# Patient Record
Sex: FEMALE | Race: WHITE | NOT HISPANIC OR LATINO | Employment: OTHER | URBAN - METROPOLITAN AREA
[De-identification: names, ages, dates, MRNs, and addresses within clinical notes are randomized per-mention and may not be internally consistent; named-entity substitution may affect disease eponyms.]

---

## 2017-01-03 ENCOUNTER — HOSPITAL ENCOUNTER (OUTPATIENT)
Dept: PREADMISSION TESTING | Facility: HOSPITAL | Age: 77
Discharge: HOME | End: 2017-01-03
Attending: ORTHOPAEDIC SURGERY
Payer: MEDICARE

## 2017-01-03 ENCOUNTER — HOSPITAL ENCOUNTER (OUTPATIENT)
Dept: RADIOLOGY | Facility: HOSPITAL | Age: 77
Discharge: HOME | End: 2017-01-03
Attending: ORTHOPAEDIC SURGERY
Payer: MEDICARE

## 2017-01-03 VITALS — HEIGHT: 64 IN | WEIGHT: 230 LBS | BODY MASS INDEX: 39.27 KG/M2

## 2017-01-03 DIAGNOSIS — Z01.818 PREOP EXAMINATION: ICD-10-CM

## 2017-01-03 DIAGNOSIS — M19.019 SHOULDER ARTHRITIS: Primary | ICD-10-CM

## 2017-01-03 LAB
ANION GAP SERPL CALC-SCNC: 8 MMOL/L
BACTERIA #/AREA URNS HPF: NORMAL /HPF
BASOPHILS # BLD AUTO: 0 K/UL
BASOPHILS NFR BLD: 0.3 %
BILIRUB UR QL STRIP: NEGATIVE
BUN SERPL-MCNC: 15 MG/DL
CALCIUM SERPL-MCNC: 9.3 MG/DL
CHLORIDE SERPL-SCNC: 108 MMOL/L
CLARITY UR: CLEAR
CO2 SERPL-SCNC: 24 MMOL/L
COLOR UR: YELLOW
CREAT SERPL-MCNC: 0.7 MG/DL
DIFFERENTIAL METHOD: ABNORMAL
EOSINOPHIL # BLD AUTO: 0.1 K/UL
EOSINOPHIL NFR BLD: 1.3 %
ERYTHROCYTE [DISTWIDTH] IN BLOOD BY AUTOMATED COUNT: 12.9 %
EST. GFR  (AFRICAN AMERICAN): >60 ML/MIN/1.73 M^2
EST. GFR  (NON AFRICAN AMERICAN): >60 ML/MIN/1.73 M^2
GLUCOSE SERPL-MCNC: 90 MG/DL
GLUCOSE UR QL STRIP: NEGATIVE
HCT VFR BLD AUTO: 39.1 %
HGB BLD-MCNC: 13.2 G/DL
HGB UR QL STRIP: NEGATIVE
KETONES UR QL STRIP: NEGATIVE
LEUKOCYTE ESTERASE UR QL STRIP: ABNORMAL
LYMPHOCYTES # BLD AUTO: 1.5 K/UL
LYMPHOCYTES NFR BLD: 22.1 %
MCH RBC QN AUTO: 31.2 PG
MCHC RBC AUTO-ENTMCNC: 33.8 %
MCV RBC AUTO: 92 FL
MICROSCOPIC COMMENT: NORMAL
MONOCYTES # BLD AUTO: 0.6 K/UL
MONOCYTES NFR BLD: 8.5 %
NEUTROPHILS # BLD AUTO: 4.5 K/UL
NEUTROPHILS NFR BLD: 67.8 %
NITRITE UR QL STRIP: NEGATIVE
PH UR STRIP: 6 [PH] (ref 5–8)
PLATELET # BLD AUTO: 199 K/UL
PMV BLD AUTO: 8.4 FL
POTASSIUM SERPL-SCNC: 4.3 MMOL/L
PROT UR QL STRIP: NEGATIVE
RBC # BLD AUTO: 4.23 M/UL
RBC #/AREA URNS HPF: 1 /HPF (ref 0–4)
SODIUM SERPL-SCNC: 140 MMOL/L
SP GR UR STRIP: 1.01 (ref 1–1.03)
SQUAMOUS #/AREA URNS HPF: 6 /HPF
URATE CRY URNS QL MICRO: NORMAL
URN SPEC COLLECT METH UR: ABNORMAL
UROBILINOGEN UR STRIP-ACNC: NEGATIVE EU/DL
WBC # BLD AUTO: 6.7 K/UL
WBC #/AREA URNS HPF: 5 /HPF (ref 0–5)

## 2017-01-03 PROCEDURE — 93005 ELECTROCARDIOGRAM TRACING: CPT

## 2017-01-03 PROCEDURE — 71020 XR CHEST PA AND LATERAL: CPT | Mod: TC

## 2017-01-03 PROCEDURE — 87081 CULTURE SCREEN ONLY: CPT

## 2017-01-03 PROCEDURE — 99900103 DSU ONLY-NO CHARGE-INITIAL HR (STAT)

## 2017-01-03 PROCEDURE — 36415 COLL VENOUS BLD VENIPUNCTURE: CPT

## 2017-01-03 PROCEDURE — 81000 URINALYSIS NONAUTO W/SCOPE: CPT

## 2017-01-03 PROCEDURE — 80048 BASIC METABOLIC PNL TOTAL CA: CPT

## 2017-01-03 PROCEDURE — 71020 XR CHEST PA AND LATERAL: CPT | Mod: 26,,, | Performed by: RADIOLOGY

## 2017-01-03 PROCEDURE — 99900104 DSU ONLY-NO CHARGE-EA ADD'L HR (STAT)

## 2017-01-03 PROCEDURE — 93010 ELECTROCARDIOGRAM REPORT: CPT | Mod: ,,, | Performed by: INTERNAL MEDICINE

## 2017-01-03 PROCEDURE — 85025 COMPLETE CBC W/AUTO DIFF WBC: CPT

## 2017-01-04 LAB — MRSA SPEC QL CULT: NORMAL

## 2017-01-05 ENCOUNTER — SURGERY (OUTPATIENT)
Age: 77
End: 2017-01-05

## 2017-01-05 PROBLEM — M51.36 DDD (DEGENERATIVE DISC DISEASE), LUMBAR: Status: ACTIVE | Noted: 2017-01-05

## 2017-01-05 PROBLEM — M51.369 DDD (DEGENERATIVE DISC DISEASE), LUMBAR: Status: ACTIVE | Noted: 2017-01-05

## 2017-01-05 RX ADMIN — LIDOCAINE HYDROCHLORIDE 10 ML: 10 INJECTION, SOLUTION EPIDURAL; INFILTRATION; INTRACAUDAL; PERINEURAL at 01:01

## 2017-01-05 RX ADMIN — DEXAMETHASONE SODIUM PHOSPHATE 10 MG: 10 INJECTION INTRAMUSCULAR; INTRAVENOUS at 01:01

## 2017-01-05 RX ADMIN — BUPIVACAINE HYDROCHLORIDE 3 ML: 2.5 INJECTION, SOLUTION EPIDURAL; INFILTRATION; INTRACAUDAL at 01:01

## 2017-01-05 RX ADMIN — FENTANYL CITRATE 50 MCG: 50 INJECTION, SOLUTION INTRAMUSCULAR; INTRAVENOUS at 01:01

## 2017-01-05 RX ADMIN — MIDAZOLAM HYDROCHLORIDE 2 MG: 1 INJECTION INTRAMUSCULAR; INTRAVENOUS at 01:01

## 2017-01-11 ENCOUNTER — ANESTHESIA EVENT (OUTPATIENT)
Dept: SURGERY | Facility: HOSPITAL | Age: 77
DRG: 483 | End: 2017-01-11
Payer: MEDICARE

## 2017-01-12 ENCOUNTER — ANESTHESIA (OUTPATIENT)
Dept: SURGERY | Facility: HOSPITAL | Age: 77
DRG: 483 | End: 2017-01-12
Payer: MEDICARE

## 2017-01-12 PROBLEM — Z96.611 STATUS POST REVERSE TOTAL REPLACEMENT OF RIGHT SHOULDER: Status: ACTIVE | Noted: 2017-01-12

## 2017-01-12 PROBLEM — M19.019 SHOULDER ARTHRITIS: Status: ACTIVE | Noted: 2017-01-12

## 2017-01-12 PROBLEM — M19.019 UNSPECIFIED ARTHROPATHY, SHOULDER REGION: Status: ACTIVE | Noted: 2017-01-12

## 2017-01-12 PROCEDURE — D9220A PRA ANESTHESIA: Mod: CRNA,,, | Performed by: NURSE ANESTHETIST, CERTIFIED REGISTERED

## 2017-01-12 PROCEDURE — 76942 ECHO GUIDE FOR BIOPSY: CPT | Performed by: ANESTHESIOLOGY

## 2017-01-12 PROCEDURE — 25000003 PHARM REV CODE 250: Performed by: ANESTHESIOLOGY

## 2017-01-12 PROCEDURE — 27200664 HC NERVE BLOCK COMPLETE KIT: Performed by: ANESTHESIOLOGY

## 2017-01-12 PROCEDURE — 63600175 PHARM REV CODE 636 W HCPCS: Performed by: NURSE ANESTHETIST, CERTIFIED REGISTERED

## 2017-01-12 PROCEDURE — 64416 NJX AA&/STRD BRCH PL NFS IMG: CPT | Mod: 59,RT,, | Performed by: ANESTHESIOLOGY

## 2017-01-12 PROCEDURE — D9220A PRA ANESTHESIA: Mod: ANES,,, | Performed by: ANESTHESIOLOGY

## 2017-01-12 PROCEDURE — 76942 ECHO GUIDE FOR BIOPSY: CPT | Mod: 26,,, | Performed by: ANESTHESIOLOGY

## 2017-01-12 PROCEDURE — 25000003 PHARM REV CODE 250: Performed by: NURSE ANESTHETIST, CERTIFIED REGISTERED

## 2017-01-12 RX ORDER — FENTANYL CITRATE 50 UG/ML
INJECTION, SOLUTION INTRAMUSCULAR; INTRAVENOUS
Status: DISCONTINUED | OUTPATIENT
Start: 2017-01-12 | End: 2017-01-12

## 2017-01-12 RX ORDER — LIDOCAINE HCL/PF 100 MG/5ML
SYRINGE (ML) INTRAVENOUS
Status: DISCONTINUED | OUTPATIENT
Start: 2017-01-12 | End: 2017-01-12

## 2017-01-12 RX ORDER — CEFAZOLIN SODIUM 1 G/3ML
INJECTION, POWDER, FOR SOLUTION INTRAMUSCULAR; INTRAVENOUS
Status: DISCONTINUED | OUTPATIENT
Start: 2017-01-12 | End: 2017-01-12

## 2017-01-12 RX ORDER — NEOSTIGMINE METHYLSULFATE 1 MG/ML
INJECTION, SOLUTION INTRAVENOUS
Status: DISCONTINUED | OUTPATIENT
Start: 2017-01-12 | End: 2017-01-12

## 2017-01-12 RX ORDER — ACETAMINOPHEN 10 MG/ML
INJECTION, SOLUTION INTRAVENOUS
Status: DISCONTINUED | OUTPATIENT
Start: 2017-01-12 | End: 2017-01-12

## 2017-01-12 RX ORDER — PROPOFOL 10 MG/ML
VIAL (ML) INTRAVENOUS
Status: DISCONTINUED | OUTPATIENT
Start: 2017-01-12 | End: 2017-01-12

## 2017-01-12 RX ORDER — SUCCINYLCHOLINE CHLORIDE 20 MG/ML
INJECTION INTRAMUSCULAR; INTRAVENOUS
Status: DISCONTINUED | OUTPATIENT
Start: 2017-01-12 | End: 2017-01-12

## 2017-01-12 RX ORDER — PHENYLEPHRINE HYDROCHLORIDE 10 MG/ML
INJECTION INTRAVENOUS
Status: DISCONTINUED | OUTPATIENT
Start: 2017-01-12 | End: 2017-01-12

## 2017-01-12 RX ORDER — ONDANSETRON HYDROCHLORIDE 2 MG/ML
INJECTION, SOLUTION INTRAMUSCULAR; INTRAVENOUS
Status: DISCONTINUED | OUTPATIENT
Start: 2017-01-12 | End: 2017-01-12

## 2017-01-12 RX ORDER — MIDAZOLAM HYDROCHLORIDE 1 MG/ML
INJECTION, SOLUTION INTRAMUSCULAR; INTRAVENOUS
Status: DISCONTINUED | OUTPATIENT
Start: 2017-01-12 | End: 2017-01-12

## 2017-01-12 RX ORDER — GLYCOPYRROLATE 0.2 MG/ML
INJECTION INTRAMUSCULAR; INTRAVENOUS
Status: DISCONTINUED | OUTPATIENT
Start: 2017-01-12 | End: 2017-01-12

## 2017-01-12 RX ORDER — ROCURONIUM BROMIDE 10 MG/ML
INJECTION, SOLUTION INTRAVENOUS
Status: DISCONTINUED | OUTPATIENT
Start: 2017-01-12 | End: 2017-01-12

## 2017-01-12 RX ADMIN — MIDAZOLAM 2 MG: 1 INJECTION INTRAMUSCULAR; INTRAVENOUS at 07:01

## 2017-01-12 RX ADMIN — SUCCINYLCHOLINE CHLORIDE 200 MG: 20 INJECTION, SOLUTION INTRAMUSCULAR; INTRAVENOUS at 07:01

## 2017-01-12 RX ADMIN — PHENYLEPHRINE HYDROCHLORIDE 200 MCG: 10 INJECTION INTRAVENOUS at 08:01

## 2017-01-12 RX ADMIN — GLYCOPYRROLATE 0.6 MG: 0.2 INJECTION, SOLUTION INTRAMUSCULAR; INTRAVENOUS at 10:01

## 2017-01-12 RX ADMIN — ONDANSETRON 4 MG: 2 INJECTION, SOLUTION INTRAMUSCULAR; INTRAVENOUS at 10:01

## 2017-01-12 RX ADMIN — FENTANYL CITRATE 50 MCG: 50 INJECTION, SOLUTION INTRAMUSCULAR; INTRAVENOUS at 10:01

## 2017-01-12 RX ADMIN — SODIUM CHLORIDE, SODIUM LACTATE, POTASSIUM CHLORIDE, AND CALCIUM CHLORIDE: .6; .31; .03; .02 INJECTION, SOLUTION INTRAVENOUS at 08:01

## 2017-01-12 RX ADMIN — GLYCOPYRROLATE 0.2 MG: 0.2 INJECTION, SOLUTION INTRAMUSCULAR; INTRAVENOUS at 08:01

## 2017-01-12 RX ADMIN — CEFAZOLIN 2 G: 1 INJECTION, POWDER, FOR SOLUTION INTRAVENOUS at 08:01

## 2017-01-12 RX ADMIN — EPHEDRINE SULFATE 10 MG: 50 INJECTION, SOLUTION INTRAMUSCULAR; INTRAVENOUS; SUBCUTANEOUS at 09:01

## 2017-01-12 RX ADMIN — FENTANYL CITRATE 50 MCG: 50 INJECTION, SOLUTION INTRAMUSCULAR; INTRAVENOUS at 11:01

## 2017-01-12 RX ADMIN — EPHEDRINE SULFATE 10 MG: 50 INJECTION, SOLUTION INTRAMUSCULAR; INTRAVENOUS; SUBCUTANEOUS at 11:01

## 2017-01-12 RX ADMIN — FENTANYL CITRATE 50 MCG: 50 INJECTION, SOLUTION INTRAMUSCULAR; INTRAVENOUS at 07:01

## 2017-01-12 RX ADMIN — EPHEDRINE SULFATE 10 MG: 50 INJECTION, SOLUTION INTRAMUSCULAR; INTRAVENOUS; SUBCUTANEOUS at 10:01

## 2017-01-12 RX ADMIN — ROCURONIUM BROMIDE 5 MG: 10 INJECTION, SOLUTION INTRAVENOUS at 07:01

## 2017-01-12 RX ADMIN — PROPOFOL 160 MG: 10 INJECTION, EMULSION INTRAVENOUS at 07:01

## 2017-01-12 RX ADMIN — LIDOCAINE HYDROCHLORIDE 50 MG: 20 INJECTION, SOLUTION INTRAVENOUS at 07:01

## 2017-01-12 RX ADMIN — EPHEDRINE SULFATE 10 MG: 50 INJECTION, SOLUTION INTRAMUSCULAR; INTRAVENOUS; SUBCUTANEOUS at 08:01

## 2017-01-12 RX ADMIN — NEOSTIGMINE METHYLSULFATE 5 MG: 1 INJECTION INTRAVENOUS at 10:01

## 2017-01-12 RX ADMIN — ACETAMINOPHEN 1000 MG: 10 INJECTION, SOLUTION INTRAVENOUS at 10:01

## 2017-01-12 NOTE — ANESTHESIA PROCEDURE NOTES
Peripheral    Patient location during procedure: pre-op   Block not for primary anesthetic.  Reason for block: at surgeon's request and post-op pain management   Post-op Pain Location: shoulder right  Start time: 1/12/2017 7:01 AM  Timeout: 1/12/2017 7:01 AM   End time: 1/12/2017 7:15 AM  Surgery related to: TSA right  Staffing  Anesthesiologist: KISHORE ARMENTA  Performed by: anesthesiologist   Preanesthetic Checklist  Completed: patient identified, site marked, surgical consent, pre-op evaluation, timeout performed, IV checked, risks and benefits discussed and monitors and equipment checked  Peripheral Block  Patient position: supine  Prep: ChloraPrep  Patient monitoring: cardiac monitor, continuous pulse ox and frequent blood pressure checks  Block type: interscalene  Laterality: right  Injection technique: continuous  Needle  Needle type: Tuohy   Needle gauge: 18 G  Needle length: 2 in  Needle localization: ultrasound guidance  Catheter type: non-stimulating  Catheter size: 20 G  Test dose: negative   -ultrasound image captured on disc.  Assessment  Injection assessment: negative aspiration, positive parasthesia and local visualized surrounding nerve  Paresthesia pain: immediately resolved  Heart rate change: no  Slow fractionated injection: yes  Medications:  Bolus administered: 25 mL of ropivacaine  Epinephrine added: none

## 2017-01-12 NOTE — ANESTHESIA POSTPROCEDURE EVALUATION
"Anesthesia Post Evaluation    Patient: Danna Sorensen    Procedure(s) Performed: Procedure(s) (LRB):  ARTHROPLASTY-SHOULDER REVERSE (Right)    Final Anesthesia Type: general  Patient location during evaluation: PACU  Patient participation: Yes- Able to Participate  Level of consciousness: awake and alert  Post-procedure vital signs: reviewed and stable  Pain management: adequate  Airway patency: patent  PONV status at discharge: No PONV  Anesthetic complications: no      Cardiovascular status: blood pressure returned to baseline  Respiratory status: unassisted  Hydration status: euvolemic  Follow-up not needed.        Visit Vitals    BP (!) 163/78    Pulse 94    Temp 36.1 °C (97 °F) (Oral)    Resp (!) 22    Ht 5' 4" (1.626 m)    Wt 104.3 kg (230 lb)    SpO2 100%    Breastfeeding No    BMI 39.48 kg/m2       Pain/Ronda Score: Pain Assessment Performed: Yes (1/12/2017 11:40 AM)  Presence of Pain: denies (1/12/2017 12:15 PM)  Pain Rating Prior to Med Admin: 0 (1/12/2017 11:47 AM)  Ronda Score: 8 (1/12/2017 12:15 PM)      "

## 2017-01-12 NOTE — ANESTHESIA PREPROCEDURE EVALUATION
01/12/2017  Danna Sorensen is a 76 y.o., female.    [facility] Anesthesia Evaluation    I have reviewed the Patient Summary Reports.    I have reviewed the Nursing Notes.      Review of Systems  Anesthesia Hx:  No problems with previous Anesthesia Denies Hx of Anesthetic complications    Cardiovascular:   CAD asymptomatic     Pulmonary:  Pulmonary Normal    Renal/:  Renal/ Normal     Hepatic/GI:  Hepatic/GI Normal    Musculoskeletal:   Arthritis     Endocrine:   Hypothyroidism        Physical Exam  General:  Morbid Obesity    Airway/Jaw/Neck:  Airway Findings: Mouth Opening: Normal Tongue: Normal  General Airway Assessment: Adult  Mallampati: III  Improves to II with phonation.  TM Distance: Normal, at least 6 cm  Jaw/Neck Findings:  Neck ROM: Normal ROM  Neck Findings:  Girth Increased      Dental:  Dental Findings: Upper Dentures, Periodontal disease, Severe   Chest/Lungs:  Chest/Lungs Findings: Clear to auscultation     Heart/Vascular:  Heart Findings: Rate: Normal  Rhythm: Regular Rhythm  Sounds: Normal             Anesthesia Plan  Type of Anesthesia, risks & benefits discussed:  Anesthesia Type:  general  Patient's Preference:   Intra-op Monitoring Plan:   Intra-op Monitoring Plan Comments:   Post Op Pain Control Plan:   Post Op Pain Control Plan Comments:   Induction:   IV  Beta Blocker:  Patient is not currently on a Beta-Blocker (No further documentation required).       Informed Consent: Patient understands risks and agrees with Anesthesia plan.  Questions answered. Anesthesia consent signed with patient.  ASA Score: 3     Day of Surgery Review of History & Physical:    H&P update referred to the surgeon.         Ready For Surgery From Anesthesia Perspective.

## 2017-01-12 NOTE — TRANSFER OF CARE
"Anesthesia Transfer of Care Note    Patient: Danna Sorensen    Procedure(s) Performed: Procedure(s) (LRB):  ARTHROPLASTY-SHOULDER REVERSE (Right)    Patient location: PACU    Anesthesia Type: general    Transport from OR: Transported from OR on 2-3 L/min O2 by NC with adequate spontaneous ventilation    Post pain: adequate analgesia    Post assessment: no apparent anesthetic complications    Post vital signs: stable    Level of consciousness: awake and alert    Nausea/Vomiting: no nausea/vomiting    Complications: none          Last vitals:   Visit Vitals    BP (!) 164/78 (BP Location: Left arm, Patient Position: Lying, BP Method: Automatic)    Pulse (!) 59    Temp 36.2 °C (97.1 °F) (Oral)    Resp 20    Ht 5' 4" (1.626 m)    Wt 104.3 kg (230 lb)    SpO2 97%    Breastfeeding No    BMI 39.48 kg/m2     "

## 2017-01-14 NOTE — ADDENDUM NOTE
Addendum  created 01/14/17 1524 by Law Lewis MD    Anesthesia Event edited, Procedure Event Log accessed, Sign clinical note

## 2017-01-14 NOTE — ANESTHESIA POST-OP PAIN MANAGEMENT
Anesthesia Acute Pain Service Follow up Note    Danna Sorensen  1940  [id number]    Procedure: Procedure(s) (LRB):  ARTHROPLASTY-SHOULDER REVERSE (Right)        Post Op Day #: 2    Catheter type: perineural  saphenous           Pt contacted by phone at number provided during surgical visit.  Pt reports good pain with perineural catheter in place and functional. Pt denies signs and symptoms  of local anesthetic toxicity, denies erythema or induration at catheter insertion site, states that dressing remains intact.  All questions were answered, no complications or issues were identified at this time.    Catheter Disposition: Removed   Catheter Tip: Intact    PANTERA RAMOS MD  Department of Anesthesiology   [facility]  Ext [phone]

## 2017-01-16 ENCOUNTER — TELEPHONE (OUTPATIENT)
Dept: ORTHOPEDICS | Facility: CLINIC | Age: 77
End: 2017-01-16

## 2017-01-16 NOTE — TELEPHONE ENCOUNTER
----- Message from Itzel Moreno sent at 1/16/2017  8:13 AM CST -----  Contact: Self - [phone]  Patient called requesting to speak with Dr. Lee or his staff about her recent right shoulder surgery. Please call back at [phone]

## 2017-01-17 ENCOUNTER — PATIENT OUTREACH (OUTPATIENT)
Dept: ADMINISTRATIVE | Facility: CLINIC | Age: 77
End: 2017-01-17
Payer: MEDICARE

## 2017-01-17 NOTE — PROGRESS NOTES
Spoke with patient and she wants to keep her appt with Dr. Carlisle on 1/30/17 and did not want to schedule any sooner with the GRETCHEN.

## 2017-01-17 NOTE — PATIENT INSTRUCTIONS
Reverse Total Shoulder Replacement  Reverse total shoulder replacement is a type of surgery. Its done to repair an injury to the rotator cuff.  Understanding the shoulder joint  The shoulder joint is where the ball-shaped part of the upper arm bone (humerus) meets the cup-shaped socket of the shoulder blade (scapula). A group of muscles and tendons hold the joint together. These muscles and tendons are called the rotator cuff. The muscles let you move your arm and shoulder.  Why reverse total shoulder replacement is done  The surgery may be needed if you have a complete tear of your rotator cuff. The tear may cause long-term problems with your shoulder joint. This is called cuff tear arthropathy. You may need reverse total shoulder replacement surgery if you have any of these:  · A complete tear in your rotator cuff  · Joint problems from the cuff tear (cuff tear arthropathy)  · Previous total shoulder replacement surgery that didnt relieve symptoms  · Severe pain and trouble moving your shoulder  · No success relieving your symptoms with treatments such as rest, medicines, cortisone injections, or physical therapy  How reverse total shoulder replacement is done  The surgery is the opposite (reverse) of standard total shoulder replacement surgery:  · In the standard surgery, the ball of the humerus is replaced with an artificial ball. The socket of the scapula is replaced with an artificial socket. The new joint still uses the rotator cuff muscles to move the arm and shoulder.  · With the reverse surgery, the ball of the humerus is replaced with an artificial socket. The socket of the scapula is replaced with an artificial ball. Since the rotator cuff muscles are damaged, another muscle (deltoid) moves the arm and shoulder.  Risks of reverse total shoulder replacement  Every surgery has risks. Risks for this surgery include:  · Infection  · Blood loss  · Damage to nerves that may make it hard to move your  arm  · Damage to the humerus or scapula  · Artificial joint moving out of position (dislocation)  · Problems with general anesthesia  Some risks may be higher if you have had shoulder surgery in the past. Your risks may vary depending on your shoulder problem and your overall health. Talk with your doctor about which risks apply most to you.  © 9439-0763 [employer]. [address] [place] [address], [place] [address]. All rights reserved. This information is not intended as a substitute for professional medical care. Always follow your healthcare professional's instructions.

## 2017-01-24 ENCOUNTER — DOCUMENTATION ONLY (OUTPATIENT)
Dept: FAMILY MEDICINE | Facility: CLINIC | Age: 77
End: 2017-01-24

## 2017-01-24 NOTE — PROGRESS NOTES
Pre-Visit Chart Review  For Appointment Scheduled on (date) 1/30/17    There are no preventive care reminders to display for this patient.

## 2017-01-26 ENCOUNTER — OFFICE VISIT (OUTPATIENT)
Dept: PAIN MEDICINE | Facility: CLINIC | Age: 77
End: 2017-01-26
Payer: MEDICARE

## 2017-01-26 VITALS
BODY MASS INDEX: 39.26 KG/M2 | WEIGHT: 229.94 LBS | SYSTOLIC BLOOD PRESSURE: 127 MMHG | DIASTOLIC BLOOD PRESSURE: 77 MMHG | HEART RATE: 64 BPM | HEIGHT: 64 IN

## 2017-01-26 DIAGNOSIS — M54.16 LUMBAR RADICULOPATHY: ICD-10-CM

## 2017-01-26 DIAGNOSIS — M25.511 RIGHT SHOULDER PAIN, UNSPECIFIED CHRONICITY: Primary | ICD-10-CM

## 2017-01-26 DIAGNOSIS — M51.36 DDD (DEGENERATIVE DISC DISEASE), LUMBAR: Primary | ICD-10-CM

## 2017-01-26 PROCEDURE — 1159F MED LIST DOCD IN RCRD: CPT | Mod: S$GLB,,, | Performed by: PHYSICIAN ASSISTANT

## 2017-01-26 PROCEDURE — 1160F RVW MEDS BY RX/DR IN RCRD: CPT | Mod: S$GLB,,, | Performed by: PHYSICIAN ASSISTANT

## 2017-01-26 PROCEDURE — 99214 OFFICE O/P EST MOD 30 MIN: CPT | Mod: S$GLB,,, | Performed by: PHYSICIAN ASSISTANT

## 2017-01-26 PROCEDURE — 1126F AMNT PAIN NOTED NONE PRSNT: CPT | Mod: S$GLB,,, | Performed by: PHYSICIAN ASSISTANT

## 2017-01-26 PROCEDURE — 99999 PR PBB SHADOW E&M-EST. PATIENT-LVL III: CPT | Mod: PBBFAC,,, | Performed by: PHYSICIAN ASSISTANT

## 2017-01-26 PROCEDURE — 1157F ADVNC CARE PLAN IN RCRD: CPT | Mod: S$GLB,,, | Performed by: PHYSICIAN ASSISTANT

## 2017-01-26 NOTE — PROGRESS NOTES
Referring Physician: No ref. provider found    PCP: Raymond Carlisle Jr, MD      CC: Lower back and right leg pain    Interval History:  Mr. Sorensen is a 76 y.o. female with a history of low back and right leg pain who presents today for f/u s/p right  lumbar TFESI at L3-4 and L4-5. Reports 100% relief of burning low back pain with radiation to right buttock to right knee. She is very happy with results. She has no new complaints today. Today pain is rated 0/10.  Pt has been seen in the clinic before, however pt is new to me.     History below per Dr. Gaitan    HPI:   42-year-old female referred to us for lower back and leg pain.  Pain has gradually worsened over the past 6 months.  No recent traumatic incident.  She has intermittent aching, BURNING pain in her lower back.  Pain radiates down her right buttock into her right lateral knee.  Pain worsens with standing, laying, lifting and getting up.  Pain improves with rest.  She has tried physical therapy with minimal benefit.  She has had hip injection with mild benefits.  X-rays performed in July showed lumbar DDD.  She has not had any lumbar MRIs.  She denies any weakness.  No bowel bladder changes.  She rates her pain 6/10 today.    ROS:  CONSTITUTIONAL: No fevers, chills, night sweats, wt. loss, appetite changes  SKIN: no rashes or itching  ENT: No headaches, head trauma, vision changes, or eye pain  LYMPH NODES: None noticed   CV: No chest pain, palpitations.   RESP: No shortness of breath, dyspnea on exertion, cough, wheezing, or hemoptysis  GI: No nausea, emesis, diarrhea, constipation, melena, hematochezia, pain.    : No dysuria, hematuria, urgency, or frequency   HEME: No easy bruising, bleeding problems  PSYCHIATRIC: No depression, anxiety, psychosis, hallucinations.  NEURO: No seizures, memory loss, dizziness or difficulty sleeping  MSK: +HPI      Past Medical History   Diagnosis Date    Back pain     Carpal tunnel syndrome     Cataract     Colon cancer      Coronary artery disease     Hypothyroidism     Obesity (BMI 30-39.9) 3/24/2016    Osteoarthritis     Osteoporosis     Personal history of colon cancer, stage III     Squamous cell carcinoma     Strabismus     Trouble in sleeping     Wears dentures      Uppers     Past Surgical History   Procedure Laterality Date    Tonsillectomy, adenoidectomy, bilateral myringotomy and tubes      Total knee arthroplasty Bilateral 08/1998     total replacements    Hemicolectomy       right    Colon surgery  06/2007    Hand tendon surgery Left     Tumor removal Left      left foot as a child    Colonoscopy N/A 10/18/2016     Procedure: COLONOSCOPY;  Surgeon: Rell Cordova MD;  [facility];  Service: Endoscopy;  Laterality: N/A;    Joint replacement       Bilateral knees    Tonsillectomy      Shoulder arthroscopy Right 01/12/2017     Reverse      Family History   Problem Relation Age of Onset    Hypertension Mother     Kidney disease Mother     Cataracts Father     Cataracts Sister     Cancer Sister      breast    No Known Problems Brother     Cancer Sister      breast    Arthritis Sister     Diabetes Maternal Uncle     Glaucoma Neg Hx     Amblyopia Neg Hx     Blindness Neg Hx     Macular degeneration Neg Hx     Retinal detachment Neg Hx     Strabismus Neg Hx     Stroke Neg Hx     Thyroid disease Neg Hx      Social History     Social History    Marital status: Single     Spouse name: N/A    Number of children: N/A    Years of education: N/A     Social History Main Topics    Smoking status: Former Smoker     Packs/day: 0.50     Years: 1.00     Types: Cigarettes     Start date: 7/27/1960     Quit date: 1/1/1961    Smokeless tobacco: Never Used    Alcohol use No    Drug use: No    Sexual activity: No     Other Topics Concern    None     Social History Narrative    None         Medications/Allergies: See med card    Vitals:    01/26/17 1258   BP: 127/77   Pulse: 64   Weight: 104.3  "kg (229 lb 15 oz)   Height: 5' 4" (1.626 m)   PainSc: 0-No pain         Physical exam:    GENERAL: A and O x3, the patient appears well groomed and is in no acute distress.  Skin: No rashes or obvious lesions  HEENT: normocephalic, atraumatic  CARDIOVASCULAR:  RRR  LUNGS: non labored breathing  ABDOMEN: soft, nontender   UPPER EXTREMITIES: Normal alignment, normal range of motion, no atrophy, no skin changes,  hair growth and nail growth normal and equal bilaterally. No swelling, no tenderness.    LOWER EXTREMITIES:  Normal alignment, normal range of motion, no atrophy, no skin changes,  hair growth and nail growth normal and equal bilaterally. No swelling, no tenderness.    LUMBAR SPINE  Lumbar spine: ROM is full with flexion extension and oblique extension with mild increased pain.    Gregory's test causes no increased pain on either side.    Supine straight leg raise is negative   Internal and external rotation of the hip causes no increased pain on either side.  Myofascial exam: No tenderness to palpation across lumbar paraspinous muscles.      MENTAL STATUS: normal orientation, speech, language, and fund of knowledge for social situation.  Emotional state appropriate.    CRANIAL NERVES:  II:  PERRL bilaterally,   III,IV,VI: EOMI.    V:  Facial sensation equal bilaterally  VII:  Facial motor function normal.  VIII:  Hearing equal to finger rub bilaterally  IX/X: Gag normal, palate symmetric  XI:  Shoulder shrug equal, head turn equal  XII:  Tongue midline without fasciculations      MOTOR: Tone and bulk: normal bilateral upper and lower Strength: normal   Delt Bi Tri WE WF     R 5 5 5 5 5 5   L 5 5 5 5 5 5     IP ADD ABD Quad TA Gas HAM  R 5 5 5 5 5 5 5  L 5 5 5 5 5 5 5    SENSATION: Light touch and pinprick intact bilaterally  REFLEXES: normal, symmetric, nonbrisk.  Toes down, no clonus. No hoffmans.  GAIT: normal rise, base, steps, and arm swing.        Imaging:  Xray L-spine 7/2016   A 5 views lumbar " spine demonstrate a transitional lumbosacral zone.  There is a mild levoscoliotic curvature.  Is anterior osteophyte motion throughout the lumbar region with loss of disc space height noted at L3/L4-L4/L5.  Prominent degenerative facet changes are present.     Assessment:  Ms. Sorensen is a 76 y.o. female with low back and right leg pain  1. DDD (degenerative disc disease), lumbar    2. Lumbar radiculopathy      Plan:  1. I have stressed the importance of physical activity and exercise to improve overall health  2. Reviewed lumbar MRI results  3. Will monitor progress. Will repeat if low back and RLE pain returns.   4. F/u prn

## 2017-01-27 ENCOUNTER — HOSPITAL ENCOUNTER (OUTPATIENT)
Dept: RADIOLOGY | Facility: HOSPITAL | Age: 77
Discharge: HOME | End: 2017-01-27
Attending: ORTHOPAEDIC SURGERY
Payer: MEDICARE

## 2017-01-27 ENCOUNTER — OFFICE VISIT (OUTPATIENT)
Dept: ORTHOPEDICS | Facility: CLINIC | Age: 77
End: 2017-01-27
Payer: MEDICARE

## 2017-01-27 VITALS
SYSTOLIC BLOOD PRESSURE: 164 MMHG | DIASTOLIC BLOOD PRESSURE: 68 MMHG | WEIGHT: 229 LBS | HEIGHT: 64 IN | BODY MASS INDEX: 39.09 KG/M2 | HEART RATE: 63 BPM

## 2017-01-27 DIAGNOSIS — M25.511 RIGHT SHOULDER PAIN, UNSPECIFIED CHRONICITY: ICD-10-CM

## 2017-01-27 DIAGNOSIS — Z96.611 S/P REVERSE TOTAL SHOULDER ARTHROPLASTY, RIGHT: Primary | ICD-10-CM

## 2017-01-27 PROCEDURE — 99999 PR PBB SHADOW E&M-EST. PATIENT-LVL III: CPT | Mod: PBBFAC,,, | Performed by: ORTHOPAEDIC SURGERY

## 2017-01-27 PROCEDURE — 73030 X-RAY EXAM OF SHOULDER: CPT | Mod: TC,PN,RT

## 2017-01-27 PROCEDURE — 73030 X-RAY EXAM OF SHOULDER: CPT | Mod: 26,RT,, | Performed by: RADIOLOGY

## 2017-01-27 PROCEDURE — 99024 POSTOP FOLLOW-UP VISIT: CPT | Mod: S$GLB,,, | Performed by: ORTHOPAEDIC SURGERY

## 2017-01-27 NOTE — MR AVS SNAPSHOT
[facility]  [place] [address]  Phone: [phone]                  Danna Sorensen   2017 8:30 AM   Office Visit    Description:  Female : 1940   Provider:  Jamal Lee MD   Department:  [facility]           Reason for Visit     Post-op Evaluation           Diagnoses this Visit        Comments    S/p reverse total shoulder arthroplasty, right    -  Primary            To Do List           Future Appointments        Provider Department Dept Phone    2017 8:00 AM Raymond Carlisle Jr., MD [facility] [phone]    2017 10:00 AM Jerrell Coy OT [facility] [phone]    2017 8:30 AM Jamal Lee MD [facility] [phone]    3/16/2017 10:00 AM KAM, [place] [facility] [phone]    3/16/2017 10:15 AM [facility] XR2 [facility] [phone]      Goals (5 Years of Data)     None      [facility]     [facility] Care [facility] -  Assistance  Registered nurses in the [facility] provide clinical advisement, health education, appointment booking, and other advisory services.  Call for this free service at [phone].             Medications           Message regarding Medications     Verify the changes and/or additions to your medication regime listed below are the same as discussed with your clinician today.  If any of these changes or additions are incorrect, please notify your healthcare provider.             Verify that the below list of medications is an accurate representation of the medications you are currently taking.  If none reported, the list may be blank. If incorrect, please contact your healthcare provider. Carry this list with you in case of emergency.           Current Medications     cyclobenzaprine (FLEXERIL) 5 MG tablet Take 5 mg by mouth 3 (three) times daily as needed for Muscle spasms.  "   hydrocodone-acetaminophen 10-325mg (NORCO)  mg Tab Take 1 tablet by mouth every 4 (four) hours as needed.    levothyroxine (SYNTHROID) 75 MCG tablet TAKE 1 TABLET(75 MCG) BY MOUTH EVERY DAY    nystatin (MYCOSTATIN) cream Apply topically 2 (two) times daily as needed. GRETCHEN EXT AA BID    lisinopril 10 MG tablet Take 1 tablet (10 mg total) by mouth once daily.           Clinical Reference Information           Vital Signs - Last Recorded  Most recent update: 1/27/2017  9:29 AM by Lanny Iniguez LPN    BP Pulse Ht Wt BMI    (!) 164/68 63 5' 4" (1.626 m) 103.9 kg (229 lb) 39.31 kg/m2      Blood Pressure          Most Recent Value    BP  (!)  164/68      Allergies as of 1/27/2017     Aspirin    Mobic [Meloxicam]    Oxaliplatin      Immunizations Administered on Date of Encounter - 1/27/2017     None      "

## 2017-01-27 NOTE — PROGRESS NOTES
Past Medical History   Diagnosis Date    Back pain     Carpal tunnel syndrome     Cataract     Colon cancer     Coronary artery disease     Hypothyroidism     Obesity (BMI 30-39.9) 3/24/2016    Osteoarthritis     Osteoporosis     Personal history of colon cancer, stage III     Squamous cell carcinoma     Strabismus     Trouble in sleeping     Wears dentures      Uppers       Past Surgical History   Procedure Laterality Date    Tonsillectomy, adenoidectomy, bilateral myringotomy and tubes      Total knee arthroplasty Bilateral 08/1998     total replacements    Hemicolectomy       right    Colon surgery  06/2007    Hand tendon surgery Left     Tumor removal Left      left foot as a child    Colonoscopy N/A 10/18/2016     Procedure: COLONOSCOPY;  Surgeon: Rell Cordova MD;  Location: [facility];  Service: Endoscopy;  Laterality: N/A;    Joint replacement       Bilateral knees    Tonsillectomy      Shoulder arthroscopy Right 01/12/2017     Reverse        Current Outpatient Prescriptions   Medication Sig    cyclobenzaprine (FLEXERIL) 5 MG tablet Take 5 mg by mouth 3 (three) times daily as needed for Muscle spasms.    hydrocodone-acetaminophen 10-325mg (NORCO)  mg Tab Take 1 tablet by mouth every 4 (four) hours as needed.    levothyroxine (SYNTHROID) 75 MCG tablet TAKE 1 TABLET(75 MCG) BY MOUTH EVERY DAY    nystatin (MYCOSTATIN) cream Apply topically 2 (two) times daily as needed. GRETCHEN EXT AA BID    lisinopril 10 MG tablet Take 1 tablet (10 mg total) by mouth once daily.     No current facility-administered medications for this visit.        Review of patient's allergies indicates:   Allergen Reactions    Aspirin      Other reaction(s): Stomach upset    Mobic [meloxicam] Swelling     Edema and elevated blood pressure     Oxaliplatin      Other reaction(s): Joint pain  Other reaction(s): Itching  Other reaction(s): Hives       Family History   Problem Relation Age of Onset     Hypertension Mother     Kidney disease Mother     Cataracts Father     Cataracts Sister     Cancer Sister      breast    No Known Problems Brother     Cancer Sister      breast    Arthritis Sister     Diabetes Maternal Uncle     Glaucoma Neg Hx     Amblyopia Neg Hx     Blindness Neg Hx     Macular degeneration Neg Hx     Retinal detachment Neg Hx     Strabismus Neg Hx     Stroke Neg Hx     Thyroid disease Neg Hx        Social History     Social History    Marital status: Single     Spouse name: N/A    Number of children: N/A    Years of education: N/A     Occupational History    Not on file.     Social History Main Topics    Smoking status: Former Smoker     Packs/day: 0.50     Years: 1.00     Types: Cigarettes     Start date: 7/27/1960     Quit date: 1/1/1961    Smokeless tobacco: Never Used    Alcohol use No    Drug use: No    Sexual activity: No     Other Topics Concern    Not on file     Social History Narrative       Chief Complaint:   Chief Complaint   Patient presents with    Post-op Evaluation     s/p right shoulder reverse total shoulder 1/12/17       Consulting Physician: No ref. provider found    History of present illness: This is a 76 y.o. year old female following up for a right reverse total shoulder arthroplasty done on 1/12/17.  She puts her pain at a 0 out of 10.  She is not using any pain medicine.  She states she's having no problems and is doing well.  She is in her immobilizer.      Review of Systems:    Constitution: Denies chills, fever, and sweats.    HENT: Denies headaches or blurry vision.    Cardiovascular: Denies chest pain or irregular heart beat.    Respiratory: Denies cough or shortness of breath.    Gastrointestinal: Denies abdominal pain, nausea, or vomiting.    Musculoskeletal:  Denies muscle cramps.    Neurological: Denies dizziness or focal weakness.    Psychiatric/Behavioral: Normal mental status.    Hematologic/Lymphatic: Denies bleeding problem or  easy bruising/bleeding.    Skin: Denies rash or suspicious lesions.      Examination:    Vital Signs:    Vitals:    01/27/17 0823   BP: (!) 164/68   Pulse: 63       Body mass index is 39.31 kg/(m^2).    This a well-developed, well nourished patient in no acute distress.    Alert and oriented and cooperative to examination.       Physical Exam: right shoulder    Inspection/Observation   Swelling:   mild  Erythema:   none  Bruising:   mild  Wounds:   Clean and dry  Drainage:  None    Range of Motion   Limited    Muscle Strength   Limited    Other   Sensation:  normal  Pulse:    palpable    Imaging: Normal post-operative XR     Assessment: S/p reverse total shoulder arthroplasty, right        Plan:  She looks good today.  We'll start her in physical therapy and see her back in 4 weeks' time.  I like her to stay in her immobilizer.      DISCLAIMER: This note may have been dictated using voice recognition software and may contain grammatical errors. Report sent to referring provider as required.

## 2017-01-30 ENCOUNTER — OFFICE VISIT (OUTPATIENT)
Dept: FAMILY MEDICINE | Facility: CLINIC | Age: 77
End: 2017-01-30
Payer: MEDICARE

## 2017-01-30 ENCOUNTER — CLINICAL SUPPORT (OUTPATIENT)
Dept: REHABILITATION | Facility: HOSPITAL | Age: 77
End: 2017-01-30
Attending: ORTHOPAEDIC SURGERY
Payer: MEDICARE

## 2017-01-30 VITALS
DIASTOLIC BLOOD PRESSURE: 73 MMHG | WEIGHT: 230.81 LBS | HEIGHT: 64 IN | SYSTOLIC BLOOD PRESSURE: 131 MMHG | TEMPERATURE: 98 F | BODY MASS INDEX: 39.4 KG/M2 | HEART RATE: 65 BPM

## 2017-01-30 DIAGNOSIS — M25.611 SHOULDER STIFFNESS, RIGHT: ICD-10-CM

## 2017-01-30 DIAGNOSIS — R29.898 SHOULDER WEAKNESS: ICD-10-CM

## 2017-01-30 DIAGNOSIS — E03.4 HYPOTHYROIDISM DUE TO ACQUIRED ATROPHY OF THYROID: ICD-10-CM

## 2017-01-30 DIAGNOSIS — M25.511 RIGHT SHOULDER PAIN, UNSPECIFIED CHRONICITY: Primary | ICD-10-CM

## 2017-01-30 DIAGNOSIS — I25.10 CORONARY ARTERY DISEASE INVOLVING NATIVE CORONARY ARTERY OF NATIVE HEART WITHOUT ANGINA PECTORIS: Primary | ICD-10-CM

## 2017-01-30 PROCEDURE — 1159F MED LIST DOCD IN RCRD: CPT | Mod: S$GLB,,, | Performed by: FAMILY MEDICINE

## 2017-01-30 PROCEDURE — 99213 OFFICE O/P EST LOW 20 MIN: CPT | Mod: S$GLB,,, | Performed by: FAMILY MEDICINE

## 2017-01-30 PROCEDURE — G8988 SELF CARE GOAL STATUS: HCPCS | Mod: CH,PN

## 2017-01-30 PROCEDURE — 1157F ADVNC CARE PLAN IN RCRD: CPT | Mod: S$GLB,,, | Performed by: FAMILY MEDICINE

## 2017-01-30 PROCEDURE — 1126F AMNT PAIN NOTED NONE PRSNT: CPT | Mod: S$GLB,,, | Performed by: FAMILY MEDICINE

## 2017-01-30 PROCEDURE — G8987 SELF CARE CURRENT STATUS: HCPCS | Mod: CM,PN

## 2017-01-30 PROCEDURE — 99999 PR PBB SHADOW E&M-EST. PATIENT-LVL III: CPT | Mod: PBBFAC,,, | Performed by: FAMILY MEDICINE

## 2017-01-30 PROCEDURE — 1160F RVW MEDS BY RX/DR IN RCRD: CPT | Mod: S$GLB,,, | Performed by: FAMILY MEDICINE

## 2017-01-30 PROCEDURE — 97165 OT EVAL LOW COMPLEX 30 MIN: CPT | Mod: PN

## 2017-01-30 PROCEDURE — 99499 UNLISTED E&M SERVICE: CPT | Mod: S$GLB,,, | Performed by: FAMILY MEDICINE

## 2017-01-30 RX ORDER — FUROSEMIDE 20 MG/1
20 TABLET ORAL DAILY PRN
Qty: 30 TABLET | Refills: 2 | Status: SHIPPED | OUTPATIENT
Start: 2017-01-30 | End: 2017-05-10 | Stop reason: SDUPTHER

## 2017-01-30 NOTE — PLAN OF CARE
TIME RECORD    Date: 01/30/2017    Start Time:  10  Stop Time:  1035    PROCEDURES:    TIMED  Procedure Time Min.    Start:  Stop:     Start:  Stop:     Start:  Stop:     Start:  Stop:          UNTIMED  Procedure Time Min.   eval Start:10  Stop:1035     Start:  Stop:      Total Timed Minutes:  0  Total Timed Units:  0  Total Untimed Units:  1  Charges Billed/# of units:  1    OUTPATIENT OCCUPATIONAL THERAPY   PATIENT EVALUATION  Onset Date: years ago  Primary Diagnosis: r reverse TSA  1. Right shoulder pain, unspecified chronicity     2. Shoulder stiffness, right     3. Shoulder weakness       Treatment Diagnosis: see above  Past Medical History   Diagnosis Date    Back pain     Carpal tunnel syndrome     Cataract     Colon cancer     Coronary artery disease     Hypothyroidism     Obesity (BMI 30-39.9) 3/24/2016    Osteoarthritis     Osteoporosis     Personal history of colon cancer, stage III     Squamous cell carcinoma     Strabismus     Trouble in sleeping     Wears dentures      Uppers     Precautions: universal, protocol  Prior Therapy: none  Medications: Danna Sorensen has a current medication list which includes the following prescription(s): cyclobenzaprine, furosemide, hydrocodone-acetaminophen 10-325mg, levothyroxine, lisinopril, and nystatin.  Nutrition:  Overweight  History of Present Illness: insidious onset, had above surgery 2 w 4 d ago; recently sent here  Prior Level of Function: Independent pre symptom onset  Social History: has rx for pain meds for r shoulder post op but not using currently, denies nicotine, drinks roughly 3 caffeine drinks daily  DME: no sling or swathe noted, pt. Reports one issued gave her a rash and that ortho is aware of this and told her to just keep arm by side instead  Functional Deficits Leading to Referral/Nature of Injury: decreased rom, use, and strength with ADL  Patient Therapy Goals: full painless use    Subjective     Danna Sorensen states  understanding of HEP importance and general anticipated progression of therapy.    Pain:  C5 ache  Rest 0/10, use n/a, sleep 2    Objective     Posture: claribel deferred, healed incision with some dried skin noted  Palpation: nt  Sensation: denies numbness, tingling, burning  Range of Motion:    r prom er at 0 abd 20 nt further   at 45 abd   20 nt further  l prom er at 0 abd  60   at 45 abd  80      ADL: per FIM pt. Has significant deficits mainly due to precautions  Hand Dominance: r  Job: retired  Duties: Normal self and home care tasks  Treatment: issued pendulums, scar massage; told to keep r ue by side at all times (except for HEP routine) to avoid subscapularis rupture and implant dislocation    Assessment       Initial Assessment (Pertinent finding, problem list and factors affecting outcome): Needs skilled OT to properly promote rom, use, and strength given type, nature, and extent of diagnosis  Rehab Potiential: excellent    stg 1. Pt. Will be I with HEP 2. Pt. Will have 2/10 pain with light use 3. Pt. Will have = prom of bilateral  shoulders   To enhance affected arm use with ADL      ltg 1. Pt. Will be I with d/c HEP 2. Pt. Will have 1/10 pain with all use 3. Pt. Will have roughly 3+/5 MMT for elevation to increase r ue functional use with ADL                                                                          4. Pt. Will be I with ADL    Plan     Certification Period: 1-30-17 to 7-17-17  Recommended Treatment Plan: 3 times per week for 24 weeks: eval and tx  Other Recommendations: understanding of HEP importance and general anticipated progression of therapy  FIM  Current g code CM max a % impairment  Goal g code      CH independent 0% impairment    Therapist: Jerrell Coy, OT/CHT    I CERTIFY THE NEED FOR THESE SERVICES FURNISHED UNDER THIS PLAN OF TREATMENT AND WHILE UNDER MY CARE    Physician's comments:  ________________________________________________________________________________________________________________________________________________      Physician's Name: ___________________________________

## 2017-02-04 NOTE — PROGRESS NOTES
Subjective:       Patient ID: Danna Sorensen is a 76 y.o. female.    Chief Complaint: Coronary Artery Disease and Hypothyroidism    HPI Comments: Patient presents here for follow-up of coronary artery disease and hypothyroidism.  She just underwent a right shoulder replacement on 1/12/17 as well as an epidural steroid injection in the lumbar spine on 1/5/17.  She has had good results with both procedures.  As far as the shoulder, she is scheduled to start therapy today.  She is not having a lot of pain in the shoulder at this time.  She did not have any problems perioperatively.  Her coronary artery disease is stable without any chest pains or palpitations.  She states she is following her low-sodium, low-fat low-cholesterol diet.  She also has a history of hypothyroidism which is stable; her last TSH was normal in March 2016.  She is up-to-date with all of her routine screening exams and immunizations.    Review of Systems   Constitutional: Negative for chills, fever and unexpected weight change.   Respiratory: Negative for cough, chest tightness and shortness of breath.    Cardiovascular: Negative for chest pain and palpitations.   Gastrointestinal: Negative for diarrhea, nausea and vomiting.   Genitourinary: Negative for difficulty urinating, dysuria and frequency.   Musculoskeletal: Positive for arthralgias (right shoulder status post shoulder surgery). Negative for back pain.   Neurological: Negative for dizziness, light-headedness and headaches.       Objective:      Physical Exam   Constitutional: She is oriented to person, place, and time.   HENT:   Right Ear: External ear normal.   Left Ear: External ear normal.   Nose: Nose normal.   Mouth/Throat: Oropharynx is clear and moist.   Neck: Normal range of motion. Neck supple. No thyromegaly present.   Cardiovascular: Normal rate, regular rhythm, normal heart sounds and intact distal pulses.    No murmur heard.  Pulmonary/Chest: Effort normal and breath sounds  normal. She has no wheezes. She has no rales.   Lymphadenopathy:     She has no cervical adenopathy.   Neurological: She is alert and oriented to person, place, and time. No cranial nerve deficit.   Vitals reviewed.      Assessment:       1. Coronary artery disease involving native coronary artery of native heart without angina pectoris    2. Hypothyroidism due to acquired atrophy of thyroid        Plan:       1.  Continue present medications as her coronary artery disease and hypothyroidism are stable  2.  Continue following her low-sodium, low-fat low-cholesterol diet  3.  Begin physical therapy for her shoulder replacement  4.  Patient is encouraged to lose weight  5.  Follow-up with me in 6 months or when necessary

## 2017-02-14 ENCOUNTER — CLINICAL SUPPORT (OUTPATIENT)
Dept: REHABILITATION | Facility: HOSPITAL | Age: 77
End: 2017-02-14
Attending: ORTHOPAEDIC SURGERY
Payer: MEDICARE

## 2017-02-14 DIAGNOSIS — M25.511 RIGHT SHOULDER PAIN, UNSPECIFIED CHRONICITY: Primary | ICD-10-CM

## 2017-02-14 DIAGNOSIS — R29.898 SHOULDER WEAKNESS: ICD-10-CM

## 2017-02-14 DIAGNOSIS — M25.611 SHOULDER STIFFNESS, RIGHT: ICD-10-CM

## 2017-02-14 PROCEDURE — 97110 THERAPEUTIC EXERCISES: CPT | Mod: PN

## 2017-02-14 NOTE — PROGRESS NOTES
Time in 9  Time out 10      Timed units  4 therex                              Time:9-10        S: understands basic concept of tissue reorientation, collagen reorganization, etc. associated with low load prolonged stretching; understands proper ice use for pain control and to use pain meds only to sleep at night to prevent overstretching during waking hours  Pain:continues to decrease in intensity and frequency    O:  r prom er at 0 abd 30   at 45 abd 40    at 90 abd  40     slir 40         func ir back pocket  l prom er at 0 abd  80   at 45 abd 90   at  90 abd  90    slir 80         func ir 15' lift off    HEP: added er 0 abd and sleeper stretches, d/cd pendulums    manual tx: n/a    prom as tolerated-pain free: n/a    stretches as tolerated-pain free: er 0, 45 abd; sleeper    theraband 2 x 20:  n/a          S/p 5 w 5 d  A:resolving stiffness of shoulder complex imperative to maximize functional rom and use of r ue long term            P: slowly add remaining gh stretches to improve above planes of motion; integrate scapula, cuff, and deltoid PRE into clinic visits

## 2017-02-21 ENCOUNTER — CLINICAL SUPPORT (OUTPATIENT)
Dept: REHABILITATION | Facility: HOSPITAL | Age: 77
End: 2017-02-21
Attending: ORTHOPAEDIC SURGERY
Payer: MEDICARE

## 2017-02-21 DIAGNOSIS — M25.511 RIGHT SHOULDER PAIN, UNSPECIFIED CHRONICITY: Primary | ICD-10-CM

## 2017-02-21 DIAGNOSIS — R29.898 SHOULDER WEAKNESS: ICD-10-CM

## 2017-02-21 DIAGNOSIS — M25.611 SHOULDER STIFFNESS, RIGHT: ICD-10-CM

## 2017-02-21 PROCEDURE — 97110 THERAPEUTIC EXERCISES: CPT | Mod: PN

## 2017-02-21 NOTE — PROGRESS NOTES
"Time in 9  Time out 10      Timed units  4 therex                              Time:9-10      S: no new issues, doing HEP  Pain:continues to decrease in intensity and frequency    O:  r prom er at 0 abd 54  at 45 abd 50    at 90 abd 40      slir  60        func ir psis  l prom er at 0 abd 80    at 45 abd 90   at 90 abd 90      slir 80        func ir 15" lift off    HEP: added er 45 abd, functional ir stretch    manual tx: n/a    prom as tolerated-pain free: n/a    stretches as tolerated-pain free: er 0, 45, 90 abd; sleeper    theraband 2 x 20:  n/a    ube: level 1 x 10'    A: shoulder complex stiffness decreasing nicely, scar with good pliability, light use with self care below shoulder level continues to improve            P:fix shoulder complex stiffness; slowly integrate, scapula, cuff, and deltoid PRE into clinic sessions  "

## 2017-02-24 ENCOUNTER — OFFICE VISIT (OUTPATIENT)
Dept: ORTHOPEDICS | Facility: CLINIC | Age: 77
End: 2017-02-24
Payer: MEDICARE

## 2017-02-24 VITALS
WEIGHT: 229 LBS | HEIGHT: 64 IN | SYSTOLIC BLOOD PRESSURE: 151 MMHG | HEART RATE: 56 BPM | DIASTOLIC BLOOD PRESSURE: 69 MMHG | BODY MASS INDEX: 39.09 KG/M2

## 2017-02-24 DIAGNOSIS — Z96.611 S/P REVERSE TOTAL SHOULDER ARTHROPLASTY, RIGHT: Primary | ICD-10-CM

## 2017-02-24 PROCEDURE — 99024 POSTOP FOLLOW-UP VISIT: CPT | Mod: S$GLB,,, | Performed by: ORTHOPAEDIC SURGERY

## 2017-02-24 PROCEDURE — 99999 PR PBB SHADOW E&M-EST. PATIENT-LVL III: CPT | Mod: PBBFAC,,, | Performed by: ORTHOPAEDIC SURGERY

## 2017-02-25 NOTE — PROGRESS NOTES
Past Medical History:   Diagnosis Date    Back pain     Carpal tunnel syndrome     Cataract     Colon cancer     Coronary artery disease     Hypothyroidism     Obesity (BMI 30-39.9) 3/24/2016    Osteoarthritis     Osteoporosis     Personal history of colon cancer, stage III     Squamous cell carcinoma     Strabismus     Trouble in sleeping     Wears dentures     Uppers       Past Surgical History:   Procedure Laterality Date    COLON SURGERY  06/2007    COLONOSCOPY N/A 10/18/2016    Procedure: COLONOSCOPY;  Surgeon: Rell Cordova MD;  [facility];  Service: Endoscopy;  Laterality: N/A;    HAND TENDON SURGERY Left     HEMICOLECTOMY      right    JOINT REPLACEMENT      Bilateral knees    SHOULDER ARTHROSCOPY Right 01/12/2017    Reverse     TONSILLECTOMY      TONSILLECTOMY, ADENOIDECTOMY, BILATERAL MYRINGOTOMY AND TUBES      TOTAL KNEE ARTHROPLASTY Bilateral 08/1998    total replacements    TUMOR REMOVAL Left     left foot as a child       Current Outpatient Prescriptions   Medication Sig    cyclobenzaprine (FLEXERIL) 5 MG tablet Take 5 mg by mouth 3 (three) times daily as needed for Muscle spasms.    furosemide (LASIX) 20 MG tablet Take 1 tablet (20 mg total) by mouth daily as needed.    hydrocodone-acetaminophen 10-325mg (NORCO)  mg Tab Take 1 tablet by mouth every 4 (four) hours as needed.    levothyroxine (SYNTHROID) 75 MCG tablet TAKE 1 TABLET(75 MCG) BY MOUTH EVERY DAY    nystatin (MYCOSTATIN) cream Apply topically 2 (two) times daily as needed. GRETCHEN EXT AA BID    lisinopril 10 MG tablet Take 1 tablet (10 mg total) by mouth once daily.     No current facility-administered medications for this visit.        Review of patient's allergies indicates:   Allergen Reactions    Aspirin      Other reaction(s): Stomach upset    Mobic [meloxicam] Swelling     Edema and elevated blood pressure     Oxaliplatin      Other reaction(s): Joint pain  Other reaction(s): Itching  Other  reaction(s): Hives       Family History   Problem Relation Age of Onset    Hypertension Mother     Kidney disease Mother     Cataracts Father     Cataracts Sister     Cancer Sister      breast    No Known Problems Brother     Cancer Sister      breast    Arthritis Sister     Diabetes Maternal Uncle     Glaucoma Neg Hx     Amblyopia Neg Hx     Blindness Neg Hx     Macular degeneration Neg Hx     Retinal detachment Neg Hx     Strabismus Neg Hx     Stroke Neg Hx     Thyroid disease Neg Hx        Social History     Social History    Marital status: Single     Spouse name: N/A    Number of children: N/A    Years of education: N/A     Occupational History    Not on file.     Social History Main Topics    Smoking status: Former Smoker     Packs/day: 0.50     Years: 1.00     Types: Cigarettes     Start date: 7/27/1960     Quit date: 1/1/1961    Smokeless tobacco: Never Used    Alcohol use No    Drug use: No    Sexual activity: No     Other Topics Concern    Not on file     Social History Narrative       Chief Complaint:   Chief Complaint   Patient presents with    Right Shoulder - Post-op Evaluation       Consulting Physician: No ref. provider found    History of present illness: This is a 76 y.o. year old female following up for a right reverse total shoulder arthroplasty done on 1/12/17.  She puts her pain at a 0 out of 10.  She is not using any pain medicine.  She states she's having no problems and is doing well.  She is doing PT and HEP.      Review of Systems:    Constitution: Denies chills, fever, and sweats.    HENT: Denies headaches or blurry vision.    Cardiovascular: Denies chest pain or irregular heart beat.    Respiratory: Denies cough or shortness of breath.    Gastrointestinal: Denies abdominal pain, nausea, or vomiting.    Musculoskeletal:  Denies muscle cramps.    Neurological: Denies dizziness or focal weakness.    Psychiatric/Behavioral: Normal mental  status.    Hematologic/Lymphatic: Denies bleeding problem or easy bruising/bleeding.    Skin: Denies rash or suspicious lesions.      Examination:    Vital Signs:    Vitals:    02/24/17 0840   BP: (!) 151/69   Pulse: (!) 56       Body mass index is 39.31 kg/(m^2).    This a well-developed, well nourished patient in no acute distress.    Alert and oriented and cooperative to examination.       Physical Exam: right shoulder    Inspection/Observation   Swelling:   mild  Erythema:   none  Bruising:   none  Wounds:   healed  Drainage:  None    Range of Motion   30/140    Muscle Strength   Limited    Other   Sensation:  normal  Pulse:    palpable    Imaging:      Assessment: S/p reverse total shoulder arthroplasty, right        Plan:  She looks good and we will continue PT. Back in 6 weeks.    DISCLAIMER: This note may have been dictated using voice recognition software and may contain grammatical errors. Report sent to referring provider as required.

## 2017-03-02 ENCOUNTER — CLINICAL SUPPORT (OUTPATIENT)
Dept: REHABILITATION | Facility: HOSPITAL | Age: 77
End: 2017-03-02
Attending: ORTHOPAEDIC SURGERY
Payer: MEDICARE

## 2017-03-02 DIAGNOSIS — M25.511 RIGHT SHOULDER PAIN, UNSPECIFIED CHRONICITY: Primary | ICD-10-CM

## 2017-03-02 DIAGNOSIS — M25.611 SHOULDER STIFFNESS, RIGHT: ICD-10-CM

## 2017-03-02 DIAGNOSIS — R29.898 SHOULDER WEAKNESS: ICD-10-CM

## 2017-03-02 PROCEDURE — 97140 MANUAL THERAPY 1/> REGIONS: CPT | Mod: PN

## 2017-03-02 PROCEDURE — 97110 THERAPEUTIC EXERCISES: CPT | Mod: PN

## 2017-03-02 NOTE — PROGRESS NOTES
"Time in 10  Time out 11      Timed units  2 manual                           Time:  2 therex                              Time:1030-11      S:doing HEP, light use below shoulder level and sleep quality improving  Pain:continues to decrease in intensity and frequency    O:  r prom er at 0 abd  60  at 45 abd 60    at 90 abd 60      slir 60         func ir psis  l prom er at 0 abd 80    at 45 abd  90  At  90 abd  90    Slir  80        func ir 15" lift off    HEP: added er 90 abd stretch    manual tx: gh prom circumduction    prom as tolerated-pain free: supine abd    stretches as tolerated-pain free: supine flex; er 0, 45, 90 abd; sleeper, functional ir    theraband 2 x 20:  n/a            A:shoulder complex stiffness consistently decreasing; progress in rehab is satisfactory considering date of symptom onset, surgery, and first day of OT             P: fix shoulder complex stiffness then issue overhead stretches as needed; slowly integrate scapula, cuff, and deltoid PRE  "

## 2017-03-07 ENCOUNTER — CLINICAL SUPPORT (OUTPATIENT)
Dept: REHABILITATION | Facility: HOSPITAL | Age: 77
End: 2017-03-07
Attending: ORTHOPAEDIC SURGERY
Payer: MEDICARE

## 2017-03-07 DIAGNOSIS — M25.611 SHOULDER STIFFNESS, RIGHT: ICD-10-CM

## 2017-03-07 DIAGNOSIS — M25.511 RIGHT SHOULDER PAIN, UNSPECIFIED CHRONICITY: Primary | ICD-10-CM

## 2017-03-07 DIAGNOSIS — R29.898 SHOULDER WEAKNESS: ICD-10-CM

## 2017-03-07 PROCEDURE — 97110 THERAPEUTIC EXERCISES: CPT | Mod: PN

## 2017-03-07 NOTE — PROGRESS NOTES
"Time in 9  Time out 10        Timed units  4 therex                              Time:9-10    S:no new issues, basic use with ADL improving however active elevation still very limited; understands as stiffness resolves and strengthening is done, active elevation will improve  Pain:continues to decrease in intensity and frequency    O:  r prom er at 0 abd 60   at 45 abd 60    at 90 abd  60     slir  70        func ir 6" lift off  l prom er at 0 abd 80    at 45 abd 90   At  90 abd 90     Slir  80        func ir 15" lift off    HEP: reviewed likely tx progression    manual tx: n/a    prom as tolerated-pain free: n/a    stretches as tolerated-pain free: er 0, 45, 90 abd; functional ir    theraband 2 x 20:  n/a        A: shoulder stiffness decreasing nicely, light use below shoulder level improving            P:fix all shoulder complex tightness    OCCUPATIONAL THERAPY PROGRESS UPDATE      Onset Date:  Years ago  SOC Date:  1-30-17  Primary Diagnosis:  r reverse TSA  Treatment Diagnosis:  r shoulder pain, stiffness, weakness  Precautions:  Universal, protocol  Visits from SOC:  5  Functional Level Prior to SOC:  Gradual decrease in rom, use, and strength; pre symptoms had full use of arm    Updated Assessment:  Overall shoulder tightness with good reduction, mild intermittent C5 ache noted with use, looks consistent with HEP    Goals: all ongoing    Reasons for Continuation of Therapy:  Continued skilled and structured formal rehab imperative to properly restore strong, painless, and balanced mechanics and full use long term    Recommended Treatment Plan: eval and tx  "

## 2017-03-10 ENCOUNTER — CLINICAL SUPPORT (OUTPATIENT)
Dept: REHABILITATION | Facility: HOSPITAL | Age: 77
End: 2017-03-10
Attending: ORTHOPAEDIC SURGERY
Payer: MEDICARE

## 2017-03-10 DIAGNOSIS — R29.898 SHOULDER WEAKNESS: ICD-10-CM

## 2017-03-10 DIAGNOSIS — M25.511 RIGHT SHOULDER PAIN, UNSPECIFIED CHRONICITY: Primary | ICD-10-CM

## 2017-03-10 DIAGNOSIS — M25.611 SHOULDER STIFFNESS, RIGHT: ICD-10-CM

## 2017-03-10 PROCEDURE — 97140 MANUAL THERAPY 1/> REGIONS: CPT | Mod: PN

## 2017-03-10 NOTE — PROGRESS NOTES
Time in 9  Time out 10      Timed units  4 therex                              Time:9-10    S:no new c/o, doing HEP as advised, continues to report increased ADL independence  Pain:continues to decrease in intensity and frequency    O:  HEP: reviewed HEP and reiterated purpose, precautions, performance, and dosage    manual tx: n/a    prom as tolerated-pain free: n/a    stretches as tolerated-pain free: er 45, 90 abd; sleeper    theraband 2 x 20:  yellow add, ir 0 abd    isometrics 2 x 20: abd, ext arm by side    s/p 9 w 1 d    A:shoulder complex stiffness decreasing nicely; progressive scapula and anterior cuff theraband exercises as well as deltoid isometrics critical to properly advance active elevation, HADD, and reaching behind body with ADL            P:test prom er x 3 and ir x 2, PRE as flexibility allows, fix shoulder complex stiffness

## 2017-03-14 ENCOUNTER — CLINICAL SUPPORT (OUTPATIENT)
Dept: REHABILITATION | Facility: HOSPITAL | Age: 77
End: 2017-03-14
Attending: ORTHOPAEDIC SURGERY
Payer: MEDICARE

## 2017-03-14 DIAGNOSIS — R29.898 SHOULDER WEAKNESS: ICD-10-CM

## 2017-03-14 DIAGNOSIS — M25.611 SHOULDER STIFFNESS, RIGHT: ICD-10-CM

## 2017-03-14 DIAGNOSIS — M25.511 RIGHT SHOULDER PAIN, UNSPECIFIED CHRONICITY: Primary | ICD-10-CM

## 2017-03-14 PROCEDURE — 97140 MANUAL THERAPY 1/> REGIONS: CPT | Mod: PN

## 2017-03-14 PROCEDURE — 97110 THERAPEUTIC EXERCISES: CPT | Mod: PN

## 2017-03-14 NOTE — PROGRESS NOTES
"Time in 10  Time out 11      Timed units  2 manual                              Time:  2 therex                              Time:1030-11      S:using r ue more for basic ADL vs 2 weeks ago  Pain:continues to decrease in intensity and frequency    O:  r prom er at 0 abd 60   at 45 abd 60    at 90 abd 60      slir 70         func ir 15" lift off  l prom er at 0 abd 80    at 45 abd 90   at  90 abd 90      slir 80         func ir 15" lift off    HEP: reviewed    manual tx:     prom circumduction, bursal like massage abd 1 and 2    prom as tolerated-pain free: n/a    stretches as tolerated-pain free: er 45, 90 abd    theraband 2 x 20:  yellow depression, add, ir 0 abd    Isometrics 2 x 20:  abd, ext arm by side        A:shoulder complex continues to loosen, functional use below shoulder level improving nicely            P:resolve shoulder complex stiffness, progress shoulder complex PRE to facilitate correct mechanics and good overhead shoulder use long term  "

## 2017-03-16 ENCOUNTER — HOSPITAL ENCOUNTER (OUTPATIENT)
Dept: RADIOLOGY | Facility: HOSPITAL | Age: 77
Discharge: HOME | End: 2017-03-16
Attending: NURSE PRACTITIONER
Payer: MEDICARE

## 2017-03-16 DIAGNOSIS — C18.9 COLON CANCER: ICD-10-CM

## 2017-03-16 PROCEDURE — 71020 XR CHEST PA AND LATERAL: CPT | Mod: TC,PO

## 2017-03-16 PROCEDURE — 71020 XR CHEST PA AND LATERAL: CPT | Mod: 26,,, | Performed by: RADIOLOGY

## 2017-03-17 ENCOUNTER — OFFICE VISIT (OUTPATIENT)
Dept: HEMATOLOGY/ONCOLOGY | Facility: CLINIC | Age: 77
End: 2017-03-17
Payer: MEDICARE

## 2017-03-17 ENCOUNTER — CLINICAL SUPPORT (OUTPATIENT)
Dept: REHABILITATION | Facility: HOSPITAL | Age: 77
End: 2017-03-17
Attending: ORTHOPAEDIC SURGERY
Payer: MEDICARE

## 2017-03-17 VITALS
RESPIRATION RATE: 20 BRPM | HEART RATE: 64 BPM | HEIGHT: 64 IN | BODY MASS INDEX: 39.75 KG/M2 | TEMPERATURE: 98 F | WEIGHT: 232.81 LBS | DIASTOLIC BLOOD PRESSURE: 70 MMHG | SYSTOLIC BLOOD PRESSURE: 129 MMHG

## 2017-03-17 DIAGNOSIS — M25.511 RIGHT SHOULDER PAIN, UNSPECIFIED CHRONICITY: Primary | ICD-10-CM

## 2017-03-17 DIAGNOSIS — M25.611 SHOULDER STIFFNESS, RIGHT: ICD-10-CM

## 2017-03-17 DIAGNOSIS — R29.898 SHOULDER WEAKNESS: ICD-10-CM

## 2017-03-17 DIAGNOSIS — C18.9 MALIGNANT NEOPLASM OF COLON, UNSPECIFIED PART OF COLON: Primary | ICD-10-CM

## 2017-03-17 PROCEDURE — 1157F ADVNC CARE PLAN IN RCRD: CPT | Mod: S$GLB,,, | Performed by: NURSE PRACTITIONER

## 2017-03-17 PROCEDURE — 97140 MANUAL THERAPY 1/> REGIONS: CPT | Mod: PN

## 2017-03-17 PROCEDURE — 99213 OFFICE O/P EST LOW 20 MIN: CPT | Mod: S$GLB,,, | Performed by: NURSE PRACTITIONER

## 2017-03-17 PROCEDURE — 1126F AMNT PAIN NOTED NONE PRSNT: CPT | Mod: S$GLB,,, | Performed by: NURSE PRACTITIONER

## 2017-03-17 PROCEDURE — 97110 THERAPEUTIC EXERCISES: CPT | Mod: PN

## 2017-03-17 PROCEDURE — 99499 UNLISTED E&M SERVICE: CPT | Mod: S$GLB,,, | Performed by: NURSE PRACTITIONER

## 2017-03-17 PROCEDURE — 1160F RVW MEDS BY RX/DR IN RCRD: CPT | Mod: S$GLB,,, | Performed by: NURSE PRACTITIONER

## 2017-03-17 PROCEDURE — 1159F MED LIST DOCD IN RCRD: CPT | Mod: S$GLB,,, | Performed by: NURSE PRACTITIONER

## 2017-03-17 PROCEDURE — 99999 PR PBB SHADOW E&M-EST. PATIENT-LVL III: CPT | Mod: PBBFAC,,, | Performed by: NURSE PRACTITIONER

## 2017-03-17 RX ORDER — LISINOPRIL 10 MG/1
TABLET ORAL
COMMUNITY
Start: 2017-02-20 | End: 2017-04-12 | Stop reason: SDUPTHER

## 2017-03-17 NOTE — PROGRESS NOTES
HISTORY OF PRESENT ILLNESS:  This is a 76-year-old white female known to Dr. Gilmore for stage III adenocarcinoma of the colon.  She is status post resection   and 12 cycles of adjuvant FOLFOX.  She presents to the clinic today for her   annual evaluation.  She is now out 120 months (10 years) post-therapy.  She   states she is active and receiving physical therapy as she had a right shoulder   arthroplasty on 01/12/2017.  She denies any difficulties with fevers, chills,   drenching night sweats, changes in the caliber or character of her stool,   abdominal discomfort or bloating, nausea, vomiting, constipation, diarrhea,   unexplained weight loss, irregular heartbeat, chest pain, bleeding, etc.  No   other new complaints or pertinent findings on a 14-point review of systems.    PHYSICAL EXAMINATION:  GENERAL:  Well-developed, morbidly obese white female in no acute distress.    Alert and oriented x3.  VITAL SIGNS:  Weight 232.8 pounds (gain of 12.5 pounds in one year),   /70, pulse 64, respirations 20, temp 97.5.  HEENT:  Normocephalic, atraumatic.  Oral mucosa pink and moist.  Lips without   lesions.  Tongue midline.  Oropharynx clear.  Nonicteric sclerae.  NECK:  Supple, no adenopathy.  No carotid bruits, thyromegaly or thyroid nodule.  HEART:  Regular rate and rhythm without murmur, gallop or rub.  LUNGS:  Clear to auscultation bilaterally.  Normal respiratory effort.  ABDOMEN:  Soft, nontender, nondistended with positive normoactive bowel sounds,   no hepatosplenomegaly.  EXTREMITIES:  No cyanosis, clubbing or edema.  Distal pulses are intact.  AXILLAE AND GROIN:  No palpable pathologic lymphadenopathy is appreciated.  SKIN:  Intact/turgor normal.  NEUROLOGIC:  Cranial nerves II-XII grossly intact.  Motor:  Good muscle bulk and   tone.  Strength/sensory 5/5 throughout.  Gait stable.    LABORATORY:  Dated 03/16/2017:  WBCs 5.38, hemoglobin 12.8, hematocrit 37.9,   platelets 210, unremarkable  differential.  Chemistry:  Sodium 141, potassium 4.6,   chloride 106, CO2 of 26, BUN 16, creatinine 0.8, EGFR > 60,   glucose 100, calcium 9.6, alk phos 50.  AST 14, ALT 15, .    Chest x-ray dated 03/16/2017:  Impression, no evidence of acute cardiopulmonary   disease.  Significant to relative change as compared to 03/13/2016.      Colonoscopy dated 12/18/2016:  Impression, diverticulosis; normal, repeat in 10 years.    IMPRESSION:  1.  Stage III adenocarcinoma of the colon disease - VINNY.  2.  Status post right shoulder arthroplasty.    PLAN:  In regard to #1, we will have the patient return in one year with interval CBC, CMP,   LDH and chest x-ray prior.    Assessment/plan reviewed and approved by Dr. Gilmore.      ABI/CHIARA  dd: 03/17/2017 10:05:28 (CDT)  td: 03/17/2017 15:35:21 (CDT)  Doc ID   #[id number]  Job ID #[id number]    CC:

## 2017-03-17 NOTE — PROGRESS NOTES
Time in 1  Time out 2      Timed units  1 manual                              Time:1-115  3 therex                              Time:115-2      S:overall light use improving, doing HEP as advised  Pain:continues to decrease in intensity and frequency    O:  HEP: reviewed likely progression and rationale of tx    manual tx:     prom circumduction, bursal like massage abd 1 and 2    prom as tolerated-pain free: n/a    stretches as tolerated-pain free: er 0, 45, 90 abd; sleeper    theraband 2 x 20:  yellow add, ir 0 abd, depression    isometrics 2 x 20: arm by side abd, ext              A:once posterior shoulder tightness gone, supine protraction PRE will be indicated to improve serratus anterior strength to assist with active elevation with ADL            P: fix all shoulder tightness; strengthen scapula muscles, anterior cuff, and deltoid to enhance active lifting of arm

## 2017-03-17 NOTE — MR AVS SNAPSHOT
[place] [id number] [facility] [address]  [facility] [address]  Phone: [phone]  Fax: [phone]                  Danna Sorensen   3/17/2017 9:00 AM   Office Visit    Description:  Female : 1940   Provider:  Benji Cali NP   Department:  [facility] Hematology           Diagnoses this Visit        Comments    Malignant neoplasm of colon, unspecified part of colon    -  Primary            To Do List           Future Appointments        Provider Department Dept Phone    3/17/2017 1:00 PM Jerrell Vochanel, OT [facility] [phone]    3/20/2017 10:00 AM Jerrell Voorhies, OT [facility] [phone]    3/24/2017 10:00 AM Jerrell Voorhies, OT [facility] [phone]    3/28/2017 11:00 AM Jerrell Voorhies, OT [facility] [phone]    3/30/2017 11:00 AM Jerrell Voorhies, OT [facility] [phone]      Goals (5 Years of Data)     None      Follow-Up and Disposition     Return in about 1 year (around 3/17/2018) for 11 YR POST THERAPY evaluation with interval CBC, CMP, LDH, CXR.    Follow-up and Disposition History      [facility]     [facility] -  Assistance  Registered nurses in the [facility] provide clinical advisement, health education, appointment booking, and other advisory services.  Call for this free service at [phone].             Medications           Message regarding Medications     Verify the changes and/or additions to your medication regime listed below are the same as discussed with your clinician today.  If any of these changes or additions are incorrect, please notify your healthcare provider.             Verify that the below list of medications is an accurate representation of the medications you are currently taking.  If none reported, the list may be blank. If incorrect, please contact your healthcare provider. Carry this list  "with you in case of emergency.           Current Medications     cyclobenzaprine (FLEXERIL) 5 MG tablet Take 5 mg by mouth 3 (three) times daily as needed for Muscle spasms.    furosemide (LASIX) 20 MG tablet Take 1 tablet (20 mg total) by mouth daily as needed.    hydrocodone-acetaminophen 10-325mg (NORCO)  mg Tab Take 1 tablet by mouth every 4 (four) hours as needed.    levothyroxine (SYNTHROID) 75 MCG tablet TAKE 1 TABLET(75 MCG) BY MOUTH EVERY DAY    lisinopril 10 MG tablet     nystatin (MYCOSTATIN) cream Apply topically 2 (two) times daily as needed. GRETCHEN EXT AA BID           Clinical Reference Information           Your Vitals Were     BP Pulse Temp Resp Height Weight    129/70 64 97.5 °F (36.4 °C) 20 5' 4" (1.626 m) 105.6 kg (232 lb 12.9 oz)    BMI                39.96 kg/m2          Blood Pressure          Most Recent Value    BP  129/70      Allergies as of 3/17/2017     Aspirin    Mobic [Meloxicam]    Oxaliplatin      Immunizations Administered on Date of Encounter - 3/17/2017     None      Orders Placed During Today's Visit     Future Labs/Procedures Expected by Expires    CBC w/ DIFF  3/17/2017 5/16/2018    CMP  3/17/2017 5/16/2018    CXR  3/17/2017 3/17/2018    LDH  3/17/2017 5/16/2018      Language Assistance Services     ATTENTION: Language assistance services are available, free of charge. Please call [phone].      ATENCIÓN: Si habla español, tiene a licea disposición servicios gratuitos de asistencia lingüística. Llame al [phone].     [facility] Ý: N?u b?n nói Ti?ng Vi?t, có các d?ch v? h? tr? ngôn ng? mi?n phí dành cho b?n. G?i s? [phone].         [facility] complies with applicable Federal civil rights laws and does not discriminate on the basis of race, color, national origin, age, disability, or sex.        "

## 2017-03-20 ENCOUNTER — CLINICAL SUPPORT (OUTPATIENT)
Dept: REHABILITATION | Facility: HOSPITAL | Age: 77
End: 2017-03-20
Attending: ORTHOPAEDIC SURGERY
Payer: MEDICARE

## 2017-03-20 DIAGNOSIS — M25.511 RIGHT SHOULDER PAIN, UNSPECIFIED CHRONICITY: Primary | ICD-10-CM

## 2017-03-20 DIAGNOSIS — R29.898 SHOULDER WEAKNESS: ICD-10-CM

## 2017-03-20 DIAGNOSIS — M25.611 SHOULDER STIFFNESS, RIGHT: ICD-10-CM

## 2017-03-20 PROCEDURE — 97110 THERAPEUTIC EXERCISES: CPT | Mod: PN

## 2017-03-20 PROCEDURE — 97140 MANUAL THERAPY 1/> REGIONS: CPT | Mod: PN

## 2017-03-20 NOTE — PROGRESS NOTES
"Time in 10  Time out 11      Timed units  2 manual                              Time:   2 therex                              Time:1030-11      S:overall light use r ue continues to improve, doing HEP faithfully  Pain:some DOMS since last session    O:  r prom er at 0 abd 68   at 45 abd 70    at 90 abd 66      slir  70        func ir 15" lift off  l prom er at 0 abd 80    at 45 abd 90   at  90 abd 90     slir  80        func ir 15' lift off    HEP: reviewed    manual tx:     prom circumduction, bursal like massage abd 1 and 2    prom as tolerated-pain free: n/a    stretches as tolerated-pain free: er 0, 45, 90 abd    theraband 2 x 20:  yellow add, ir 0 abd, depression    isometrics 2 x 20: arm by side abd, ext            A:shoulder complex continues to loosen; once above planes = to l side, prom elevation screen x 3 will be indicated; scar with good pliability      progress in rehab is satisfactory considering date of symptom onset, surgery, and first day of OT       P:progress PRE as mobility allows  "

## 2017-03-24 ENCOUNTER — CLINICAL SUPPORT (OUTPATIENT)
Dept: REHABILITATION | Facility: HOSPITAL | Age: 77
End: 2017-03-24
Attending: ORTHOPAEDIC SURGERY
Payer: MEDICARE

## 2017-03-24 DIAGNOSIS — M25.511 RIGHT SHOULDER PAIN, UNSPECIFIED CHRONICITY: Primary | ICD-10-CM

## 2017-03-24 DIAGNOSIS — M25.611 SHOULDER STIFFNESS, RIGHT: ICD-10-CM

## 2017-03-24 DIAGNOSIS — R29.898 SHOULDER WEAKNESS: ICD-10-CM

## 2017-03-24 PROCEDURE — G8988 SELF CARE GOAL STATUS: HCPCS | Mod: CH,PN

## 2017-03-24 PROCEDURE — G8987 SELF CARE CURRENT STATUS: HCPCS | Mod: CL,PN

## 2017-03-24 PROCEDURE — 97110 THERAPEUTIC EXERCISES: CPT | Mod: PN

## 2017-03-24 NOTE — PROGRESS NOTES
Time in 10  Time out 11      Timed units  4 therex                              Time:10-11      S: understands current rationale of care and expected tx progression, doing HEP as advised  Pain:mild, intermittent, C5 ache with some light use    O:    HEP: discussed mechanism of LLPS    manual tx: n/a    prom as tolerated-pain free: n/a    stretches as tolerated-pain free: sleeper; er 45, 90 abd    theraband 2 x 20:  purple row, add, ir 0 abd, depression    ube: level 1 x 10'    isometrics 2 x 20: arm by side abd, ext      A: r ue with good increase in flexibility and below shoulder level functional use since day 1 of OT; continued skilled and structured rehab is essential to resolve r shoulder complex stiffness, progress to home PRE routine to improve strength and mechanics, and to facilitate full use and wfl elevation with required tasks            P: test prom er x 3 and ir x 2; strengthen scapula musculature, anterior cuff, and deltoid to promote improved active elevation     FIM   Current g code CL mod a 60-80% impairment  Goal g code CH 0% impairment independent

## 2017-03-28 ENCOUNTER — CLINICAL SUPPORT (OUTPATIENT)
Dept: REHABILITATION | Facility: HOSPITAL | Age: 77
End: 2017-03-28
Attending: ORTHOPAEDIC SURGERY
Payer: MEDICARE

## 2017-03-28 DIAGNOSIS — R29.898 SHOULDER WEAKNESS: ICD-10-CM

## 2017-03-28 DIAGNOSIS — M25.611 SHOULDER STIFFNESS, RIGHT: ICD-10-CM

## 2017-03-28 DIAGNOSIS — M25.511 RIGHT SHOULDER PAIN, UNSPECIFIED CHRONICITY: Primary | ICD-10-CM

## 2017-03-28 PROCEDURE — 97110 THERAPEUTIC EXERCISES: CPT | Mod: PN

## 2017-03-28 NOTE — PROGRESS NOTES
"Time in 11  Time out 12      Timed units  4 therex                              Time:11-12      S:no new issues, doing HEP  Pain:continues to decrease in intensity and frequency    O:  r prom er at 0 abd 68   at 45 abd 70    at 90 abd 66      slir  80        func ir 15" lift off  l prom er at 0 abd 80    at 45 abd 90   at  90 abd 90     slir  80        func ir 15" lift off    HEP: reviewed    manual tx: n/a    prom as tolerated-pain free: n/a    stretches as tolerated-pain free: er 45, 90 abd    theraband 2 x 20:  purple row, add, ir 0 abd, depression    isometrics 2 x 20: arm by side abd, flex    aarom 4 x 10: supine protraction    ube: n/a            A:posterior shoulder stiffness resolved; once anterior shoulder stiffness gone, evaluation of prom of elevation x 3 should be done            P: protraction PRE progression soon; strengthen scapula muscles, deltoid, and anterior cuff  "

## 2017-03-30 ENCOUNTER — CLINICAL SUPPORT (OUTPATIENT)
Dept: REHABILITATION | Facility: HOSPITAL | Age: 77
End: 2017-03-30
Attending: ORTHOPAEDIC SURGERY
Payer: MEDICARE

## 2017-03-30 DIAGNOSIS — M25.611 SHOULDER STIFFNESS, RIGHT: ICD-10-CM

## 2017-03-30 DIAGNOSIS — R29.898 SHOULDER WEAKNESS: ICD-10-CM

## 2017-03-30 DIAGNOSIS — M25.511 RIGHT SHOULDER PAIN, UNSPECIFIED CHRONICITY: Primary | ICD-10-CM

## 2017-03-30 PROCEDURE — 97110 THERAPEUTIC EXERCISES: CPT | Mod: PN

## 2017-03-30 NOTE — PROGRESS NOTES
Time in 11  Time out 12      Timed units  4 therex                              Time:11-12      S: doing HEP and understands to increase TERT for er stretches as time allows  Pain:0/10 at rest, with sleep, with light use    O:  HEP: reviewed    manual tx: n/a    prom as tolerated-pain free: n/a    stretches as tolerated-pain free: er 0, 45, 90 abd    theraband 2 x 20:  purple row, add, ir 0 abd, depression    isometrics 2 x 20: arm by side abd, ext    ube: n/a    arom 4 x 10: supine protraction    explained given type of surgery deltoid PRE is critical to improve active elevation especially coupled with scapula and anterior cuff PRE    A: light use and overall shoulder flexibility continue to improve, resolving all shoulder complex tightness essential for correct mechanics with ADL in future            P:progressive strengthening, stretching, scar massage; start supine protraction theraband instead of arom once strength allows this

## 2017-04-03 ENCOUNTER — CLINICAL SUPPORT (OUTPATIENT)
Dept: REHABILITATION | Facility: HOSPITAL | Age: 77
End: 2017-04-03
Attending: ORTHOPAEDIC SURGERY
Payer: MEDICARE

## 2017-04-03 DIAGNOSIS — R29.898 SHOULDER WEAKNESS: ICD-10-CM

## 2017-04-03 DIAGNOSIS — M25.511 RIGHT SHOULDER PAIN, UNSPECIFIED CHRONICITY: Primary | ICD-10-CM

## 2017-04-03 DIAGNOSIS — M25.611 SHOULDER STIFFNESS, RIGHT: ICD-10-CM

## 2017-04-03 PROCEDURE — 97110 THERAPEUTIC EXERCISES: CPT | Mod: PN

## 2017-04-03 NOTE — PROGRESS NOTES
Time in 2  Time out 3    Timed units  4 therex                              Time:2-3      S:overall light use continues to improve  Pain:rare, mild, C5 ache    O:  HEP: reviewed likely progression    manual tx: n/a    prom as tolerated-pain free: n/a    stretches as tolerated-pain free: n/a    theraband 2 x 20:  purple row, add, ir 0 abd, depression; yellow supine protraction    isometrics 2 x 20: arm by side abd, ext            A: r ue rom and use improving nicely considering type and date of surgery and amount of rehab so far, scar with good pliability            P:fix shoulder complex stiffness, test prom er x 3 and ir x 2

## 2017-04-07 ENCOUNTER — CLINICAL SUPPORT (OUTPATIENT)
Dept: REHABILITATION | Facility: HOSPITAL | Age: 77
End: 2017-04-07
Attending: ORTHOPAEDIC SURGERY
Payer: MEDICARE

## 2017-04-07 ENCOUNTER — OFFICE VISIT (OUTPATIENT)
Dept: ORTHOPEDICS | Facility: CLINIC | Age: 77
End: 2017-04-07
Payer: MEDICARE

## 2017-04-07 VITALS
SYSTOLIC BLOOD PRESSURE: 152 MMHG | BODY MASS INDEX: 39.61 KG/M2 | DIASTOLIC BLOOD PRESSURE: 66 MMHG | WEIGHT: 232 LBS | HEART RATE: 57 BPM | HEIGHT: 64 IN

## 2017-04-07 DIAGNOSIS — Z96.611 S/P REVERSE TOTAL SHOULDER ARTHROPLASTY, RIGHT: Primary | ICD-10-CM

## 2017-04-07 DIAGNOSIS — M25.611 SHOULDER STIFFNESS, RIGHT: ICD-10-CM

## 2017-04-07 DIAGNOSIS — M25.511 RIGHT SHOULDER PAIN, UNSPECIFIED CHRONICITY: Primary | ICD-10-CM

## 2017-04-07 DIAGNOSIS — R29.898 SHOULDER WEAKNESS: ICD-10-CM

## 2017-04-07 PROCEDURE — 97110 THERAPEUTIC EXERCISES: CPT | Mod: PN

## 2017-04-07 PROCEDURE — 99024 POSTOP FOLLOW-UP VISIT: CPT | Mod: S$GLB,,, | Performed by: ORTHOPAEDIC SURGERY

## 2017-04-07 PROCEDURE — 99999 PR PBB SHADOW E&M-EST. PATIENT-LVL III: CPT | Mod: PBBFAC,,, | Performed by: ORTHOPAEDIC SURGERY

## 2017-04-07 NOTE — PROGRESS NOTES
Time in 2  Time out 3      Timed units  4 therex                              Time:2-3      S:doing more and more with r ue with self and home care, saw ortho this a.m.  Pain:0/10 at rest, with sleep, with light use    O:  HEP: reviewed    manual tx: n/a    prom as tolerated-pain free: n/a    stretches as tolerated-pain free: n/a    theraband 2 x 20:  purple row, add, ir 0 abd, depression; orange supine protraction    ube: level 1 x 10'    isometrics 2 x 20: arm by side abd, ext        A: progressive strengthening of scapula muscles, anterior cuff, and deltoid should facilitate improved active elevation long term; considering length of time pt. was symptomatic pre surgery, slowly resolving weakness and stiffness in shoulder complex is reasonable            P: test prom er x 3 and ir x 2; fix all shoulder complex stiffness then transition to a stretch weaning and PRE HEP

## 2017-04-10 ENCOUNTER — DOCUMENTATION ONLY (OUTPATIENT)
Dept: FAMILY MEDICINE | Facility: CLINIC | Age: 77
End: 2017-04-10

## 2017-04-10 ENCOUNTER — CLINICAL SUPPORT (OUTPATIENT)
Dept: REHABILITATION | Facility: HOSPITAL | Age: 77
End: 2017-04-10
Attending: ORTHOPAEDIC SURGERY
Payer: MEDICARE

## 2017-04-10 DIAGNOSIS — M25.611 SHOULDER STIFFNESS, RIGHT: ICD-10-CM

## 2017-04-10 DIAGNOSIS — R29.898 SHOULDER WEAKNESS: ICD-10-CM

## 2017-04-10 DIAGNOSIS — M25.511 RIGHT SHOULDER PAIN, UNSPECIFIED CHRONICITY: Primary | ICD-10-CM

## 2017-04-10 PROCEDURE — 97110 THERAPEUTIC EXERCISES: CPT | Mod: PN

## 2017-04-10 NOTE — PROGRESS NOTES
Past Medical History:   Diagnosis Date    Back pain     Carpal tunnel syndrome     Cataract     Colon cancer     Coronary artery disease     Hypothyroidism     Obesity (BMI 30-39.9) 3/24/2016    Osteoarthritis     Osteoporosis     Personal history of colon cancer, stage III     Squamous cell carcinoma     Strabismus     Trouble in sleeping     Wears dentures     Uppers       Past Surgical History:   Procedure Laterality Date    COLON SURGERY  06/2007    COLONOSCOPY N/A 10/18/2016    Procedure: COLONOSCOPY;  Surgeon: Rell Cordova MD;  Location: [facility];  Service: Endoscopy;  Laterality: N/A;    HAND TENDON SURGERY Left     HEMICOLECTOMY      right    JOINT REPLACEMENT      Bilateral knees    SHOULDER ARTHROSCOPY Right 01/12/2017    Reverse     TONSILLECTOMY      TONSILLECTOMY, ADENOIDECTOMY, BILATERAL MYRINGOTOMY AND TUBES      TOTAL KNEE ARTHROPLASTY Bilateral 08/1998    total replacements    TUMOR REMOVAL Left     left foot as a child       Current Outpatient Prescriptions   Medication Sig    cyclobenzaprine (FLEXERIL) 5 MG tablet Take 5 mg by mouth 3 (three) times daily as needed for Muscle spasms.    furosemide (LASIX) 20 MG tablet Take 1 tablet (20 mg total) by mouth daily as needed.    hydrocodone-acetaminophen 10-325mg (NORCO)  mg Tab Take 1 tablet by mouth every 4 (four) hours as needed.    levothyroxine (SYNTHROID) 75 MCG tablet TAKE 1 TABLET(75 MCG) BY MOUTH EVERY DAY    lisinopril 10 MG tablet     nystatin (MYCOSTATIN) cream Apply topically 2 (two) times daily as needed. GRETCHEN EXT AA BID     No current facility-administered medications for this visit.        Review of patient's allergies indicates:   Allergen Reactions    Aspirin      Other reaction(s): Stomach upset    Mobic [meloxicam] Swelling     Edema and elevated blood pressure     Oxaliplatin      Other reaction(s): Joint pain  Other reaction(s): Itching  Other reaction(s): Hives       Family History   Problem  Relation Age of Onset    Hypertension Mother     Kidney disease Mother     Cataracts Father     Cataracts Sister     Cancer Sister      breast    No Known Problems Brother     Cancer Sister      breast    Arthritis Sister     Diabetes Maternal Uncle     Glaucoma Neg Hx     Amblyopia Neg Hx     Blindness Neg Hx     Macular degeneration Neg Hx     Retinal detachment Neg Hx     Strabismus Neg Hx     Stroke Neg Hx     Thyroid disease Neg Hx        Social History     Social History    Marital status: Single     Spouse name: N/A    Number of children: N/A    Years of education: N/A     Occupational History    Not on file.     Social History Main Topics    Smoking status: Former Smoker     Packs/day: 0.50     Years: 1.00     Types: Cigarettes     Start date: 7/27/1960     Quit date: 1/1/1961    Smokeless tobacco: Never Used    Alcohol use No    Drug use: No    Sexual activity: No     Other Topics Concern    Not on file     Social History Narrative       Chief Complaint:   Chief Complaint   Patient presents with    Shoulder Pain     R wicholer 6wk f/u       Consulting Physician: No ref. provider found    History of present illness: This is a 77 y.o. year old female following up for a right reverse total shoulder arthroplasty done on 1/12/17.  She puts her pain at a 0 out of 10.  She is not using any pain medicine.  She states she's having no problems and is doing well.  She is doing PT and HEP.      Review of Systems:    Constitution: Denies chills, fever, and sweats.    HENT: Denies headaches or blurry vision.    Cardiovascular: Denies chest pain or irregular heart beat.    Respiratory: Denies cough or shortness of breath.    Gastrointestinal: Denies abdominal pain, nausea, or vomiting.    Musculoskeletal:  Denies muscle cramps.    Neurological: Denies dizziness or focal weakness.    Psychiatric/Behavioral: Normal mental status.    Hematologic/Lymphatic: Denies bleeding problem or easy  bruising/bleeding.    Skin: Denies rash or suspicious lesions.      Examination:    Vital Signs:    Vitals:    04/07/17 0843   BP: (!) 152/66   Pulse: (!) 57       Body mass index is 39.82 kg/(m^2).    This a well-developed, well nourished patient in no acute distress.    Alert and oriented and cooperative to examination.       Physical Exam: right shoulder    Inspection/Observation   Swelling:   none  Erythema:   none  Bruising:   none  Wounds:   healed  Drainage:  None    Range of Motion   60/140, 30, HP    Muscle Strength   3+-4    Other   Sensation:  normal  Pulse:    palpable    Imaging:      Assessment: S/p reverse total shoulder arthroplasty, right        Plan:  She looks good and we will continue PT. Back in 6 weeks.    DISCLAIMER: This note may have been dictated using voice recognition software and may contain grammatical errors. Report sent to referring provider as required.

## 2017-04-10 NOTE — PROGRESS NOTES
Time in 8  Time out 9      Timed units  4 therex                            Time:8-9      S: light functional use continues to improve  Pain:mild, intermittent, C5 ache    O:  HEP: reviewed plan that eventually once stiffness resolved transition to stretch weaning and strengthening HEP will be done    manual tx: n/a    prom as tolerated-pain free: n/a    stretches as tolerated-pain free: er 45, 90 abd    theraband 2 x 20:  purple row, add, depression, ir 0 abd    theraband 4 x 10: orange supine protraction    ube: level 1 x 10'    isometrics 2 x 20: arm by side abd, ext        A: serratus anterior showing improved strength, good effort in clinic, looks consistent with HEP            P:fix shoulder complex stiffness and strengthen anterior cuff, scapula, and deltoid to facilitate improved functional elevation long term and promote proper mechanics; test prom er x 3 and ir x 2

## 2017-04-10 NOTE — PROGRESS NOTES
Pre-Visit Chart Review  For Appointment Scheduled on 4-12-17    There are no preventive care reminders to display for this patient.

## 2017-04-12 ENCOUNTER — OFFICE VISIT (OUTPATIENT)
Dept: FAMILY MEDICINE | Facility: CLINIC | Age: 77
End: 2017-04-12
Payer: MEDICARE

## 2017-04-12 VITALS
TEMPERATURE: 98 F | SYSTOLIC BLOOD PRESSURE: 149 MMHG | DIASTOLIC BLOOD PRESSURE: 76 MMHG | BODY MASS INDEX: 40.39 KG/M2 | HEART RATE: 62 BPM | HEIGHT: 64 IN | WEIGHT: 236.56 LBS

## 2017-04-12 DIAGNOSIS — H26.9 CATARACT, UNSPECIFIED CATARACT TYPE, UNSPECIFIED LATERALITY: ICD-10-CM

## 2017-04-12 DIAGNOSIS — E03.4 HYPOTHYROIDISM DUE TO ACQUIRED ATROPHY OF THYROID: Primary | ICD-10-CM

## 2017-04-12 DIAGNOSIS — R06.09 DOE (DYSPNEA ON EXERTION): ICD-10-CM

## 2017-04-12 DIAGNOSIS — L30.4 INTERTRIGO: ICD-10-CM

## 2017-04-12 DIAGNOSIS — I87.2 STASIS DERMATITIS OF BOTH LEGS: ICD-10-CM

## 2017-04-12 DIAGNOSIS — Z23 NEED FOR SHINGLES VACCINE: ICD-10-CM

## 2017-04-12 DIAGNOSIS — I10 ESSENTIAL HYPERTENSION: ICD-10-CM

## 2017-04-12 DIAGNOSIS — E66.01 MORBID OBESITY WITH BMI OF 40.0-44.9, ADULT: ICD-10-CM

## 2017-04-12 DIAGNOSIS — M51.36 DDD (DEGENERATIVE DISC DISEASE), LUMBAR: ICD-10-CM

## 2017-04-12 DIAGNOSIS — E78.5 HYPERLIPIDEMIA, UNSPECIFIED HYPERLIPIDEMIA TYPE: ICD-10-CM

## 2017-04-12 DIAGNOSIS — Z85.038 HISTORY OF COLON CANCER: ICD-10-CM

## 2017-04-12 PROCEDURE — 3077F SYST BP >= 140 MM HG: CPT | Mod: S$GLB,,, | Performed by: FAMILY MEDICINE

## 2017-04-12 PROCEDURE — 1126F AMNT PAIN NOTED NONE PRSNT: CPT | Mod: S$GLB,,, | Performed by: FAMILY MEDICINE

## 2017-04-12 PROCEDURE — 1157F ADVNC CARE PLAN IN RCRD: CPT | Mod: 8P,S$GLB,, | Performed by: FAMILY MEDICINE

## 2017-04-12 PROCEDURE — 1160F RVW MEDS BY RX/DR IN RCRD: CPT | Mod: S$GLB,,, | Performed by: FAMILY MEDICINE

## 2017-04-12 PROCEDURE — 99214 OFFICE O/P EST MOD 30 MIN: CPT | Mod: S$GLB,,, | Performed by: FAMILY MEDICINE

## 2017-04-12 PROCEDURE — 99499 UNLISTED E&M SERVICE: CPT | Mod: S$GLB,,, | Performed by: FAMILY MEDICINE

## 2017-04-12 PROCEDURE — 3078F DIAST BP <80 MM HG: CPT | Mod: S$GLB,,, | Performed by: FAMILY MEDICINE

## 2017-04-12 PROCEDURE — 1159F MED LIST DOCD IN RCRD: CPT | Mod: S$GLB,,, | Performed by: FAMILY MEDICINE

## 2017-04-12 PROCEDURE — 99999 PR PBB SHADOW E&M-EST. PATIENT-LVL III: CPT | Mod: PBBFAC,,, | Performed by: FAMILY MEDICINE

## 2017-04-12 RX ORDER — TRIAMCINOLONE ACETONIDE 1 MG/G
CREAM TOPICAL 2 TIMES DAILY
Qty: 60 G | Refills: 0 | Status: SHIPPED | OUTPATIENT
Start: 2017-04-12 | End: 2017-10-12 | Stop reason: SDUPTHER

## 2017-04-12 RX ORDER — LISINOPRIL 20 MG/1
20 TABLET ORAL DAILY
Qty: 90 TABLET | Refills: 3 | Status: SHIPPED | OUTPATIENT
Start: 2017-04-12 | End: 2018-04-19 | Stop reason: SDUPTHER

## 2017-04-12 RX ORDER — NYSTATIN 100000 U/G
CREAM TOPICAL 2 TIMES DAILY PRN
Qty: 30 G | Status: SHIPPED | OUTPATIENT
Start: 2017-04-12 | End: 2018-10-18 | Stop reason: SDUPTHER

## 2017-04-12 NOTE — PATIENT INSTRUCTIONS
Weight Management: Getting Started  Healthy bodies come in all shapes and sizes. Not all bodies are made to be thin. For some people, a healthy weight is higher than the average weight listed on weight charts. Your healthcare provider can help you decide on a healthy weight for you.    Reasons to lose weight  Losing weight can help with some health problems, such as high blood pressure, heart disease, diabetes, sleep apnea, and arthritis. You may also feel more energy.  Set your long-term goal  Your goal doesn't even have to be a specific weight. You may decide on a fitness goal (such as being able to walk 10 miles a week), or a health goal (such as lowering your blood pressure). Choose a goal that is measurable and reasonable, so you know when you've reached it. A goal of reaching a BMI of less than 25 is not always reasonable (or possible).   Make an action plan  Habits dont change overnight. Setting your goals too high can leave you feeling discouraged if you cant reach them. Be realistic. Choose one or two small changes you can make now. Set an action plan for how you are going to make these changes. When you can stick to this plan, keep making a few more small changes. Taking small steps will help you stay on the path to success.  Track your progress  Write down your goals. Then, keep a daily record of your progress. Write down what you eat and how active you are. This record lets you look back on how much youve done. It may also help when youre feeling frustrated. Reward yourself for success. Even if you dont reach every goal, give yourself credit for what you do get done.  Get support  Encouragement from others can help make losing weight easier. Ask your family members and friends for support. They may even want to join you. Also look to your healthcare provider, registered dietitian, and  for help. Your local hospital can give you more information about nutrition, exercise, and  weight loss.  Date Last Reviewed: 1/31/2016 © 2000-2016 [employer]. [address] [place] [address], [place] [address]. All rights reserved. This information is not intended as a substitute for professional medical care. Always follow your healthcare professional's instructions.        Walking for Fitness  Fitness walking has something for everyone, even people who are already fit. Walking is one of the safest ways to condition your body aerobically. It can boost energy, help you lose weight, and reduce stress.    Physical benefits  · Walking strengthens your heart and lungs, and tones your muscles.  · When walking, your feet land with less impact than in other sports. This reduces chances of muscle, bone, and joint injury.  · Regular walking improves your cholesterol levels and lowers your risk of heart disease. And it helps you control your blood sugar if you have diabetes.  · Walking is a weight-bearing activity, which helps maintain bone density. This can help prevent osteoporosis.  Personal rewards  · Taking walks can help you relax and manage stress. And fitness walking may make you feel better about yourself.  · Walking can help you sleep better at night and make you less likely to be depressed.  · Regular walking may help maintain your memory as you get older.  · Walking is a great way to spend extra time with friends and family members. Be sure to invite your dog along!  Q&A about fitness walking  Q: Will walking keep me fit?  A: Yes. Regular walking at the right pace gives you all the benefits of other aerobic activities, such as jogging and swimming.  Q: Will walking help me lose weight and keep it off?  A: Yes. Per mile, walking can burn as many calories as jogging. Your health care provider can help work walking into your weight-loss plan.  Q: Is walking safe for my health?  A: Yes. Walking is safe if you have high blood pressure, diabetes, heart disease, or other conditions. Talk to your health  care provider before you start.  Date Last Reviewed: 5/9/2015  © 9000-9216 [employer]. [address] [place] [address], [place] [address]. All rights reserved. This information is not intended as a substitute for professional medical care. Always follow your healthcare professional's instructions.        Controlling High Blood Pressure  High blood pressure (hypertension) is often called the silent killer. This is because many people who have it dont know it. High blood pressure is defined as 140/90 mm Hg or higher. Know your blood pressure and remember to check it regularly. Doing so can save your life. Here are some things you can do to help control your blood pressure.    Choose heart-healthy foods  · Select low-salt, low-fat foods. Limit sodium intake to 2,400 mg per day or the amount suggested by your healthcare provider.  · Limit canned, dried, cured, packaged, and fast foods. These can contain a lot of salt.  · Eat 8 to 10 servings of fruits and vegetables every day.  · Choose lean meats, fish, or chicken.  · Eat whole-grain pasta, brown rice, and beans.  · Eat 2 to 3 servings of low-fat or fat-free dairy products.  · Ask your doctor about the DASH eating plan. This plan helps reduce blood pressure.  · When you go to a restaurant, ask that your meal be prepared with no added salt.  Maintain a healthy weight  · Ask your healthcare provider how many calories to eat a day. Then stick to that number.  · Ask your healthcare provider what weight range is healthiest for you. If you are overweight, a weight loss of only 3% to 5% of your body weight can help lower blood pressure. Generally, a good weight loss goal is to lose 10% of your body weight in a year.  · Limit snacks and sweets.  · Get regular exercise.  Get up and get active  · Choose activities you enjoy. Find ones you can do with friends or family. This includes bicycling, dancing, walking, and jogging.  · Park farther away from building  entrances.  · Use stairs instead of the elevator.  · When you can, walk or bike instead of driving.  · [place] leaves, garden, or do household repairs.  · Be active at a moderate to vigorous level of physical activity for at least 40 minutes for a minimum of 3 to 4 days a week.   Manage stress  · Make time to relax and enjoy life. Find time to laugh.  · Communicate your concerns with your loved ones and your healthcare provider.  · Visit with family and friends, and keep up with hobbies.  Limit alcohol and quit smoking  · Men should have no more than 2 drinks per day.  · Women should have no more than 1 drink per day.  · Talk with your healthcare provider about quitting smoking. Smoking significantly increases your risk for heart disease and stroke. Ask your healthcare provider about community smoking cessation programs and other options.  Medicines  If lifestyle changes arent enough, your healthcare provider may prescribe high blood pressure medicine. Take all medicines as prescribed. If you have any questions about your medicines, ask your healthcare provider before stopping or changing them.   Date Last Reviewed: 4/27/2016 © 2000-2016 [employer]. [address] [place] [address], [place] [address]. All rights reserved. This information is not intended as a substitute for professional medical care. Always follow your healthcare professional's instructions.

## 2017-04-12 NOTE — MR AVS SNAPSHOT
[facility]  [address]  [place] [address]  Phone: [phone]  Fax: [phone]                  Danna Sorensen   2017 8:00 AM   Office Visit    Description:  Female : 1940   Provider:  Claudia Kim MD   Department:  [facility]           Reason for Visit     Establish Care           Diagnoses this Visit        Comments    Hypothyroidism due to acquired atrophy of thyroid    -  Primary     EATON (dyspnea on exertion)         Hyperlipidemia, unspecified hyperlipidemia type         Stasis dermatitis of both legs         Intertrigo         DDD (degenerative disc disease), lumbar         Cataract, unspecified cataract type, unspecified laterality         Need for shingles vaccine         Essential hypertension                To Do List           Future Appointments        Provider Department Dept Phone    2017 8:10 AM LAB, [facility] [address] [facility] [phone]    2017 8:30 AM ECHO, [place] [facility] - Cardiology [phone]    2017 8:00 AM Peng Daylin Renee, OD [facility] 2 - Optometry [phone]    2017 11:00 AM Jerrell Coy OT [facility] [phone]    2017 9:00 AM Jerrell Coy OT [facility] [phone]      Goals (5 Years of Data)     None      Follow-Up and Disposition     Return in about 6 months (around 10/12/2017).       These Medications        Disp Refills Start End    triamcinolone acetonide 0.1% (KENALOG) 0.1 % cream 60 g 0 2017     Apply topically 2 (two) times daily. - Topical (Top)    Pharmacy: [id number]  [place] - [address] AT [facility] & [place] [address] Ph #: [phone]       nystatin (MYCOSTATIN) cream 30 g prn 2017     Apply topically 2 (two) times daily as needed. GRETCHEN EXT AA BID - Topical (Top)    Pharmacy: [id number] - [place] - [address] AT [facility] & [place] [address]  Ph #: [phone]       zoster vaccine live, PF, (ZOSTAVAX, PF,) 19,400 unit/0.65 mL injection 1 vial 0 4/12/2017 4/12/2017    Inject 19,400 Units into the skin once. - Subcutaneous    Pharmacy: [facility] [address] [place] [address] [address] AT [facility] & [place] [address] Ph #: [phone]       lisinopril (PRINIVIL,ZESTRIL) 20 MG tablet 90 tablet 3 4/12/2017     Take 1 tablet (20 mg total) by mouth once daily. - Oral    Pharmacy: [facility] [address] [place] [address] [address] AT [facility] & [place] [address] Ph #: [phone]         [facility]     [facility] Care [facility] - 24/7 Assistance  Unless otherwise directed by your provider, please contact [facility], our nurse care line that is available for 24/7 assistance.     Registered nurses in the [facility] provide: appointment scheduling, clinical advisement, health education, and other advisory services.  Call: [phone] (toll free)               Medications           Message regarding Medications     Verify the changes and/or additions to your medication regime listed below are the same as discussed with your clinician today.  If any of these changes or additions are incorrect, please notify your healthcare provider.        START taking these NEW medications        Refills    triamcinolone acetonide 0.1% (KENALOG) 0.1 % cream 0    Sig: Apply topically 2 (two) times daily.    Class: Normal    Route: Topical (Top)    zoster vaccine live, PF, (ZOSTAVAX, PF,) 19,400 unit/0.65 mL injection 0    Sig: Inject 19,400 Units into the skin once.    Class: Normal    Route: Subcutaneous      CHANGE how you are taking these medications     Start Taking Instead of    lisinopril (PRINIVIL,ZESTRIL) 20 MG tablet lisinopril 10 MG tablet    Dosage:  Take 1 tablet (20 mg total) by mouth once daily.     Reason for Change:  Reorder            Verify that the below list of medications is an accurate representation of the  "medications you are currently taking.  If none reported, the list may be blank. If incorrect, please contact your healthcare provider. Carry this list with you in case of emergency.           Current Medications     cyclobenzaprine (FLEXERIL) 5 MG tablet Take 5 mg by mouth 3 (three) times daily as needed for Muscle spasms.    furosemide (LASIX) 20 MG tablet Take 1 tablet (20 mg total) by mouth daily as needed.    hydrocodone-acetaminophen 10-325mg (NORCO)  mg Tab Take 1 tablet by mouth every 4 (four) hours as needed.    levothyroxine (SYNTHROID) 75 MCG tablet TAKE 1 TABLET(75 MCG) BY MOUTH EVERY DAY    lisinopril (PRINIVIL,ZESTRIL) 20 MG tablet Take 1 tablet (20 mg total) by mouth once daily.    nystatin (MYCOSTATIN) cream Apply topically 2 (two) times daily as needed. GRETCHEN EXT AA BID    triamcinolone acetonide 0.1% (KENALOG) 0.1 % cream Apply topically 2 (two) times daily.    zoster vaccine live, PF, (ZOSTAVAX, PF,) 19,400 unit/0.65 mL injection Inject 19,400 Units into the skin once.           Clinical Reference Information           Your Vitals Were     BP Pulse Temp Height Weight BMI    149/76 (BP Location: Right arm, Patient Position: Sitting, BP Method: Automatic) 62 98 °F (36.7 °C) (Oral) 5' 4" (1.626 m) 107.3 kg (236 lb 8.9 oz) 40.6 kg/m2      Blood Pressure          Most Recent Value    BP  (!)  149/76      Allergies as of 4/12/2017     Aspirin    Mobic [Meloxicam]    Oxaliplatin      Immunizations Administered on Date of Encounter - 4/12/2017     None      Orders Placed During Today's Visit      Normal Orders This Visit    Ambulatory referral to Optometry     Ambulatory referral to Pain Clinic     Future Labs/Procedures Expected by Expires    Brain natriuretic peptide  4/12/2017 6/11/2018    CBC auto differential  4/12/2017 6/11/2018    Comprehensive metabolic panel  4/12/2017 6/11/2018    Lipid panel  4/12/2017 6/11/2018    T4, free  4/12/2017 6/11/2018    TSH  4/12/2017 6/11/2018    2D Echo w/ Color " Flow Doppler  As directed 4/12/2018      Instructions      Weight Management: Getting Started  Healthy bodies come in all shapes and sizes. Not all bodies are made to be thin. For some people, a healthy weight is higher than the average weight listed on weight charts. Your healthcare provider can help you decide on a healthy weight for you.    Reasons to lose weight  Losing weight can help with some health problems, such as high blood pressure, heart disease, diabetes, sleep apnea, and arthritis. You may also feel more energy.  Set your long-term goal  Your goal doesn't even have to be a specific weight. You may decide on a fitness goal (such as being able to walk 10 miles a week), or a health goal (such as lowering your blood pressure). Choose a goal that is measurable and reasonable, so you know when you've reached it. A goal of reaching a BMI of less than 25 is not always reasonable (or possible).   Make an action plan  Habits dont change overnight. Setting your goals too high can leave you feeling discouraged if you cant reach them. Be realistic. Choose one or two small changes you can make now. Set an action plan for how you are going to make these changes. When you can stick to this plan, keep making a few more small changes. Taking small steps will help you stay on the path to success.  Track your progress  Write down your goals. Then, keep a daily record of your progress. Write down what you eat and how active you are. This record lets you look back on how much youve done. It may also help when youre feeling frustrated. Reward yourself for success. Even if you dont reach every goal, give yourself credit for what you do get done.  Get support  Encouragement from others can help make losing weight easier. Ask your family members and friends for support. They may even want to join you. Also look to your healthcare provider, registered dietitian, and  for help. Your local hospital can  give you more information about nutrition, exercise, and weight loss.  Date Last Reviewed: 1/31/2016  © 8416-1210 [employer]. [address] [place] [address], [place] [address]. All rights reserved. This information is not intended as a substitute for professional medical care. Always follow your healthcare professional's instructions.        Walking for Fitness  Fitness walking has something for everyone, even people who are already fit. Walking is one of the safest ways to condition your body aerobically. It can boost energy, help you lose weight, and reduce stress.    Physical benefits  · Walking strengthens your heart and lungs, and tones your muscles.  · When walking, your feet land with less impact than in other sports. This reduces chances of muscle, bone, and joint injury.  · Regular walking improves your cholesterol levels and lowers your risk of heart disease. And it helps you control your blood sugar if you have diabetes.  · Walking is a weight-bearing activity, which helps maintain bone density. This can help prevent osteoporosis.  Personal rewards  · Taking walks can help you relax and manage stress. And fitness walking may make you feel better about yourself.  · Walking can help you sleep better at night and make you less likely to be depressed.  · Regular walking may help maintain your memory as you get older.  · Walking is a great way to spend extra time with friends and family members. Be sure to invite your dog along!  Q&A about fitness walking  Q: Will walking keep me fit?  A: Yes. Regular walking at the right pace gives you all the benefits of other aerobic activities, such as jogging and swimming.  Q: Will walking help me lose weight and keep it off?  A: Yes. Per mile, walking can burn as many calories as jogging. Your health care provider can help work walking into your weight-loss plan.  Q: Is walking safe for my health?  A: Yes. Walking is safe if you have high blood pressure, diabetes,  heart disease, or other conditions. Talk to your health care provider before you start.  Date Last Reviewed: 5/9/2015 © 2000-2016 [employer]. [address] [place] [address], [place] [address]. All rights reserved. This information is not intended as a substitute for professional medical care. Always follow your healthcare professional's instructions.        Controlling High Blood Pressure  High blood pressure (hypertension) is often called the silent killer. This is because many people who have it dont know it. High blood pressure is defined as 140/90 mm Hg or higher. Know your blood pressure and remember to check it regularly. Doing so can save your life. Here are some things you can do to help control your blood pressure.    Choose heart-healthy foods  · Select low-salt, low-fat foods. Limit sodium intake to 2,400 mg per day or the amount suggested by your healthcare provider.  · Limit canned, dried, cured, packaged, and fast foods. These can contain a lot of salt.  · Eat 8 to 10 servings of fruits and vegetables every day.  · Choose lean meats, fish, or chicken.  · Eat whole-grain pasta, brown rice, and beans.  · Eat 2 to 3 servings of low-fat or fat-free dairy products.  · Ask your doctor about the DASH eating plan. This plan helps reduce blood pressure.  · When you go to a restaurant, ask that your meal be prepared with no added salt.  Maintain a healthy weight  · Ask your healthcare provider how many calories to eat a day. Then stick to that number.  · Ask your healthcare provider what weight range is healthiest for you. If you are overweight, a weight loss of only 3% to 5% of your body weight can help lower blood pressure. Generally, a good weight loss goal is to lose 10% of your body weight in a year.  · Limit snacks and sweets.  · Get regular exercise.  Get up and get active  · Choose activities you enjoy. Find ones you can do with friends or family. This includes bicycling, dancing, walking, and  jogging.  · Park farther away from building entrances.  · Use stairs instead of the elevator.  · When you can, walk or bike instead of driving.  · [place] leaves, garden, or do household repairs.  · Be active at a moderate to vigorous level of physical activity for at least 40 minutes for a minimum of 3 to 4 days a week.   Manage stress  · Make time to relax and enjoy life. Find time to laugh.  · Communicate your concerns with your loved ones and your healthcare provider.  · Visit with family and friends, and keep up with hobbies.  Limit alcohol and quit smoking  · Men should have no more than 2 drinks per day.  · Women should have no more than 1 drink per day.  · Talk with your healthcare provider about quitting smoking. Smoking significantly increases your risk for heart disease and stroke. Ask your healthcare provider about community smoking cessation programs and other options.  Medicines  If lifestyle changes arent enough, your healthcare provider may prescribe high blood pressure medicine. Take all medicines as prescribed. If you have any questions about your medicines, ask your healthcare provider before stopping or changing them.   Date Last Reviewed: 4/27/2016 © 2000-2016 [employer]. [address] [place] [address]. All rights reserved. This information is not intended as a substitute for professional medical care. Always follow your healthcare professional's instructions.             Language Assistance Services     ATTENTION: Language assistance services are available, free of charge. Please call [phone].      ATENCIÓN: Si habla español, tiene a licea disposición servicios gratuitos de asistencia lingüística. Llame al [phone].     CHÚ Ý: N?u b?n nói Ti?ng Vi?t, có các d?ch v? h? tr? ngôn ng? mi?n phí dành cho b?n. G?i s? [phone].         [facility] complies with applicable Federal civil rights laws and does not discriminate on the basis of race,  color, national origin, age, disability, or sex.

## 2017-04-13 PROBLEM — E66.01 MORBID OBESITY WITH BMI OF 40.0-44.9, ADULT: Status: ACTIVE | Noted: 2017-04-13

## 2017-04-13 NOTE — PROGRESS NOTES
Subjective:       Patient ID: Danna Sorensen is a 77 y.o. female.    Chief Complaint: Establish Care    Patient Active Problem List   Diagnosis    CAD (coronary artery disease)    Hypothyroid    History of colon cancer    Nuclear sclerosis    Hyperopia with astigmatism and presbyopia    History of squamous cell carcinoma    Myalgia    Acute neck pain    Chronic right shoulder pain    Arthralgia of right hip    Hx of colon cancer, stage IV    Right hip pain    DDD (degenerative disc disease), lumbar    Shoulder arthritis    Unspecified arthropathy, shoulder region    Status post reverse total replacement of right shoulder    Right shoulder pain    Stiffness of right shoulder joint    Shoulder weakness    Morbid obesity with BMI of 40.0-44.9, adult   Patient is here to establish care and check up chronic conditions    2007 h/o colon ca s/p surgery and treatment.  During preop she was told she has an enlarged heart but has not had card f/u since then.  Has some land but this is stable.  No CP.  Was started on thyroid meds at that time and has been taking since    C/o recurrent rash ankle won't go away. Itchy.  Associated with feet swelling ivory    Insomnia due to chronic lbp with spinal stenosis radiates to right hip.  Had [facility] Dr. Gaitan 1/5/17 which did help    Right shoulder surgery 1/12/17 Dr. Lee and is in PT    HPI  Review of Systems   Constitutional: Negative for fatigue and unexpected weight change.   Respiratory: Negative for chest tightness and shortness of breath.    Cardiovascular: Negative for chest pain, palpitations and leg swelling.   Gastrointestinal: Negative for abdominal pain.   Musculoskeletal: Negative for arthralgias.   Neurological: Negative for dizziness, syncope, light-headedness and headaches.       Objective:      Physical Exam   Constitutional: She is oriented to person, place, and time. She appears well-developed and well-nourished.   Cardiovascular: Normal rate, regular  rhythm and normal heart sounds.    Pulmonary/Chest: Effort normal and breath sounds normal.   Musculoskeletal: She exhibits edema.   Neurological: She is alert and oriented to person, place, and time.   Skin: Skin is warm and dry. Rash noted. There is erythema.   Psychiatric: She has a normal mood and affect.   Nursing note and vitals reviewed.      Assessment:       1. Hypothyroidism due to acquired atrophy of thyroid    2. EATON (dyspnea on exertion)    3. Hyperlipidemia, unspecified hyperlipidemia type    4. Stasis dermatitis of both legs    5. Intertrigo    6. DDD (degenerative disc disease), lumbar    7. Cataract, unspecified cataract type, unspecified laterality    8. Need for shingles vaccine    9. Essential hypertension    10. Morbid obesity with BMI of 40.0-44.9, adult    11. History of colon cancer        Plan:       1. Hypothyroidism due to acquired atrophy of thyroid  Stable condition.  Continue current medications.  Will adjust based on lab findings or if condition changes.    - TSH; Future  - T4, free; Future    2. EATON (dyspnea on exertion)  Work up and treat as indicated.  Refer to card if indicated in light of h/o enlarged heart  - CBC auto differential; Future  - Comprehensive metabolic panel; Future  - Brain natriuretic peptide; Future  - 2D Echo w/ Color Flow Doppler; Future    3. Hyperlipidemia, unspecified hyperlipidemia type  Stable condition.  Continue current medications.  Will adjust based on lab findings or if condition changes.    - Lipid panel; Future    4. Stasis dermatitis of both legs  I counseled the patient on avoiding salt, elevating legs and wearing compression stockings as much as possible to diminish swelling.      - triamcinolone acetonide 0.1% (KENALOG) 0.1 % cream; Apply topically 2 (two) times daily.  Dispense: 60 g; Refill: 0    5. Intertrigo  Apply as directed  - nystatin (MYCOSTATIN) cream; Apply topically 2 (two) times daily as needed. GRETCHEN EXT AA BID  Dispense: 30 g;  "Refill: prn    6. DDD (degenerative disc disease), lumbar  Refer for repeat [facility]  - Ambulatory referral to Pain Clinic    7. Cataract, unspecified cataract type, unspecified laterality  Refer for eval and treat  - Ambulatory referral to Optometry    8. Need for shingles vaccine  RX sent to pharmacy  - zoster vaccine live, PF, (ZOSTAVAX, PF,) 19,400 unit/0.65 mL injection; Inject 19,400 Units into the skin once.  Dispense: 1 vial; Refill: 0    9. Essential hypertension  Uncontrolled. Increase:  - lisinopril (PRINIVIL,ZESTRIL) 20 MG tablet; Take 1 tablet (20 mg total) by mouth once daily.  Dispense: 90 tablet; Refill: 3    10. Morbid obesity with BMI of 40.0-44.9, adult  Counseled patient on his ideal body weight, health consequences of being obese and current recommendations including weekly exercise and a heart healthy diet.  Current BMI is:Estimated body mass index is 40.6 kg/(m^2) as calculated from the following:    Height as of this encounter: 5' 4" (1.626 m).    Weight as of this encounter: 107.3 kg (236 lb 8.9 oz)..  Patient is aware that ideal BMI < 25 or Weight in (lb) to have BMI = 25: 145.3.        11. History of colon cancer  In remission    [facility] goal documentation:  Patient readiness: acceptance and barriers:readiness  During the course of the visit the patient was educated and counseled about the following: Hypertension:   Dietary sodium restriction.  Regular aerobic exercise.  Obesity:   General weight loss/lifestyle modification strategies discussed (elicit support from others; identify saboteurs; non-food rewards, etc).  Goals: Hypertension: Reduce Blood Pressure and Obesity: Reduce calorie intake and BMI  Goal/Outcomes met:none  The following self management tools provided:none  Patient Instructions (the written plan) was given to the patient/family: Yes  Time spent with patient: 40 minutes    Patient with be reevaluated in 6 months or sooner prn    Greater than 50% of this visit was spent counseling " as described in above documentation:Yes

## 2017-04-17 ENCOUNTER — LAB VISIT (OUTPATIENT)
Dept: LAB | Facility: HOSPITAL | Age: 77
End: 2017-04-17
Attending: FAMILY MEDICINE
Payer: MEDICARE

## 2017-04-17 ENCOUNTER — CLINICAL SUPPORT (OUTPATIENT)
Dept: CARDIOLOGY | Facility: CLINIC | Age: 77
End: 2017-04-17
Payer: MEDICARE

## 2017-04-17 DIAGNOSIS — R06.09 DOE (DYSPNEA ON EXERTION): ICD-10-CM

## 2017-04-17 DIAGNOSIS — E78.5 HYPERLIPIDEMIA, UNSPECIFIED HYPERLIPIDEMIA TYPE: ICD-10-CM

## 2017-04-17 DIAGNOSIS — E03.4 HYPOTHYROIDISM DUE TO ACQUIRED ATROPHY OF THYROID: ICD-10-CM

## 2017-04-17 LAB
ALBUMIN SERPL BCP-MCNC: 3.9 G/DL
ALP SERPL-CCNC: 50 U/L
ALT SERPL W/O P-5'-P-CCNC: 11 U/L
ANION GAP SERPL CALC-SCNC: 9 MMOL/L
AORTIC VALVE REGURGITATION: NORMAL
AST SERPL-CCNC: 13 U/L
BASOPHILS # BLD AUTO: 0 K/UL
BASOPHILS NFR BLD: 0.4 %
BILIRUB SERPL-MCNC: 1.4 MG/DL
BNP SERPL-MCNC: 34 PG/ML
BUN SERPL-MCNC: 14 MG/DL
CALCIUM SERPL-MCNC: 9.6 MG/DL
CHLORIDE SERPL-SCNC: 106 MMOL/L
CHOLEST/HDLC SERPL: 2.7 {RATIO}
CO2 SERPL-SCNC: 26 MMOL/L
CREAT SERPL-MCNC: 0.8 MG/DL
DIASTOLIC DYSFUNCTION: NO
DIFFERENTIAL METHOD: ABNORMAL
EOSINOPHIL # BLD AUTO: 0.1 K/UL
EOSINOPHIL NFR BLD: 1.5 %
ERYTHROCYTE [DISTWIDTH] IN BLOOD BY AUTOMATED COUNT: 13.9 %
EST. GFR  (AFRICAN AMERICAN): >60 ML/MIN/1.73 M^2
EST. GFR  (NON AFRICAN AMERICAN): >60 ML/MIN/1.73 M^2
GLUCOSE SERPL-MCNC: 72 MG/DL
HCT VFR BLD AUTO: 38.8 %
HDL/CHOLESTEROL RATIO: 36.9 %
HDLC SERPL-MCNC: 176 MG/DL
HDLC SERPL-MCNC: 65 MG/DL
HGB BLD-MCNC: 12.7 G/DL
LDLC SERPL CALC-MCNC: 94.8 MG/DL
LYMPHOCYTES # BLD AUTO: 1.3 K/UL
LYMPHOCYTES NFR BLD: 27.1 %
MCH RBC QN AUTO: 30.1 PG
MCHC RBC AUTO-ENTMCNC: 32.8 %
MCV RBC AUTO: 92 FL
MITRAL VALVE REGURGITATION: NORMAL
MONOCYTES # BLD AUTO: 0.4 K/UL
MONOCYTES NFR BLD: 7.8 %
NEUTROPHILS # BLD AUTO: 3 K/UL
NEUTROPHILS NFR BLD: 63.2 %
NONHDLC SERPL-MCNC: 111 MG/DL
PLATELET # BLD AUTO: 189 K/UL
PMV BLD AUTO: 8.8 FL
POTASSIUM SERPL-SCNC: 4.6 MMOL/L
PROT SERPL-MCNC: 6.9 G/DL
RBC # BLD AUTO: 4.23 M/UL
RETIRED EF AND QEF - SEE NOTES: 56 (ref 55–65)
SODIUM SERPL-SCNC: 141 MMOL/L
T4 FREE SERPL-MCNC: 1.11 NG/DL
TRIGL SERPL-MCNC: 81 MG/DL
TSH SERPL DL<=0.005 MIU/L-ACNC: 1.49 UIU/ML
WBC # BLD AUTO: 4.7 K/UL

## 2017-04-17 PROCEDURE — 80061 LIPID PANEL: CPT

## 2017-04-17 PROCEDURE — 83880 ASSAY OF NATRIURETIC PEPTIDE: CPT

## 2017-04-17 PROCEDURE — 84439 ASSAY OF FREE THYROXINE: CPT

## 2017-04-17 PROCEDURE — 36415 COLL VENOUS BLD VENIPUNCTURE: CPT

## 2017-04-17 PROCEDURE — 84443 ASSAY THYROID STIM HORMONE: CPT

## 2017-04-17 PROCEDURE — 80053 COMPREHEN METABOLIC PANEL: CPT

## 2017-04-17 PROCEDURE — 85025 COMPLETE CBC W/AUTO DIFF WBC: CPT

## 2017-04-17 PROCEDURE — 93306 TTE W/DOPPLER COMPLETE: CPT | Mod: S$GLB,,, | Performed by: INTERNAL MEDICINE

## 2017-04-20 ENCOUNTER — OFFICE VISIT (OUTPATIENT)
Dept: OPTOMETRY | Facility: CLINIC | Age: 77
End: 2017-04-20
Payer: MEDICARE

## 2017-04-20 DIAGNOSIS — H52.7 REFRACTIVE ERROR: ICD-10-CM

## 2017-04-20 DIAGNOSIS — H25.13 NUCLEAR SCLEROSIS, BILATERAL: Primary | ICD-10-CM

## 2017-04-20 PROCEDURE — 92015 DETERMINE REFRACTIVE STATE: CPT | Mod: S$GLB,,, | Performed by: OPTOMETRIST

## 2017-04-20 PROCEDURE — 99999 PR PBB SHADOW E&M-EST. PATIENT-LVL I: CPT | Mod: PBBFAC,,, | Performed by: OPTOMETRIST

## 2017-04-20 PROCEDURE — 92014 COMPRE OPH EXAM EST PT 1/>: CPT | Mod: S$GLB,,, | Performed by: OPTOMETRIST

## 2017-04-20 NOTE — PROGRESS NOTES
HPI     CC: Pt here today for yearly ocular health check. DLE: 4 years ago    (+) Cataract both eyes  (+) Blurry near vision with current glasses. Pt states distance has bene   stable.      (-) pain / (-) discomfort  (-) headaches  (-) diplopia   (-) flashes / (-) floaters         Last edited by Danish Renee, OD on 4/20/2017  8:32 AM.     ROS     Positive for: Eyes    Negative for: Constitutional, Gastrointestinal, Neurological, Skin,   Genitourinary, Musculoskeletal, HENT, Endocrine, Cardiovascular,   Respiratory, Psychiatric, Allergic/Imm, Heme/Lymph    Last edited by Danish Renee, OD on 4/20/2017  8:32 AM. (History)        Assessment /Plan     For exam results, see Encounter Report.    Nuclear sclerosis, bilateral    Refractive error      Moderate NS OU. Discussed possible ocular affects of cataracts. Acceptable BCVA OU. Discussed treatment options. Surgery not recommended at this time. Monitor yearly.     Dispensed updated spectacle Rx. Discussed various spectacle lens options. Discussed adaptation period to new specs. Demonstrated new spec Rx in phoropter vs current specs with patient satisfaction.     Discussed findings. Mild blepharitis OU. Lid scrubs with warm water and baby shampoo qHS OU. RTC if symptoms worsen, otherwise monitor yearly.        RTC in 1 year for comprehensive eye exam, or sooner prn.

## 2017-04-21 ENCOUNTER — CLINICAL SUPPORT (OUTPATIENT)
Dept: REHABILITATION | Facility: HOSPITAL | Age: 77
End: 2017-04-21
Attending: ORTHOPAEDIC SURGERY
Payer: MEDICARE

## 2017-04-21 DIAGNOSIS — M25.611 SHOULDER STIFFNESS, RIGHT: ICD-10-CM

## 2017-04-21 DIAGNOSIS — M25.511 RIGHT SHOULDER PAIN, UNSPECIFIED CHRONICITY: Primary | ICD-10-CM

## 2017-04-21 DIAGNOSIS — R29.898 SHOULDER WEAKNESS: ICD-10-CM

## 2017-04-21 PROCEDURE — 97110 THERAPEUTIC EXERCISES: CPT | Mod: PN

## 2017-04-21 NOTE — PROGRESS NOTES
"Time in 11  Time out 12      Timed units  4 therex                              Time:11-12        S: doing HEP  Pain:continues to decrease in intensity and frequency    O:  r prom er at 0 abd 80   at 45 abd 60    at 90 abd 60      slir 80         func ir 15" lift off  l prom er at 0 abd 80    at 45 abd 90   at  90 abd  90    slir  80        func ir 15" lift off    HEP: told pt. To do at least 2 min. TERT for er 45, 90 abd stretches and to decrease ir stretches to 1-3 reps 1 x day    manual tx: n/a    prom as tolerated-pain free: n/a    stretches as tolerated-pain free: er 45, 90 abd    theraband 2 x 20:  purple row, add, ir 0 abd, depression    theraband 4 x 10: orange supine protraction    isometrics 2 x 20: arm by side abd, ext    education: explained typically passive er x 3 should = contralateral side long term given type of surgery done especially if no tension on subscapularis documented in op note    ube: level 1 x 10'          A: shoulder stiffness needs to be resolved so long term overhead use can be strong and functional            P:fix gh stiffness then issue d2 stretch; d/c once stiffness thru shoulder complex resolved  "

## 2017-04-24 ENCOUNTER — CLINICAL SUPPORT (OUTPATIENT)
Dept: REHABILITATION | Facility: HOSPITAL | Age: 77
End: 2017-04-24
Attending: ORTHOPAEDIC SURGERY
Payer: MEDICARE

## 2017-04-24 DIAGNOSIS — M25.611 SHOULDER STIFFNESS, RIGHT: ICD-10-CM

## 2017-04-24 DIAGNOSIS — R29.898 SHOULDER WEAKNESS: ICD-10-CM

## 2017-04-24 DIAGNOSIS — M25.511 RIGHT SHOULDER PAIN, UNSPECIFIED CHRONICITY: Primary | ICD-10-CM

## 2017-04-24 PROCEDURE — 97110 THERAPEUTIC EXERCISES: CPT | Mod: PN

## 2017-04-24 NOTE — PROGRESS NOTES
Time in 9  Time out 10      Timed units  4 therex                              Time:9-10    S: doing HEP and understands continued home stretching should resolve r anterior tightness  Pain:continues to decrease in intensity and frequency    O:  HEP: reviewed benefits of low load prolonged stretching    manual tx: n/a    prom as tolerated-pain free: n/a    stretches as tolerated-pain free: er 45, 90 abd    theraband 2 x 20:  purple row, ir 0 abd, add, depression    theraband 4 x 10: orange supine protraction    isometrics 2 x 20: abd, ext arm by side    education: explained as er 45, 90 abd flexibility improves, visit frequency may be decreased    ube: n/a            A:resolving anterior shoulder stiffness specifically subscapularis shortening is critical so overhead stretches to work pec major clavicular head can be issued            P:fix all shoulder stiffness so long term overhead use can be possible; test prom er x 3 and ir x 2

## 2017-04-27 ENCOUNTER — CLINICAL SUPPORT (OUTPATIENT)
Dept: REHABILITATION | Facility: HOSPITAL | Age: 77
End: 2017-04-27
Attending: ORTHOPAEDIC SURGERY
Payer: MEDICARE

## 2017-04-27 DIAGNOSIS — R29.898 SHOULDER WEAKNESS: ICD-10-CM

## 2017-04-27 DIAGNOSIS — M25.511 RIGHT SHOULDER PAIN, UNSPECIFIED CHRONICITY: Primary | ICD-10-CM

## 2017-04-27 DIAGNOSIS — M25.611 SHOULDER STIFFNESS, RIGHT: ICD-10-CM

## 2017-04-27 PROCEDURE — 97110 THERAPEUTIC EXERCISES: CPT | Mod: PN

## 2017-04-27 NOTE — PROGRESS NOTES
"Time in 8  Time out 9      Timed units  4 therex                              Time:8-9        S:understands given info in op note, r shoulder should have full flexibility vs l side long term  Pain:mild, rare, C5 ache    O:  r prom er at 0 abd 80   at 45 abd 72    at 90 abd 70      slir 80         func ir 15" lift off  l prom er at 0 abd 80   at 45 abd 90    at  90 abd 90     slir  80        func ir 15" lift off    HEP: reviewed    manual tx: n/a    prom as tolerated-pain free: n/a    stretches as tolerated-pain free: n/a    theraband 2 x 20:  purple row, add, ir 0 abd, depression    theraband 4 x 10: blue supine protraction    isometrics 2 x 20: arm by side abd, ext    education: went over benefit of getting full flexibility in r shoulder vs l    ube: level 1 x 10'        A: passive abd with excellent rom vs l side (about =), pec major clavicular head tightness and subscapularis tightness looks to be main factor for limited passive flex which is about 130            P:test b prom elevation x 3 once er 45, 90 abd = to l side; fix all stiffness and transition to stretch weaning and shoulder complex PRE HEP to improve active use, elevation, and mechanics to promote full painless use in future      "

## 2017-05-02 ENCOUNTER — CLINICAL SUPPORT (OUTPATIENT)
Dept: REHABILITATION | Facility: HOSPITAL | Age: 77
End: 2017-05-02
Attending: ORTHOPAEDIC SURGERY
Payer: MEDICARE

## 2017-05-02 DIAGNOSIS — R29.898 SHOULDER WEAKNESS: ICD-10-CM

## 2017-05-02 DIAGNOSIS — M25.511 RIGHT SHOULDER PAIN, UNSPECIFIED CHRONICITY: Primary | ICD-10-CM

## 2017-05-02 DIAGNOSIS — M25.611 SHOULDER STIFFNESS, RIGHT: ICD-10-CM

## 2017-05-02 PROCEDURE — 97110 THERAPEUTIC EXERCISES: CPT | Mod: PN

## 2017-05-02 NOTE — PROGRESS NOTES
Time in 7  Time out 8      Timed units  4 therex                              Time:7-8        S:overall functional use below shoulder level continues to improve  Pain:continues to decrease in intensity and frequency    O:  HEP: n/a    manual tx: n/a    prom as tolerated-pain free: n/a    stretches as tolerated-pain free: n/a    theraband 2 x 20:  purple row, add, ir 0 abd, depression, supine protraction    isometrics 2 x 20: arm by side abd, ext    education: explained focus of care is on stretching and strengthening and that HEP should provide main benefit at this point    ube: level 1 x 10'            A:subscapularis length continues to increase; once er x 3 prom = to l side, prom elevation eval should be done to see if overhead stretches will be needed to get full prom into elevation to facilitate overhead use long term            P:fix all shoulder stiffness, decrease visit frequency and issue PRE HEP to be done 1 x week soon

## 2017-05-04 ENCOUNTER — CLINICAL SUPPORT (OUTPATIENT)
Dept: REHABILITATION | Facility: HOSPITAL | Age: 77
End: 2017-05-04
Attending: ORTHOPAEDIC SURGERY
Payer: MEDICARE

## 2017-05-04 DIAGNOSIS — R29.898 SHOULDER WEAKNESS: ICD-10-CM

## 2017-05-04 DIAGNOSIS — M25.511 RIGHT SHOULDER PAIN, UNSPECIFIED CHRONICITY: Primary | ICD-10-CM

## 2017-05-04 DIAGNOSIS — M25.611 SHOULDER STIFFNESS, RIGHT: ICD-10-CM

## 2017-05-04 PROCEDURE — 97110 THERAPEUTIC EXERCISES: CPT | Mod: PN

## 2017-05-04 NOTE — PROGRESS NOTES
Time in 7  Time out 8      Timed units  4 therex                             Time:7-8        S:no new issues, overall use much better than 1 month ago  Pain:0/10 at rest, with sleep, with light use    O:  HEP: issued theraband routine done today as well as isometrics to be done 1 x week    manual tx: n/a    prom as tolerated-pain free: n/a    stretches as tolerated-pain free: n/a    theraband 2 x 20:  purple row, add, ir 0 abd, depression, supine protraction    isometrics 2 x 20: arm by side abd, ext    education: explained most likely overhead stretches will be needed once all anterior shoulder/subscapularis stiffness gone so long term elevation can be full    ube: level 1 x 10'            A:    functional use with ADL increasing nicely however active elevation still limited to about 80          P:decrease visit frequency, start overhead stretching once er 45, 90 abd = to l side    OCCUPATIONAL THERAPY PROGRESS UPDATE      Onset Date:  Years ago  SOC Date:  11-30-16  Primary Diagnosis:  r reverse TSA  Treatment Diagnosis:  r shoulder pain, stiffness, weakness  Precautions:  Universal, protocol  Visits from SOC:  21  Functional Level Prior to SOC:  Decreased rom, use, and strength; pre symptom onset had no deficits with functional use    Updated Assessment:  r shoulder flexibility with excellent improvement since day 1 of OT especially considering extent of stiffness and duration of symptoms pre OT    Goals: all ongoing    Reasons for Continuation of Therapy:  Progression to full stretch routine to maximize functional elevation and ADL independence imperative so long term pt. Can use r ue with all required tasks and promote solid mechanics    Recommended Treatment Plan: eval and tx

## 2017-05-05 ENCOUNTER — OFFICE VISIT (OUTPATIENT)
Dept: PAIN MEDICINE | Facility: CLINIC | Age: 77
End: 2017-05-05
Payer: MEDICARE

## 2017-05-05 VITALS
SYSTOLIC BLOOD PRESSURE: 120 MMHG | WEIGHT: 236.56 LBS | BODY MASS INDEX: 40.39 KG/M2 | HEIGHT: 64 IN | DIASTOLIC BLOOD PRESSURE: 68 MMHG | HEART RATE: 67 BPM

## 2017-05-05 DIAGNOSIS — M54.16 LUMBAR RADICULITIS: ICD-10-CM

## 2017-05-05 DIAGNOSIS — M51.36 DDD (DEGENERATIVE DISC DISEASE), LUMBAR: Primary | ICD-10-CM

## 2017-05-05 PROCEDURE — 1125F AMNT PAIN NOTED PAIN PRSNT: CPT | Mod: S$GLB,,, | Performed by: ANESTHESIOLOGY

## 2017-05-05 PROCEDURE — 3078F DIAST BP <80 MM HG: CPT | Mod: S$GLB,,, | Performed by: ANESTHESIOLOGY

## 2017-05-05 PROCEDURE — 99999 PR PBB SHADOW E&M-EST. PATIENT-LVL III: CPT | Mod: PBBFAC,,, | Performed by: ANESTHESIOLOGY

## 2017-05-05 PROCEDURE — 99214 OFFICE O/P EST MOD 30 MIN: CPT | Mod: S$GLB,,, | Performed by: ANESTHESIOLOGY

## 2017-05-05 PROCEDURE — 3074F SYST BP LT 130 MM HG: CPT | Mod: S$GLB,,, | Performed by: ANESTHESIOLOGY

## 2017-05-05 PROCEDURE — 1159F MED LIST DOCD IN RCRD: CPT | Mod: S$GLB,,, | Performed by: ANESTHESIOLOGY

## 2017-05-05 PROCEDURE — 1160F RVW MEDS BY RX/DR IN RCRD: CPT | Mod: S$GLB,,, | Performed by: ANESTHESIOLOGY

## 2017-05-05 NOTE — PROGRESS NOTES
This note was completed with dictation software and grammatical errors may exist.    Referring Physician: Claudia Kim MD    PCP: Claudia Kim MD      CC: Lower back and right leg pain    Interval history: Patient returns to our clinic.  She underwent right L3-4 and L4-5 TFESI on 1/2017.  She reports about 60% relief for about 3 months.  Pain has since recurreed.  She has resumption of Pain radiates down her right buttock into her right lateral knee.  Pain worsens with standing, laying, lifting and getting up.  She desires repeat procedure.  She denies any weakness.  No bowel bladder changes.  Prior HPI:   42-year-old female referred to us for lower back and leg pain.  Pain has gradually worsened over the past 6 months.  No recent traumatic incident.  She has intermittent aching, BURNING pain in her lower back.  Pain radiates down her right buttock into her right lateral knee.  Pain worsens with standing, laying, lifting and getting up.  Pain improves with rest.  She has tried physical therapy with minimal benefit.  She has had hip injection with mild benefits.  X-rays performed in July showed lumbar DDD.  She has not had any lumbar MRIs.  She denies any weakness.  No bowel bladder changes.  She rates her pain 6/10 today.    ROS:  CONSTITUTIONAL: No fevers, chills, night sweats, wt. loss, appetite changes  SKIN: no rashes or itching  ENT: No headaches, head trauma, vision changes, or eye pain  LYMPH NODES: None noticed   CV: No chest pain, palpitations.   RESP: No shortness of breath, dyspnea on exertion, cough, wheezing, or hemoptysis  GI: No nausea, emesis, diarrhea, constipation, melena, hematochezia, pain.    : No dysuria, hematuria, urgency, or frequency   HEME: No easy bruising, bleeding problems  PSYCHIATRIC: No depression, anxiety, psychosis, hallucinations.  NEURO: No seizures, memory loss, dizziness or difficulty sleeping  MSK: +HPI      Past Medical History:   Diagnosis Date    Back pain      Carpal tunnel syndrome     Cataract     Colon cancer     Coronary artery disease     Hypothyroidism     Obesity (BMI 30-39.9) 3/24/2016    Osteoarthritis     Osteoporosis     Personal history of colon cancer, stage III     Squamous cell carcinoma     Strabismus     Trouble in sleeping     Wears dentures     Uppers     Past Surgical History:   Procedure Laterality Date    COLON SURGERY  06/2007    COLONOSCOPY N/A 10/18/2016    Procedure: COLONOSCOPY;  Surgeon: Rell Cordova MD;  Location: [facility];  Service: Endoscopy;  Laterality: N/A;    HAND TENDON SURGERY Left     HEMICOLECTOMY      right    JOINT REPLACEMENT      Bilateral knees    SHOULDER ARTHROSCOPY Right 01/12/2017    Reverse     TONSILLECTOMY      TONSILLECTOMY, ADENOIDECTOMY, BILATERAL MYRINGOTOMY AND TUBES      TOTAL KNEE ARTHROPLASTY Bilateral 08/1998    total replacements    TUMOR REMOVAL Left     left foot as a child     Family History   Problem Relation Age of Onset    Hypertension Mother     Kidney disease Mother     Cataracts Father     Cataracts Sister     Cancer Sister      breast    No Known Problems Brother     Cancer Sister      breast    Arthritis Sister     Diabetes Maternal Uncle     Glaucoma Neg Hx     Amblyopia Neg Hx     Blindness Neg Hx     Macular degeneration Neg Hx     Retinal detachment Neg Hx     Strabismus Neg Hx     Stroke Neg Hx     Thyroid disease Neg Hx      Social History     Social History    Marital status: Single     Spouse name: N/A    Number of children: N/A    Years of education: N/A     Social History Main Topics    Smoking status: Former Smoker     Packs/day: 0.50     Years: 1.00     Types: Cigarettes     Start date: 7/27/1960     Quit date: 1/1/1961    Smokeless tobacco: Never Used    Alcohol use No    Drug use: No    Sexual activity: No     Other Topics Concern    None     Social History Narrative         Medications/Allergies: See med card    Vitals:    05/05/17 0845  "  BP: 120/68   Pulse: 67   Weight: 107.3 kg (236 lb 8.9 oz)   Height: 5' 4" (1.626 m)   PainSc:   7         Physical exam:    GENERAL: A and O x3, the patient appears well groomed and is in no acute distress.  Skin: No rashes or obvious lesions  HEENT: normocephalic, atraumatic  CARDIOVASCULAR:  Palpable peripheral pulses  LUNGS: easy work of breathing  ABDOMEN: soft, nontender   UPPER EXTREMITIES: Normal alignment, normal range of motion, no atrophy, no skin changes,  hair growth and nail growth normal and equal bilaterally. No swelling, no tenderness.    LOWER EXTREMITIES:  Normal alignment, normal range of motion, no atrophy, no skin changes,  hair growth and nail growth normal and equal bilaterally. No swelling, no tenderness.    LUMBAR SPINE  Lumbar spine: ROM is full with flexion extension and oblique extension with mild increased pain.    Gregory's test causes no increased pain on either side.    Supine straight leg raise is positive on right at 60 degrees   Internal and external rotation of the hip causes no increased pain on either side.  Myofascial exam: No tenderness to palpation across lumbar paraspinous muscles.      MENTAL STATUS: normal orientation, speech, language, and fund of knowledge for social situation.  Emotional state appropriate.    CRANIAL NERVES:  II:  PERRL bilaterally,   III,IV,VI: EOMI.    V:  Facial sensation equal bilaterally  VII:  Facial motor function normal.  VIII:  Hearing equal to finger rub bilaterally  IX/X: Gag normal, palate symmetric  XI:  Shoulder shrug equal, head turn equal  XII:  Tongue midline without fasciculations      MOTOR: Tone and bulk: normal bilateral upper and lower Strength: normal   Delt Bi Tri WE WF     R 5 5 5 5 5 5   L 5 5 5 5 5 5     IP ADD ABD Quad TA Gas HAM  R 5 5 5 5 5 5 5  L 5 5 5 5 5 5 5    SENSATION: Light touch and pinprick intact bilaterally  REFLEXES: normal, symmetric, nonbrisk.  Toes down, no clonus. No hoffmans.  GAIT: normal rise, base, " steps, and arm swing.        Imaging:  Xray L-spine 7/2016   A 5 views lumbar spine demonstrate a transitional lumbosacral zone.  There is a mild levoscoliotic curvature.  Is anterior osteophyte motion throughout the lumbar region with loss of disc space height noted at L3/L4-L4/L5.  Prominent degenerative facet changes are present.     MRI L-spine 12/2016  Multilevel degenerative lumbar spondylosis resulting in severe spinal canal narrowing at the L2-3 and L3-4 levels.  No significant neuroforaminal narrowing.  Please see level by level comments above.    Assessment:  Patient referred for low back and right leg pain  1. DDD (degenerative disc disease), lumbar    2. Lumbar radiculitis          Plan:  - I have stressed the importance of physical activity and exercise to improve overall health  - Schedule repeat right L3-4 and L4-5 TFESI  - Follow up after procedure

## 2017-05-08 DIAGNOSIS — M54.16 LUMBAR RADICULOPATHY: Primary | ICD-10-CM

## 2017-05-10 ENCOUNTER — CLINICAL SUPPORT (OUTPATIENT)
Dept: FAMILY MEDICINE | Facility: CLINIC | Age: 77
End: 2017-05-10
Payer: MEDICARE

## 2017-05-10 VITALS — HEART RATE: 53 BPM | DIASTOLIC BLOOD PRESSURE: 62 MMHG | SYSTOLIC BLOOD PRESSURE: 139 MMHG

## 2017-05-10 DIAGNOSIS — I10 ESSENTIAL HYPERTENSION: Primary | ICD-10-CM

## 2017-05-10 PROCEDURE — 99999 PR PBB SHADOW E&M-EST. PATIENT-LVL III: CPT | Mod: PBBFAC,,, | Performed by: FAMILY MEDICINE

## 2017-05-10 RX ORDER — FUROSEMIDE 20 MG/1
20 TABLET ORAL DAILY PRN
Qty: 30 TABLET | Refills: 2 | Status: SHIPPED | OUTPATIENT
Start: 2017-05-10 | End: 2017-08-12 | Stop reason: SDUPTHER

## 2017-05-10 NOTE — PROGRESS NOTES
2 patient identifiers used ( name &  ).  Patient came in for nurse blood pressure check.  Automatic reading was 156/59  . Right arm resting on desk, feet flat on floor, back straight.  Patient c/o back pain at a level 7.  Patient stated that hey took their prescribed blood pressure medication this am.  Allergies and medication reviewed with the patient.  Blood pressure re-checked after 5 minutes with manual cuff, reading was 139/62  .  Patient advised to continue taking medication as prescribed and follow up as scheduled.

## 2017-05-12 ENCOUNTER — CLINICAL SUPPORT (OUTPATIENT)
Dept: REHABILITATION | Facility: HOSPITAL | Age: 77
End: 2017-05-12
Attending: ORTHOPAEDIC SURGERY
Payer: MEDICARE

## 2017-05-12 DIAGNOSIS — M25.611 SHOULDER STIFFNESS, RIGHT: ICD-10-CM

## 2017-05-12 DIAGNOSIS — R29.898 SHOULDER WEAKNESS: ICD-10-CM

## 2017-05-12 DIAGNOSIS — M25.511 RIGHT SHOULDER PAIN, UNSPECIFIED CHRONICITY: Primary | ICD-10-CM

## 2017-05-12 PROCEDURE — G8988 SELF CARE GOAL STATUS: HCPCS | Mod: CH,PN

## 2017-05-12 PROCEDURE — G8987 SELF CARE CURRENT STATUS: HCPCS | Mod: CK,PN

## 2017-05-12 PROCEDURE — 97110 THERAPEUTIC EXERCISES: CPT | Mod: KX,PN

## 2017-05-12 NOTE — PROGRESS NOTES
"Time in 8  Time out 9      Timed units  4 therex                              Time:8-9        S:doing HEP, functional elevation with ADL continues to improve  Pain:0/10 at rest, with sleep, with light use    O:  r prom er at 0 abd 80   at 45 abd 78    at 90 abd 76      slir 80         func ir 15" lift off  l prom er at 0 abd  80   at 45 abd 90   At  90 abd 90     Slir 80         func ir 15" lift off    HEP: reviewed    manual tx: n/a    prom as tolerated-pain free: n/a    stretches as tolerated-pain free:     theraband 2 x 20:  purple row, add, ir 0 abd, depression, supine protraction    isometrics 2 x 20: arm by side abd, ext    education: expalined once all above planes =, d2 stretch will be issued with d/c soon after    ube: n/a        A: improving prom all shoulder planes important so long term over shoulder use can be full, balanced, and painless            P:test prom er x x 3 and ir x 2    FIM  Current g code CK min a 40-60% impairment  Goal g code      CH independent 0% impairment      "

## 2017-05-16 ENCOUNTER — HOSPITAL ENCOUNTER (OUTPATIENT)
Facility: AMBULARY SURGERY CENTER | Age: 77
Discharge: HOME | End: 2017-05-16
Attending: ANESTHESIOLOGY | Admitting: ANESTHESIOLOGY
Payer: MEDICARE

## 2017-05-16 ENCOUNTER — SURGERY (OUTPATIENT)
Age: 77
End: 2017-05-16

## 2017-05-16 DIAGNOSIS — M51.36 DDD (DEGENERATIVE DISC DISEASE), LUMBAR: Primary | ICD-10-CM

## 2017-05-16 PROCEDURE — 64483 NJX AA&/STRD TFRM EPI L/S 1: CPT | Performed by: ANESTHESIOLOGY

## 2017-05-16 PROCEDURE — 64484 NJX AA&/STRD TFRM EPI L/S EA: CPT | Mod: RT,,, | Performed by: ANESTHESIOLOGY

## 2017-05-16 PROCEDURE — 64484 NJX AA&/STRD TFRM EPI L/S EA: CPT | Performed by: ANESTHESIOLOGY

## 2017-05-16 PROCEDURE — 64483 NJX AA&/STRD TFRM EPI L/S 1: CPT | Mod: RT,,, | Performed by: ANESTHESIOLOGY

## 2017-05-16 RX ORDER — SODIUM CHLORIDE, SODIUM LACTATE, POTASSIUM CHLORIDE, CALCIUM CHLORIDE 600; 310; 30; 20 MG/100ML; MG/100ML; MG/100ML; MG/100ML
INJECTION, SOLUTION INTRAVENOUS ONCE AS NEEDED
Status: COMPLETED | OUTPATIENT
Start: 2017-05-16 | End: 2017-05-16

## 2017-05-16 RX ORDER — MIDAZOLAM HYDROCHLORIDE 1 MG/ML
INJECTION INTRAMUSCULAR; INTRAVENOUS
Status: DISCONTINUED | OUTPATIENT
Start: 2017-05-16 | End: 2017-05-16 | Stop reason: HOSPADM

## 2017-05-16 RX ORDER — DEXAMETHASONE SODIUM PHOSPHATE 10 MG/ML
INJECTION INTRAMUSCULAR; INTRAVENOUS
Status: DISCONTINUED | OUTPATIENT
Start: 2017-05-16 | End: 2017-05-16 | Stop reason: HOSPADM

## 2017-05-16 RX ORDER — FENTANYL CITRATE 50 UG/ML
INJECTION, SOLUTION INTRAMUSCULAR; INTRAVENOUS
Status: DISCONTINUED | OUTPATIENT
Start: 2017-05-16 | End: 2017-05-16 | Stop reason: HOSPADM

## 2017-05-16 RX ORDER — FENTANYL CITRATE 50 UG/ML
INJECTION, SOLUTION INTRAMUSCULAR; INTRAVENOUS
Status: DISCONTINUED
Start: 2017-05-16 | End: 2017-05-16 | Stop reason: HOSPADM

## 2017-05-16 RX ORDER — LIDOCAINE HYDROCHLORIDE 10 MG/ML
INJECTION, SOLUTION EPIDURAL; INFILTRATION; INTRACAUDAL; PERINEURAL
Status: DISCONTINUED | OUTPATIENT
Start: 2017-05-16 | End: 2017-05-16 | Stop reason: HOSPADM

## 2017-05-16 RX ORDER — BUPIVACAINE HYDROCHLORIDE 2.5 MG/ML
INJECTION, SOLUTION EPIDURAL; INFILTRATION; INTRACAUDAL
Status: DISPENSED
Start: 2017-05-16 | End: 2017-05-17

## 2017-05-16 RX ORDER — ALPRAZOLAM 1 MG/1
1 TABLET, ORALLY DISINTEGRATING ORAL
Status: DISCONTINUED | OUTPATIENT
Start: 2017-05-16 | End: 2017-05-16 | Stop reason: HOSPADM

## 2017-05-16 RX ORDER — BUPIVACAINE HYDROCHLORIDE 2.5 MG/ML
INJECTION, SOLUTION EPIDURAL; INFILTRATION; INTRACAUDAL
Status: DISCONTINUED | OUTPATIENT
Start: 2017-05-16 | End: 2017-05-16 | Stop reason: HOSPADM

## 2017-05-16 RX ORDER — MIDAZOLAM HYDROCHLORIDE 1 MG/ML
INJECTION INTRAMUSCULAR; INTRAVENOUS
Status: DISCONTINUED
Start: 2017-05-16 | End: 2017-05-16 | Stop reason: HOSPADM

## 2017-05-16 RX ORDER — DEXAMETHASONE SODIUM PHOSPHATE 10 MG/ML
INJECTION INTRAMUSCULAR; INTRAVENOUS
Status: DISPENSED
Start: 2017-05-16 | End: 2017-05-17

## 2017-05-16 RX ADMIN — SODIUM CHLORIDE, SODIUM LACTATE, POTASSIUM CHLORIDE, CALCIUM CHLORIDE: 600; 310; 30; 20 INJECTION, SOLUTION INTRAVENOUS at 12:05

## 2017-05-16 RX ADMIN — LIDOCAINE HYDROCHLORIDE 10 ML: 10 INJECTION, SOLUTION EPIDURAL; INFILTRATION; INTRACAUDAL; PERINEURAL at 01:05

## 2017-05-16 RX ADMIN — DEXAMETHASONE SODIUM PHOSPHATE 10 MG: 10 INJECTION INTRAMUSCULAR; INTRAVENOUS at 01:05

## 2017-05-16 RX ADMIN — MIDAZOLAM HYDROCHLORIDE 2 MG: 1 INJECTION INTRAMUSCULAR; INTRAVENOUS at 01:05

## 2017-05-16 RX ADMIN — BUPIVACAINE HYDROCHLORIDE 3 ML: 2.5 INJECTION, SOLUTION EPIDURAL; INFILTRATION; INTRACAUDAL at 01:05

## 2017-05-16 RX ADMIN — FENTANYL CITRATE 50 MCG: 50 INJECTION, SOLUTION INTRAMUSCULAR; INTRAVENOUS at 01:05

## 2017-05-16 NOTE — IP AVS SNAPSHOT
[facility] [place] Location  [address] [address]           Patient Discharge Instructions   Our goal is to set you up for success. This packet includes information on your condition, medications, and your home care.  It will help you care for yourself to prevent having to return to the hospital.     Please ask your nurse if you have any questions.      There are many details to remember when preparing to leave the hospital. Here is what you will need to do:    1. Take your medicine. If you are prescribed medications, review your Medication List on the following pages. You may have new medications to  at the pharmacy and others that you'll need to stop taking. Review the instructions for how and when to take your medications. Talk with your doctor or nurses if you are unsure of what to do.     2. Go to your follow-up appointments. Specific follow-up information is listed in the following pages. Your may be contacted by a nurse or clinical provider about future appointments. Be sure we have all of the phone numbers to reach you. Please contact your provider's office if you are unable to make an appointment.     3. Watch for warning signs. Your doctor or nurse will give you detailed warning signs to watch for and when to call for assistance. These instructions may also include educational information about your condition. If you experience any of warning signs to your health, call your doctor.           [facility]  Unless otherwise directed by your provider, please   contact [facility], our nurse care line   that is available for 24/7 assistance.     [phone] (toll-free)     Registered nurses in the [facility]   provide: appointment scheduling, clinical advisement, health education, and other advisory services.                  ** Verify the list of medication(s) below is accurate and up to date. Carry this with you in case of  emergency. If your medications have changed, please notify your healthcare provider.             Medication List      CONTINUE taking these medications        Additional Info                      cyclobenzaprine 5 MG tablet   Commonly known as:  FLEXERIL   Refills:  0   Dose:  5 mg    Instructions:  Take 5 mg by mouth 3 (three) times daily as needed for Muscle spasms.     Begin Date    AM    Noon    PM    Bedtime       furosemide 20 MG tablet   Commonly known as:  LASIX   Quantity:  30 tablet   Refills:  2   Dose:  20 mg    Instructions:  Take 1 tablet (20 mg total) by mouth daily as needed.     Begin Date    AM    Noon    PM    Bedtime       hydrocodone-acetaminophen 10-325mg  mg Tab   Commonly known as:  NORCO   Quantity:  30 tablet   Refills:  0   Dose:  1 tablet    Instructions:  Take 1 tablet by mouth every 4 (four) hours as needed.     Begin Date    AM    Noon    PM    Bedtime       levothyroxine 75 MCG tablet   Commonly known as:  SYNTHROID   Quantity:  90 tablet   Refills:  3   Comments:  **Patient requests 90 days supply**    Instructions:  TAKE 1 TABLET(75 MCG) BY MOUTH EVERY DAY     Begin Date    AM    Noon    PM    Bedtime       lisinopril 20 MG tablet   Commonly known as:  PRINIVIL,ZESTRIL   Quantity:  90 tablet   Refills:  3   Dose:  20 mg    Instructions:  Take 1 tablet (20 mg total) by mouth once daily.     Begin Date    AM    Noon    PM    Bedtime       nystatin cream   Commonly known as:  MYCOSTATIN   Quantity:  30 g   Refills:  prn    Instructions:  Apply topically 2 (two) times daily as needed. GRETCHEN EXT AA BID     Begin Date    AM    Noon    PM    Bedtime       triamcinolone acetonide 0.1% 0.1 % cream   Commonly known as:  KENALOG   Quantity:  60 g   Refills:  0    Instructions:  Apply topically 2 (two) times daily.     Begin Date    AM    Noon    PM    Bedtime                  Please bring to all follow up appointments:    1. A copy of your discharge instructions.  2. All medicines you are  currently taking in their original bottles.  3. Identification and insurance card.    Please arrive 15 minutes ahead of scheduled appointment time.    Please call 24 hours in advance if you must reschedule your appointment and/or time.        Your Scheduled Appointments     May 23, 2017  7:00 AM CDT   Established Occupational Therapy with Jerrell Coy, OT   [facility] ([facility])    [address] [place] [id number]   [phone]            May 26, 2017  8:00 AM CDT   Established Occupational Therapy with Jerrell Coy OT   [facility] ([facility])    [address] [place] [address]   [phone]            May 30, 2017  7:00 AM CDT   Established Occupational Therapy with Jerrell Coy OT   [facility] ([facility])    [address] [place] [address]   [phone]            Jun 01, 2017  8:00 AM CDT   Established Occupational Therapy with Jerrell Coy OT   [facility] ([facility])    [address] [place] [address]   [phone]            Socrates 15, 2017 10:30 AM CDT   Established Patient Visit with R[facility] ([facility])    [address] [place] [address] [address]  [facility] [address]   [phone]                Discharge Instructions     Future Orders    Activity as tolerated     Call MD for:  difficulty breathing or increased cough     Call MD for:  persistent nausea and vomiting or diarrhea     Call MD for:  redness, tenderness, or signs of infection (pain, swelling, redness, odor or green/yellow discharge around incision site)     Call MD for:  severe persistent headache     Call MD for:  severe uncontrolled pain     Call MD for:  temperature >100.4     Diet general     Questions:    Total calories:      Fat restriction, if  "any:      Protein restriction, if any:      Na restriction, if any:      Fluid restriction:      Additional restrictions:          Discharge Instructions       Pain injection instructions:     Steroids take about a week to relieve pain.  Initially you may get pain relief from the local anesthetic but this may wear off before the steroid works.    No driving for 24 hrs   Activity as tolerated- gradually increase activities.  Dont lift over 10 lbs for 24 hrs  No heat at injection sites x 2 days  Use ice for mild swelling and for comfort.  May shower today. No baths for two days.      Resume Aspirin, Plavix, or Coumadin the day after the procedure unless otherwise instructed.   If diabetic,monitor your glucose carefully as steroids can increase glucose level    Seek immediate medical help for:   Severe increase in your usual pain or appearance of new pain.  Prolonged or increasing weakness or numbness in the legs or arms.  Fever above 101 ,Drainage,redness,active bleeding, or increased swelling at the injection site.  Headache, shortness of breath, chest pain, or breathing problems.            Primary Diagnosis     Your primary diagnosis was:  Degeneration Of Lumbar Or Lumbosacral Intervertebral Disc      Admission Information     Date & Time Provider Department [facility]    5/16/2017 12:29 PM Parish Gaitan MD [facility] [phone]      Care Providers     Provider Role Specialty Primary office phone    Parish Gaitan MD Attending Provider Pain Medicine [phone]    Parish Gaitan MD Surgeon  Pain Medicine [phone]      Your Vitals Were     BP Pulse Temp Resp Height Weight    161/76 72 97.8 °F (36.6 °C) (Oral) 18 5' 4" (1.626 m) 107 kg (236 lb)    SpO2 BMI             99% 40.51 kg/m2         Recent Lab Values     No lab values to display.      Allergies as of 5/16/2017        Reactions    Aspirin     Other reaction(s): Stomach upset    Mobic [Meloxicam] Swelling    Edema and elevated blood pressure     " Oxaliplatin     Other reaction(s): Joint pain  Other reaction(s): Itching  Other reaction(s): Hives      Smoking Cessation     If you would like to quit smoking:   You may be eligible for free services if you are a [place] resident and started smoking cigarettes before September 1, 1988.  Call the [employer]oking [employer]essation [employer] ([employer]) toll free at (177) 886-7907 or (164) 812-1530.   Call 1-800-QUIT-NOW if you do not meet the above criteria.   Contact us via email: [email or web address]   View our website for more information: [email or web address]        Language Assistance Services     ATTENTION: Language assistance services are available, free of charge. Please call [phone].      ATENCIÓN: Si bert ye, tiene a licea disposición servicios gratuitos de asistencia lingüística. Llame al [phone].     CHÚ Ý: N?u b?n nói Ti?ng Vi?t, có các d?ch v? h? tr? ngôn ng? mi?n phí dành cho b?n. G?i s? [phone].         [facility] complies with applicable Federal civil rights laws and does not discriminate on the basis of race, color, national origin, age, disability, or sex.

## 2017-05-16 NOTE — H&P (VIEW-ONLY)
This note was completed with dictation software and grammatical errors may exist.    Referring Physician: Claudia Kim MD    PCP: Claudia Kim MD      CC: Lower back and right leg pain    Interval history: Patient returns to our clinic.  She underwent right L3-4 and L4-5 TFESI on 1/2017.  She reports about 60% relief for about 3 months.  Pain has since recurreed.  She has resumption of Pain radiates down her right buttock into her right lateral knee.  Pain worsens with standing, laying, lifting and getting up.  She desires repeat procedure.  She denies any weakness.  No bowel bladder changes.  Prior HPI:   42-year-old female referred to us for lower back and leg pain.  Pain has gradually worsened over the past 6 months.  No recent traumatic incident.  She has intermittent aching, BURNING pain in her lower back.  Pain radiates down her right buttock into her right lateral knee.  Pain worsens with standing, laying, lifting and getting up.  Pain improves with rest.  She has tried physical therapy with minimal benefit.  She has had hip injection with mild benefits.  X-rays performed in July showed lumbar DDD.  She has not had any lumbar MRIs.  She denies any weakness.  No bowel bladder changes.  She rates her pain 6/10 today.    ROS:  CONSTITUTIONAL: No fevers, chills, night sweats, wt. loss, appetite changes  SKIN: no rashes or itching  ENT: No headaches, head trauma, vision changes, or eye pain  LYMPH NODES: None noticed   CV: No chest pain, palpitations.   RESP: No shortness of breath, dyspnea on exertion, cough, wheezing, or hemoptysis  GI: No nausea, emesis, diarrhea, constipation, melena, hematochezia, pain.    : No dysuria, hematuria, urgency, or frequency   HEME: No easy bruising, bleeding problems  PSYCHIATRIC: No depression, anxiety, psychosis, hallucinations.  NEURO: No seizures, memory loss, dizziness or difficulty sleeping  MSK: +HPI      Past Medical History:   Diagnosis Date    Back pain      Carpal tunnel syndrome     Cataract     Colon cancer     Coronary artery disease     Hypothyroidism     Obesity (BMI 30-39.9) 3/24/2016    Osteoarthritis     Osteoporosis     Personal history of colon cancer, stage III     Squamous cell carcinoma     Strabismus     Trouble in sleeping     Wears dentures     Uppers     Past Surgical History:   Procedure Laterality Date    COLON SURGERY  06/2007    COLONOSCOPY N/A 10/18/2016    Procedure: COLONOSCOPY;  Surgeon: Rell Cordova MD;  Location: [facility];  Service: Endoscopy;  Laterality: N/A;    HAND TENDON SURGERY Left     HEMICOLECTOMY      right    JOINT REPLACEMENT      Bilateral knees    SHOULDER ARTHROSCOPY Right 01/12/2017    Reverse     TONSILLECTOMY      TONSILLECTOMY, ADENOIDECTOMY, BILATERAL MYRINGOTOMY AND TUBES      TOTAL KNEE ARTHROPLASTY Bilateral 08/1998    total replacements    TUMOR REMOVAL Left     left foot as a child     Family History   Problem Relation Age of Onset    Hypertension Mother     Kidney disease Mother     Cataracts Father     Cataracts Sister     Cancer Sister      breast    No Known Problems Brother     Cancer Sister      breast    Arthritis Sister     Diabetes Maternal Uncle     Glaucoma Neg Hx     Amblyopia Neg Hx     Blindness Neg Hx     Macular degeneration Neg Hx     Retinal detachment Neg Hx     Strabismus Neg Hx     Stroke Neg Hx     Thyroid disease Neg Hx      Social History     Social History    Marital status: Single     Spouse name: N/A    Number of children: N/A    Years of education: N/A     Social History Main Topics    Smoking status: Former Smoker     Packs/day: 0.50     Years: 1.00     Types: Cigarettes     Start date: 7/27/1960     Quit date: 1/1/1961    Smokeless tobacco: Never Used    Alcohol use No    Drug use: No    Sexual activity: No     Other Topics Concern    None     Social History Narrative         Medications/Allergies: See med card    Vitals:    05/05/17 0845  "  BP: 120/68   Pulse: 67   Weight: 107.3 kg (236 lb 8.9 oz)   Height: 5' 4" (1.626 m)   PainSc:   7         Physical exam:    GENERAL: A and O x3, the patient appears well groomed and is in no acute distress.  Skin: No rashes or obvious lesions  HEENT: normocephalic, atraumatic  CARDIOVASCULAR:  Palpable peripheral pulses  LUNGS: easy work of breathing  ABDOMEN: soft, nontender   UPPER EXTREMITIES: Normal alignment, normal range of motion, no atrophy, no skin changes,  hair growth and nail growth normal and equal bilaterally. No swelling, no tenderness.    LOWER EXTREMITIES:  Normal alignment, normal range of motion, no atrophy, no skin changes,  hair growth and nail growth normal and equal bilaterally. No swelling, no tenderness.    LUMBAR SPINE  Lumbar spine: ROM is full with flexion extension and oblique extension with mild increased pain.    Gregory's test causes no increased pain on either side.    Supine straight leg raise is positive on right at 60 degrees   Internal and external rotation of the hip causes no increased pain on either side.  Myofascial exam: No tenderness to palpation across lumbar paraspinous muscles.      MENTAL STATUS: normal orientation, speech, language, and fund of knowledge for social situation.  Emotional state appropriate.    CRANIAL NERVES:  II:  PERRL bilaterally,   III,IV,VI: EOMI.    V:  Facial sensation equal bilaterally  VII:  Facial motor function normal.  VIII:  Hearing equal to finger rub bilaterally  IX/X: Gag normal, palate symmetric  XI:  Shoulder shrug equal, head turn equal  XII:  Tongue midline without fasciculations      MOTOR: Tone and bulk: normal bilateral upper and lower Strength: normal   Delt Bi Tri WE WF     R 5 5 5 5 5 5   L 5 5 5 5 5 5     IP ADD ABD Quad TA Gas HAM  R 5 5 5 5 5 5 5  L 5 5 5 5 5 5 5    SENSATION: Light touch and pinprick intact bilaterally  REFLEXES: normal, symmetric, nonbrisk.  Toes down, no clonus. No hoffmans.  GAIT: normal rise, base, " steps, and arm swing.        Imaging:  Xray L-spine 7/2016   A 5 views lumbar spine demonstrate a transitional lumbosacral zone.  There is a mild levoscoliotic curvature.  Is anterior osteophyte motion throughout the lumbar region with loss of disc space height noted at L3/L4-L4/L5.  Prominent degenerative facet changes are present.     MRI L-spine 12/2016  Multilevel degenerative lumbar spondylosis resulting in severe spinal canal narrowing at the L2-3 and L3-4 levels.  No significant neuroforaminal narrowing.  Please see level by level comments above.    Assessment:  Patient referred for low back and right leg pain  1. DDD (degenerative disc disease), lumbar    2. Lumbar radiculitis          Plan:  - I have stressed the importance of physical activity and exercise to improve overall health  - Schedule repeat right L3-4 and L4-5 TFESI  - Follow up after procedure

## 2017-05-16 NOTE — DISCHARGE SUMMARY
[facility]  Discharge Note  Short Stay    Admit Date: 5/16/2017    Discharge Date and Time: 5/16/2017    Attending Physician: Parish Gaitan MD     Discharge Provider: Parish Gaitan    Diagnoses:  Active Hospital Problems    Diagnosis  POA    *DDD (degenerative disc disease), lumbar [M51.36]  Yes      Resolved Hospital Problems    Diagnosis Date Resolved POA   No resolved problems to display.       Hospital Course: Lumbar K[facility]ARI  Discharged Condition: Good    Final Diagnoses:   Active Hospital Problems    Diagnosis  POA    *DDD (degenerative disc disease), lumbar [M51.36]  Yes      Resolved Hospital Problems    Diagnosis Date Resolved POA   No resolved problems to display.       Disposition: Home or Self Care    Follow up/Patient Instructions:    Medications:  Reconciled Home Medications:   Current Discharge Medication List      CONTINUE these medications which have NOT CHANGED    Details   furosemide (LASIX) 20 MG tablet Take 1 tablet (20 mg total) by mouth daily as needed.  Qty: 30 tablet, Refills: 2      levothyroxine (SYNTHROID) 75 MCG tablet TAKE 1 TABLET(75 MCG) BY MOUTH EVERY DAY  Qty: 90 tablet, Refills: 3    Comments: **Patient requests 90 days supply**  Associated Diagnoses: Hypothyroidism, unspecified hypothyroidism type      lisinopril (PRINIVIL,ZESTRIL) 20 MG tablet Take 1 tablet (20 mg total) by mouth once daily.  Qty: 90 tablet, Refills: 3    Associated Diagnoses: Essential hypertension      cyclobenzaprine (FLEXERIL) 5 MG tablet Take 5 mg by mouth 3 (three) times daily as needed for Muscle spasms.      hydrocodone-acetaminophen 10-325mg (NORCO)  mg Tab Take 1 tablet by mouth every 4 (four) hours as needed.  Qty: 30 tablet, Refills: 0      nystatin (MYCOSTATIN) cream Apply topically 2 (two) times daily as needed. GRETCHEN EXT AA BID  Qty: 30 g, Refills: prn    Associated Diagnoses: Intertrigo      triamcinolone acetonide 0.1% (KENALOG) 0.1 % cream Apply topically 2 (two) times daily.  Qty: 60 g,  Refills: 0    Associated Diagnoses: Stasis dermatitis of both legs             Discharge Procedure Orders  Diet general     Activity as tolerated     Call MD for:  temperature >100.4     Call MD for:  persistent nausea and vomiting or diarrhea     Call MD for:  severe uncontrolled pain     Call MD for:  redness, tenderness, or signs of infection (pain, swelling, redness, odor or green/yellow discharge around incision site)     Call MD for:  difficulty breathing or increased cough     Call MD for:  severe persistent headache          Follow up with MD in 2-3 weeks    Discharge Procedure Orders (must include Diet, Follow-up, Activity):    Discharge Procedure Orders (must include Diet, Follow-up, Activity)  Diet general     Activity as tolerated     Call MD for:  temperature >100.4     Call MD for:  persistent nausea and vomiting or diarrhea     Call MD for:  severe uncontrolled pain     Call MD for:  redness, tenderness, or signs of infection (pain, swelling, redness, odor or green/yellow discharge around incision site)     Call MD for:  difficulty breathing or increased cough     Call MD for:  severe persistent headache

## 2017-05-16 NOTE — INTERVAL H&P NOTE
The patient has been examined and the H&P has been reviewed:    I concur with the findings and no changes have occurred since H&P was written.   ASA 3,  MP3  No history of anesthetic complications  Plan for RN IV sedation      Anesthesia/Surgery risks, benefits and alternative options discussed and understood by patient/family.          Active Hospital Problems    Diagnosis  POA    DDD (degenerative disc disease), lumbar [M51.36]  Yes      Resolved Hospital Problems    Diagnosis Date Resolved POA   No resolved problems to display.

## 2017-05-16 NOTE — OP NOTE
PROCEDURE DATE: 5/16/2017    PROCEDURE: Right L3-4 and L4-5 transforaminal epidural steroid injection under fluoroscopy    DIAGNOSIS: Lumbar disc displacement without myelopathy  Post op diagnosis: Same    PHYSICIAN: Parish Gaiatn MD    MEDICATIONS INJECTED:  Dexamethasone 5mg (0.5ml) and 1.5ml 0.25% bupivicaine at each nerve root.     LOCAL ANESTHETIC INJECTED:  Lidocaine 1%. 2 ml per site.    SEDATION MEDICATIONS: RN IV sedation    ESTIMATED BLOOD LOSS:  None    COMPLICATIONS:  None    TECHNIQUE:   A time-out was taken to identify patient and procedure side prior to starting the procedure. The patient was placed in a prone position, prepped and draped in the usual sterile fashion using ChloraPrep and sterile towels.  The area to be injected was determined under fluoroscopic guidance in AP and oblique view.  Local anesthetic was given by raising a wheal and going down to the hub of a 25-gauge 1.5 inch needle.  In oblique view, a 3.5 inch 22-gauge bent-tip spinal needle was introduced towards 6 oclock position of the pedicle of each above named nerve root level.  The needle was walked medially then hinged into the neural foramen and position was confirmed in AP and lateral views.  1ml contrast dye was injected to confirm appropriate placement and that there was no vascular uptake.  After negative aspiration for blood or CSF, the medication was then injected. This was performed at the right L3-4 and L4-5 level(s). The patient tolerated the procedure well.    The patient was monitored after the procedure.  Patient was given post procedure and discharge instructions to follow at home. The patient was discharged in a stable condition.

## 2017-05-17 VITALS
TEMPERATURE: 98 F | OXYGEN SATURATION: 97 % | DIASTOLIC BLOOD PRESSURE: 58 MMHG | SYSTOLIC BLOOD PRESSURE: 113 MMHG | WEIGHT: 236 LBS | HEIGHT: 64 IN | RESPIRATION RATE: 18 BRPM | HEART RATE: 66 BPM | BODY MASS INDEX: 40.29 KG/M2

## 2017-05-23 ENCOUNTER — CLINICAL SUPPORT (OUTPATIENT)
Dept: REHABILITATION | Facility: HOSPITAL | Age: 77
End: 2017-05-23
Attending: ORTHOPAEDIC SURGERY
Payer: MEDICARE

## 2017-05-23 DIAGNOSIS — R29.898 SHOULDER WEAKNESS: ICD-10-CM

## 2017-05-23 DIAGNOSIS — M25.611 SHOULDER STIFFNESS, RIGHT: ICD-10-CM

## 2017-05-23 DIAGNOSIS — M25.511 RIGHT SHOULDER PAIN, UNSPECIFIED CHRONICITY: Primary | ICD-10-CM

## 2017-05-23 PROCEDURE — 97110 THERAPEUTIC EXERCISES: CPT | Mod: PN

## 2017-05-23 NOTE — PROGRESS NOTES
"Time in 7  Time out 8        Timed units  4 therex                              Time:7-8    S:understands passive d2 stretch will be issued next week since flexibility of below planes pretty good considering hx of r shoulder  Pain:0/10 at rest, with sleep, with light use    O:  r prom er at 0 abd 80   at 45 abd 80    at 90 abd 78      slir 80         func ir 15" lift off            d2 3" elbow to surface  l prom er at 0 abd 80    at 45 abd 90   at  90 abd 90     slir 80         func ir 15" lift off            d2 0" elbow to surface    HEP: reviewed    manual tx: n/a    prom as tolerated-pain free: n/a    stretches as tolerated-pain free: n/a    theraband 2 x 20:  purple row, add, ir 0 abd, depression, supine protraction    isometrics 2 x 20: arm by side abd, ext    education: explained clinic visits 1 x week is fine now since she has stretches and strengthening for home, explained transition to d2 stretch and resolving all shoulder complex tightness should be focus of rehab now; explained given extensive amount of time of preop stiffness, progress has been excellent    ube: level 1 x 10'        A: r ue with about 70 degrees of active elevation against gravity, this should improve once all tightness gone and continued PRE is done            P: most likely transition to a d/c HEP within the next 4 visits or so  "

## 2017-05-30 ENCOUNTER — CLINICAL SUPPORT (OUTPATIENT)
Dept: REHABILITATION | Facility: HOSPITAL | Age: 77
End: 2017-05-30
Attending: ORTHOPAEDIC SURGERY
Payer: MEDICARE

## 2017-05-30 DIAGNOSIS — M25.511 RIGHT SHOULDER PAIN, UNSPECIFIED CHRONICITY: Primary | ICD-10-CM

## 2017-05-30 DIAGNOSIS — R29.898 SHOULDER WEAKNESS: ICD-10-CM

## 2017-05-30 DIAGNOSIS — M25.611 SHOULDER STIFFNESS, RIGHT: ICD-10-CM

## 2017-05-30 PROCEDURE — 97110 THERAPEUTIC EXERCISES: CPT | Mod: KX,PN

## 2017-05-30 NOTE — PROGRESS NOTES
"Time in 7  Time out 8      Timed units  4 therex                              Time:7-8        S:doing HEP, understands HEP modification below  Pain:0/10 at rest, with sleep, with light use    O:  r prom er at 0 abd 80   at 45 abd 78    at 90 abd 78      slir  80        func ir 15" lift off  l prom er at 0 abd 80    at 45 abd 90   At  90 abd 90     Slir 80         func ir 15" lift off    HEP: added d2 stretch, modified frequency and TERT prn for current HEP    manual tx: n/a    prom as tolerated-pain free: n/a    stretches as tolerated-pain free: d2, er 45 abd    theraband 2 x 20:  purple row, add, ir 0 abd, depression, supine protraction    isometrics 2 x 20: n/a  education: explained above motions that are = to l side can be done less often with HEP stretches    ube: level 1 x 10'        A: d/c very soon likely, resolving last bit of tightness should promote improved active elevation and functional use long term, more structured rehab important to properly advance HEP            P: test prom er x 3 and ir x 2 and elevation x 3, transition to d/c  "

## 2017-06-12 ENCOUNTER — CLINICAL SUPPORT (OUTPATIENT)
Dept: REHABILITATION | Facility: HOSPITAL | Age: 77
End: 2017-06-12
Attending: ORTHOPAEDIC SURGERY
Payer: MEDICARE

## 2017-06-12 DIAGNOSIS — M25.511 RIGHT SHOULDER PAIN, UNSPECIFIED CHRONICITY: Primary | ICD-10-CM

## 2017-06-12 DIAGNOSIS — M25.611 SHOULDER STIFFNESS, RIGHT: ICD-10-CM

## 2017-06-12 DIAGNOSIS — R29.898 SHOULDER WEAKNESS: ICD-10-CM

## 2017-06-12 PROCEDURE — G8987 SELF CARE CURRENT STATUS: HCPCS | Mod: CH,PN

## 2017-06-12 PROCEDURE — G8989 SELF CARE D/C STATUS: HCPCS | Mod: CH,PN

## 2017-06-12 PROCEDURE — G8988 SELF CARE GOAL STATUS: HCPCS | Mod: CH,PN

## 2017-06-12 PROCEDURE — 97110 THERAPEUTIC EXERCISES: CPT | Mod: PN

## 2017-06-12 NOTE — PROGRESS NOTES
"Time in 7  Time out 8      Timed units  4 therex                              Time:7-8        S:doing all required tasks  Pain:0/10 at rest, with sleep, with light use    O:  r prom er at 0 abd 80   at 45 abd 78    at 90 abd 78      slir 80         func ir 15" lift off  l prom er at 0 abd 80    at 45 abd 90   at  90 abd 90     slir  80        func ir 15" lift off    HEP: explained stretch weaning routine and duration for PRE HEP, issued d2 stretch; told to progress use per md advice    manual tx: n/a    prom as tolerated-pain free: n/a    stretches as tolerated-pain free: n/a    theraband 2 x 20:  n/a    isometrics 2 x 20: arm by side abd, ext    education: see above    ube: level 1 x 10'        A: all goals met except prom goal            P: d/c    Current, d/c g code CH independent 0% impairment  Goal g code CH independent 0% impairment  "

## 2017-06-15 ENCOUNTER — OFFICE VISIT (OUTPATIENT)
Dept: PAIN MEDICINE | Facility: CLINIC | Age: 77
End: 2017-06-15
Payer: MEDICARE

## 2017-06-15 VITALS
HEIGHT: 64 IN | HEART RATE: 62 BPM | WEIGHT: 236 LBS | SYSTOLIC BLOOD PRESSURE: 118 MMHG | DIASTOLIC BLOOD PRESSURE: 77 MMHG | BODY MASS INDEX: 40.29 KG/M2

## 2017-06-15 DIAGNOSIS — M54.16 LUMBAR RADICULOPATHY: Primary | ICD-10-CM

## 2017-06-15 DIAGNOSIS — M51.36 DDD (DEGENERATIVE DISC DISEASE), LUMBAR: ICD-10-CM

## 2017-06-15 PROCEDURE — 99999 PR PBB SHADOW E&M-EST. PATIENT-LVL III: CPT | Mod: PBBFAC,,, | Performed by: PHYSICIAN ASSISTANT

## 2017-06-15 PROCEDURE — 99213 OFFICE O/P EST LOW 20 MIN: CPT | Mod: S$GLB,,, | Performed by: PHYSICIAN ASSISTANT

## 2017-06-15 PROCEDURE — 1125F AMNT PAIN NOTED PAIN PRSNT: CPT | Mod: S$GLB,,, | Performed by: PHYSICIAN ASSISTANT

## 2017-06-15 PROCEDURE — 1159F MED LIST DOCD IN RCRD: CPT | Mod: S$GLB,,, | Performed by: PHYSICIAN ASSISTANT

## 2017-06-15 NOTE — PROGRESS NOTES
This note was completed with dictation software and grammatical errors may exist.    Referring Physician: No ref. provider found    PCP: Claudia Kim MD      CC: Lower back and right leg pain    Interval history: Ms. Sorensen is a 77 y.o. female with chronic low back and right leg pain who presents today for f/u s/p repeat right L3-4 and L4-5 TFESI.  Reports no improvement in pain. Pain radiates down right buttock and terminates mid thigh.  Pain no longer radiates to right lateral knee.  Pain worsens with standing, laying, lifting and getting up.  She desires repeat procedure. She denies any weakness.  No bowel bladder changes. 8/10.      Prior HPI:   42-year-old female referred to us for lower back and leg pain.  Pain has gradually worsened over the past 6 months.  No recent traumatic incident.  She has intermittent aching, BURNING pain in her lower back.  Pain radiates down her right buttock into her right lateral knee.  Pain worsens with standing, laying, lifting and getting up.  Pain improves with rest.  She has tried physical therapy with minimal benefit.  She has had hip injection with mild benefits.  X-rays performed in July showed lumbar DDD.  She has not had any lumbar MRIs.  She denies any weakness.  No bowel bladder changes.  She rates her pain 6/10 today.    ROS:  CONSTITUTIONAL: No fevers, chills, night sweats, wt. loss, appetite changes  SKIN: no rashes or itching  ENT: No headaches, head trauma, vision changes, or eye pain  LYMPH NODES: None noticed   CV: No chest pain, palpitations.   RESP: No shortness of breath, dyspnea on exertion, cough, wheezing, or hemoptysis  GI: No nausea, emesis, diarrhea, constipation, melena, hematochezia, pain.    : No dysuria, hematuria, urgency, or frequency   HEME: No easy bruising, bleeding problems  PSYCHIATRIC: No depression, anxiety, psychosis, hallucinations.  NEURO: No seizures, memory loss, dizziness or difficulty sleeping  MSK: +HPI      Past Medical History:    Diagnosis Date    Back pain     Carpal tunnel syndrome     Cataract     Colon cancer     Coronary artery disease     Hypothyroidism     Obesity (BMI 30-39.9) 3/24/2016    Osteoarthritis     Osteoporosis     Personal history of colon cancer, stage III     Squamous cell carcinoma     Strabismus     Trouble in sleeping     Wears dentures     Uppers     Past Surgical History:   Procedure Laterality Date    COLON SURGERY  06/2007    COLONOSCOPY N/A 10/18/2016    Procedure: COLONOSCOPY;  Surgeon: Rell Cordova MD;  Location: [facility];  Service: Endoscopy;  Laterality: N/A;    HAND TENDON SURGERY Left     HEMICOLECTOMY      right    JOINT REPLACEMENT      Bilateral knees    SHOULDER ARTHROSCOPY Right 01/12/2017    Reverse     TONSILLECTOMY      TONSILLECTOMY, ADENOIDECTOMY, BILATERAL MYRINGOTOMY AND TUBES      TOTAL KNEE ARTHROPLASTY Bilateral 08/1998    total replacements    TUMOR REMOVAL Left     left foot as a child     Family History   Problem Relation Age of Onset    Hypertension Mother     Kidney disease Mother     Cataracts Father     Cataracts Sister     Cancer Sister      breast    No Known Problems Brother     Cancer Sister      breast    Arthritis Sister     Diabetes Maternal Uncle     Glaucoma Neg Hx     Amblyopia Neg Hx     Blindness Neg Hx     Macular degeneration Neg Hx     Retinal detachment Neg Hx     Strabismus Neg Hx     Stroke Neg Hx     Thyroid disease Neg Hx      Social History     Social History    Marital status: Single     Spouse name: N/A    Number of children: N/A    Years of education: N/A     Social History Main Topics    Smoking status: Former Smoker     Packs/day: 0.50     Years: 1.00     Types: Cigarettes     Start date: 7/27/1960     Quit date: 1/1/1961    Smokeless tobacco: Never Used    Alcohol use No    Drug use: No    Sexual activity: No     Other Topics Concern    None     Social History Narrative    None  "        Medications/Allergies: See med card    Vitals:    06/15/17 1041   BP: 118/77   Pulse: 62   Weight: 107 kg (236 lb)   Height: 5' 4" (1.626 m)   PainSc:   8   PainLoc: Back         Physical exam:    GENERAL: A and O x3, the patient appears well groomed and is in no acute distress.  Skin: No rashes or obvious lesions  HEENT: normocephalic, atraumatic  CARDIOVASCULAR:  RRR  LUNGS: non labored breathing  ABDOMEN: soft, nontender   UPPER EXTREMITIES: Normal alignment, normal range of motion, no atrophy, no skin changes,  hair growth and nail growth normal and equal bilaterally. No swelling, no tenderness.    LOWER EXTREMITIES:  Normal alignment, normal range of motion, no atrophy, no skin changes,  hair growth and nail growth normal and equal bilaterally. No swelling, no tenderness.    LUMBAR SPINE  Lumbar spine: ROM is full with flexion extension and oblique extension with mild increased pain.    Gregory's test causes no increased pain on either side.    Supine straight leg raise is positive on right at 60 degrees   Internal and external rotation of the hip causes no increased pain on either side.  Myofascial exam: No tenderness to palpation across lumbar paraspinous muscles.      MENTAL STATUS: normal orientation, speech, language, and fund of knowledge for social situation.  Emotional state appropriate.    CRANIAL NERVES:  II:  PERRL bilaterally,   III,IV,VI: EOMI.    V:  Facial sensation equal bilaterally  VII:  Facial motor function normal.  VIII:  Hearing equal to finger rub bilaterally  IX/X: Gag normal, palate symmetric  XI:  Shoulder shrug equal, head turn equal  XII:  Tongue midline without fasciculations      MOTOR: Tone and bulk: normal bilateral upper and lower Strength: normal   Delt Bi Tri WE WF     R 5 5 5 5 5 5   L 5 5 5 5 5 5     IP ADD ABD Quad TA Gas HAM  R 5 5 5 5 5 5 5  L 5 5 5 5 5 5 5    SENSATION: Light touch and pinprick intact bilaterally  REFLEXES: normal, symmetric, nonbrisk.  Toes " down, no clonus. No hoffmans.  GAIT: normal rise, base, steps, and arm swing.        Imaging:  Xray L-spine 7/2016   A 5 views lumbar spine demonstrate a transitional lumbosacral zone.  There is a mild levoscoliotic curvature.  Is anterior osteophyte motion throughout the lumbar region with loss of disc space height noted at L3/L4-L4/L5.  Prominent degenerative facet changes are present.     MRI L-spine 12/2016  Multilevel degenerative lumbar spondylosis resulting in severe spinal canal narrowing at the L2-3 and L3-4 levels.  No significant neuroforaminal narrowing.  Please see level by level comments above.    Assessment:  Ms. Sorensen is a 77 y.o. female with chronic low back and right leg pain  1. Lumbar radiculopathy    2. DDD (degenerative disc disease), lumbar        Plan:  1. I have stressed the importance of physical activity and exercise to improve overall health  2. Schedule L5-S1 IESI. I have explained the risks, benefits, and alternatives of the procedure in detail. The patient voices understanding and all questions have been answered. The patient agrees to proceed as planned. Written Consent obtained.   3. May benefit from anti neuropathic agent in the future  4. F/u 3 weeks s/p KHARI

## 2017-06-15 NOTE — PROGRESS NOTES
This note was completed with dictation software and grammatical errors may exist.    Referring Physician: No ref. provider found    PCP: Claudia Kim MD      CC: Lower back and right leg pain    Interval history: Patient returns to our clinic.  She underwent right L3-4 and L4-5 TFESI on 1/2017.  She reports about 60% relief for about 3 months.  Pain has since recurreed.  She has resumption of Pain radiates down her right buttock into her right lateral knee.  Pain worsens with standing, laying, lifting and getting up.  She desires repeat procedure.  She denies any weakness.  No bowel bladder changes.  Prior HPI:   42-year-old female referred to us for lower back and leg pain.  Pain has gradually worsened over the past 6 months.  No recent traumatic incident.  She has intermittent aching, BURNING pain in her lower back.  Pain radiates down her right buttock into her right lateral knee.  Pain worsens with standing, laying, lifting and getting up.  Pain improves with rest.  She has tried physical therapy with minimal benefit.  She has had hip injection with mild benefits.  X-rays performed in July showed lumbar DDD.  She has not had any lumbar MRIs.  She denies any weakness.  No bowel bladder changes.  She rates her pain 6/10 today.    ROS:  CONSTITUTIONAL: No fevers, chills, night sweats, wt. loss, appetite changes  SKIN: no rashes or itching  ENT: No headaches, head trauma, vision changes, or eye pain  LYMPH NODES: None noticed   CV: No chest pain, palpitations.   RESP: No shortness of breath, dyspnea on exertion, cough, wheezing, or hemoptysis  GI: No nausea, emesis, diarrhea, constipation, melena, hematochezia, pain.    : No dysuria, hematuria, urgency, or frequency   HEME: No easy bruising, bleeding problems  PSYCHIATRIC: No depression, anxiety, psychosis, hallucinations.  NEURO: No seizures, memory loss, dizziness or difficulty sleeping  MSK: +HPI      Past Medical History:   Diagnosis Date    Back pain      Carpal tunnel syndrome     Cataract     Colon cancer     Coronary artery disease     Hypothyroidism     Obesity (BMI 30-39.9) 3/24/2016    Osteoarthritis     Osteoporosis     Personal history of colon cancer, stage III     Squamous cell carcinoma     Strabismus     Trouble in sleeping     Wears dentures     Uppers     Past Surgical History:   Procedure Laterality Date    COLON SURGERY  06/2007    COLONOSCOPY N/A 10/18/2016    Procedure: COLONOSCOPY;  Surgeon: Rell Cordova MD;  Location: [facility];  Service: Endoscopy;  Laterality: N/A;    HAND TENDON SURGERY Left     HEMICOLECTOMY      right    JOINT REPLACEMENT      Bilateral knees    SHOULDER ARTHROSCOPY Right 01/12/2017    Reverse     TONSILLECTOMY      TONSILLECTOMY, ADENOIDECTOMY, BILATERAL MYRINGOTOMY AND TUBES      TOTAL KNEE ARTHROPLASTY Bilateral 08/1998    total replacements    TUMOR REMOVAL Left     left foot as a child     Family History   Problem Relation Age of Onset    Hypertension Mother     Kidney disease Mother     Cataracts Father     Cataracts Sister     Cancer Sister      breast    No Known Problems Brother     Cancer Sister      breast    Arthritis Sister     Diabetes Maternal Uncle     Glaucoma Neg Hx     Amblyopia Neg Hx     Blindness Neg Hx     Macular degeneration Neg Hx     Retinal detachment Neg Hx     Strabismus Neg Hx     Stroke Neg Hx     Thyroid disease Neg Hx      Social History     Social History    Marital status: Single     Spouse name: N/A    Number of children: N/A    Years of education: N/A     Social History Main Topics    Smoking status: Former Smoker     Packs/day: 0.50     Years: 1.00     Types: Cigarettes     Start date: 7/27/1960     Quit date: 1/1/1961    Smokeless tobacco: Never Used    Alcohol use No    Drug use: No    Sexual activity: No     Other Topics Concern    None     Social History Narrative    None         Medications/Allergies: See med card    Vitals:     "06/15/17 1041   BP: 118/77   Pulse: 62   Weight: 107 kg (236 lb)   Height: 5' 4" (1.626 m)   PainSc:   8   PainLoc: Back         Physical exam:    GENERAL: A and O x3, the patient appears well groomed and is in no acute distress.  Skin: No rashes or obvious lesions  HEENT: normocephalic, atraumatic  CARDIOVASCULAR:  Palpable peripheral pulses  LUNGS: easy work of breathing  ABDOMEN: soft, nontender   UPPER EXTREMITIES: Normal alignment, normal range of motion, no atrophy, no skin changes,  hair growth and nail growth normal and equal bilaterally. No swelling, no tenderness.    LOWER EXTREMITIES:  Normal alignment, normal range of motion, no atrophy, no skin changes,  hair growth and nail growth normal and equal bilaterally. No swelling, no tenderness.    LUMBAR SPINE  Lumbar spine: ROM is full with flexion extension and oblique extension with mild increased pain.    Gregory's test causes no increased pain on either side.    Supine straight leg raise is positive on right at 60 degrees   Internal and external rotation of the hip causes no increased pain on either side.  Myofascial exam: No tenderness to palpation across lumbar paraspinous muscles.      MENTAL STATUS: normal orientation, speech, language, and fund of knowledge for social situation.  Emotional state appropriate.    CRANIAL NERVES:  II:  PERRL bilaterally,   III,IV,VI: EOMI.    V:  Facial sensation equal bilaterally  VII:  Facial motor function normal.  VIII:  Hearing equal to finger rub bilaterally  IX/X: Gag normal, palate symmetric  XI:  Shoulder shrug equal, head turn equal  XII:  Tongue midline without fasciculations      MOTOR: Tone and bulk: normal bilateral upper and lower Strength: normal   Delt Bi Tri WE WF     R 5 5 5 5 5 5   L 5 5 5 5 5 5     IP ADD ABD Quad TA Gas HAM  R 5 5 5 5 5 5 5  L 5 5 5 5 5 5 5    SENSATION: Light touch and pinprick intact bilaterally  REFLEXES: normal, symmetric, nonbrisk.  Toes down, no clonus. No " anitha.  GAIT: normal rise, base, steps, and arm swing.        Imaging:  Xray L-spine 7/2016   A 5 views lumbar spine demonstrate a transitional lumbosacral zone.  There is a mild levoscoliotic curvature.  Is anterior osteophyte motion throughout the lumbar region with loss of disc space height noted at L3/L4-L4/L5.  Prominent degenerative facet changes are present.     MRI L-spine 12/2016  Multilevel degenerative lumbar spondylosis resulting in severe spinal canal narrowing at the L2-3 and L3-4 levels.  No significant neuroforaminal narrowing.  Please see level by level comments above.    Assessment:  Patient referred for low back and right leg pain  No diagnosis found.      Plan:  - I have stressed the importance of physical activity and exercise to improve overall health  - Schedule repeat right L3-4 and L4-5 TFESI  - Follow up after procedure

## 2017-06-21 DIAGNOSIS — M54.16 LUMBAR RADICULOPATHY: Primary | ICD-10-CM

## 2017-06-30 ENCOUNTER — SURGERY (OUTPATIENT)
Age: 77
End: 2017-06-30

## 2017-06-30 ENCOUNTER — HOSPITAL ENCOUNTER (OUTPATIENT)
Facility: AMBULARY SURGERY CENTER | Age: 77
Discharge: HOME | End: 2017-06-30
Attending: ANESTHESIOLOGY | Admitting: ANESTHESIOLOGY
Payer: MEDICARE

## 2017-06-30 DIAGNOSIS — M51.36 DDD (DEGENERATIVE DISC DISEASE), LUMBAR: Primary | ICD-10-CM

## 2017-06-30 PROCEDURE — 62323 NJX INTERLAMINAR LMBR/SAC: CPT | Performed by: ANESTHESIOLOGY

## 2017-06-30 PROCEDURE — 62323 NJX INTERLAMINAR LMBR/SAC: CPT | Mod: ,,, | Performed by: ANESTHESIOLOGY

## 2017-06-30 PROCEDURE — 99152 MOD SED SAME PHYS/QHP 5/>YRS: CPT | Mod: ,,, | Performed by: ANESTHESIOLOGY

## 2017-06-30 RX ORDER — MIDAZOLAM HYDROCHLORIDE 2 MG/2ML
INJECTION, SOLUTION INTRAMUSCULAR; INTRAVENOUS
Status: DISCONTINUED | OUTPATIENT
Start: 2017-06-30 | End: 2017-06-30 | Stop reason: HOSPADM

## 2017-06-30 RX ORDER — DEXAMETHASONE SODIUM PHOSPHATE 10 MG/ML
INJECTION INTRAMUSCULAR; INTRAVENOUS
Status: DISCONTINUED
Start: 2017-06-30 | End: 2017-06-30 | Stop reason: HOSPADM

## 2017-06-30 RX ORDER — ALPRAZOLAM 1 MG/1
1 TABLET, ORALLY DISINTEGRATING ORAL
Status: DISCONTINUED | OUTPATIENT
Start: 2017-06-30 | End: 2017-06-30 | Stop reason: HOSPADM

## 2017-06-30 RX ORDER — FENTANYL CITRATE 50 UG/ML
INJECTION, SOLUTION INTRAMUSCULAR; INTRAVENOUS
Status: DISCONTINUED
Start: 2017-06-30 | End: 2017-06-30 | Stop reason: HOSPADM

## 2017-06-30 RX ORDER — SODIUM CHLORIDE, SODIUM LACTATE, POTASSIUM CHLORIDE, CALCIUM CHLORIDE 600; 310; 30; 20 MG/100ML; MG/100ML; MG/100ML; MG/100ML
INJECTION, SOLUTION INTRAVENOUS ONCE AS NEEDED
Status: COMPLETED | OUTPATIENT
Start: 2017-06-30 | End: 2017-06-30

## 2017-06-30 RX ORDER — SODIUM CHLORIDE 9 MG/ML
INJECTION, SOLUTION INTRAMUSCULAR; INTRAVENOUS; SUBCUTANEOUS
Status: DISCONTINUED | OUTPATIENT
Start: 2017-06-30 | End: 2017-06-30 | Stop reason: HOSPADM

## 2017-06-30 RX ORDER — DEXAMETHASONE SODIUM PHOSPHATE 10 MG/ML
INJECTION INTRAMUSCULAR; INTRAVENOUS
Status: DISCONTINUED | OUTPATIENT
Start: 2017-06-30 | End: 2017-06-30 | Stop reason: HOSPADM

## 2017-06-30 RX ORDER — FENTANYL CITRATE 50 UG/ML
INJECTION, SOLUTION INTRAMUSCULAR; INTRAVENOUS
Status: DISCONTINUED | OUTPATIENT
Start: 2017-06-30 | End: 2017-06-30 | Stop reason: HOSPADM

## 2017-06-30 RX ORDER — MIDAZOLAM HYDROCHLORIDE 1 MG/ML
INJECTION INTRAMUSCULAR; INTRAVENOUS
Status: DISCONTINUED
Start: 2017-06-30 | End: 2017-06-30 | Stop reason: HOSPADM

## 2017-06-30 RX ORDER — LIDOCAINE HYDROCHLORIDE 10 MG/ML
INJECTION, SOLUTION EPIDURAL; INFILTRATION; INTRACAUDAL; PERINEURAL
Status: DISCONTINUED | OUTPATIENT
Start: 2017-06-30 | End: 2017-06-30 | Stop reason: HOSPADM

## 2017-06-30 RX ADMIN — SODIUM CHLORIDE, SODIUM LACTATE, POTASSIUM CHLORIDE, CALCIUM CHLORIDE: 600; 310; 30; 20 INJECTION, SOLUTION INTRAVENOUS at 11:06

## 2017-06-30 RX ADMIN — SODIUM CHLORIDE 4 ML: 9 INJECTION, SOLUTION INTRAMUSCULAR; INTRAVENOUS; SUBCUTANEOUS at 12:06

## 2017-06-30 RX ADMIN — LIDOCAINE HYDROCHLORIDE 5 ML: 10 INJECTION, SOLUTION EPIDURAL; INFILTRATION; INTRACAUDAL; PERINEURAL at 12:06

## 2017-06-30 RX ADMIN — DEXAMETHASONE SODIUM PHOSPHATE 10 MG: 10 INJECTION INTRAMUSCULAR; INTRAVENOUS at 12:06

## 2017-06-30 RX ADMIN — MIDAZOLAM HYDROCHLORIDE 2 MG: 2 INJECTION, SOLUTION INTRAMUSCULAR; INTRAVENOUS at 12:06

## 2017-06-30 RX ADMIN — FENTANYL CITRATE 50 MCG: 50 INJECTION, SOLUTION INTRAMUSCULAR; INTRAVENOUS at 12:06

## 2017-06-30 NOTE — DISCHARGE SUMMARY
[facility]  Discharge Note  Short Stay    Admit Date: 6/30/2017    Discharge Date and Time: 6/30/2017    Attending Physician: Parish Gaitan MD     Discharge Provider: Parish Gaitan    Diagnoses:  Active Hospital Problems    Diagnosis  POA    *DDD (degenerative disc disease), lumbar [M51.36]  Yes      Resolved Hospital Problems    Diagnosis Date Resolved POA   No resolved problems to display.       Hospital Course: lumbar Jose  Discharged Condition: Good    Final Diagnoses:   Active Hospital Problems    Diagnosis  POA    *DDD (degenerative disc disease), lumbar [M51.36]  Yes      Resolved Hospital Problems    Diagnosis Date Resolved POA   No resolved problems to display.       Disposition: Home or Self Care    Follow up/Patient Instructions:    Medications:  Reconciled Home Medications:   Current Discharge Medication List      CONTINUE these medications which have NOT CHANGED    Details   levothyroxine (SYNTHROID) 75 MCG tablet TAKE 1 TABLET(75 MCG) BY MOUTH EVERY DAY  Qty: 90 tablet, Refills: 3    Comments: **Patient requests 90 days supply**  Associated Diagnoses: Hypothyroidism, unspecified hypothyroidism type      lisinopril (PRINIVIL,ZESTRIL) 20 MG tablet Take 1 tablet (20 mg total) by mouth once daily.  Qty: 90 tablet, Refills: 3    Associated Diagnoses: Essential hypertension      cyclobenzaprine (FLEXERIL) 5 MG tablet Take 5 mg by mouth 3 (three) times daily as needed for Muscle spasms.      furosemide (LASIX) 20 MG tablet Take 1 tablet (20 mg total) by mouth daily as needed.  Qty: 30 tablet, Refills: 2      hydrocodone-acetaminophen 10-325mg (NORCO)  mg Tab Take 1 tablet by mouth every 4 (four) hours as needed.  Qty: 30 tablet, Refills: 0      nystatin (MYCOSTATIN) cream Apply topically 2 (two) times daily as needed. GRETCHEN EXT AA BID  Qty: 30 g, Refills: prn    Associated Diagnoses: Intertrigo      triamcinolone acetonide 0.1% (KENALOG) 0.1 % cream Apply topically 2 (two) times daily.  Qty: 60 g,  Refills: 0    Associated Diagnoses: Stasis dermatitis of both legs             Discharge Procedure Orders  Diet general     Activity as tolerated     Call MD for:  temperature >100.4     Call MD for:  persistent nausea and vomiting or diarrhea     Call MD for:  severe uncontrolled pain     Call MD for:  redness, tenderness, or signs of infection (pain, swelling, redness, odor or green/yellow discharge around incision site)     Call MD for:  difficulty breathing or increased cough     Call MD for:  severe persistent headache          Follow up with MD in 2-3 weeks    Discharge Procedure Orders (must include Diet, Follow-up, Activity):    Discharge Procedure Orders (must include Diet, Follow-up, Activity)  Diet general     Activity as tolerated     Call MD for:  temperature >100.4     Call MD for:  persistent nausea and vomiting or diarrhea     Call MD for:  severe uncontrolled pain     Call MD for:  redness, tenderness, or signs of infection (pain, swelling, redness, odor or green/yellow discharge around incision site)     Call MD for:  difficulty breathing or increased cough     Call MD for:  severe persistent headache

## 2017-06-30 NOTE — H&P (VIEW-ONLY)
This note was completed with dictation software and grammatical errors may exist.    Referring Physician: No ref. provider found    PCP: Claudia Kim MD      CC: Lower back and right leg pain    Interval history: Ms. Sorensen is a 77 y.o. female with chronic low back and right leg pain who presents today for f/u s/p repeat right L3-4 and L4-5 TFESI.  Reports no improvement in pain. Pain radiates down right buttock and terminates mid thigh.  Pain no longer radiates to right lateral knee.  Pain worsens with standing, laying, lifting and getting up.  She desires repeat procedure. She denies any weakness.  No bowel bladder changes. 8/10.      Prior HPI:   42-year-old female referred to us for lower back and leg pain.  Pain has gradually worsened over the past 6 months.  No recent traumatic incident.  She has intermittent aching, BURNING pain in her lower back.  Pain radiates down her right buttock into her right lateral knee.  Pain worsens with standing, laying, lifting and getting up.  Pain improves with rest.  She has tried physical therapy with minimal benefit.  She has had hip injection with mild benefits.  X-rays performed in July showed lumbar DDD.  She has not had any lumbar MRIs.  She denies any weakness.  No bowel bladder changes.  She rates her pain 6/10 today.    ROS:  CONSTITUTIONAL: No fevers, chills, night sweats, wt. loss, appetite changes  SKIN: no rashes or itching  ENT: No headaches, head trauma, vision changes, or eye pain  LYMPH NODES: None noticed   CV: No chest pain, palpitations.   RESP: No shortness of breath, dyspnea on exertion, cough, wheezing, or hemoptysis  GI: No nausea, emesis, diarrhea, constipation, melena, hematochezia, pain.    : No dysuria, hematuria, urgency, or frequency   HEME: No easy bruising, bleeding problems  PSYCHIATRIC: No depression, anxiety, psychosis, hallucinations.  NEURO: No seizures, memory loss, dizziness or difficulty sleeping  MSK: +HPI      Past Medical History:    Diagnosis Date    Back pain     Carpal tunnel syndrome     Cataract     Colon cancer     Coronary artery disease     Hypothyroidism     Obesity (BMI 30-39.9) 3/24/2016    Osteoarthritis     Osteoporosis     Personal history of colon cancer, stage III     Squamous cell carcinoma     Strabismus     Trouble in sleeping     Wears dentures     Uppers     Past Surgical History:   Procedure Laterality Date    COLON SURGERY  06/2007    COLONOSCOPY N/A 10/18/2016    Procedure: COLONOSCOPY;  Surgeon: Rell Cordova MD;  Location[facility];  Service: Endoscopy;  Laterality: N/A;    HAND TENDON SURGERY Left     HEMICOLECTOMY      right    JOINT REPLACEMENT      Bilateral knees    SHOULDER ARTHROSCOPY Right 01/12/2017    Reverse     TONSILLECTOMY      TONSILLECTOMY, ADENOIDECTOMY, BILATERAL MYRINGOTOMY AND TUBES      TOTAL KNEE ARTHROPLASTY Bilateral 08/1998    total replacements    TUMOR REMOVAL Left     left foot as a child     Family History   Problem Relation Age of Onset    Hypertension Mother     Kidney disease Mother     Cataracts Father     Cataracts Sister     Cancer Sister      breast    No Known Problems Brother     Cancer Sister      breast    Arthritis Sister     Diabetes Maternal Uncle     Glaucoma Neg Hx     Amblyopia Neg Hx     Blindness Neg Hx     Macular degeneration Neg Hx     Retinal detachment Neg Hx     Strabismus Neg Hx     Stroke Neg Hx     Thyroid disease Neg Hx      Social History     Social History    Marital status: Single     Spouse name: N/A    Number of children: N/A    Years of education: N/A     Social History Main Topics    Smoking status: Former Smoker     Packs/day: 0.50     Years: 1.00     Types: Cigarettes     Start date: 7/27/1960     Quit date: 1/1/1961    Smokeless tobacco: Never Used    Alcohol use No    Drug use: No    Sexual activity: No     Other Topics Concern    None     Social History Narrative    None  "        Medications/Allergies: See med card    Vitals:    06/15/17 1041   BP: 118/77   Pulse: 62   Weight: 107 kg (236 lb)   Height: 5' 4" (1.626 m)   PainSc:   8   PainLoc: Back         Physical exam:    GENERAL: A and O x3, the patient appears well groomed and is in no acute distress.  Skin: No rashes or obvious lesions  HEENT: normocephalic, atraumatic  CARDIOVASCULAR:  RRR  LUNGS: non labored breathing  ABDOMEN: soft, nontender   UPPER EXTREMITIES: Normal alignment, normal range of motion, no atrophy, no skin changes,  hair growth and nail growth normal and equal bilaterally. No swelling, no tenderness.    LOWER EXTREMITIES:  Normal alignment, normal range of motion, no atrophy, no skin changes,  hair growth and nail growth normal and equal bilaterally. No swelling, no tenderness.    LUMBAR SPINE  Lumbar spine: ROM is full with flexion extension and oblique extension with mild increased pain.    Gregory's test causes no increased pain on either side.    Supine straight leg raise is positive on right at 60 degrees   Internal and external rotation of the hip causes no increased pain on either side.  Myofascial exam: No tenderness to palpation across lumbar paraspinous muscles.      MENTAL STATUS: normal orientation, speech, language, and fund of knowledge for social situation.  Emotional state appropriate.    CRANIAL NERVES:  II:  PERRL bilaterally,   III,IV,VI: EOMI.    V:  Facial sensation equal bilaterally  VII:  Facial motor function normal.  VIII:  Hearing equal to finger rub bilaterally  IX/X: Gag normal, palate symmetric  XI:  Shoulder shrug equal, head turn equal  XII:  Tongue midline without fasciculations      MOTOR: Tone and bulk: normal bilateral upper and lower Strength: normal   Delt Bi Tri WE WF     R 5 5 5 5 5 5   L 5 5 5 5 5 5     IP ADD ABD Quad TA Gas HAM  R 5 5 5 5 5 5 5  L 5 5 5 5 5 5 5    SENSATION: Light touch and pinprick intact bilaterally  REFLEXES: normal, symmetric, nonbrisk.  Toes " down, no clonus. No hoffmans.  GAIT: normal rise, base, steps, and arm swing.        Imaging:  Xray L-spine 7/2016   A 5 views lumbar spine demonstrate a transitional lumbosacral zone.  There is a mild levoscoliotic curvature.  Is anterior osteophyte motion throughout the lumbar region with loss of disc space height noted at L3/L4-L4/L5.  Prominent degenerative facet changes are present.     MRI L-spine 12/2016  Multilevel degenerative lumbar spondylosis resulting in severe spinal canal narrowing at the L2-3 and L3-4 levels.  No significant neuroforaminal narrowing.  Please see level by level comments above.    Assessment:  Ms. Sorensen is a 77 y.o. female with chronic low back and right leg pain  1. Lumbar radiculopathy    2. DDD (degenerative disc disease), lumbar        Plan:  1. I have stressed the importance of physical activity and exercise to improve overall health  2. Schedule L5-S1 IESI. I have explained the risks, benefits, and alternatives of the procedure in detail. The patient voices understanding and all questions have been answered. The patient agrees to proceed as planned. Written Consent obtained.   3. May benefit from anti neuropathic agent in the future  4. F/u 3 weeks s/p KHARI

## 2017-06-30 NOTE — OP NOTE
PROCEDURE DATE: 6/30/2017    Procedure:   Interlaminar epidural steroid injection at L5-S1 under fluoroscopic guidance.    Diagnosis: lUMBAR DISC DISPLACEMENT WITHOUT MYELOPATHY  pOSTOP DIAGNOSIS: sAME    Physician: Parish Gaitan M.D.    Medications injected:10 mg dexamethasone with 4 ml of preservative free NaCl    Local anesthetic injected:    Lidocaine 1% 2 ml total    Sedation Medications: RN IV sedation    Estimated blood loss:  None    Complications:  None    Technique:  Time-out taken to identify patient and procedure prior to starting the procedure.  With the patient laying in a prone position, the area was prepped and draped in the usual sterile fashion using ChloraPrep and a fenestrated drape.  After determining the target level with an AP fluoroscopic view, local anesthetic was given using a 25-gauge 1.5 inch needle by raising a wheal and then infiltrating toward the interlaminar entry space.  A 3.5inch 25-gauge Touhy needle was introduced under AP fluoroscopic guidance to the interlaminar space of L5-S1. Once the trajectory was established, the needle was visualized in the lateral view and advanced using loss of resistance technique. Once in the desired position, 1ml contrast was injected to confirm placement and there was no vascular uptake nor intrathecal spread.  The medication was then injected slowly. The patient tolerated the procedure well.      The patient was monitored after the procedure.   They were given post-procedure and discharge instructions to follow at home.  The patient was discharged in a stable condition.

## 2017-07-03 VITALS
WEIGHT: 236 LBS | BODY MASS INDEX: 40.29 KG/M2 | HEIGHT: 64 IN | SYSTOLIC BLOOD PRESSURE: 106 MMHG | HEART RATE: 68 BPM | DIASTOLIC BLOOD PRESSURE: 55 MMHG | RESPIRATION RATE: 20 BRPM | OXYGEN SATURATION: 97 % | TEMPERATURE: 98 F

## 2017-07-07 ENCOUNTER — OFFICE VISIT (OUTPATIENT)
Dept: ORTHOPEDICS | Facility: CLINIC | Age: 77
End: 2017-07-07
Payer: MEDICARE

## 2017-07-07 VITALS
HEIGHT: 64 IN | HEART RATE: 63 BPM | WEIGHT: 235.88 LBS | DIASTOLIC BLOOD PRESSURE: 61 MMHG | SYSTOLIC BLOOD PRESSURE: 144 MMHG | BODY MASS INDEX: 40.27 KG/M2

## 2017-07-07 DIAGNOSIS — Z96.611 S/P REVERSE TOTAL SHOULDER ARTHROPLASTY, RIGHT: Primary | ICD-10-CM

## 2017-07-07 PROCEDURE — 99999 PR PBB SHADOW E&M-EST. PATIENT-LVL III: CPT | Mod: PBBFAC,,, | Performed by: ORTHOPAEDIC SURGERY

## 2017-07-07 PROCEDURE — 99213 OFFICE O/P EST LOW 20 MIN: CPT | Mod: S$GLB,,, | Performed by: ORTHOPAEDIC SURGERY

## 2017-07-07 PROCEDURE — 1126F AMNT PAIN NOTED NONE PRSNT: CPT | Mod: S$GLB,,, | Performed by: ORTHOPAEDIC SURGERY

## 2017-07-07 PROCEDURE — 1159F MED LIST DOCD IN RCRD: CPT | Mod: S$GLB,,, | Performed by: ORTHOPAEDIC SURGERY

## 2017-07-13 NOTE — PROGRESS NOTES
Past Medical History:   Diagnosis Date    Back pain     Carpal tunnel syndrome     Cataract     Colon cancer     Coronary artery disease     Hypothyroidism     Obesity (BMI 30-39.9) 3/24/2016    Osteoarthritis     Osteoporosis     Personal history of colon cancer, stage III     Squamous cell carcinoma     Strabismus     Trouble in sleeping     Wears dentures     Uppers       Past Surgical History:   Procedure Laterality Date    COLON SURGERY  06/2007    COLONOSCOPY N/A 10/18/2016    Procedure: COLONOSCOPY;  Surgeon: Rell Cordova MD;  [facility];  Service: Endoscopy;  Laterality: N/A;    HAND TENDON SURGERY Left     HEMICOLECTOMY      right    JOINT REPLACEMENT      Bilateral knees    SHOULDER ARTHROSCOPY Right 01/12/2017    Reverse     TONSILLECTOMY      TONSILLECTOMY, ADENOIDECTOMY, BILATERAL MYRINGOTOMY AND TUBES      TOTAL KNEE ARTHROPLASTY Bilateral 08/1998    total replacements    TUMOR REMOVAL Left     left foot as a child       Current Outpatient Prescriptions   Medication Sig    cyclobenzaprine (FLEXERIL) 5 MG tablet Take 5 mg by mouth 3 (three) times daily as needed for Muscle spasms.    furosemide (LASIX) 20 MG tablet Take 1 tablet (20 mg total) by mouth daily as needed.    hydrocodone-acetaminophen 10-325mg (NORCO)  mg Tab Take 1 tablet by mouth every 4 (four) hours as needed.    levothyroxine (SYNTHROID) 75 MCG tablet TAKE 1 TABLET(75 MCG) BY MOUTH EVERY DAY    lisinopril (PRINIVIL,ZESTRIL) 20 MG tablet Take 1 tablet (20 mg total) by mouth once daily.    nystatin (MYCOSTATIN) cream Apply topically 2 (two) times daily as needed. GRETCHEN EXT AA BID    triamcinolone acetonide 0.1% (KENALOG) 0.1 % cream Apply topically 2 (two) times daily.     No current facility-administered medications for this visit.        Review of patient's allergies indicates:   Allergen Reactions    Aspirin      Other reaction(s): Stomach upset    Mobic [meloxicam] Swelling     Edema and  elevated blood pressure     Oxaliplatin      Other reaction(s): Joint pain  Other reaction(s): Itching  Other reaction(s): Hives       Family History   Problem Relation Age of Onset    Hypertension Mother     Kidney disease Mother     Cataracts Father     Cataracts Sister     Cancer Sister      breast    No Known Problems Brother     Cancer Sister      breast    Arthritis Sister     Diabetes Maternal Uncle     Glaucoma Neg Hx     Amblyopia Neg Hx     Blindness Neg Hx     Macular degeneration Neg Hx     Retinal detachment Neg Hx     Strabismus Neg Hx     Stroke Neg Hx     Thyroid disease Neg Hx        Social History     Social History    Marital status: Single     Spouse name: N/A    Number of children: N/A    Years of education: N/A     Occupational History    Not on file.     Social History Main Topics    Smoking status: Former Smoker     Packs/day: 0.50     Years: 1.00     Types: Cigarettes     Start date: 7/27/1960     Quit date: 1/1/1961    Smokeless tobacco: Never Used    Alcohol use No    Drug use: No    Sexual activity: No     Other Topics Concern    Not on file     Social History Narrative    No narrative on file       Chief Complaint:   Chief Complaint   Patient presents with    Post-op Evaluation     S/P (R) reverse total shoulder 1/12/17       Consulting Physician: No ref. provider found    History of present illness: This is a 77 y.o. year old female following up for a right reverse total shoulder arthroplasty done on 1/12/17.  She puts her pain at a 0 out of 10.  She is not using any pain medicine.  She states she's having no problems and is doing well.      Review of Systems:    Constitution: Denies chills, fever, and sweats.    HENT: Denies headaches or blurry vision.    Cardiovascular: Denies chest pain or irregular heart beat.    Respiratory: Denies cough or shortness of breath.    Gastrointestinal: Denies abdominal pain, nausea, or vomiting.    Musculoskeletal:  Denies  muscle cramps.    Neurological: Denies dizziness or focal weakness.    Psychiatric/Behavioral: Normal mental status.    Hematologic/Lymphatic: Denies bleeding problem or easy bruising/bleeding.    Skin: Denies rash or suspicious lesions.      Examination:    Vital Signs:    Vitals:    07/07/17 0824   BP: (!) 144/61   Pulse: 63       Body mass index is 40.49 kg/m².    This a well-developed, well nourished patient in no acute distress.    Alert and oriented and cooperative to examination.       Physical Exam: right shoulder    Inspection/Observation   Swelling:   none  Erythema:   none  Bruising:   none  Wounds:   healed  Drainage:  None    Range of Motion   130, 30, low back    Muscle Strength   4-4+    Other   Sensation:  normal  Pulse:    palpable    Imaging:      Assessment: S/p reverse total shoulder arthroplasty, right        Plan:  She is doing well. No pain. Good ROM. She is very happy with the shoulder. Continue HEP. Back in 3 months.    DISCLAIMER: This note may have been dictated using voice recognition software and may contain grammatical errors. Report sent to referring provider as required.

## 2017-07-23 DIAGNOSIS — E03.9 HYPOTHYROIDISM: ICD-10-CM

## 2017-07-24 RX ORDER — LEVOTHYROXINE SODIUM 75 UG/1
TABLET ORAL
Qty: 90 TABLET | Refills: 3 | Status: SHIPPED | OUTPATIENT
Start: 2017-07-24 | End: 2018-08-02 | Stop reason: SDUPTHER

## 2017-07-28 ENCOUNTER — OFFICE VISIT (OUTPATIENT)
Dept: PAIN MEDICINE | Facility: CLINIC | Age: 77
End: 2017-07-28
Payer: MEDICARE

## 2017-07-28 VITALS
DIASTOLIC BLOOD PRESSURE: 80 MMHG | BODY MASS INDEX: 40.12 KG/M2 | WEIGHT: 235 LBS | SYSTOLIC BLOOD PRESSURE: 121 MMHG | HEIGHT: 64 IN | HEART RATE: 69 BPM

## 2017-07-28 DIAGNOSIS — M54.16 LUMBAR RADICULOPATHY: Primary | ICD-10-CM

## 2017-07-28 DIAGNOSIS — M51.36 DDD (DEGENERATIVE DISC DISEASE), LUMBAR: ICD-10-CM

## 2017-07-28 PROCEDURE — 99214 OFFICE O/P EST MOD 30 MIN: CPT | Mod: S$GLB,,, | Performed by: PHYSICIAN ASSISTANT

## 2017-07-28 PROCEDURE — 1159F MED LIST DOCD IN RCRD: CPT | Mod: S$GLB,,, | Performed by: PHYSICIAN ASSISTANT

## 2017-07-28 PROCEDURE — 99999 PR PBB SHADOW E&M-EST. PATIENT-LVL III: CPT | Mod: PBBFAC,,, | Performed by: PHYSICIAN ASSISTANT

## 2017-07-28 PROCEDURE — 1125F AMNT PAIN NOTED PAIN PRSNT: CPT | Mod: S$GLB,,, | Performed by: PHYSICIAN ASSISTANT

## 2017-07-28 RX ORDER — GABAPENTIN 300 MG/1
300 CAPSULE ORAL 3 TIMES DAILY
Qty: 90 CAPSULE | Refills: 0 | Status: SHIPPED | OUTPATIENT
Start: 2017-07-28 | End: 2017-10-12

## 2017-07-28 RX ORDER — CYCLOBENZAPRINE HCL 5 MG
5 TABLET ORAL 3 TIMES DAILY PRN
Qty: 90 TABLET | Refills: 0 | Status: SHIPPED | OUTPATIENT
Start: 2017-07-28 | End: 2017-08-27

## 2017-07-28 NOTE — PATIENT INSTRUCTIONS
Exercises To Strengthen Your Lower Back  Strong lower-back and abdominal muscles work together to support your spine. These exercises will help strengthen the muscles of the lower back. It is important that you begin exercising slowly and increase levels gradually.  Always begin any exercise program with stretching. If you feel pain while doing any of these exercises, stop and talk to your doctor about a more specific exercise program that suits your condition better.  Low Back Stretch  · Lie on your back with your knees bent and both feet on the ground.  ·   Slowly raise your left knee to your chest as you flatten your lower back against the floor. Hold for 5 seconds.  · Relax and repeat the exercise with your right knee.  · Do 10 of these exercises for each leg.  · Repeat hugging both knees to your chest at the same time.  Building Lower Back Strength  1. Kneeling Lumbar Extension: Begin on your hands and knees. Simultaneously raise and straighten your right arm and left leg until they are parallel to the ground. Hold for 2 seconds and come back slowly to a starting position. Repeat with left arm and right leg, alternating 10 times.  2. Prone Lumbar Extension: Lie face down, arms extended overhead, palms on the floor. Simultaneously raise your right arm and left leg as high as comfortably possible. Hold for 10 seconds and slowly return to start. Repeat with left arm and right leg, alternating 10 times. Gradually build up to 20 times. (Advanced: Repeat this exercise raising both arms and both legs a few inches off the floor at the same time. Hold for 5 seconds and release.)  3. Pelvic Tilt: Lie on the floor on your back with your knees bent at 90 degrees. Your feet should be flat on the floor. Inhale, exhale, then slowly contract your abdominal muscles bringing your navel toward your spine. Let your pelvis rock back until your lower back is flat on the floor. Hold for 10 seconds while breathing  smoothly.  4. Abdominal Crunch: Perform a Pelvic Tilt (above) flattening your lower back against the floor. Holding the tension in your abdominal muscles, take another breath and raise your shoulder blades off the ground (this is not a full sit-up). Keep your head in line with your body (dont bend your neck forward). Hold for 2 seconds, then slowly lower.      Back Exercises: Abdominal Lift  The Abdominal Lift strengthens your lower abdominal muscles, helping you keep your pelvis and back stable.  · Lie on the floor with both knees bent. Put your feet flat on the floor and your arms by your sides. Tighten your abdominal muscles.  · Lift one bent knee and move it toward your upper body. Keep your abdominal muscles tight and your back flat on the floor. Hold for 10 seconds.  · Repeat 3 times. Then, switch legs.         Back Exercises: Bridge  The Bridge exercise strengthens your abdominal, buttocks, and hamstring muscles. This helps keep your back stable and aligned when you walk.  · Lie on the floor with your back and palms flat. Bend your knees. Keep your feet flat on the floor.  · Contract your abdominal and buttocks muscles. Slowly lift your buttocks off the floor until there is a straight line from your knees to your shoulders.  · Hold for 5  seconds. Repeat 10 times.          Back Exercises: Hip Lift        To start, lie on your back with your knees bent and feet flat on the floor. Dont press your neck or lower back to the floor. Breathe deeply. You should feel comfortable and relaxed in this position.  · Slowly raise your hips upward.  · Tighten your abdomen and buttocks. Be careful not to arch your back.  · Hold for 5 seconds. Lower your hips to the floor.  · Repeat 10 times.  For your safety, check with your healthcare provider before starting an exercise program.       Back Exercises: Hip Rotator Stretch        To start, lie on your back with your knees bent and feet flat on the floor. Dont press your  neck or lower back to the floor. Breathe deeply. You should feel comfortable and relaxed in this position.  · Rest your right ankle on your left knee.  · Place a towel behind your left thigh and use it to pull the knee toward your chest. Feel the stretch in your buttocks.  · Hold for 30-60 seconds. Release.  · Repeat 2 times.  · Switch legs.   For your safety, check with your healthcare provider before starting an exercise program.       Back Exercises: Knee Lift        To start, lie on your back with your knees bent and feet flat on the floor. Dont press your neck or lower back to the floor. Breathe deeply. You should feel comfortable and relaxed in this position.  · Lift one bent knee and move it toward your upper body. Keep your abdominal muscles tight and your back flat on the floor.  · Hold for 10 seconds. Return to start position.  · Repeat 3 times.  · Switch legs.        Back Exercises: Leg Pull        To start, lie on your back with your knees bent and feet flat on the floor. Dont press your neck or lower back to the floor. Breathe deeply. You should feel comfortable and relaxed in this position.  · Pull one knee to your chest.  · Hold for 30-60 seconds. Return to starting position.  · Repeat 2 times.  · Switch legs.  · For a double leg pull, pull both legs to your chest at the same time. Repeat 2 times.  For your safety, check with your healthcare provider before starting an exercise program.       Back Exercises: Leg Reach        Do this exercise on your hands and knees. Keep your knees under your hips and your hands under your shoulders. Keep your spine in a neutral position (not arched or sagging). Be sure to maintain your necks natural curve.  · Extend one leg straight back. Dont arch your back or let your head or body sag.  · Hold for 5 seconds. Return to starting position.  · Repeat 5 times.  · Switch legs.       Back Exercises: Lower Back Rotation  To start, lie on your back with your knees bent  and feet flat on the floor. Dont press your neck or lower back to the floor. Breathe deeply. You should feel comfortable and relaxed in this position.  · Drop both knees to one side. Turn your head to the other side. Keep your shoulders flat on the floor.  · Hold for 20 seconds.  · Slowly switch sides.  · Repeat 2 times.                                   Back Exercises: Lower Back Stretch                        To start, sit in a chair with your feet flat on the floor. Shift your weight slightly forward to avoid rounding your back. Relax, and keep your ears, shoulders, and hips aligned.  · Sit with your feet well apart.  · Bend forward and touch the floor with the backs of your hands. Relax and let your body drop.  · Hold for 20 seconds. Return to starting position.  · Repeat 2 times.       Back Exercises: Partial Curl-Ups        To start, lie on your back with your knees bent and feet flat on the floor. Dont press your neck or lower back to the floor. Breathe deeply. You should feel comfortable and relaxed in this position.  · Cross your arms loosely.  · Tighten your abdomen and curl detention up, keeping your head in line with your shoulders.  · Hold for 5 seconds. Uncurl to lie down.  · Repeat 5 times.       Back Exercises: Pelvic Tilt  To start, lie on your back with your knees bent and feet flat on the floor. Dont press your neck or lower back to the floor. Breathe deeply. You should feel comfortable and relaxed in this position.  · Tighten your abdomen and buttocks, and press your lower back toward the floor. This should be a small, subtle movement.  · Hold for 5 seconds. Release.  · Repeat 5 times.                           Back Exercises: Seated Rotation      To start, sit in a chair with your feet flat on the floor. Shift your weight slightly forward to avoid rounding your back. Relax, and keep your ears, shoulders, and hips aligned.  · Fold your arms, elbows just below shoulder height.  · Turn from the  waist with hips forward. Turn your head last.  · Hold for a count of 5. Return to starting position.  · Repeat 5 times on one side. Then switch sides.          Back Exercises: Side Stretch  To start, sit in a chair with your feet flat on the floor. Shift your weight slightly forward to avoid rounding your back. Relax. Keep your ears, shoulders, and hips aligned.  · Stretch your right arm overhead.  · Slowly bend to the left. Dont twist your torso.  · Hold for 20 seconds. Return to starting position.  · Repeat 2 times. Then, switch to the other side.      © 8597-3420 [employer]. [address] [place] [address], [place] [address]. All rights reserved. This information is not intended as a substitute for professional medical care. Always follow your healthcare professional's instructions.

## 2017-08-14 RX ORDER — FUROSEMIDE 20 MG/1
TABLET ORAL
Qty: 30 TABLET | Refills: 11 | Status: SHIPPED | OUTPATIENT
Start: 2017-08-14 | End: 2018-09-03 | Stop reason: SDUPTHER

## 2017-10-10 ENCOUNTER — DOCUMENTATION ONLY (OUTPATIENT)
Dept: FAMILY MEDICINE | Facility: CLINIC | Age: 77
End: 2017-10-10

## 2017-10-10 NOTE — PROGRESS NOTES
Pre-Visit Chart Review  For Appointment Scheduled on 10-12-17    Health Maintenance Due   Topic Date Due    Zoster Vaccine  03/22/2002    Influenza Vaccine  08/01/2017

## 2017-10-12 ENCOUNTER — OFFICE VISIT (OUTPATIENT)
Dept: FAMILY MEDICINE | Facility: CLINIC | Age: 77
End: 2017-10-12
Payer: MEDICARE

## 2017-10-12 VITALS
HEIGHT: 64 IN | TEMPERATURE: 98 F | BODY MASS INDEX: 42.19 KG/M2 | DIASTOLIC BLOOD PRESSURE: 76 MMHG | SYSTOLIC BLOOD PRESSURE: 140 MMHG | HEART RATE: 58 BPM | WEIGHT: 247.13 LBS

## 2017-10-12 DIAGNOSIS — Z23 FLU VACCINE NEED: ICD-10-CM

## 2017-10-12 DIAGNOSIS — E03.4 HYPOTHYROIDISM DUE TO ACQUIRED ATROPHY OF THYROID: ICD-10-CM

## 2017-10-12 DIAGNOSIS — I87.2 STASIS DERMATITIS OF BOTH LEGS: Primary | ICD-10-CM

## 2017-10-12 PROCEDURE — G0008 ADMIN INFLUENZA VIRUS VAC: HCPCS | Mod: S$GLB,,, | Performed by: FAMILY MEDICINE

## 2017-10-12 PROCEDURE — 99499 UNLISTED E&M SERVICE: CPT | Mod: S$GLB,,, | Performed by: FAMILY MEDICINE

## 2017-10-12 PROCEDURE — 99999 PR PBB SHADOW E&M-EST. PATIENT-LVL III: CPT | Mod: PBBFAC,,, | Performed by: FAMILY MEDICINE

## 2017-10-12 PROCEDURE — 99214 OFFICE O/P EST MOD 30 MIN: CPT | Mod: S$GLB,,, | Performed by: FAMILY MEDICINE

## 2017-10-12 PROCEDURE — 90662 IIV NO PRSV INCREASED AG IM: CPT | Mod: S$GLB,,, | Performed by: FAMILY MEDICINE

## 2017-10-12 RX ORDER — CYCLOBENZAPRINE HCL 5 MG
5 TABLET ORAL 3 TIMES DAILY PRN
COMMUNITY
End: 2017-12-22 | Stop reason: SDUPTHER

## 2017-10-12 RX ORDER — TRIAMCINOLONE ACETONIDE 1 MG/G
CREAM TOPICAL 2 TIMES DAILY
Qty: 60 G | Refills: 0 | Status: SHIPPED | OUTPATIENT
Start: 2017-10-12 | End: 2018-10-18 | Stop reason: SDUPTHER

## 2017-10-12 NOTE — PROGRESS NOTES
Subjective:       Patient ID: Danna Sorensen is a 77 y.o. female.    Chief Complaint: Follow-up    Patient Active Problem List   Diagnosis    CAD (coronary artery disease)    Hypothyroid    History of colon cancer    Nuclear sclerosis    Hyperopia with astigmatism and presbyopia    History of squamous cell carcinoma    Myalgia    Acute neck pain    Chronic right shoulder pain    Arthralgia of right hip    Hx of colon cancer, stage IV    Right hip pain    DDD (degenerative disc disease), lumbar    Shoulder arthritis    Unspecified arthropathy, shoulder region    Status post reverse total replacement of right shoulder    Right shoulder pain    Stiffness of right shoulder joint    Shoulder weakness    Morbid obesity with BMI of 40.0-44.9, adult     HPI  Review of Systems   Constitutional: Negative for fatigue and unexpected weight change.   Respiratory: Negative for chest tightness and shortness of breath.    Cardiovascular: Negative for chest pain, palpitations and leg swelling.   Gastrointestinal: Negative for abdominal pain.   Musculoskeletal: Negative for arthralgias.   Neurological: Negative for dizziness, syncope, light-headedness and headaches.       Objective:      Physical Exam   Constitutional: She is oriented to person, place, and time. She appears well-developed and well-nourished.   Cardiovascular: Normal rate, regular rhythm and normal heart sounds.    Pulmonary/Chest: Effort normal and breath sounds normal.   Musculoskeletal: She exhibits no edema.   Neurological: She is alert and oriented to person, place, and time.   Skin: Skin is warm and dry.   Psychiatric: She has a normal mood and affect.   Nursing note and vitals reviewed.      Assessment:       1. Stasis dermatitis of both legs    2. Flu vaccine need    3. Hypothyroidism due to acquired atrophy of thyroid        Plan:       1. Stasis dermatitis of both legs  I counseled the patient on avoiding salt, elevating legs and wearing  compression stockings as much as possible to diminish swelling.      - triamcinolone acetonide 0.1% (KENALOG) 0.1 % cream; Apply topically 2 (two) times daily.  Dispense: 60 g; Refill: 0    2. Flu vaccine need  Immunize today.  Counseled patient on risks, benefits and side effects.  Patient elected to proceed with vaccination.    - Influenza - High Dose (65+) (PF) (IM)    3. Hypothyroidism due to acquired atrophy of thyroid  Stable condition.  Continue current medications.  Will adjust based on lab findings or if condition changes.    - CBC auto differential; Future  - Comprehensive metabolic panel; Future  - Lipid panel; Future  - TSH; Future  - T3, free; Future  - T4, free; Future      Reeval 6 months or sooner prn

## 2017-10-12 NOTE — PATIENT INSTRUCTIONS

## 2017-10-23 DIAGNOSIS — M25.511 RIGHT SHOULDER PAIN, UNSPECIFIED CHRONICITY: Primary | ICD-10-CM

## 2017-10-24 ENCOUNTER — HOSPITAL ENCOUNTER (OUTPATIENT)
Dept: RADIOLOGY | Facility: HOSPITAL | Age: 77
Discharge: HOME | End: 2017-10-24
Attending: ORTHOPAEDIC SURGERY
Payer: MEDICARE

## 2017-10-24 ENCOUNTER — OFFICE VISIT (OUTPATIENT)
Dept: ORTHOPEDICS | Facility: CLINIC | Age: 77
End: 2017-10-24
Payer: MEDICARE

## 2017-10-24 VITALS
HEIGHT: 64 IN | BODY MASS INDEX: 42.19 KG/M2 | WEIGHT: 247.13 LBS | DIASTOLIC BLOOD PRESSURE: 76 MMHG | SYSTOLIC BLOOD PRESSURE: 176 MMHG | HEART RATE: 61 BPM

## 2017-10-24 DIAGNOSIS — M25.511 RIGHT SHOULDER PAIN, UNSPECIFIED CHRONICITY: ICD-10-CM

## 2017-10-24 DIAGNOSIS — Z96.611 S/P REVERSE TOTAL SHOULDER ARTHROPLASTY, RIGHT: Primary | ICD-10-CM

## 2017-10-24 PROCEDURE — 99999 PR PBB SHADOW E&M-EST. PATIENT-LVL III: CPT | Mod: PBBFAC,,, | Performed by: ORTHOPAEDIC SURGERY

## 2017-10-24 PROCEDURE — 73030 X-RAY EXAM OF SHOULDER: CPT | Mod: TC,PN,RT

## 2017-10-24 PROCEDURE — 99213 OFFICE O/P EST LOW 20 MIN: CPT | Mod: S$GLB,,, | Performed by: ORTHOPAEDIC SURGERY

## 2017-10-24 PROCEDURE — 73030 X-RAY EXAM OF SHOULDER: CPT | Mod: 26,RT,, | Performed by: RADIOLOGY

## 2017-10-26 NOTE — PROGRESS NOTES
Past Medical History:   Diagnosis Date    Back pain     Carpal tunnel syndrome     Cataract     Colon cancer     Coronary artery disease     Hypothyroidism     Obesity (BMI 30-39.9) 3/24/2016    Osteoarthritis     Osteoporosis     Personal history of colon cancer, stage III     Squamous cell carcinoma     Strabismus     Trouble in sleeping     Wears dentures     Uppers       Past Surgical History:   Procedure Laterality Date    COLON SURGERY  06/2007    COLONOSCOPY N/A 10/18/2016    Procedure: COLONOSCOPY;  Surgeon: Rell Cordova MD;  Location: [facility];  Service: Endoscopy;  Laterality: N/A;    HAND TENDON SURGERY Left     HEMICOLECTOMY      right    JOINT REPLACEMENT      Bilateral knees    SHOULDER ARTHROSCOPY Right 01/12/2017    Reverse     TONSILLECTOMY      TONSILLECTOMY, ADENOIDECTOMY, BILATERAL MYRINGOTOMY AND TUBES      TOTAL KNEE ARTHROPLASTY Bilateral 08/1998    total replacements    TUMOR REMOVAL Left     left foot as a child       Current Outpatient Prescriptions   Medication Sig    cyclobenzaprine (FLEXERIL) 5 MG tablet Take 5 mg by mouth 3 (three) times daily as needed for Muscle spasms.    furosemide (LASIX) 20 MG tablet TAKE 1 TABLET(20 MG) BY MOUTH DAILY AS NEEDED    hydrocodone-acetaminophen 10-325mg (NORCO)  mg Tab Take 1 tablet by mouth every 4 (four) hours as needed.    levothyroxine (SYNTHROID) 75 MCG tablet TAKE 1 TABLET BY MOUTH EVERY DAY    lisinopril (PRINIVIL,ZESTRIL) 20 MG tablet Take 1 tablet (20 mg total) by mouth once daily.    nystatin (MYCOSTATIN) cream Apply topically 2 (two) times daily as needed. GRETCHEN EXT AA BID    triamcinolone acetonide 0.1% (KENALOG) 0.1 % cream Apply topically 2 (two) times daily.     No current facility-administered medications for this visit.        Review of patient's allergies indicates:   Allergen Reactions    Aspirin      Other reaction(s): Stomach upset    Mobic [meloxicam] Swelling     Edema and elevated blood  pressure     Oxaliplatin      Other reaction(s): Joint pain  Other reaction(s): Itching  Other reaction(s): Hives       Family History   Problem Relation Age of Onset    Hypertension Mother     Kidney disease Mother     Cataracts Father     Cataracts Sister     Cancer Sister      breast    No Known Problems Brother     Cancer Sister      breast    Arthritis Sister     Diabetes Maternal Uncle     Glaucoma Neg Hx     Amblyopia Neg Hx     Blindness Neg Hx     Macular degeneration Neg Hx     Retinal detachment Neg Hx     Strabismus Neg Hx     Stroke Neg Hx     Thyroid disease Neg Hx        Social History     Social History    Marital status: Single     Spouse name: N/A    Number of children: N/A    Years of education: N/A     Occupational History    Not on file.     Social History Main Topics    Smoking status: Former Smoker     Packs/day: 0.50     Years: 1.00     Types: Cigarettes     Start date: 7/27/1960     Quit date: 1/1/1961    Smokeless tobacco: Never Used    Alcohol use No    Drug use: No    Sexual activity: No     Other Topics Concern    Not on file     Social History Narrative    No narrative on file       Chief Complaint:   Chief Complaint   Patient presents with    Right Shoulder - Pain       Consulting Physician: No ref. provider found    History of present illness: This is a 77 y.o. year old female following up for a right reverse total shoulder arthroplasty done on 1/12/17.  She puts her pain at a 0 out of 10.  She is not using any pain medicine.  She states she's having no problems and is doing well.      Review of Systems:    Constitution: Denies chills, fever, and sweats.    HENT: Denies headaches or blurry vision.    Cardiovascular: Denies chest pain or irregular heart beat.    Respiratory: Denies cough or shortness of breath.    Gastrointestinal: Denies abdominal pain, nausea, or vomiting.    Musculoskeletal:  Denies muscle cramps.    Neurological: Denies dizziness or  focal weakness.    Psychiatric/Behavioral: Normal mental status.    Hematologic/Lymphatic: Denies bleeding problem or easy bruising/bleeding.    Skin: Denies rash or suspicious lesions.      Examination:    Vital Signs:    Vitals:    10/24/17 0827   BP: (!) 176/76   Pulse: 61       Body mass index is 42.42 kg/m².    This a well-developed, well nourished patient in no acute distress.    Alert and oriented and cooperative to examination.       Physical Exam: right shoulder    Inspection/Observation   Swelling:   none  Erythema:   none  Bruising:   none  Wounds:   healed  Drainage:  None    Range of Motion   130, 30, low back    Muscle Strength   4+-5    Other   Sensation:  normal  Pulse:    palpable    Imaging: XR ordered and reviewed personally today show a normal appearing reverse TSA.     Assessment: S/P reverse total shoulder arthroplasty, right        Plan:  She is doing well. No pain. Good ROM. She is very happy with the shoulder. PRN.    DISCLAIMER: This note may have been dictated using voice recognition software and may contain grammatical errors. Report sent to referring provider as required.

## 2017-12-21 ENCOUNTER — DOCUMENTATION ONLY (OUTPATIENT)
Dept: FAMILY MEDICINE | Facility: CLINIC | Age: 77
End: 2017-12-21

## 2017-12-21 NOTE — PROGRESS NOTES
Pre-Visit Chart Review  For Appointment Scheduled on 12/22/2017      Health Maintenance Due   Topic Date Due    Zoster Vaccine  03/22/2002

## 2017-12-22 ENCOUNTER — OFFICE VISIT (OUTPATIENT)
Dept: FAMILY MEDICINE | Facility: CLINIC | Age: 77
End: 2017-12-22
Payer: MEDICARE

## 2017-12-22 VITALS
TEMPERATURE: 98 F | SYSTOLIC BLOOD PRESSURE: 138 MMHG | DIASTOLIC BLOOD PRESSURE: 66 MMHG | HEIGHT: 64 IN | HEART RATE: 57 BPM | WEIGHT: 241.88 LBS | BODY MASS INDEX: 41.29 KG/M2

## 2017-12-22 DIAGNOSIS — M54.2 NECK PAIN: ICD-10-CM

## 2017-12-22 DIAGNOSIS — E03.4 HYPOTHYROIDISM DUE TO ACQUIRED ATROPHY OF THYROID: ICD-10-CM

## 2017-12-22 DIAGNOSIS — Z00.00 ENCOUNTER FOR PREVENTIVE HEALTH EXAMINATION: Primary | ICD-10-CM

## 2017-12-22 DIAGNOSIS — E66.01 MORBID OBESITY WITH BMI OF 40.0-44.9, ADULT: ICD-10-CM

## 2017-12-22 DIAGNOSIS — J84.10 CALCIFIED GRANULOMA OF LUNG: ICD-10-CM

## 2017-12-22 PROBLEM — R29.898 SHOULDER WEAKNESS: Status: RESOLVED | Noted: 2017-01-30 | Resolved: 2017-12-22

## 2017-12-22 PROBLEM — Z96.611 STATUS POST REVERSE TOTAL REPLACEMENT OF RIGHT SHOULDER: Status: RESOLVED | Noted: 2017-01-12 | Resolved: 2017-12-22

## 2017-12-22 PROBLEM — M25.611 STIFFNESS OF RIGHT SHOULDER JOINT: Status: RESOLVED | Noted: 2017-01-30 | Resolved: 2017-12-22

## 2017-12-22 PROBLEM — M19.019 SHOULDER ARTHRITIS: Status: RESOLVED | Noted: 2017-01-12 | Resolved: 2017-12-22

## 2017-12-22 PROBLEM — J98.4 CALCIFIED GRANULOMA OF LUNG: Status: ACTIVE | Noted: 2017-12-22

## 2017-12-22 PROBLEM — M25.511 RIGHT SHOULDER PAIN: Status: RESOLVED | Noted: 2017-01-30 | Resolved: 2017-12-22

## 2017-12-22 PROBLEM — M19.019 UNSPECIFIED ARTHROPATHY, SHOULDER REGION: Status: RESOLVED | Noted: 2017-01-12 | Resolved: 2017-12-22

## 2017-12-22 PROCEDURE — 99499 UNLISTED E&M SERVICE: CPT | Mod: S$GLB,,, | Performed by: PHYSICIAN ASSISTANT

## 2017-12-22 PROCEDURE — 99999 PR PBB SHADOW E&M-EST. PATIENT-LVL IV: CPT | Mod: PBBFAC,,, | Performed by: PHYSICIAN ASSISTANT

## 2017-12-22 PROCEDURE — G0439 PPPS, SUBSEQ VISIT: HCPCS | Mod: S$GLB,,, | Performed by: PHYSICIAN ASSISTANT

## 2017-12-22 RX ORDER — CYCLOBENZAPRINE HCL 5 MG
5 TABLET ORAL NIGHTLY
Qty: 90 TABLET | Refills: 3 | Status: SHIPPED | OUTPATIENT
Start: 2017-12-22 | End: 2018-12-27 | Stop reason: SDUPTHER

## 2017-12-22 RX ORDER — DEXTROMETHORPHAN HYDROBROMIDE, GUAIFENESIN 5; 100 MG/5ML; MG/5ML
650 LIQUID ORAL EVERY 8 HOURS
COMMUNITY

## 2017-12-22 NOTE — PATIENT INSTRUCTIONS
Weight Management: Getting Started  Healthy bodies come in all shapes and sizes. Not all bodies are made to be thin. For some people, a healthy weight is higher than the average weight listed on weight charts. Your healthcare provider can help you decide on a healthy weight for you.    Reasons to lose weight  Losing weight can help with some health problems, such as high blood pressure, heart disease, diabetes, sleep apnea, and arthritis. You may also feel more energy.  Set your long-term goal  Your goal doesn't even have to be a specific weight. You may decide on a fitness goal (such as being able to walk 10 miles a week), or a health goal (such as lowering your blood pressure). Choose a goal that is measurable and reasonable, so you know when you've reached it. A goal of reaching a BMI of less than 25 is not always reasonable (or possible).   Make an action plan  Habits dont change overnight. Setting your goals too high can leave you feeling discouraged if you cant reach them. Be realistic. Choose one or two small changes you can make now. Set an action plan for how you are going to make these changes. When you can stick to this plan, keep making a few more small changes. Taking small steps will help you stay on the path to success.  Track your progress  Write down your goals. Then, keep a daily record of your progress. Write down what you eat and how active you are. This record lets you look back on how much youve done. It may also help when youre feeling frustrated. Reward yourself for success. Even if you dont reach every goal, give yourself credit for what you do get done.  Get support  Encouragement from others can help make losing weight easier. Ask your family members and friends for support. They may even want to join you. Also look to your healthcare provider, registered dietitian, and  for help. Your local hospital can give you more information about nutrition, exercise, and  weight loss.  Date Last Reviewed: 1/31/2016 © 2000-2017 [employer]. [address] [place] [address], [place] [address]. All rights reserved. This information is not intended as a substitute for professional medical care. Always follow your healthcare professional's instructions.        Walking for Fitness  Fitness walking has something for everyone, even people who are already fit. Walking is one of the safest ways to condition your body aerobically. It can boost energy, help you lose weight, and reduce stress.    Physical benefits  · Walking strengthens your heart and lungs, and tones your muscles.  · When walking, your feet land with less impact than in other sports. This reduces chances of muscle, bone, and joint injury.  · Regular walking improves your cholesterol levels and lowers your risk of heart disease. And it helps you control your blood sugar if you have diabetes.  · Walking is a weight-bearing activity, which helps maintain bone density. This can help prevent osteoporosis.  Personal rewards  · Taking walks can help you relax and manage stress. And fitness walking may make you feel better about yourself.  · Walking can help you sleep better at night and make you less likely to be depressed.  · Regular walking may help maintain your memory as you get older.  · Walking is a great way to spend extra time with friends and family members. Be sure to invite your dog along!  Q&A about fitness walking  Q: Will walking keep me fit?  A: Yes. Regular walking at the right pace gives you all the benefits of other aerobic activities, such as jogging and swimming.  Q: Will walking help me lose weight and keep it off?  A: Yes. Per mile, walking can burn as many calories as jogging. Your health care provider can help work walking into your weight-loss plan.  Q: Is walking safe for my health?  A: Yes. Walking is safe if you have high blood pressure, diabetes, heart disease, or other conditions. Talk to your  healthcare provider before you start.  Date Last Reviewed: 4/1/2017  © 1140-1092 [employer].. [address] [place] [address], [place] [address]. All rights reserved. This information is not intended as a substitute for professional medical care. Always follow your healthcare professional's instructions.          Counseling and Referral of Other Preventative  (Italic type indicates deductible and co-insurance are waived)    Patient Name: Danna Sorensen  Today's Date: 12/22/2017      SERVICE LIMITATIONS RECOMMENDATION    Vaccines    · Pneumococcal (once after 65)    · Influenza (annually)    · Hepatitis B (if medium/high risk)    · Prevnar 13      Hepatitis B medium/high risk factors:       - End-stage renal disease       - Hemophiliacs who received Factor VII or         IX concentrates       - Clients of institutions for the mentally             retarded       - Persons who live in the same house as          a HepB carrier       - Homosexual men       - Illicit injectable drug abusers     Pneumococcal: Done, no repeat necessary     Influenza: Done, repeat in one year     Hepatitis B: N/A     Prevnar 13: Done, no repeat necessary    Mammogram (biennial age 50-74)  Annually (age 40 or over)  N/A    Pap (up to age 70 and after 70 if unknown history or abnormal study last 10 years)    N/A     The USPSTF recommends against screening for cervical cancer in women who have had a hysterectomy with removal of the cervix and who do not have a history of a high-grade precancerous lesion (cervical intraepithelial neoplasia [VENANCIO] grade 2 or 3) or cervical cancer.     Colorectal cancer screening (to age 75)    · Fecal occult blood test (annual)  · Flexible sigmoidoscopy (5y)  · Screening colonoscopy (10y)  · Barium enema   Last done 10/18/16, recommend to repeat every 10  years    Diabetes self-management training (no USPSTF recommendations)  Requires referral by treating physician for patient with diabetes or renal disease. 10  hours of initial DSMT sessions of no less than 30 minutes each in a continuous 12-month period. 2 hours of follow-up DSMT in subsequent years.  Done this year, repeat every year    Bone mass measurements (age 65 & older, biennial)  Requires diagnosis related to osteoporosis or estrogen deficiency. Biennial benefit unless patient has history of long-term glucocorticoid  Last done 9/2/16, recommend to repeat every 3 years     Glaucoma screening (no USPSTF recommendation)  Diabetes mellitus, family history   , age 50 or over    American, age 65 or over  Recommend follow up with eye care professional regularly    Medical nutrition therapy for diabetes or renal disease (no recommended schedule)  Requires referral by treating physician for patient with diabetes or renal disease or kidney transplant within the past 3 years.  Can be provided in same year as diabetes self-management training (DSMT), and CMS recommends medical nutrition therapy take place after DSMT. Up to 3 hours for initial year and 2 hours in subsequent years.  Done this year, repeat every year    Cardiovascular screening blood tests (every 5 years)  · Fasting lipid panel  Order as a panel if possible  Done this year, repeat every year    Diabetes screening tests (at least every 3 years, Medicare covers annually or at 6-month intervals for prediabetic patients)  · Fasting blood sugar (FBS) or glucose tolerance test (GTT)  Patient must be diagnosed with one of the following:       - Hypertension       - Dyslipidemia       - Obesity (BMI 30kg/m2)       - Previous elevated impaired FBS or GTT       ... or any two of the following:       - Overweight (BMI 25 but <30)       - Family history of diabetes       - Age 65 or older       - History of gestational diabetes or birth of baby weighing more than 9 pounds  Done this year, repeat every year    HIV screening (annually for increased risk patients)  · HIV-1 and HIV-2 by EIA, or DANNY,  rapid antibody test or oral mucosa transudate  Patients must be at increased risk for HIV infection per USPSTF guidelines or pregnant. Tests covered annually for patient at increased risk or as requested by the patient. Pregnant patients may receive up to 3 tests during pregnancy.  Risks discussed, screening is not recommended    Smoking cessation counseling (up to 8 sessions per year)  Patients must be asymptomatic of tobacco-related conditions to receive as a preventative service.  Non-smoker    Subsequent annual wellness visit  At least 12 months since last AWV  Return in one year     The following information is provided to all patients.  This information is to help you find resources for any of the problems found today that may be affecting your health:                Living healthy guide: www.[facility].[place].[facility]      Understanding Diabetes: www.diabetes.org      Eating healthy: www.cdc.gov/healthyweight      CDC home safety checklist: www.cdc.gov/steadi/patient.html      Agency on Aging: www.goea.[place].[facility]      Alcoholics anonymous (AA): www.aa.org      Physical Activity: www.dread.nih.gov/oy9giyt      Tobacco use: www.quitwithusla.org

## 2017-12-22 NOTE — PROGRESS NOTES
"Danna Sorensen presented for a  Medicare AWV and comprehensive Health Risk Assessment today. The following components were reviewed and updated:    · Medical history  · Family History  · Social history  · Allergies and Current Medications  · Health Risk Assessment  · Health Maintenance  · Care Team     ** See Completed Assessments for Annual Wellness Visit within the encounter summary.**       The following assessments were completed:  · Living Situation  · CAGE  · Depression Screening  · Timed Get Up and Go  · Whisper Test  · Cognitive Function Screening  · Nutrition Screening  · ADL Screening  · PAQ Screening    Vitals:    12/22/17 0801   BP: 138/66   BP Location: Left arm   Patient Position: Sitting   BP Method: Large (Automatic)   Pulse: (!) 57   Temp: 98.2 °F (36.8 °C)   TempSrc: Oral   Weight: 109.7 kg (241 lb 13.5 oz)   Height: 5' 4" (1.626 m)     Body mass index is 41.51 kg/m².  Physical Exam   Constitutional: She is oriented to person, place, and time. She appears well-developed and well-nourished.   HENT:   Head: Normocephalic and atraumatic.   Eyes: Conjunctivae are normal. Pupils are equal, round, and reactive to light.   Neck: Normal range of motion. Neck supple. No JVD present.   Cardiovascular: Normal rate and regular rhythm.  Exam reveals no gallop and no friction rub.    No murmur heard.  Pulmonary/Chest: Effort normal and breath sounds normal. No respiratory distress. She has no wheezes. She has no rales.   Neurological: She is alert and oriented to person, place, and time.   Skin: Skin is warm and dry.   Psychiatric: She has a normal mood and affect. Her behavior is normal. Judgment and thought content normal.                 Diagnoses and health risks identified today and associated recommendations/orders:    Danna was seen today for health risk assessment.    Diagnoses and all orders for this visit:    Encounter for preventive health examination    Morbid obesity with BMI of 40.0-44.9, " adult  Comments:  uncontrolled, encouraged weight loss    Calcified granuloma of lung  Comments:  stable,continue to monitor    Hypothyroidism due to acquired atrophy of thyroid  Comments:  controlled, continue meds    Neck pain  Comments:  chronic continue meds  Orders:  -     cyclobenzaprine (FLEXERIL) 5 MG tablet; Take 1 tablet (5 mg total) by mouth nightly.        Provided Danna with a 5-10 year written screening schedule and personal prevention plan. Recommendations were developed using the USPSTF age appropriate recommendations. Education, counseling, and referrals were provided as needed. After Visit Summary printed and given to patient which includes a list of additional screenings\tests needed.    No Follow-up on file.    BERNADINE Truong     Patient readiness: acceptance and barriers:none    During the course of the visit the patient was educated and counseled about the following:     Obesity:   General weight loss/lifestyle modification strategies discussed (elicit support from others; identify saboteurs; non-food rewards, etc).    Goals: Obesity: Reduce calorie intake and BMI    Did patient meet goals/outcomes: No    The following self management tools provided: excercise log    Patient Instructions (the written plan) was given to the patient/family.     Time spent with patient: 55 minutes    Barriers to medications present (no )    Adverse reactions to current medications (no)    Over the counter medications reviewed (Yes)

## 2017-12-22 NOTE — Clinical Note
Primary Care Providers: Claudia Kim MD, MD (General)  Your patient was seen today for a HRA visit. Gap(s) in care (HEDIS gaps) have been identified during this visit that require additional testing and possible follow up.  No orders of the defined types were placed in this encounter.   These orders were placed using [facility] approved protocol and any results will be forwarded to your office for appropriate follow up. I have included a copy of my visit note; please review the note and feel free to contact me with any questions.   Thank you for allowing me to participate in the care of your patients. BERNADINE Truong

## 2018-03-16 ENCOUNTER — HOSPITAL ENCOUNTER (OUTPATIENT)
Dept: RADIOLOGY | Facility: HOSPITAL | Age: 78
Discharge: HOME | End: 2018-03-16
Attending: NURSE PRACTITIONER
Payer: MEDICARE

## 2018-03-16 DIAGNOSIS — C18.9 MALIGNANT NEOPLASM OF COLON, UNSPECIFIED PART OF COLON: ICD-10-CM

## 2018-03-16 PROCEDURE — 71046 X-RAY EXAM CHEST 2 VIEWS: CPT | Mod: TC,FY

## 2018-03-16 PROCEDURE — 71046 X-RAY EXAM CHEST 2 VIEWS: CPT | Mod: 26,,, | Performed by: RADIOLOGY

## 2018-03-19 ENCOUNTER — TELEPHONE (OUTPATIENT)
Dept: HEMATOLOGY/ONCOLOGY | Facility: CLINIC | Age: 78
End: 2018-03-19

## 2018-03-19 ENCOUNTER — OFFICE VISIT (OUTPATIENT)
Dept: HEMATOLOGY/ONCOLOGY | Facility: CLINIC | Age: 78
End: 2018-03-19
Payer: MEDICARE

## 2018-03-19 ENCOUNTER — LAB VISIT (OUTPATIENT)
Dept: LAB | Facility: HOSPITAL | Age: 78
End: 2018-03-19
Attending: NURSE PRACTITIONER
Payer: MEDICARE

## 2018-03-19 VITALS
HEART RATE: 68 BPM | BODY MASS INDEX: 41.25 KG/M2 | SYSTOLIC BLOOD PRESSURE: 141 MMHG | DIASTOLIC BLOOD PRESSURE: 68 MMHG | RESPIRATION RATE: 20 BRPM | TEMPERATURE: 98 F | WEIGHT: 241.63 LBS | HEIGHT: 64 IN

## 2018-03-19 DIAGNOSIS — R59.0 LYMPHADENOPATHY OF HEAD AND NECK REGION: ICD-10-CM

## 2018-03-19 DIAGNOSIS — Z85.038 HISTORY OF COLON CANCER: ICD-10-CM

## 2018-03-19 DIAGNOSIS — Z85.038 HISTORY OF COLON CANCER: Primary | ICD-10-CM

## 2018-03-19 LAB
BASOPHILS # BLD AUTO: 0.03 K/UL
BASOPHILS NFR BLD: 0.6 %
DIFFERENTIAL METHOD: ABNORMAL
EOSINOPHIL # BLD AUTO: 0.1 K/UL
EOSINOPHIL NFR BLD: 1.3 %
ERYTHROCYTE [DISTWIDTH] IN BLOOD BY AUTOMATED COUNT: 12.6 %
HCT VFR BLD AUTO: 37.5 %
HGB BLD-MCNC: 12.6 G/DL
LYMPHOCYTES # BLD AUTO: 1.3 K/UL
LYMPHOCYTES NFR BLD: 24.5 %
MCH RBC QN AUTO: 32.6 PG
MCHC RBC AUTO-ENTMCNC: 33.6 G/DL
MCV RBC AUTO: 97 FL
MONOCYTES # BLD AUTO: 0.5 K/UL
MONOCYTES NFR BLD: 9.9 %
NEUTROPHILS # BLD AUTO: 3.3 K/UL
NEUTROPHILS NFR BLD: 63.7 %
NRBC BLD-RTO: 0 /100 WBC
PLATELET # BLD AUTO: 185 K/UL
PMV BLD AUTO: 10.1 FL
RBC # BLD AUTO: 3.86 M/UL
WBC # BLD AUTO: 5.23 K/UL

## 2018-03-19 PROCEDURE — 3078F DIAST BP <80 MM HG: CPT | Mod: CPTII,S$GLB,, | Performed by: NURSE PRACTITIONER

## 2018-03-19 PROCEDURE — 36415 COLL VENOUS BLD VENIPUNCTURE: CPT | Mod: PN

## 2018-03-19 PROCEDURE — 85025 COMPLETE CBC W/AUTO DIFF WBC: CPT

## 2018-03-19 PROCEDURE — 99499 UNLISTED E&M SERVICE: CPT | Mod: S$GLB,,, | Performed by: NURSE PRACTITIONER

## 2018-03-19 PROCEDURE — 99213 OFFICE O/P EST LOW 20 MIN: CPT | Mod: S$GLB,,, | Performed by: NURSE PRACTITIONER

## 2018-03-19 PROCEDURE — 3077F SYST BP >= 140 MM HG: CPT | Mod: CPTII,S$GLB,, | Performed by: NURSE PRACTITIONER

## 2018-03-19 PROCEDURE — 85025 COMPLETE CBC W/AUTO DIFF WBC: CPT | Mod: PN

## 2018-03-19 PROCEDURE — 99999 PR PBB SHADOW E&M-EST. PATIENT-LVL IV: CPT | Mod: PBBFAC,,, | Performed by: NURSE PRACTITIONER

## 2018-03-19 NOTE — PROGRESS NOTES
HISTORY OF PRESENT ILLNESS:  This is a 77-year-old white female known to Dr. Gilmore for stage III adenocarcinoma of the colon.  She is status post resection   and 12 cycles of adjuvant FOLFOX.  She presents to the clinic today for her   annual evaluation.  She is now out 132 months (11 years) post-therapy. She   denies any difficulties with fevers, chills, drenching night sweats, changes in   the caliber or character of her stool, abdominal discomfort or bloating, nausea,   vomiting, constipation, diarrhea, unexplained weight loss, irregular heartbeat,   chest pain, bleeding, etc.  No other new complaints or pertinent findings on a   14-point review of systems.    PHYSICAL EXAMINATION:  GENERAL:  Well-developed, morbidly obese white female in no acute distress.    Alert and oriented x3.  VITAL SIGNS: Gain of 8 1/2 pounds in 1 year  Wt Readings from Last 3 Encounters:   03/19/18 109.6 kg (241 lb 10 oz)   12/22/17 109.7 kg (241 lb 13.5 oz)   10/24/17 112.1 kg (247 lb 2.2 oz)     Temp Readings from Last 3 Encounters:   03/19/18 97.8 °F (36.6 °C)   12/22/17 98.2 °F (36.8 °C) (Oral)   10/12/17 97.6 °F (36.4 °C) (Oral)     BP Readings from Last 3 Encounters:   03/19/18 (!) 141/68   12/22/17 138/66   10/24/17 (!) 176/76     Pulse Readings from Last 3 Encounters:   03/19/18 68   12/22/17 (!) 57   10/24/17 61     HEENT:  Normocephalic, atraumatic.  Oral mucosa pink and moist.  Lips without   lesions.  Tongue midline.  Oropharynx clear.  Nonicteric sclerae.  NECK:  Supple, swelling to right supraclavicular area.  No carotid bruits, thyromegaly or thyroid nodule.  HEART:  Regular rate and rhythm without murmur, gallop or rub.  LUNGS:  Clear to auscultation bilaterally.  Normal respiratory effort.  ABDOMEN:  Soft, nontender, nondistended with positive normoactive bowel sounds,   no hepatosplenomegaly.  EXTREMITIES:  No cyanosis, clubbing or edema.  Distal pulses are intact.  AXILLAE AND GROIN:  No palpable pathologic  lymphadenopathy is appreciated.  SKIN:  Intact/turgor normal.  NEUROLOGIC:  Cranial nerves II-XII grossly intact.  Motor:  Good muscle bulk and   tone.  Strength/sensory 5/5 throughout.  Gait stable.    LABORATORY:    CMP  Sodium   Date Value Ref Range Status   03/16/2018 141 136 - 145 mmol/L Final   03/16/2018 141 136 - 145 mmol/L Final     Potassium   Date Value Ref Range Status   03/16/2018 4.4 3.5 - 5.1 mmol/L Final   03/16/2018 4.4 3.5 - 5.1 mmol/L Final     Chloride   Date Value Ref Range Status   03/16/2018 107 95 - 110 mmol/L Final   03/16/2018 107 95 - 110 mmol/L Final     CO2   Date Value Ref Range Status   03/16/2018 25 23 - 29 mmol/L Final   03/16/2018 25 23 - 29 mmol/L Final     Glucose   Date Value Ref Range Status   03/16/2018 93 70 - 110 mg/dL Final   03/16/2018 93 70 - 110 mg/dL Final     BUN, Bld   Date Value Ref Range Status   03/16/2018 14 8 - 23 mg/dL Final   03/16/2018 14 8 - 23 mg/dL Final     Creatinine   Date Value Ref Range Status   03/16/2018 0.7 0.5 - 1.4 mg/dL Final   03/16/2018 0.7 0.5 - 1.4 mg/dL Final     Calcium   Date Value Ref Range Status   03/16/2018 9.5 8.7 - 10.5 mg/dL Final   03/16/2018 9.5 8.7 - 10.5 mg/dL Final     Total Protein   Date Value Ref Range Status   03/16/2018 7.1 6.0 - 8.4 g/dL Final   03/16/2018 7.1 6.0 - 8.4 g/dL Final     Albumin   Date Value Ref Range Status   03/16/2018 3.9 3.5 - 5.2 g/dL Final   03/16/2018 3.9 3.5 - 5.2 g/dL Final     Total Bilirubin   Date Value Ref Range Status   03/16/2018 1.7 (H) 0.1 - 1.0 mg/dL Final     Comment:     For infants and newborns, interpretation of results should be based  on gestational age, weight and in agreement with clinical  observations.  Premature Infant recommended reference ranges:  Up to 24 hours.............<8.0 mg/dL  Up to 48 hours............<12.0 mg/dL  3-5 days..................<15.0 mg/dL  6-29 days.................<15.0 mg/dL     03/16/2018 1.7 (H) 0.1 - 1.0 mg/dL Final     Comment:     For infants and  newborns, interpretation of results should be based  on gestational age, weight and in agreement with clinical  observations.  Premature Infant recommended reference ranges:  Up to 24 hours.............<8.0 mg/dL  Up to 48 hours............<12.0 mg/dL  3-5 days..................<15.0 mg/dL  6-29 days.................<15.0 mg/dL       Alkaline Phosphatase   Date Value Ref Range Status   03/16/2018 61 55 - 135 U/L Final   03/16/2018 61 55 - 135 U/L Final     AST   Date Value Ref Range Status   03/16/2018 17 10 - 40 U/L Final   03/16/2018 17 10 - 40 U/L Final     ALT   Date Value Ref Range Status   03/16/2018 15 10 - 44 U/L Final   03/16/2018 15 10 - 44 U/L Final     Anion Gap   Date Value Ref Range Status   03/16/2018 9 8 - 16 mmol/L Final   03/16/2018 9 8 - 16 mmol/L Final     eGFR if    Date Value Ref Range Status   03/16/2018 >60 >60 mL/min/1.73 m^2 Final   03/16/2018 >60 >60 mL/min/1.73 m^2 Final     eGFR if non    Date Value Ref Range Status   03/16/2018 >60 >60 mL/min/1.73 m^2 Final     Comment:     Calculation used to obtain the estimated glomerular filtration  rate (eGFR) is the CKD-EPI equation.      03/16/2018 >60 >60 mL/min/1.73 m^2 Final     Comment:     Calculation used to obtain the estimated glomerular filtration  rate (eGFR) is the CKD-EPI equation.            Chest x-ray dated 03/16/2018: Impression     No acute radiographic findings in the chest.    Stable enlarged pulmonary artery contours suggestive of pulmonary artery hypertension.     Colonoscopy dated 12/18/2016:  Impression, diverticulosis; normal, repeat in 10 years.    IMPRESSION:  1.  Stage III adenocarcinoma of the colon disease - VINNY.  2.  Status post right shoulder arthroplasty - 01/12/2017  3.  Right supraclavicular edema - US today    PLAN:    1.  CBC today.  2.  US right neck today - phone review [phone]  3.  In regard to #1, we will have the patient return in one year with interval CBC, CMP,    LDH and chest x-ray prior.    Assessment/plan reviewed and approved by Dr. Gilmore.

## 2018-04-12 ENCOUNTER — DOCUMENTATION ONLY (OUTPATIENT)
Dept: FAMILY MEDICINE | Facility: CLINIC | Age: 78
End: 2018-04-12

## 2018-04-12 NOTE — PROGRESS NOTES
Pre-Visit Chart Review  For Appointment Scheduled on 4-13-18  There are no preventive care reminders to display for this patient.

## 2018-04-13 ENCOUNTER — OFFICE VISIT (OUTPATIENT)
Dept: FAMILY MEDICINE | Facility: CLINIC | Age: 78
End: 2018-04-13
Payer: MEDICARE

## 2018-04-13 ENCOUNTER — TELEPHONE (OUTPATIENT)
Dept: HEMATOLOGY/ONCOLOGY | Facility: CLINIC | Age: 78
End: 2018-04-13

## 2018-04-13 VITALS
HEIGHT: 64 IN | DIASTOLIC BLOOD PRESSURE: 80 MMHG | SYSTOLIC BLOOD PRESSURE: 138 MMHG | TEMPERATURE: 98 F | BODY MASS INDEX: 41.06 KG/M2 | HEART RATE: 58 BPM | WEIGHT: 240.5 LBS

## 2018-04-13 DIAGNOSIS — E03.4 HYPOTHYROIDISM DUE TO ACQUIRED ATROPHY OF THYROID: ICD-10-CM

## 2018-04-13 DIAGNOSIS — C18.2 MALIGNANT NEOPLASM OF ASCENDING COLON: Primary | ICD-10-CM

## 2018-04-13 DIAGNOSIS — M51.36 DDD (DEGENERATIVE DISC DISEASE), LUMBAR: Primary | ICD-10-CM

## 2018-04-13 PROCEDURE — 3079F DIAST BP 80-89 MM HG: CPT | Mod: CPTII,S$GLB,, | Performed by: FAMILY MEDICINE

## 2018-04-13 PROCEDURE — 99213 OFFICE O/P EST LOW 20 MIN: CPT | Mod: S$GLB,,, | Performed by: FAMILY MEDICINE

## 2018-04-13 PROCEDURE — 99999 PR PBB SHADOW E&M-EST. PATIENT-LVL III: CPT | Mod: PBBFAC,,, | Performed by: FAMILY MEDICINE

## 2018-04-13 PROCEDURE — 99499 UNLISTED E&M SERVICE: CPT | Mod: S$GLB,,, | Performed by: FAMILY MEDICINE

## 2018-04-13 PROCEDURE — 3075F SYST BP GE 130 - 139MM HG: CPT | Mod: CPTII,S$GLB,, | Performed by: FAMILY MEDICINE

## 2018-04-13 RX ORDER — NABUMETONE 500 MG/1
500 TABLET, FILM COATED ORAL 2 TIMES DAILY PRN
Qty: 60 TABLET | Refills: 5 | Status: SHIPPED | OUTPATIENT
Start: 2018-04-13 | End: 2018-11-16

## 2018-04-13 NOTE — TELEPHONE ENCOUNTER
----- Message from Pebbles Stephens sent at 4/13/2018  1:34 PM CDT -----  Contact: Patient  Type:  Patient Returning Call    Who Called:  Danna, patient   Who Left Message for Patient:  Ellen  Does the patient know what this is regarding?: Scheduling an appointment  Best Call Back Number:  [phone]

## 2018-04-13 NOTE — PROGRESS NOTES
Subjective:       Patient ID: Danna Sorensen is a 78 y.o. female.    Chief Complaint: Follow-up    Patient Active Problem List   Diagnosis    Hypothyroid    Nuclear sclerosis    Hyperopia with astigmatism and presbyopia    DDD (degenerative disc disease), lumbar    Morbid obesity with BMI of 40.0-44.9, adult    Calcified granuloma of lung    History of colon cancer     HPI  Review of Systems   Constitutional: Negative for fatigue and unexpected weight change.   Respiratory: Negative for chest tightness and shortness of breath.    Cardiovascular: Negative for chest pain, palpitations and leg swelling.   Gastrointestinal: Negative for abdominal pain.   Musculoskeletal: Negative for arthralgias.   Neurological: Negative for dizziness, syncope, light-headedness and headaches.       Objective:      Physical Exam   Constitutional: She is oriented to person, place, and time. She appears well-developed and well-nourished.   Cardiovascular: Normal rate, regular rhythm and normal heart sounds.    Pulmonary/Chest: Effort normal and breath sounds normal.   Musculoskeletal: She exhibits no edema.   Neurological: She is alert and oriented to person, place, and time.   Skin: Skin is warm and dry.   Psychiatric: She has a normal mood and affect.   Nursing note and vitals reviewed.      Assessment:       1. DDD (degenerative disc disease), lumbar    2. Hypothyroidism due to acquired atrophy of thyroid        Plan:       1. DDD (degenerative disc disease), lumbar  Refer  - Ambulatory referral to Physical Medicine Rehab  - nabumetone (RELAFEN) 500 MG tablet; Take 1 tablet (500 mg total) by mouth 2 (two) times daily as needed for Pain.  Dispense: 60 tablet; Refill: 5    2. Hypothyroidism due to acquired atrophy of thyroid  Stable condition.  Continue current medications.  Will adjust based on lab findings or if condition changes.    - CBC auto differential; Future  - Comprehensive metabolic panel; Future  - TSH; Future  - T4,  free; Future      Reeval 6 months or sooner prn

## 2018-04-13 NOTE — TELEPHONE ENCOUNTER
----- Message from Leonora Chen sent at 4/13/2018  8:41 AM CDT -----  Please call pt @ [phone] to schedule her annual cancer checkup  Thanks !

## 2018-04-19 DIAGNOSIS — I10 ESSENTIAL HYPERTENSION: ICD-10-CM

## 2018-04-19 RX ORDER — LISINOPRIL 20 MG/1
TABLET ORAL
Qty: 90 TABLET | Refills: 3 | Status: SHIPPED | OUTPATIENT
Start: 2018-04-19 | End: 2018-10-18 | Stop reason: SDUPTHER

## 2018-05-07 ENCOUNTER — OFFICE VISIT (OUTPATIENT)
Dept: SPINE | Facility: CLINIC | Age: 78
End: 2018-05-07
Payer: MEDICARE

## 2018-05-07 ENCOUNTER — TELEPHONE (OUTPATIENT)
Dept: PAIN MEDICINE | Facility: CLINIC | Age: 78
End: 2018-05-07

## 2018-05-07 VITALS
DIASTOLIC BLOOD PRESSURE: 83 MMHG | HEART RATE: 58 BPM | BODY MASS INDEX: 41.05 KG/M2 | WEIGHT: 240.44 LBS | SYSTOLIC BLOOD PRESSURE: 164 MMHG | HEIGHT: 64 IN

## 2018-05-07 DIAGNOSIS — M54.50 CHRONIC RIGHT-SIDED LOW BACK PAIN WITHOUT SCIATICA: Primary | ICD-10-CM

## 2018-05-07 DIAGNOSIS — M54.16 LUMBAR RADICULOPATHY: Primary | ICD-10-CM

## 2018-05-07 DIAGNOSIS — G89.29 CHRONIC RIGHT-SIDED LOW BACK PAIN WITHOUT SCIATICA: Primary | ICD-10-CM

## 2018-05-07 PROCEDURE — 99204 OFFICE O/P NEW MOD 45 MIN: CPT | Mod: ,,, | Performed by: PHYSICAL MEDICINE & REHABILITATION

## 2018-05-07 PROCEDURE — 3079F DIAST BP 80-89 MM HG: CPT | Mod: ,,, | Performed by: PHYSICAL MEDICINE & REHABILITATION

## 2018-05-07 PROCEDURE — 3077F SYST BP >= 140 MM HG: CPT | Mod: ,,, | Performed by: PHYSICAL MEDICINE & REHABILITATION

## 2018-05-07 NOTE — PROGRESS NOTES
Subjective:       Patient ID: Danna Sorensen is a 78 y.o. female.    Chief Complaint: No chief complaint on file.    This is a 78-year-old woman who sees Dr. Kim for her primary care.  She does have a history of stage III colon cancer but has shown no evidence of disease for approximately 11 years.  She has a long history of low back pain and presents to me with a primary complaint of right-sided low back pain at the lumbosacral junction that radiates to the right buttocks.  No distal radicular symptoms.  No weakness no bowel or bladder dysfunction fever chills or sweats.  She has had injections with Dr. Gaitan including right L3-4 and L4-5 transforaminal KHARI ×2 and interlaminar KHARI at L5-S1 once.  According to the patient those injections did not help her significantly with her primary complaint of right-sided low back pain.  At this time she denies having any radicular leg pain.  Last visit with Dr. Gaitan was in July 2017 at which time gabapentin was added and the patient was told titrate it up.  When she got to about 3 pills a day she started feeling strange and discontinued the gabapentin.  She says that it was not helping at that dose anyway.  She has not had physical therapy.  She experiences pain only when lying down at night.  It is interfering with her sleep.  During the day she is fine.  At its worst the pain is 9 out of 10 in intensity.  When she is up and about during the day it is 0 out of 10.  Extension causes more discomfort than flexion.      PHQ9 is 9      Review of Systems   Constitutional: Negative for chills, diaphoresis, fatigue, fever and unexpected weight change.   HENT: Negative for trouble swallowing.    Eyes: Negative for visual disturbance.   Respiratory: Negative for shortness of breath.    Cardiovascular: Negative for chest pain.   Gastrointestinal: Negative for abdominal pain, constipation, nausea and vomiting.   Genitourinary: Negative for difficulty urinating.   Musculoskeletal: Negative  for arthralgias, back pain, gait problem, joint swelling, myalgias, neck pain and neck stiffness.   Neurological: Negative for dizziness, speech difficulty, weakness, light-headedness, numbness and headaches.       Objective:      Physical Exam   Constitutional: She is oriented to person, place, and time. She appears well-developed and well-nourished.   Neurological: She is alert and oriented to person, place, and time.   She is awake and in no acute distress  No point tenderness or palpable masses about the lumbar spine  Tendon reflexes trace at both knees and both ankles  Strength is normal in both lower extremities  Forward flexion to 90° causes discomfort on the right at the lumbosacral junction  Extension to about 10° reproduces her pain on the right lumbosacral junction.  Again extension causes more discomfort than flexion  Kole testing on the right reproduces her low back pain  MRI of the lumbar spine from 2016 shows advanced multilevel degenerative disc disease with multilevel central canal stenosis       Assessment:       1. Chronic right-sided low back pain without sciatica        Plan:         she has a history of chronic primarily right-sided low back pain.  At this particular time she has no radicular complaints.  Despite the rather severe central canal stenosis she has no symptoms of claudication.  I believe that her current discomfort is most likely related to facet arthropathy.  I would like to try facet injections on the right at L4-5 and L5-S1.  In addition to that she needs to be in a physical therapy program and continue home excise program afterwards.  I did tell her that she has advanced degenerative changes of the lumbar spine and it is possible that we will not be able to improve her pain with injections or therapy.  She is not interested in surgery at this time.  She can follow-up with me after the injection

## 2018-05-07 NOTE — TELEPHONE ENCOUNTER
----- Message from Chance Joyce MD sent at 5/7/2018  9:29 AM CDT -----  Please schedule lumbar facet injections on the right at L4-5 and L5-S1

## 2018-05-22 ENCOUNTER — HOSPITAL ENCOUNTER (OUTPATIENT)
Facility: AMBULARY SURGERY CENTER | Age: 78
Discharge: HOME | End: 2018-05-22
Attending: ANESTHESIOLOGY | Admitting: ANESTHESIOLOGY
Payer: MEDICARE

## 2018-05-22 ENCOUNTER — SURGERY (OUTPATIENT)
Age: 78
End: 2018-05-22

## 2018-05-22 DIAGNOSIS — M54.16 LUMBAR RADICULITIS: ICD-10-CM

## 2018-05-22 DIAGNOSIS — M51.36 DDD (DEGENERATIVE DISC DISEASE), LUMBAR: Primary | ICD-10-CM

## 2018-05-22 PROCEDURE — 64484 NJX AA&/STRD TFRM EPI L/S EA: CPT | Mod: RT,,, | Performed by: ANESTHESIOLOGY

## 2018-05-22 PROCEDURE — 99152 MOD SED SAME PHYS/QHP 5/>YRS: CPT | Mod: ,,, | Performed by: ANESTHESIOLOGY

## 2018-05-22 PROCEDURE — 64484 NJX AA&/STRD TFRM EPI L/S EA: CPT | Performed by: ANESTHESIOLOGY

## 2018-05-22 PROCEDURE — 64483 NJX AA&/STRD TFRM EPI L/S 1: CPT | Performed by: ANESTHESIOLOGY

## 2018-05-22 PROCEDURE — 64483 NJX AA&/STRD TFRM EPI L/S 1: CPT | Mod: RT,,, | Performed by: ANESTHESIOLOGY

## 2018-05-22 RX ORDER — FENTANYL CITRATE 50 UG/ML
INJECTION, SOLUTION INTRAMUSCULAR; INTRAVENOUS
Status: DISCONTINUED | OUTPATIENT
Start: 2018-05-22 | End: 2018-05-22 | Stop reason: HOSPADM

## 2018-05-22 RX ORDER — MIDAZOLAM HYDROCHLORIDE 1 MG/ML
INJECTION INTRAMUSCULAR; INTRAVENOUS
Status: DISCONTINUED | OUTPATIENT
Start: 2018-05-22 | End: 2018-05-22 | Stop reason: HOSPADM

## 2018-05-22 RX ORDER — BUPIVACAINE HYDROCHLORIDE 2.5 MG/ML
INJECTION, SOLUTION EPIDURAL; INFILTRATION; INTRACAUDAL
Status: DISCONTINUED
Start: 2018-05-22 | End: 2018-05-22 | Stop reason: HOSPADM

## 2018-05-22 RX ORDER — LIDOCAINE HYDROCHLORIDE 10 MG/ML
INJECTION, SOLUTION EPIDURAL; INFILTRATION; INTRACAUDAL; PERINEURAL
Status: DISCONTINUED
Start: 2018-05-22 | End: 2018-05-22 | Stop reason: HOSPADM

## 2018-05-22 RX ORDER — LIDOCAINE HYDROCHLORIDE 10 MG/ML
INJECTION, SOLUTION EPIDURAL; INFILTRATION; INTRACAUDAL; PERINEURAL
Status: DISCONTINUED | OUTPATIENT
Start: 2018-05-22 | End: 2018-05-22 | Stop reason: HOSPADM

## 2018-05-22 RX ORDER — DEXAMETHASONE SODIUM PHOSPHATE 10 MG/ML
INJECTION INTRAMUSCULAR; INTRAVENOUS
Status: DISCONTINUED | OUTPATIENT
Start: 2018-05-22 | End: 2018-05-22 | Stop reason: HOSPADM

## 2018-05-22 RX ORDER — BUPIVACAINE HYDROCHLORIDE 2.5 MG/ML
INJECTION, SOLUTION EPIDURAL; INFILTRATION; INTRACAUDAL
Status: DISCONTINUED | OUTPATIENT
Start: 2018-05-22 | End: 2018-05-22 | Stop reason: HOSPADM

## 2018-05-22 RX ORDER — SODIUM CHLORIDE, SODIUM LACTATE, POTASSIUM CHLORIDE, CALCIUM CHLORIDE 600; 310; 30; 20 MG/100ML; MG/100ML; MG/100ML; MG/100ML
INJECTION, SOLUTION INTRAVENOUS ONCE AS NEEDED
Status: COMPLETED | OUTPATIENT
Start: 2018-05-22 | End: 2018-05-22

## 2018-05-22 RX ADMIN — LIDOCAINE HYDROCHLORIDE 10 ML: 10 INJECTION, SOLUTION EPIDURAL; INFILTRATION; INTRACAUDAL; PERINEURAL at 12:05

## 2018-05-22 RX ADMIN — FENTANYL CITRATE 50 MCG: 50 INJECTION, SOLUTION INTRAMUSCULAR; INTRAVENOUS at 12:05

## 2018-05-22 RX ADMIN — SODIUM CHLORIDE, SODIUM LACTATE, POTASSIUM CHLORIDE, CALCIUM CHLORIDE: 600; 310; 30; 20 INJECTION, SOLUTION INTRAVENOUS at 11:05

## 2018-05-22 RX ADMIN — MIDAZOLAM HYDROCHLORIDE 2 MG: 1 INJECTION INTRAMUSCULAR; INTRAVENOUS at 12:05

## 2018-05-22 RX ADMIN — BUPIVACAINE HYDROCHLORIDE 3 ML: 2.5 INJECTION, SOLUTION EPIDURAL; INFILTRATION; INTRACAUDAL at 12:05

## 2018-05-22 RX ADMIN — DEXAMETHASONE SODIUM PHOSPHATE 10 MG: 10 INJECTION INTRAMUSCULAR; INTRAVENOUS at 12:05

## 2018-05-22 NOTE — PLAN OF CARE
Patient sitting in chair and states she is ready to go home. Denies pain,nausea or any weakness. Spouse chairside and states he is ready to take patient home;he states he is driving the patient home.

## 2018-05-22 NOTE — OP NOTE
PROCEDURE DATE: 5/22/2018    PROCEDURE: Right L4-5 and L5-S1 transforaminal epidural steroid injection under fluoroscopy    DIAGNOSIS: Lumbar disc displacement without myelopathy  Post op diagnosis: Same    PHYSICIAN: Parish Gaitan MD    MEDICATIONS INJECTED:  Dexamethasone 5mg (0.5ml) and 1.5ml 0.25% bupivicaine at each nerve root.     LOCAL ANESTHETIC INJECTED:  Lidocaine 1%. 2 ml per site.    SEDATION MEDICATIONS: RN IV sedation    ESTIMATED BLOOD LOSS:  None    COMPLICATIONS:  None    TECHNIQUE:   A time-out was taken to identify patient and procedure side prior to starting the procedure. The patient was placed in a prone position, prepped and draped in the usual sterile fashion using ChloraPrep and sterile towels.  The area to be injected was determined under fluoroscopic guidance in AP and oblique view.  Local anesthetic was given by raising a wheal and going down to the hub of a 25-gauge 1.5 inch needle.  In oblique view, a 5 inch 22-gauge bent-tip spinal needle was introduced towards 6 oclock position of the pedicle of each above named nerve root level.  The needle was walked medially then hinged into the neural foramen and position was confirmed in AP and lateral views.  1ml contrast dye was injected to confirm appropriate placement and that there was no vascular uptake.  After negative aspiration for blood or CSF, the medication was then injected. This was performed at the right L4-5 and L5-S1 level(s). The patient tolerated the procedure well.    The patient was monitored after the procedure.  Patient was given post procedure and discharge instructions to follow at home. The patient was discharged in a stable condition.

## 2018-05-22 NOTE — DISCHARGE SUMMARY
[facility]  Discharge Note  Short Stay    Admit Date: 5/22/2018    Discharge Date and Time: 5/22/2018    Attending Physician: Parish Gaitan MD     Discharge Provider: Parish Gaitan    Diagnoses:  Active Hospital Problems    Diagnosis  POA    *Lumbar radiculitis [M54.16]  Yes      Resolved Hospital Problems    Diagnosis Date Resolved POA   No resolved problems to display.       Hospital Course: Lumbar KHARI  Discharged Condition: Good    Final Diagnoses:   Active Hospital Problems    Diagnosis  POA    *Lumbar radiculitis [M54.16]  Yes      Resolved Hospital Problems    Diagnosis Date Resolved POA   No resolved problems to display.       Disposition: Home or Self Care    Follow up/Patient Instructions:    Medications:  Reconciled Home Medications:      Medication List      CONTINUE taking these medications    cyclobenzaprine 5 MG tablet  Commonly known as:  FLEXERIL  Take 1 tablet (5 mg total) by mouth nightly.     furosemide 20 MG tablet  Commonly known as:  LASIX  TAKE 1 TABLET(20 MG) BY MOUTH DAILY AS NEEDED     levothyroxine 75 MCG tablet  Commonly known as:  SYNTHROID  TAKE 1 TABLET BY MOUTH EVERY DAY     lisinopril 20 MG tablet  Commonly known as:  PRINIVIL,ZESTRIL  TAKE 1 TABLET(20 MG) BY MOUTH EVERY DAY     nabumetone 500 MG tablet  Commonly known as:  RELAFEN  Take 1 tablet (500 mg total) by mouth 2 (two) times daily as needed for Pain.     nystatin cream  Commonly known as:  MYCOSTATIN  Apply topically 2 (two) times daily as needed. GRETCHEN EXT AA BID     triamcinolone acetonide 0.1% 0.1 % cream  Commonly known as:  KENALOG  Apply topically 2 (two) times daily.     TYLENOL ARTHRITIS PAIN 650 MG Tbsr  Generic drug:  acetaminophen  Take 650 mg by mouth every 8 (eight) hours.            Discharge Procedure Orders  Call MD for:  temperature >100.4     Call MD for:  persistent nausea and vomiting or diarrhea     Call MD for:  severe uncontrolled pain     Call MD for:  redness, tenderness, or signs of infection  (pain, swelling, redness, odor or green/yellow discharge around incision site)     Call MD for:  difficulty breathing or increased cough     Call MD for:  severe persistent headache          Follow up with MD in 2-3 weeks    Discharge Procedure Orders (must include Diet, Follow-up, Activity):    Discharge Procedure Orders (must include Diet, Follow-up, Activity)  Call MD for:  temperature >100.4     Call MD for:  persistent nausea and vomiting or diarrhea     Call MD for:  severe uncontrolled pain     Call MD for:  redness, tenderness, or signs of infection (pain, swelling, redness, odor or green/yellow discharge around incision site)     Call MD for:  difficulty breathing or increased cough     Call MD for:  severe persistent headache

## 2018-05-22 NOTE — DISCHARGE INSTRUCTIONS
Recovery After Procedural Sedation (Adult)  You have been given medicine by vein to make you sleep during your surgery. This may have included both a pain medicine and sleeping medicine. Most of the effects have worn off. But you may still have some drowsiness for the next 6 to 8 hours.  Home care  Follow these guidelines when you get home:  · For the next 8 hours, you should be watched by a responsible adult. This person should make sure your condition is not getting worse.  · Don't drink any alcohol for the next 24 hours.  · Don't drive, operate dangerous machinery, or make important business or personal decisions during the next 24 hours.  Note: Your healthcare provider may tell you not to take any medicine by mouth for pain or sleep in the next 4 hours. These medicines may react with the medicines you were given in the hospital. This could cause a much stronger response than usual.  Follow-up care  Follow up with your healthcare provider if you are not alert and back to your usual level of activity within 12 hours.  When to seek medical advice  Call your healthcare provider right away if any of these occur:  · Drowsiness gets worse  · Weakness or dizziness gets worse  · Repeated vomiting  · You can't be awakened   Date Last Reviewed: 10/18/2016  © 5738-9474 [employer]. [address] [place] [address], [place] [address]. All rights reserved. This information is not intended as a substitute for professional medical care. Always follow your healthcare professional's instructions.      Pain injection instructions:     Steroids take about a 2 weeks to relieve pain.  Initially you may get pain relief from the local anesthetic but this may wear off before the steroid works.    No driving for 24 hrs.   Activity as tolerated- gradually increase activities.  Dont lift over 10 lbs for 24 hrs   No heat at injection sites x 2 days. No heating pads, hot tubs, saunas, or swimming in any body of water or pool for 2  days.  Use ice pack for mild swelling and for comfort , apply for 20 minutes, remove for 20 minute intervals. No direct contact of ice itself  to skin.  May shower today. No tub baths for two days.      Resume Aspirin, Plavix, or Coumadin the day after the procedure unless otherwise instructed.   If diabetic,monitor your glucose carefully as steroids can increase your glucose level    Seek immediate medical help for:   Severe increase in your usual pain or appearance of new pain.  Prolonged (mor than 8 hours) or increasing weakness or numbness in the legs or arms.    - Numbing medicine was injected that affects nerves that carry information from       muscles to the  brain and the brain to the muscles.  This numbness can last 6-8 hrs so be very careful and get assistance when standing or walking.    Fever above 101 ,Drainage,redness,active bleeding, or increased swelling at the injection site.  Headache, shortness of breath, chest pain, or breathing problems.

## 2018-05-23 VITALS
HEIGHT: 64 IN | HEART RATE: 60 BPM | WEIGHT: 240 LBS | RESPIRATION RATE: 18 BRPM | TEMPERATURE: 98 F | BODY MASS INDEX: 40.97 KG/M2 | DIASTOLIC BLOOD PRESSURE: 65 MMHG | SYSTOLIC BLOOD PRESSURE: 139 MMHG | OXYGEN SATURATION: 98 %

## 2018-06-12 ENCOUNTER — OFFICE VISIT (OUTPATIENT)
Dept: SPINE | Facility: CLINIC | Age: 78
End: 2018-06-12
Payer: MEDICARE

## 2018-06-12 VITALS
DIASTOLIC BLOOD PRESSURE: 79 MMHG | BODY MASS INDEX: 40.98 KG/M2 | WEIGHT: 240.06 LBS | SYSTOLIC BLOOD PRESSURE: 141 MMHG | HEIGHT: 64 IN | HEART RATE: 61 BPM

## 2018-06-12 DIAGNOSIS — G89.29 CHRONIC RIGHT-SIDED LOW BACK PAIN WITHOUT SCIATICA: Primary | ICD-10-CM

## 2018-06-12 DIAGNOSIS — M54.50 CHRONIC RIGHT-SIDED LOW BACK PAIN WITHOUT SCIATICA: Primary | ICD-10-CM

## 2018-06-12 PROCEDURE — 99213 OFFICE O/P EST LOW 20 MIN: CPT | Mod: ,,, | Performed by: PHYSICAL MEDICINE & REHABILITATION

## 2018-06-12 PROCEDURE — 3078F DIAST BP <80 MM HG: CPT | Mod: ,,, | Performed by: PHYSICAL MEDICINE & REHABILITATION

## 2018-06-12 PROCEDURE — 3077F SYST BP >= 140 MM HG: CPT | Mod: ,,, | Performed by: PHYSICAL MEDICINE & REHABILITATION

## 2018-06-12 RX ORDER — ACETAMINOPHEN AND CODEINE PHOSPHATE 300; 30 MG/1; MG/1
1 TABLET ORAL NIGHTLY PRN
Qty: 30 TABLET | Refills: 2 | Status: SHIPPED | OUTPATIENT
Start: 2018-06-12 | End: 2018-06-22

## 2018-06-12 NOTE — PROGRESS NOTES
Subjective:       Patient ID: Danna Sorensen is a 78 y.o. female.    Chief Complaint: Follow-up (KHARI with Dr. Gaitan. No help with the last 4 injections)    She returns for follow-up status post transforaminal epidural steroid injection on the right at L4-5 and L5-S1.  She continues to have discomfort on the right at the lumbosacral junction.  She has no new or progressive problems.  Still having difficulty sleeping at night due to pain.  Currently taking over-the-counter Tylenol and Flexeril.  Physical therapy was not started      Review of Systems   All other systems reviewed and are negative.      Objective:      Physical Exam   Constitutional: She is oriented to person, place, and time. She appears well-developed and well-nourished.   Neurological: She is alert and oriented to person, place, and time.   she is awake and in no acute distress  Mild tenderness to palpation of the right lumbosacral junction.  No palpable masses  Flexion and extension at this time are painless         Assessment:       1. Chronic right-sided low back pain without sciatica        Plan:         start physical therapy as ordered.  Add Tylenol number 3 at night.  Follow-up with me in about one month

## 2018-06-18 ENCOUNTER — OFFICE VISIT (OUTPATIENT)
Dept: PAIN MEDICINE | Facility: CLINIC | Age: 78
End: 2018-06-18
Payer: MEDICARE

## 2018-06-18 VITALS
HEART RATE: 53 BPM | WEIGHT: 240 LBS | BODY MASS INDEX: 40.97 KG/M2 | SYSTOLIC BLOOD PRESSURE: 143 MMHG | DIASTOLIC BLOOD PRESSURE: 72 MMHG | HEIGHT: 64 IN

## 2018-06-18 DIAGNOSIS — M47.896 OTHER SPONDYLOSIS, LUMBAR REGION: Primary | ICD-10-CM

## 2018-06-18 DIAGNOSIS — M51.36 DDD (DEGENERATIVE DISC DISEASE), LUMBAR: ICD-10-CM

## 2018-06-18 PROCEDURE — 99499 UNLISTED E&M SERVICE: CPT | Mod: S$GLB,,, | Performed by: ANESTHESIOLOGY

## 2018-06-18 PROCEDURE — 99214 OFFICE O/P EST MOD 30 MIN: CPT | Mod: S$GLB,,, | Performed by: ANESTHESIOLOGY

## 2018-06-18 PROCEDURE — 99999 PR PBB SHADOW E&M-EST. PATIENT-LVL III: CPT | Mod: PBBFAC,,, | Performed by: ANESTHESIOLOGY

## 2018-06-18 PROCEDURE — 3078F DIAST BP <80 MM HG: CPT | Mod: CPTII,S$GLB,, | Performed by: ANESTHESIOLOGY

## 2018-06-18 PROCEDURE — 3077F SYST BP >= 140 MM HG: CPT | Mod: CPTII,S$GLB,, | Performed by: ANESTHESIOLOGY

## 2018-06-18 NOTE — PROGRESS NOTES
Referring Physician: No ref. provider found    PCP: Claudia Kim MD      CC:right sided lower back pain    Interval history: Ms. Sorensen is a 78 y.o. female with chronic right sided low back pain who returns to our clinic.  She recently underwent repeat lumbar KHARI's and states having minimal relief of her right-sided lower back pain.  She was actually referred to us by Dr. Joyce for consideration of facet arthropathy.  However, prior to procedure, patient consented to repeat lumbar KHARI.  Pain is a constant aching, deep pain in her right sided lower back.  Pain radiates to her right buttock and hip.  Denies any weakness.  No bowel bladder changes.  She takes Flexeril and most recently Tylenol 3 with mild to moderate benefit.      Prior HPI:   42-year-old female referred to us for lower back and leg pain.  Pain has gradually worsened over the past 6 months.  No recent traumatic incident.  She has intermittent aching, BURNING pain in her lower back.  Pain radiates down her right buttock into her right lateral knee.  Pain worsens with standing, laying, lifting and getting up.  Pain improves with rest.  She has tried physical therapy with minimal benefit.  She has had hip injection with mild benefits.  X-rays performed in July showed lumbar DDD.  She has not had any lumbar MRIs.  She denies any weakness.  No bowel bladder changes.  She rates her pain 6/10 today.    Interventional history:   S/p right L3-4 and L4-5 TFESI on 1/2017 and 5/2017 with moderate relief of her right lateral knee pain  - s/p L5-S1 KHARI on 6/2017 with mild to moderate relief of her lower back pain  - s/p right L4-5 and L5-S1 TEFESI on 6/2018 with minimal relief of her right lower back pain    ROS:  CONSTITUTIONAL: No fevers, chills, night sweats, wt. loss, appetite changes  SKIN: no rashes or itching  ENT: No headaches, head trauma, vision changes, or eye pain  LYMPH NODES: None noticed   CV: No chest pain, palpitations.   RESP: No shortness of  breath, dyspnea on exertion, cough, wheezing, or hemoptysis  GI: No nausea, emesis, diarrhea, constipation, melena, hematochezia, pain.    : No dysuria, hematuria, urgency, or frequency   HEME: No easy bruising, bleeding problems  PSYCHIATRIC: No depression, anxiety, psychosis, hallucinations.  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    Strabismus     Trouble in sleeping     Wears dentures     Uppers     Past Surgical History:   Procedure Laterality Date    COLONOSCOPY N/A 10/18/2016    Procedure: COLONOSCOPY;  Surgeon: Rell Cordova MD;  Location: [facility];  Service: Endoscopy;  Laterality: N/A;    HAND TENDON SURGERY Left     HEMICOLECTOMY      right    SHOULDER ARTHROSCOPY Right 01/12/2017    Reverse     TONSILLECTOMY, ADENOIDECTOMY, BILATERAL MYRINGOTOMY AND TUBES      TOTAL KNEE ARTHROPLASTY Bilateral 08/1998    total replacements    TUMOR REMOVAL Left     left foot as a child     Family History   Problem Relation Age of Onset    Hypertension Mother     Kidney disease Mother     Cataracts Father     Cataracts Sister     Cancer Sister         breast    No Known Problems Brother     Cancer Sister         breast    Arthritis Sister     Glaucoma Neg Hx     Amblyopia Neg Hx     Blindness Neg Hx     Macular degeneration Neg Hx     Retinal detachment Neg Hx     Strabismus Neg Hx     Stroke Neg Hx     Thyroid disease Neg Hx      Social History     Social History    Marital  "status: Single     Spouse name: N/A    Number of children: N/A    Years of education: N/A     Social History Main Topics    Smoking status: Former Smoker     Packs/day: 0.50     Years: 1.00     Types: Cigarettes     Start date: 7/27/1960     Quit date: 1/1/1961    Smokeless tobacco: Never Used    Alcohol use No    Drug use: No    Sexual activity: No     Other Topics Concern    None     Social History Narrative    None         Medications/Allergies: See med card    Vitals:    06/18/18 0929   BP: (!) 143/72   Pulse: (!) 53   Weight: 108.9 kg (240 lb)   Height: 5' 4" (1.626 m)   PainSc:   6   PainLoc: Back         Physical exam:    GENERAL: A and O x3, the patient appears well groomed and is in no acute distress.  Skin: No rashes or obvious lesions  HEENT: normocephalic, atraumatic  CARDIOVASCULAR:  RRR  LUNGS: non labored breathing  ABDOMEN: soft, nontender   UPPER EXTREMITIES: Normal alignment, normal range of motion, no atrophy, no skin changes,  hair growth and nail growth normal and equal bilaterally. No swelling, no tenderness.    LOWER EXTREMITIES:  Normal alignment, normal range of motion, no atrophy, no skin changes,  hair growth and nail growth normal and equal bilaterally. No swelling, no tenderness.    LUMBAR SPINE  Lumbar spine: ROM is full with flexion extension and oblique extension with mild increased pain.    Gregory's test causes no increased pain on either side.    Supine straight leg raise is positive on right at 60 degrees   Internal and external rotation of the hip causes no increased pain on either side.  Myofascial exam: No tenderness to palpation across lumbar paraspinous muscles.      MENTAL STATUS: normal orientation, speech, language, and fund of knowledge for social situation.  Emotional state appropriate.    CRANIAL NERVES:  II:  PERRL bilaterally,   III,IV,VI: EOMI.    V:  Facial sensation equal bilaterally  VII:  Facial motor function normal.  VIII:  Hearing equal to finger rub " bilaterally  IX/X: Gag normal, palate symmetric  XI:  Shoulder shrug equal, head turn equal  XII:  Tongue midline without fasciculations      MOTOR: Tone and bulk: normal bilateral upper and lower Strength: normal   Delt Bi Tri WE WF     R 5 5 5 5 5 5   L 5 5 5 5 5 5     IP ADD ABD Quad TA Gas HAM  R 5 5 5 5 5 5 5  L 5 5 5 5 5 5 5    SENSATION: Light touch and pinprick intact bilaterally  REFLEXES: normal, symmetric, nonbrisk.  Toes down, no clonus. No hoffmans.  GAIT: normal rise, base, steps, and arm swing.        Imaging:  Xray L-spine 7/2016   A 5 views lumbar spine demonstrate a transitional lumbosacral zone.  There is a mild levoscoliotic curvature.  Is anterior osteophyte motion throughout the lumbar region with loss of disc space height noted at L3/L4-L4/L5.  Prominent degenerative facet changes are present.     MRI L-spine 12/2016  Multilevel degenerative lumbar spondylosis resulting in severe spinal canal narrowing at the L2-3 and L3-4 levels.  No significant neuroforaminal narrowing.  Please see level by level comments above.    Assessment:  Ms. Sorensen is a 78 y.o. female with chronic low back and right leg pain  1. Other spondylosis, lumbar region    2. DDD (degenerative disc disease), lumbar        Plan:  1. I have stressed the importance of physical activity and exercise to improve overall health. Home exercises mailed  2. I believe her low back pain maybe due to facet arthropathy and have recommended lumbar medial branch blocks as a diagnostic procedure.  If successful, would proceed with radiofrequency ablation.  3.  Continue medications as prescribed  4.  I have given her a prescription for compounded cream that contains local anesthetics, anti-neuropathic medication, muscle relaxants and anti-inflammatories to see if this helps with her myofascial pain.  5. Follow up after procedure

## 2018-07-09 ENCOUNTER — PES CALL (OUTPATIENT)
Dept: ADMINISTRATIVE | Facility: CLINIC | Age: 78
End: 2018-07-09

## 2018-07-10 ENCOUNTER — HOSPITAL ENCOUNTER (OUTPATIENT)
Facility: AMBULARY SURGERY CENTER | Age: 78
Discharge: HOME | End: 2018-07-10
Attending: ANESTHESIOLOGY | Admitting: ANESTHESIOLOGY
Payer: MEDICARE

## 2018-07-10 ENCOUNTER — SURGERY (OUTPATIENT)
Age: 78
End: 2018-07-10

## 2018-07-10 DIAGNOSIS — M47.896 OTHER SPONDYLOSIS, LUMBAR REGION: Primary | ICD-10-CM

## 2018-07-10 PROCEDURE — 64495 INJ PARAVERT F JNT L/S 3 LEV: CPT | Performed by: ANESTHESIOLOGY

## 2018-07-10 PROCEDURE — 64494 INJ PARAVERT F JNT L/S 2 LEV: CPT | Mod: RT,,, | Performed by: ANESTHESIOLOGY

## 2018-07-10 PROCEDURE — 64493 INJ PARAVERT F JNT L/S 1 LEV: CPT | Mod: RT,,, | Performed by: ANESTHESIOLOGY

## 2018-07-10 PROCEDURE — 64494 INJ PARAVERT F JNT L/S 2 LEV: CPT | Performed by: ANESTHESIOLOGY

## 2018-07-10 PROCEDURE — 64493 INJ PARAVERT F JNT L/S 1 LEV: CPT | Performed by: ANESTHESIOLOGY

## 2018-07-10 PROCEDURE — 99152 MOD SED SAME PHYS/QHP 5/>YRS: CPT | Mod: ,,, | Performed by: ANESTHESIOLOGY

## 2018-07-10 RX ORDER — BUPIVACAINE HYDROCHLORIDE 5 MG/ML
INJECTION, SOLUTION EPIDURAL; INTRACAUDAL
Status: DISCONTINUED | OUTPATIENT
Start: 2018-07-10 | End: 2018-07-10 | Stop reason: HOSPADM

## 2018-07-10 RX ORDER — SODIUM CHLORIDE, SODIUM LACTATE, POTASSIUM CHLORIDE, CALCIUM CHLORIDE 600; 310; 30; 20 MG/100ML; MG/100ML; MG/100ML; MG/100ML
INJECTION, SOLUTION INTRAVENOUS ONCE AS NEEDED
Status: DISCONTINUED | OUTPATIENT
Start: 2018-07-10 | End: 2018-07-10 | Stop reason: HOSPADM

## 2018-07-10 RX ADMIN — BUPIVACAINE HYDROCHLORIDE 5 ML: 5 INJECTION, SOLUTION EPIDURAL; INTRACAUDAL at 12:07

## 2018-07-10 NOTE — H&P (VIEW-ONLY)
Referring Physician: No ref. provider found    PCP: Claudia Kim MD      CC:right sided lower back pain    Interval history: Ms. Sorensen is a 78 y.o. female with chronic right sided low back pain who returns to our clinic.  She recently underwent repeat lumbar KHARI's and states having minimal relief of her right-sided lower back pain.  She was actually referred to us by Dr. Joyce for consideration of facet arthropathy.  However, prior to procedure, patient consented to repeat lumbar KHARI.  Pain is a constant aching, deep pain in her right sided lower back.  Pain radiates to her right buttock and hip.  Denies any weakness.  No bowel bladder changes.  She takes Flexeril and most recently Tylenol 3 with mild to moderate benefit.      Prior HPI:   42-year-old female referred to us for lower back and leg pain.  Pain has gradually worsened over the past 6 months.  No recent traumatic incident.  She has intermittent aching, BURNING pain in her lower back.  Pain radiates down her right buttock into her right lateral knee.  Pain worsens with standing, laying, lifting and getting up.  Pain improves with rest.  She has tried physical therapy with minimal benefit.  She has had hip injection with mild benefits.  X-rays performed in July showed lumbar DDD.  She has not had any lumbar MRIs.  She denies any weakness.  No bowel bladder changes.  She rates her pain 6/10 today.    Interventional history:   S/p right L3-4 and L4-5 TFESI on 1/2017 and 5/2017 with moderate relief of her right lateral knee pain  - s/p L5-S1 KHARI on 6/2017 with mild to moderate relief of her lower back pain  - s/p right L4-5 and L5-S1 TEFESI on 6/2018 with minimal relief of her right lower back pain    ROS:  CONSTITUTIONAL: No fevers, chills, night sweats, wt. loss, appetite changes  SKIN: no rashes or itching  ENT: No headaches, head trauma, vision changes, or eye pain  LYMPH NODES: None noticed   CV: No chest pain, palpitations.   RESP: No shortness of  breath, dyspnea on exertion, cough, wheezing, or hemoptysis  GI: No nausea, emesis, diarrhea, constipation, melena, hematochezia, pain.    : No dysuria, hematuria, urgency, or frequency   HEME: No easy bruising, bleeding problems  PSYCHIATRIC: No depression, anxiety, psychosis, hallucinations.  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    Strabismus     Trouble in sleeping     Wears dentures     Uppers     Past Surgical History:   Procedure Laterality Date    COLONOSCOPY N/A 10/18/2016    Procedure: COLONOSCOPY;  Surgeon: Rell Cordova MD;  [facility];  Service: Endoscopy;  Laterality: N/A;    HAND TENDON SURGERY Left     HEMICOLECTOMY      right    SHOULDER ARTHROSCOPY Right 01/12/2017    Reverse     TONSILLECTOMY, ADENOIDECTOMY, BILATERAL MYRINGOTOMY AND TUBES      TOTAL KNEE ARTHROPLASTY Bilateral 08/1998    total replacements    TUMOR REMOVAL Left     left foot as a child     Family History   Problem Relation Age of Onset    Hypertension Mother     Kidney disease Mother     Cataracts Father     Cataracts Sister     Cancer Sister         breast    No Known Problems Brother     Cancer Sister         breast    Arthritis Sister     Glaucoma Neg Hx     Amblyopia Neg Hx     Blindness Neg Hx     Macular degeneration Neg Hx     Retinal detachment Neg Hx     Strabismus Neg Hx     Stroke Neg Hx     Thyroid disease Neg Hx      Social History     Social History    Marital  "status: Single     Spouse name: N/A    Number of children: N/A    Years of education: N/A     Social History Main Topics    Smoking status: Former Smoker     Packs/day: 0.50     Years: 1.00     Types: Cigarettes     Start date: 7/27/1960     Quit date: 1/1/1961    Smokeless tobacco: Never Used    Alcohol use No    Drug use: No    Sexual activity: No     Other Topics Concern    None     Social History Narrative    None         Medications/Allergies: See med card    Vitals:    06/18/18 0929   BP: (!) 143/72   Pulse: (!) 53   Weight: 108.9 kg (240 lb)   Height: 5' 4" (1.626 m)   PainSc:   6   PainLoc: Back         Physical exam:    GENERAL: A and O x3, the patient appears well groomed and is in no acute distress.  Skin: No rashes or obvious lesions  HEENT: normocephalic, atraumatic  CARDIOVASCULAR:  RRR  LUNGS: non labored breathing  ABDOMEN: soft, nontender   UPPER EXTREMITIES: Normal alignment, normal range of motion, no atrophy, no skin changes,  hair growth and nail growth normal and equal bilaterally. No swelling, no tenderness.    LOWER EXTREMITIES:  Normal alignment, normal range of motion, no atrophy, no skin changes,  hair growth and nail growth normal and equal bilaterally. No swelling, no tenderness.    LUMBAR SPINE  Lumbar spine: ROM is full with flexion extension and oblique extension with mild increased pain.    Gregory's test causes no increased pain on either side.    Supine straight leg raise is positive on right at 60 degrees   Internal and external rotation of the hip causes no increased pain on either side.  Myofascial exam: No tenderness to palpation across lumbar paraspinous muscles.      MENTAL STATUS: normal orientation, speech, language, and fund of knowledge for social situation.  Emotional state appropriate.    CRANIAL NERVES:  II:  PERRL bilaterally,   III,IV,VI: EOMI.    V:  Facial sensation equal bilaterally  VII:  Facial motor function normal.  VIII:  Hearing equal to finger rub " bilaterally  IX/X: Gag normal, palate symmetric  XI:  Shoulder shrug equal, head turn equal  XII:  Tongue midline without fasciculations      MOTOR: Tone and bulk: normal bilateral upper and lower Strength: normal   Delt Bi Tri WE WF     R 5 5 5 5 5 5   L 5 5 5 5 5 5     IP ADD ABD Quad TA Gas HAM  R 5 5 5 5 5 5 5  L 5 5 5 5 5 5 5    SENSATION: Light touch and pinprick intact bilaterally  REFLEXES: normal, symmetric, nonbrisk.  Toes down, no clonus. No hoffmans.  GAIT: normal rise, base, steps, and arm swing.        Imaging:  Xray L-spine 7/2016   A 5 views lumbar spine demonstrate a transitional lumbosacral zone.  There is a mild levoscoliotic curvature.  Is anterior osteophyte motion throughout the lumbar region with loss of disc space height noted at L3/L4-L4/L5.  Prominent degenerative facet changes are present.     MRI L-spine 12/2016  Multilevel degenerative lumbar spondylosis resulting in severe spinal canal narrowing at the L2-3 and L3-4 levels.  No significant neuroforaminal narrowing.  Please see level by level comments above.    Assessment:  Ms. Sorensen is a 78 y.o. female with chronic low back and right leg pain  1. Other spondylosis, lumbar region    2. DDD (degenerative disc disease), lumbar        Plan:  1. I have stressed the importance of physical activity and exercise to improve overall health. Home exercises mailed  2. I believe her low back pain maybe due to facet arthropathy and have recommended lumbar medial branch blocks as a diagnostic procedure.  If successful, would proceed with radiofrequency ablation.  3.  Continue medications as prescribed  4.  I have given her a prescription for compounded cream that contains local anesthetics, anti-neuropathic medication, muscle relaxants and anti-inflammatories to see if this helps with her myofascial pain.  5. Follow up after procedure

## 2018-07-10 NOTE — DISCHARGE SUMMARY
[facility]  Discharge Note  Short Stay    Admit Date: 7/10/2018    Discharge Date and Time: 7/10/2018    Attending Physician: Parish Gaitan MD     Discharge Provider: Parish Gaitan    Diagnoses:  Active Hospital Problems    Diagnosis  POA    *Other spondylosis, lumbar region [M47.896]  Yes      Resolved Hospital Problems    Diagnosis Date Resolved POA   No resolved problems to display.       Hospital Course: Lumbar MBB  Discharged Condition: Good    Final Diagnoses:   Active Hospital Problems    Diagnosis  POA    *Other spondylosis, lumbar region [M47.896]  Yes      Resolved Hospital Problems    Diagnosis Date Resolved POA   No resolved problems to display.       Disposition: Home or Self Care    Follow up/Patient Instructions:    Medications:  Reconciled Home Medications:      Medication List      CONTINUE taking these medications    cyclobenzaprine 5 MG tablet  Commonly known as:  FLEXERIL  Take 1 tablet (5 mg total) by mouth nightly.     furosemide 20 MG tablet  Commonly known as:  LASIX  TAKE 1 TABLET(20 MG) BY MOUTH DAILY AS NEEDED     levothyroxine 75 MCG tablet  Commonly known as:  SYNTHROID  TAKE 1 TABLET BY MOUTH EVERY DAY     lisinopril 20 MG tablet  Commonly known as:  PRINIVIL,ZESTRIL  TAKE 1 TABLET(20 MG) BY MOUTH EVERY DAY     nabumetone 500 MG tablet  Commonly known as:  RELAFEN  Take 1 tablet (500 mg total) by mouth 2 (two) times daily as needed for Pain.     nystatin cream  Commonly known as:  MYCOSTATIN  Apply topically 2 (two) times daily as needed. GRETCHEN EXT AA BID     triamcinolone acetonide 0.1% 0.1 % cream  Commonly known as:  KENALOG  Apply topically 2 (two) times daily.     TYLENOL ARTHRITIS PAIN 650 MG Tbsr  Generic drug:  acetaminophen  Take 650 mg by mouth every 8 (eight) hours.            Discharge Procedure Orders  Call MD for:  temperature >100.4     Call MD for:  persistent nausea and vomiting or diarrhea     Call MD for:  severe uncontrolled pain     Call MD for:  redness,  tenderness, or signs of infection (pain, swelling, redness, odor or green/yellow discharge around incision site)     Call MD for:  difficulty breathing or increased cough     Call MD for:  severe persistent headache          Follow up with MD in 2-3 weeks    Discharge Procedure Orders (must include Diet, Follow-up, Activity):    Discharge Procedure Orders (must include Diet, Follow-up, Activity)  Call MD for:  temperature >100.4     Call MD for:  persistent nausea and vomiting or diarrhea     Call MD for:  severe uncontrolled pain     Call MD for:  redness, tenderness, or signs of infection (pain, swelling, redness, odor or green/yellow discharge around incision site)     Call MD for:  difficulty breathing or increased cough     Call MD for:  severe persistent headache

## 2018-07-10 NOTE — INTERVAL H&P NOTE
The patient has been examined and the H&P has been reviewed:    I concur with the findings and no changes have occurred since H&P was written..  This patient has been cleared for surgery in an ambulatory surgical facility    ASA 3,  Mallampatti Score 3  No history of anesthetic complications  Plan for RN IV sedation      Anesthesia/Surgery risks, benefits and alternative options discussed and understood by patient/family.          Active Hospital Problems    Diagnosis  POA    Other spondylosis, lumbar region [M47.896]  Yes      Resolved Hospital Problems    Diagnosis Date Resolved POA   No resolved problems to display.

## 2018-07-10 NOTE — PLAN OF CARE
Pt states ready to go home , stable, korey po fluids, ambulatory, denies pain, Leg raises performed in bed without diff and instructed on fall risk. PT and spouse verbalized understanding

## 2018-07-10 NOTE — DISCHARGE INSTRUCTIONS
Anesthesia information    Anesthesia Safety      You have been given medicine  to sedate you during your procedure today. This may have included both a pain medicine and sleeping medicine. Most of the effects have worn off; however, you may continue to have some drowsiness for the next  24 hours. Anesthesia and pain medicines can cause nausea, sleepiness, dizziness and  constipation.    HOME CARE:  1) For the next EIGHT HOURS, you should be watched by a responsible adult to look for any worsening of your condition.  2) DO NOT DRINK any ALCOHOL for the next 24 HOURS.  3) DO NOT DRIVE or operate dangerous machinery during the next 24 HOURS.  FOLLOW UP with your doctor or this facility if you are not alert and back to your usual level of activity within 24 hrs.  GET PROMPT MEDICAL ATTENTION if any of the following occur:  -- Increased drowsiness  -- Increased weakness or dizziness  -- Repeated vomiting  -- If you cannot be awakened    NERVE BLOCK   This was a test not a treatment. Please keep pain journal as instructed  Tips for recovery  · You may use an ice pack at your injection site for comfort.  · You may shower this evening.   · Do not use a heating pad or take tub baths or swim for 2 days.  · Take your usual medication for pain if needed.  · Dont drive the day of the procedure.  · Wait until tomorrow to resume any blood thinners (aspirin, Plavix, Coumadin) but you may resume all your other medications today.  When to call your doctor  Call right away if you notice any of the following symptoms:  · Severe pain or headache  · Fever or chills  · Redness or swelling around the injection site   · Difficulty breathing  · Vomiting  · Increasing numbness or weakness in arms or legs  ·

## 2018-07-10 NOTE — OP NOTE
PROCEDURE DATE: 7/10/2018    PROCEDURE:  Right L3,4,5 medial branch nerve blocks    DIAGNOSIS:  Other lumbar spondylosis    Post Op diagnosis: Same    PHYSICIAN: Parish Gaitan MD    MEDICATIONS INJECTED: 0.5% bupivicaine, 0.5 ml at each level    SEDATION MEDICATIONS:RN IV Versed    LOCAL ANESTHETIC USED: None    ESTIMATED BLOOD LOSS:  None    COMPLICATIONS:  None    TECHNIQUE: A time out was taken to identify the patient, procedure and side of the procedure. The patient was placed in a prone position, then prepped and draped in the usual sterile fashion using ChloraPrep and sterile towels.  The levels were determined under fluoroscopic guidance and then marked.  A 25-gauge 3.5 inch needle was introduced to the anatomic location of the L3,4,5 medial branch nerves on the right side. The above medication was then injected. The patient tolerated the procedure well.     The patient was monitored after the procedure. Patient was given pain diary to record pain levels at home.     If found to have greater than a 50% recovery and so will be scheduled for a radiofrequency ablation of the corresponding nerves.  Patient was given post procedure and discharge instructions to follow at home.  The patient was discharged in a stable condition.

## 2018-07-11 VITALS
OXYGEN SATURATION: 98 % | SYSTOLIC BLOOD PRESSURE: 120 MMHG | TEMPERATURE: 99 F | HEIGHT: 64 IN | HEART RATE: 60 BPM | RESPIRATION RATE: 18 BRPM | DIASTOLIC BLOOD PRESSURE: 60 MMHG | BODY MASS INDEX: 40.97 KG/M2 | WEIGHT: 240 LBS

## 2018-07-13 ENCOUNTER — TELEPHONE (OUTPATIENT)
Dept: PAIN MEDICINE | Facility: CLINIC | Age: 78
End: 2018-07-13

## 2018-07-13 DIAGNOSIS — M47.896 OTHER SPONDYLOSIS, LUMBAR REGION: Primary | ICD-10-CM

## 2018-07-13 NOTE — TELEPHONE ENCOUNTER
----- Message from Tru Velázquez sent at 7/13/2018  9:59 AM CDT -----  Contact: patient  Danna, [phone]. Calling in regards to her pain diary. Says she was told someone would call to get the results, but no one have called her yet. Please advise. Thanks.

## 2018-07-13 NOTE — TELEPHONE ENCOUNTER
Patient reports 80% or greater decrease in pain. Will proceed with Radiofrequency Thermocoagulation to corresponding nerves. Patient scheduled on 7/24/18 instructions given. Patient accepted and voiced understanding.

## 2018-07-16 ENCOUNTER — TELEPHONE (OUTPATIENT)
Dept: PAIN MEDICINE | Facility: CLINIC | Age: 78
End: 2018-07-16

## 2018-07-16 ENCOUNTER — OFFICE VISIT (OUTPATIENT)
Dept: SPINE | Facility: CLINIC | Age: 78
End: 2018-07-16
Payer: MEDICARE

## 2018-07-16 VITALS
DIASTOLIC BLOOD PRESSURE: 83 MMHG | WEIGHT: 240.06 LBS | BODY MASS INDEX: 40.98 KG/M2 | HEART RATE: 54 BPM | SYSTOLIC BLOOD PRESSURE: 148 MMHG | HEIGHT: 64 IN

## 2018-07-16 DIAGNOSIS — M54.50 CHRONIC RIGHT-SIDED LOW BACK PAIN WITHOUT SCIATICA: Primary | ICD-10-CM

## 2018-07-16 DIAGNOSIS — G89.29 CHRONIC RIGHT-SIDED LOW BACK PAIN WITHOUT SCIATICA: Primary | ICD-10-CM

## 2018-07-16 PROCEDURE — 3079F DIAST BP 80-89 MM HG: CPT | Mod: ,,, | Performed by: PHYSICAL MEDICINE & REHABILITATION

## 2018-07-16 PROCEDURE — 99213 OFFICE O/P EST LOW 20 MIN: CPT | Mod: ,,, | Performed by: PHYSICAL MEDICINE & REHABILITATION

## 2018-07-16 PROCEDURE — 3077F SYST BP >= 140 MM HG: CPT | Mod: ,,, | Performed by: PHYSICAL MEDICINE & REHABILITATION

## 2018-07-16 NOTE — PROGRESS NOTES
SUBJECTIVE:    Patient ID: Danna Sorensen is a 78 y.o. female.    Chief Complaint: Follow-up (Patient presents for a follow up stating that she noticed a difference for 1 day the of Radio frequency test. Pain resumed the next day and is a 5 today on pain scale.)    She is here for follow-up status post medial branch blocks on the right at L3-L4 and L5.  She had a good response to the medial branch blocks.  She is ready to proceed with radiofrequency ablation.  Nobody contacted her regarding physical therapy but she would like to put that off until after the radiofrequency ablation.  Tylenol 3 helps at night with sleep.  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     Social History Main Topics    Smoking status: Former Smoker     Packs/day: 0.50     Years: 1.00     Types: Cigarettes     Start date: 7/27/1960     Quit date: 1/1/1961    Smokeless tobacco: Never Used    Alcohol use No    Drug use: No    Sexual activity: No     Other Topics Concern    Not on file     Social History Narrative    No narrative on file     Past Surgical History:   Procedure Laterality Date    COLONOSCOPY N/A 10/18/2016    Procedure: COLONOSCOPY;  Surgeon: Rell Cordova MD;  Location: [facility];  Service:  "Endoscopy;  Laterality: N/A;    HAND TENDON SURGERY Left     HEMICOLECTOMY      right    INJECTION OF ANESTHETIC AGENT AROUND MEDIAL BRANCH NERVES INNERVATING LUMBAR FACET JOINT Right 7/10/2018    Procedure: Block-nerve-medial branch-lumbar;  Surgeon: Parish Gaitan MD;  Location: [facility];  Service: Pain Management;  Laterality: Right;  L3, 4, 5    SHOULDER ARTHROSCOPY Right 01/12/2017    Reverse     TONSILLECTOMY, ADENOIDECTOMY, BILATERAL MYRINGOTOMY AND TUBES      TOTAL KNEE ARTHROPLASTY Bilateral 08/1998    total replacements    TUMOR REMOVAL Left     left foot as a child     Family History   Problem Relation Age of Onset    Hypertension Mother     Kidney disease Mother     Cataracts Father     Cataracts Sister     Cancer Sister         breast    No Known Problems Brother     Cancer Sister         breast    Arthritis Sister     Glaucoma Neg Hx     Amblyopia Neg Hx     Blindness Neg Hx     Macular degeneration Neg Hx     Retinal detachment Neg Hx     Strabismus Neg Hx     Stroke Neg Hx     Thyroid disease Neg Hx      Vitals:    07/16/18 1003   BP: (!) 148/83   Pulse: (!) 54   Weight: 108.9 kg (240 lb 1.3 oz)   Height: 5' 4" (1.626 m)       Review of Systems   Constitutional: Negative for chills, diaphoresis, fatigue, fever and unexpected weight change.   HENT: Negative for trouble swallowing.    Eyes: Negative for visual disturbance.   Respiratory: Negative for shortness of breath.    Cardiovascular: Negative for chest pain.   Gastrointestinal: Negative for abdominal pain, constipation, nausea and vomiting.   Genitourinary: Negative for difficulty urinating.   Musculoskeletal: Negative for arthralgias, back pain, gait problem, joint swelling, myalgias, neck pain and neck stiffness.   Neurological: Negative for dizziness, speech difficulty, weakness, light-headedness, numbness and headaches.          Objective:      Physical Exam   Constitutional: She appears well-developed and well-nourished. "   No change           Assessment:       1. Chronic right-sided low back pain without sciatica           Plan:     proceed with radiofrequency ablation on the right at L3-4 L4 and L5 as planned.  Follow up with me in about 1 month afterwards      Chronic right-sided low back pain without sciatica

## 2018-07-16 NOTE — TELEPHONE ENCOUNTER
----- Message from Chance Joyce MD sent at 7/16/2018 10:26 AM CDT -----  Please proceed with radiofrequency ablation as planned

## 2018-07-17 ENCOUNTER — TELEPHONE (OUTPATIENT)
Dept: SPINE | Facility: CLINIC | Age: 78
End: 2018-07-17

## 2018-07-17 NOTE — TELEPHONE ENCOUNTER
Spoke with pt. She states there was a missed call on her phone from our number, however, I do not see any other notes in her chart other than from Dolores from Dr. Gaitan's office calling to schedule her procedure. She thanked me for calling back.

## 2018-07-17 NOTE — TELEPHONE ENCOUNTER
----- Message from Gail Ely sent at 7/16/2018  4:40 PM CDT -----  Contact: pt            Name of Who is Calling: pt      What is the request in detail: pt returned the nurse's phone call. Call pt      Can the clinic reply by [facility]: no      What Number to Call Back if not in [facility]: [phone]

## 2018-07-24 ENCOUNTER — HOSPITAL ENCOUNTER (OUTPATIENT)
Facility: AMBULARY SURGERY CENTER | Age: 78
Discharge: HOME | End: 2018-07-24
Attending: ANESTHESIOLOGY | Admitting: ANESTHESIOLOGY
Payer: MEDICARE

## 2018-07-24 ENCOUNTER — SURGERY (OUTPATIENT)
Age: 78
End: 2018-07-24

## 2018-07-24 DIAGNOSIS — M47.896 OTHER SPONDYLOSIS, LUMBAR REGION: Primary | ICD-10-CM

## 2018-07-24 PROCEDURE — 64636 DESTROY L/S FACET JNT ADDL: CPT | Mod: RT,,, | Performed by: ANESTHESIOLOGY

## 2018-07-24 PROCEDURE — 99152 MOD SED SAME PHYS/QHP 5/>YRS: CPT | Mod: ,,, | Performed by: ANESTHESIOLOGY

## 2018-07-24 PROCEDURE — 64635 DESTROY LUMB/SAC FACET JNT: CPT | Performed by: ANESTHESIOLOGY

## 2018-07-24 PROCEDURE — 64635 DESTROY LUMB/SAC FACET JNT: CPT | Mod: RT,,, | Performed by: ANESTHESIOLOGY

## 2018-07-24 PROCEDURE — 64636 DESTROY L/S FACET JNT ADDL: CPT | Performed by: ANESTHESIOLOGY

## 2018-07-24 RX ORDER — MIDAZOLAM HYDROCHLORIDE 2 MG/2ML
INJECTION, SOLUTION INTRAMUSCULAR; INTRAVENOUS
Status: DISCONTINUED | OUTPATIENT
Start: 2018-07-24 | End: 2018-07-24 | Stop reason: HOSPADM

## 2018-07-24 RX ORDER — BUPIVACAINE HYDROCHLORIDE 2.5 MG/ML
INJECTION, SOLUTION EPIDURAL; INFILTRATION; INTRACAUDAL
Status: DISCONTINUED | OUTPATIENT
Start: 2018-07-24 | End: 2018-07-24 | Stop reason: HOSPADM

## 2018-07-24 RX ORDER — LIDOCAINE HYDROCHLORIDE 20 MG/ML
INJECTION, SOLUTION EPIDURAL; INFILTRATION; INTRACAUDAL; PERINEURAL
Status: DISCONTINUED
Start: 2018-07-24 | End: 2018-07-24 | Stop reason: HOSPADM

## 2018-07-24 RX ORDER — SODIUM CHLORIDE, SODIUM LACTATE, POTASSIUM CHLORIDE, CALCIUM CHLORIDE 600; 310; 30; 20 MG/100ML; MG/100ML; MG/100ML; MG/100ML
INJECTION, SOLUTION INTRAVENOUS ONCE AS NEEDED
Status: COMPLETED | OUTPATIENT
Start: 2018-07-24 | End: 2018-07-24

## 2018-07-24 RX ORDER — LIDOCAINE HYDROCHLORIDE 20 MG/ML
INJECTION, SOLUTION EPIDURAL; INFILTRATION; INTRACAUDAL; PERINEURAL
Status: DISCONTINUED | OUTPATIENT
Start: 2018-07-24 | End: 2018-07-24 | Stop reason: HOSPADM

## 2018-07-24 RX ORDER — METHYLPREDNISOLONE ACETATE 80 MG/ML
INJECTION, SUSPENSION INTRA-ARTICULAR; INTRALESIONAL; INTRAMUSCULAR; SOFT TISSUE
Status: DISCONTINUED
Start: 2018-07-24 | End: 2018-07-24 | Stop reason: HOSPADM

## 2018-07-24 RX ORDER — METHYLPREDNISOLONE ACETATE 80 MG/ML
INJECTION, SUSPENSION INTRA-ARTICULAR; INTRALESIONAL; INTRAMUSCULAR; SOFT TISSUE
Status: DISCONTINUED | OUTPATIENT
Start: 2018-07-24 | End: 2018-07-24 | Stop reason: HOSPADM

## 2018-07-24 RX ORDER — LIDOCAINE HYDROCHLORIDE 10 MG/ML
INJECTION, SOLUTION EPIDURAL; INFILTRATION; INTRACAUDAL; PERINEURAL
Status: DISCONTINUED
Start: 2018-07-24 | End: 2018-07-24 | Stop reason: HOSPADM

## 2018-07-24 RX ORDER — LIDOCAINE HYDROCHLORIDE 10 MG/ML
INJECTION, SOLUTION EPIDURAL; INFILTRATION; INTRACAUDAL; PERINEURAL
Status: DISCONTINUED | OUTPATIENT
Start: 2018-07-24 | End: 2018-07-24 | Stop reason: HOSPADM

## 2018-07-24 RX ORDER — FENTANYL CITRATE 50 UG/ML
INJECTION, SOLUTION INTRAMUSCULAR; INTRAVENOUS
Status: DISCONTINUED
Start: 2018-07-24 | End: 2018-07-24 | Stop reason: HOSPADM

## 2018-07-24 RX ORDER — FENTANYL CITRATE 50 UG/ML
INJECTION, SOLUTION INTRAMUSCULAR; INTRAVENOUS
Status: DISCONTINUED | OUTPATIENT
Start: 2018-07-24 | End: 2018-07-24 | Stop reason: HOSPADM

## 2018-07-24 RX ORDER — MIDAZOLAM HYDROCHLORIDE 1 MG/ML
INJECTION INTRAMUSCULAR; INTRAVENOUS
Status: DISCONTINUED
Start: 2018-07-24 | End: 2018-07-24 | Stop reason: HOSPADM

## 2018-07-24 RX ORDER — BUPIVACAINE HYDROCHLORIDE 2.5 MG/ML
INJECTION, SOLUTION EPIDURAL; INFILTRATION; INTRACAUDAL
Status: DISCONTINUED
Start: 2018-07-24 | End: 2018-07-24 | Stop reason: HOSPADM

## 2018-07-24 RX ADMIN — MIDAZOLAM HYDROCHLORIDE 2 MG: 2 INJECTION, SOLUTION INTRAMUSCULAR; INTRAVENOUS at 01:07

## 2018-07-24 RX ADMIN — LIDOCAINE HYDROCHLORIDE 10 ML: 10 INJECTION, SOLUTION EPIDURAL; INFILTRATION; INTRACAUDAL; PERINEURAL at 01:07

## 2018-07-24 RX ADMIN — SODIUM CHLORIDE, SODIUM LACTATE, POTASSIUM CHLORIDE, CALCIUM CHLORIDE: 600; 310; 30; 20 INJECTION, SOLUTION INTRAVENOUS at 01:07

## 2018-07-24 RX ADMIN — LIDOCAINE HYDROCHLORIDE 6 ML: 20 INJECTION, SOLUTION EPIDURAL; INFILTRATION; INTRACAUDAL; PERINEURAL at 01:07

## 2018-07-24 RX ADMIN — METHYLPREDNISOLONE ACETATE 80 MG: 80 INJECTION, SUSPENSION INTRA-ARTICULAR; INTRALESIONAL; INTRAMUSCULAR; SOFT TISSUE at 01:07

## 2018-07-24 RX ADMIN — FENTANYL CITRATE 50 MCG: 50 INJECTION, SOLUTION INTRAMUSCULAR; INTRAVENOUS at 01:07

## 2018-07-24 RX ADMIN — BUPIVACAINE HYDROCHLORIDE 6 ML: 2.5 INJECTION, SOLUTION EPIDURAL; INFILTRATION; INTRACAUDAL at 01:07

## 2018-07-24 NOTE — OP NOTE
PROCEDURE DATE: 7/24/2018    PROCEDURE:  Radiofrequency ablation of the L3,4,5 medial branch nerves on the right-side utilizing fluoroscopy    DIAGNOSIS:  Other lumbar spondylosis  Post op Diagnosis: Same    PHYSICIAN: Parish Gaitan MD    MEDICATIONS INJECTED:  From a mixture of 6ml of 0.25% bupivicaine and 80mg of methylprednisone,  1ml of this solution was injected at each level.    LOCAL ANESTHETIC USED: Lidocaine 1%, 2 ml given at each site.    SEDATION MEDICATIONS: RN IV Sedation    ESTIMATED BLOOD LOSS:  NOne    COMPLICATIONS:  None    TECHNIQUE:  A time out was taken to identify patient and procedure side prior to starting the procedure. Laying in a prone position, the patient was prepped and draped in the usual sterile fashion using ChloraPrep and sterile towels.  The levels were determined under fluoroscopic guidance and then marked.  Local anesthetic was given by raising a wheal at the skin over each site and then infiltrated approximately 2cm deeper.  A 20-gauge  100 mm RF needle was introduced to the anatomic location of the right L3,4,5 medial branch nerves. Motor stimulation up to 2 Volts at each level confirmed no motor nerve involvement.  Impedance was less than 800 ohms at each level.  1ml of 2% lidocaine was instilled prior to lesioning.  Ablation was performed per level utilizing radiofrequency generator 80°C for 60 seconds.  Needles were then rotated 90° and a second lesion was performed.  The above noted medication was then injected slowly. The patient tolerated the procedure well.     The patient was monitored after the procedure.  Patient was given post procedure and discharge instructions to follow at home.  The patient was discharged in a stable condition

## 2018-07-24 NOTE — DISCHARGE INSTRUCTIONS
Recovery After Procedural Sedation (Adult)  You have been given medicine by vein to make you sleep during your surgery. This may have included both a pain medicine and sleeping medicine. Most of the effects have worn off. But you may still have some drowsiness for the next 6 to 8 hours.  Home care  Follow these guidelines when you get home:  · For the next 8 hours, you should be watched by a responsible adult. This person should make sure your condition is not getting worse.  · Don't drink any alcohol for the next 24 hours.  · Don't drive, operate dangerous machinery, or make important business or personal decisions during the next 24 hours.  Note: Your healthcare provider may tell you not to take any medicine by mouth for pain or sleep in the next 4 hours. These medicines may react with the medicines you were given in the hospital. This could cause a much stronger response than usual.  Follow-up care  Follow up with your healthcare provider if you are not alert and back to your usual level of activity within 12 hours.  When to seek medical advice  Call your healthcare provider right away if any of these occur:  · Drowsiness gets worse  · Weakness or dizziness gets worse  · Repeated vomiting  · You can't be awakened   Date Last Reviewed: 10/18/2016  © 3986-3244 [employer]. [address] [place] [address], [place] [address]. All rights reserved. This information is not intended as a substitute for professional medical care. Always follow your healthcare professional's instructions.      Radiofrequency Thermocoagulation Recovery at Home  What to know about pain relief  An injection to reduce inflammation takes a few days to work, sometimes even up to a week. There may even be more pain at first.  Tips for recovery  · You may use an ice pack at your injection site for comfort.  · You may shower this evening.   · Do not use a heating pad or take tub baths or swim for 2 days.  · Take your usual medication for  pain if needed.  · Gradually increase your activities.  · Dont lift anything over 10 lbs for the first 24 hours  · Dont drive the day of the procedure.  · Wait until tomorrow to resume any blood thinners (aspirin, Plavix, Coumadin) but you may resume all your other medications today.  When to call your doctor  Call right away if you notice any of the following symptoms:  · Severe pain or headache  · Fever or chills  · Redness or swelling around the injection site   · Difficulty breathing  · Vomiting  · Increasing numbness or weakness in arms or legs  · If you are diabetic, a steroid injection can increase your blood sugar so moniter it carefully.

## 2018-07-24 NOTE — PLAN OF CARE
Patient was able to stand at bedside and transfer to wheelchair with minimal assistance.Patient currently  sitting in wheelchair and denies pain, nausea or any weakness. Patient states she is ready to go home. Spouse states he is ready to take patient home, and he states he is driving the patient home.

## 2018-07-24 NOTE — DISCHARGE SUMMARY
[facility]  Discharge Note  Short Stay    Admit Date: 7/24/2018    Discharge Date and Time: 7/24/2018    Attending Physician: Parish Gaitan MD     Discharge Provider: Parish Gaitan    Diagnoses:  Active Hospital Problems    Diagnosis  POA    *Other spondylosis, lumbar region [M47.896]  Yes      Resolved Hospital Problems    Diagnosis Date Resolved POA   No resolved problems to display.       Hospital Course: Lumbar MB RFA  Discharged Condition: Good    Final Diagnoses:   Active Hospital Problems    Diagnosis  POA    *Other spondylosis, lumbar region [M47.896]  Yes      Resolved Hospital Problems    Diagnosis Date Resolved POA   No resolved problems to display.       Disposition: Home or Self Care    Follow up/Patient Instructions:    Medications:  Reconciled Home Medications:      Medication List      CONTINUE taking these medications    cyclobenzaprine 5 MG tablet  Commonly known as:  FLEXERIL  Take 1 tablet (5 mg total) by mouth nightly.     furosemide 20 MG tablet  Commonly known as:  LASIX  TAKE 1 TABLET(20 MG) BY MOUTH DAILY AS NEEDED     levothyroxine 75 MCG tablet  Commonly known as:  SYNTHROID  TAKE 1 TABLET BY MOUTH EVERY DAY     lisinopril 20 MG tablet  Commonly known as:  PRINIVIL,ZESTRIL  TAKE 1 TABLET(20 MG) BY MOUTH EVERY DAY     nabumetone 500 MG tablet  Commonly known as:  RELAFEN  Take 1 tablet (500 mg total) by mouth 2 (two) times daily as needed for Pain.     nystatin cream  Commonly known as:  MYCOSTATIN  Apply topically 2 (two) times daily as needed. GRETCHEN EXT AA BID     triamcinolone acetonide 0.1% 0.1 % cream  Commonly known as:  KENALOG  Apply topically 2 (two) times daily.     TYLENOL ARTHRITIS PAIN 650 MG Tbsr  Generic drug:  acetaminophen  Take 650 mg by mouth every 8 (eight) hours.            Discharge Procedure Orders  Call MD for:  temperature >100.4     Call MD for:  persistent nausea and vomiting or diarrhea     Call MD for:  severe uncontrolled pain     Call MD for:  redness,  tenderness, or signs of infection (pain, swelling, redness, odor or green/yellow discharge around incision site)     Call MD for:  difficulty breathing or increased cough     Call MD for:  severe persistent headache          Follow up with MD in 2-3 weeks    Discharge Procedure Orders (must include Diet, Follow-up, Activity):    Discharge Procedure Orders (must include Diet, Follow-up, Activity)  Call MD for:  temperature >100.4     Call MD for:  persistent nausea and vomiting or diarrhea     Call MD for:  severe uncontrolled pain     Call MD for:  redness, tenderness, or signs of infection (pain, swelling, redness, odor or green/yellow discharge around incision site)     Call MD for:  difficulty breathing or increased cough     Call MD for:  severe persistent headache

## 2018-07-24 NOTE — H&P (VIEW-ONLY)
SUBJECTIVE:    Patient ID: Danna Sorensen is a 78 y.o. female.    Chief Complaint: Follow-up (Patient presents for a follow up stating that she noticed a difference for 1 day the of Radio frequency test. Pain resumed the next day and is a 5 today on pain scale.)    She is here for follow-up status post medial branch blocks on the right at L3-L4 and L5.  She had a good response to the medial branch blocks.  She is ready to proceed with radiofrequency ablation.  Nobody contacted her regarding physical therapy but she would like to put that off until after the radiofrequency ablation.  Tylenol 3 helps at night with sleep.  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     Social History Main Topics    Smoking status: Former Smoker     Packs/day: 0.50     Years: 1.00     Types: Cigarettes     Start date: 7/27/1960     Quit date: 1/1/1961    Smokeless tobacco: Never Used    Alcohol use No    Drug use: No    Sexual activity: No     Other Topics Concern    Not on file     Social History Narrative    No narrative on file     Past Surgical History:   Procedure Laterality Date    COLONOSCOPY N/A 10/18/2016    Procedure: COLONOSCOPY;  Surgeon: Rell Cordova MD;  Location: [facility];  Service:  "Endoscopy;  Laterality: N/A;    HAND TENDON SURGERY Left     HEMICOLECTOMY      right    INJECTION OF ANESTHETIC AGENT AROUND MEDIAL BRANCH NERVES INNERVATING LUMBAR FACET JOINT Right 7/10/2018    Procedure: Block-nerve-medial branch-lumbar;  Surgeon: Parish Gaitan MD;  Location: [facility] OR;  Service: Pain Management;  Laterality: Right;  L3, 4, 5    SHOULDER ARTHROSCOPY Right 01/12/2017    Reverse     TONSILLECTOMY, ADENOIDECTOMY, BILATERAL MYRINGOTOMY AND TUBES      TOTAL KNEE ARTHROPLASTY Bilateral 08/1998    total replacements    TUMOR REMOVAL Left     left foot as a child     Family History   Problem Relation Age of Onset    Hypertension Mother     Kidney disease Mother     Cataracts Father     Cataracts Sister     Cancer Sister         breast    No Known Problems Brother     Cancer Sister         breast    Arthritis Sister     Glaucoma Neg Hx     Amblyopia Neg Hx     Blindness Neg Hx     Macular degeneration Neg Hx     Retinal detachment Neg Hx     Strabismus Neg Hx     Stroke Neg Hx     Thyroid disease Neg Hx      Vitals:    07/16/18 1003   BP: (!) 148/83   Pulse: (!) 54   Weight: 108.9 kg (240 lb 1.3 oz)   Height: 5' 4" (1.626 m)       Review of Systems   Constitutional: Negative for chills, diaphoresis, fatigue, fever and unexpected weight change.   HENT: Negative for trouble swallowing.    Eyes: Negative for visual disturbance.   Respiratory: Negative for shortness of breath.    Cardiovascular: Negative for chest pain.   Gastrointestinal: Negative for abdominal pain, constipation, nausea and vomiting.   Genitourinary: Negative for difficulty urinating.   Musculoskeletal: Negative for arthralgias, back pain, gait problem, joint swelling, myalgias, neck pain and neck stiffness.   Neurological: Negative for dizziness, speech difficulty, weakness, light-headedness, numbness and headaches.          Objective:      Physical Exam   Constitutional: She appears well-developed and well-nourished. "   No change           Assessment:       1. Chronic right-sided low back pain without sciatica           Plan:     proceed with radiofrequency ablation on the right at L3-4 L4 and L5 as planned.  Follow up with me in about 1 month afterwards      Chronic right-sided low back pain without sciatica

## 2018-07-25 VITALS
OXYGEN SATURATION: 96 % | BODY MASS INDEX: 40.97 KG/M2 | DIASTOLIC BLOOD PRESSURE: 74 MMHG | RESPIRATION RATE: 18 BRPM | SYSTOLIC BLOOD PRESSURE: 122 MMHG | HEIGHT: 64 IN | TEMPERATURE: 98 F | WEIGHT: 240 LBS | HEART RATE: 61 BPM

## 2018-08-02 DIAGNOSIS — E03.9 HYPOTHYROIDISM: ICD-10-CM

## 2018-08-03 RX ORDER — LEVOTHYROXINE SODIUM 75 UG/1
TABLET ORAL
Qty: 90 TABLET | Refills: 0 | Status: SHIPPED | OUTPATIENT
Start: 2018-08-03 | End: 2018-10-18 | Stop reason: SDUPTHER

## 2018-08-13 ENCOUNTER — OFFICE VISIT (OUTPATIENT)
Dept: SPINE | Facility: CLINIC | Age: 78
End: 2018-08-13
Payer: MEDICARE

## 2018-08-13 VITALS
BODY MASS INDEX: 40.98 KG/M2 | DIASTOLIC BLOOD PRESSURE: 83 MMHG | SYSTOLIC BLOOD PRESSURE: 136 MMHG | HEIGHT: 64 IN | WEIGHT: 240.06 LBS | HEART RATE: 62 BPM

## 2018-08-13 DIAGNOSIS — G89.29 CHRONIC RIGHT-SIDED LOW BACK PAIN WITHOUT SCIATICA: Primary | ICD-10-CM

## 2018-08-13 DIAGNOSIS — M54.50 CHRONIC RIGHT-SIDED LOW BACK PAIN WITHOUT SCIATICA: Primary | ICD-10-CM

## 2018-08-13 PROCEDURE — 99213 OFFICE O/P EST LOW 20 MIN: CPT | Mod: ,,, | Performed by: PHYSICAL MEDICINE & REHABILITATION

## 2018-08-13 PROCEDURE — 3079F DIAST BP 80-89 MM HG: CPT | Mod: ,,, | Performed by: PHYSICAL MEDICINE & REHABILITATION

## 2018-08-13 PROCEDURE — 3075F SYST BP GE 130 - 139MM HG: CPT | Mod: ,,, | Performed by: PHYSICAL MEDICINE & REHABILITATION

## 2018-08-13 NOTE — PROGRESS NOTES
SUBJECTIVE:    Patient ID: Danna Sorensen is a 78 y.o. female.    Chief Complaint: Follow-up (Procedure with )    She is here to follow-up status post radiofrequency ablation of the L3-4 and 5 medial branch nerves on the right.  She got some relief from the procedure in so much as her daytime discomfort has improved significantly.  Unfortunately she still has right-sided discomfort at night that keeps her up.  She finds that if she sleeps in her recliner she does not have the discomfort but despite various modifications to her sleeping position in bed she can't get comfortable.  She has no new or progressive problems          Past Medical History:   Diagnosis Date    Back pain     Carpal tunnel syndrome     Cataract     Colon cancer     Coronary artery disease     History of squamous cell carcinoma 2015    Hx of colon cancer, stage IV 10/18/2016    Hypothyroidism     Obesity (BMI 30-39.9) 3/24/2016    Osteoarthritis     Osteoporosis     Personal history of colon cancer, stage III     Squamous cell carcinoma     Status post reverse total replacement of right shoulder 2017    Strabismus     Trouble in sleeping     Wears dentures     Uppers     Social History     Socioeconomic History    Marital status: Single     Spouse name: Not on file    Number of children: Not on file    Years of education: Not on file    Highest education level: Not on file   Social Needs    Financial resource strain: Not on file    Food insecurity - worry: Not on file    Food insecurity - inability: Not on file    Transportation needs - medical: Not on file    Transportation needs - non-medical: Not on file   Occupational History    Not on file   Tobacco Use    Smoking status: Former Smoker     Packs/day: 0.50     Years: 1.00     Pack years: 0.50     Types: Cigarettes     Start date: 1960     Last attempt to quit: 1961     Years since quittin.6    Smokeless tobacco: Never Used  "  Substance and Sexual Activity    Alcohol use: No    Drug use: No    Sexual activity: No   Other Topics Concern    Not on file   Social History Narrative    Not on file     Past Surgical History:   Procedure Laterality Date    HAND TENDON SURGERY Left     HEMICOLECTOMY      right    SHOULDER ARTHROSCOPY Right 01/12/2017    Reverse     TONSILLECTOMY, ADENOIDECTOMY, BILATERAL MYRINGOTOMY AND TUBES      TOTAL KNEE ARTHROPLASTY Bilateral 08/1998    total replacements    TUMOR REMOVAL Left     left foot as a child     Family History   Problem Relation Age of Onset    Hypertension Mother     Kidney disease Mother     Cataracts Father     Cataracts Sister     Cancer Sister         breast    No Known Problems Brother     Cancer Sister         breast    Arthritis Sister     Glaucoma Neg Hx     Amblyopia Neg Hx     Blindness Neg Hx     Macular degeneration Neg Hx     Retinal detachment Neg Hx     Strabismus Neg Hx     Stroke Neg Hx     Thyroid disease Neg Hx      Vitals:    08/13/18 1025   BP: 136/83   Pulse: 62   Weight: 108.9 kg (240 lb 1.3 oz)   Height: 5' 4" (1.626 m)       Review of Systems   Constitutional: Negative for chills, diaphoresis, fatigue, fever and unexpected weight change.   HENT: Negative for trouble swallowing.    Eyes: Negative for visual disturbance.   Respiratory: Negative for shortness of breath.    Cardiovascular: Negative for chest pain.   Gastrointestinal: Negative for abdominal pain, constipation, nausea and vomiting.   Genitourinary: Negative for difficulty urinating.   Musculoskeletal: Negative for arthralgias, back pain, gait problem, joint swelling, myalgias, neck pain and neck stiffness.   Neurological: Negative for dizziness, speech difficulty, weakness, light-headedness, numbness and headaches.          Objective:      Physical Exam   Constitutional: She appears well-developed and well-nourished.   No change           Assessment:       1. Chronic right-sided low " back pain without sciatica           Plan:     she is improved following radiofrequency ablation and we can repeat that as needed.  I do not have a good solution for her discomfort at night but I suggested that formal physical therapy may be of benefit however she is not interested in that at this time.  Likewise she is not interested in home exercise program that requires her to get on the floor.  She also is not interested in surgical intervention.  She is going to try to live with things the way that they are.  She is welcome to follow up here on an as-needed basis      Chronic right-sided low back pain without sciatica

## 2018-08-15 ENCOUNTER — PES CALL (OUTPATIENT)
Dept: ADMINISTRATIVE | Facility: CLINIC | Age: 78
End: 2018-08-15

## 2018-09-05 RX ORDER — FUROSEMIDE 20 MG/1
TABLET ORAL
Qty: 30 TABLET | Refills: 11 | Status: SHIPPED | OUTPATIENT
Start: 2018-09-05 | End: 2018-12-27 | Stop reason: SDUPTHER

## 2018-10-11 ENCOUNTER — LAB VISIT (OUTPATIENT)
Dept: LAB | Facility: HOSPITAL | Age: 78
End: 2018-10-11
Attending: FAMILY MEDICINE
Payer: MEDICARE

## 2018-10-11 DIAGNOSIS — E03.4 HYPOTHYROIDISM DUE TO ACQUIRED ATROPHY OF THYROID: ICD-10-CM

## 2018-10-11 LAB
ALBUMIN SERPL BCP-MCNC: 3.7 G/DL
ALP SERPL-CCNC: 56 U/L
ALT SERPL W/O P-5'-P-CCNC: 11 U/L
ANION GAP SERPL CALC-SCNC: 10 MMOL/L
AST SERPL-CCNC: 13 U/L
BASOPHILS # BLD AUTO: 0.02 K/UL
BASOPHILS NFR BLD: 0.4 %
BILIRUB SERPL-MCNC: 1.3 MG/DL
BUN SERPL-MCNC: 13 MG/DL
CALCIUM SERPL-MCNC: 9.3 MG/DL
CHLORIDE SERPL-SCNC: 109 MMOL/L
CO2 SERPL-SCNC: 22 MMOL/L
CREAT SERPL-MCNC: 0.7 MG/DL
DIFFERENTIAL METHOD: ABNORMAL
EOSINOPHIL # BLD AUTO: 0.1 K/UL
EOSINOPHIL NFR BLD: 1.4 %
ERYTHROCYTE [DISTWIDTH] IN BLOOD BY AUTOMATED COUNT: 12.6 %
EST. GFR  (AFRICAN AMERICAN): >60 ML/MIN/1.73 M^2
EST. GFR  (NON AFRICAN AMERICAN): >60 ML/MIN/1.73 M^2
GLUCOSE SERPL-MCNC: 93 MG/DL
HCT VFR BLD AUTO: 39.9 %
HGB BLD-MCNC: 13.1 G/DL
IMM GRANULOCYTES # BLD AUTO: 0.02 K/UL
IMM GRANULOCYTES NFR BLD AUTO: 0.4 %
LYMPHOCYTES # BLD AUTO: 1.2 K/UL
LYMPHOCYTES NFR BLD: 22.7 %
MCH RBC QN AUTO: 31.8 PG
MCHC RBC AUTO-ENTMCNC: 32.8 G/DL
MCV RBC AUTO: 97 FL
MONOCYTES # BLD AUTO: 0.4 K/UL
MONOCYTES NFR BLD: 8.3 %
NEUTROPHILS # BLD AUTO: 3.4 K/UL
NEUTROPHILS NFR BLD: 66.8 %
NRBC BLD-RTO: 0 /100 WBC
PLATELET # BLD AUTO: 171 K/UL
PMV BLD AUTO: 10.8 FL
POTASSIUM SERPL-SCNC: 4.4 MMOL/L
PROT SERPL-MCNC: 6.5 G/DL
RBC # BLD AUTO: 4.12 M/UL
SODIUM SERPL-SCNC: 141 MMOL/L
T4 FREE SERPL-MCNC: 1.19 NG/DL
TSH SERPL DL<=0.005 MIU/L-ACNC: 1.72 UIU/ML
WBC # BLD AUTO: 5.15 K/UL

## 2018-10-11 PROCEDURE — 80053 COMPREHEN METABOLIC PANEL: CPT

## 2018-10-11 PROCEDURE — 84439 ASSAY OF FREE THYROXINE: CPT

## 2018-10-11 PROCEDURE — 85025 COMPLETE CBC W/AUTO DIFF WBC: CPT

## 2018-10-11 PROCEDURE — 84443 ASSAY THYROID STIM HORMONE: CPT

## 2018-10-11 PROCEDURE — 36415 COLL VENOUS BLD VENIPUNCTURE: CPT | Mod: PO

## 2018-10-18 ENCOUNTER — OFFICE VISIT (OUTPATIENT)
Dept: FAMILY MEDICINE | Facility: CLINIC | Age: 78
End: 2018-10-18
Payer: MEDICARE

## 2018-10-18 VITALS
TEMPERATURE: 98 F | HEIGHT: 64 IN | DIASTOLIC BLOOD PRESSURE: 71 MMHG | HEART RATE: 60 BPM | WEIGHT: 238.56 LBS | SYSTOLIC BLOOD PRESSURE: 115 MMHG | BODY MASS INDEX: 40.73 KG/M2 | RESPIRATION RATE: 16 BRPM

## 2018-10-18 DIAGNOSIS — I87.2 STASIS DERMATITIS OF BOTH LEGS: ICD-10-CM

## 2018-10-18 DIAGNOSIS — M51.36 DDD (DEGENERATIVE DISC DISEASE), LUMBAR: ICD-10-CM

## 2018-10-18 DIAGNOSIS — L30.4 INTERTRIGO: ICD-10-CM

## 2018-10-18 DIAGNOSIS — E03.9 HYPOTHYROIDISM: ICD-10-CM

## 2018-10-18 DIAGNOSIS — I10 ESSENTIAL HYPERTENSION: ICD-10-CM

## 2018-10-18 DIAGNOSIS — Z91.89 AT HIGH RISK FOR BREAST CANCER: ICD-10-CM

## 2018-10-18 DIAGNOSIS — J84.10 CALCIFIED GRANULOMA OF LUNG: Primary | ICD-10-CM

## 2018-10-18 DIAGNOSIS — E66.01 MORBID OBESITY WITH BMI OF 40.0-44.9, ADULT: ICD-10-CM

## 2018-10-18 PROCEDURE — 99499 UNLISTED E&M SERVICE: CPT | Mod: S$GLB,,, | Performed by: FAMILY MEDICINE

## 2018-10-18 PROCEDURE — 99214 OFFICE O/P EST MOD 30 MIN: CPT | Mod: S$PBB,,, | Performed by: FAMILY MEDICINE

## 2018-10-18 PROCEDURE — 99999 PR PBB SHADOW E&M-EST. PATIENT-LVL IV: CPT | Mod: PBBFAC,,, | Performed by: FAMILY MEDICINE

## 2018-10-18 PROCEDURE — 3074F SYST BP LT 130 MM HG: CPT | Mod: CPTII,,, | Performed by: FAMILY MEDICINE

## 2018-10-18 PROCEDURE — 3078F DIAST BP <80 MM HG: CPT | Mod: CPTII,,, | Performed by: FAMILY MEDICINE

## 2018-10-18 PROCEDURE — 1101F PT FALLS ASSESS-DOCD LE1/YR: CPT | Mod: CPTII,,, | Performed by: FAMILY MEDICINE

## 2018-10-18 PROCEDURE — 90662 IIV NO PRSV INCREASED AG IM: CPT | Mod: PBBFAC,PO

## 2018-10-18 PROCEDURE — 99214 OFFICE O/P EST MOD 30 MIN: CPT | Mod: PBBFAC,PO | Performed by: FAMILY MEDICINE

## 2018-10-18 RX ORDER — LISINOPRIL 20 MG/1
TABLET ORAL
Qty: 90 TABLET | Refills: 3 | Status: SHIPPED | OUTPATIENT
Start: 2018-10-18 | End: 2018-11-16

## 2018-10-18 RX ORDER — PREGABALIN 75 MG/1
75 CAPSULE ORAL NIGHTLY
Qty: 30 CAPSULE | Refills: 2 | Status: SHIPPED | OUTPATIENT
Start: 2018-10-18 | End: 2018-11-16

## 2018-10-18 RX ORDER — NYSTATIN 100000 U/G
CREAM TOPICAL 2 TIMES DAILY PRN
Qty: 30 G | Status: SHIPPED | OUTPATIENT
Start: 2018-10-18 | End: 2020-03-02 | Stop reason: SDUPTHER

## 2018-10-18 RX ORDER — TRIAMCINOLONE ACETONIDE 1 MG/G
CREAM TOPICAL 2 TIMES DAILY
Qty: 60 G | Refills: 0 | Status: SHIPPED | OUTPATIENT
Start: 2018-10-18 | End: 2018-12-27 | Stop reason: SDUPTHER

## 2018-10-18 RX ORDER — LEVOTHYROXINE SODIUM 75 UG/1
75 TABLET ORAL DAILY
Qty: 90 TABLET | Refills: 0 | Status: SHIPPED | OUTPATIENT
Start: 2018-10-18 | End: 2019-01-28 | Stop reason: SDUPTHER

## 2018-10-18 NOTE — PROGRESS NOTES
Subjective:       Patient ID: Danna Sorensen is a 78 y.o. female.    Chief Complaint: Follow-up (with labs )    HPI  Review of Systems   Constitutional: Negative for fatigue and unexpected weight change.   Respiratory: Negative for chest tightness and shortness of breath.    Cardiovascular: Negative for chest pain, palpitations and leg swelling.   Gastrointestinal: Negative for abdominal pain.   Musculoskeletal: Positive for arthralgias, back pain and myalgias.   Neurological: Negative for dizziness, syncope, light-headedness and headaches.       Patient Active Problem List   Diagnosis    Hypothyroid    Nuclear sclerosis    Hyperopia with astigmatism and presbyopia    DDD (degenerative disc disease), lumbar    Morbid obesity with BMI of 40.0-44.9, adult    Calcified granuloma of lung    History of colon cancer    Lumbar radiculitis    Other spondylosis, lumbar region    Chronic right-sided low back pain without sciatica     Patient is here for a chronic conditions follow up.    Lab Visit on 10/11/2018   Component Date Value Ref Range Status    WBC 10/11/2018 5.15  3.90 - 12.70 K/uL Final    RBC 10/11/2018 4.12  4.00 - 5.40 M/uL Final    Hemoglobin 10/11/2018 13.1  12.0 - 16.0 g/dL Final    Hematocrit 10/11/2018 39.9  37.0 - 48.5 % Final    MCV 10/11/2018 97  82 - 98 fL Final    MCH 10/11/2018 31.8* 27.0 - 31.0 pg Final    MCHC 10/11/2018 32.8  32.0 - 36.0 g/dL Final    RDW 10/11/2018 12.6  11.5 - 14.5 % Final    Platelets 10/11/2018 171  150 - 350 K/uL Final    MPV 10/11/2018 10.8  9.2 - 12.9 fL Final    Immature Granulocytes 10/11/2018 0.4  0.0 - 0.5 % Final    Gran # (ANC) 10/11/2018 3.4  1.8 - 7.7 K/uL Final    Immature Grans (Abs) 10/11/2018 0.02  0.00 - 0.04 K/uL Final    Lymph # 10/11/2018 1.2  1.0 - 4.8 K/uL Final    Mono # 10/11/2018 0.4  0.3 - 1.0 K/uL Final    Eos # 10/11/2018 0.1  0.0 - 0.5 K/uL Final    Baso # 10/11/2018 0.02  0.00 - 0.20 K/uL Final    nRBC 10/11/2018 0  0 /100  WBC Final    Gran% 10/11/2018 66.8  38.0 - 73.0 % Final    Lymph% 10/11/2018 22.7  18.0 - 48.0 % Final    Mono% 10/11/2018 8.3  4.0 - 15.0 % Final    Eosinophil% 10/11/2018 1.4  0.0 - 8.0 % Final    Basophil% 10/11/2018 0.4  0.0 - 1.9 % Final    Differential Method 10/11/2018 Automated   Final    Sodium 10/11/2018 141  136 - 145 mmol/L Final    Potassium 10/11/2018 4.4  3.5 - 5.1 mmol/L Final    Chloride 10/11/2018 109  95 - 110 mmol/L Final    CO2 10/11/2018 22* 23 - 29 mmol/L Final    Glucose 10/11/2018 93  70 - 110 mg/dL Final    BUN, Bld 10/11/2018 13  8 - 23 mg/dL Final    Creatinine 10/11/2018 0.7  0.5 - 1.4 mg/dL Final    Calcium 10/11/2018 9.3  8.7 - 10.5 mg/dL Final    Total Protein 10/11/2018 6.5  6.0 - 8.4 g/dL Final    Albumin 10/11/2018 3.7  3.5 - 5.2 g/dL Final    Total Bilirubin 10/11/2018 1.3* 0.1 - 1.0 mg/dL Final    Alkaline Phosphatase 10/11/2018 56  55 - 135 U/L Final    AST 10/11/2018 13  10 - 40 U/L Final    ALT 10/11/2018 11  10 - 44 U/L Final    Anion Gap 10/11/2018 10  8 - 16 mmol/L Final    eGFR if African American 10/11/2018 >60.0  >60 mL/min/1.73 m^2 Final    eGFR if non African American 10/11/2018 >60.0  >60 mL/min/1.73 m^2 Final    TSH 10/11/2018 1.723  0.400 - 4.000 uIU/mL Final    Free T4 10/11/2018 1.19  0.71 - 1.51 ng/dL Final       Has 3 sisters with breast cancer.  Has personal h/o colon cancer.  Has refused mammogram due to pain and concerns about increasing her cancer risk from it.  Has gotten ultrasound in past which she is aware is inferior method of screening.      Spinal stenosis lumbar -Has been seeing Dr. Gaitan and Dr. Joyce.  Has hd 8 procedures with no relief.  Could not tolerated gabapentin due to stomach pain.  Is not interested in surgery.  Pain is worse at night, keeps her awake  Objective:      Physical Exam   Constitutional: She is oriented to person, place, and time. She appears well-developed and well-nourished.   Cardiovascular: Normal  rate, regular rhythm and normal heart sounds.   Pulmonary/Chest: Effort normal and breath sounds normal.   Musculoskeletal: She exhibits no edema.   Neurological: She is alert and oriented to person, place, and time.   Skin: Skin is warm and dry.   Psychiatric: She has a normal mood and affect.   Nursing note and vitals reviewed.      Assessment:       1. Calcified granuloma of lung    2. Hypothyroidism    3. DDD (degenerative disc disease), lumbar    4. Essential hypertension    5. Intertrigo    6. Stasis dermatitis of both legs    7. Morbid obesity with BMI of 40.0-44.9, adult    8. At high risk for breast cancer        Plan:     1. Hypothyroidism  Controlled on current medications.  Continue current medications.    - levothyroxine (SYNTHROID) 75 MCG tablet; Take 1 tablet (75 mcg total) by mouth once daily.  Dispense: 90 tablet; Refill: 0  - CBC auto differential; Future  - TSH; Future  - T4, free; Future    2. DDD (degenerative disc disease), lumbar  Add  - pregabalin (LYRICA) 75 MG capsule; Take 1 capsule (75 mg total) by mouth every evening.  Dispense: 30 capsule; Refill: 2  Consider whether spinal stimulator candidate.  May titrate lyrica as needed or consider adding muscle relaxer, cymbalta if not improved at 1 month    3. Essential hypertension  Controlled on current medications.  Continue current medications.    - lisinopril (PRINIVIL,ZESTRIL) 20 MG tablet; TAKE 1 TABLET(20 MG) BY MOUTH EVERY DAY  Dispense: 90 tablet; Refill: 3  - Comprehensive metabolic panel; Future  - Lipid panel; Future    4. Intertrigo  Apply  - nystatin (MYCOSTATIN) cream; Apply topically 2 (two) times daily as needed. GRETCHEN EXT AA BID  Dispense: 30 g; Refill: prn    5. Stasis dermatitis of both legs  Apply as needed  - triamcinolone acetonide 0.1% (KENALOG) 0.1 % cream; Apply topically 2 (two) times daily.  Dispense: 60 g; Refill: 0    6. Calcified granuloma of lung  Stable. monitor    7. Morbid obesity with BMI of 40.0-44.9,  "adult  Counseled patient on his ideal body weight, health consequences of being obese and current recommendations including weekly exercise and a heart healthy diet.  Current BMI is:Estimated body mass index is 40.94 kg/m² as calculated from the following:    Height as of this encounter: 5' 4" (1.626 m).    Weight as of this encounter: 108.2 kg (238 lb 8.6 oz)..  Patient is aware that ideal BMI < 25 or Weight in (lb) to have BMI = 25: 145.3.        8. At high risk for breast cancer  High risk candidate. Off screening  - MRI Breast Bilateral W WO Contrast; Future        Time spent with patient: 20 minutes    Patient with be reevaluated in 6 months or sooner prn    Greater than 50% of this visit was spent counseling as described in above documentation:Yes  "

## 2018-11-15 ENCOUNTER — DOCUMENTATION ONLY (OUTPATIENT)
Dept: FAMILY MEDICINE | Facility: CLINIC | Age: 78
End: 2018-11-15

## 2018-11-15 NOTE — PROGRESS NOTES
Pre-Visit Chart Review  For Appointment Scheduled on 11/16/2018    There are no preventive care reminders to display for this patient.

## 2018-11-16 ENCOUNTER — OFFICE VISIT (OUTPATIENT)
Dept: FAMILY MEDICINE | Facility: CLINIC | Age: 78
End: 2018-11-16
Payer: MEDICARE

## 2018-11-16 VITALS
DIASTOLIC BLOOD PRESSURE: 71 MMHG | TEMPERATURE: 98 F | HEART RATE: 57 BPM | SYSTOLIC BLOOD PRESSURE: 158 MMHG | WEIGHT: 244.5 LBS | OXYGEN SATURATION: 97 % | BODY MASS INDEX: 41.97 KG/M2

## 2018-11-16 DIAGNOSIS — G89.29 CHRONIC RIGHT-SIDED LOW BACK PAIN WITHOUT SCIATICA: Primary | ICD-10-CM

## 2018-11-16 DIAGNOSIS — E66.01 MORBID OBESITY WITH BMI OF 40.0-44.9, ADULT: ICD-10-CM

## 2018-11-16 DIAGNOSIS — M54.50 CHRONIC RIGHT-SIDED LOW BACK PAIN WITHOUT SCIATICA: Primary | ICD-10-CM

## 2018-11-16 DIAGNOSIS — I10 ESSENTIAL HYPERTENSION: ICD-10-CM

## 2018-11-16 PROCEDURE — 99213 OFFICE O/P EST LOW 20 MIN: CPT | Mod: HCWC,S$GLB,, | Performed by: PHYSICIAN ASSISTANT

## 2018-11-16 PROCEDURE — 99999 PR PBB SHADOW E&M-EST. PATIENT-LVL IV: CPT | Mod: PBBFAC,HCWC,, | Performed by: PHYSICIAN ASSISTANT

## 2018-11-16 PROCEDURE — 1101F PT FALLS ASSESS-DOCD LE1/YR: CPT | Mod: CPTII,HCWC,S$GLB, | Performed by: PHYSICIAN ASSISTANT

## 2018-11-16 PROCEDURE — 3077F SYST BP >= 140 MM HG: CPT | Mod: CPTII,HCWC,S$GLB, | Performed by: PHYSICIAN ASSISTANT

## 2018-11-16 PROCEDURE — 3078F DIAST BP <80 MM HG: CPT | Mod: CPTII,HCWC,S$GLB, | Performed by: PHYSICIAN ASSISTANT

## 2018-11-16 RX ORDER — LISINOPRIL 30 MG/1
30 TABLET ORAL DAILY
Qty: 90 TABLET | Refills: 3 | Status: SHIPPED | OUTPATIENT
Start: 2018-11-16 | End: 2018-12-27 | Stop reason: SDUPTHER

## 2018-11-16 RX ORDER — PREGABALIN 100 MG/1
100 CAPSULE ORAL NIGHTLY
Qty: 30 CAPSULE | Refills: 2 | Status: SHIPPED | OUTPATIENT
Start: 2018-11-16 | End: 2018-12-27

## 2018-11-16 NOTE — TELEPHONE ENCOUNTER
Patient seen today for follow-up of back pain. Recommend increasing Lyrica to 100 mg nightly.  Please send and prescription.  I will see her back in 6 weeks.  She is also starting physical therapy

## 2018-11-16 NOTE — PROGRESS NOTES
Subjective:       Patient ID: Danna Sorensen is a 78 y.o. female.    Chief Complaint: Follow-up (1 month)    Patient with uncontrolled hypertension and chronic low back pain presents for one-month follow-up.  She was started on Lyrica 75 mg nightly 1 month ago.  She has not noticed an improvement in her nighttime pain. She states that she wakes every 1-2 hours secondary to the pain. Pain is located in the right lower back and right buttock.  Her blood pressure is elevated above goal today.  She reports compliance with medication.  She does not check blood pressure at home.  Patients patient medical/surgical, social and family histories have been reviewed         Review of Systems   Musculoskeletal: Positive for arthralgias, back pain and gait problem.   Psychiatric/Behavioral: Positive for sleep disturbance (Secondary to pain).       Objective:      Physical Exam   Constitutional: She appears well-developed and well-nourished. No distress.   Appears uncomfortable and in pain   Cardiovascular: Normal rate and regular rhythm.   No murmur heard.  Pulmonary/Chest: Effort normal and breath sounds normal. She has no wheezes. She has no rales.   Abdominal: Soft. Bowel sounds are normal. She exhibits no distension. There is no tenderness.   Musculoskeletal:        Right hip: She exhibits normal range of motion and normal strength.        Left hip: She exhibits normal range of motion and normal strength.        Lumbar back: She exhibits tenderness. She exhibits normal range of motion.   Straight leg raise negative bilaterally  Tenderness is over the lumbar paraspinal muscles   Neurological:   Reflex Scores:       Patellar reflexes are 2+ on the right side and 2+ on the left side.       Achilles reflexes are 2+ on the right side and 2+ on the left side.  Nursing note and vitals reviewed.      Assessment:       1. Chronic right-sided low back pain without sciatica    2. Essential hypertension    3. BMI 40.0-44.9, adult    4.  Morbid obesity with BMI of 40.0-44.9, adult        Plan:       Danna was seen today for follow-up.    Diagnoses and all orders for this visit:    Chronic right-sided low back pain without sciatica  -     Ambulatory Referral to Physical/Occupational Therapy  Request to refill Lyrica at sent to PCP  Essential hypertension      - increase    lisinopril (PRINIVIL,ZESTRIL) 30 MG tablet; Take 1 tablet (30 mg total) by mouth once daily.      BMI 40.0-44.9, adult    Morbid obesity with BMI of 40.0-44.9, adult    Patient readiness: acceptance and barriers:none    During the course of the visit the patient was educated and counseled about the following:     Hypertension:   Medication: increase to 30 mg lisinopril .  Obesity:   Diet interventions: qualitative changes (increase low-fat,  high-fiber foods).    Goals: Hypertension: Reduce Blood Pressure and Obesity: Reduce calorie intake and BMI    Did patient meet goals/outcomes: No    The following self management tools provided: declined    Patient Instructions (the written plan) was given to the patient/family.     Time spent with patient: 15 minutes    Barriers to medications present (no )    Adverse reactions to current medications (no)    Over the counter medications reviewed (Yes)

## 2018-12-17 ENCOUNTER — PES CALL (OUTPATIENT)
Dept: ADMINISTRATIVE | Facility: CLINIC | Age: 78
End: 2018-12-17

## 2018-12-18 ENCOUNTER — CLINICAL SUPPORT (OUTPATIENT)
Dept: REHABILITATION | Facility: HOSPITAL | Age: 78
End: 2018-12-18
Payer: MEDICARE

## 2018-12-18 DIAGNOSIS — G89.29 CHRONIC RIGHT-SIDED LOW BACK PAIN WITHOUT SCIATICA: Primary | ICD-10-CM

## 2018-12-18 DIAGNOSIS — M54.50 CHRONIC RIGHT-SIDED LOW BACK PAIN WITHOUT SCIATICA: Primary | ICD-10-CM

## 2018-12-18 PROCEDURE — G8978 MOBILITY CURRENT STATUS: HCPCS | Mod: CL,PN

## 2018-12-18 PROCEDURE — G8979 MOBILITY GOAL STATUS: HCPCS | Mod: CK,PN

## 2018-12-18 PROCEDURE — 97161 PT EVAL LOW COMPLEX 20 MIN: CPT | Mod: PN

## 2018-12-18 PROCEDURE — 97110 THERAPEUTIC EXERCISES: CPT | Mod: PN

## 2018-12-18 NOTE — PLAN OF CARE
TIME RECORD    Date: 12/18/2018    Start Time:  1110  Stop Time:  1205    PROCEDURES:    TIMED  Procedure Time Min.   There ex Start:1140   Stop:1205     Start:  Stop:     Start:  Stop:     Start:  Stop:          UNTIMED  Procedure Time Min.   eval Start:1110  Stop:1140     Start:  Stop:      Total Timed Minutes:  25  Total Timed Units:  2  Total Untimed Units:  1  Charges Billed/# of units:  3    OUTPATIENT PHYSICAL THERAPY   PATIENT EVALUATION  Onset Date: 12/01/15  Primary Diagnosis:   1. 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  Nutrition:  Overweight  History of Present Illness: reports several yr hx of chronic ivory. lbp; no radiculopathy in LE's; pain worse in standing and better in sitting; imaging evidence of severe bilateral facet arthropathy in l/s  Prior Level of Function: Independent  Social History: retired   Place of Residence (Steps/Adaptations): lives w/ family in 2-story home - flight of stairs  Functional Deficits Leading to Referral/Nature of Injury: difficulty performing everyday activities  Patient Therapy Goals: decrease  c/o pain    Subjective     Danna Sorensen states that her chronic ivory. lbp limits her ability to perform her everyday activities.    Pain:  Location: low back  Description: Aching and Dull  Activities Which Increase Pain: Standing and Walking  Activities Which Decrease Pain: menthol based creams, rest, sitting  Pain Scale: 3/10 at best 6/10 now  10/10 at worst    Objective     Posture: guarded due to pain  Palpation: pain w/ palpation to affected areas  Sensation: intact  DTRs:  Range of Motion/Strength: MMT = 4-/5 ivory. hips, grossly 4/5 throughout rest of ivory. LE's; AROM = WFL throughout    Flexibility: mild limitation in l/s  Gait: Without AD  Analysis: SOB due to fatigue  Bed Mobility:Assistance - SBA; able to perform proper log rolling technique  Transfers: Assistance - SBA  Special Tests: Oswestry = 30/50  Other:   Treatment: x 20 lower trunk rotations; x 15 PPT; 5 x 10 sec ivory. SKTC; provided pt. w/ HEP - instructed to perform bid    Assessment       Initial Assessment (Pertinent finding, problem list and factors affecting outcome): presents w/ chronic lbp that limits pt.'s ability to perform her everyday activities; pt. would benefit from PT to address these areas and to reinforce the HEP  Rehab Potiential: fair    Short Term Goals (2 Weeks):   1.  Sadaf. tx session w/out increase in symptoms.  2.  Decrease c/o lbp to 4/10    Long Term Goals (4 Weeks):   1.  Demo comp w/ HEP  2.  Improve ivory. hip MMT 1/2 grade   3.  Improve Oswestry score to 25/50    Plan     Certification Period: 12/18/18 to 01/26/19  Recommended Treatment Plan: 2 times per week for 4 weeks (starting 01/01/19): Manual Therapy, Moist Heat/ Ice, Patient Education, Therapeutic Activites, Therapeutic Exercise, Ultrasound and HEP  Other Recommendations: Dry Needling      Therapist: Laurent Suarez, PT    I CERTIFY THE NEED FOR THESE SERVICES FURNISHED UNDER THIS PLAN OF TREATMENT AND WHILE UNDER MY CARE    Physician's comments:  ________________________________________________________________________________________________________________________________________________      Physician's Name: ___________________________________

## 2018-12-26 ENCOUNTER — DOCUMENTATION ONLY (OUTPATIENT)
Dept: FAMILY MEDICINE | Facility: CLINIC | Age: 78
End: 2018-12-26

## 2018-12-26 NOTE — PROGRESS NOTES
Pre-Visit Chart Review  For Appointment Scheduled on 12/27/2018      There are no preventive care reminders to display for this patient.

## 2018-12-27 ENCOUNTER — OFFICE VISIT (OUTPATIENT)
Dept: FAMILY MEDICINE | Facility: CLINIC | Age: 78
End: 2018-12-27
Payer: MEDICARE

## 2018-12-27 ENCOUNTER — TELEPHONE (OUTPATIENT)
Dept: FAMILY MEDICINE | Facility: CLINIC | Age: 78
End: 2018-12-27

## 2018-12-27 VITALS
SYSTOLIC BLOOD PRESSURE: 131 MMHG | HEART RATE: 59 BPM | TEMPERATURE: 98 F | DIASTOLIC BLOOD PRESSURE: 65 MMHG | HEIGHT: 64 IN | BODY MASS INDEX: 41.74 KG/M2 | WEIGHT: 244.5 LBS

## 2018-12-27 DIAGNOSIS — E66.01 MORBID OBESITY WITH BMI OF 40.0-44.9, ADULT: ICD-10-CM

## 2018-12-27 DIAGNOSIS — G89.29 CHRONIC RIGHT-SIDED LOW BACK PAIN WITH RIGHT-SIDED SCIATICA: Primary | ICD-10-CM

## 2018-12-27 DIAGNOSIS — I10 ESSENTIAL HYPERTENSION: ICD-10-CM

## 2018-12-27 DIAGNOSIS — M54.41 CHRONIC RIGHT-SIDED LOW BACK PAIN WITH RIGHT-SIDED SCIATICA: Primary | ICD-10-CM

## 2018-12-27 DIAGNOSIS — I87.2 STASIS DERMATITIS OF BOTH LEGS: ICD-10-CM

## 2018-12-27 PROCEDURE — 99213 OFFICE O/P EST LOW 20 MIN: CPT | Mod: S$GLB,,, | Performed by: PHYSICIAN ASSISTANT

## 2018-12-27 PROCEDURE — 3078F DIAST BP <80 MM HG: CPT | Mod: CPTII,S$GLB,, | Performed by: PHYSICIAN ASSISTANT

## 2018-12-27 PROCEDURE — 99999 PR PBB SHADOW E&M-EST. PATIENT-LVL III: CPT | Mod: PBBFAC,,, | Performed by: PHYSICIAN ASSISTANT

## 2018-12-27 PROCEDURE — 3075F SYST BP GE 130 - 139MM HG: CPT | Mod: CPTII,S$GLB,, | Performed by: PHYSICIAN ASSISTANT

## 2018-12-27 PROCEDURE — 1101F PT FALLS ASSESS-DOCD LE1/YR: CPT | Mod: CPTII,S$GLB,, | Performed by: PHYSICIAN ASSISTANT

## 2018-12-27 RX ORDER — TRIAMCINOLONE ACETONIDE 1 MG/G
CREAM TOPICAL
Qty: 60 G | Refills: 0 | Status: SHIPPED | OUTPATIENT
Start: 2018-12-27 | End: 2020-03-02 | Stop reason: SDUPTHER

## 2018-12-27 RX ORDER — CYCLOBENZAPRINE HCL 5 MG
5 TABLET ORAL NIGHTLY
Qty: 90 TABLET | Refills: 0 | Status: SHIPPED | OUTPATIENT
Start: 2018-12-27 | End: 2019-01-03

## 2018-12-27 RX ORDER — FUROSEMIDE 20 MG/1
TABLET ORAL
Qty: 30 TABLET | Refills: 2 | Status: SHIPPED | OUTPATIENT
Start: 2018-12-27 | End: 2019-04-23 | Stop reason: SDUPTHER

## 2018-12-27 RX ORDER — PREGABALIN 150 MG/1
150 CAPSULE ORAL NIGHTLY
Qty: 30 CAPSULE | Refills: 0 | Status: SHIPPED | OUTPATIENT
Start: 2018-12-27 | End: 2019-01-28 | Stop reason: SDUPTHER

## 2018-12-27 RX ORDER — LISINOPRIL 30 MG/1
30 TABLET ORAL DAILY
Qty: 90 TABLET | Refills: 3 | Status: SHIPPED | OUTPATIENT
Start: 2018-12-27 | End: 2020-01-16

## 2018-12-27 RX ORDER — CYCLOBENZAPRINE HCL 5 MG
5 TABLET ORAL NIGHTLY
Qty: 90 TABLET | Refills: 3 | Status: CANCELLED | OUTPATIENT
Start: 2018-12-27

## 2018-12-27 NOTE — TELEPHONE ENCOUNTER
PA for Cyclobenzaprine HCl initiated on 12/27/18, pending decision.     Key: [id number] - PA Case ID: [id number] - Rx #: [id number]

## 2018-12-27 NOTE — PROGRESS NOTES
Subjective:       Patient ID: Danna Sorensen is a 78 y.o. female.    Chief Complaint: Follow-up (sciatica)    Patient with controlled hypertension and chronic low back pain presents for one-month follow-up.  Lyrica was increased to 100 mg 1 month ago.  She has noticed very little improvement in her nighttime pain. She states that she wakes every 2 hours secondary to the pain. Pain is located in the right lower back and right buttock.  She has had consultation with Physical therapy and will start formal program next week.  She continues to do her home exercise program.    Her blood pressure is better controlled today.  Her lisinopril was increased to 30 mg 1 month ago.  She reports compliance with medication.  She does not check blood pressure at home.  She has no acute complaints today.  Patients patient medical/surgical, social and family histories have been reviewed       Review of Systems   Musculoskeletal: Positive for arthralgias, back pain and gait problem.   Psychiatric/Behavioral: Positive for sleep disturbance (Secondary to pain).       Objective:      Physical Exam    Physical Exam   Constitutional: She appears well-developed and well-nourished. No distress.   Appears uncomfortable and in pain   Cardiovascular: Normal rate and regular rhythm.   No murmur heard.  Pulmonary/Chest: Effort normal and breath sounds normal. She has no wheezes. She has no rales.   Abdominal: Soft. Bowel sounds are normal. She exhibits no distension. There is no tenderness.   Musculoskeletal:        Right hip: She exhibits normal range of motion and normal strength.        Left hip: She exhibits normal range of motion and normal strength.        Lumbar back: She exhibits tenderness. She exhibits normal range of motion.   Straight leg raise negative bilaterally  Tenderness is over the lumbar paraspinal muscles   Neurological:   Reflex Scores:       Patellar reflexes are 2+ on the right side and 2+ on the left side.       Achilles  reflexes are 2+ on the right side and 2+ on the left side.  Nursing note and vitals reviewed    Assessment:       1. Chronic right-sided low back pain with right-sided sciatica    2. Essential hypertension    3. Morbid obesity with BMI of 40.0-44.9, adult        Plan:       Danna was seen today for follow-up.    Diagnoses and all orders for this visit:    Chronic right-sided low back pain with right-sided sciatica  -     cyclobenzaprine (FLEXERIL) 5 MG tablet; Take 1 tablet (5 mg total) by mouth nightly.  Recommend increasing Lyrica to 150 mg nightly.  Proceed with physical therapy program.  If no improvement with course of physical therapy would suggest that she return for pain management follow-up.  She continues to decline surgical intervention  Essential hypertension  Continue     lisinopril (PRINIVIL,ZESTRIL) 30 MG tablet; Take 1 tablet (30 mg total) by mouth once daily.    Morbid obesity with BMI of 40.0-44.9, adult  Patient readiness: acceptance and barriers:none    During the course of the visit the patient was educated and counseled about the following:     Hypertension:   Medication: no change.  Obesity:   Diet interventions: qualitative changes (increase low-fat,  high-fiber foods).    Goals: Hypertension: Reduce Blood Pressure and Obesity: Reduce calorie intake and BMI    Did patient meet goals/outcomes: No    The following self management tools provided: declined    Patient Instructions (the written plan) was given to the patient/family.     Time spent with patient: 15 minutes    Barriers to medications present (no )    Adverse reactions to current medications (no)    Over the counter medications reviewed (Yes)

## 2018-12-27 NOTE — TELEPHONE ENCOUNTER
Patient seen today for follow-up of chronic lumbar radiculitis.  I recommend that we increase her Lyrica to 150 mg.  Her PCP is not in clinic.  Please provide 30 day supply of new prescription

## 2018-12-27 NOTE — TELEPHONE ENCOUNTER
Initial PA request for Cyclobenzaprine HCl was denied. Appeal requested 12/27/18, pending decision.

## 2018-12-31 NOTE — TELEPHONE ENCOUNTER
PA appeal denied 12/31/18 for Cyclobenzaprine 5 mg tabs. To qualify, the pt must meet following criteria: been unable to achieve adequate symptom control on previous tx, have a contraindication, or intolerance with two of the follow appropriate alternatives for specified dx: baclofen tab and tizanidine tab.

## 2019-01-03 RX ORDER — BACLOFEN 10 MG/1
10 TABLET ORAL NIGHTLY PRN
Qty: 30 TABLET | Refills: 2 | Status: SHIPPED | OUTPATIENT
Start: 2019-01-03 | End: 2019-05-09

## 2019-01-03 NOTE — TELEPHONE ENCOUNTER
Please let patient know that Flexeril was denied by insurance we need to try baclofen.  New prescription was sent in

## 2019-01-03 NOTE — TELEPHONE ENCOUNTER
Advised pt message from provider. Pt stated that she changed her ins from [employer] to [employer], would like provider to resubmit for Flexeril again. Advised pt that per [facility] plan - Flexeril is not covered and possibly will be denied again. Pt verbalized understanding. She stated that she would like to try for Flexeril approval again with [employer] [facility]. She will update her insurance plan with her pharmacy.

## 2019-01-10 ENCOUNTER — DOCUMENTATION ONLY (OUTPATIENT)
Dept: REHABILITATION | Facility: HOSPITAL | Age: 79
End: 2019-01-10

## 2019-01-10 ENCOUNTER — TELEPHONE (OUTPATIENT)
Dept: REHABILITATION | Facility: HOSPITAL | Age: 79
End: 2019-01-10

## 2019-01-10 NOTE — PROGRESS NOTES
REHAB SERVICES OUTPATIENT DISCHARGE SUMMARY  Physical Therapy      Name:  Danna Sorensen  Date:  01/10/19  Date of Evaluation:  12/18/18  Physician:  Claudia Kim MD   Total # Of Visits:  1  Cancelled:  9  No Shows:  0  Diagnosis:  No diagnosis found.    Physical/Functional Status:  At time of discharge, patient attended initial assessment only.    The patient is to be discharged from our Therapy service for the following reason(s):  Patient requested discharge    Degree of Goal Achievement:  Patient has not met goals secondary to:  seen for initial assessment only    Patient Education:  NA    Discharge Plan:  Other:  Follow up w/ MD

## 2019-01-28 DIAGNOSIS — E03.9 HYPOTHYROIDISM: ICD-10-CM

## 2019-01-28 RX ORDER — LEVOTHYROXINE SODIUM 75 UG/1
TABLET ORAL
Qty: 90 TABLET | Refills: 3 | Status: SHIPPED | OUTPATIENT
Start: 2019-01-28 | End: 2020-02-13

## 2019-01-28 RX ORDER — PREGABALIN 150 MG/1
CAPSULE ORAL
Qty: 30 CAPSULE | Refills: 5 | Status: SHIPPED | OUTPATIENT
Start: 2019-01-28 | End: 2019-03-19

## 2019-03-18 ENCOUNTER — HOSPITAL ENCOUNTER (OUTPATIENT)
Dept: RADIOLOGY | Facility: HOSPITAL | Age: 79
Discharge: HOME | End: 2019-03-18
Attending: NURSE PRACTITIONER
Payer: MEDICARE

## 2019-03-18 DIAGNOSIS — C18.2 MALIGNANT NEOPLASM OF ASCENDING COLON: ICD-10-CM

## 2019-03-18 PROCEDURE — 71046 XR CHEST PA AND LATERAL: ICD-10-PCS | Mod: 26,,, | Performed by: RADIOLOGY

## 2019-03-18 PROCEDURE — 71046 X-RAY EXAM CHEST 2 VIEWS: CPT | Mod: TC,FY

## 2019-03-18 PROCEDURE — 71046 X-RAY EXAM CHEST 2 VIEWS: CPT | Mod: 26,,, | Performed by: RADIOLOGY

## 2019-03-19 ENCOUNTER — OFFICE VISIT (OUTPATIENT)
Dept: HEMATOLOGY/ONCOLOGY | Facility: CLINIC | Age: 79
End: 2019-03-19
Payer: MEDICARE

## 2019-03-19 VITALS
TEMPERATURE: 98 F | HEART RATE: 60 BPM | SYSTOLIC BLOOD PRESSURE: 140 MMHG | HEIGHT: 64 IN | WEIGHT: 246.5 LBS | RESPIRATION RATE: 20 BRPM | DIASTOLIC BLOOD PRESSURE: 71 MMHG | BODY MASS INDEX: 42.08 KG/M2

## 2019-03-19 DIAGNOSIS — Z85.038 HISTORY OF COLON CANCER: Primary | ICD-10-CM

## 2019-03-19 DIAGNOSIS — M94.9 DISORDER OF BONE AND CARTILAGE: ICD-10-CM

## 2019-03-19 DIAGNOSIS — M81.0 OSTEOPOROSIS, SENILE: ICD-10-CM

## 2019-03-19 DIAGNOSIS — M89.9 DISORDER OF BONE AND CARTILAGE: ICD-10-CM

## 2019-03-19 PROCEDURE — 99213 PR OFFICE/OUTPT VISIT, EST, LEVL III, 20-29 MIN: ICD-10-PCS | Mod: S$GLB,,, | Performed by: NURSE PRACTITIONER

## 2019-03-19 PROCEDURE — 3077F SYST BP >= 140 MM HG: CPT | Mod: CPTII,S$GLB,, | Performed by: NURSE PRACTITIONER

## 2019-03-19 PROCEDURE — 3078F PR MOST RECENT DIASTOLIC BLOOD PRESSURE < 80 MM HG: ICD-10-PCS | Mod: CPTII,S$GLB,, | Performed by: NURSE PRACTITIONER

## 2019-03-19 PROCEDURE — 99999 PR PBB SHADOW E&M-EST. PATIENT-LVL IV: CPT | Mod: PBBFAC,,, | Performed by: NURSE PRACTITIONER

## 2019-03-19 PROCEDURE — 99499 RISK ADDL DX/OHS AUDIT: ICD-10-PCS | Mod: S$GLB,,, | Performed by: NURSE PRACTITIONER

## 2019-03-19 PROCEDURE — 3077F PR MOST RECENT SYSTOLIC BLOOD PRESSURE >= 140 MM HG: ICD-10-PCS | Mod: CPTII,S$GLB,, | Performed by: NURSE PRACTITIONER

## 2019-03-19 PROCEDURE — 99213 OFFICE O/P EST LOW 20 MIN: CPT | Mod: S$GLB,,, | Performed by: NURSE PRACTITIONER

## 2019-03-19 PROCEDURE — 99499 UNLISTED E&M SERVICE: CPT | Mod: S$GLB,,, | Performed by: NURSE PRACTITIONER

## 2019-03-19 PROCEDURE — 3078F DIAST BP <80 MM HG: CPT | Mod: CPTII,S$GLB,, | Performed by: NURSE PRACTITIONER

## 2019-03-19 PROCEDURE — 1101F PT FALLS ASSESS-DOCD LE1/YR: CPT | Mod: CPTII,S$GLB,, | Performed by: NURSE PRACTITIONER

## 2019-03-19 PROCEDURE — 1101F PR PT FALLS ASSESS DOC 0-1 FALLS W/OUT INJ PAST YR: ICD-10-PCS | Mod: CPTII,S$GLB,, | Performed by: NURSE PRACTITIONER

## 2019-03-19 PROCEDURE — 99999 PR PBB SHADOW E&M-EST. PATIENT-LVL IV: ICD-10-PCS | Mod: PBBFAC,,, | Performed by: NURSE PRACTITIONER

## 2019-03-19 NOTE — PROGRESS NOTES
HISTORY OF PRESENT ILLNESS:  This is a 78-year-old white female known   to Dr. Gilmore for Stage III adenocarcinoma of the colon.  She is status post   resection and 12 cycles of adjuvant FOLFOX.      She presents to the clinic today for her annual evaluation.  She is now out   144 months (12 years) post-therapy. She denies any difficulties with fevers,   chills, drenching night sweats, changes in the caliber or character of her stool,   abdominal discomfort or bloating, nausea, vomiting, constipation, diarrhea,   unexplained weight loss, irregular heartbeat, chest pain, bleeding, etc.  No   other new complaints or pertinent findings on a 14-point review of systems.    PHYSICAL EXAMINATION:  GENERAL:  Well-developed, morbidly obese white female in no acute distress.    Alert and oriented x3.  VITAL SIGNS: Gain of 5 pounds in 1 year  Wt Readings from Last 3 Encounters:   03/19/19 111.8 kg (246 lb 7.6 oz)   12/27/18 110.9 kg (244 lb 7.8 oz)   11/16/18 110.9 kg (244 lb 7.8 oz)     Temp Readings from Last 3 Encounters:   03/19/19 97.7 °F (36.5 °C)   12/27/18 97.8 °F (36.6 °C) (Oral)   11/16/18 98 °F (36.7 °C) (Oral)     BP Readings from Last 3 Encounters:   03/19/19 (!) 140/71   12/27/18 131/65   11/16/18 (!) 158/71     Pulse Readings from Last 3 Encounters:   03/19/19 60   12/27/18 (!) 59   11/16/18 (!) 57     HEENT:  Normocephalic, atraumatic.  Oral mucosa pink and moist.  Lips without   lesions.  Tongue midline.  Oropharynx clear.  Nonicteric sclerae.  NECK:  Supple, swelling to right supraclavicular area.  No carotid bruits, thyromegaly or thyroid nodule.  HEART:  Regular rate and rhythm without murmur, gallop or rub.  LUNGS:  Clear to auscultation bilaterally.  Normal respiratory effort.  ABDOMEN:  Soft, nontender, nondistended with positive normoactive bowel sounds,   no hepatosplenomegaly.  EXTREMITIES:  No cyanosis, clubbing or edema.  Distal pulses are intact.  AXILLAE AND GROIN:  No palpable pathologic  lymphadenopathy is appreciated.  SKIN:  Intact/turgor normal.  NEUROLOGIC:  Cranial nerves II-XII grossly intact.  Motor:  Good muscle bulk and   tone.  Strength/sensory 5/5 throughout.  Gait stable.    LABORATORY:    Lab Results   Component Value Date    WBC 4.90 03/18/2019    HGB 14.3 03/18/2019    HCT 43.3 03/18/2019    MCV 92 03/18/2019     03/18/2019     Unremarkable differential    CMP  Sodium   Date Value Ref Range Status   03/18/2019 141 136 - 145 mmol/L Final     Potassium   Date Value Ref Range Status   03/18/2019 4.1 3.5 - 5.1 mmol/L Final     Chloride   Date Value Ref Range Status   03/18/2019 106 95 - 110 mmol/L Final     CO2   Date Value Ref Range Status   03/18/2019 24 23 - 29 mmol/L Final     Glucose   Date Value Ref Range Status   03/18/2019 95 70 - 110 mg/dL Final     BUN, Bld   Date Value Ref Range Status   03/18/2019 13 8 - 23 mg/dL Final     Creatinine   Date Value Ref Range Status   03/18/2019 0.8 0.5 - 1.4 mg/dL Final     Calcium   Date Value Ref Range Status   03/18/2019 10.1 8.7 - 10.5 mg/dL Final     Total Protein   Date Value Ref Range Status   03/18/2019 7.7 6.0 - 8.4 g/dL Final     Albumin   Date Value Ref Range Status   03/18/2019 4.5 3.5 - 5.2 g/dL Final     Total Bilirubin   Date Value Ref Range Status   03/18/2019 1.6 (H) 0.1 - 1.0 mg/dL Final     Comment:     For infants and newborns, interpretation of results should be based  on gestational age, weight and in agreement with clinical  observations.  Premature Infant recommended reference ranges:  Up to 24 hours.............<8.0 mg/dL  Up to 48 hours............<12.0 mg/dL  3-5 days..................<15.0 mg/dL  6-29 days.................<15.0 mg/dL       Alkaline Phosphatase   Date Value Ref Range Status   03/18/2019 62 55 - 135 U/L Final     AST   Date Value Ref Range Status   03/18/2019 14 10 - 40 U/L Final     ALT   Date Value Ref Range Status   03/18/2019 17 10 - 44 U/L Final     Anion Gap   Date Value Ref Range Status    03/18/2019 11 8 - 16 mmol/L Final     eGFR if    Date Value Ref Range Status   03/18/2019 >60 >60 mL/min/1.73 m^2 Final     eGFR if non    Date Value Ref Range Status   03/18/2019 >60 >60 mL/min/1.73 m^2 Final     Comment:     Calculation used to obtain the estimated glomerular filtration  rate (eGFR) is the CKD-EPI equation.            Chest x-ray dated 03/18/19: Impression    No interval lobar consolidation or cardiac decompensation is appreciated. No significant interval change of parenchymal aeration is appreciated.    Colonoscopy dated 12/18/2016:  Impression, diverticulosis; normal, repeat in 10 years.    IMPRESSION:  1.  Stage III adenocarcinoma of the colon disease - VINNY.  2.  Status post right shoulder arthroplasty - 01/12/2017    PLAN:    1.  BMD & Vitamin D:  ASAP; scheduled for Thursday, 03/21/19/  2.  Return in one year with interval CBC, CMP, LDH, Vitamin D and chest x-ray prior.    Assessment/plan reviewed and approved by Dr. Gilmore.

## 2019-03-21 ENCOUNTER — LAB VISIT (OUTPATIENT)
Dept: LAB | Facility: HOSPITAL | Age: 79
End: 2019-03-21
Attending: NURSE PRACTITIONER
Payer: MEDICARE

## 2019-03-21 ENCOUNTER — HOSPITAL ENCOUNTER (OUTPATIENT)
Dept: RADIOLOGY | Facility: CLINIC | Age: 79
Discharge: HOME | End: 2019-03-21
Attending: NURSE PRACTITIONER
Payer: MEDICARE

## 2019-03-21 DIAGNOSIS — E55.9 VITAMIN D DEFICIENCY: Primary | ICD-10-CM

## 2019-03-21 DIAGNOSIS — M94.9 DISORDER OF BONE AND CARTILAGE: ICD-10-CM

## 2019-03-21 DIAGNOSIS — M81.0 OSTEOPOROSIS, SENILE: ICD-10-CM

## 2019-03-21 DIAGNOSIS — M89.9 DISORDER OF BONE AND CARTILAGE: ICD-10-CM

## 2019-03-21 LAB — 25(OH)D3+25(OH)D2 SERPL-MCNC: 19 NG/ML

## 2019-03-21 PROCEDURE — 77080 DXA BONE DENSITY AXIAL: CPT | Mod: 26,,, | Performed by: RADIOLOGY

## 2019-03-21 PROCEDURE — 77080 DXA BONE DENSITY AXIAL: CPT | Mod: TC,PO

## 2019-03-21 PROCEDURE — 77080 DEXA BONE DENSITY SPINE HIP: ICD-10-PCS | Mod: 26,,, | Performed by: RADIOLOGY

## 2019-03-21 PROCEDURE — 36415 COLL VENOUS BLD VENIPUNCTURE: CPT | Mod: PO

## 2019-03-21 PROCEDURE — 82306 VITAMIN D 25 HYDROXY: CPT

## 2019-03-22 PROBLEM — E55.9 VITAMIN D DEFICIENCY: Status: ACTIVE | Noted: 2019-03-22

## 2019-03-22 RX ORDER — ERGOCALCIFEROL 1.25 MG/1
50000 CAPSULE ORAL
Qty: 8 CAPSULE | Refills: 0 | Status: SHIPPED | OUTPATIENT
Start: 2019-03-22 | End: 2019-05-11

## 2019-03-22 NOTE — PROGRESS NOTES
Reviewed Vitamin D & BMD results.  Patient aware of Ergocalciferol 50,000 units weekly x 8, then OTC Vitamin D 2,000 units daily.  Will recheck Vitamin d level in 6 months.

## 2019-03-28 ENCOUNTER — PES CALL (OUTPATIENT)
Dept: ADMINISTRATIVE | Facility: CLINIC | Age: 79
End: 2019-03-28

## 2019-04-23 RX ORDER — FUROSEMIDE 20 MG/1
TABLET ORAL
Qty: 90 TABLET | Refills: 0 | Status: SHIPPED | OUTPATIENT
Start: 2019-04-23 | End: 2019-09-03 | Stop reason: SDUPTHER

## 2019-04-25 ENCOUNTER — PATIENT OUTREACH (OUTPATIENT)
Dept: ADMINISTRATIVE | Facility: HOSPITAL | Age: 79
End: 2019-04-25

## 2019-05-02 ENCOUNTER — LAB VISIT (OUTPATIENT)
Dept: LAB | Facility: HOSPITAL | Age: 79
End: 2019-05-02
Attending: FAMILY MEDICINE
Payer: MEDICARE

## 2019-05-02 DIAGNOSIS — E03.9 HYPOTHYROIDISM: ICD-10-CM

## 2019-05-02 DIAGNOSIS — I10 ESSENTIAL HYPERTENSION: ICD-10-CM

## 2019-05-02 LAB
ALBUMIN SERPL BCP-MCNC: 3.8 G/DL (ref 3.5–5.2)
ALP SERPL-CCNC: 52 U/L (ref 55–135)
ALT SERPL W/O P-5'-P-CCNC: 18 U/L (ref 10–44)
ANION GAP SERPL CALC-SCNC: 9 MMOL/L (ref 8–16)
AST SERPL-CCNC: 13 U/L (ref 10–40)
BASOPHILS # BLD AUTO: 0.02 K/UL (ref 0–0.2)
BASOPHILS NFR BLD: 0.4 % (ref 0–1.9)
BILIRUB SERPL-MCNC: 1.7 MG/DL (ref 0.1–1)
BUN SERPL-MCNC: 14 MG/DL (ref 8–23)
CALCIUM SERPL-MCNC: 9.4 MG/DL (ref 8.7–10.5)
CHLORIDE SERPL-SCNC: 107 MMOL/L (ref 95–110)
CHOLEST SERPL-MCNC: 161 MG/DL (ref 120–199)
CHOLEST/HDLC SERPL: 3 {RATIO} (ref 2–5)
CO2 SERPL-SCNC: 23 MMOL/L (ref 23–29)
CREAT SERPL-MCNC: 0.8 MG/DL (ref 0.5–1.4)
DIFFERENTIAL METHOD: ABNORMAL
EOSINOPHIL # BLD AUTO: 0.1 K/UL (ref 0–0.5)
EOSINOPHIL NFR BLD: 1.5 % (ref 0–8)
ERYTHROCYTE [DISTWIDTH] IN BLOOD BY AUTOMATED COUNT: 13.1 % (ref 11.5–14.5)
EST. GFR  (AFRICAN AMERICAN): >60 ML/MIN/1.73 M^2
EST. GFR  (NON AFRICAN AMERICAN): >60 ML/MIN/1.73 M^2
GLUCOSE SERPL-MCNC: 95 MG/DL (ref 70–110)
HCT VFR BLD AUTO: 40.4 % (ref 37–48.5)
HDLC SERPL-MCNC: 54 MG/DL (ref 40–75)
HDLC SERPL: 33.5 % (ref 20–50)
HGB BLD-MCNC: 13 G/DL (ref 12–16)
IMM GRANULOCYTES # BLD AUTO: 0.02 K/UL (ref 0–0.04)
IMM GRANULOCYTES NFR BLD AUTO: 0.4 % (ref 0–0.5)
LDLC SERPL CALC-MCNC: 91.6 MG/DL (ref 63–159)
LYMPHOCYTES # BLD AUTO: 1.3 K/UL (ref 1–4.8)
LYMPHOCYTES NFR BLD: 25.1 % (ref 18–48)
MCH RBC QN AUTO: 31.3 PG (ref 27–31)
MCHC RBC AUTO-ENTMCNC: 32.2 G/DL (ref 32–36)
MCV RBC AUTO: 97 FL (ref 82–98)
MONOCYTES # BLD AUTO: 0.5 K/UL (ref 0.3–1)
MONOCYTES NFR BLD: 9.8 % (ref 4–15)
NEUTROPHILS # BLD AUTO: 3.4 K/UL (ref 1.8–7.7)
NEUTROPHILS NFR BLD: 62.8 % (ref 38–73)
NONHDLC SERPL-MCNC: 107 MG/DL
NRBC BLD-RTO: 0 /100 WBC
PLATELET # BLD AUTO: 193 K/UL (ref 150–350)
PMV BLD AUTO: 10.7 FL (ref 9.2–12.9)
POTASSIUM SERPL-SCNC: 4.2 MMOL/L (ref 3.5–5.1)
PROT SERPL-MCNC: 6.5 G/DL (ref 6–8.4)
RBC # BLD AUTO: 4.16 M/UL (ref 4–5.4)
SODIUM SERPL-SCNC: 139 MMOL/L (ref 136–145)
T4 FREE SERPL-MCNC: 1.07 NG/DL (ref 0.71–1.51)
TRIGL SERPL-MCNC: 77 MG/DL (ref 30–150)
TSH SERPL DL<=0.005 MIU/L-ACNC: 1.65 UIU/ML (ref 0.4–4)
WBC # BLD AUTO: 5.33 K/UL (ref 3.9–12.7)

## 2019-05-02 PROCEDURE — 80053 COMPREHEN METABOLIC PANEL: CPT

## 2019-05-02 PROCEDURE — 80061 LIPID PANEL: CPT

## 2019-05-02 PROCEDURE — 85025 COMPLETE CBC W/AUTO DIFF WBC: CPT

## 2019-05-02 PROCEDURE — 84443 ASSAY THYROID STIM HORMONE: CPT

## 2019-05-02 PROCEDURE — 36415 COLL VENOUS BLD VENIPUNCTURE: CPT | Mod: PO

## 2019-05-02 PROCEDURE — 84439 ASSAY OF FREE THYROXINE: CPT

## 2019-05-09 ENCOUNTER — DOCUMENTATION ONLY (OUTPATIENT)
Dept: FAMILY MEDICINE | Facility: CLINIC | Age: 79
End: 2019-05-09

## 2019-05-09 ENCOUNTER — OFFICE VISIT (OUTPATIENT)
Dept: FAMILY MEDICINE | Facility: CLINIC | Age: 79
End: 2019-05-09
Payer: MEDICARE

## 2019-05-09 VITALS — WEIGHT: 240.06 LBS | HEIGHT: 64 IN | BODY MASS INDEX: 40.98 KG/M2

## 2019-05-09 DIAGNOSIS — E66.01 MORBID OBESITY WITH BMI OF 40.0-44.9, ADULT: ICD-10-CM

## 2019-05-09 DIAGNOSIS — M54.50 CHRONIC RIGHT-SIDED LOW BACK PAIN WITHOUT SCIATICA: ICD-10-CM

## 2019-05-09 DIAGNOSIS — G89.29 CHRONIC RIGHT-SIDED LOW BACK PAIN WITHOUT SCIATICA: ICD-10-CM

## 2019-05-09 DIAGNOSIS — Z85.038 HISTORY OF COLON CANCER: ICD-10-CM

## 2019-05-09 DIAGNOSIS — E03.4 HYPOTHYROIDISM DUE TO ACQUIRED ATROPHY OF THYROID: Primary | ICD-10-CM

## 2019-05-09 DIAGNOSIS — E55.9 VITAMIN D DEFICIENCY: ICD-10-CM

## 2019-05-09 DIAGNOSIS — J84.10 CALCIFIED GRANULOMA OF LUNG: ICD-10-CM

## 2019-05-09 PROCEDURE — 1101F PR PT FALLS ASSESS DOC 0-1 FALLS W/OUT INJ PAST YR: ICD-10-PCS | Mod: CPTII,S$GLB,, | Performed by: FAMILY MEDICINE

## 2019-05-09 PROCEDURE — 99499 RISK ADDL DX/OHS AUDIT: ICD-10-PCS | Mod: S$GLB,,, | Performed by: FAMILY MEDICINE

## 2019-05-09 PROCEDURE — 1101F PT FALLS ASSESS-DOCD LE1/YR: CPT | Mod: CPTII,S$GLB,, | Performed by: FAMILY MEDICINE

## 2019-05-09 PROCEDURE — 99999 PR PBB SHADOW E&M-EST. PATIENT-LVL III: ICD-10-PCS | Mod: PBBFAC,,, | Performed by: FAMILY MEDICINE

## 2019-05-09 PROCEDURE — 99214 OFFICE O/P EST MOD 30 MIN: CPT | Mod: S$GLB,,, | Performed by: FAMILY MEDICINE

## 2019-05-09 PROCEDURE — 99999 PR PBB SHADOW E&M-EST. PATIENT-LVL III: CPT | Mod: PBBFAC,,, | Performed by: FAMILY MEDICINE

## 2019-05-09 PROCEDURE — 99499 UNLISTED E&M SERVICE: CPT | Mod: S$GLB,,, | Performed by: FAMILY MEDICINE

## 2019-05-09 PROCEDURE — 99214 PR OFFICE/OUTPT VISIT, EST, LEVL IV, 30-39 MIN: ICD-10-PCS | Mod: S$GLB,,, | Performed by: FAMILY MEDICINE

## 2019-05-09 RX ORDER — DIETHYLPROPION HYDROCHLORIDE 25 MG/1
25 TABLET ORAL DAILY
Qty: 90 EACH | Refills: 0 | Status: SHIPPED | OUTPATIENT
Start: 2019-05-09 | End: 2019-08-09

## 2019-05-09 RX ORDER — CYCLOBENZAPRINE HCL 5 MG
5 TABLET ORAL 3 TIMES DAILY PRN
Qty: 90 TABLET | Refills: 3 | Status: SHIPPED | OUTPATIENT
Start: 2019-05-09 | End: 2019-05-19

## 2019-05-09 NOTE — PROGRESS NOTES
Subjective:       Patient ID: Danna Sorensen is a 79 y.o. female.    Chief Complaint: Follow-up (6month, discuss flexeril)    HPI  Review of Systems   Constitutional: Negative for fatigue and unexpected weight change.   Respiratory: Negative for chest tightness and shortness of breath.    Cardiovascular: Negative for chest pain, palpitations and leg swelling.   Gastrointestinal: Negative for abdominal pain.   Musculoskeletal: Positive for arthralgias and back pain.   Neurological: Negative for dizziness, syncope, light-headedness and headaches.       Patient Active Problem List   Diagnosis    Hypothyroid    Nuclear sclerosis    Hyperopia with astigmatism and presbyopia    DDD (degenerative disc disease), lumbar    Morbid obesity with BMI of 40.0-44.9, adult    Calcified granuloma of lung    History of colon cancer    Lumbar radiculitis    Other spondylosis, lumbar region    Chronic right-sided low back pain without sciatica    Vitamin D deficiency     Patient is here for a chronic conditions follow up.    2007 h/o colon ca s/p surgery and treatment.  Followed by Heme/Onc Dr. Cali        Insomnia due to chronic lbp with spinal stenosis radiates to right hip.  Had [facility] Dr. Gaitan 1/5/17 which did help. Tried lyrica but could not tolerate-caused joint pain.  Tried baclofen at night and didn't help.  Flexeril in past 5mg worked well for muscle spasms at night.  No side effects     Right shoulder surgery 1/12/17 Dr. Lee      Reviewed labs 5/2/19     Concerned about weight.  Brother is taking 75mg diethylproprion and doing well on it  Objective:      Physical Exam   Constitutional: She is oriented to person, place, and time. She appears well-developed and well-nourished.   Cardiovascular: Normal rate, regular rhythm and normal heart sounds.   Pulmonary/Chest: Effort normal and breath sounds normal.   Musculoskeletal: She exhibits no edema.   Neurological: She is alert and oriented to person, place, and time.  "  Skin: Skin is warm and dry.   Psychiatric: She has a normal mood and affect.   Nursing note and vitals reviewed.      Assessment:       1. Hypothyroidism due to acquired atrophy of thyroid    2. Vitamin D deficiency    3. Morbid obesity with BMI of 40.0-44.9, adult    4. Chronic right-sided low back pain without sciatica    5. History of colon cancer    6. Calcified granuloma of lung        Plan:       1. Hypothyroidism due to acquired atrophy of thyroid  Controlled on current medications.  Continue current medications.      2. Vitamin D deficiency  Stable condition.  Continue current medications.  Will adjust based on lab findings or if condition changes.      3. Morbid obesity with BMI of 40.0-44.9, adult  Counseled patient on his ideal body weight, health consequences of being obese and current recommendations including weekly exercise and a heart healthy diet.  Current BMI is:Estimated body mass index is 41.21 kg/m² as calculated from the following:    Height as of this encounter: 5' 4" (1.626 m).    Weight as of this encounter: 108.9 kg (240 lb 1.3 oz)..  Patient is aware that ideal BMI < 25 or Weight in (lb) to have BMI = 25: 145.3.    Start for up to 6 months.    - diethylpropion 25 mg Tab; Take 25 mg by mouth once daily.  Dispense: 90 each; Refill: 0    4. Chronic right-sided low back pain without sciatica  Cont current mgmt and add  - cyclobenzaprine (FLEXERIL) 5 MG tablet; Take 1 tablet (5 mg total) by mouth 3 (three) times daily as needed for Muscle spasms.  Dispense: 90 tablet; Refill: 3    5. History of colon cancer  Cont heme/onc surveillance    6. Calcified granuloma of lung  Cont monitor        Time spent with patient: 20 minutes    Patient with be reevaluated in 3 and 6 months or sooner prn    Greater than 50% of this visit was spent counseling as described in above documentation:Yes  "

## 2019-05-10 ENCOUNTER — TELEPHONE (OUTPATIENT)
Dept: FAMILY MEDICINE | Facility: CLINIC | Age: 79
End: 2019-05-10

## 2019-08-09 ENCOUNTER — OFFICE VISIT (OUTPATIENT)
Dept: FAMILY MEDICINE | Facility: CLINIC | Age: 79
End: 2019-08-09
Payer: MEDICARE

## 2019-08-09 VITALS
WEIGHT: 241.88 LBS | SYSTOLIC BLOOD PRESSURE: 138 MMHG | TEMPERATURE: 98 F | DIASTOLIC BLOOD PRESSURE: 82 MMHG | HEART RATE: 65 BPM | HEIGHT: 64 IN | BODY MASS INDEX: 41.29 KG/M2 | OXYGEN SATURATION: 96 %

## 2019-08-09 DIAGNOSIS — E55.9 VITAMIN D DEFICIENCY: ICD-10-CM

## 2019-08-09 DIAGNOSIS — I10 ESSENTIAL HYPERTENSION: Primary | ICD-10-CM

## 2019-08-09 DIAGNOSIS — G89.29 CHRONIC RIGHT-SIDED LOW BACK PAIN WITHOUT SCIATICA: ICD-10-CM

## 2019-08-09 DIAGNOSIS — E03.4 HYPOTHYROIDISM DUE TO ACQUIRED ATROPHY OF THYROID: ICD-10-CM

## 2019-08-09 DIAGNOSIS — E66.01 MORBID OBESITY WITH BMI OF 40.0-44.9, ADULT: ICD-10-CM

## 2019-08-09 DIAGNOSIS — M54.50 CHRONIC RIGHT-SIDED LOW BACK PAIN WITHOUT SCIATICA: ICD-10-CM

## 2019-08-09 DIAGNOSIS — Z85.038 HISTORY OF COLON CANCER: ICD-10-CM

## 2019-08-09 PROCEDURE — 99999 PR PBB SHADOW E&M-EST. PATIENT-LVL III: CPT | Mod: PBBFAC,,, | Performed by: NURSE PRACTITIONER

## 2019-08-09 PROCEDURE — 3079F DIAST BP 80-89 MM HG: CPT | Mod: CPTII,S$GLB,, | Performed by: NURSE PRACTITIONER

## 2019-08-09 PROCEDURE — 99213 PR OFFICE/OUTPT VISIT, EST, LEVL III, 20-29 MIN: ICD-10-PCS | Mod: S$GLB,,, | Performed by: NURSE PRACTITIONER

## 2019-08-09 PROCEDURE — 3075F PR MOST RECENT SYSTOLIC BLOOD PRESS GE 130-139MM HG: ICD-10-PCS | Mod: CPTII,S$GLB,, | Performed by: NURSE PRACTITIONER

## 2019-08-09 PROCEDURE — 99999 PR PBB SHADOW E&M-EST. PATIENT-LVL III: ICD-10-PCS | Mod: PBBFAC,,, | Performed by: NURSE PRACTITIONER

## 2019-08-09 PROCEDURE — 99213 OFFICE O/P EST LOW 20 MIN: CPT | Mod: S$GLB,,, | Performed by: NURSE PRACTITIONER

## 2019-08-09 PROCEDURE — 99499 RISK ADDL DX/OHS AUDIT: ICD-10-PCS | Mod: S$GLB,,, | Performed by: NURSE PRACTITIONER

## 2019-08-09 PROCEDURE — 3079F PR MOST RECENT DIASTOLIC BLOOD PRESSURE 80-89 MM HG: ICD-10-PCS | Mod: CPTII,S$GLB,, | Performed by: NURSE PRACTITIONER

## 2019-08-09 PROCEDURE — 3075F SYST BP GE 130 - 139MM HG: CPT | Mod: CPTII,S$GLB,, | Performed by: NURSE PRACTITIONER

## 2019-08-09 PROCEDURE — 1101F PR PT FALLS ASSESS DOC 0-1 FALLS W/OUT INJ PAST YR: ICD-10-PCS | Mod: CPTII,S$GLB,, | Performed by: NURSE PRACTITIONER

## 2019-08-09 PROCEDURE — 99499 UNLISTED E&M SERVICE: CPT | Mod: S$GLB,,, | Performed by: NURSE PRACTITIONER

## 2019-08-09 PROCEDURE — 1101F PT FALLS ASSESS-DOCD LE1/YR: CPT | Mod: CPTII,S$GLB,, | Performed by: NURSE PRACTITIONER

## 2019-08-09 RX ORDER — BACLOFEN 10 MG/1
10 TABLET ORAL 3 TIMES DAILY
COMMUNITY
End: 2022-01-14

## 2019-08-09 NOTE — PROGRESS NOTES
"Subjective:       Patient ID: Danna Sorensen is a 79 y.o. female.    Chief Complaint: Follow-up    Ms. Sorensen presents today for a chronic conditions F/U. Has chronic lower back pain. Saw pain management in the past, had injections x6 and radiofrequency that did not provide any relief. Conversation about possible surgery, but surgeon could not tell her "what he was going to do" so she does not want to proceed. Rides stationary bike at home for exercise. Vitals and weight stable. Previously prescribed diethylpropion by Dr. Kim, but she states it "did not work". Stopped the medicaiton.     Patient Active Problem List   Diagnosis    Hypothyroid    Nuclear sclerosis    Hyperopia with astigmatism and presbyopia    DDD (degenerative disc disease), lumbar    Morbid obesity with BMI of 40.0-44.9, adult    Calcified granuloma of lung    History of colon cancer    Lumbar radiculitis    Other spondylosis, lumbar region    Chronic right-sided low back pain without sciatica    Vitamin D deficiency    Essential hypertension     Review of Systems   Constitutional: Negative for activity change, fatigue, fever and unexpected weight change.   Respiratory: Negative for shortness of breath.    Cardiovascular: Negative for chest pain and palpitations.   Gastrointestinal: Negative for abdominal pain, diarrhea, nausea and vomiting.   Genitourinary: Negative for dysuria.   Musculoskeletal: Positive for arthralgias and back pain.   Neurological: Negative for dizziness, weakness and light-headedness.       Objective:      Physical Exam   Constitutional: She is oriented to person, place, and time. She appears well-developed and well-nourished.   Cardiovascular: Normal rate, regular rhythm and normal heart sounds.   Pulmonary/Chest: Effort normal and breath sounds normal.   Musculoskeletal: She exhibits edema (+2 non pitting edema, compression hose in place).   Neurological: She is alert and oriented to person, place, and time. " "  Skin: Skin is warm and dry.   Psychiatric: She has a normal mood and affect.   Nursing note and vitals reviewed.      Assessment:       1. Essential hypertension    2. Hypothyroidism due to acquired atrophy of thyroid    3. Vitamin D deficiency    4. Chronic right-sided low back pain without sciatica    5. Morbid obesity with BMI of 40.0-44.9, adult    6. History of colon cancer        Plan:       Danna was seen today for follow-up.    Diagnoses and all orders for this visit:    Essential hypertension  Stable condition.  Continue current medications.  Will adjust based on lab findings or if condition changes.    Hypothyroidism due to acquired atrophy of thyroid  Controlled on current medications    Vitamin D deficiency  Continue current medications  Labs scheduled in September    Chronic right-sided low back pain without sciatica  Declined PT, continue conservative management    Morbid obesity with BMI of 40.0-44.9, adult  Counseled patient on his ideal body weight, health consequences of being obese and current recommendations including weekly exercise and a heart healthy diet.  Current BMI is:Estimated body mass index is 41.51 kg/m² as calculated from the following:    Height as of this encounter: 5' 4" (1.626 m).    Weight as of this encounter: 109.7 kg (241 lb 13.5 oz)..  Patient is aware that ideal BMI < 25 or Weight in (lb) to have BMI = 25: 145.3.  Exercise as able    History of colon cancer  Hem/onc following    Patient readiness: acceptance and barriers:none    During the course of the visit the patient was educated and counseled about the following:     Hypertension:   Medication: no change.    Goals: Hypertension: Reduce Blood Pressure    Did patient meet goals/outcomes: No    The following self management tools provided: declined    Patient Instructions (the written plan) was given to the patient/family.     Time spent with patient: 15 minutes    Barriers to medications present (no )    Adverse " reactions to current medications (no)    Over the counter medications reviewed (Yes)    F/U as scheduled with Dr. Kim

## 2019-09-03 RX ORDER — FUROSEMIDE 20 MG/1
TABLET ORAL
Qty: 90 TABLET | Refills: 3 | Status: SHIPPED | OUTPATIENT
Start: 2019-09-03 | End: 2021-07-12 | Stop reason: SDUPTHER

## 2019-09-23 ENCOUNTER — LAB VISIT (OUTPATIENT)
Dept: LAB | Facility: HOSPITAL | Age: 79
End: 2019-09-23
Attending: NURSE PRACTITIONER
Payer: MEDICARE

## 2019-09-23 DIAGNOSIS — E55.9 VITAMIN D DEFICIENCY: ICD-10-CM

## 2019-09-23 LAB — 25(OH)D3+25(OH)D2 SERPL-MCNC: 25 NG/ML (ref 30–96)

## 2019-09-23 PROCEDURE — 36415 COLL VENOUS BLD VENIPUNCTURE: CPT | Mod: PO

## 2019-09-23 PROCEDURE — 82306 VITAMIN D 25 HYDROXY: CPT

## 2019-10-09 ENCOUNTER — TELEPHONE (OUTPATIENT)
Dept: ORTHOPEDICS | Facility: CLINIC | Age: 79
End: 2019-10-09

## 2019-10-09 NOTE — TELEPHONE ENCOUNTER
Contacted pt to advise of the [employer] recall letter.  No answer, LVM asking she contact office in regards to surgery she had in 2017.

## 2019-10-10 ENCOUNTER — TELEPHONE (OUTPATIENT)
Dept: ORTHOPEDICS | Facility: CLINIC | Age: 79
End: 2019-10-10

## 2019-10-10 NOTE — TELEPHONE ENCOUNTER
----- Message from Parish Schulte sent at 10/10/2019 11:10 AM CDT -----  Contact: pt  Very important you call before noon  ----- Message -----  From: Parish Schulte  Sent: 10/10/2019  10:20 AM CDT  To: [facility] Staff    Returning please try again, [phone]  ///  [phone]

## 2019-10-10 NOTE — TELEPHONE ENCOUNTER
Spoke w/ pt, not having any issue with shoulder.  Encouraged her to call if issue or concern arose.

## 2019-10-10 NOTE — TELEPHONE ENCOUNTER
----- Message from Parish Schulte sent at 10/10/2019 10:20 AM CDT -----  Contact: pt  Returning please try again, [phone]  ///  [phone]

## 2019-11-29 ENCOUNTER — OFFICE VISIT (OUTPATIENT)
Dept: FAMILY MEDICINE | Facility: CLINIC | Age: 79
End: 2019-11-29
Payer: MEDICARE

## 2019-11-29 VITALS
TEMPERATURE: 98 F | SYSTOLIC BLOOD PRESSURE: 134 MMHG | WEIGHT: 241.38 LBS | DIASTOLIC BLOOD PRESSURE: 68 MMHG | HEIGHT: 64 IN | BODY MASS INDEX: 41.21 KG/M2 | OXYGEN SATURATION: 98 % | HEART RATE: 52 BPM

## 2019-11-29 DIAGNOSIS — G89.29 CHRONIC BILATERAL LOW BACK PAIN WITHOUT SCIATICA: ICD-10-CM

## 2019-11-29 DIAGNOSIS — E55.9 VITAMIN D DEFICIENCY: ICD-10-CM

## 2019-11-29 DIAGNOSIS — E66.01 MORBID OBESITY WITH BMI OF 40.0-44.9, ADULT: ICD-10-CM

## 2019-11-29 DIAGNOSIS — I10 ESSENTIAL HYPERTENSION: Primary | ICD-10-CM

## 2019-11-29 DIAGNOSIS — Z23 FLU VACCINE NEED: ICD-10-CM

## 2019-11-29 DIAGNOSIS — E03.4 HYPOTHYROIDISM DUE TO ACQUIRED ATROPHY OF THYROID: ICD-10-CM

## 2019-11-29 DIAGNOSIS — M54.50 CHRONIC BILATERAL LOW BACK PAIN WITHOUT SCIATICA: ICD-10-CM

## 2019-11-29 PROCEDURE — 99999 PR PBB SHADOW E&M-EST. PATIENT-LVL III: CPT | Mod: PBBFAC,,, | Performed by: FAMILY MEDICINE

## 2019-11-29 PROCEDURE — 99214 OFFICE O/P EST MOD 30 MIN: CPT | Mod: 25,S$GLB,, | Performed by: FAMILY MEDICINE

## 2019-11-29 PROCEDURE — 3075F PR MOST RECENT SYSTOLIC BLOOD PRESS GE 130-139MM HG: ICD-10-PCS | Mod: CPTII,S$GLB,, | Performed by: FAMILY MEDICINE

## 2019-11-29 PROCEDURE — 99999 PR PBB SHADOW E&M-EST. PATIENT-LVL III: ICD-10-PCS | Mod: PBBFAC,,, | Performed by: FAMILY MEDICINE

## 2019-11-29 PROCEDURE — 3075F SYST BP GE 130 - 139MM HG: CPT | Mod: CPTII,S$GLB,, | Performed by: FAMILY MEDICINE

## 2019-11-29 PROCEDURE — 1159F MED LIST DOCD IN RCRD: CPT | Mod: S$GLB,,, | Performed by: FAMILY MEDICINE

## 2019-11-29 PROCEDURE — 99214 PR OFFICE/OUTPT VISIT, EST, LEVL IV, 30-39 MIN: ICD-10-PCS | Mod: 25,S$GLB,, | Performed by: FAMILY MEDICINE

## 2019-11-29 PROCEDURE — 3078F PR MOST RECENT DIASTOLIC BLOOD PRESSURE < 80 MM HG: ICD-10-PCS | Mod: CPTII,S$GLB,, | Performed by: FAMILY MEDICINE

## 2019-11-29 PROCEDURE — 90662 IIV NO PRSV INCREASED AG IM: CPT | Mod: S$GLB,,, | Performed by: FAMILY MEDICINE

## 2019-11-29 PROCEDURE — 1101F PR PT FALLS ASSESS DOC 0-1 FALLS W/OUT INJ PAST YR: ICD-10-PCS | Mod: CPTII,S$GLB,, | Performed by: FAMILY MEDICINE

## 2019-11-29 PROCEDURE — 1159F PR MEDICATION LIST DOCUMENTED IN MEDICAL RECORD: ICD-10-PCS | Mod: S$GLB,,, | Performed by: FAMILY MEDICINE

## 2019-11-29 PROCEDURE — 90662 FLU VACCINE - HIGH DOSE (65+) PRESERVATIVE FREE IM: ICD-10-PCS | Mod: S$GLB,,, | Performed by: FAMILY MEDICINE

## 2019-11-29 PROCEDURE — G0008 ADMIN INFLUENZA VIRUS VAC: HCPCS | Mod: S$GLB,,, | Performed by: FAMILY MEDICINE

## 2019-11-29 PROCEDURE — 1101F PT FALLS ASSESS-DOCD LE1/YR: CPT | Mod: CPTII,S$GLB,, | Performed by: FAMILY MEDICINE

## 2019-11-29 PROCEDURE — 1125F AMNT PAIN NOTED PAIN PRSNT: CPT | Mod: S$GLB,,, | Performed by: FAMILY MEDICINE

## 2019-11-29 PROCEDURE — G0008 FLU VACCINE - HIGH DOSE (65+) PRESERVATIVE FREE IM: ICD-10-PCS | Mod: S$GLB,,, | Performed by: FAMILY MEDICINE

## 2019-11-29 PROCEDURE — 1125F PR PAIN SEVERITY QUANTIFIED, PAIN PRESENT: ICD-10-PCS | Mod: S$GLB,,, | Performed by: FAMILY MEDICINE

## 2019-11-29 PROCEDURE — 3078F DIAST BP <80 MM HG: CPT | Mod: CPTII,S$GLB,, | Performed by: FAMILY MEDICINE

## 2019-11-29 RX ORDER — METHYLPREDNISOLONE 4 MG/1
TABLET ORAL
Qty: 1 PACKAGE | Refills: 0 | Status: SHIPPED | OUTPATIENT
Start: 2019-11-29 | End: 2019-12-20

## 2019-11-29 RX ORDER — TRAMADOL HYDROCHLORIDE 50 MG/1
50 TABLET ORAL EVERY 12 HOURS PRN
Qty: 60 TABLET | Refills: 0 | Status: SHIPPED | OUTPATIENT
Start: 2019-11-29 | End: 2021-01-08 | Stop reason: SDUPTHER

## 2019-11-29 NOTE — PROGRESS NOTES
Subjective:       Patient ID: Danna Sorensen is a 79 y.o. female.    Chief Complaint: No chief complaint on file.    HPI  Review of Systems   Constitutional: Negative for fatigue and unexpected weight change.   Respiratory: Negative for chest tightness and shortness of breath.    Cardiovascular: Negative for chest pain, palpitations and leg swelling.   Gastrointestinal: Negative for abdominal pain.   Musculoskeletal: Positive for back pain and myalgias. Negative for arthralgias.   Neurological: Negative for dizziness, syncope, weakness, light-headedness, numbness and headaches.       Patient Active Problem List   Diagnosis    Hypothyroid    Nuclear sclerosis    Hyperopia with astigmatism and presbyopia    DDD (degenerative disc disease), lumbar    Morbid obesity with BMI of 40.0-44.9, adult    Calcified granuloma of lung    History of colon cancer 2017 s/p resection and chemo    Lumbar radiculitis    Other spondylosis, lumbar region    Chronic right-sided low back pain without sciatica    Vitamin D deficiency    Essential hypertension     Patient is here for a chronic conditions follow up.    Heme/onc Dr. Cali following for colon cancer s/p resection and chemo 2017    Has gradually worsening of lower back pain. Ache that radiates to right hip. No paresthesias. No B/B incontinence.  Gabapentin- nausea. lyrica gave pain rather than stopped it.  Tried PT, KHARI and radiofrequency ablation without relief.    Objective:      Physical Exam   Constitutional: She is oriented to person, place, and time. She appears well-developed and well-nourished.   Cardiovascular: Normal rate, regular rhythm and normal heart sounds.   Pulmonary/Chest: Effort normal and breath sounds normal.   Musculoskeletal: She exhibits no edema.        Lumbar back: She exhibits pain. She exhibits no tenderness.   Neurological: She is alert and oriented to person, place, and time.   Skin: Skin is warm and dry.   Psychiatric: She has a normal  "mood and affect.   Nursing note and vitals reviewed.      Assessment:       1. Essential hypertension    2. Hypothyroidism due to acquired atrophy of thyroid    3. Morbid obesity with BMI of 40.0-44.9, adult    4. Vitamin D deficiency    5. Chronic bilateral low back pain without sciatica    6. Flu vaccine need        Plan:         1. Essential hypertension  Controlled on current medications.  Continue current medications.    - Comprehensive metabolic panel; Future  - Lipid panel; Future    2. Hypothyroidism due to acquired atrophy of thyroid  Stable condition.  Continue current medications.  Will adjust based on lab findings or if condition changes.    - CBC auto differential; Future  - TSH; Future  - T4, free; Future    3. Morbid obesity with BMI of 40.0-44.9, adult  Counseled patient on his ideal body weight, health consequences of being obese and current recommendations including weekly exercise and a heart healthy diet.  Current BMI is:Estimated body mass index is 41.44 kg/m² as calculated from the following:    Height as of this encounter: 5' 4" (1.626 m).    Weight as of this encounter: 109.5 kg (241 lb 6.5 oz)..  Patient is aware that ideal BMI < 25 or Weight in (lb) to have BMI = 25: 145.3.        4. Vitamin D deficiency  Screen and treat as indicated:    - Vitamin D; Future    5. Chronic bilateral low back pain without sciatica  Add mainly at bedtime  - traMADol (ULTRAM) 50 mg tablet; Take 1 tablet (50 mg total) by mouth every 12 (twelve) hours as needed for Pain.  Dispense: 60 tablet; Refill: 0  Start  - methylPREDNISolone (MEDROL DOSEPACK) 4 mg tablet; use as directed  Dispense: 1 Package; Refill: 0  Use tylenol as needed for daily pain. Home Care:  1. You may need to stay in bed the first few days. But, as soon as possible, begin sitting or walking to avoid problems with prolonged bed rest (muscle weakness, worsening back stiffness and pain, blood clots in the legs).  2. When in bed, try to find a " position of comfort. A firm mattress is best. Try lying flat on your back with pillows under your knees. You can also try lying on your side with your knees bent up towards your chest and a pillow between your knees.  3. Avoid prolonged sitting. This puts more stress on the lower back than standing or walking.  4. During the first two days after injury, apply an ICE PACK to the painful area for 20 minutes every 2-4 hours. This will reduce swelling and pain. HEAT (hot shower, hot bath or heating pad) works well for muscle spasm. You can start with ice, then switch to heat after two days. Some patients feel best alternating ice and heat treatments. Use the one method that feels the best to you.  5. You may use acetaminophen (Tylenol) or ibuprofen (Motrin, Advil) to control pain, unless another pain medicine was prescribed. [NOTE: If you have chronic liver or kidney disease or ever had a stomach ulcer or GI bleeding, talk with your doctor before using these medicines.]  6. Be aware of safe lifting methods and do not lift anything over 15 pounds until all the pain is gone      6. Flu vaccine need  Immunize today.  Counseled patient on risks, benefits and side effects.  Patient elected to proceed with vaccination.    - Influenza - High Dose (65+) (PF) (IM)        Time spent with patient: 20 minutes    Patient with be reevaluated in 3 months or sooner prn    Greater than 50% of this visit was spent counseling as described in above documentation:Yes

## 2020-01-16 RX ORDER — LISINOPRIL 30 MG/1
TABLET ORAL
Qty: 90 TABLET | Refills: 3 | Status: SHIPPED | OUTPATIENT
Start: 2020-01-16 | End: 2021-01-08 | Stop reason: SDUPTHER

## 2020-02-11 DIAGNOSIS — E03.9 HYPOTHYROIDISM: ICD-10-CM

## 2020-02-13 ENCOUNTER — NURSE TRIAGE (OUTPATIENT)
Dept: ADMINISTRATIVE | Facility: CLINIC | Age: 80
End: 2020-02-13

## 2020-02-13 ENCOUNTER — TELEPHONE (OUTPATIENT)
Dept: FAMILY MEDICINE | Facility: CLINIC | Age: 80
End: 2020-02-13

## 2020-02-13 DIAGNOSIS — E03.9 HYPOTHYROIDISM: ICD-10-CM

## 2020-02-13 RX ORDER — LEVOTHYROXINE SODIUM 75 UG/1
TABLET ORAL
Qty: 90 TABLET | Refills: 3 | Status: SHIPPED | OUTPATIENT
Start: 2020-02-13 | End: 2020-02-14

## 2020-02-13 NOTE — TELEPHONE ENCOUNTER
----- Message from Bj De La Torre sent at 2/13/2020  2:00 PM CST -----  Contact: Patient  Type:  RX Refill Request    Who Called:  Patient  Refill or New Rx:  Refill  RX Name and Strength:  levothyroxine (SYNTHROID) 75 MCG tablet  How is the patient currently taking it? (ex. 1XDay):  As ordered  Is this a 30 day or 90 day RX:  90  Preferred Pharmacy with phone number:    [id number] - [place] - [address] [facility] [address]  [address] [address]  Phone: [phone] Fax: [phone]  Local or Mail Order:  Local  Ordering Provider:  Dr. Judy Lucio Call Back Number:  [phone]  Additional Information:  NA

## 2020-02-13 NOTE — TELEPHONE ENCOUNTER
----- Message from Katharine Stover sent at 2/13/2020  2:17 PM CST -----  Contact: patient  Type:  RX Refill Request    Who Called:  Patient  Refill or New Rx:  refill  RX Name and Strength:  levothyroxine (SYNTHROID) 75 MCG tablet  How is the patient currently taking it? (ex. 1XDay):  As directed  Is this a 30 day or 90 day RX:  30  Preferred Pharmacy with phone number:    [facility] #[id number] - [place] - [address] AT [facility] [address]  [address] [address]  Phone: [phone] Fax: [phone]  Local or Mail Order:  Local  Ordering Provider:  Judy Lucio Call Back Number:  [phone]  Additional Information:  Please advise-thank you

## 2020-02-13 NOTE — TELEPHONE ENCOUNTER
Pt states she has been trying all week to get authorization for her synthroid, she has one pill left for tomorrow morning, advised her I would send Dr Kim and staff a message letting them know    Reason for Disposition   Caller has medication question only, adult not sick, and triager answers question    Protocols used: MEDICATION QUESTION CALL-A-AH

## 2020-02-14 RX ORDER — LEVOTHYROXINE SODIUM 75 UG/1
TABLET ORAL
Qty: 90 TABLET | Refills: 3 | Status: SHIPPED | OUTPATIENT
Start: 2020-02-14 | End: 2021-01-08 | Stop reason: SDUPTHER

## 2020-03-02 ENCOUNTER — OFFICE VISIT (OUTPATIENT)
Dept: FAMILY MEDICINE | Facility: CLINIC | Age: 80
End: 2020-03-02
Payer: MEDICARE

## 2020-03-02 VITALS
WEIGHT: 244.06 LBS | HEART RATE: 67 BPM | TEMPERATURE: 98 F | SYSTOLIC BLOOD PRESSURE: 124 MMHG | OXYGEN SATURATION: 99 % | HEIGHT: 64 IN | BODY MASS INDEX: 41.67 KG/M2 | DIASTOLIC BLOOD PRESSURE: 78 MMHG

## 2020-03-02 DIAGNOSIS — E55.9 VITAMIN D DEFICIENCY: ICD-10-CM

## 2020-03-02 DIAGNOSIS — G89.29 CHRONIC RIGHT-SIDED LOW BACK PAIN WITHOUT SCIATICA: ICD-10-CM

## 2020-03-02 DIAGNOSIS — E03.4 HYPOTHYROIDISM DUE TO ACQUIRED ATROPHY OF THYROID: ICD-10-CM

## 2020-03-02 DIAGNOSIS — L30.4 INTERTRIGO: ICD-10-CM

## 2020-03-02 DIAGNOSIS — Z99.89 USE OF CANE AS AMBULATORY AID: ICD-10-CM

## 2020-03-02 DIAGNOSIS — I10 ESSENTIAL HYPERTENSION: Primary | ICD-10-CM

## 2020-03-02 DIAGNOSIS — M54.50 CHRONIC RIGHT-SIDED LOW BACK PAIN WITHOUT SCIATICA: ICD-10-CM

## 2020-03-02 PROCEDURE — 1159F PR MEDICATION LIST DOCUMENTED IN MEDICAL RECORD: ICD-10-PCS | Mod: S$GLB,,, | Performed by: NURSE PRACTITIONER

## 2020-03-02 PROCEDURE — 99499 UNLISTED E&M SERVICE: CPT | Mod: S$GLB,,, | Performed by: NURSE PRACTITIONER

## 2020-03-02 PROCEDURE — 1159F MED LIST DOCD IN RCRD: CPT | Mod: S$GLB,,, | Performed by: NURSE PRACTITIONER

## 2020-03-02 PROCEDURE — 99214 OFFICE O/P EST MOD 30 MIN: CPT | Mod: S$GLB,,, | Performed by: NURSE PRACTITIONER

## 2020-03-02 PROCEDURE — 99999 PR PBB SHADOW E&M-EST. PATIENT-LVL III: CPT | Mod: PBBFAC,,, | Performed by: NURSE PRACTITIONER

## 2020-03-02 PROCEDURE — 3074F PR MOST RECENT SYSTOLIC BLOOD PRESSURE < 130 MM HG: ICD-10-PCS | Mod: CPTII,S$GLB,, | Performed by: NURSE PRACTITIONER

## 2020-03-02 PROCEDURE — 1101F PR PT FALLS ASSESS DOC 0-1 FALLS W/OUT INJ PAST YR: ICD-10-PCS | Mod: CPTII,S$GLB,, | Performed by: NURSE PRACTITIONER

## 2020-03-02 PROCEDURE — 3078F DIAST BP <80 MM HG: CPT | Mod: CPTII,S$GLB,, | Performed by: NURSE PRACTITIONER

## 2020-03-02 PROCEDURE — 99214 PR OFFICE/OUTPT VISIT, EST, LEVL IV, 30-39 MIN: ICD-10-PCS | Mod: S$GLB,,, | Performed by: NURSE PRACTITIONER

## 2020-03-02 PROCEDURE — 1101F PT FALLS ASSESS-DOCD LE1/YR: CPT | Mod: CPTII,S$GLB,, | Performed by: NURSE PRACTITIONER

## 2020-03-02 PROCEDURE — 3078F PR MOST RECENT DIASTOLIC BLOOD PRESSURE < 80 MM HG: ICD-10-PCS | Mod: CPTII,S$GLB,, | Performed by: NURSE PRACTITIONER

## 2020-03-02 PROCEDURE — 99999 PR PBB SHADOW E&M-EST. PATIENT-LVL III: ICD-10-PCS | Mod: PBBFAC,,, | Performed by: NURSE PRACTITIONER

## 2020-03-02 PROCEDURE — 3074F SYST BP LT 130 MM HG: CPT | Mod: CPTII,S$GLB,, | Performed by: NURSE PRACTITIONER

## 2020-03-02 PROCEDURE — 99499 RISK ADDL DX/OHS AUDIT: ICD-10-PCS | Mod: S$GLB,,, | Performed by: NURSE PRACTITIONER

## 2020-03-02 RX ORDER — CYCLOBENZAPRINE HCL 5 MG
5 TABLET ORAL 3 TIMES DAILY PRN
COMMUNITY
End: 2022-01-14 | Stop reason: SDUPTHER

## 2020-03-02 RX ORDER — TRIAMCINOLONE ACETONIDE 1 MG/G
CREAM TOPICAL
Qty: 60 G | Refills: 0 | Status: SHIPPED | OUTPATIENT
Start: 2020-03-02 | End: 2020-07-02 | Stop reason: SDUPTHER

## 2020-03-02 RX ORDER — ACETAMINOPHEN 500 MG
1 TABLET ORAL DAILY
COMMUNITY
End: 2020-03-17 | Stop reason: ALTCHOICE

## 2020-03-02 RX ORDER — NYSTATIN 100000 U/G
CREAM TOPICAL 2 TIMES DAILY PRN
Qty: 30 G | Status: SHIPPED | OUTPATIENT
Start: 2020-03-02 | End: 2020-07-02 | Stop reason: SDUPTHER

## 2020-03-02 NOTE — PROGRESS NOTES
"Subjective:       Patient ID: Danna Sorensen is a 79 y.o. female.    Chief Complaint: Follow-up    HPI   Ms. Sorensen is a 78 yo female who presents today for chronic conditions follow up.  She is regularly treated for htn and hypothyroid.  She is compliant with all medications and denies side effects.  She continues to suffer with back pain.  She walks regularly with a cane and is inquiring about a handy cap tag.  She also has a rash on her left side in her "skin folds" that began about 2 days ago that is red and "itchy".  She has had this before and has used nystatin cream with success.    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   HENT: Negative for congestion, hearing loss, nosebleeds, postnasal drip, sinus pressure, sinus pain and sore throat.    Eyes: Negative for pain, discharge, redness and visual disturbance.   Respiratory: Negative for cough, chest tightness and shortness of breath.    Cardiovascular: Negative for chest pain and palpitations.   Gastrointestinal: Negative for abdominal pain, constipation, diarrhea, nausea and vomiting.   Endocrine: Negative for cold intolerance and heat intolerance.   Musculoskeletal: Positive for back pain (chronic).   Skin: Positive for rash.   Neurological: " Negative for dizziness, syncope, light-headedness and headaches.   Psychiatric/Behavioral: Negative for agitation and dysphoric mood. The patient is not nervous/anxious.        Objective:      Physical Exam   Constitutional: She is oriented to person, place, and time. She appears well-developed and well-nourished.   HENT:   Head: Normocephalic and atraumatic.   Eyes: Pupils are equal, round, and reactive to light. Conjunctivae are normal. Right eye exhibits no discharge. Left eye exhibits no discharge.   Neck: Normal range of motion. Neck supple. No thyromegaly present.   Cardiovascular: Normal rate, regular rhythm, normal heart sounds and intact distal pulses.   Pulmonary/Chest: Breath sounds normal. No respiratory distress. She has no wheezes.   Abdominal: Soft. Bowel sounds are normal. She exhibits no distension. There is no tenderness. There is no rebound.   Musculoskeletal: Normal range of motion. She exhibits no edema or deformity.   Lymphadenopathy:     She has no cervical adenopathy.   Neurological: She is alert and oriented to person, place, and time. No cranial nerve deficit or sensory deficit.   Skin: Skin is warm and dry. Rash noted.        Psychiatric: She has a normal mood and affect.       Assessment & Plan:       Essential hypertension  -controlled, continue current medication regimen  -Low salt diet  -Labs prior to next visit  Hypothyroidism due to acquired atrophy of thyroid  -Stable, continue current medication regimen  -Labs prior to next visit   Vitamin D deficiency  -Continue current medication regimen   Intertrigo  -     nystatin (MYCOSTATIN) cream; Apply topically 2 (two) times daily as needed. GRETCHEN EXT AA BID  Dispense: 30 g; Refill: prn        -     If no improvement with the above notify provider   Chronic right-sided low back pain without sciatica  -Continue current management  - Form completed for handicap tag and given to patient at visit   Use of cane as ambulatory aid        -    Continue use of assistive device when ambulating        Other orders  -     triamcinolone acetonide 0.1% (KENALOG) 0.1 % cream; APPLY EXTERNALLY TO THE AFFECTED AREA TWICE DAILY  Dispense: 60 g; Refill: 0                Follow up in about 2 months (around 5/2/2020) for lukas Kim with labs prior.

## 2020-03-02 NOTE — PROGRESS NOTES
Patient, Danna Sorensen (MRN #[id number]), presented with a recorded BMI of 41.89 kg/m^2 consistent with the definition of morbid obesity (ICD-10 E66.01). The patient's morbid obesity was monitored, evaluated, addressed and/or treated. This addendum to the medical record is made on 03/02/2020.

## 2020-03-11 ENCOUNTER — TELEPHONE (OUTPATIENT)
Dept: FAMILY MEDICINE | Facility: CLINIC | Age: 80
End: 2020-03-11

## 2020-03-11 NOTE — TELEPHONE ENCOUNTER
Mailed patient a letter, indicating Dr. Kim will be out of the office on 5/29/2020 and to call the office to reschedule office visit.

## 2020-03-16 ENCOUNTER — HOSPITAL ENCOUNTER (OUTPATIENT)
Dept: RADIOLOGY | Facility: CLINIC | Age: 80
Discharge: HOME | End: 2020-03-16
Attending: NURSE PRACTITIONER
Payer: MEDICARE

## 2020-03-16 DIAGNOSIS — Z85.038 HISTORY OF COLON CANCER: ICD-10-CM

## 2020-03-16 PROCEDURE — 71046 X-RAY EXAM CHEST 2 VIEWS: CPT | Mod: TC,FY,PO

## 2020-03-16 PROCEDURE — 71046 X-RAY EXAM CHEST 2 VIEWS: CPT | Mod: 26,,, | Performed by: RADIOLOGY

## 2020-03-16 PROCEDURE — 71046 XR CHEST PA AND LATERAL: ICD-10-PCS | Mod: 26,,, | Performed by: RADIOLOGY

## 2020-03-17 ENCOUNTER — TELEPHONE (OUTPATIENT)
Dept: HEMATOLOGY/ONCOLOGY | Facility: CLINIC | Age: 80
End: 2020-03-17

## 2020-03-17 DIAGNOSIS — E55.9 VITAMIN D DEFICIENCY: Primary | ICD-10-CM

## 2020-03-17 RX ORDER — ERGOCALCIFEROL 1.25 MG/1
50000 CAPSULE ORAL
Qty: 12 CAPSULE | Refills: 0 | Status: SHIPPED | OUTPATIENT
Start: 2020-03-17 | End: 2020-06-03

## 2020-03-17 NOTE — TELEPHONE ENCOUNTER
Attempted to contact patient via home & mobile numbers.  No VM set up to leave message.    Escribed Ergocalciferol 50,000 units weekly x 12; then OTC Vitamin D 2,000 I.U. Daily.

## 2020-03-17 NOTE — TELEPHONE ENCOUNTER
----- Message from Jeannie Loredo sent at 3/17/2020 11:42 AM CDT -----  Type:  Patient Returning Call    Who Called:  Pateint  Who Left Message for Patient:  Benji Cali  Does the patient know what this is regarding?:  no  Best Call Back Number:  [phone] (home) or [phone]

## 2020-03-17 NOTE — TELEPHONE ENCOUNTER
----- Message from Gordon Ortzi sent at 3/17/2020 11:21 AM CDT -----  Contact: pt  Type:  Patient Returning Call    Who Called:  pt  Who Left Message for Patient:  ALEKSANDRA Cali  Does the patient know what this is regarding?:    Best Call Back Number:  [phone]  Additional Information:

## 2020-03-17 NOTE — TELEPHONE ENCOUNTER
Spoke w pt.  Informed pt of lab results and instructions given on how to take vitamin D.  Pt verbalized understanding and appreciation of call.  Appt for 3/19/2020 canceled and appt rescheduled for June w labs prior.

## 2020-05-21 ENCOUNTER — TELEPHONE (OUTPATIENT)
Dept: HEMATOLOGY/ONCOLOGY | Facility: CLINIC | Age: 80
End: 2020-05-21

## 2020-05-21 ENCOUNTER — TELEPHONE (OUTPATIENT)
Dept: FAMILY MEDICINE | Facility: CLINIC | Age: 80
End: 2020-05-21

## 2020-05-21 NOTE — TELEPHONE ENCOUNTER
----- Message from Shelby Emerson sent at 5/21/2020  8:57 AM CDT -----  Contact: Patient  Type: Needs Medical Advice  Who Called:  Pt  Best Call Back Number: [phone]  Additional Information: Patient is calling to speak with a nurse in regards to Vitamin D.Please call back and advise.

## 2020-05-21 NOTE — TELEPHONE ENCOUNTER
----- Message from Farzaneh Fong sent at 5/21/2020  8:47 AM CDT -----  Contact: Patient  Type:  Sooner Apoointment Request      Name of Caller:  pt  When is the first available appointment?  september  Symptoms: 6 month f/u  Best Call Back Number:  [phone]  Additional Information:    Patient moved blood work to June 15th- requesting this appt after that.

## 2020-05-21 NOTE — TELEPHONE ENCOUNTER
----- Message from Audrey Fuentes sent at 5/21/2020  3:30 PM CDT -----  Type:  Patient Returning Call    Who Called:  Patient  Who Left Message for Patient:  Odilia Martin  Does the patient know what this is regarding?:  Vitamin D   Best Call Back Number:  [phone]  Additional Information:  Need to know how many units should take

## 2020-06-25 ENCOUNTER — LAB VISIT (OUTPATIENT)
Dept: LAB | Facility: HOSPITAL | Age: 80
End: 2020-06-25
Attending: FAMILY MEDICINE
Payer: MEDICARE

## 2020-06-25 DIAGNOSIS — E03.4 HYPOTHYROIDISM DUE TO ACQUIRED ATROPHY OF THYROID: ICD-10-CM

## 2020-06-25 DIAGNOSIS — E55.9 VITAMIN D DEFICIENCY: ICD-10-CM

## 2020-06-25 DIAGNOSIS — I10 ESSENTIAL HYPERTENSION: ICD-10-CM

## 2020-06-25 LAB
25(OH)D3+25(OH)D2 SERPL-MCNC: 26 NG/ML (ref 30–96)
ALBUMIN SERPL BCP-MCNC: 4 G/DL (ref 3.5–5.2)
ALP SERPL-CCNC: 51 U/L (ref 55–135)
ALT SERPL W/O P-5'-P-CCNC: 15 U/L (ref 10–44)
ANION GAP SERPL CALC-SCNC: 8 MMOL/L (ref 8–16)
AST SERPL-CCNC: 14 U/L (ref 10–40)
BASOPHILS # BLD AUTO: 0.02 K/UL (ref 0–0.2)
BASOPHILS NFR BLD: 0.4 % (ref 0–1.9)
BILIRUB SERPL-MCNC: 1.2 MG/DL (ref 0.1–1)
BUN SERPL-MCNC: 13 MG/DL (ref 8–23)
CALCIUM SERPL-MCNC: 9.3 MG/DL (ref 8.7–10.5)
CHLORIDE SERPL-SCNC: 110 MMOL/L (ref 95–110)
CHOLEST SERPL-MCNC: 174 MG/DL (ref 120–199)
CHOLEST/HDLC SERPL: 3.1 {RATIO} (ref 2–5)
CO2 SERPL-SCNC: 24 MMOL/L (ref 23–29)
CREAT SERPL-MCNC: 0.7 MG/DL (ref 0.5–1.4)
DIFFERENTIAL METHOD: ABNORMAL
EOSINOPHIL # BLD AUTO: 0.1 K/UL (ref 0–0.5)
EOSINOPHIL NFR BLD: 1.8 % (ref 0–8)
ERYTHROCYTE [DISTWIDTH] IN BLOOD BY AUTOMATED COUNT: 13.1 % (ref 11.5–14.5)
EST. GFR  (AFRICAN AMERICAN): >60 ML/MIN/1.73 M^2
EST. GFR  (NON AFRICAN AMERICAN): >60 ML/MIN/1.73 M^2
GLUCOSE SERPL-MCNC: 95 MG/DL (ref 70–110)
HCT VFR BLD AUTO: 41.3 % (ref 37–48.5)
HDLC SERPL-MCNC: 57 MG/DL (ref 40–75)
HDLC SERPL: 32.8 % (ref 20–50)
HGB BLD-MCNC: 12.9 G/DL (ref 12–16)
IMM GRANULOCYTES # BLD AUTO: 0.03 K/UL (ref 0–0.04)
IMM GRANULOCYTES NFR BLD AUTO: 0.7 % (ref 0–0.5)
LDLC SERPL CALC-MCNC: 103 MG/DL (ref 63–159)
LYMPHOCYTES # BLD AUTO: 1.3 K/UL (ref 1–4.8)
LYMPHOCYTES NFR BLD: 27.9 % (ref 18–48)
MCH RBC QN AUTO: 30.9 PG (ref 27–31)
MCHC RBC AUTO-ENTMCNC: 31.2 G/DL (ref 32–36)
MCV RBC AUTO: 99 FL (ref 82–98)
MONOCYTES # BLD AUTO: 0.4 K/UL (ref 0.3–1)
MONOCYTES NFR BLD: 8.3 % (ref 4–15)
NEUTROPHILS # BLD AUTO: 2.8 K/UL (ref 1.8–7.7)
NEUTROPHILS NFR BLD: 60.9 % (ref 38–73)
NONHDLC SERPL-MCNC: 117 MG/DL
NRBC BLD-RTO: 0 /100 WBC
PLATELET # BLD AUTO: 184 K/UL (ref 150–350)
PMV BLD AUTO: 10.1 FL (ref 9.2–12.9)
POTASSIUM SERPL-SCNC: 4.4 MMOL/L (ref 3.5–5.1)
PROT SERPL-MCNC: 6.8 G/DL (ref 6–8.4)
RBC # BLD AUTO: 4.18 M/UL (ref 4–5.4)
SODIUM SERPL-SCNC: 142 MMOL/L (ref 136–145)
T4 FREE SERPL-MCNC: 1 NG/DL (ref 0.71–1.51)
TRIGL SERPL-MCNC: 70 MG/DL (ref 30–150)
TSH SERPL DL<=0.005 MIU/L-ACNC: 3.07 UIU/ML (ref 0.4–4)
WBC # BLD AUTO: 4.56 K/UL (ref 3.9–12.7)

## 2020-06-25 PROCEDURE — 84443 ASSAY THYROID STIM HORMONE: CPT

## 2020-06-25 PROCEDURE — 85025 COMPLETE CBC W/AUTO DIFF WBC: CPT

## 2020-06-25 PROCEDURE — 80053 COMPREHEN METABOLIC PANEL: CPT

## 2020-06-25 PROCEDURE — 84439 ASSAY OF FREE THYROXINE: CPT

## 2020-06-25 PROCEDURE — 36415 COLL VENOUS BLD VENIPUNCTURE: CPT | Mod: PO

## 2020-06-25 PROCEDURE — 82306 VITAMIN D 25 HYDROXY: CPT

## 2020-06-25 PROCEDURE — 80061 LIPID PANEL: CPT

## 2020-07-02 ENCOUNTER — OFFICE VISIT (OUTPATIENT)
Dept: FAMILY MEDICINE | Facility: CLINIC | Age: 80
End: 2020-07-02
Payer: MEDICARE

## 2020-07-02 VITALS
HEART RATE: 63 BPM | OXYGEN SATURATION: 96 % | RESPIRATION RATE: 14 BRPM | TEMPERATURE: 98 F | WEIGHT: 245.56 LBS | HEIGHT: 64 IN | DIASTOLIC BLOOD PRESSURE: 60 MMHG | BODY MASS INDEX: 41.92 KG/M2 | SYSTOLIC BLOOD PRESSURE: 120 MMHG

## 2020-07-02 DIAGNOSIS — E03.4 HYPOTHYROIDISM DUE TO ACQUIRED ATROPHY OF THYROID: ICD-10-CM

## 2020-07-02 DIAGNOSIS — M54.16 LUMBAR RADICULITIS: ICD-10-CM

## 2020-07-02 DIAGNOSIS — L30.4 INTERTRIGO: ICD-10-CM

## 2020-07-02 DIAGNOSIS — E66.01 MORBID OBESITY WITH BMI OF 40.0-44.9, ADULT: ICD-10-CM

## 2020-07-02 DIAGNOSIS — E55.9 VITAMIN D DEFICIENCY: Primary | ICD-10-CM

## 2020-07-02 DIAGNOSIS — I10 ESSENTIAL HYPERTENSION: ICD-10-CM

## 2020-07-02 PROCEDURE — 1159F PR MEDICATION LIST DOCUMENTED IN MEDICAL RECORD: ICD-10-PCS | Mod: S$GLB,,, | Performed by: FAMILY MEDICINE

## 2020-07-02 PROCEDURE — 1126F PR PAIN SEVERITY QUANTIFIED, NO PAIN PRESENT: ICD-10-PCS | Mod: S$GLB,,, | Performed by: FAMILY MEDICINE

## 2020-07-02 PROCEDURE — 3078F PR MOST RECENT DIASTOLIC BLOOD PRESSURE < 80 MM HG: ICD-10-PCS | Mod: CPTII,S$GLB,, | Performed by: FAMILY MEDICINE

## 2020-07-02 PROCEDURE — 1101F PR PT FALLS ASSESS DOC 0-1 FALLS W/OUT INJ PAST YR: ICD-10-PCS | Mod: CPTII,S$GLB,, | Performed by: FAMILY MEDICINE

## 2020-07-02 PROCEDURE — 99214 PR OFFICE/OUTPT VISIT, EST, LEVL IV, 30-39 MIN: ICD-10-PCS | Mod: S$GLB,,, | Performed by: FAMILY MEDICINE

## 2020-07-02 PROCEDURE — 3074F PR MOST RECENT SYSTOLIC BLOOD PRESSURE < 130 MM HG: ICD-10-PCS | Mod: CPTII,S$GLB,, | Performed by: FAMILY MEDICINE

## 2020-07-02 PROCEDURE — 1126F AMNT PAIN NOTED NONE PRSNT: CPT | Mod: S$GLB,,, | Performed by: FAMILY MEDICINE

## 2020-07-02 PROCEDURE — 3074F SYST BP LT 130 MM HG: CPT | Mod: CPTII,S$GLB,, | Performed by: FAMILY MEDICINE

## 2020-07-02 PROCEDURE — 1101F PT FALLS ASSESS-DOCD LE1/YR: CPT | Mod: CPTII,S$GLB,, | Performed by: FAMILY MEDICINE

## 2020-07-02 PROCEDURE — 99214 OFFICE O/P EST MOD 30 MIN: CPT | Mod: S$GLB,,, | Performed by: FAMILY MEDICINE

## 2020-07-02 PROCEDURE — 99999 PR PBB SHADOW E&M-EST. PATIENT-LVL III: CPT | Mod: PBBFAC,,, | Performed by: FAMILY MEDICINE

## 2020-07-02 PROCEDURE — 3078F DIAST BP <80 MM HG: CPT | Mod: CPTII,S$GLB,, | Performed by: FAMILY MEDICINE

## 2020-07-02 PROCEDURE — 1159F MED LIST DOCD IN RCRD: CPT | Mod: S$GLB,,, | Performed by: FAMILY MEDICINE

## 2020-07-02 PROCEDURE — 99999 PR PBB SHADOW E&M-EST. PATIENT-LVL III: ICD-10-PCS | Mod: PBBFAC,,, | Performed by: FAMILY MEDICINE

## 2020-07-02 RX ORDER — NYSTATIN 100000 U/G
CREAM TOPICAL 2 TIMES DAILY PRN
Qty: 30 G | Status: SHIPPED | OUTPATIENT
Start: 2020-07-02 | End: 2023-07-24 | Stop reason: SDUPTHER

## 2020-07-02 RX ORDER — ERGOCALCIFEROL 1.25 MG/1
50000 CAPSULE ORAL
Qty: 12 CAPSULE | Refills: 1 | Status: SHIPPED | OUTPATIENT
Start: 2020-07-02 | End: 2021-01-08 | Stop reason: SDUPTHER

## 2020-07-02 RX ORDER — TRIAMCINOLONE ACETONIDE 1 MG/G
CREAM TOPICAL
Qty: 60 G | Refills: 0 | Status: SHIPPED | OUTPATIENT
Start: 2020-07-02 | End: 2023-05-30 | Stop reason: SDUPTHER

## 2020-07-02 NOTE — PROGRESS NOTES
Subjective:       Patient ID: Danna Sorensen is a 80 y.o. female.    Chief Complaint: Follow-up (6mth f/u hypertension)    HPI  Review of Systems   Constitutional: Negative for fatigue and unexpected weight change.   Respiratory: Negative for chest tightness and shortness of breath.    Cardiovascular: Negative for chest pain, palpitations and leg swelling.   Gastrointestinal: Negative for abdominal pain.   Musculoskeletal: Negative for arthralgias.   Neurological: Negative for dizziness, syncope, light-headedness and headaches.       Patient Active Problem List   Diagnosis    Hypothyroid    Nuclear sclerosis    Hyperopia with astigmatism and presbyopia    DDD (degenerative disc disease), lumbar    Morbid obesity with BMI of 40.0-44.9, adult    Calcified granuloma of lung    History of colon cancer 2017 s/p resection and chemo    Lumbar radiculitis    Other spondylosis, lumbar region    Chronic right-sided low back pain without sciatica    Vitamin D deficiency    Essential hypertension     Patient is here for a chronic conditions follow up.    Reviewed labs 6/20   Vit d low 26     Heme/onc Dr. Cali following for colon cancer s/p resection and chemo 2017    Declines mammo  Objective:      Physical Exam  Vitals signs and nursing note reviewed.   Constitutional:       Appearance: She is well-developed.   Cardiovascular:      Rate and Rhythm: Normal rate and regular rhythm.      Heart sounds: Normal heart sounds.   Pulmonary:      Effort: Pulmonary effort is normal.      Breath sounds: Normal breath sounds.   Skin:     General: Skin is warm and dry.   Neurological:      Mental Status: She is alert and oriented to person, place, and time.         Assessment:       1. Vitamin D deficiency    2. Intertrigo    3. Hypothyroidism due to acquired atrophy of thyroid    4. Morbid obesity with BMI of 40.0-44.9, adult    5. Lumbar radiculitis    6. Essential hypertension        Plan:       1. Intertrigo  Apply as  "needed   - nystatin (MYCOSTATIN) cream; Apply topically 2 (two) times daily as needed. GRETCHEN EXT AA BID  Dispense: 30 g; Refill: prn    2. Vitamin D deficiency  Add  - ergocalciferol (ERGOCALCIFEROL) 50,000 unit Cap; Take 1 capsule (50,000 Units total) by mouth every 7 days.  Dispense: 12 capsule; Refill: 1    3. Hypothyroidism due to acquired atrophy of thyroid  Controlled on current medications.  Continue current medications.    - CBC auto differential; Future  - Comprehensive metabolic panel; Future  - TSH; Future  - T4, free; Future    4. Morbid obesity with BMI of 40.0-44.9, adult  Counseled patient on his ideal body weight, health consequences of being obese and current recommendations including weekly exercise and a heart healthy diet.  Current BMI is:Estimated body mass index is 42.16 kg/m² as calculated from the following:    Height as of this encounter: 5' 4" (1.626 m).    Weight as of this encounter: 111.4 kg (245 lb 9.5 oz)..  Patient is aware that ideal BMI < 25 or Weight in (lb) to have BMI = 25: 145.3.        5. Lumbar radiculitis  Cont current mgmt    6. Essential hypertension  Controlled on current medications.  Continue current medications.      Time spent with patient: 20 minutes    Patient with be reevaluated in 6 months or sooner prn    Greater than 50% of this visit was spent counseling as described in above documentation:Yes  "

## 2020-07-06 ENCOUNTER — TELEPHONE (OUTPATIENT)
Dept: HEMATOLOGY/ONCOLOGY | Facility: CLINIC | Age: 80
End: 2020-07-06

## 2020-07-06 NOTE — TELEPHONE ENCOUNTER
----- Message from Ellen Sullivan sent at 7/6/2020 10:22 AM CDT -----  Regarding: apt. mix up  Contact: patient  Type: Needs Medical Advice  Who Called:  patient  Best Call Back Number: [phone]  Additional Information: Patient stating she had VV this morning but epic shows in clinic vist.  Patient states she has paperwork sating it was VV.  Sent message to teams.  Please call to advise.  Thanks!

## 2020-07-08 ENCOUNTER — OFFICE VISIT (OUTPATIENT)
Dept: HEMATOLOGY/ONCOLOGY | Facility: CLINIC | Age: 80
End: 2020-07-08
Payer: MEDICARE

## 2020-07-08 VITALS
HEIGHT: 64 IN | BODY MASS INDEX: 42.12 KG/M2 | WEIGHT: 246.69 LBS | TEMPERATURE: 99 F | HEART RATE: 67 BPM | SYSTOLIC BLOOD PRESSURE: 189 MMHG | DIASTOLIC BLOOD PRESSURE: 82 MMHG | RESPIRATION RATE: 18 BRPM | OXYGEN SATURATION: 96 %

## 2020-07-08 DIAGNOSIS — E55.9 VITAMIN D DEFICIENCY: ICD-10-CM

## 2020-07-08 DIAGNOSIS — Z85.038 HISTORY OF COLON CANCER: Primary | ICD-10-CM

## 2020-07-08 DIAGNOSIS — M85.852 OSTEOPENIA OF NECK OF LEFT FEMUR: ICD-10-CM

## 2020-07-08 PROCEDURE — 99499 RISK ADDL DX/OHS AUDIT: ICD-10-PCS | Mod: S$GLB,,, | Performed by: NURSE PRACTITIONER

## 2020-07-08 PROCEDURE — 1101F PT FALLS ASSESS-DOCD LE1/YR: CPT | Mod: CPTII,S$GLB,, | Performed by: NURSE PRACTITIONER

## 2020-07-08 PROCEDURE — 99213 PR OFFICE/OUTPT VISIT, EST, LEVL III, 20-29 MIN: ICD-10-PCS | Mod: S$GLB,,, | Performed by: NURSE PRACTITIONER

## 2020-07-08 PROCEDURE — 99213 OFFICE O/P EST LOW 20 MIN: CPT | Mod: S$GLB,,, | Performed by: NURSE PRACTITIONER

## 2020-07-08 PROCEDURE — 3077F PR MOST RECENT SYSTOLIC BLOOD PRESSURE >= 140 MM HG: ICD-10-PCS | Mod: CPTII,S$GLB,, | Performed by: NURSE PRACTITIONER

## 2020-07-08 PROCEDURE — 3077F SYST BP >= 140 MM HG: CPT | Mod: CPTII,S$GLB,, | Performed by: NURSE PRACTITIONER

## 2020-07-08 PROCEDURE — 99499 UNLISTED E&M SERVICE: CPT | Mod: S$GLB,,, | Performed by: NURSE PRACTITIONER

## 2020-07-08 PROCEDURE — 1101F PR PT FALLS ASSESS DOC 0-1 FALLS W/OUT INJ PAST YR: ICD-10-PCS | Mod: CPTII,S$GLB,, | Performed by: NURSE PRACTITIONER

## 2020-07-08 PROCEDURE — 1125F AMNT PAIN NOTED PAIN PRSNT: CPT | Mod: S$GLB,,, | Performed by: NURSE PRACTITIONER

## 2020-07-08 PROCEDURE — 3079F PR MOST RECENT DIASTOLIC BLOOD PRESSURE 80-89 MM HG: ICD-10-PCS | Mod: CPTII,S$GLB,, | Performed by: NURSE PRACTITIONER

## 2020-07-08 PROCEDURE — 3079F DIAST BP 80-89 MM HG: CPT | Mod: CPTII,S$GLB,, | Performed by: NURSE PRACTITIONER

## 2020-07-08 PROCEDURE — 1159F MED LIST DOCD IN RCRD: CPT | Mod: S$GLB,,, | Performed by: NURSE PRACTITIONER

## 2020-07-08 PROCEDURE — 1159F PR MEDICATION LIST DOCUMENTED IN MEDICAL RECORD: ICD-10-PCS | Mod: S$GLB,,, | Performed by: NURSE PRACTITIONER

## 2020-07-08 PROCEDURE — 99999 PR PBB SHADOW E&M-EST. PATIENT-LVL V: ICD-10-PCS | Mod: PBBFAC,,, | Performed by: NURSE PRACTITIONER

## 2020-07-08 PROCEDURE — 1125F PR PAIN SEVERITY QUANTIFIED, PAIN PRESENT: ICD-10-PCS | Mod: S$GLB,,, | Performed by: NURSE PRACTITIONER

## 2020-07-08 PROCEDURE — 99999 PR PBB SHADOW E&M-EST. PATIENT-LVL V: CPT | Mod: PBBFAC,,, | Performed by: NURSE PRACTITIONER

## 2020-07-08 NOTE — PROGRESS NOTES
HISTORY OF PRESENT ILLNESS:  This is a 80-year-old white female known to Dr. Gilmore for Stage III adenocarcinoma of the colon.  She is status post resection and 12 cycles of adjuvant FOLFOX.      She presents to the clinic today for her annual evaluation.  She is now out 156 months (13 years) post-therapy. She denies any difficulties with fevers, chills, drenching night sweats, changes in the caliber or character of her stool,   abdominal discomfort or bloating, nausea, vomiting, constipation, diarrhea, unexplained weight loss, irregular heartbeat, chest pain, bleeding, etc.  No other new complaints or pertinent findings on a 14-point review of systems.    PHYSICAL EXAMINATION:  GENERAL:  Well-developed, morbidly obese white female in no acute distress.  Alert and oriented x3.  VITAL SIGNS: stable  Wt Readings from Last 3 Encounters:   07/08/20 111.9 kg (246 lb 11.1 oz)   07/02/20 111.4 kg (245 lb 9.5 oz)   03/02/20 110.7 kg (244 lb 0.8 oz)     Temp Readings from Last 3 Encounters:   07/08/20 98.6 °F (37 °C) (Temporal)   07/02/20 97.9 °F (36.6 °C) (Oral)   03/02/20 98 °F (36.7 °C) (Oral)     BP Readings from Last 3 Encounters:   07/08/20 (!) 189/82   07/02/20 120/60   03/02/20 124/78     Pulse Readings from Last 3 Encounters:   07/08/20 67   07/02/20 63   03/02/20 67     HEENT:  Normocephalic, atraumatic.  Oral mucosa pink and moist.  Lips without lesions.  Tongue midline.  Oropharynx clear.  Nonicteric sclerae.  NECK:  Supple, No carotid bruits, thyromegaly or thyroid nodule.  HEART:  Regular rate and rhythm without murmur, gallop or rub.  LUNGS:  Clear to auscultation bilaterally.  Normal respiratory effort.  ABDOMEN:  Soft, nontender, nondistended with positive normoactive bowel sounds, no hepatosplenomegaly.  EXTREMITIES:  No cyanosis, clubbing or edema.  Distal pulses are intact.  AXILLAE AND GROIN:  No palpable pathologic lymphadenopathy is appreciated.  SKIN:  Intact/turgor normal.  NEUROLOGIC:  Cranial  nerves II-XII grossly intact.  Motor:  Good muscle bulk and tone.  Strength/sensory 5/5 throughout.  Gait stable.    LABORATORY:    Lab Results   Component Value Date    WBC 4.56 06/25/2020    RBC 4.18 06/25/2020    HGB 12.9 06/25/2020    HCT 41.3 06/25/2020    MCV 99 (H) 06/25/2020    MCH 30.9 06/25/2020    MCHC 31.2 (L) 06/25/2020    RDW 13.1 06/25/2020     06/25/2020    MPV 10.1 06/25/2020    GRAN 2.8 06/25/2020    GRAN 60.9 06/25/2020    LYMPH 1.3 06/25/2020    LYMPH 27.9 06/25/2020    MONO 0.4 06/25/2020    MONO 8.3 06/25/2020    EOS 0.1 06/25/2020    BASO 0.02 06/25/2020    EOSINOPHIL 1.8 06/25/2020    BASOPHIL 0.4 06/25/2020     CMP  Sodium   Date Value Ref Range Status   06/25/2020 142 136 - 145 mmol/L Final     Potassium   Date Value Ref Range Status   06/25/2020 4.4 3.5 - 5.1 mmol/L Final     Chloride   Date Value Ref Range Status   06/25/2020 110 95 - 110 mmol/L Final     CO2   Date Value Ref Range Status   06/25/2020 24 23 - 29 mmol/L Final     Glucose   Date Value Ref Range Status   06/25/2020 95 70 - 110 mg/dL Final     BUN, Bld   Date Value Ref Range Status   06/25/2020 13 8 - 23 mg/dL Final     Creatinine   Date Value Ref Range Status   06/25/2020 0.7 0.5 - 1.4 mg/dL Final     Calcium   Date Value Ref Range Status   06/25/2020 9.3 8.7 - 10.5 mg/dL Final     Total Protein   Date Value Ref Range Status   06/25/2020 6.8 6.0 - 8.4 g/dL Final     Albumin   Date Value Ref Range Status   06/25/2020 4.0 3.5 - 5.2 g/dL Final     Total Bilirubin   Date Value Ref Range Status   06/25/2020 1.2 (H) 0.1 - 1.0 mg/dL Final     Comment:     For infants and newborns, interpretation of results should be based  on gestational age, weight and in agreement with clinical  observations.  Premature Infant recommended reference ranges:  Up to 24 hours.............<8.0 mg/dL  Up to 48 hours............<12.0 mg/dL  3-5 days..................<15.0 mg/dL  6-29 days.................<15.0 mg/dL       Alkaline Phosphatase    Date Value Ref Range Status   06/25/2020 51 (L) 55 - 135 U/L Final     AST   Date Value Ref Range Status   06/25/2020 14 10 - 40 U/L Final     ALT   Date Value Ref Range Status   06/25/2020 15 10 - 44 U/L Final     Anion Gap   Date Value Ref Range Status   06/25/2020 8 8 - 16 mmol/L Final     eGFR if    Date Value Ref Range Status   06/25/2020 >60.0 >60 mL/min/1.73 m^2 Final     eGFR if non    Date Value Ref Range Status   06/25/2020 >60.0 >60 mL/min/1.73 m^2 Final     Comment:     Calculation used to obtain the estimated glomerular filtration  rate (eGFR) is the CKD-EPI equation.            Vitamin D:  28 (25, 19) - started on Ergocalciferol 50,000 units weekly x 12    Chest x-ray dated 03/16/2020: Impression   No definite acute cardiopulmonary disease and no significant change relative to March 18, 2019    BMD dated 03/21/2020:  Osteopenia in left femoral neck    Colonoscopy dated 12/18/2016:  Impression, diverticulosis; normal, repeat in 10 years.    IMPRESSION:  1.  Stage III adenocarcinoma of the colon disease - VINNY.  2.  Status post right shoulder arthroplasty - 01/12/2017  3.  Osteopenia - start calcium supplementation, weight bearing exercises     PLAN:    Return in one year with interval CBC, CMP, LDH, Vitamin D and chest x-ray prior.  Continue Vitamin D supplementation per Dr. Kim.    Assessment/plan reviewed and approved by Dr. Gilmore.

## 2020-08-26 ENCOUNTER — TELEPHONE (OUTPATIENT)
Dept: FAMILY MEDICINE | Facility: CLINIC | Age: 80
End: 2020-08-26

## 2020-08-26 ENCOUNTER — OFFICE VISIT (OUTPATIENT)
Dept: FAMILY MEDICINE | Facility: CLINIC | Age: 80
End: 2020-08-26
Payer: MEDICARE

## 2020-08-26 VITALS
TEMPERATURE: 98 F | HEART RATE: 67 BPM | SYSTOLIC BLOOD PRESSURE: 130 MMHG | WEIGHT: 237.63 LBS | DIASTOLIC BLOOD PRESSURE: 76 MMHG | BODY MASS INDEX: 40.57 KG/M2 | OXYGEN SATURATION: 97 % | RESPIRATION RATE: 18 BRPM | HEIGHT: 64 IN

## 2020-08-26 DIAGNOSIS — C18.2 MALIGNANT NEOPLASM OF ASCENDING COLON: ICD-10-CM

## 2020-08-26 DIAGNOSIS — E66.01 MORBID OBESITY WITH BMI OF 40.0-44.9, ADULT: ICD-10-CM

## 2020-08-26 DIAGNOSIS — Z01.00 EYE EXAM, ROUTINE: ICD-10-CM
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
## 2020-08-26 DIAGNOSIS — J84.10 CALCIFIED GRANULOMA OF LUNG: ICD-10-CM

## 2020-08-26 DIAGNOSIS — E03.4 HYPOTHYROIDISM DUE TO ACQUIRED ATROPHY OF THYROID: ICD-10-CM

## 2020-08-26 DIAGNOSIS — Z99.89 USE OF CANE AS AMBULATORY AID: ICD-10-CM

## 2020-08-26 DIAGNOSIS — M51.36 DDD (DEGENERATIVE DISC DISEASE), LUMBAR: ICD-10-CM

## 2020-08-26 DIAGNOSIS — I10 ESSENTIAL HYPERTENSION: ICD-10-CM

## 2020-08-26 PROCEDURE — 99999 PR PBB SHADOW E&M-EST. PATIENT-LVL V: ICD-10-PCS | Mod: PBBFAC,,, | Performed by: NURSE PRACTITIONER

## 2020-08-26 PROCEDURE — 1126F PR PAIN SEVERITY QUANTIFIED, NO PAIN PRESENT: ICD-10-PCS | Mod: S$GLB,,, | Performed by: NURSE PRACTITIONER

## 2020-08-26 PROCEDURE — 99214 PR OFFICE/OUTPT VISIT, EST, LEVL IV, 30-39 MIN: ICD-10-PCS | Mod: S$GLB,,, | Performed by: NURSE PRACTITIONER

## 2020-08-26 PROCEDURE — 3078F DIAST BP <80 MM HG: CPT | Mod: CPTII,S$GLB,, | Performed by: NURSE PRACTITIONER

## 2020-08-26 PROCEDURE — 99214 OFFICE O/P EST MOD 30 MIN: CPT | Mod: S$GLB,,, | Performed by: NURSE PRACTITIONER

## 2020-08-26 PROCEDURE — 1126F AMNT PAIN NOTED NONE PRSNT: CPT | Mod: S$GLB,,, | Performed by: NURSE PRACTITIONER

## 2020-08-26 PROCEDURE — 1159F PR MEDICATION LIST DOCUMENTED IN MEDICAL RECORD: ICD-10-PCS | Mod: S$GLB,,, | Performed by: NURSE PRACTITIONER

## 2020-08-26 PROCEDURE — 1101F PR PT FALLS ASSESS DOC 0-1 FALLS W/OUT INJ PAST YR: ICD-10-PCS | Mod: CPTII,S$GLB,, | Performed by: NURSE PRACTITIONER

## 2020-08-26 PROCEDURE — 1159F MED LIST DOCD IN RCRD: CPT | Mod: S$GLB,,, | Performed by: NURSE PRACTITIONER

## 2020-08-26 PROCEDURE — 1101F PT FALLS ASSESS-DOCD LE1/YR: CPT | Mod: CPTII,S$GLB,, | Performed by: NURSE PRACTITIONER

## 2020-08-26 PROCEDURE — 3075F PR MOST RECENT SYSTOLIC BLOOD PRESS GE 130-139MM HG: ICD-10-PCS | Mod: CPTII,S$GLB,, | Performed by: NURSE PRACTITIONER

## 2020-08-26 PROCEDURE — 99499 RISK ADDL DX/OHS AUDIT: ICD-10-PCS | Mod: S$GLB,,, | Performed by: NURSE PRACTITIONER

## 2020-08-26 PROCEDURE — 3075F SYST BP GE 130 - 139MM HG: CPT | Mod: CPTII,S$GLB,, | Performed by: NURSE PRACTITIONER

## 2020-08-26 PROCEDURE — 99499 UNLISTED E&M SERVICE: CPT | Mod: S$GLB,,, | Performed by: NURSE PRACTITIONER

## 2020-08-26 PROCEDURE — 3078F PR MOST RECENT DIASTOLIC BLOOD PRESSURE < 80 MM HG: ICD-10-PCS | Mod: CPTII,S$GLB,, | Performed by: NURSE PRACTITIONER

## 2020-08-26 PROCEDURE — 99999 PR PBB SHADOW E&M-EST. PATIENT-LVL V: CPT | Mod: PBBFAC,,, | Performed by: NURSE PRACTITIONER
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
## 2020-08-27 ENCOUNTER — TELEPHONE (OUTPATIENT)
Dept: FAMILY MEDICINE | Facility: CLINIC | Age: 80
End: 2020-08-27

## 2020-08-27 ENCOUNTER — OFFICE VISIT (OUTPATIENT)
Dept: OPTOMETRY | Facility: CLINIC | Age: 80
End: 2020-08-27
Payer: MEDICARE

## 2020-08-27 DIAGNOSIS — H52.03 HYPEROPIA OF BOTH EYES WITH ASTIGMATISM AND PRESBYOPIA: ICD-10-CM

## 2020-08-27 DIAGNOSIS — Z01.00 EYE EXAM, ROUTINE: ICD-10-CM

## 2020-08-27 DIAGNOSIS — H25.13 NUCLEAR SCLEROSIS OF BOTH EYES: Primary | ICD-10-CM

## 2020-08-27 DIAGNOSIS — H52.203 HYPEROPIA OF BOTH EYES WITH ASTIGMATISM AND PRESBYOPIA: ICD-10-CM

## 2020-08-27 DIAGNOSIS — H52.4 HYPEROPIA OF BOTH EYES WITH ASTIGMATISM AND PRESBYOPIA: ICD-10-CM

## 2020-08-27 PROCEDURE — 99999 PR PBB SHADOW E&M-EST. PATIENT-LVL III: CPT | Mod: PBBFAC,,, | Performed by: OPTOMETRIST

## 2020-08-27 PROCEDURE — 92004 COMPRE OPH EXAM NEW PT 1/>: CPT | Mod: S$GLB,,, | Performed by: OPTOMETRIST

## 2020-08-27 PROCEDURE — 92015 PR REFRACTION: ICD-10-PCS | Mod: S$GLB,,, | Performed by: OPTOMETRIST

## 2020-08-27 PROCEDURE — 92015 DETERMINE REFRACTIVE STATE: CPT | Mod: S$GLB,,, | Performed by: OPTOMETRIST

## 2020-08-27 PROCEDURE — 99999 PR PBB SHADOW E&M-EST. PATIENT-LVL III: ICD-10-PCS | Mod: PBBFAC,,, | Performed by: OPTOMETRIST

## 2020-08-27 PROCEDURE — 92004 PR EYE EXAM, NEW PATIENT,COMPREHESV: ICD-10-PCS | Mod: S$GLB,,, | Performed by: OPTOMETRIST

## 2020-08-27 NOTE — LETTER
August 27, 2020      Chester Burk, NP  [address]  [place] [address]           [facility]  [address] [place] [address]  Phone: [phone]  Fax: [phone]          Patient: Danna Sorensen   MR Number: [id number]   YOB: 1940   Date of Visit: 8/27/2020       Dear Chester Burk:    Thank you for referring Danna Sorensen to me for evaluation. Attached you will find relevant portions of my assessment and plan of care.    If you have questions, please do not hesitate to call me. I look forward to following Danna Sorensen along with you.    Sincerely,    Thien Berrios, OD    Enclosure  CC:  No Recipients    If you would like to receive this communication electronically, please contact [email or web address] or [phone] to request more information on [employer] Link access.    For providers and/or their staff who would like to refer a patient to [facility], please contact us through our one-stop-shop provider referral line, [facility] , at [phone].    If you feel you have received this communication in error or would no longer like to receive these types of communications, please e-mail [email or web address]

## 2020-08-27 NOTE — TELEPHONE ENCOUNTER
Spoke with pt in regards to letter being picked up. Advised her that the letter was ready and it has been placed at the  for pickup.     ----- Message from Patrice Mcgee sent at 8/27/2020 11:47 AM CDT -----  Contact: patient  Type: Needs Medical Advice  Who Called:  Patient  Symptoms (please be specific):    How long has patient had these symptoms:    Pharmacy name and phone #:    Best Call Back Number: [phone]  Additional Information: requesting a call back regarding paper work that Chester was to complete on yesterday would like to  paperwork today

## 2020-08-27 NOTE — PROGRESS NOTES
HPI     Concerns About Ocular Health      Additional comments: Ocular health exam              Comments     DLS: 4/20/17  Blur ou at near x mos, no assoc pain or red, constant, no relief over   time.Pt states has noticed recently some problems with small print, has to   use magnifying gls sometimes. No floaters or flashes.          Last edited by Thien Berrios, OD on 8/27/2020  9:56 AM. (History)            Assessment /Plan     For exam results, see Encounter Report.    Nuclear sclerosis of both eyes    Eye exam, routine  -     Ambulatory referral/consult to Optometry    Hyperopia of both eyes with astigmatism and presbyopia      1. Educated pt on presence of cataracts and effects on vision. No surgery at this time. Recheck in one year.  2. New Spec Rx given. Different lens options discussed with patient. RTC 1 year full exam.

## 2020-10-06 NOTE — LETTER
April 20, 2017      Claudia Kim MD  [address]  [place] [address]           [facility] [facility]  [address] [place] [address] Drive [address]  [place] [address]  Phone: [phone]          Patient: Danna Sorensen   MR Number: [id number]   YOB: 1940   Date of Visit: 4/20/2017       Dear Dr. Claudia Kim:    Thank you for referring Danna Sorensen to me for evaluation. Attached you will find relevant portions of my assessment and plan of care.    If you have questions, please do not hesitate to call me. I look forward to following Danna Sorensen along with you.    Sincerely,    Danish Renee, OD    Enclosure  CC:  No Recipients    If you would like to receive this communication electronically, please contact [email or web address] or [phone] to request more information on [email or web address] Link access.    For providers and/or their staff who would like to refer a patient to [facility], please contact us through our one-stop-shop provider referral line, [facility], at [phone].    If you feel you have received this communication in error or would no longer like to receive these types of communications, please e-mail [email or web address]          Patient called req ref of [place] to [employer] on [facility].     Last seen 8/4/20    Next appt  11/4/20

## 2020-10-15 ENCOUNTER — PES CALL (OUTPATIENT)
Dept: ADMINISTRATIVE | Facility: CLINIC | Age: 80
End: 2020-10-15

## 2020-11-12 ENCOUNTER — OFFICE VISIT (OUTPATIENT)
Dept: FAMILY MEDICINE | Facility: CLINIC | Age: 80
End: 2020-11-12
Payer: MEDICARE

## 2020-11-12 VITALS
SYSTOLIC BLOOD PRESSURE: 134 MMHG | OXYGEN SATURATION: 96 % | BODY MASS INDEX: 40.01 KG/M2 | WEIGHT: 234.38 LBS | TEMPERATURE: 99 F | HEART RATE: 71 BPM | DIASTOLIC BLOOD PRESSURE: 60 MMHG | HEIGHT: 64 IN | RESPIRATION RATE: 16 BRPM

## 2020-11-12 DIAGNOSIS — E66.01 MORBID OBESITY WITH BMI OF 40.0-44.9, ADULT: ICD-10-CM

## 2020-11-12 DIAGNOSIS — I10 ESSENTIAL HYPERTENSION: ICD-10-CM

## 2020-11-12 DIAGNOSIS — Z99.89 USE OF CANE AS AMBULATORY AID: ICD-10-CM

## 2020-11-12 DIAGNOSIS — Z01.00 EYE EXAM, ROUTINE: ICD-10-CM

## 2020-11-12 DIAGNOSIS — Z02.4 ENCOUNTER FOR EXAMINATION FOR DRIVING LICENSE: Primary | ICD-10-CM

## 2020-11-12 DIAGNOSIS — Z23 FLU VACCINE NEED: ICD-10-CM

## 2020-11-12 PROCEDURE — 99499 UNLISTED E&M SERVICE: CPT | Mod: S$GLB,,, | Performed by: FAMILY MEDICINE

## 2020-11-12 PROCEDURE — G0008 FLU VACCINE - HIGH DOSE (65+) PRESERVATIVE FREE IM: ICD-10-PCS | Mod: S$GLB,,, | Performed by: FAMILY MEDICINE

## 2020-11-12 PROCEDURE — 1101F PT FALLS ASSESS-DOCD LE1/YR: CPT | Mod: CPTII,S$GLB,, | Performed by: FAMILY MEDICINE

## 2020-11-12 PROCEDURE — 99214 PR OFFICE/OUTPT VISIT, EST, LEVL IV, 30-39 MIN: ICD-10-PCS | Mod: 25,S$GLB,, | Performed by: FAMILY MEDICINE

## 2020-11-12 PROCEDURE — 99214 OFFICE O/P EST MOD 30 MIN: CPT | Mod: 25,S$GLB,, | Performed by: FAMILY MEDICINE

## 2020-11-12 PROCEDURE — 90662 FLU VACCINE - HIGH DOSE (65+) PRESERVATIVE FREE IM: ICD-10-PCS | Mod: S$GLB,,, | Performed by: FAMILY MEDICINE

## 2020-11-12 PROCEDURE — 3075F SYST BP GE 130 - 139MM HG: CPT | Mod: CPTII,S$GLB,, | Performed by: FAMILY MEDICINE

## 2020-11-12 PROCEDURE — 1101F PR PT FALLS ASSESS DOC 0-1 FALLS W/OUT INJ PAST YR: ICD-10-PCS | Mod: CPTII,S$GLB,, | Performed by: FAMILY MEDICINE

## 2020-11-12 PROCEDURE — 1159F MED LIST DOCD IN RCRD: CPT | Mod: S$GLB,,, | Performed by: FAMILY MEDICINE

## 2020-11-12 PROCEDURE — 3075F PR MOST RECENT SYSTOLIC BLOOD PRESS GE 130-139MM HG: ICD-10-PCS | Mod: CPTII,S$GLB,, | Performed by: FAMILY MEDICINE

## 2020-11-12 PROCEDURE — 3078F PR MOST RECENT DIASTOLIC BLOOD PRESSURE < 80 MM HG: ICD-10-PCS | Mod: CPTII,S$GLB,, | Performed by: FAMILY MEDICINE

## 2020-11-12 PROCEDURE — 99999 PR PBB SHADOW E&M-EST. PATIENT-LVL IV: CPT | Mod: PBBFAC,,, | Performed by: FAMILY MEDICINE

## 2020-11-12 PROCEDURE — 3078F DIAST BP <80 MM HG: CPT | Mod: CPTII,S$GLB,, | Performed by: FAMILY MEDICINE

## 2020-11-12 PROCEDURE — 1125F PR PAIN SEVERITY QUANTIFIED, PAIN PRESENT: ICD-10-PCS | Mod: S$GLB,,, | Performed by: FAMILY MEDICINE

## 2020-11-12 PROCEDURE — 90662 IIV NO PRSV INCREASED AG IM: CPT | Mod: S$GLB,,, | Performed by: FAMILY MEDICINE

## 2020-11-12 PROCEDURE — 3288F FALL RISK ASSESSMENT DOCD: CPT | Mod: CPTII,S$GLB,, | Performed by: FAMILY MEDICINE

## 2020-11-12 PROCEDURE — 99999 PR PBB SHADOW E&M-EST. PATIENT-LVL IV: ICD-10-PCS | Mod: PBBFAC,,, | Performed by: FAMILY MEDICINE

## 2020-11-12 PROCEDURE — 1159F PR MEDICATION LIST DOCUMENTED IN MEDICAL RECORD: ICD-10-PCS | Mod: S$GLB,,, | Performed by: FAMILY MEDICINE

## 2020-11-12 PROCEDURE — G0008 ADMIN INFLUENZA VIRUS VAC: HCPCS | Mod: S$GLB,,, | Performed by: FAMILY MEDICINE

## 2020-11-12 PROCEDURE — 99499 RISK ADDL DX/OHS AUDIT: ICD-10-PCS | Mod: S$GLB,,, | Performed by: FAMILY MEDICINE

## 2020-11-12 PROCEDURE — 1125F AMNT PAIN NOTED PAIN PRSNT: CPT | Mod: S$GLB,,, | Performed by: FAMILY MEDICINE

## 2020-11-12 PROCEDURE — 3288F PR FALLS RISK ASSESSMENT DOCUMENTED: ICD-10-PCS | Mod: CPTII,S$GLB,, | Performed by: FAMILY MEDICINE

## 2020-11-12 NOTE — PROGRESS NOTES
Subjective:       Patient ID: Danna Sorensen is a 80 y.o. female.    Chief Complaint: Complete paperwork    HPI  Review of Systems   Constitutional: Negative for fatigue and unexpected weight change.   Respiratory: Negative for chest tightness and shortness of breath.    Cardiovascular: Negative for chest pain, palpitations and leg swelling.   Gastrointestinal: Negative for abdominal pain.   Musculoskeletal: Negative for arthralgias.   Neurological: Negative for dizziness, syncope, light-headedness and headaches.       Patient Active Problem List   Diagnosis    Hypothyroid    Nuclear sclerosis    Hyperopia with astigmatism and presbyopia    DDD (degenerative disc disease), lumbar    Morbid obesity with BMI of 40.0-44.9, adult    Calcified granuloma of lung    History of colon cancer    Lumbar radiculitis    Other spondylosis, lumbar region    Chronic right-sided low back pain without sciatica    Vitamin D deficiency    Essential hypertension    Malignant neoplasm of ascending colon     Patient is here for a chronic conditions follow up.  Needs paperwork completed by PCP for DMV to clear her to drive.  Had recent ete exam Dr. Renee.      Has routine check up with labs 1/2021        Heme/onc Dr. Cali following for colon cancer s/p resection and chemo 2017  Objective:      Physical Exam  Vitals signs and nursing note reviewed.   Constitutional:       Appearance: She is well-developed.   Cardiovascular:      Rate and Rhythm: Normal rate and regular rhythm.      Heart sounds: Normal heart sounds.   Pulmonary:      Effort: Pulmonary effort is normal.      Breath sounds: Normal breath sounds.   Skin:     General: Skin is warm and dry.   Neurological:      Mental Status: She is alert and oriented to person, place, and time.         Assessment:       1. Encounter for examination for driving license    2. Flu vaccine need    3. Essential hypertension    4. Eye exam, routine    5. Morbid obesity with BMI of  "40.0-44.9, adult    6. Use of cane as ambulatory aid        Plan:     1. Encounter for examination for driving license  Form completed and scanned. No evidence of issue that impairs driving    2. Flu vaccine need  Immunize today.  Counseled patient on risks, benefits and side effects.  Patient elected to proceed with vaccination.    - Influenza - High Dose (65+) (PF) (IM)    3. Essential hypertension  Controlled on current medications.  Continue current medications.      4. Eye exam, routine  See notes    5. Morbid obesity with BMI of 40.0-44.9, adult  Counseled patient on his ideal body weight, health consequences of being obese and current recommendations including weekly exercise and a heart healthy diet.  Current BMI is:Estimated body mass index is 40.23 kg/m² as calculated from the following:    Height as of this encounter: 5' 4" (1.626 m).    Weight as of this encounter: 106.3 kg (234 lb 5.6 oz)..  Patient is aware that ideal BMI < 25 or Weight in (lb) to have BMI = 25: 145.3.        6. Use of cane as ambulatory aid  Cont fall precautions            Time spent with patient: 20 minutes    Patient with be reevaluated in as scheduled or sooner prn    Greater than 50% of this visit was spent counseling as described in above documentation:Yes  "

## 2020-11-12 NOTE — PROGRESS NOTES
ID patient by name and date of birth.  Allergies confirmed. High dose flu shot given as per orders , using aseptic technique.  Patient tolerated well.  Information sheet reviewed and given to patient.

## 2021-01-05 ENCOUNTER — LAB VISIT (OUTPATIENT)
Dept: LAB | Facility: HOSPITAL | Age: 81
End: 2021-01-05
Attending: FAMILY MEDICINE
Payer: MEDICARE

## 2021-01-05 DIAGNOSIS — E03.4 HYPOTHYROIDISM DUE TO ACQUIRED ATROPHY OF THYROID: ICD-10-CM

## 2021-01-05 LAB
ALBUMIN SERPL BCP-MCNC: 3.9 G/DL (ref 3.5–5.2)
ALP SERPL-CCNC: 54 U/L (ref 55–135)
ALT SERPL W/O P-5'-P-CCNC: 14 U/L (ref 10–44)
ANION GAP SERPL CALC-SCNC: 11 MMOL/L (ref 8–16)
AST SERPL-CCNC: 12 U/L (ref 10–40)
BASOPHILS # BLD AUTO: 0.01 K/UL (ref 0–0.2)
BASOPHILS NFR BLD: 0.2 % (ref 0–1.9)
BILIRUB SERPL-MCNC: 1.8 MG/DL (ref 0.1–1)
BUN SERPL-MCNC: 21 MG/DL (ref 8–23)
CALCIUM SERPL-MCNC: 9.2 MG/DL (ref 8.7–10.5)
CHLORIDE SERPL-SCNC: 104 MMOL/L (ref 95–110)
CO2 SERPL-SCNC: 26 MMOL/L (ref 23–29)
CREAT SERPL-MCNC: 0.8 MG/DL (ref 0.5–1.4)
DIFFERENTIAL METHOD: ABNORMAL
EOSINOPHIL # BLD AUTO: 0.1 K/UL (ref 0–0.5)
EOSINOPHIL NFR BLD: 1.5 % (ref 0–8)
ERYTHROCYTE [DISTWIDTH] IN BLOOD BY AUTOMATED COUNT: 12.9 % (ref 11.5–14.5)
EST. GFR  (AFRICAN AMERICAN): >60 ML/MIN/1.73 M^2
EST. GFR  (NON AFRICAN AMERICAN): >60 ML/MIN/1.73 M^2
GLUCOSE SERPL-MCNC: 97 MG/DL (ref 70–110)
HCT VFR BLD AUTO: 40.7 % (ref 37–48.5)
HGB BLD-MCNC: 12.9 G/DL (ref 12–16)
IMM GRANULOCYTES # BLD AUTO: 0.02 K/UL (ref 0–0.04)
IMM GRANULOCYTES NFR BLD AUTO: 0.4 % (ref 0–0.5)
LYMPHOCYTES # BLD AUTO: 1.4 K/UL (ref 1–4.8)
LYMPHOCYTES NFR BLD: 26.6 % (ref 18–48)
MCH RBC QN AUTO: 31.2 PG (ref 27–31)
MCHC RBC AUTO-ENTMCNC: 31.7 G/DL (ref 32–36)
MCV RBC AUTO: 99 FL (ref 82–98)
MONOCYTES # BLD AUTO: 0.6 K/UL (ref 0.3–1)
MONOCYTES NFR BLD: 10.5 % (ref 4–15)
NEUTROPHILS # BLD AUTO: 3.3 K/UL (ref 1.8–7.7)
NEUTROPHILS NFR BLD: 60.8 % (ref 38–73)
NRBC BLD-RTO: 0 /100 WBC
PLATELET # BLD AUTO: 197 K/UL (ref 150–350)
PMV BLD AUTO: 10.6 FL (ref 9.2–12.9)
POTASSIUM SERPL-SCNC: 4.8 MMOL/L (ref 3.5–5.1)
PROT SERPL-MCNC: 6.9 G/DL (ref 6–8.4)
RBC # BLD AUTO: 4.13 M/UL (ref 4–5.4)
SODIUM SERPL-SCNC: 141 MMOL/L (ref 136–145)
T4 FREE SERPL-MCNC: 1.06 NG/DL (ref 0.71–1.51)
TSH SERPL DL<=0.005 MIU/L-ACNC: 2.12 UIU/ML (ref 0.4–4)
WBC # BLD AUTO: 5.42 K/UL (ref 3.9–12.7)

## 2021-01-05 PROCEDURE — 84443 ASSAY THYROID STIM HORMONE: CPT

## 2021-01-05 PROCEDURE — 36415 COLL VENOUS BLD VENIPUNCTURE: CPT | Mod: PO

## 2021-01-05 PROCEDURE — 85025 COMPLETE CBC W/AUTO DIFF WBC: CPT

## 2021-01-05 PROCEDURE — 80053 COMPREHEN METABOLIC PANEL: CPT

## 2021-01-05 PROCEDURE — 84439 ASSAY OF FREE THYROXINE: CPT

## 2021-01-08 ENCOUNTER — OFFICE VISIT (OUTPATIENT)
Dept: FAMILY MEDICINE | Facility: CLINIC | Age: 81
End: 2021-01-08
Payer: MEDICARE

## 2021-01-08 VITALS
TEMPERATURE: 97 F | BODY MASS INDEX: 41.06 KG/M2 | DIASTOLIC BLOOD PRESSURE: 76 MMHG | SYSTOLIC BLOOD PRESSURE: 130 MMHG | OXYGEN SATURATION: 98 % | RESPIRATION RATE: 14 BRPM | HEART RATE: 62 BPM | WEIGHT: 240.5 LBS | HEIGHT: 64 IN

## 2021-01-08 DIAGNOSIS — E03.4 HYPOTHYROIDISM DUE TO ACQUIRED ATROPHY OF THYROID: ICD-10-CM

## 2021-01-08 DIAGNOSIS — E03.9 HYPOTHYROIDISM: ICD-10-CM

## 2021-01-08 DIAGNOSIS — E66.01 MORBID OBESITY WITH BMI OF 40.0-44.9, ADULT: ICD-10-CM

## 2021-01-08 DIAGNOSIS — G89.29 CHRONIC RIGHT-SIDED LOW BACK PAIN WITHOUT SCIATICA: ICD-10-CM

## 2021-01-08 DIAGNOSIS — G89.29 CHRONIC BILATERAL LOW BACK PAIN WITHOUT SCIATICA: ICD-10-CM

## 2021-01-08 DIAGNOSIS — Z85.038 HISTORY OF COLON CANCER: ICD-10-CM

## 2021-01-08 DIAGNOSIS — Z12.31 ENCOUNTER FOR SCREENING MAMMOGRAM FOR MALIGNANT NEOPLASM OF BREAST: ICD-10-CM

## 2021-01-08 DIAGNOSIS — E55.9 VITAMIN D DEFICIENCY: ICD-10-CM

## 2021-01-08 DIAGNOSIS — M54.50 CHRONIC BILATERAL LOW BACK PAIN WITHOUT SCIATICA: ICD-10-CM

## 2021-01-08 DIAGNOSIS — I10 ESSENTIAL HYPERTENSION: Primary | ICD-10-CM

## 2021-01-08 DIAGNOSIS — Z91.89 AT HIGH RISK FOR BREAST CANCER: ICD-10-CM

## 2021-01-08 DIAGNOSIS — Z11.59 NEED FOR HEPATITIS C SCREENING TEST: ICD-10-CM

## 2021-01-08 DIAGNOSIS — M54.50 CHRONIC RIGHT-SIDED LOW BACK PAIN WITHOUT SCIATICA: ICD-10-CM

## 2021-01-08 PROCEDURE — 99999 PR PBB SHADOW E&M-EST. PATIENT-LVL IV: ICD-10-PCS | Mod: PBBFAC,,, | Performed by: FAMILY MEDICINE

## 2021-01-08 PROCEDURE — 3288F PR FALLS RISK ASSESSMENT DOCUMENTED: ICD-10-PCS | Mod: CPTII,S$GLB,, | Performed by: FAMILY MEDICINE

## 2021-01-08 PROCEDURE — 99499 UNLISTED E&M SERVICE: CPT | Mod: S$GLB,,, | Performed by: FAMILY MEDICINE

## 2021-01-08 PROCEDURE — 99999 PR PBB SHADOW E&M-EST. PATIENT-LVL IV: CPT | Mod: PBBFAC,,, | Performed by: FAMILY MEDICINE

## 2021-01-08 PROCEDURE — 1159F MED LIST DOCD IN RCRD: CPT | Mod: S$GLB,,, | Performed by: FAMILY MEDICINE

## 2021-01-08 PROCEDURE — 1125F PR PAIN SEVERITY QUANTIFIED, PAIN PRESENT: ICD-10-PCS | Mod: S$GLB,,, | Performed by: FAMILY MEDICINE

## 2021-01-08 PROCEDURE — 99214 PR OFFICE/OUTPT VISIT, EST, LEVL IV, 30-39 MIN: ICD-10-PCS | Mod: S$GLB,,, | Performed by: FAMILY MEDICINE

## 2021-01-08 PROCEDURE — 99214 OFFICE O/P EST MOD 30 MIN: CPT | Mod: S$GLB,,, | Performed by: FAMILY MEDICINE

## 2021-01-08 PROCEDURE — 99499 RISK ADDL DX/OHS AUDIT: ICD-10-PCS | Mod: S$GLB,,, | Performed by: FAMILY MEDICINE

## 2021-01-08 PROCEDURE — 1101F PR PT FALLS ASSESS DOC 0-1 FALLS W/OUT INJ PAST YR: ICD-10-PCS | Mod: CPTII,S$GLB,, | Performed by: FAMILY MEDICINE

## 2021-01-08 PROCEDURE — 1159F PR MEDICATION LIST DOCUMENTED IN MEDICAL RECORD: ICD-10-PCS | Mod: S$GLB,,, | Performed by: FAMILY MEDICINE

## 2021-01-08 PROCEDURE — 3288F FALL RISK ASSESSMENT DOCD: CPT | Mod: CPTII,S$GLB,, | Performed by: FAMILY MEDICINE

## 2021-01-08 PROCEDURE — 3078F DIAST BP <80 MM HG: CPT | Mod: CPTII,S$GLB,, | Performed by: FAMILY MEDICINE

## 2021-01-08 PROCEDURE — 3075F PR MOST RECENT SYSTOLIC BLOOD PRESS GE 130-139MM HG: ICD-10-PCS | Mod: CPTII,S$GLB,, | Performed by: FAMILY MEDICINE

## 2021-01-08 PROCEDURE — 1125F AMNT PAIN NOTED PAIN PRSNT: CPT | Mod: S$GLB,,, | Performed by: FAMILY MEDICINE

## 2021-01-08 PROCEDURE — 3078F PR MOST RECENT DIASTOLIC BLOOD PRESSURE < 80 MM HG: ICD-10-PCS | Mod: CPTII,S$GLB,, | Performed by: FAMILY MEDICINE

## 2021-01-08 PROCEDURE — 1101F PT FALLS ASSESS-DOCD LE1/YR: CPT | Mod: CPTII,S$GLB,, | Performed by: FAMILY MEDICINE

## 2021-01-08 PROCEDURE — 3075F SYST BP GE 130 - 139MM HG: CPT | Mod: CPTII,S$GLB,, | Performed by: FAMILY MEDICINE

## 2021-01-08 RX ORDER — LISINOPRIL 30 MG/1
30 TABLET ORAL DAILY
Qty: 90 TABLET | Refills: 3 | Status: SHIPPED | OUTPATIENT
Start: 2021-01-08 | End: 2021-07-12 | Stop reason: SDUPTHER

## 2021-01-08 RX ORDER — TRAMADOL HYDROCHLORIDE 50 MG/1
50 TABLET ORAL EVERY 12 HOURS PRN
Qty: 60 TABLET | Refills: 0 | Status: SHIPPED | OUTPATIENT
Start: 2021-01-08 | End: 2021-07-12 | Stop reason: SDUPTHER

## 2021-01-08 RX ORDER — LEVOTHYROXINE SODIUM 75 UG/1
75 TABLET ORAL DAILY
Qty: 90 TABLET | Refills: 3 | Status: SHIPPED | OUTPATIENT
Start: 2021-01-08 | End: 2022-02-07

## 2021-01-08 RX ORDER — ERGOCALCIFEROL 1.25 MG/1
50000 CAPSULE ORAL
Qty: 12 CAPSULE | Refills: 1 | Status: SHIPPED | OUTPATIENT
Start: 2021-01-08 | End: 2021-07-07

## 2021-01-18 ENCOUNTER — HOSPITAL ENCOUNTER (OUTPATIENT)
Dept: RADIOLOGY | Facility: HOSPITAL | Age: 81
Discharge: HOME | End: 2021-01-18
Attending: FAMILY MEDICINE
Payer: MEDICARE

## 2021-01-18 DIAGNOSIS — Z91.89 AT HIGH RISK FOR BREAST CANCER: ICD-10-CM

## 2021-01-18 PROCEDURE — A9585 GADOBUTROL INJECTION: HCPCS | Mod: PO | Performed by: FAMILY MEDICINE

## 2021-01-18 PROCEDURE — 25500020 PHARM REV CODE 255: Mod: PO | Performed by: FAMILY MEDICINE

## 2021-01-18 PROCEDURE — 77049 MRI BREAST C-+ W/CAD BI: CPT | Mod: TC,PO

## 2021-01-18 RX ORDER — GADOBUTROL 604.72 MG/ML
11 INJECTION INTRAVENOUS
Status: COMPLETED | OUTPATIENT
Start: 2021-01-18 | End: 2021-01-18

## 2021-01-18 RX ADMIN — GADOBUTROL 11 ML: 604.72 INJECTION INTRAVENOUS at 08:01

## 2021-01-20 ENCOUNTER — TELEPHONE (OUTPATIENT)
Dept: FAMILY MEDICINE | Facility: CLINIC | Age: 81
End: 2021-01-20

## 2021-07-06 ENCOUNTER — HOSPITAL ENCOUNTER (OUTPATIENT)
Dept: RADIOLOGY | Facility: CLINIC | Age: 81
Discharge: HOME | End: 2021-07-06
Attending: NURSE PRACTITIONER
Payer: MEDICARE

## 2021-07-06 DIAGNOSIS — Z85.038 HISTORY OF COLON CANCER: ICD-10-CM

## 2021-07-06 PROCEDURE — 71046 X-RAY EXAM CHEST 2 VIEWS: CPT | Mod: 26,,, | Performed by: RADIOLOGY

## 2021-07-06 PROCEDURE — 71046 X-RAY EXAM CHEST 2 VIEWS: CPT | Mod: TC,FY,PO

## 2021-07-06 PROCEDURE — 71046 XR CHEST PA AND LATERAL: ICD-10-PCS | Mod: 26,,, | Performed by: RADIOLOGY

## 2021-07-12 ENCOUNTER — OFFICE VISIT (OUTPATIENT)
Dept: FAMILY MEDICINE | Facility: CLINIC | Age: 81
End: 2021-07-12
Payer: MEDICARE

## 2021-07-12 VITALS
SYSTOLIC BLOOD PRESSURE: 139 MMHG | BODY MASS INDEX: 41.4 KG/M2 | HEART RATE: 58 BPM | HEIGHT: 64 IN | DIASTOLIC BLOOD PRESSURE: 70 MMHG | RESPIRATION RATE: 20 BRPM | OXYGEN SATURATION: 95 % | WEIGHT: 242.5 LBS | TEMPERATURE: 98 F

## 2021-07-12 DIAGNOSIS — G89.29 CHRONIC BILATERAL LOW BACK PAIN WITHOUT SCIATICA: ICD-10-CM

## 2021-07-12 DIAGNOSIS — M54.50 CHRONIC BILATERAL LOW BACK PAIN WITHOUT SCIATICA: ICD-10-CM

## 2021-07-12 DIAGNOSIS — E66.01 MORBID OBESITY WITH BMI OF 40.0-44.9, ADULT: ICD-10-CM

## 2021-07-12 DIAGNOSIS — I10 ESSENTIAL HYPERTENSION: ICD-10-CM

## 2021-07-12 DIAGNOSIS — E03.4 HYPOTHYROIDISM DUE TO ACQUIRED ATROPHY OF THYROID: ICD-10-CM

## 2021-07-12 DIAGNOSIS — E55.9 VITAMIN D DEFICIENCY: Primary | ICD-10-CM

## 2021-07-12 DIAGNOSIS — R73.9 HYPERGLYCEMIA: ICD-10-CM

## 2021-07-12 PROCEDURE — 1101F PT FALLS ASSESS-DOCD LE1/YR: CPT | Mod: CPTII,S$GLB,, | Performed by: FAMILY MEDICINE

## 2021-07-12 PROCEDURE — 99214 OFFICE O/P EST MOD 30 MIN: CPT | Mod: S$GLB,,, | Performed by: FAMILY MEDICINE

## 2021-07-12 PROCEDURE — 1125F AMNT PAIN NOTED PAIN PRSNT: CPT | Mod: S$GLB,,, | Performed by: FAMILY MEDICINE

## 2021-07-12 PROCEDURE — 3288F FALL RISK ASSESSMENT DOCD: CPT | Mod: CPTII,S$GLB,, | Performed by: FAMILY MEDICINE

## 2021-07-12 PROCEDURE — 1101F PR PT FALLS ASSESS DOC 0-1 FALLS W/OUT INJ PAST YR: ICD-10-PCS | Mod: CPTII,S$GLB,, | Performed by: FAMILY MEDICINE

## 2021-07-12 PROCEDURE — 1159F PR MEDICATION LIST DOCUMENTED IN MEDICAL RECORD: ICD-10-PCS | Mod: S$GLB,,, | Performed by: FAMILY MEDICINE

## 2021-07-12 PROCEDURE — 99999 PR PBB SHADOW E&M-EST. PATIENT-LVL IV: ICD-10-PCS | Mod: PBBFAC,,, | Performed by: FAMILY MEDICINE

## 2021-07-12 PROCEDURE — 99999 PR PBB SHADOW E&M-EST. PATIENT-LVL IV: CPT | Mod: PBBFAC,,, | Performed by: FAMILY MEDICINE

## 2021-07-12 PROCEDURE — 3288F PR FALLS RISK ASSESSMENT DOCUMENTED: ICD-10-PCS | Mod: CPTII,S$GLB,, | Performed by: FAMILY MEDICINE

## 2021-07-12 PROCEDURE — 1159F MED LIST DOCD IN RCRD: CPT | Mod: S$GLB,,, | Performed by: FAMILY MEDICINE

## 2021-07-12 PROCEDURE — 99214 PR OFFICE/OUTPT VISIT, EST, LEVL IV, 30-39 MIN: ICD-10-PCS | Mod: S$GLB,,, | Performed by: FAMILY MEDICINE

## 2021-07-12 PROCEDURE — 1125F PR PAIN SEVERITY QUANTIFIED, PAIN PRESENT: ICD-10-PCS | Mod: S$GLB,,, | Performed by: FAMILY MEDICINE

## 2021-07-12 RX ORDER — LISINOPRIL 30 MG/1
30 TABLET ORAL DAILY
Qty: 90 TABLET | Refills: 3 | Status: SHIPPED | OUTPATIENT
Start: 2021-07-12 | End: 2022-07-12

## 2021-07-12 RX ORDER — FUROSEMIDE 20 MG/1
20 TABLET ORAL DAILY PRN
Qty: 90 TABLET | Refills: 3 | Status: SHIPPED | OUTPATIENT
Start: 2021-07-12

## 2021-07-12 RX ORDER — TRAMADOL HYDROCHLORIDE 50 MG/1
50 TABLET ORAL EVERY 12 HOURS PRN
Qty: 60 TABLET | Refills: 0 | Status: SHIPPED | OUTPATIENT
Start: 2021-07-12 | End: 2021-09-13

## 2021-07-19 ENCOUNTER — OFFICE VISIT (OUTPATIENT)
Dept: HEMATOLOGY/ONCOLOGY | Facility: CLINIC | Age: 81
End: 2021-07-19
Payer: MEDICARE

## 2021-07-19 VITALS
BODY MASS INDEX: 40.88 KG/M2 | TEMPERATURE: 98 F | SYSTOLIC BLOOD PRESSURE: 120 MMHG | HEART RATE: 63 BPM | WEIGHT: 239.44 LBS | OXYGEN SATURATION: 99 % | HEIGHT: 64 IN | DIASTOLIC BLOOD PRESSURE: 62 MMHG

## 2021-07-19 DIAGNOSIS — Z85.038 HISTORY OF COLON CANCER: Primary | ICD-10-CM

## 2021-07-19 PROCEDURE — 99213 OFFICE O/P EST LOW 20 MIN: CPT | Mod: S$GLB,,, | Performed by: NURSE PRACTITIONER

## 2021-07-19 PROCEDURE — 99213 PR OFFICE/OUTPT VISIT, EST, LEVL III, 20-29 MIN: ICD-10-PCS | Mod: S$GLB,,, | Performed by: NURSE PRACTITIONER

## 2021-07-19 PROCEDURE — 99999 PR PBB SHADOW E&M-EST. PATIENT-LVL IV: ICD-10-PCS | Mod: PBBFAC,,, | Performed by: NURSE PRACTITIONER

## 2021-07-19 PROCEDURE — 99499 UNLISTED E&M SERVICE: CPT | Mod: S$GLB,,, | Performed by: NURSE PRACTITIONER

## 2021-07-19 PROCEDURE — 1101F PT FALLS ASSESS-DOCD LE1/YR: CPT | Mod: CPTII,S$GLB,, | Performed by: NURSE PRACTITIONER

## 2021-07-19 PROCEDURE — 1101F PR PT FALLS ASSESS DOC 0-1 FALLS W/OUT INJ PAST YR: ICD-10-PCS | Mod: CPTII,S$GLB,, | Performed by: NURSE PRACTITIONER

## 2021-07-19 PROCEDURE — 1125F PR PAIN SEVERITY QUANTIFIED, PAIN PRESENT: ICD-10-PCS | Mod: CPTII,S$GLB,, | Performed by: NURSE PRACTITIONER

## 2021-07-19 PROCEDURE — 99499 RISK ADDL DX/OHS AUDIT: ICD-10-PCS | Mod: S$GLB,,, | Performed by: NURSE PRACTITIONER

## 2021-07-19 PROCEDURE — 3288F FALL RISK ASSESSMENT DOCD: CPT | Mod: CPTII,S$GLB,, | Performed by: NURSE PRACTITIONER

## 2021-07-19 PROCEDURE — 1125F AMNT PAIN NOTED PAIN PRSNT: CPT | Mod: CPTII,S$GLB,, | Performed by: NURSE PRACTITIONER

## 2021-07-19 PROCEDURE — 1159F PR MEDICATION LIST DOCUMENTED IN MEDICAL RECORD: ICD-10-PCS | Mod: CPTII,S$GLB,, | Performed by: NURSE PRACTITIONER

## 2021-07-19 PROCEDURE — 3288F PR FALLS RISK ASSESSMENT DOCUMENTED: ICD-10-PCS | Mod: CPTII,S$GLB,, | Performed by: NURSE PRACTITIONER

## 2021-07-19 PROCEDURE — 1159F MED LIST DOCD IN RCRD: CPT | Mod: CPTII,S$GLB,, | Performed by: NURSE PRACTITIONER

## 2021-07-19 PROCEDURE — 99999 PR PBB SHADOW E&M-EST. PATIENT-LVL IV: CPT | Mod: PBBFAC,,, | Performed by: NURSE PRACTITIONER

## 2021-09-06 ENCOUNTER — NURSE TRIAGE (OUTPATIENT)
Dept: ADMINISTRATIVE | Facility: CLINIC | Age: 81
End: 2021-09-06

## 2021-09-07 ENCOUNTER — TELEPHONE (OUTPATIENT)
Dept: FAMILY MEDICINE | Facility: CLINIC | Age: 81
End: 2021-09-07

## 2021-09-13 ENCOUNTER — OFFICE VISIT (OUTPATIENT)
Dept: FAMILY MEDICINE | Facility: CLINIC | Age: 81
End: 2021-09-13
Payer: MEDICARE

## 2021-09-13 VITALS
SYSTOLIC BLOOD PRESSURE: 145 MMHG | HEIGHT: 64 IN | BODY MASS INDEX: 38.99 KG/M2 | RESPIRATION RATE: 18 BRPM | OXYGEN SATURATION: 98 % | TEMPERATURE: 98 F | WEIGHT: 228.38 LBS | HEART RATE: 50 BPM | DIASTOLIC BLOOD PRESSURE: 70 MMHG

## 2021-09-13 DIAGNOSIS — E66.09 CLASS 2 OBESITY DUE TO EXCESS CALORIES IN ADULT, UNSPECIFIED BMI, UNSPECIFIED WHETHER SERIOUS COMORBIDITY PRESENT: ICD-10-CM

## 2021-09-13 DIAGNOSIS — M54.50 ACUTE BILATERAL LOW BACK PAIN WITHOUT SCIATICA: Primary | ICD-10-CM

## 2021-09-13 DIAGNOSIS — I10 ESSENTIAL HYPERTENSION: ICD-10-CM

## 2021-09-13 PROCEDURE — 1125F PR PAIN SEVERITY QUANTIFIED, PAIN PRESENT: ICD-10-PCS | Mod: CPTII,S$GLB,, | Performed by: NURSE PRACTITIONER

## 2021-09-13 PROCEDURE — 3078F PR MOST RECENT DIASTOLIC BLOOD PRESSURE < 80 MM HG: ICD-10-PCS | Mod: CPTII,S$GLB,, | Performed by: NURSE PRACTITIONER

## 2021-09-13 PROCEDURE — 3078F DIAST BP <80 MM HG: CPT | Mod: CPTII,S$GLB,, | Performed by: NURSE PRACTITIONER

## 2021-09-13 PROCEDURE — 99214 PR OFFICE/OUTPT VISIT, EST, LEVL IV, 30-39 MIN: ICD-10-PCS | Mod: 25,S$GLB,, | Performed by: NURSE PRACTITIONER

## 2021-09-13 PROCEDURE — 3288F PR FALLS RISK ASSESSMENT DOCUMENTED: ICD-10-PCS | Mod: CPTII,S$GLB,, | Performed by: NURSE PRACTITIONER

## 2021-09-13 PROCEDURE — 1159F MED LIST DOCD IN RCRD: CPT | Mod: CPTII,S$GLB,, | Performed by: NURSE PRACTITIONER

## 2021-09-13 PROCEDURE — 99999 PR PBB SHADOW E&M-EST. PATIENT-LVL IV: ICD-10-PCS | Mod: PBBFAC,,, | Performed by: NURSE PRACTITIONER

## 2021-09-13 PROCEDURE — 3288F FALL RISK ASSESSMENT DOCD: CPT | Mod: CPTII,S$GLB,, | Performed by: NURSE PRACTITIONER

## 2021-09-13 PROCEDURE — 96372 PR INJECTION,THERAP/PROPH/DIAG2ST, IM OR SUBCUT: ICD-10-PCS | Mod: S$GLB,,, | Performed by: NURSE PRACTITIONER

## 2021-09-13 PROCEDURE — 3077F SYST BP >= 140 MM HG: CPT | Mod: CPTII,S$GLB,, | Performed by: NURSE PRACTITIONER

## 2021-09-13 PROCEDURE — 1160F PR REVIEW ALL MEDS BY PRESCRIBER/CLIN PHARMACIST DOCUMENTED: ICD-10-PCS | Mod: CPTII,S$GLB,, | Performed by: NURSE PRACTITIONER

## 2021-09-13 PROCEDURE — 1160F RVW MEDS BY RX/DR IN RCRD: CPT | Mod: CPTII,S$GLB,, | Performed by: NURSE PRACTITIONER

## 2021-09-13 PROCEDURE — 96372 THER/PROPH/DIAG INJ SC/IM: CPT | Mod: S$GLB,,, | Performed by: NURSE PRACTITIONER

## 2021-09-13 PROCEDURE — 3077F PR MOST RECENT SYSTOLIC BLOOD PRESSURE >= 140 MM HG: ICD-10-PCS | Mod: CPTII,S$GLB,, | Performed by: NURSE PRACTITIONER

## 2021-09-13 PROCEDURE — 1101F PT FALLS ASSESS-DOCD LE1/YR: CPT | Mod: CPTII,S$GLB,, | Performed by: NURSE PRACTITIONER

## 2021-09-13 PROCEDURE — 1159F PR MEDICATION LIST DOCUMENTED IN MEDICAL RECORD: ICD-10-PCS | Mod: CPTII,S$GLB,, | Performed by: NURSE PRACTITIONER

## 2021-09-13 PROCEDURE — 99214 OFFICE O/P EST MOD 30 MIN: CPT | Mod: 25,S$GLB,, | Performed by: NURSE PRACTITIONER

## 2021-09-13 PROCEDURE — 99999 PR PBB SHADOW E&M-EST. PATIENT-LVL IV: CPT | Mod: PBBFAC,,, | Performed by: NURSE PRACTITIONER

## 2021-09-13 PROCEDURE — 1125F AMNT PAIN NOTED PAIN PRSNT: CPT | Mod: CPTII,S$GLB,, | Performed by: NURSE PRACTITIONER

## 2021-09-13 PROCEDURE — 1101F PR PT FALLS ASSESS DOC 0-1 FALLS W/OUT INJ PAST YR: ICD-10-PCS | Mod: CPTII,S$GLB,, | Performed by: NURSE PRACTITIONER

## 2021-09-13 RX ORDER — METHOCARBAMOL 500 MG/1
500 TABLET, FILM COATED ORAL 4 TIMES DAILY
Qty: 40 TABLET | Refills: 0 | Status: SHIPPED | OUTPATIENT
Start: 2021-09-13 | End: 2021-10-01

## 2021-09-13 RX ORDER — DEXAMETHASONE SODIUM PHOSPHATE 4 MG/ML
8 INJECTION, SOLUTION INTRA-ARTICULAR; INTRALESIONAL; INTRAMUSCULAR; INTRAVENOUS; SOFT TISSUE
Status: COMPLETED | OUTPATIENT
Start: 2021-09-13 | End: 2021-09-13

## 2021-09-13 RX ORDER — PREDNISONE 20 MG/1
20 TABLET ORAL DAILY
Qty: 5 TABLET | Refills: 0 | Status: SHIPPED | OUTPATIENT
Start: 2021-09-14 | End: 2021-09-19

## 2021-09-13 RX ORDER — TRAMADOL HYDROCHLORIDE 100 MG/1
100 TABLET, EXTENDED RELEASE ORAL DAILY PRN
Qty: 7 TABLET | Refills: 0 | Status: SHIPPED | OUTPATIENT
Start: 2021-09-13 | End: 2021-09-20

## 2021-09-13 RX ADMIN — DEXAMETHASONE SODIUM PHOSPHATE 8 MG: 4 INJECTION, SOLUTION INTRA-ARTICULAR; INTRALESIONAL; INTRAMUSCULAR; INTRAVENOUS; SOFT TISSUE at 08:09

## 2021-09-30 ENCOUNTER — TELEPHONE (OUTPATIENT)
Dept: FAMILY MEDICINE | Facility: CLINIC | Age: 81
End: 2021-09-30

## 2021-10-11 ENCOUNTER — TELEPHONE (OUTPATIENT)
Dept: FAMILY MEDICINE | Facility: CLINIC | Age: 81
End: 2021-10-11

## 2021-10-11 ENCOUNTER — CLINICAL SUPPORT (OUTPATIENT)
Dept: FAMILY MEDICINE | Facility: CLINIC | Age: 81
End: 2021-10-11
Payer: MEDICARE

## 2021-10-11 VITALS — SYSTOLIC BLOOD PRESSURE: 138 MMHG | DIASTOLIC BLOOD PRESSURE: 80 MMHG | HEART RATE: 62 BPM

## 2021-10-11 DIAGNOSIS — Z01.30 BP CHECK: Primary | ICD-10-CM

## 2021-10-11 PROCEDURE — 99999 PR PBB SHADOW E&M-EST. PATIENT-LVL I: CPT | Mod: PBBFAC,,,

## 2021-10-11 PROCEDURE — 99999 PR PBB SHADOW E&M-EST. PATIENT-LVL I: ICD-10-PCS | Mod: PBBFAC,,,

## 2021-11-24 ENCOUNTER — PES CALL (OUTPATIENT)
Dept: ADMINISTRATIVE | Facility: CLINIC | Age: 81
End: 2021-11-24
Payer: MEDICARE

## 2021-11-29 ENCOUNTER — PES CALL (OUTPATIENT)
Dept: ADMINISTRATIVE | Facility: CLINIC | Age: 81
End: 2021-11-29
Payer: MEDICARE

## 2022-01-11 ENCOUNTER — LAB VISIT (OUTPATIENT)
Dept: LAB | Facility: HOSPITAL | Age: 82
End: 2022-01-11
Attending: FAMILY MEDICINE
Payer: MEDICARE

## 2022-01-11 DIAGNOSIS — E55.9 VITAMIN D DEFICIENCY: ICD-10-CM

## 2022-01-11 DIAGNOSIS — R73.9 HYPERGLYCEMIA: ICD-10-CM

## 2022-01-11 DIAGNOSIS — I10 ESSENTIAL HYPERTENSION: ICD-10-CM

## 2022-01-11 DIAGNOSIS — E03.4 HYPOTHYROIDISM DUE TO ACQUIRED ATROPHY OF THYROID: ICD-10-CM

## 2022-01-11 LAB
25(OH)D3+25(OH)D2 SERPL-MCNC: 41 NG/ML (ref 30–96)
ALBUMIN SERPL BCP-MCNC: 3.5 G/DL (ref 3.5–5.2)
ALP SERPL-CCNC: 51 U/L (ref 55–135)
ALT SERPL W/O P-5'-P-CCNC: 10 U/L (ref 10–44)
ANION GAP SERPL CALC-SCNC: 9 MMOL/L (ref 8–16)
AST SERPL-CCNC: 12 U/L (ref 10–40)
BASOPHILS # BLD AUTO: 0.02 K/UL (ref 0–0.2)
BASOPHILS NFR BLD: 0.4 % (ref 0–1.9)
BILIRUB SERPL-MCNC: 1.1 MG/DL (ref 0.1–1)
BUN SERPL-MCNC: 15 MG/DL (ref 8–23)
CALCIUM SERPL-MCNC: 9.3 MG/DL (ref 8.7–10.5)
CHLORIDE SERPL-SCNC: 107 MMOL/L (ref 95–110)
CHOLEST SERPL-MCNC: 161 MG/DL (ref 120–199)
CHOLEST/HDLC SERPL: 3 {RATIO} (ref 2–5)
CO2 SERPL-SCNC: 22 MMOL/L (ref 23–29)
CREAT SERPL-MCNC: 0.8 MG/DL (ref 0.5–1.4)
DIFFERENTIAL METHOD: ABNORMAL
EOSINOPHIL # BLD AUTO: 0.1 K/UL (ref 0–0.5)
EOSINOPHIL NFR BLD: 1.6 % (ref 0–8)
ERYTHROCYTE [DISTWIDTH] IN BLOOD BY AUTOMATED COUNT: 12.3 % (ref 11.5–14.5)
EST. GFR  (AFRICAN AMERICAN): >60 ML/MIN/1.73 M^2
EST. GFR  (NON AFRICAN AMERICAN): >60 ML/MIN/1.73 M^2
ESTIMATED AVG GLUCOSE: 105 MG/DL (ref 68–131)
GLUCOSE SERPL-MCNC: 94 MG/DL (ref 70–110)
HBA1C MFR BLD: 5.3 % (ref 4–5.6)
HCT VFR BLD AUTO: 35.5 % (ref 37–48.5)
HDLC SERPL-MCNC: 53 MG/DL (ref 40–75)
HDLC SERPL: 32.9 % (ref 20–50)
HGB BLD-MCNC: 11.5 G/DL (ref 12–16)
IMM GRANULOCYTES # BLD AUTO: 0.03 K/UL (ref 0–0.04)
IMM GRANULOCYTES NFR BLD AUTO: 0.5 % (ref 0–0.5)
LDLC SERPL CALC-MCNC: 94.4 MG/DL (ref 63–159)
LYMPHOCYTES # BLD AUTO: 1.1 K/UL (ref 1–4.8)
LYMPHOCYTES NFR BLD: 20.4 % (ref 18–48)
MCH RBC QN AUTO: 31.6 PG (ref 27–31)
MCHC RBC AUTO-ENTMCNC: 32.4 G/DL (ref 32–36)
MCV RBC AUTO: 98 FL (ref 82–98)
MONOCYTES # BLD AUTO: 0.4 K/UL (ref 0.3–1)
MONOCYTES NFR BLD: 7.6 % (ref 4–15)
NEUTROPHILS # BLD AUTO: 3.8 K/UL (ref 1.8–7.7)
NEUTROPHILS NFR BLD: 69.5 % (ref 38–73)
NONHDLC SERPL-MCNC: 108 MG/DL
NRBC BLD-RTO: 0 /100 WBC
PLATELET # BLD AUTO: 208 K/UL (ref 150–450)
PMV BLD AUTO: 10.2 FL (ref 9.2–12.9)
POTASSIUM SERPL-SCNC: 4.2 MMOL/L (ref 3.5–5.1)
PROT SERPL-MCNC: 6.3 G/DL (ref 6–8.4)
RBC # BLD AUTO: 3.64 M/UL (ref 4–5.4)
SODIUM SERPL-SCNC: 138 MMOL/L (ref 136–145)
T4 FREE SERPL-MCNC: 1.06 NG/DL (ref 0.71–1.51)
TRIGL SERPL-MCNC: 68 MG/DL (ref 30–150)
TSH SERPL DL<=0.005 MIU/L-ACNC: 2.6 UIU/ML (ref 0.4–4)
WBC # BLD AUTO: 5.5 K/UL (ref 3.9–12.7)

## 2022-01-11 PROCEDURE — 80053 COMPREHEN METABOLIC PANEL: CPT | Performed by: FAMILY MEDICINE

## 2022-01-11 PROCEDURE — 82306 VITAMIN D 25 HYDROXY: CPT | Performed by: FAMILY MEDICINE

## 2022-01-11 PROCEDURE — 36415 COLL VENOUS BLD VENIPUNCTURE: CPT | Mod: PO | Performed by: FAMILY MEDICINE

## 2022-01-11 PROCEDURE — 84443 ASSAY THYROID STIM HORMONE: CPT | Performed by: FAMILY MEDICINE

## 2022-01-11 PROCEDURE — 80061 LIPID PANEL: CPT | Performed by: FAMILY MEDICINE

## 2022-01-11 PROCEDURE — 84439 ASSAY OF FREE THYROXINE: CPT | Performed by: FAMILY MEDICINE

## 2022-01-11 PROCEDURE — 83525 ASSAY OF INSULIN: CPT | Performed by: FAMILY MEDICINE

## 2022-01-11 PROCEDURE — 83036 HEMOGLOBIN GLYCOSYLATED A1C: CPT | Performed by: FAMILY MEDICINE

## 2022-01-11 PROCEDURE — 85025 COMPLETE CBC W/AUTO DIFF WBC: CPT | Performed by: FAMILY MEDICINE

## 2022-01-12 LAB
INSULIN COLLECTION INTERVAL: NORMAL
INSULIN SERPL-ACNC: 9.4 UU/ML

## 2022-01-14 ENCOUNTER — OFFICE VISIT (OUTPATIENT)
Dept: FAMILY MEDICINE | Facility: CLINIC | Age: 82
End: 2022-01-14
Payer: MEDICARE

## 2022-01-14 VITALS
BODY MASS INDEX: 39.85 KG/M2 | DIASTOLIC BLOOD PRESSURE: 60 MMHG | RESPIRATION RATE: 16 BRPM | HEIGHT: 64 IN | SYSTOLIC BLOOD PRESSURE: 120 MMHG | WEIGHT: 233.44 LBS | HEART RATE: 64 BPM | OXYGEN SATURATION: 96 % | TEMPERATURE: 98 F

## 2022-01-14 DIAGNOSIS — Z23 FLU VACCINE NEED: ICD-10-CM

## 2022-01-14 DIAGNOSIS — Z91.89 AT HIGH RISK FOR BREAST CANCER: ICD-10-CM

## 2022-01-14 DIAGNOSIS — M54.50 ACUTE BILATERAL LOW BACK PAIN WITHOUT SCIATICA: Primary | ICD-10-CM

## 2022-01-14 PROCEDURE — 3288F PR FALLS RISK ASSESSMENT DOCUMENTED: ICD-10-PCS | Mod: CPTII,S$GLB,, | Performed by: FAMILY MEDICINE

## 2022-01-14 PROCEDURE — 3288F FALL RISK ASSESSMENT DOCD: CPT | Mod: CPTII,S$GLB,, | Performed by: FAMILY MEDICINE

## 2022-01-14 PROCEDURE — 1101F PT FALLS ASSESS-DOCD LE1/YR: CPT | Mod: CPTII,S$GLB,, | Performed by: FAMILY MEDICINE

## 2022-01-14 PROCEDURE — 3074F PR MOST RECENT SYSTOLIC BLOOD PRESSURE < 130 MM HG: ICD-10-PCS | Mod: CPTII,S$GLB,, | Performed by: FAMILY MEDICINE

## 2022-01-14 PROCEDURE — 1159F PR MEDICATION LIST DOCUMENTED IN MEDICAL RECORD: ICD-10-PCS | Mod: CPTII,S$GLB,, | Performed by: FAMILY MEDICINE

## 2022-01-14 PROCEDURE — 99214 PR OFFICE/OUTPT VISIT, EST, LEVL IV, 30-39 MIN: ICD-10-PCS | Mod: S$GLB,,, | Performed by: FAMILY MEDICINE

## 2022-01-14 PROCEDURE — 1101F PR PT FALLS ASSESS DOC 0-1 FALLS W/OUT INJ PAST YR: ICD-10-PCS | Mod: CPTII,S$GLB,, | Performed by: FAMILY MEDICINE

## 2022-01-14 PROCEDURE — 1160F RVW MEDS BY RX/DR IN RCRD: CPT | Mod: CPTII,S$GLB,, | Performed by: FAMILY MEDICINE

## 2022-01-14 PROCEDURE — G0008 FLU VACCINE - QUADRIVALENT - ADJUVANTED: ICD-10-PCS | Mod: S$GLB,,, | Performed by: FAMILY MEDICINE

## 2022-01-14 PROCEDURE — 1159F MED LIST DOCD IN RCRD: CPT | Mod: CPTII,S$GLB,, | Performed by: FAMILY MEDICINE

## 2022-01-14 PROCEDURE — 1125F AMNT PAIN NOTED PAIN PRSNT: CPT | Mod: CPTII,S$GLB,, | Performed by: FAMILY MEDICINE

## 2022-01-14 PROCEDURE — 99999 PR PBB SHADOW E&M-EST. PATIENT-LVL IV: CPT | Mod: PBBFAC,,, | Performed by: FAMILY MEDICINE

## 2022-01-14 PROCEDURE — 99999 PR PBB SHADOW E&M-EST. PATIENT-LVL IV: ICD-10-PCS | Mod: PBBFAC,,, | Performed by: FAMILY MEDICINE

## 2022-01-14 PROCEDURE — 90694 FLU VACCINE - QUADRIVALENT - ADJUVANTED: ICD-10-PCS | Mod: S$GLB,,, | Performed by: FAMILY MEDICINE

## 2022-01-14 PROCEDURE — 1160F PR REVIEW ALL MEDS BY PRESCRIBER/CLIN PHARMACIST DOCUMENTED: ICD-10-PCS | Mod: CPTII,S$GLB,, | Performed by: FAMILY MEDICINE

## 2022-01-14 PROCEDURE — 99214 OFFICE O/P EST MOD 30 MIN: CPT | Mod: S$GLB,,, | Performed by: FAMILY MEDICINE

## 2022-01-14 PROCEDURE — G0008 ADMIN INFLUENZA VIRUS VAC: HCPCS | Mod: S$GLB,,, | Performed by: FAMILY MEDICINE

## 2022-01-14 PROCEDURE — 3074F SYST BP LT 130 MM HG: CPT | Mod: CPTII,S$GLB,, | Performed by: FAMILY MEDICINE

## 2022-01-14 PROCEDURE — 3078F PR MOST RECENT DIASTOLIC BLOOD PRESSURE < 80 MM HG: ICD-10-PCS | Mod: CPTII,S$GLB,, | Performed by: FAMILY MEDICINE

## 2022-01-14 PROCEDURE — 3078F DIAST BP <80 MM HG: CPT | Mod: CPTII,S$GLB,, | Performed by: FAMILY MEDICINE

## 2022-01-14 PROCEDURE — 90694 VACC AIIV4 NO PRSRV 0.5ML IM: CPT | Mod: S$GLB,,, | Performed by: FAMILY MEDICINE

## 2022-01-14 PROCEDURE — 1125F PR PAIN SEVERITY QUANTIFIED, PAIN PRESENT: ICD-10-PCS | Mod: CPTII,S$GLB,, | Performed by: FAMILY MEDICINE

## 2022-01-14 RX ORDER — CYCLOBENZAPRINE HCL 5 MG
5 TABLET ORAL 3 TIMES DAILY PRN
Qty: 90 TABLET | Refills: 0 | Status: SHIPPED | OUTPATIENT
Start: 2022-01-14

## 2022-01-14 RX ORDER — TRAMADOL HYDROCHLORIDE 50 MG/1
50 TABLET ORAL EVERY 6 HOURS PRN
COMMUNITY
End: 2023-03-14 | Stop reason: SDUPTHER

## 2022-01-14 NOTE — PROGRESS NOTES
Subjective:       Patient ID: Danna Sorensen is a 81 y.o. female.    Chief Complaint: Follow-up (6mth f/u hypertension)    HPI  Review of Systems   Constitutional: Negative for fatigue and unexpected weight change.   Respiratory: Negative for chest tightness and shortness of breath.    Cardiovascular: Negative for chest pain, palpitations and leg swelling.   Gastrointestinal: Negative for abdominal pain.   Musculoskeletal: Negative for arthralgias.   Neurological: Negative for dizziness, syncope, light-headedness and headaches.       Patient Active Problem List   Diagnosis    Hypothyroid    Nuclear sclerosis    Hyperopia with astigmatism and presbyopia    DDD (degenerative disc disease), lumbar    Morbid obesity with BMI of 40.0-44.9, adult    Calcified granuloma of lung    History of colon cancer    Lumbar radiculitis    Other spondylosis, lumbar region    Chronic right-sided low back pain without sciatica    Vitamin D deficiency    Essential hypertension    Malignant neoplasm of ascending colon     Patient is here for a chronic conditions follow up.      Objective:      Physical Exam  Vitals and nursing note reviewed.   Constitutional:       Appearance: She is well-developed and well-nourished.   Cardiovascular:      Rate and Rhythm: Normal rate and regular rhythm.      Heart sounds: Normal heart sounds.   Pulmonary:      Effort: Pulmonary effort is normal.      Breath sounds: Normal breath sounds.   Musculoskeletal:         General: No edema.   Skin:     General: Skin is warm and dry.   Neurological:      Mental Status: She is alert and oriented to person, place, and time.   Psychiatric:         Mood and Affect: Mood and affect normal.         Assessment:       1. Acute bilateral low back pain without sciatica    2. Flu vaccine need    3. At high risk for breast cancer        Plan:         1. Acute bilateral low back pain without sciatica  Refer for eval and treat  - Ambulatory referral/consult to  Physical/Occupational Therapy; Future  - cyclobenzaprine (FLEXERIL) 5 MG tablet; Take 1 tablet (5 mg total) by mouth 3 (three) times daily as needed for Muscle spasms.  Dispense: 90 tablet; Refill: 0    2. Flu vaccine need  Immunize today.  Counseled patient on risks, benefits and side effects.  Patient elected to proceed with vaccination.    - Influenza (FLUAD) - Quadrivalent (Adjuvanted) *Preferred* (65+) (PF)    3. At high risk for breast cancer  Screen and treat as indicated:    - MRI Breast Bilateral W WO Contrast; Future        Time spent with patient: 20 minutes    Patient with be reevaluated in 4 weeks or sooner prn    Greater than 50% of this visit was spent counseling as described in above documentation:Yes

## 2022-01-24 ENCOUNTER — CLINICAL SUPPORT (OUTPATIENT)
Dept: REHABILITATION | Facility: HOSPITAL | Age: 82
End: 2022-01-24
Payer: MEDICARE

## 2022-01-24 DIAGNOSIS — M25.60 DECREASED RANGE OF MOTION: ICD-10-CM

## 2022-01-24 DIAGNOSIS — R26.89 DECREASED FUNCTIONAL MOBILITY: ICD-10-CM

## 2022-01-24 DIAGNOSIS — M54.50 ACUTE BILATERAL LOW BACK PAIN WITHOUT SCIATICA: ICD-10-CM

## 2022-01-24 DIAGNOSIS — R53.1 DECREASED STRENGTH: ICD-10-CM

## 2022-01-24 DIAGNOSIS — M62.89 MUSCLE TIGHTNESS: ICD-10-CM

## 2022-01-24 PROCEDURE — 97161 PT EVAL LOW COMPLEX 20 MIN: CPT | Mod: PN

## 2022-01-24 PROCEDURE — 97110 THERAPEUTIC EXERCISES: CPT | Mod: PN

## 2022-01-24 NOTE — PROGRESS NOTES
Patient, Danna Sorensen (MRN #[id number]), presented with a recorded BMI of 40.07 kg/m^2 consistent with the definition of morbid obesity (ICD-10 E66.01). The patient's morbid obesity was monitored, evaluated, addressed and/or treated. This addendum to the medical record is made on 01/23/2022.

## 2022-01-24 NOTE — PLAN OF CARE
Dictation #1  MRN:[id number]  CSN:[id number]  [facility]  Physical Therapy Initial Evaluation    Name: Danna Sorensen  Clinic Number: [id number]    Therapy Diagnosis:   Encounter Diagnoses   Name Primary?    Acute bilateral low back pain without sciatica     Decreased functional mobility     Muscle tightness     Decreased strength     Decreased range of motion      Physician: Claudia Kim MD  Physician Orders: PT Eval and Treat  Medical Diagnosis from Referral: M54.50 (ICD-10-CM) - Acute bilateral low back pain without sciatica   Evaluation Date: 1/24/2022  Authorization Period Expiration: 02/07/2022   Plan of Care Expiration: 5/30/2022    Progress Update: 2/24/2022  FOTO: 1 / 3   Visit # / Visits authorized: 1 / 1       PRECAUTIONS: Standard Precautions     Time In: 0845  Time Out: 0930  Total Appointment Time (timed & untimed codes): 45 minutes    SUBJECTIVE     Chief complaint:  Pain started about 6 years ago.  States that she was diagnosed with 3 bulging discs and spinal stenosis.  Has gotten worse since onset.   Pain frequency has increased.  Denies numbness/tingling, bowel/bladder issues.  Denies fevers, chills, night sweats.  States that her pain is worse at night but she believes it is due to her activity level during the day.  Was diagnosed with colon cancer in 2007 and has been in remission since.  States that she starts having hip pain at night.      History of current condition:  none    Dominant Extremity: Both    Previous episode:  none    Aggravating Factors:  Bending over, prolonged standing > 15 misn  Easing Factors:  Heat, tramadol    Imaging:  See epic.    Prior Therapy: N/A  Social History: Pt lives with their spouse;  Stairs but does not utilize.    Occupation: Pt is retired.   Prior Level of Function: Independent with all ADLs  Current Level of Function: 85% of PLOF    Pain:  Current 4 /10, worst 10 /10, best 4 /10   Description: Dull and Throbbing    Pts goals:  "Pt reported goal is to decrease pain.   Be able to sleep at night without pain.    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    Strabismus     Trouble in sleeping     Wears dentures     Uppers       Surgical History:   Danna Sorensen  has a past surgical history that includes TONSILLECTOMY, ADENOIDECTOMY, BILATERAL yringotomy and tubes; Total knee arthroplasty (Bilateral, 08/1998); Hemicolectomy; Hand tendon surgery (Left); Tumor removal (Left); Colonoscopy (N/A, 10/18/2016); Shoulder arthroscopy (Right, 01/12/2017); Injection of anesthetic agent around medial branch nerves innervating lumbar facet joint (Right, 7/10/2018); and Radiofrequency ablation of lumbar medial branch nerve at single level (Right, 7/24/2018).    Medications:   Danna has a current medication list which includes the following prescription(s): acetaminophen, cyclobenzaprine, ergocalciferol (vitamin d2), furosemide, levothyroxine, lisinopril, methocarbamol, nystatin, tramadol, and triamcinolone acetonide 0.1%.    Allergies:   Review of patient's allergies indicates:   Allergen Reactions    Aspirin      Other reaction(s): Stomach upset    Gabapentin Other (See Comments)     Pt states she felt "funny" when she took the medication. Especially at the higher dose.    Mobic [meloxicam] Swelling     Edema and elevated blood pressure     Oxaliplatin      Other reaction(s): Joint pain  Other reaction(s): " Itching  Other reaction(s): Hives        OBJECTIVE   (x = not tested due to pain and/or inability to obtain test position)    RANGE OF MOTION:    Lumbar AROM/PROM Right  (spine)  1/24/2022 Left    1/24/2022 Goal   Lumbar Flexion (60) wfl --- 55     Lumbar Extension (30) wfl --- 30     Lumbar Side Bending (25) wfl wfl 20     Lumbar Rotation wfl wfl 45         Hip AROM/PROM Right  1/24/2022 Left  1/24/2022 Goal   Hip Flexion (120) 102 108 115     Hip IR (45) 36 39 40     Hip ER (45) wfl wfl 40         Knee AROM/PROM Right  1/24/2022 Left  1/24/2022 Goal   Hyper - Zero - Flexion wfl wfl 0-0-135  Initi       STRENGTH:    L/E MMT Right  1/24/2022 Goal   Hip Flexion  3/5 5/5 B    Hip Extension  4-/5 5/5 B   Hip Abduction  4-/5 5/5 B   Hip IR 4-/5 5/5 B   Hip ER 4-/5 5/5 B   Knee Flexion 4/5 5/5 B   Knee Extension 4/5 5/5 B   Ankle DF 4/5 5/5 B   Ankle PF 4/5 5/5 B   Ankle Inversion 4-/5 5/5 B   Ankle Eversion 4-/5 5/5 B   Big Toe Extension 4/5 5/5 B     L/E MMT Left  1/24/2022 Goal   Hip Flexion  3+/5 5/5 B    Hip Extension  4-/5 5/5 B   Hip Abduction  4-/5 5/5 B   Hip IR 4-/5 5/5 B   Hip ER 4-/5 5/5 B   Knee Flexion 4/5 5/5 B   Knee Extension 4/5 5/5 B   Ankle DF 4/5 5/5 B   Ankle PF 4/5 5/5 B   Ankle Inversion 4-/5 5/5 B   Ankle Eversion 4-/5 5/5 B   Big Toe Extension 4-/5 5/5 B       MUSCLE LENGTH:     Muscle Tested  Right  1/24/2022 Left   1/24/2022 Goal   Hip Flexors  decreased decreased Normal B   Hamstrings  decreased decreased Normal B   Gastrocnemius  decreased decreased Normal B       SPECIAL TESTS:     Right  (spine)  1/24/2022 Goal   SLR Negative Negative    Crossover SLR Negative Negative    Slump Negative Negative    90/90 Positive B Negative   Yair Positive Negative   Standing forward flexion test Negative Negative   Sacral thrust Negative Negative   SIJ Distraction Negative Negative   SIJ Compression Negative Negative       Sensation:  Sensation is intact to light touch    Palpation:  TTP from L2-L5  "spinous processes and R lumbar paraspinals.    Posture:  Pt presents with postural abnormalities which include: forward head and rounded shoulders    Gait Analysis: The patient ambulated with the following assistive device: straight cane; the pt presents with the following gait abnormalities: decreased step length, eduardo, and arm swing; increased forward flexed posture, trunk sway     Movement Analysis:   Functional Test  Outcome   Double Leg Squat 1/4 limited by lower extremity mobility   Single Leg Step Down NT           TREATMENT   Total Treatment time separate from Evaluation: (10) minutes    Danna received therapeutic exercises to develop strength, endurance, ROM, flexibility, and posture for (10) minutes including: x = exercises performed     TherEx 1/24/2022    Lower trunk rotations x 3x10   Standing hip abduction  x 3x10   Standing hip extension x 3x10          Plan for Next Visit:     Recumbent bike x 5 mins  Lower trunk rotations  3x10  Supine hip adduction with ball  3x10  Bridges  3x10  Clams with red tb  3x10  Sit to stand  x20  Seated hamstring stretch  3x30 secs  Standing hip extension in parallel bars  2x10  Standing hip abduction in parallel bars  2x10  Step ups  6"  2x10         Home Exercises and Patient Education Provided    Education/Self-Care provided:   Patient educated on the impairments noted above and the effects of physical therapy intervention to improve overall condition and quality of life.    Patient was educated on all the above exercise prior/during/after for proper posture, positioning, and execution for safe performance with home exercise program.     Written Home Exercises Provided: yes. Prefers: Electronic Copies  Exercises were reviewed and Danna was able to demonstrate them prior to the end of the session.  Danna demonstrated good understanding of the education provided.     See EMR under Patient Instructions for exercises provided during therapy sessions.    ASSESSMENT " "  Danna is a 81 y.o. female referred to outpatient Physical Therapy with a medical diagnosis of M54.50 (ICD-10-CM) - Acute bilateral low back pain without sciatica   . Danna presents with clinical signs and symptoms that support this diagnosis with     decreased Lumbar ROM, decreased lower extremity strength, Lumbar joint(s) hypomobility, lower extremity neural tension, and impaired functional mobility. Radicular symptoms are not present.    The above impairments will be addressed through manual therapy techniques, therapeutic exercises, functional training, and modalities as necessary. Patient was treated and educated on exercises for home program, progression of therapy, and benefits of therapy to achieve full functional mobility. Patient will benefit from skilled physical therapy to meet short and long term goals and return to prior level of function.    Pt prognosis is Good.   Pt will benefit from skilled outpatient Physical Therapy to address the deficits stated above and in the chart below, provide pt/family education, and to maximize pt's level of independence.     Plan of care discussed with patient: Yes  Pt's spiritual, cultural and educational needs considered and patient is agreeable to the plan of care and goals as stated below:     Anticipated Barriers for therapy: none    Medical Necessity is demonstrated by the following:   History  Co-morbidities and personal factors that may impact the plan of care Co-morbidities:   advanced age    Personal Factors:   no deficits     low   Examination  Body Structures and Functions, activity limitations and participation restrictions that may impact the plan of care Body Regions:   back    Body Systems:    gross symmetry  ROM   Strength  Core stabilization    Participation Restrictions:   See above in "Current Level of Function"     Activity limitations:   Learning and applying knowledge  no deficits    General Tasks and Commands  no deficits    Communication  no " deficits    Mobility  no deficits    Self care  no deficits    Domestic Life  no deficits    Interactions/Relationships  no deficits    Life Areas  no deficits    Community and Social Life  community life  recreation and leisure  no deficits         low   Clinical Presentation stable and uncomplicated low   Decision Making/ Complexity Score: low       GOALS:  SHORT TERM GOALS: 4 weeks 1/24/2022   1. Recent signs and systems trend is improving in order to progress towards LTG's.    2. Patient will be independent with HEP in order to further progress and return to maximal function.    3. Pain rating at Worst: 5/10 in order to progress towards increased independence with activity.    4. Patient will be able to correct postural deviations in sitting and standing, to decrease pain and promote postural awareness for injury prevention.       LONG TERM GOALS: 8 weeks 1/24/2022   1. Patient will return to normal ADL, recreational, and work related activities with less pain and limitation.     2. Patient will improve AROM to stated goals in order to return to maximal functional potential.     3. Patient will improve Strength to stated goals of appropriate musculature in order to improve functional independence.     4. Pain Rating at Best: 1/10 to improve Quality of Life.     5. Patient will meet predicted functional outcome (FOTO) score: 50% to increase self-worth & perceived functional ability.    6. Patient will have met/partially met personal goal of being able to sleep at night with no pain          PLAN   Plan of care Certification: 1/24/2022 to 5/30/2022    Outpatient Physical Therapy 2 times weekly for 6 weeks to include any combination of the following interventions: virtual visits, dry needling, modalities, electrical stimulation (IFC, Pre-Mod, Attended with Functional Dry Needling), Manual Therapy, Moist Heat/ Ice, Neuromuscular Re-ed, Patient Education, Self Care, Therapeutic Exercise, Functional Training and  Therapeutic Activites     Thank you for this referral.    These services are reasonable and necessary for the conditions set forth above while under my care.    Roberth Daniels, PT, DPT

## 2022-01-31 ENCOUNTER — CLINICAL SUPPORT (OUTPATIENT)
Dept: REHABILITATION | Facility: HOSPITAL | Age: 82
End: 2022-01-31
Payer: MEDICARE

## 2022-01-31 ENCOUNTER — DOCUMENTATION ONLY (OUTPATIENT)
Dept: REHABILITATION | Facility: HOSPITAL | Age: 82
End: 2022-01-31

## 2022-01-31 DIAGNOSIS — M25.60 DECREASED RANGE OF MOTION: ICD-10-CM

## 2022-01-31 DIAGNOSIS — R26.89 DECREASED FUNCTIONAL MOBILITY: ICD-10-CM

## 2022-01-31 DIAGNOSIS — R53.1 DECREASED STRENGTH: ICD-10-CM

## 2022-01-31 DIAGNOSIS — M62.89 MUSCLE TIGHTNESS: ICD-10-CM

## 2022-01-31 PROCEDURE — 97140 MANUAL THERAPY 1/> REGIONS: CPT | Mod: PN,CQ

## 2022-01-31 PROCEDURE — 97110 THERAPEUTIC EXERCISES: CPT | Mod: PN,CQ

## 2022-01-31 NOTE — PROGRESS NOTES
30 day visit PT-PTA face-face discussion with LOPEZ Daniels re: patient status, POC, and plan for progression done 1/31/22.  Janiya Alamo, PTA

## 2022-01-31 NOTE — PROGRESS NOTES
"  Physical Therapy Daily Treatment Note     Name: Danna LEE [facility] Number: [id number]    Therapy Diagnosis:   Encounter Diagnoses   Name Primary?    Decreased functional mobility     Muscle tightness     Decreased strength     Decreased range of motion      Physician: Claudia Kim MD    Visit Date: 1/31/2022    Physician: Claudia Kim MD  Physician Orders: PT Eval and Treat  Medical Diagnosis from Referral: M54.50 (ICD-10-CM) - Acute bilateral low back pain without sciatica   Evaluation Date: 1/24/2022  Authorization Period Expiration: 02/07/2022   Plan of Care Expiration: 5/30/2022                Progress Update: 2/24/2022             FOTO: 1 / 3   Visit # / Visits authorized: 1 / 5        Time In: 1042  Time Out: 1130  Total Billable Time: 48 minutes    Precautions: Standard    Subjective     Pt reports: R hip pain.  She was compliant with home exercise program.  Response to previous treatment: first visit after eval  Functional change: none    Pain: 6/10  Location: right hip      Objective     Danna received therapeutic exercises to develop strength, ROM, flexibility, posture and core stabilization for 43 minutes including:    Recumbent bike x 5 mins    LTR x 20 B  Bridges x20 w/toes up to stop Low back pain and hamstring cramp  Supine hip adduction with ball  2x10  Clams with red theraband  2x10     Seated piriformis stretch 3 x 30 sec B  Seated hamstring stretch  3x30 secs  Sit to stand  2 x10    Standing hip extension in parallel bars  2x10  Standing hip abduction in parallel bars  2x10  Step ups  6"  2x10    Manual therapy:  X 5 minutes  Myofacial Release with therapy bar to R hip region    Home Exercises Provided and Patient Education Provided     Education provided:   - cont HEP    Written Home Exercises Provided: Patient instructed to cont prior HEP.  Exercises were reviewed and Danna was able to demonstrate them prior to the end of the session.  Danna demonstrated good  understanding of the " education provided.     See EMR under Patient Instructions for exercises provided prior visit.    Assessment     Danna presents with decreased hip and trunk flexibility along with R hip pain, utilizing SC on L side.  Pt unable to stay supine for more than 5 minutes.  First rolling on her L side, then on second time, sitting eom. SOB with activity, rest breaks needed.  Decreased R hip extension with toe off.  Continued LE stretching, strengthening, to decrease fall risk and pain, improve community outings and ADL's.     Danna Is progressing well towards her goals.   Pt prognosis is Good.     Pt will continue to benefit from skilled outpatient physical therapy to address the deficits listed in the problem list box on initial evaluation, provide pt/family education and to maximize pt's level of independence in the home and community environment.     Pt's spiritual, cultural and educational needs considered and pt agreeable to plan of care and goals.    Anticipated barriers to physical therapy: none    GOALS:  SHORT TERM GOALS: 4 weeks 1/24/2022   1. Recent signs and systems trend is improving in order to progress towards LTG's. progressing    2. Patient will be independent with HEP in order to further progress and return to maximal function.  progressing    3. Pain rating at Worst: 5/10 in order to progress towards increased independence with activity.  progressing    4. Patient will be able to correct postural deviations in sitting and standing, to decrease pain and promote postural awareness for injury prevention.   progressing       LONG TERM GOALS: 8 weeks 1/24/2022   1. Patient will return to normal ADL, recreational, and work related activities with less pain and limitation.  progressing     2. Patient will improve AROM to stated goals in order to return to maximal functional potential.   progressing    3. Patient will improve Strength to stated goals of appropriate musculature in order to improve functional  independence.   progressing    4. Pain Rating at Best: 1/10 to improve Quality of Life.   progressing    5. Patient will meet predicted functional outcome (FOTO) score: 50% to increase self-worth & perceived functional ability.  progressing    6. Patient will have met/partially met personal goal of being able to sleep at night with no pain progressing          Plan     Cont per POC, posture, strengthening    Janiya Alamo, PTA

## 2022-02-03 ENCOUNTER — CLINICAL SUPPORT (OUTPATIENT)
Dept: REHABILITATION | Facility: HOSPITAL | Age: 82
End: 2022-02-03
Payer: MEDICARE

## 2022-02-03 DIAGNOSIS — M62.89 MUSCLE TIGHTNESS: ICD-10-CM

## 2022-02-03 DIAGNOSIS — R26.89 DECREASED FUNCTIONAL MOBILITY: ICD-10-CM

## 2022-02-03 DIAGNOSIS — R53.1 DECREASED STRENGTH: ICD-10-CM

## 2022-02-03 DIAGNOSIS — M25.60 DECREASED RANGE OF MOTION: ICD-10-CM

## 2022-02-03 PROCEDURE — 97110 THERAPEUTIC EXERCISES: CPT | Mod: PN

## 2022-02-03 PROCEDURE — 97140 MANUAL THERAPY 1/> REGIONS: CPT | Mod: PN

## 2022-02-03 PROCEDURE — 97112 NEUROMUSCULAR REEDUCATION: CPT | Mod: PN

## 2022-02-03 NOTE — PROGRESS NOTES
"  Physical Therapy Daily Treatment Note     Name: Danna LEE [facility] Number: [id number]    Therapy Diagnosis:   Encounter Diagnoses   Name Primary?    Decreased functional mobility     Muscle tightness     Decreased strength     Decreased range of motion      Physician: Claudia Kim MD    Visit Date: 2/3/2022    Physician: Claudia Kim MD  Physician Orders: PT Eval and Treat  Medical Diagnosis from Referral: M54.50 (ICD-10-CM) - Acute bilateral low back pain without sciatica   Evaluation Date: 1/24/2022  Authorization Period Expiration: 02/07/2022   Plan of Care Expiration: 5/30/2022                Progress Update: 2/24/2022             FOTO: 1 / 3   Visit # / Visits authorized: 2 / 5        Time In: 1030  Time Out: 1125  Total Billable Time: 55 minutes    Precautions: Standard    Subjective     Pt reports: that she was slightly sore after last session and is having R hip pain today.  She was compliant with home exercise program.  Response to previous treatment: first visit after eval  Functional change: none    Pain: 5/10  Location: right hip      Objective     Danna received therapeutic exercises to develop strength, ROM, flexibility, posture and core stabilization for 39 minutes including:  During 25 minutes of therapeutic exercise, Danna, was supervised by a rehabilitation technician under the direction of the treating therapist.    Recumbent bike x 7 mins    LTR x 20 B  Bridges x20 w/toes up to stop Low back pain and hamstring cramp  Supine hip adduction with ball  2x10  Clams with red theraband  2x10     Seated piriformis stretch 3 x 30 sec B  Seated hamstring stretch  3x30 secs  Sit to stand  2 x10    Neuro reeducation x 8 mins including:      Standing hip extension in parallel bars  2x10  Standing hip abduction in parallel bars  2x10  Step ups  6"  2x10    Manual therapy:  X 8 minutes  Myofacial Release with therapy bar to R hip region    Home Exercises Provided and Patient Education Provided "     Education provided:   - cont HEP    Written Home Exercises Provided: Patient instructed to cont prior HEP.  Exercises were reviewed and Danna was able to demonstrate them prior to the end of the session.  Danna demonstrated good  understanding of the education provided.     See EMR under Patient Instructions for exercises provided prior visit.    Assessment     Danna presents with decreased hip and trunk flexibility along with R hip pain, utilizing SC on L side.  Pt unable to stay supine for more than 5 minutes.  First rolling on her L side, then on second time, sitting eom. SOB with activity, rest breaks needed.  Decreased R hip extension with toe off.  Continued LE stretching, strengthening, to decrease fall risk and pain, improve community outings and ADL's.     Danna Is progressing well towards her goals.   Pt prognosis is Good.     Pt will continue to benefit from skilled outpatient physical therapy to address the deficits listed in the problem list box on initial evaluation, provide pt/family education and to maximize pt's level of independence in the home and community environment.     Pt's spiritual, cultural and educational needs considered and pt agreeable to plan of care and goals.    Anticipated barriers to physical therapy: none    GOALS:  SHORT TERM GOALS: 4 weeks 1/24/2022   1. Recent signs and systems trend is improving in order to progress towards LTG's. progressing    2. Patient will be independent with HEP in order to further progress and return to maximal function.  progressing    3. Pain rating at Worst: 5/10 in order to progress towards increased independence with activity.  progressing    4. Patient will be able to correct postural deviations in sitting and standing, to decrease pain and promote postural awareness for injury prevention.   progressing       LONG TERM GOALS: 8 weeks 1/24/2022   1. Patient will return to normal ADL, recreational, and work related activities with less pain and  limitation.  progressing     2. Patient will improve AROM to stated goals in order to return to maximal functional potential.   progressing    3. Patient will improve Strength to stated goals of appropriate musculature in order to improve functional independence.   progressing    4. Pain Rating at Best: 1/10 to improve Quality of Life.   progressing    5. Patient will meet predicted functional outcome (FOTO) score: 50% to increase self-worth & perceived functional ability.  progressing    6. Patient will have met/partially met personal goal of being able to sleep at night with no pain progressing          Plan     Cont per POC, posture, strengthening    Roberth Daniels, PT

## 2022-02-07 ENCOUNTER — DOCUMENTATION ONLY (OUTPATIENT)
Dept: REHABILITATION | Facility: HOSPITAL | Age: 82
End: 2022-02-07

## 2022-02-07 ENCOUNTER — CLINICAL SUPPORT (OUTPATIENT)
Dept: REHABILITATION | Facility: HOSPITAL | Age: 82
End: 2022-02-07
Payer: MEDICARE

## 2022-02-07 DIAGNOSIS — M62.89 MUSCLE TIGHTNESS: ICD-10-CM

## 2022-02-07 DIAGNOSIS — M25.60 DECREASED RANGE OF MOTION: ICD-10-CM

## 2022-02-07 DIAGNOSIS — R26.89 DECREASED FUNCTIONAL MOBILITY: ICD-10-CM

## 2022-02-07 DIAGNOSIS — R53.1 DECREASED STRENGTH: ICD-10-CM

## 2022-02-07 PROCEDURE — 97110 THERAPEUTIC EXERCISES: CPT | Mod: PN,CQ

## 2022-02-07 PROCEDURE — 97112 NEUROMUSCULAR REEDUCATION: CPT | Mod: PN,CQ

## 2022-02-07 PROCEDURE — 97140 MANUAL THERAPY 1/> REGIONS: CPT | Mod: PN,CQ

## 2022-02-07 NOTE — PROGRESS NOTES
"  Physical Therapy Daily Treatment Note     Name: Danna Sorensen  [facility] Number: [id number]    Therapy Diagnosis:   Encounter Diagnoses   Name Primary?    Decreased functional mobility     Muscle tightness     Decreased strength     Decreased range of motion      Physician: Claudia Kim MD    Visit Date: 2/7/2022    Physician: Claudia Kim MD  Physician Orders: PT Eval and Treat  Medical Diagnosis from Referral: M54.50 (ICD-10-CM) - Acute bilateral low back pain without sciatica   Evaluation Date: 1/24/2022  Authorization Period Expiration: 02/07/2022   Plan of Care Expiration: 5/30/2022                Progress Update: 2/24/2022             FOTO: 1 / 3   Visit # / Visits authorized: 3 / 5        Time In: 1050  Time Out: 1149  Total Billable Time: 55 minutes    Precautions: Standard    Subjective     Pt reports: she has been awake since 2AM with R hip pain  She was compliant with home exercise program.  Response to previous treatment: first visit after eval  Functional change: none    Pain: 6/10  Location: right hip      Objective     Danna received therapeutic exercises to develop strength, ROM, flexibility, posture and core stabilization for 32 minutes including:    Recumbent bike x 5 mins    LTR x 10 B  Bridges x20 w/toes up to stop Low back pain and hamstring cramp  NOT PERFORMED time  Supine hip adduction with ball  2x10  Clams with red theraband  2x10     Seated piriformis stretch 3 x 30 sec R-supine manual today  Seated hamstring stretch  3x30 secs  Sit to stand  2 x10    Neuro reeducation x 8 mins including:      Standing hip extension in parallel bars  2x10  Standing hip abduction in parallel bars  2x10  Step ups  6"  2x10  NOT PERFORMED time    Manual therapy: x 15 minutes  Myofacial Release with therapy bar to R hip region  Long axis distraction  Manual R piriformis stretch    Home Exercises Provided and Patient Education Provided     Education provided:   - cont HEP    Written Home Exercises " Provided: Patient instructed to cont prior HEP.  Exercises were reviewed and Danna was able to demonstrate them prior to the end of the session.  Danna demonstrated good  understanding of the education provided.     See EMR under Patient Instructions for exercises provided prior visit.    Assessment     Danna continues to report L hip pain.  She states she usually gets woken up during the middle of the night.  She reports decreased pain after MT was completed.  Decreased lumbar lordosis contributes to her Low back pain and dysfunction.  Continued core and LB strengthening to improve ADL's, functional activities.    Danna Is progressing well towards her goals.   Pt prognosis is Good.     Pt will continue to benefit from skilled outpatient physical therapy to address the deficits listed in the problem list box on initial evaluation, provide pt/family education and to maximize pt's level of independence in the home and community environment.     Pt's spiritual, cultural and educational needs considered and pt agreeable to plan of care and goals.    Anticipated barriers to physical therapy: none    GOALS:  SHORT TERM GOALS: 4 weeks 1/24/2022   1. Recent signs and systems trend is improving in order to progress towards LTG's. progressing    2. Patient will be independent with HEP in order to further progress and return to maximal function.  progressing    3. Pain rating at Worst: 5/10 in order to progress towards increased independence with activity.  progressing    4. Patient will be able to correct postural deviations in sitting and standing, to decrease pain and promote postural awareness for injury prevention.   progressing       LONG TERM GOALS: 8 weeks 1/24/2022   1. Patient will return to normal ADL, recreational, and work related activities with less pain and limitation.  progressing     2. Patient will improve AROM to stated goals in order to return to maximal functional potential.   progressing    3. Patient  will improve Strength to stated goals of appropriate musculature in order to improve functional independence.   progressing    4. Pain Rating at Best: 1/10 to improve Quality of Life.   progressing    5. Patient will meet predicted functional outcome (FOTO) score: 50% to increase self-worth & perceived functional ability.  progressing    6. Patient will have met/partially met personal goal of being able to sleep at night with no pain progressing          Plan     Cont per POC, posture, strengthening    Janiya Alamo, PTA

## 2022-02-07 NOTE — PROGRESS NOTES
30 day visit PT-PTA face-face discussion with LOPEZ Daniels re: patient status, POC, and plan for progression done 2/7/22.  Janiya Alamo, PTA

## 2022-02-10 ENCOUNTER — CLINICAL SUPPORT (OUTPATIENT)
Dept: REHABILITATION | Facility: HOSPITAL | Age: 82
End: 2022-02-10
Payer: MEDICARE

## 2022-02-10 DIAGNOSIS — M25.60 DECREASED RANGE OF MOTION: ICD-10-CM

## 2022-02-10 DIAGNOSIS — M62.89 MUSCLE TIGHTNESS: ICD-10-CM

## 2022-02-10 DIAGNOSIS — R53.1 DECREASED STRENGTH: ICD-10-CM

## 2022-02-10 DIAGNOSIS — R26.89 DECREASED FUNCTIONAL MOBILITY: ICD-10-CM

## 2022-02-10 PROCEDURE — 97110 THERAPEUTIC EXERCISES: CPT | Mod: PN,CQ

## 2022-02-10 PROCEDURE — 97112 NEUROMUSCULAR REEDUCATION: CPT | Mod: PN,CQ

## 2022-02-10 PROCEDURE — 97140 MANUAL THERAPY 1/> REGIONS: CPT | Mod: PN,CQ

## 2022-02-10 NOTE — PROGRESS NOTES
"  Physical Therapy Daily Treatment Note     Name: Danna Sorensen  [facility] Number: [id number]    Therapy Diagnosis:   Encounter Diagnoses   Name Primary?    Decreased functional mobility     Muscle tightness     Decreased strength     Decreased range of motion      Physician: Claudia Kim MD    Visit Date: 2/10/2022    Physician: Claudia Kim MD  Physician Orders: PT Eval and Treat  Medical Diagnosis from Referral: M54.50 (ICD-10-CM) - Acute bilateral low back pain without sciatica   Evaluation Date: 1/24/2022  Authorization Period Expiration: 02/07/2022   Plan of Care Expiration: 5/30/2022                Progress Update: 2/24/2022             FOTO: 1 / 3   Visit # / Visits authorized: 3 / 5        Time In: 1050  Time Out: 1149  Total Billable Time: 55 minutes    Precautions: Standard    Subjective     Pt reports: she did a lot of clothes washing yesterday, woke up hurting "all over" today, she almost called to cancel appt today due to pain  She was compliant with home exercise program.  Response to previous treatment:positive  Functional change: none    Pain: 8/10  Location: right hip      Objective     Danna received therapeutic exercises to develop strength, ROM, flexibility, posture and core stabilization for 38 minutes including:    Recumbent bike x 5 mins-pt deferred    LTR x 30 B  Bridges x20 w/toes up to stop Low back pain   Supine hip adduction with ball  2x10  Dying bug 3 x 10 B    Clams with red theraband  3x10-seated  Physioball crunch 3 x 10   Seated piriformis stretch 3 x 30 sec R-supine manual today  Seated hamstring stretch  3x30 secs  Sit to stand  2 x10  NOT PERFORMED   Slant board gastroc stretch 3 x 30 sec B    Neuro reeducation x 12 mins including:      Standing hip extension in parallel bars  2x10  Standing hip abduction in parallel bars  2x10  Step ups  6"  2x10  NOT PERFORMED time    Manual therapy: x 5 minutes  Myofacial Release with therapy bar to R hip region  Long axis distraction  " NOT PERFORMED   Manual R piriformis stretch NOT PERFORMED   SL R glute stretch 3 x 30 sec    Home Exercises Provided and Patient Education Provided     Education provided:   - cont HEP    Written Home Exercises Provided: Patient instructed to cont prior HEP.  Exercises were reviewed and Danna was able to demonstrate them prior to the end of the session.  Danna demonstrated good  understanding of the education provided.     See EMR under Patient Instructions for exercises provided prior visit.    Assessment     Danna presents with continued R Low back pain, decreased B hip and core strength, and decreased LE flexibility .  She tolerated additional PRE's and increased reps w/o increased s/s.  She continues with TP's along R hip region, which decreased minimally after MT was completed.  Pain decreased to 5/10 at conclusion of therapy.  Continued skilled therapy to increase strength, flexibility, and improve ADL's to achieve goals listed on initial evaluation.   Danna Is progressing well towards her goals.   Pt prognosis is Good.     Pt will continue to benefit from skilled outpatient physical therapy to address the deficits listed in the problem list box on initial evaluation, provide pt/family education and to maximize pt's level of independence in the home and community environment.     Pt's spiritual, cultural and educational needs considered and pt agreeable to plan of care and goals.    Anticipated barriers to physical therapy: none    GOALS:  SHORT TERM GOALS: 4 weeks 1/24/2022   1. Recent signs and systems trend is improving in order to progress towards LTG's. progressing    2. Patient will be independent with HEP in order to further progress and return to maximal function.  progressing    3. Pain rating at Worst: 5/10 in order to progress towards increased independence with activity.  progressing    4. Patient will be able to correct postural deviations in sitting and standing, to decrease pain and promote  postural awareness for injury prevention.   progressing       LONG TERM GOALS: 8 weeks 1/24/2022   1. Patient will return to normal ADL, recreational, and work related activities with less pain and limitation.  progressing     2. Patient will improve AROM to stated goals in order to return to maximal functional potential.   progressing    3. Patient will improve Strength to stated goals of appropriate musculature in order to improve functional independence.   progressing    4. Pain Rating at Best: 1/10 to improve Quality of Life.   progressing    5. Patient will meet predicted functional outcome (FOTO) score: 50% to increase self-worth & perceived functional ability.  progressing    6. Patient will have met/partially met personal goal of being able to sleep at night with no pain progressing          Plan     Cont per POC, posture, strengthening    Janiya Alamo, PTA

## 2022-02-14 ENCOUNTER — CLINICAL SUPPORT (OUTPATIENT)
Dept: REHABILITATION | Facility: HOSPITAL | Age: 82
End: 2022-02-14
Payer: MEDICARE

## 2022-02-14 DIAGNOSIS — M62.89 MUSCLE TIGHTNESS: ICD-10-CM

## 2022-02-14 DIAGNOSIS — R26.89 DECREASED FUNCTIONAL MOBILITY: ICD-10-CM

## 2022-02-14 DIAGNOSIS — M25.60 DECREASED RANGE OF MOTION: ICD-10-CM

## 2022-02-14 DIAGNOSIS — R53.1 DECREASED STRENGTH: ICD-10-CM

## 2022-02-14 PROCEDURE — 97140 MANUAL THERAPY 1/> REGIONS: CPT | Mod: PN,CQ

## 2022-02-14 PROCEDURE — 97112 NEUROMUSCULAR REEDUCATION: CPT | Mod: PN,CQ

## 2022-02-14 PROCEDURE — 97110 THERAPEUTIC EXERCISES: CPT | Mod: PN,CQ

## 2022-02-14 NOTE — PROGRESS NOTES
"  Physical Therapy Daily Treatment Note     Name: Danna Sorensen  [facility] Number: [id number]    Therapy Diagnosis:   Encounter Diagnoses   Name Primary?    Decreased functional mobility     Muscle tightness     Decreased strength     Decreased range of motion      Physician: Claudia Kim MD    Visit Date: 2/14/2022    Physician: Claudia Kim MD  Physician Orders: PT Eval and Treat  Medical Diagnosis from Referral: M54.50 (ICD-10-CM) - Acute bilateral low back pain without sciatica   Evaluation Date: 1/24/2022  Authorization Period Expiration: 02/07/2022   Plan of Care Expiration: 5/30/2022                Progress Update: 2/24/2022             FOTO: 2 / 3   Visit # / Visits authorized: 5 / 5      FOTO 5th: 2/14/22    Time In: 1044  Time Out: 1127  Total Billable Time: 43 minutes    Precautions: Standard    Subjective     Pt reports: she is feeling better today  She was compliant with home exercise program.  Response to previous treatment:positive  Functional change: none    Pain: 3/10  Location: right hip      Objective     Danna received therapeutic exercises to develop strength, ROM, flexibility, posture and core stabilization for 21 minutes including:    Recumbent bike x 5 mins-pt deferred    LTR x 15 B  Bridges x20 w/toes up to decrease Low back pain   Dying bug x 10 B    Seated hip adduction with ball  2x10  Clams with red theraband  2x10-seated  Physioball crunch 2 x 10   Seated piriformis stretch 2 x 30 sec R-supine manual today  Seated hamstring stretch  3x30 secs  NOT PERFORMED   Sit to stand  x10    Slant board gastroc stretch 3 x 30 sec B    Neuro reeducation x 12 mins including:      Standing hip extension in parallel bars  2x10 B  Standing hip abduction in parallel bars  2x10 B  Step ups  6"  2x10  NOT PERFORMED time    Manual therapy: x 10 minutes  Myofacial Release with ball to R hip region  Long axis distraction  NOT PERFORMED   Manual R piriformis stretch NOT PERFORMED   SL R glute stretch 3 x " 30 sec  NOT PERFORMED     Home Exercises Provided and Patient Education Provided     Education provided:   - cont HEP    Written Home Exercises Provided: Patient instructed to cont prior HEP.  Exercises were reviewed and Danna was able to demonstrate them prior to the end of the session.  Danna demonstrated good  understanding of the education provided.     See EMR under Patient Instructions for exercises provided prior visit.    Assessment     Danna reports pain R PSIS region, with decreased tissue pliability noted during MT.  She tends to shift weight to the L with sit-stand, VC needed to shift weight to L to perform task in midline.  SOB with activity.  Continued core, LB, and hip strengthening.   Danna Is progressing well towards her goals.   Pt prognosis is Good.     Pt will continue to benefit from skilled outpatient physical therapy to address the deficits listed in the problem list box on initial evaluation, provide pt/family education and to maximize pt's level of independence in the home and community environment.     Pt's spiritual, cultural and educational needs considered and pt agreeable to plan of care and goals.    Anticipated barriers to physical therapy: none    GOALS:  SHORT TERM GOALS: 4 weeks 1/24/2022   1. Recent signs and systems trend is improving in order to progress towards LTG's. progressing    2. Patient will be independent with HEP in order to further progress and return to maximal function.  progressing    3. Pain rating at Worst: 5/10 in order to progress towards increased independence with activity.  progressing    4. Patient will be able to correct postural deviations in sitting and standing, to decrease pain and promote postural awareness for injury prevention.   progressing       LONG TERM GOALS: 8 weeks 1/24/2022   1. Patient will return to normal ADL, recreational, and work related activities with less pain and limitation.  progressing     2. Patient will improve AROM to stated  goals in order to return to maximal functional potential.   progressing    3. Patient will improve Strength to stated goals of appropriate musculature in order to improve functional independence.   progressing    4. Pain Rating at Best: 1/10 to improve Quality of Life.   progressing    5. Patient will meet predicted functional outcome (FOTO) score: 50% to increase self-worth & perceived functional ability.  progressing    6. Patient will have met/partially met personal goal of being able to sleep at night with no pain progressing          Plan     Cont per POC, posture, strengthening    Janiya Alamo, PTA

## 2022-02-15 ENCOUNTER — OFFICE VISIT (OUTPATIENT)
Dept: FAMILY MEDICINE | Facility: CLINIC | Age: 82
End: 2022-02-15
Payer: MEDICARE

## 2022-02-15 VITALS
SYSTOLIC BLOOD PRESSURE: 138 MMHG | TEMPERATURE: 98 F | BODY MASS INDEX: 39.94 KG/M2 | DIASTOLIC BLOOD PRESSURE: 70 MMHG | OXYGEN SATURATION: 95 % | WEIGHT: 233.94 LBS | RESPIRATION RATE: 18 BRPM | HEIGHT: 64 IN

## 2022-02-15 DIAGNOSIS — Z85.038 HISTORY OF COLON CANCER: ICD-10-CM

## 2022-02-15 DIAGNOSIS — Z91.89 AT HIGH RISK FOR BREAST CANCER: ICD-10-CM

## 2022-02-15 DIAGNOSIS — D64.9 ANEMIA, UNSPECIFIED TYPE: ICD-10-CM

## 2022-02-15 DIAGNOSIS — E03.4 HYPOTHYROIDISM DUE TO ACQUIRED ATROPHY OF THYROID: ICD-10-CM

## 2022-02-15 DIAGNOSIS — R73.9 HYPERGLYCEMIA: ICD-10-CM

## 2022-02-15 DIAGNOSIS — E66.01 MORBID OBESITY WITH BMI OF 40.0-44.9, ADULT: ICD-10-CM

## 2022-02-15 DIAGNOSIS — M51.36 DDD (DEGENERATIVE DISC DISEASE), LUMBAR: ICD-10-CM

## 2022-02-15 DIAGNOSIS — M54.50 CHRONIC BILATERAL LOW BACK PAIN WITHOUT SCIATICA: ICD-10-CM

## 2022-02-15 DIAGNOSIS — E55.9 VITAMIN D DEFICIENCY: ICD-10-CM

## 2022-02-15 DIAGNOSIS — Z79.899 OTHER LONG TERM (CURRENT) DRUG THERAPY: ICD-10-CM

## 2022-02-15 DIAGNOSIS — Z12.31 ENCOUNTER FOR SCREENING MAMMOGRAM FOR MALIGNANT NEOPLASM OF BREAST: ICD-10-CM

## 2022-02-15 DIAGNOSIS — I10 ESSENTIAL HYPERTENSION: Primary | ICD-10-CM

## 2022-02-15 DIAGNOSIS — N95.9 MENOPAUSAL AND POSTMENOPAUSAL DISORDER: ICD-10-CM

## 2022-02-15 DIAGNOSIS — G89.29 CHRONIC BILATERAL LOW BACK PAIN WITHOUT SCIATICA: ICD-10-CM

## 2022-02-15 PROCEDURE — 1159F MED LIST DOCD IN RCRD: CPT | Mod: CPTII,S$GLB,, | Performed by: FAMILY MEDICINE

## 2022-02-15 PROCEDURE — 99999 PR PBB SHADOW E&M-EST. PATIENT-LVL IV: CPT | Mod: PBBFAC,,, | Performed by: FAMILY MEDICINE

## 2022-02-15 PROCEDURE — 1159F PR MEDICATION LIST DOCUMENTED IN MEDICAL RECORD: ICD-10-PCS | Mod: CPTII,S$GLB,, | Performed by: FAMILY MEDICINE

## 2022-02-15 PROCEDURE — 3075F SYST BP GE 130 - 139MM HG: CPT | Mod: CPTII,S$GLB,, | Performed by: FAMILY MEDICINE

## 2022-02-15 PROCEDURE — 99214 OFFICE O/P EST MOD 30 MIN: CPT | Mod: S$GLB,,, | Performed by: FAMILY MEDICINE

## 2022-02-15 PROCEDURE — 99214 PR OFFICE/OUTPT VISIT, EST, LEVL IV, 30-39 MIN: ICD-10-PCS | Mod: S$GLB,,, | Performed by: FAMILY MEDICINE

## 2022-02-15 PROCEDURE — 1160F PR REVIEW ALL MEDS BY PRESCRIBER/CLIN PHARMACIST DOCUMENTED: ICD-10-PCS | Mod: CPTII,S$GLB,, | Performed by: FAMILY MEDICINE

## 2022-02-15 PROCEDURE — 1101F PT FALLS ASSESS-DOCD LE1/YR: CPT | Mod: CPTII,S$GLB,, | Performed by: FAMILY MEDICINE

## 2022-02-15 PROCEDURE — 3288F FALL RISK ASSESSMENT DOCD: CPT | Mod: CPTII,S$GLB,, | Performed by: FAMILY MEDICINE

## 2022-02-15 PROCEDURE — 1101F PR PT FALLS ASSESS DOC 0-1 FALLS W/OUT INJ PAST YR: ICD-10-PCS | Mod: CPTII,S$GLB,, | Performed by: FAMILY MEDICINE

## 2022-02-15 PROCEDURE — 1160F RVW MEDS BY RX/DR IN RCRD: CPT | Mod: CPTII,S$GLB,, | Performed by: FAMILY MEDICINE

## 2022-02-15 PROCEDURE — 3288F PR FALLS RISK ASSESSMENT DOCUMENTED: ICD-10-PCS | Mod: CPTII,S$GLB,, | Performed by: FAMILY MEDICINE

## 2022-02-15 PROCEDURE — 3075F PR MOST RECENT SYSTOLIC BLOOD PRESS GE 130-139MM HG: ICD-10-PCS | Mod: CPTII,S$GLB,, | Performed by: FAMILY MEDICINE

## 2022-02-15 PROCEDURE — 1126F AMNT PAIN NOTED NONE PRSNT: CPT | Mod: CPTII,S$GLB,, | Performed by: FAMILY MEDICINE

## 2022-02-15 PROCEDURE — 1126F PR PAIN SEVERITY QUANTIFIED, NO PAIN PRESENT: ICD-10-PCS | Mod: CPTII,S$GLB,, | Performed by: FAMILY MEDICINE

## 2022-02-15 PROCEDURE — 3078F DIAST BP <80 MM HG: CPT | Mod: CPTII,S$GLB,, | Performed by: FAMILY MEDICINE

## 2022-02-15 PROCEDURE — 3078F PR MOST RECENT DIASTOLIC BLOOD PRESSURE < 80 MM HG: ICD-10-PCS | Mod: CPTII,S$GLB,, | Performed by: FAMILY MEDICINE

## 2022-02-15 PROCEDURE — 99999 PR PBB SHADOW E&M-EST. PATIENT-LVL IV: ICD-10-PCS | Mod: PBBFAC,,, | Performed by: FAMILY MEDICINE

## 2022-02-15 RX ORDER — PREGABALIN 50 MG/1
50 CAPSULE ORAL 2 TIMES DAILY
Qty: 180 CAPSULE | Refills: 1 | Status: SHIPPED | OUTPATIENT
Start: 2022-02-15 | End: 2022-05-20 | Stop reason: SDUPTHER

## 2022-02-15 NOTE — PROGRESS NOTES
ri breastSubjective:       Patient ID: Danna Sorensen is a 81 y.o. female.    Chief Complaint: Follow-up (1mth f/u)    HPI  Review of Systems   Constitutional: Negative for fatigue and unexpected weight change.   Respiratory: Negative for chest tightness and shortness of breath.    Cardiovascular: Negative for chest pain, palpitations and leg swelling.   Gastrointestinal: Negative for abdominal pain.   Genitourinary: Negative for difficulty urinating.   Musculoskeletal: Positive for back pain and myalgias. Negative for arthralgias.   Neurological: Negative for dizziness, syncope, weakness, light-headedness, numbness and headaches.       Patient Active Problem List   Diagnosis    Hypothyroid    Nuclear sclerosis    Hyperopia with astigmatism and presbyopia    DDD (degenerative disc disease), lumbar    Morbid obesity with BMI of 40.0-44.9, adult    Calcified granuloma of lung    History of colon cancer    Lumbar radiculitis    Other spondylosis, lumbar region    Chronic right-sided low back pain without sciatica    Vitamin D deficiency    Essential hypertension    Malignant neoplasm of ascending colon    Decreased functional mobility    Muscle tightness    Decreased strength    Decreased range of motion     Patient is here for a follow up.   Reviewed labs 1/2022-mild normocytic anemia    Referred to PT 1 month ago and has done 6 sessions- not much help. Flexeril prn . Tramadol 50mg #60 lasts 2 months or more for low back pain-helps but still waking up from sleep with pain.  Tylenol also helps Sees chiropractor Dr. Stearns. Has gradually worsening of lower back pain. Ache that radiates to right hip. No paresthesias. No B/B incontinence.  Gabapentin- nausea.  Tried PT, KHARI and radiofrequency ablation without relief.       [facility] Dr. Berrios treating cataracts     Heme/onc Dr. Cali following for colon cancer s/p resection and chemo 2017     Had mri brast 2018- high risk.  1/2021 mri breast negative.  "Cannot tolerate mammo  Objective:      Physical Exam  Vitals and nursing note reviewed.   Constitutional:       Appearance: She is well-developed and well-nourished.   Cardiovascular:      Rate and Rhythm: Normal rate and regular rhythm.      Heart sounds: Normal heart sounds.   Pulmonary:      Effort: Pulmonary effort is normal.      Breath sounds: Normal breath sounds.   Musculoskeletal:         General: No edema.   Skin:     General: Skin is warm and dry.   Neurological:      Mental Status: She is alert and oriented to person, place, and time.   Psychiatric:         Mood and Affect: Mood and affect normal.         Assessment:       1. Essential hypertension    2. Vitamin D deficiency    3. Hypothyroidism due to acquired atrophy of thyroid    4. Morbid obesity with BMI of 40.0-44.9, adult    5. Hyperglycemia    6. Chronic bilateral low back pain without sciatica    7. At high risk for breast cancer    8. DDD (degenerative disc disease), lumbar    9. History of colon cancer    10. Anemia, unspecified type    11. Encounter for screening mammogram for malignant neoplasm of breast    12. Menopausal and postmenopausal disorder    13. Other long term (current) drug therapy         Plan:         1. Essential hypertension  Controlled on current medications.  Continue current medications.      2. Vitamin D deficiency  Cont supplement and monitor    3. Hypothyroidism due to acquired atrophy of thyroid  Stable condition.  Continue current medications.  Will adjust based on lab findings or if condition changes.      4. Morbid obesity with BMI of 40.0-44.9, adult  Counseled patient on his ideal body weight, health consequences of being obese and current recommendations including weekly exercise and a heart healthy diet.  Current BMI is:Estimated body mass index is 40.15 kg/m² as calculated from the following:    Height as of this encounter: 5' 4" (1.626 m).    Weight as of this encounter: 106.1 kg (233 lb 14.5 oz)..  Patient " is aware that ideal BMI < 25 or Weight in (lb) to have BMI = 25: 145.3.        5. Hyperglycemia   Your blood sugar is borderline high.  This means you are at risk for developing type 2 diabetes mellitus.  To lessen your risk you should exercise regularly, avoid excess carbohydrates and work toward a body mass index of less than 25.        6. Chronic bilateral low back pain without sciatica  add  - pregabalin (LYRICA) 50 MG capsule; Take 1 capsule (50 mg total) by mouth 2 (two) times daily.  Dispense: 180 capsule; Refill: 1  Use tramadol or tylenol as needed up to bid prn    7. At high risk for breast cancer  Order yearly  - MRI Breast Bilateral W WO Contrast; Future    8. DDD (degenerative disc disease), lumbar  Cont pt and current mgmt    9. History of colon cancer  Cont heme/onc monitoring     10. Anemia, unspecified type  Screen and treat as indicated:    - CBC Auto Differential; Future  - Comprehensive Metabolic Panel; Future  - Vitamin B12; Future  - Ferritin; Future  - Iron and TIBC; Future  - Folate; Future    11. Encounter for screening mammogram for malignant neoplasm of breast  Avoid mammo    12. Menopausal and postmenopausal disorder  Screen and treat as indicated:    - DXA Bone Density Spine And Hip; Future    13. Other long term (current) drug therapy   Screen and treat as indicated:    - Vitamin B12; Future  - Folate; Future        Time spent with patient: 20 minutes    Patient with be reevaluated in 3 months or sooner prn    Greater than 50% of this visit was spent counseling as described in above documentation:Yes

## 2022-02-23 ENCOUNTER — PES CALL (OUTPATIENT)
Dept: ADMINISTRATIVE | Facility: CLINIC | Age: 82
End: 2022-02-23
Payer: MEDICARE

## 2022-03-02 ENCOUNTER — HOSPITAL ENCOUNTER (OUTPATIENT)
Dept: RADIOLOGY | Facility: HOSPITAL | Age: 82
Discharge: HOME | End: 2022-03-02
Attending: FAMILY MEDICINE
Payer: MEDICARE

## 2022-03-02 DIAGNOSIS — Z91.89 AT HIGH RISK FOR BREAST CANCER: ICD-10-CM

## 2022-03-02 LAB
CREAT SERPL-MCNC: 0.8 MG/DL (ref 0.5–1.4)
SAMPLE: NORMAL

## 2022-03-02 PROCEDURE — A9585 GADOBUTROL INJECTION: HCPCS | Mod: PO | Performed by: FAMILY MEDICINE

## 2022-03-02 PROCEDURE — 77049 MRI BREAST C-+ W/CAD BI: CPT | Mod: TC,PO

## 2022-03-02 PROCEDURE — 25500020 PHARM REV CODE 255: Mod: PO | Performed by: FAMILY MEDICINE

## 2022-03-02 RX ORDER — GADOBUTROL 604.72 MG/ML
10 INJECTION INTRAVENOUS
Status: COMPLETED | OUTPATIENT
Start: 2022-03-02 | End: 2022-03-02

## 2022-03-02 RX ADMIN — GADOBUTROL 10 ML: 604.72 INJECTION INTRAVENOUS at 09:03

## 2022-05-11 ENCOUNTER — HOSPITAL ENCOUNTER (OUTPATIENT)
Dept: RADIOLOGY | Facility: CLINIC | Age: 82
Discharge: HOME | End: 2022-05-11
Attending: FAMILY MEDICINE
Payer: MEDICARE

## 2022-05-11 DIAGNOSIS — N95.9 MENOPAUSAL AND POSTMENOPAUSAL DISORDER: ICD-10-CM

## 2022-05-11 PROCEDURE — 77080 DXA BONE DENSITY AXIAL: CPT | Mod: 26,,, | Performed by: RADIOLOGY

## 2022-05-11 PROCEDURE — 77080 DXA BONE DENSITY AXIAL: CPT | Mod: TC,PO

## 2022-05-11 PROCEDURE — 77080 DEXA BONE DENSITY SPINE HIP: ICD-10-PCS | Mod: 26,,, | Performed by: RADIOLOGY

## 2022-05-13 ENCOUNTER — TELEPHONE (OUTPATIENT)
Dept: FAMILY MEDICINE | Facility: CLINIC | Age: 82
End: 2022-05-13
Payer: MEDICARE

## 2022-05-17 ENCOUNTER — TELEPHONE (OUTPATIENT)
Dept: FAMILY MEDICINE | Facility: CLINIC | Age: 82
End: 2022-05-17
Payer: MEDICARE

## 2022-05-17 NOTE — TELEPHONE ENCOUNTER
----- Message from Amparo Walter sent at 5/17/2022  3:05 PM CDT -----  Contact: pt  Type: Test Results    Who Called: pt  Name of Test: (labs, xray etc..) labs and bone density  Date of Test: 05/11/2022  Ordering Provider: Judy  Where the test performed: [facility] Call Back Number: [phone]    Additional Information-Please advise caller/pt-Thank you~

## 2022-05-18 ENCOUNTER — TELEPHONE (OUTPATIENT)
Dept: FAMILY MEDICINE | Facility: CLINIC | Age: 82
End: 2022-05-18
Payer: MEDICARE

## 2022-05-18 NOTE — TELEPHONE ENCOUNTER
----- Message from Luiza Elaineglenda sent at 5/18/2022  9:37 AM CDT -----  Contact: self  Type:  Patient Returning Call    Who Called:  patient   Who Left Message for Patient:  Maribel  Does the patient know what this is regarding?:  abelardo schumachert   [facility] Call Back Number:  [phone] (home) or [phone] when no answer please try the other number  Additional Information:  She states she did not know anything about this appt and she tried to call Monday but could not get through.

## 2022-05-20 ENCOUNTER — OFFICE VISIT (OUTPATIENT)
Dept: FAMILY MEDICINE | Facility: CLINIC | Age: 82
End: 2022-05-20
Payer: MEDICARE

## 2022-05-20 VITALS
SYSTOLIC BLOOD PRESSURE: 120 MMHG | HEART RATE: 66 BPM | WEIGHT: 236.56 LBS | BODY MASS INDEX: 40.39 KG/M2 | DIASTOLIC BLOOD PRESSURE: 60 MMHG | OXYGEN SATURATION: 96 % | HEIGHT: 64 IN | TEMPERATURE: 98 F | RESPIRATION RATE: 18 BRPM

## 2022-05-20 DIAGNOSIS — G89.29 CHRONIC BILATERAL LOW BACK PAIN WITHOUT SCIATICA: ICD-10-CM

## 2022-05-20 DIAGNOSIS — M81.0 OSTEOPOROSIS, UNSPECIFIED OSTEOPOROSIS TYPE, UNSPECIFIED PATHOLOGICAL FRACTURE PRESENCE: ICD-10-CM

## 2022-05-20 DIAGNOSIS — E55.9 VITAMIN D DEFICIENCY: ICD-10-CM

## 2022-05-20 DIAGNOSIS — E66.01 MORBID OBESITY WITH BMI OF 40.0-44.9, ADULT: ICD-10-CM

## 2022-05-20 DIAGNOSIS — E03.4 HYPOTHYROIDISM DUE TO ACQUIRED ATROPHY OF THYROID: ICD-10-CM

## 2022-05-20 DIAGNOSIS — R73.9 HYPERGLYCEMIA: ICD-10-CM

## 2022-05-20 DIAGNOSIS — M54.50 CHRONIC BILATERAL LOW BACK PAIN WITHOUT SCIATICA: ICD-10-CM

## 2022-05-20 DIAGNOSIS — I10 ESSENTIAL HYPERTENSION: Primary | ICD-10-CM

## 2022-05-20 PROCEDURE — 99214 OFFICE O/P EST MOD 30 MIN: CPT | Mod: S$GLB,,, | Performed by: FAMILY MEDICINE

## 2022-05-20 PROCEDURE — 99214 PR OFFICE/OUTPT VISIT, EST, LEVL IV, 30-39 MIN: ICD-10-PCS | Mod: S$GLB,,, | Performed by: FAMILY MEDICINE

## 2022-05-20 PROCEDURE — 1160F PR REVIEW ALL MEDS BY PRESCRIBER/CLIN PHARMACIST DOCUMENTED: ICD-10-PCS | Mod: CPTII,S$GLB,, | Performed by: FAMILY MEDICINE

## 2022-05-20 PROCEDURE — 3288F PR FALLS RISK ASSESSMENT DOCUMENTED: ICD-10-PCS | Mod: CPTII,S$GLB,, | Performed by: FAMILY MEDICINE

## 2022-05-20 PROCEDURE — 1160F RVW MEDS BY RX/DR IN RCRD: CPT | Mod: CPTII,S$GLB,, | Performed by: FAMILY MEDICINE

## 2022-05-20 PROCEDURE — 1159F PR MEDICATION LIST DOCUMENTED IN MEDICAL RECORD: ICD-10-PCS | Mod: CPTII,S$GLB,, | Performed by: FAMILY MEDICINE

## 2022-05-20 PROCEDURE — 1126F AMNT PAIN NOTED NONE PRSNT: CPT | Mod: CPTII,S$GLB,, | Performed by: FAMILY MEDICINE

## 2022-05-20 PROCEDURE — 99999 PR PBB SHADOW E&M-EST. PATIENT-LVL III: CPT | Mod: PBBFAC,,, | Performed by: FAMILY MEDICINE

## 2022-05-20 PROCEDURE — 99999 PR PBB SHADOW E&M-EST. PATIENT-LVL III: ICD-10-PCS | Mod: PBBFAC,,, | Performed by: FAMILY MEDICINE

## 2022-05-20 PROCEDURE — 3074F SYST BP LT 130 MM HG: CPT | Mod: CPTII,S$GLB,, | Performed by: FAMILY MEDICINE

## 2022-05-20 PROCEDURE — 1126F PR PAIN SEVERITY QUANTIFIED, NO PAIN PRESENT: ICD-10-PCS | Mod: CPTII,S$GLB,, | Performed by: FAMILY MEDICINE

## 2022-05-20 PROCEDURE — 3074F PR MOST RECENT SYSTOLIC BLOOD PRESSURE < 130 MM HG: ICD-10-PCS | Mod: CPTII,S$GLB,, | Performed by: FAMILY MEDICINE

## 2022-05-20 PROCEDURE — 1159F MED LIST DOCD IN RCRD: CPT | Mod: CPTII,S$GLB,, | Performed by: FAMILY MEDICINE

## 2022-05-20 PROCEDURE — 1101F PR PT FALLS ASSESS DOC 0-1 FALLS W/OUT INJ PAST YR: ICD-10-PCS | Mod: CPTII,S$GLB,, | Performed by: FAMILY MEDICINE

## 2022-05-20 PROCEDURE — 1101F PT FALLS ASSESS-DOCD LE1/YR: CPT | Mod: CPTII,S$GLB,, | Performed by: FAMILY MEDICINE

## 2022-05-20 PROCEDURE — 3288F FALL RISK ASSESSMENT DOCD: CPT | Mod: CPTII,S$GLB,, | Performed by: FAMILY MEDICINE

## 2022-05-20 PROCEDURE — 3078F PR MOST RECENT DIASTOLIC BLOOD PRESSURE < 80 MM HG: ICD-10-PCS | Mod: CPTII,S$GLB,, | Performed by: FAMILY MEDICINE

## 2022-05-20 PROCEDURE — 3078F DIAST BP <80 MM HG: CPT | Mod: CPTII,S$GLB,, | Performed by: FAMILY MEDICINE

## 2022-05-20 RX ORDER — ALENDRONATE SODIUM 70 MG/1
70 TABLET ORAL
Qty: 12 TABLET | Refills: 3 | Status: SHIPPED | OUTPATIENT
Start: 2022-05-20 | End: 2023-01-30

## 2022-05-20 RX ORDER — PREGABALIN 50 MG/1
CAPSULE ORAL
Qty: 270 CAPSULE | Refills: 1 | Status: SHIPPED | OUTPATIENT
Start: 2022-05-20 | End: 2022-07-19 | Stop reason: SDUPTHER

## 2022-05-20 NOTE — PROGRESS NOTES
Subjective:       Patient ID: Danna Sorensen is a 82 y.o. female.    Chief Complaint: Follow-up (3mth f/u hypertension)    HPI  Review of Systems   Constitutional: Negative for fatigue and unexpected weight change.   Respiratory: Negative for chest tightness and shortness of breath.    Cardiovascular: Negative for chest pain, palpitations and leg swelling.   Gastrointestinal: Negative for abdominal pain.   Musculoskeletal: Negative for arthralgias.   Neurological: Negative for dizziness, syncope, light-headedness and headaches.       Patient Active Problem List   Diagnosis    Hypothyroid    Nuclear sclerosis    Hyperopia with astigmatism and presbyopia    DDD (degenerative disc disease), lumbar    Morbid obesity with BMI of 40.0-44.9, adult    Calcified granuloma of lung    History of colon cancer    Lumbar radiculitis    Other spondylosis, lumbar region    Chronic right-sided low back pain without sciatica    Vitamin D deficiency    Essential hypertension    Malignant neoplasm of ascending colon    Decreased functional mobility    Muscle tightness    Decreased strength    Decreased range of motion     Patient is here for a chronic conditions follow up.    Reviewed labs 5/22-mild normocytic anemia-slightly improved. Iron, folate and b12 normal  Dexa 5/22 osteoporosis     F/u LBP. Started on lyrica 50mg bid 2/22  PT completed 2/22 not much help. Flexeril prn . Tramadol 50mg #60 lasts 2 months or more for low back pain-helps but still waking up from sleep with pain.  Tylenol also helps Sees chiropractor Dr. Stearns. Has gradually worsening of lower back pain. Ache that radiates to right hip. No paresthesias. No B/B incontinence.  Gabapentin- nausea.  Tried PT, KHARI and radiofrequency ablation without relief.       [facility] Dr. Berrios treating cataracts     Heme/onc Dr. Cali following for colon cancer s/p resection and chemo 2017     Had mri brast 2018- high risk.  1/2021 mri breast negative. Cannot  "tolerate mammo. MRI breast 3/22 neg  Objective:      Physical Exam  Vitals and nursing note reviewed.   Constitutional:       Appearance: She is well-developed.   Cardiovascular:      Rate and Rhythm: Normal rate and regular rhythm.      Heart sounds: Normal heart sounds.   Pulmonary:      Effort: Pulmonary effort is normal.      Breath sounds: Normal breath sounds.   Skin:     General: Skin is warm and dry.   Neurological:      Mental Status: She is alert and oriented to person, place, and time.         Assessment:       1. Essential hypertension    2. Vitamin D deficiency    3. Hypothyroidism due to acquired atrophy of thyroid    4. Morbid obesity with BMI of 40.0-44.9, adult    5. Hyperglycemia    6. Osteoporosis, unspecified osteoporosis type, unspecified pathological fracture presence    7. Chronic bilateral low back pain without sciatica        Plan:         1. Essential hypertension  Controlled on current medications.  Continue current medications.    - Comprehensive Metabolic Panel; Future  - Lipid Panel; Future    2. Vitamin D deficiency  Screen and treat as indicated:    - Vitamin D; Future    3. Hypothyroidism due to acquired atrophy of thyroid  Stable condition.  Continue current medications.  Will adjust based on lab findings or if condition changes.    - CBC Auto Differential; Future  - TSH; Future  - T4, Free; Future    4. Morbid obesity with BMI of 40.0-44.9, adult  Counseled patient on his ideal body weight, health consequences of being obese and current recommendations including weekly exercise and a heart healthy diet.  Current BMI is:Estimated body mass index is 40.6 kg/m² as calculated from the following:    Height as of this encounter: 5' 4" (1.626 m).    Weight as of this encounter: 107.3 kg (236 lb 8.9 oz)..  Patient is aware that ideal BMI < 25 or Weight in (lb) to have BMI = 25: 145.3.        5. Hyperglycemia   Your blood sugar is borderline high.  This means you are at risk for developing " type 2 diabetes mellitus.  To lessen your risk you should exercise regularly, avoid excess carbohydrates and work toward a body mass index of less than 25.      - Hemoglobin A1C; Future    6. Osteoporosis, unspecified osteoporosis type, unspecified pathological fracture presence  treat  - alendronate (FOSAMAX) 70 MG tablet; Take 1 tablet (70 mg total) by mouth every 7 days.  Dispense: 12 tablet; Refill: 3    7. Chronic bilateral low back pain without sciatica  treat  - pregabalin (LYRICA) 50 MG capsule; 1 po qam and 2 po qhs  Dispense: 270 capsule; Refill: 1        Time spent with patient: 20 minutes    Patient with be reevaluated in 6 months or sooner prn    Greater than 50% of this visit was spent counseling as described in above documentation:Yes

## 2022-07-12 RX ORDER — LISINOPRIL 30 MG/1
TABLET ORAL
Qty: 90 TABLET | Refills: 3 | Status: SHIPPED | OUTPATIENT
Start: 2022-07-12 | End: 2023-04-25

## 2022-07-12 NOTE — TELEPHONE ENCOUNTER
No new care gaps identified.  [facility] Embedded Care Gaps. Reference number: [id number]. 7/12/2022   9:11:07 AM MOISEST

## 2022-07-13 NOTE — TELEPHONE ENCOUNTER
Refill Decision Note   Danna Sorensen  is requesting a refill authorization.  Brief Assessment and Rationale for Refill:  Approve     Medication Therapy Plan:       Medication Reconciliation Completed: No   Comments:     No Care Gaps recommended.     Note composed:11:38 PM 07/12/2022

## 2022-07-14 ENCOUNTER — HOSPITAL ENCOUNTER (OUTPATIENT)
Dept: RADIOLOGY | Facility: HOSPITAL | Age: 82
Discharge: HOME | End: 2022-07-14
Attending: NURSE PRACTITIONER
Payer: MEDICARE

## 2022-07-14 DIAGNOSIS — Z85.038 HISTORY OF COLON CANCER: ICD-10-CM

## 2022-07-14 PROCEDURE — 71046 X-RAY EXAM CHEST 2 VIEWS: CPT | Mod: TC,FY

## 2022-07-14 PROCEDURE — 71046 XR CHEST PA AND LATERAL: ICD-10-PCS | Mod: 26,,, | Performed by: RADIOLOGY

## 2022-07-14 PROCEDURE — 71046 X-RAY EXAM CHEST 2 VIEWS: CPT | Mod: 26,,, | Performed by: RADIOLOGY

## 2022-07-18 ENCOUNTER — OFFICE VISIT (OUTPATIENT)
Dept: HEMATOLOGY/ONCOLOGY | Facility: CLINIC | Age: 82
End: 2022-07-18
Payer: MEDICARE

## 2022-07-18 VITALS
BODY MASS INDEX: 40.65 KG/M2 | DIASTOLIC BLOOD PRESSURE: 80 MMHG | OXYGEN SATURATION: 96 % | SYSTOLIC BLOOD PRESSURE: 174 MMHG | WEIGHT: 238.13 LBS | RESPIRATION RATE: 18 BRPM | HEIGHT: 64 IN | HEART RATE: 65 BPM | TEMPERATURE: 98 F

## 2022-07-18 DIAGNOSIS — Z85.038 HISTORY OF COLON CANCER: Primary | ICD-10-CM

## 2022-07-18 PROCEDURE — 1101F PR PT FALLS ASSESS DOC 0-1 FALLS W/OUT INJ PAST YR: ICD-10-PCS | Mod: CPTII,S$GLB,, | Performed by: NURSE PRACTITIONER

## 2022-07-18 PROCEDURE — 3288F PR FALLS RISK ASSESSMENT DOCUMENTED: ICD-10-PCS | Mod: CPTII,S$GLB,, | Performed by: NURSE PRACTITIONER

## 2022-07-18 PROCEDURE — 1159F MED LIST DOCD IN RCRD: CPT | Mod: CPTII,S$GLB,, | Performed by: NURSE PRACTITIONER

## 2022-07-18 PROCEDURE — 1125F AMNT PAIN NOTED PAIN PRSNT: CPT | Mod: CPTII,S$GLB,, | Performed by: NURSE PRACTITIONER

## 2022-07-18 PROCEDURE — 1160F RVW MEDS BY RX/DR IN RCRD: CPT | Mod: CPTII,S$GLB,, | Performed by: NURSE PRACTITIONER

## 2022-07-18 PROCEDURE — 1160F PR REVIEW ALL MEDS BY PRESCRIBER/CLIN PHARMACIST DOCUMENTED: ICD-10-PCS | Mod: CPTII,S$GLB,, | Performed by: NURSE PRACTITIONER

## 2022-07-18 PROCEDURE — 3079F DIAST BP 80-89 MM HG: CPT | Mod: CPTII,S$GLB,, | Performed by: NURSE PRACTITIONER

## 2022-07-18 PROCEDURE — 99999 PR PBB SHADOW E&M-EST. PATIENT-LVL IV: ICD-10-PCS | Mod: PBBFAC,,, | Performed by: NURSE PRACTITIONER

## 2022-07-18 PROCEDURE — 99999 PR PBB SHADOW E&M-EST. PATIENT-LVL IV: CPT | Mod: PBBFAC,,, | Performed by: NURSE PRACTITIONER

## 2022-07-18 PROCEDURE — 1125F PR PAIN SEVERITY QUANTIFIED, PAIN PRESENT: ICD-10-PCS | Mod: CPTII,S$GLB,, | Performed by: NURSE PRACTITIONER

## 2022-07-18 PROCEDURE — 3077F SYST BP >= 140 MM HG: CPT | Mod: CPTII,S$GLB,, | Performed by: NURSE PRACTITIONER

## 2022-07-18 PROCEDURE — 3288F FALL RISK ASSESSMENT DOCD: CPT | Mod: CPTII,S$GLB,, | Performed by: NURSE PRACTITIONER

## 2022-07-18 PROCEDURE — 99213 OFFICE O/P EST LOW 20 MIN: CPT | Mod: S$GLB,,, | Performed by: NURSE PRACTITIONER

## 2022-07-18 PROCEDURE — 99213 PR OFFICE/OUTPT VISIT, EST, LEVL III, 20-29 MIN: ICD-10-PCS | Mod: S$GLB,,, | Performed by: NURSE PRACTITIONER

## 2022-07-18 PROCEDURE — 1159F PR MEDICATION LIST DOCUMENTED IN MEDICAL RECORD: ICD-10-PCS | Mod: CPTII,S$GLB,, | Performed by: NURSE PRACTITIONER

## 2022-07-18 PROCEDURE — 3079F PR MOST RECENT DIASTOLIC BLOOD PRESSURE 80-89 MM HG: ICD-10-PCS | Mod: CPTII,S$GLB,, | Performed by: NURSE PRACTITIONER

## 2022-07-18 PROCEDURE — 3077F PR MOST RECENT SYSTOLIC BLOOD PRESSURE >= 140 MM HG: ICD-10-PCS | Mod: CPTII,S$GLB,, | Performed by: NURSE PRACTITIONER

## 2022-07-18 PROCEDURE — 1101F PT FALLS ASSESS-DOCD LE1/YR: CPT | Mod: CPTII,S$GLB,, | Performed by: NURSE PRACTITIONER

## 2022-07-18 NOTE — PROGRESS NOTES
Subjective:      Name: Danna Sorensen  : 1940  MRN: [id number]    CC:  Annual colon evaluation    HPI:   Danna Sorensen is a 82 y.o. female presents for annual surveillance for history of colon cancer.    This is a 82-year-old white female known to Dr. Gilmore for Stage III adenocarcinoma of the colon.    She is status post resection and 12 cycles of adjuvant FOLFOX.   She is now out 15 years post-therapy.       TODAY:  22:  She presents to the clinic today for her annual evaluation.    Her complaints are arthritis/spinal stenosis related.  She follows regularly with her PCP.   She denies any difficulties with fevers, chills, drenching night sweats, changes in the caliber or character of her stool, abdominal discomfort or bloating, nausea, vomiting, constipation, diarrhea, unexplained weight loss, irregular heartbeat, chest pain, bleeding, etc.  No other new complaints or pertinent findings on a 14-point review of systems.      Past Medical History:   Diagnosis Date    Back pain     Carpal tunnel syndrome     Cataract     Colon cancer     Coronary artery disease     Essential hypertension 2019    History of squamous cell carcinoma 2015    Hx of colon cancer, stage IV 10/18/2016    Hypothyroidism     Obesity (BMI 30-39.9) 3/24/2016    Osteoarthritis     Osteoporosis     Personal history of colon cancer, stage III     Squamous cell carcinoma     Status post reverse total replacement of right shoulder 2017    Strabismus     Trouble in sleeping     Wears dentures     Uppers       Past Surgical History:   Procedure Laterality Date    COLONOSCOPY N/A 10/18/2016    Procedure: COLONOSCOPY;  Surgeon: Rell Cordova MD;  Location: [facility];  Service: Endoscopy;  Laterality: N/A;    HAND TENDON SURGERY Left     HEMICOLECTOMY      right    INJECTION OF ANESTHETIC AGENT AROUND MEDIAL BRANCH NERVES INNERVATING LUMBAR FACET JOINT Right 7/10/2018    Procedure: Block-nerve-medial  "branch-lumbar;  Surgeon: Parish Gaitan MD;  Location: [facility] OR;  Service: Pain Management;  Laterality: Right;  L3, 4, 5    RADIOFREQUENCY ABLATION OF LUMBAR MEDIAL BRANCH NERVE AT SINGLE LEVEL Right 2018    Procedure: RADIOFREQUENCY ABLATION, NERVE, MEDIAL BRANCH, LUMBAR, 1 LEVEL;  Surgeon: Parish Gaitan MD;  Location: [facility] OR;  Service: Pain Management;  Laterality: Right;  L3,4,5 - Burned at 80 degrees C. for 75 seconds x 2 each site    SHOULDER ARTHROSCOPY Right 2017    Reverse     TONSILLECTOMY, ADENOIDECTOMY, BILATERAL MYRINGOTOMY AND TUBES      TOTAL KNEE ARTHROPLASTY Bilateral 1998    total replacements    TUMOR REMOVAL Left     left foot as a child       Family History   Problem Relation Age of Onset    Hypertension Mother     Kidney disease Mother     Cataracts Father     Cataracts Sister     Cancer Sister         breast    No Known Problems Brother     Cancer Sister         breast    Arthritis Sister     Glaucoma Neg Hx     Amblyopia Neg Hx     Blindness Neg Hx     Macular degeneration Neg Hx     Retinal detachment Neg Hx     Strabismus Neg Hx     Stroke Neg Hx     Thyroid disease Neg Hx        Social History     Socioeconomic History    Marital status: Single   Tobacco Use    Smoking status: Former Smoker     Packs/day: 0.50     Years: 1.00     Pack years: 0.50     Types: Cigarettes     Start date: 1960     Quit date: 1961     Years since quittin.5    Smokeless tobacco: Never Used   Substance and Sexual Activity    Alcohol use: No    Drug use: No    Sexual activity: Never       Review of patient's allergies indicates:   Allergen Reactions    Aspirin      Other reaction(s): Stomach upset    Gabapentin Other (See Comments)     Pt states she felt "funny" when she took the medication. Especially at the higher dose.    Mobic [meloxicam] Swelling     Edema and elevated blood pressure     Oxaliplatin      Other reaction(s): Joint pain  Other reaction(s): " Itching  Other reaction(s): Hives       Review of Systems   Musculoskeletal: Positive for arthritis and back pain.   All other systems reviewed and are negative.           Objective:   There were no vitals filed for this visit.     Physical Exam  Vitals reviewed.   Constitutional:       Appearance: She is obese.   HENT:      Head: Normocephalic and atraumatic.   Eyes:      Conjunctiva/sclera: Conjunctivae normal.   Cardiovascular:      Rate and Rhythm: Normal rate and regular rhythm.      Pulses: Normal pulses.      Heart sounds: Normal heart sounds.   Pulmonary:      Effort: Pulmonary effort is normal.      Breath sounds: Normal breath sounds.   Chest:   Breasts:      Right: No axillary adenopathy or supraclavicular adenopathy.      Left: No axillary adenopathy or supraclavicular adenopathy.       Abdominal:      General: Bowel sounds are normal.      Palpations: Abdomen is soft.   Musculoskeletal:      Cervical back: Neck supple.      Right lower leg: Edema present.      Left lower leg: Edema present.      Comments: Use of cane for ambulation   Lymphadenopathy:      Cervical: No cervical adenopathy.      Upper Body:      Right upper body: No supraclavicular or axillary adenopathy.      Left upper body: No supraclavicular or axillary adenopathy.      Lower Body: No right inguinal adenopathy. No left inguinal adenopathy.   Skin:     General: Skin is warm and dry.      Capillary Refill: Capillary refill takes less than 2 seconds.   Neurological:      Mental Status: She is oriented to person, place, and time.   Psychiatric:         Behavior: Behavior normal.         Thought Content: Thought content normal.         Judgment: Judgment normal.             Current Outpatient Medications on File Prior to Visit   Medication Sig    acetaminophen (TYLENOL) 650 MG TbSR Take 650 mg by mouth every 8 (eight) hours.    alendronate (FOSAMAX) 70 MG tablet Take 1 tablet (70 mg total) by mouth every 7 days.    cyclobenzaprine  (FLEXERIL) 5 MG tablet Take 1 tablet (5 mg total) by mouth 3 (three) times daily as needed for Muscle spasms.    ergocalciferol, vitamin D2, (VITAMIN D ORAL) Take 2,000 mg by mouth.    furosemide (LASIX) 20 MG tablet Take 1 tablet (20 mg total) by mouth daily as needed (edema).    levothyroxine (SYNTHROID) 75 MCG tablet TAKE 1 TABLET(75 MCG) BY MOUTH EVERY DAY    lisinopriL (PRINIVIL,ZESTRIL) 30 MG tablet TAKE 1 TABLET(30 MG) BY MOUTH EVERY DAY    methocarbamoL (ROBAXIN) 500 MG Tab TAKE 1 TABLET(500 MG) BY MOUTH FOUR TIMES DAILY FOR 10 DAYS    nystatin (MYCOSTATIN) cream Apply topically 2 (two) times daily as needed. GRETCHEN EXT AA BID    pregabalin (LYRICA) 50 MG capsule 1 po qam and 2 po qhs    traMADoL (ULTRAM) 50 mg tablet Take 50 mg by mouth every 6 (six) hours as needed for Pain.    triamcinolone acetonide 0.1% (KENALOG) 0.1 % cream APPLY EXTERNALLY TO THE AFFECTED AREA TWICE DAILY     No current facility-administered medications on file prior to visit.       CBC:  Lab Results   Component Value Date    WBC 5.07 07/14/2022    HGB 12.4 07/14/2022    HCT 36.6 (L) 07/14/2022    MCV 92 07/14/2022     07/14/2022     CMP:  Sodium   Date Value Ref Range Status   07/14/2022 141 136 - 145 mmol/L Final     Potassium   Date Value Ref Range Status   07/14/2022 4.2 3.5 - 5.1 mmol/L Final     Chloride   Date Value Ref Range Status   07/14/2022 108 95 - 110 mmol/L Final     CO2   Date Value Ref Range Status   07/14/2022 22 (L) 23 - 29 mmol/L Final     Glucose   Date Value Ref Range Status   07/14/2022 94 70 - 110 mg/dL Final     BUN   Date Value Ref Range Status   07/14/2022 11 8 - 23 mg/dL Final     Creatinine   Date Value Ref Range Status   07/14/2022 0.9 0.5 - 1.4 mg/dL Final     Calcium   Date Value Ref Range Status   07/14/2022 9.5 8.7 - 10.5 mg/dL Final     Total Protein   Date Value Ref Range Status   07/14/2022 6.8 6.0 - 8.4 g/dL Final     Albumin   Date Value Ref Range Status   07/14/2022 3.8 3.5 - 5.2  g/dL Final     Total Bilirubin   Date Value Ref Range Status   07/14/2022 1.5 (H) 0.1 - 1.0 mg/dL Final     Comment:     For infants and newborns, interpretation of results should be based  on gestational age, weight and in agreement with clinical  observations.    Premature Infant recommended reference ranges:  Up to 24 hours.............<8.0 mg/dL  Up to 48 hours............<12.0 mg/dL  3-5 days..................<15.0 mg/dL  6-29 days.................<15.0 mg/dL       Alkaline Phosphatase   Date Value Ref Range Status   07/14/2022 55 55 - 135 U/L Final     AST   Date Value Ref Range Status   07/14/2022 12 10 - 40 U/L Final     ALT   Date Value Ref Range Status   07/14/2022 13 10 - 44 U/L Final     Anion Gap   Date Value Ref Range Status   07/14/2022 11 8 - 16 mmol/L Final     eGFR if    Date Value Ref Range Status   07/14/2022 >60 >60 mL/min/1.73 m^2 Final     eGFR if non    Date Value Ref Range Status   07/14/2022 >60 >60 mL/min/1.73 m^2 Final     Comment:     Calculation used to obtain the estimated glomerular filtration  rate (eGFR) is the CKD-EPI equation.        LDH:  156    CXR  Narrative: EXAMINATION:  XR CHEST PA AND LATERAL    CLINICAL HISTORY:  Personal history of other malignant neoplasm of large intestine    TECHNIQUE:  PA and lateral views of the chest were performed.    COMPARISON:  07/06/2021    FINDINGS:  There is bibasilar atelectasis.  There is no pneumothorax or pleural effusion.  The cardiac silhouette is within normal limits.  Impression: There is bibasilar atelectasis.    Electronically signed by: Malgorzata Russo MD  Date:    07/14/2022  Time:    08:21     Colonoscopy dated 12/18/2016:  Impression, diverticulosis; normal, repeat in 10 years.  Due 12/2026    All pertinent labs and imaging reviewed.    Assessment:       1. History of colon cancer         Plan:     History of colon cancer       Hx of Stage III adenocarcinoma of the colon - presently 15 yrs out  post tx - VINNY   Follow up in 1 year with labs:  CBC, CMP, LDH, VIT D & CXR prior  Call for any concerns.    Route Chart for Scheduling    Med Onc Chart Routing      Follow up with physician    Follow up with GRETCHEN 1 year. F/u in 1 year with CBC, CMP, LDH, & CXR prior   Infusion scheduling note    Injection scheduling note    Labs    Imaging    Pharmacy appointment No pharmacy appointment needed      Other referrals No additional referrals needed

## 2022-07-19 ENCOUNTER — OFFICE VISIT (OUTPATIENT)
Dept: FAMILY MEDICINE | Facility: CLINIC | Age: 82
End: 2022-07-19
Payer: MEDICARE

## 2022-07-19 VITALS
OXYGEN SATURATION: 96 % | RESPIRATION RATE: 18 BRPM | WEIGHT: 236.56 LBS | HEIGHT: 64 IN | DIASTOLIC BLOOD PRESSURE: 68 MMHG | BODY MASS INDEX: 40.39 KG/M2 | SYSTOLIC BLOOD PRESSURE: 120 MMHG | HEART RATE: 63 BPM | TEMPERATURE: 98 F

## 2022-07-19 DIAGNOSIS — I10 ESSENTIAL HYPERTENSION: ICD-10-CM

## 2022-07-19 DIAGNOSIS — E55.9 VITAMIN D DEFICIENCY: ICD-10-CM

## 2022-07-19 DIAGNOSIS — M54.50 CHRONIC BILATERAL LOW BACK PAIN WITHOUT SCIATICA: ICD-10-CM

## 2022-07-19 DIAGNOSIS — M51.36 DDD (DEGENERATIVE DISC DISEASE), LUMBAR: Primary | ICD-10-CM

## 2022-07-19 DIAGNOSIS — Z91.89 AT HIGH RISK FOR BREAST CANCER: ICD-10-CM

## 2022-07-19 DIAGNOSIS — K13.0 LIP LESION: ICD-10-CM

## 2022-07-19 DIAGNOSIS — G89.29 CHRONIC BILATERAL LOW BACK PAIN WITHOUT SCIATICA: ICD-10-CM

## 2022-07-19 DIAGNOSIS — M81.0 OSTEOPOROSIS, UNSPECIFIED OSTEOPOROSIS TYPE, UNSPECIFIED PATHOLOGICAL FRACTURE PRESENCE: ICD-10-CM

## 2022-07-19 DIAGNOSIS — R73.9 HYPERGLYCEMIA: ICD-10-CM

## 2022-07-19 DIAGNOSIS — E03.4 HYPOTHYROIDISM DUE TO ACQUIRED ATROPHY OF THYROID: ICD-10-CM

## 2022-07-19 DIAGNOSIS — E66.01 MORBID OBESITY WITH BMI OF 40.0-44.9, ADULT: ICD-10-CM

## 2022-07-19 DIAGNOSIS — D64.9 ANEMIA, UNSPECIFIED TYPE: ICD-10-CM

## 2022-07-19 PROCEDURE — 3074F PR MOST RECENT SYSTOLIC BLOOD PRESSURE < 130 MM HG: ICD-10-PCS | Mod: CPTII,S$GLB,, | Performed by: FAMILY MEDICINE

## 2022-07-19 PROCEDURE — 1160F PR REVIEW ALL MEDS BY PRESCRIBER/CLIN PHARMACIST DOCUMENTED: ICD-10-PCS | Mod: CPTII,S$GLB,, | Performed by: FAMILY MEDICINE

## 2022-07-19 PROCEDURE — 99214 PR OFFICE/OUTPT VISIT, EST, LEVL IV, 30-39 MIN: ICD-10-PCS | Mod: S$GLB,,, | Performed by: FAMILY MEDICINE

## 2022-07-19 PROCEDURE — 1159F PR MEDICATION LIST DOCUMENTED IN MEDICAL RECORD: ICD-10-PCS | Mod: CPTII,S$GLB,, | Performed by: FAMILY MEDICINE

## 2022-07-19 PROCEDURE — 3078F DIAST BP <80 MM HG: CPT | Mod: CPTII,S$GLB,, | Performed by: FAMILY MEDICINE

## 2022-07-19 PROCEDURE — 1125F AMNT PAIN NOTED PAIN PRSNT: CPT | Mod: CPTII,S$GLB,, | Performed by: FAMILY MEDICINE

## 2022-07-19 PROCEDURE — 1125F PR PAIN SEVERITY QUANTIFIED, PAIN PRESENT: ICD-10-PCS | Mod: CPTII,S$GLB,, | Performed by: FAMILY MEDICINE

## 2022-07-19 PROCEDURE — 3074F SYST BP LT 130 MM HG: CPT | Mod: CPTII,S$GLB,, | Performed by: FAMILY MEDICINE

## 2022-07-19 PROCEDURE — 1160F RVW MEDS BY RX/DR IN RCRD: CPT | Mod: CPTII,S$GLB,, | Performed by: FAMILY MEDICINE

## 2022-07-19 PROCEDURE — 3078F PR MOST RECENT DIASTOLIC BLOOD PRESSURE < 80 MM HG: ICD-10-PCS | Mod: CPTII,S$GLB,, | Performed by: FAMILY MEDICINE

## 2022-07-19 PROCEDURE — 3288F FALL RISK ASSESSMENT DOCD: CPT | Mod: CPTII,S$GLB,, | Performed by: FAMILY MEDICINE

## 2022-07-19 PROCEDURE — 1101F PR PT FALLS ASSESS DOC 0-1 FALLS W/OUT INJ PAST YR: ICD-10-PCS | Mod: CPTII,S$GLB,, | Performed by: FAMILY MEDICINE

## 2022-07-19 PROCEDURE — 1101F PT FALLS ASSESS-DOCD LE1/YR: CPT | Mod: CPTII,S$GLB,, | Performed by: FAMILY MEDICINE

## 2022-07-19 PROCEDURE — 99999 PR PBB SHADOW E&M-EST. PATIENT-LVL IV: CPT | Mod: PBBFAC,,, | Performed by: FAMILY MEDICINE

## 2022-07-19 PROCEDURE — 1159F MED LIST DOCD IN RCRD: CPT | Mod: CPTII,S$GLB,, | Performed by: FAMILY MEDICINE

## 2022-07-19 PROCEDURE — 99214 OFFICE O/P EST MOD 30 MIN: CPT | Mod: S$GLB,,, | Performed by: FAMILY MEDICINE

## 2022-07-19 PROCEDURE — 99999 PR PBB SHADOW E&M-EST. PATIENT-LVL IV: ICD-10-PCS | Mod: PBBFAC,,, | Performed by: FAMILY MEDICINE

## 2022-07-19 PROCEDURE — 3288F PR FALLS RISK ASSESSMENT DOCUMENTED: ICD-10-PCS | Mod: CPTII,S$GLB,, | Performed by: FAMILY MEDICINE

## 2022-07-19 RX ORDER — PREGABALIN 50 MG/1
100 CAPSULE ORAL 2 TIMES DAILY
Qty: 360 CAPSULE | Refills: 1 | COMMUNITY
Start: 2022-07-19 | End: 2022-08-12 | Stop reason: SDUPTHER

## 2022-07-19 NOTE — PROGRESS NOTES
Subjective:       Patient ID: Danna Sorensen is a 82 y.o. female.    Chief Complaint: Follow-up (6mth f/u)    HPI  Review of Systems   Constitutional: Negative for fatigue and unexpected weight change.   Respiratory: Negative for chest tightness and shortness of breath.    Cardiovascular: Negative for chest pain, palpitations and leg swelling.   Gastrointestinal: Negative for abdominal pain.   Musculoskeletal: Positive for back pain and myalgias. Negative for arthralgias.   Neurological: Negative for dizziness, syncope, light-headedness and headaches.       Patient Active Problem List   Diagnosis    Hypothyroid    Nuclear sclerosis    Hyperopia with astigmatism and presbyopia    DDD (degenerative disc disease), lumbar    Morbid obesity with BMI of 40.0-44.9, adult    Calcified granuloma of lung    History of colon cancer    Lumbar radiculitis    Other spondylosis, lumbar region    Chronic right-sided low back pain without sciatica    Vitamin D deficiency    Essential hypertension    Malignant neoplasm of ascending colon    Decreased functional mobility    Muscle tightness    Decreased strength    Decreased range of motion     Patient is here for a chronic conditions follow up.    Reviewed labs 5/22-mild normocytic anemia-slightly improved. Iron, folate and b12 normal  Dexa 5/22 osteoporosis on fosomax 5/22     F/u LBP. Started on lyrica 50mg and increased to 1 po qam and 2 pm.  Tolerating well. Sleeps better. Pain in daytime 6/10. PT completed 2/22 not much help. Flexeril prn . Tramadol 50mg #60 lasts 2 months or more for low back pain-helps but still waking up from sleep with pain.  Tylenol also helps Sees chiropractor Dr. Stearns. Has gradually worsening of lower back pain. Ache that radiates to right hip. No paresthesias. No B/B incontinence.  Gabapentin- nausea.  Tried PT, KHARI and radiofrequency ablation without relief.       Eye [facility] Dr. Berrios treating cataracts     Heme/onc Dr. Cali  following for colon cancer s/p resection and chemo 2017     Had mri brast 2018- high risk.  1/2021 mri breast negative. Cannot tolerate mammo. MRI breast 3/22 neg  Objective:      Physical Exam  Vitals and nursing note reviewed.   Constitutional:       Appearance: She is well-developed.   HENT:      Mouth/Throat:     Cardiovascular:      Rate and Rhythm: Normal rate and regular rhythm.      Heart sounds: Normal heart sounds.   Pulmonary:      Effort: Pulmonary effort is normal.      Breath sounds: Normal breath sounds.   Skin:     General: Skin is warm and dry.   Neurological:      Mental Status: She is alert and oriented to person, place, and time.         Assessment:       1. DDD (degenerative disc disease), lumbar    2. Essential hypertension    3. Chronic bilateral low back pain without sciatica    4. Vitamin D deficiency    5. Hypothyroidism due to acquired atrophy of thyroid    6. Morbid obesity with BMI of 40.0-44.9, adult    7. Hyperglycemia    8. Osteoporosis, unspecified osteoporosis type, unspecified pathological fracture presence    9. At high risk for breast cancer    10. Anemia, unspecified type    11. Lip lesion        Plan:         1. DDD (degenerative disc disease), lumbar  Cont current mgmt    2. Essential hypertension  Controlled on current medications.  Continue current medications.    - Lipid Panel; Future    3. Chronic bilateral low back pain without sciatica  Increase to   - pregabalin (LYRICA) 50 MG capsule; Take 2 capsules (100 mg total) by mouth 2 (two) times daily. 1 po qam and 2 po qhs  Dispense: 360 capsule; Refill: 1    4. Vitamin D deficiency  Screen and treat as indicated:    - Vitamin D; Future    5. Hypothyroidism due to acquired atrophy of thyroid  Stable condition.  Continue current medications.  Will adjust based on lab findings or if condition changes.    - CBC Auto Differential; Future  - TSH; Future  - T4, Free; Future    6. Morbid obesity with BMI of 40.0-44.9,  "adult  Counseled patient on his ideal body weight, health consequences of being obese and current recommendations including weekly exercise and a heart healthy diet.  Current BMI is:Estimated body mass index is 40.6 kg/m² as calculated from the following:    Height as of this encounter: 5' 4" (1.626 m).    Weight as of this encounter: 107.3 kg (236 lb 8.9 oz)..  Patient is aware that ideal BMI < 25 or Weight in (lb) to have BMI = 25: 145.3.        7. Hyperglycemia   Your blood sugar is borderline high.  This means you are at risk for developing type 2 diabetes mellitus.  To lessen your risk you should exercise regularly, avoid excess carbohydrates and work toward a body mass index of less than 25.      - Comprehensive Metabolic Panel; Future  - Hemoglobin A1C; Future    8. Osteoporosis, unspecified osteoporosis type, unspecified pathological fracture presence  Cont fosamax    9. At high risk for breast cancer  Cont monitoring mri breast yearly if possible    10. Anemia, unspecified type  Screen and treat as indicated:      11. Lip lesion  Refer for eval and treat  - Ambulatory referral/consult to ENT; Future        Time spent with patient: 20 minutes    Patient with be reevaluated in 6 months or sooner prn    Greater than 50% of this visit was spent counseling as described in above documentation:Yes  "

## 2022-08-10 ENCOUNTER — TELEPHONE (OUTPATIENT)
Dept: FAMILY MEDICINE | Facility: CLINIC | Age: 82
End: 2022-08-10
Payer: MEDICARE

## 2022-08-10 NOTE — TELEPHONE ENCOUNTER
----- Message from Page Gomez sent at 8/10/2022 11:27 AM CDT -----  Contact: Elisha  Type:  Pharmacy Calling to Clarify an RX    Name of Caller: Pharm    Pharmacy Name: [facility]    Prescription Name: pregabalin (LYRICA) 50 MG capsule    What do they need to clarify?: states they need to confirm instructions; patient is stating she is suppose to be taking 2 in more and 2 at bed time but they are showing for her to take one; states she was told that at the last visit; requesting provider reaches out to patient as well; please advise    Best Call Back Number:   [id number] - [place] - [address] AT [facility] [address] [facility] [address]  [address] [address]  Phone: [phone] Fax: [phone]      Additional Information: patient [phone]

## 2022-08-11 ENCOUNTER — OFFICE VISIT (OUTPATIENT)
Dept: OTOLARYNGOLOGY | Facility: CLINIC | Age: 82
End: 2022-08-11
Payer: MEDICARE

## 2022-08-11 ENCOUNTER — TELEPHONE (OUTPATIENT)
Dept: FAMILY MEDICINE | Facility: CLINIC | Age: 82
End: 2022-08-11
Payer: MEDICARE

## 2022-08-11 VITALS — TEMPERATURE: 98 F | BODY MASS INDEX: 40.95 KG/M2 | HEIGHT: 64 IN | WEIGHT: 239.88 LBS

## 2022-08-11 DIAGNOSIS — R23.8 VENOUS LAKE OF LIP: Primary | ICD-10-CM

## 2022-08-11 PROCEDURE — 1159F MED LIST DOCD IN RCRD: CPT | Mod: CPTII,S$GLB,, | Performed by: OTOLARYNGOLOGY

## 2022-08-11 PROCEDURE — 1101F PT FALLS ASSESS-DOCD LE1/YR: CPT | Mod: CPTII,S$GLB,, | Performed by: OTOLARYNGOLOGY

## 2022-08-11 PROCEDURE — 3288F PR FALLS RISK ASSESSMENT DOCUMENTED: ICD-10-PCS | Mod: CPTII,S$GLB,, | Performed by: OTOLARYNGOLOGY

## 2022-08-11 PROCEDURE — 1159F PR MEDICATION LIST DOCUMENTED IN MEDICAL RECORD: ICD-10-PCS | Mod: CPTII,S$GLB,, | Performed by: OTOLARYNGOLOGY

## 2022-08-11 PROCEDURE — 1125F PR PAIN SEVERITY QUANTIFIED, PAIN PRESENT: ICD-10-PCS | Mod: CPTII,S$GLB,, | Performed by: OTOLARYNGOLOGY

## 2022-08-11 PROCEDURE — 99999 PR PBB SHADOW E&M-EST. PATIENT-LVL III: ICD-10-PCS | Mod: PBBFAC,,, | Performed by: OTOLARYNGOLOGY

## 2022-08-11 PROCEDURE — 99203 PR OFFICE/OUTPT VISIT, NEW, LEVL III, 30-44 MIN: ICD-10-PCS | Mod: S$GLB,,, | Performed by: OTOLARYNGOLOGY

## 2022-08-11 PROCEDURE — 99999 PR PBB SHADOW E&M-EST. PATIENT-LVL III: CPT | Mod: PBBFAC,,, | Performed by: OTOLARYNGOLOGY

## 2022-08-11 PROCEDURE — 3288F FALL RISK ASSESSMENT DOCD: CPT | Mod: CPTII,S$GLB,, | Performed by: OTOLARYNGOLOGY

## 2022-08-11 PROCEDURE — 1101F PR PT FALLS ASSESS DOC 0-1 FALLS W/OUT INJ PAST YR: ICD-10-PCS | Mod: CPTII,S$GLB,, | Performed by: OTOLARYNGOLOGY

## 2022-08-11 PROCEDURE — 1125F AMNT PAIN NOTED PAIN PRSNT: CPT | Mod: CPTII,S$GLB,, | Performed by: OTOLARYNGOLOGY

## 2022-08-11 PROCEDURE — 99203 OFFICE O/P NEW LOW 30 MIN: CPT | Mod: S$GLB,,, | Performed by: OTOLARYNGOLOGY

## 2022-08-11 NOTE — TELEPHONE ENCOUNTER
----- Message from Ayesha Salazar, Patient Care Assistant sent at 8/11/2022  2:20 PM CDT -----  Regarding: advice  Contact: pt  Type: Needs Medical Advice  Who Called:  pt   Pharmacy name and phone #:    [id number] - [place] - [address] AT [facility] [address] [facility] [address]  [address]  [place] [address]  Phone: [phone] Fax: [phone]    Best Call Back Number: [phone]    Additional Information: pt states she would like a callback regarding the dosage for pregabalin (LYRICA) 50 MG capsule. Please call to advise. Thanks!
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
## 2022-08-11 NOTE — PATIENT INSTRUCTIONS
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  No suspicion of neoplasia to indicate the need for excision.  Discussed cosmetic excision with patient which would be considered elective/cosmetic and likely not covered by insurance.  She will contact the office to evaluate the expected cost and if there is any insurance coverage should she want to proceed with elective excision under local anesthesia.  She understands that there is risk of bleeding infection and scarring.  The incision would have to extend on to the white lip past the vermilion which can result obvious cosmetic deformity depending upon healing.  All questions answered.

## 2022-08-12 ENCOUNTER — TELEPHONE (OUTPATIENT)
Dept: FAMILY MEDICINE | Facility: CLINIC | Age: 82
End: 2022-08-12
Payer: MEDICARE

## 2022-08-12 DIAGNOSIS — G89.29 CHRONIC BILATERAL LOW BACK PAIN WITHOUT SCIATICA: ICD-10-CM

## 2022-08-12 DIAGNOSIS — M54.50 CHRONIC BILATERAL LOW BACK PAIN WITHOUT SCIATICA: ICD-10-CM

## 2022-08-12 RX ORDER — PREGABALIN 50 MG/1
100 CAPSULE ORAL 2 TIMES DAILY
Qty: 360 CAPSULE | Refills: 1 | Status: SHIPPED | OUTPATIENT
Start: 2022-08-12 | End: 2023-01-19

## 2022-08-12 NOTE — TELEPHONE ENCOUNTER
----- Message from Grace Castillo sent at 8/12/2022  9:37 AM CDT -----  .Type:  Patient Call Back    Who Called: PT       Does the patient know what this is regarding?: PT CALLED TO SPEAK WITH THE OFFICE ABOUT THE pregabalin (LYRICA) 50 MG capsule INSTRUCTIONS PLEASE ADVISE PT ASAP SHE STATED THAT SHE HASN'T TAKEN IT IN TWO DAYS SHE ASKING FOR A CALL BEFORE 5     Would the patient rather a call back YES     Best Call Back Number: [phone]      Additional Information: Thank You

## 2022-08-12 NOTE — TELEPHONE ENCOUNTER
----- Message from Terra Ca sent at 8/12/2022  9:15 AM CDT -----  Contact: Self  Type:  Patient Calling to Clarify an RX    Caller Name: Patient  Prescription Name:  pregabalin (LYRICA) 50 MG capsule  What do they need to clarify?:  Needs directions clarified, stated Dr Russell's says to take 2 in the morning and 2 at night, but the pharmacist has where she is to take 1 in the morning and 2 at night.  Best Call Back Number:  [phone]  Additional Information:  Please call pt back to advise. Pt stated she has already sent multiple messages and no one has returned her call. Thank You.      
Please clarify directions for Lyrica. Thank you.   
21-Jul-2019

## 2022-08-12 NOTE — TELEPHONE ENCOUNTER
----- Message from Terra Ca sent at 8/12/2022  9:15 AM CDT -----  Contact: Self  Type:  Patient Calling to Clarify an RX    Caller Name: Patient  Prescription Name:  pregabalin (LYRICA) 50 MG capsule  What do they need to clarify?:  Needs directions clarified, stated Dr Russell's says to take 2 in the morning and 2 at night, but the pharmacist has where she is to take 1 in the morning and 2 at night.  Best Call Back Number:  [phone]  Additional Information:  Please call pt back to advise. Pt stated she has already sent multiple messages and no one has returned her call. Thank You.

## 2022-08-12 NOTE — TELEPHONE ENCOUNTER
Dr. Kim clarified order for Lyrica as follows:    Lyrica 50 mg take 2 capsules (100 mg total) by mouth 2 times daily.    New prescription with clarified dosage e-scribed to [facility]. Call placed to patient for notification. Patient verbalized understanding.

## 2022-10-24 ENCOUNTER — TELEPHONE (OUTPATIENT)
Dept: FAMILY MEDICINE | Facility: CLINIC | Age: 82
End: 2022-10-24
Payer: MEDICARE

## 2022-10-24 NOTE — TELEPHONE ENCOUNTER
Received refill request for Pregabalin (Lyrica). Upon further assessment it was noted a prescription for this medication was e-scribed on 8-12-22 quantity of 360 (3 month supply) with 1 additional refill. Call placed to patient for notification. Patient confirmed she filled prescription on yesterday. No further assistance required.

## 2022-10-24 NOTE — TELEPHONE ENCOUNTER
----- Message from Nicholas Humphries sent at 10/21/2022  1:29 PM CDT -----  Who Called:patient       What is the reqeust in detail:pt is requesting a rx refill. Please advise     pregabalin (LYRICA) 50 MG capsule 360 capsule 1 8/12/2022  No  Sig - Route: Take 2 capsules (100 mg total) by mouth 2 (two) times daily. - Oral  Sent to pharmacy as: pregabalin (LYRICA) 50 MG capsule  Class: Normal  Order: [id number]    [id number] - [place], [place] - [address] AT [facility] [address]      Can the clinic reply by [facility]?      Best Call Back Number:[phone]      Additional Information:

## 2022-11-01 DIAGNOSIS — E03.9 HYPOTHYROIDISM: ICD-10-CM

## 2022-11-01 NOTE — TELEPHONE ENCOUNTER
----- Message from Page Gomez sent at 11/1/2022  1:23 PM CDT -----  Contact: Patient  Type:  RX Refill Request    Who Called: Patient    Refill or New Rx: refill    RX Name and Strength: levothyroxine (SYNTHROID) 75 MCG tablet    How is the patient currently taking it? (ex. 1XDay)    Is this a 30 day or 90 day RX:    Preferred Pharmacy with phone number:  [facility] #[id number] - [place] - [address]  [address] [address]  Phone: [phone] Fax: [phone]      Local or Mail Order:    Ordering Provider:    Would the patient rather a call back or a response via [facility]?    Best Call Back Number: [phone]    Additional Information: switching pharmacies; needs new orders sent/transferred over to [facility] above

## 2022-11-01 NOTE — TELEPHONE ENCOUNTER
No new care gaps identified.  [facility] Embedded Care Gaps. Reference number: [id number]. 11/01/2022   4:37:09 PM CDT

## 2022-11-02 RX ORDER — LEVOTHYROXINE SODIUM 75 UG/1
75 TABLET ORAL DAILY
Qty: 90 TABLET | Refills: 3 | Status: SHIPPED | OUTPATIENT
Start: 2022-11-02

## 2022-11-03 ENCOUNTER — PES CALL (OUTPATIENT)
Dept: ADMINISTRATIVE | Facility: CLINIC | Age: 82
End: 2022-11-03
Payer: MEDICARE

## 2022-11-21 ENCOUNTER — LAB VISIT (OUTPATIENT)
Dept: LAB | Facility: HOSPITAL | Age: 82
End: 2022-11-21
Attending: FAMILY MEDICINE
Payer: MEDICARE

## 2022-11-21 DIAGNOSIS — E55.9 VITAMIN D DEFICIENCY: ICD-10-CM

## 2022-11-21 DIAGNOSIS — I10 ESSENTIAL HYPERTENSION: ICD-10-CM

## 2022-11-21 DIAGNOSIS — E03.4 HYPOTHYROIDISM DUE TO ACQUIRED ATROPHY OF THYROID: ICD-10-CM

## 2022-11-21 DIAGNOSIS — R73.9 HYPERGLYCEMIA: ICD-10-CM

## 2022-11-21 LAB
25(OH)D3+25(OH)D2 SERPL-MCNC: 69 NG/ML (ref 30–96)
ALBUMIN SERPL BCP-MCNC: 3.6 G/DL (ref 3.5–5.2)
ALP SERPL-CCNC: 41 U/L (ref 55–135)
ALT SERPL W/O P-5'-P-CCNC: 10 U/L (ref 10–44)
ANION GAP SERPL CALC-SCNC: 9 MMOL/L (ref 8–16)
AST SERPL-CCNC: 13 U/L (ref 10–40)
BASOPHILS # BLD AUTO: 0.02 K/UL (ref 0–0.2)
BASOPHILS NFR BLD: 0.3 % (ref 0–1.9)
BILIRUB SERPL-MCNC: 1.1 MG/DL (ref 0.1–1)
BUN SERPL-MCNC: 11 MG/DL (ref 8–23)
CALCIUM SERPL-MCNC: 9.2 MG/DL (ref 8.7–10.5)
CHLORIDE SERPL-SCNC: 108 MMOL/L (ref 95–110)
CHOLEST SERPL-MCNC: 153 MG/DL (ref 120–199)
CHOLEST/HDLC SERPL: 3.3 {RATIO} (ref 2–5)
CO2 SERPL-SCNC: 25 MMOL/L (ref 23–29)
CREAT SERPL-MCNC: 0.8 MG/DL (ref 0.5–1.4)
DIFFERENTIAL METHOD: ABNORMAL
EOSINOPHIL # BLD AUTO: 0.1 K/UL (ref 0–0.5)
EOSINOPHIL NFR BLD: 1.5 % (ref 0–8)
ERYTHROCYTE [DISTWIDTH] IN BLOOD BY AUTOMATED COUNT: 13.6 % (ref 11.5–14.5)
EST. GFR  (NO RACE VARIABLE): >60 ML/MIN/1.73 M^2
ESTIMATED AVG GLUCOSE: 108 MG/DL (ref 68–131)
GLUCOSE SERPL-MCNC: 91 MG/DL (ref 70–110)
HBA1C MFR BLD: 5.4 % (ref 4–5.6)
HCT VFR BLD AUTO: 35.3 % (ref 37–48.5)
HDLC SERPL-MCNC: 46 MG/DL (ref 40–75)
HDLC SERPL: 30.1 % (ref 20–50)
HGB BLD-MCNC: 11.2 G/DL (ref 12–16)
IMM GRANULOCYTES # BLD AUTO: 0.02 K/UL (ref 0–0.04)
IMM GRANULOCYTES NFR BLD AUTO: 0.3 % (ref 0–0.5)
LDLC SERPL CALC-MCNC: 93.2 MG/DL (ref 63–159)
LYMPHOCYTES # BLD AUTO: 1 K/UL (ref 1–4.8)
LYMPHOCYTES NFR BLD: 16.1 % (ref 18–48)
MCH RBC QN AUTO: 30 PG (ref 27–31)
MCHC RBC AUTO-ENTMCNC: 31.7 G/DL (ref 32–36)
MCV RBC AUTO: 95 FL (ref 82–98)
MONOCYTES # BLD AUTO: 0.4 K/UL (ref 0.3–1)
MONOCYTES NFR BLD: 7 % (ref 4–15)
NEUTROPHILS # BLD AUTO: 4.6 K/UL (ref 1.8–7.7)
NEUTROPHILS NFR BLD: 74.8 % (ref 38–73)
NONHDLC SERPL-MCNC: 107 MG/DL
NRBC BLD-RTO: 0 /100 WBC
PLATELET # BLD AUTO: 221 K/UL (ref 150–450)
PMV BLD AUTO: 10.6 FL (ref 9.2–12.9)
POTASSIUM SERPL-SCNC: 4.4 MMOL/L (ref 3.5–5.1)
PROT SERPL-MCNC: 6.5 G/DL (ref 6–8.4)
RBC # BLD AUTO: 3.73 M/UL (ref 4–5.4)
SODIUM SERPL-SCNC: 142 MMOL/L (ref 136–145)
T4 FREE SERPL-MCNC: 0.92 NG/DL (ref 0.71–1.51)
TRIGL SERPL-MCNC: 69 MG/DL (ref 30–150)
TSH SERPL DL<=0.005 MIU/L-ACNC: 3.29 UIU/ML (ref 0.4–4)
WBC # BLD AUTO: 6.14 K/UL (ref 3.9–12.7)

## 2022-11-21 PROCEDURE — 85025 COMPLETE CBC W/AUTO DIFF WBC: CPT | Performed by: FAMILY MEDICINE

## 2022-11-21 PROCEDURE — 84443 ASSAY THYROID STIM HORMONE: CPT | Performed by: FAMILY MEDICINE

## 2022-11-21 PROCEDURE — 83036 HEMOGLOBIN GLYCOSYLATED A1C: CPT | Performed by: FAMILY MEDICINE

## 2022-11-21 PROCEDURE — 84439 ASSAY OF FREE THYROXINE: CPT | Performed by: FAMILY MEDICINE

## 2022-11-21 PROCEDURE — 80061 LIPID PANEL: CPT | Performed by: FAMILY MEDICINE

## 2022-11-21 PROCEDURE — 36415 COLL VENOUS BLD VENIPUNCTURE: CPT | Mod: PO | Performed by: FAMILY MEDICINE

## 2022-11-21 PROCEDURE — 80053 COMPREHEN METABOLIC PANEL: CPT | Performed by: FAMILY MEDICINE

## 2022-11-21 PROCEDURE — 82306 VITAMIN D 25 HYDROXY: CPT | Performed by: FAMILY MEDICINE

## 2022-12-21 ENCOUNTER — PES CALL (OUTPATIENT)
Dept: ADMINISTRATIVE | Facility: CLINIC | Age: 82
End: 2022-12-21
Payer: MEDICARE

## 2023-01-12 ENCOUNTER — LAB VISIT (OUTPATIENT)
Dept: LAB | Facility: HOSPITAL | Age: 83
End: 2023-01-12
Attending: FAMILY MEDICINE
Payer: MEDICARE

## 2023-01-12 DIAGNOSIS — E55.9 VITAMIN D DEFICIENCY: ICD-10-CM

## 2023-01-12 DIAGNOSIS — E03.4 HYPOTHYROIDISM DUE TO ACQUIRED ATROPHY OF THYROID: ICD-10-CM

## 2023-01-12 DIAGNOSIS — I10 ESSENTIAL HYPERTENSION: ICD-10-CM

## 2023-01-12 DIAGNOSIS — R73.9 HYPERGLYCEMIA: ICD-10-CM

## 2023-01-12 LAB
BASOPHILS # BLD AUTO: 0.02 K/UL (ref 0–0.2)
BASOPHILS NFR BLD: 0.3 % (ref 0–1.9)
DIFFERENTIAL METHOD: ABNORMAL
EOSINOPHIL # BLD AUTO: 0.1 K/UL (ref 0–0.5)
EOSINOPHIL NFR BLD: 1.1 % (ref 0–8)
ERYTHROCYTE [DISTWIDTH] IN BLOOD BY AUTOMATED COUNT: 13.8 % (ref 11.5–14.5)
ESTIMATED AVG GLUCOSE: 103 MG/DL (ref 68–131)
HBA1C MFR BLD: 5.2 % (ref 4–5.6)
HCT VFR BLD AUTO: 34 % (ref 37–48.5)
HGB BLD-MCNC: 10.5 G/DL (ref 12–16)
IMM GRANULOCYTES # BLD AUTO: 0.02 K/UL (ref 0–0.04)
IMM GRANULOCYTES NFR BLD AUTO: 0.3 % (ref 0–0.5)
LYMPHOCYTES # BLD AUTO: 0.9 K/UL (ref 1–4.8)
LYMPHOCYTES NFR BLD: 14.2 % (ref 18–48)
MCH RBC QN AUTO: 29.2 PG (ref 27–31)
MCHC RBC AUTO-ENTMCNC: 30.9 G/DL (ref 32–36)
MCV RBC AUTO: 95 FL (ref 82–98)
MONOCYTES # BLD AUTO: 0.6 K/UL (ref 0.3–1)
MONOCYTES NFR BLD: 8.8 % (ref 4–15)
NEUTROPHILS # BLD AUTO: 5 K/UL (ref 1.8–7.7)
NEUTROPHILS NFR BLD: 75.3 % (ref 38–73)
NRBC BLD-RTO: 0 /100 WBC
PLATELET # BLD AUTO: 236 K/UL (ref 150–450)
PMV BLD AUTO: 10.3 FL (ref 9.2–12.9)
RBC # BLD AUTO: 3.59 M/UL (ref 4–5.4)
WBC # BLD AUTO: 6.6 K/UL (ref 3.9–12.7)

## 2023-01-12 PROCEDURE — 82306 VITAMIN D 25 HYDROXY: CPT | Performed by: FAMILY MEDICINE

## 2023-01-12 PROCEDURE — 36415 COLL VENOUS BLD VENIPUNCTURE: CPT | Mod: PO | Performed by: FAMILY MEDICINE

## 2023-01-12 PROCEDURE — 80061 LIPID PANEL: CPT | Performed by: FAMILY MEDICINE

## 2023-01-12 PROCEDURE — 84439 ASSAY OF FREE THYROXINE: CPT | Performed by: FAMILY MEDICINE

## 2023-01-12 PROCEDURE — 80053 COMPREHEN METABOLIC PANEL: CPT | Performed by: FAMILY MEDICINE

## 2023-01-12 PROCEDURE — 85025 COMPLETE CBC W/AUTO DIFF WBC: CPT | Performed by: FAMILY MEDICINE

## 2023-01-12 PROCEDURE — 83036 HEMOGLOBIN GLYCOSYLATED A1C: CPT | Performed by: FAMILY MEDICINE

## 2023-01-12 PROCEDURE — 84443 ASSAY THYROID STIM HORMONE: CPT | Performed by: FAMILY MEDICINE

## 2023-01-13 LAB
25(OH)D3+25(OH)D2 SERPL-MCNC: 67 NG/ML (ref 30–96)
ALBUMIN SERPL BCP-MCNC: 3.6 G/DL (ref 3.5–5.2)
ALP SERPL-CCNC: 47 U/L (ref 55–135)
ALT SERPL W/O P-5'-P-CCNC: 12 U/L (ref 10–44)
ANION GAP SERPL CALC-SCNC: 10 MMOL/L (ref 8–16)
AST SERPL-CCNC: 13 U/L (ref 10–40)
BILIRUB SERPL-MCNC: 1.3 MG/DL (ref 0.1–1)
BUN SERPL-MCNC: 15 MG/DL (ref 8–23)
CALCIUM SERPL-MCNC: 9.3 MG/DL (ref 8.7–10.5)
CHLORIDE SERPL-SCNC: 108 MMOL/L (ref 95–110)
CHOLEST SERPL-MCNC: 151 MG/DL (ref 120–199)
CHOLEST/HDLC SERPL: 3.1 {RATIO} (ref 2–5)
CO2 SERPL-SCNC: 22 MMOL/L (ref 23–29)
CREAT SERPL-MCNC: 0.8 MG/DL (ref 0.5–1.4)
EST. GFR  (NO RACE VARIABLE): >60 ML/MIN/1.73 M^2
GLUCOSE SERPL-MCNC: 94 MG/DL (ref 70–110)
HDLC SERPL-MCNC: 48 MG/DL (ref 40–75)
HDLC SERPL: 31.8 % (ref 20–50)
LDLC SERPL CALC-MCNC: 89.8 MG/DL (ref 63–159)
NONHDLC SERPL-MCNC: 103 MG/DL
POTASSIUM SERPL-SCNC: 4.3 MMOL/L (ref 3.5–5.1)
PROT SERPL-MCNC: 6 G/DL (ref 6–8.4)
SODIUM SERPL-SCNC: 140 MMOL/L (ref 136–145)
T4 FREE SERPL-MCNC: 0.99 NG/DL (ref 0.71–1.51)
TRIGL SERPL-MCNC: 66 MG/DL (ref 30–150)
TSH SERPL DL<=0.005 MIU/L-ACNC: 2.84 UIU/ML (ref 0.4–4)

## 2023-01-19 ENCOUNTER — OFFICE VISIT (OUTPATIENT)
Dept: FAMILY MEDICINE | Facility: CLINIC | Age: 83
End: 2023-01-19
Payer: MEDICARE

## 2023-01-19 VITALS
OXYGEN SATURATION: 95 % | WEIGHT: 232.56 LBS | BODY MASS INDEX: 39.7 KG/M2 | HEIGHT: 64 IN | RESPIRATION RATE: 18 BRPM | SYSTOLIC BLOOD PRESSURE: 120 MMHG | DIASTOLIC BLOOD PRESSURE: 70 MMHG | TEMPERATURE: 98 F | HEART RATE: 75 BPM

## 2023-01-19 DIAGNOSIS — C18.2 MALIGNANT NEOPLASM OF ASCENDING COLON: ICD-10-CM

## 2023-01-19 DIAGNOSIS — E66.01 MORBID OBESITY WITH BMI OF 40.0-44.9, ADULT: ICD-10-CM

## 2023-01-19 DIAGNOSIS — R73.9 HYPERGLYCEMIA: ICD-10-CM

## 2023-01-19 DIAGNOSIS — D64.9 ANEMIA, UNSPECIFIED TYPE: ICD-10-CM

## 2023-01-19 DIAGNOSIS — I10 ESSENTIAL HYPERTENSION: Primary | ICD-10-CM

## 2023-01-19 DIAGNOSIS — G89.29 CHRONIC BILATERAL LOW BACK PAIN WITHOUT SCIATICA: ICD-10-CM

## 2023-01-19 DIAGNOSIS — M54.50 CHRONIC BILATERAL LOW BACK PAIN WITHOUT SCIATICA: ICD-10-CM

## 2023-01-19 DIAGNOSIS — E78.5 HYPERLIPIDEMIA, UNSPECIFIED HYPERLIPIDEMIA TYPE: ICD-10-CM

## 2023-01-19 DIAGNOSIS — M81.0 OSTEOPOROSIS, UNSPECIFIED OSTEOPOROSIS TYPE, UNSPECIFIED PATHOLOGICAL FRACTURE PRESENCE: ICD-10-CM

## 2023-01-19 DIAGNOSIS — E55.9 VITAMIN D DEFICIENCY: ICD-10-CM

## 2023-01-19 DIAGNOSIS — J84.10 CALCIFIED GRANULOMA OF LUNG: ICD-10-CM

## 2023-01-19 DIAGNOSIS — E03.4 HYPOTHYROIDISM DUE TO ACQUIRED ATROPHY OF THYROID: ICD-10-CM

## 2023-01-19 PROCEDURE — 3288F PR FALLS RISK ASSESSMENT DOCUMENTED: ICD-10-PCS | Mod: CPTII,S$GLB,, | Performed by: FAMILY MEDICINE

## 2023-01-19 PROCEDURE — 1101F PR PT FALLS ASSESS DOC 0-1 FALLS W/OUT INJ PAST YR: ICD-10-PCS | Mod: CPTII,S$GLB,, | Performed by: FAMILY MEDICINE

## 2023-01-19 PROCEDURE — 3074F PR MOST RECENT SYSTOLIC BLOOD PRESSURE < 130 MM HG: ICD-10-PCS | Mod: CPTII,S$GLB,, | Performed by: FAMILY MEDICINE

## 2023-01-19 PROCEDURE — 99214 PR OFFICE/OUTPT VISIT, EST, LEVL IV, 30-39 MIN: ICD-10-PCS | Mod: S$GLB,,, | Performed by: FAMILY MEDICINE

## 2023-01-19 PROCEDURE — 3078F PR MOST RECENT DIASTOLIC BLOOD PRESSURE < 80 MM HG: ICD-10-PCS | Mod: CPTII,S$GLB,, | Performed by: FAMILY MEDICINE

## 2023-01-19 PROCEDURE — 99499 RISK ADDL DX/OHS AUDIT: ICD-10-PCS | Mod: S$GLB,,, | Performed by: FAMILY MEDICINE

## 2023-01-19 PROCEDURE — 1159F PR MEDICATION LIST DOCUMENTED IN MEDICAL RECORD: ICD-10-PCS | Mod: CPTII,S$GLB,, | Performed by: FAMILY MEDICINE

## 2023-01-19 PROCEDURE — 1125F PR PAIN SEVERITY QUANTIFIED, PAIN PRESENT: ICD-10-PCS | Mod: CPTII,S$GLB,, | Performed by: FAMILY MEDICINE

## 2023-01-19 PROCEDURE — 99999 PR PBB SHADOW E&M-EST. PATIENT-LVL III: ICD-10-PCS | Mod: PBBFAC,,, | Performed by: FAMILY MEDICINE

## 2023-01-19 PROCEDURE — 1159F MED LIST DOCD IN RCRD: CPT | Mod: CPTII,S$GLB,, | Performed by: FAMILY MEDICINE

## 2023-01-19 PROCEDURE — 1125F AMNT PAIN NOTED PAIN PRSNT: CPT | Mod: CPTII,S$GLB,, | Performed by: FAMILY MEDICINE

## 2023-01-19 PROCEDURE — 99999 PR PBB SHADOW E&M-EST. PATIENT-LVL III: CPT | Mod: PBBFAC,,, | Performed by: FAMILY MEDICINE

## 2023-01-19 PROCEDURE — 3074F SYST BP LT 130 MM HG: CPT | Mod: CPTII,S$GLB,, | Performed by: FAMILY MEDICINE

## 2023-01-19 PROCEDURE — 1160F PR REVIEW ALL MEDS BY PRESCRIBER/CLIN PHARMACIST DOCUMENTED: ICD-10-PCS | Mod: CPTII,S$GLB,, | Performed by: FAMILY MEDICINE

## 2023-01-19 PROCEDURE — 99214 OFFICE O/P EST MOD 30 MIN: CPT | Mod: S$GLB,,, | Performed by: FAMILY MEDICINE

## 2023-01-19 PROCEDURE — 1101F PT FALLS ASSESS-DOCD LE1/YR: CPT | Mod: CPTII,S$GLB,, | Performed by: FAMILY MEDICINE

## 2023-01-19 PROCEDURE — 3078F DIAST BP <80 MM HG: CPT | Mod: CPTII,S$GLB,, | Performed by: FAMILY MEDICINE

## 2023-01-19 PROCEDURE — 99499 UNLISTED E&M SERVICE: CPT | Mod: S$GLB,,, | Performed by: FAMILY MEDICINE

## 2023-01-19 PROCEDURE — 3288F FALL RISK ASSESSMENT DOCD: CPT | Mod: CPTII,S$GLB,, | Performed by: FAMILY MEDICINE

## 2023-01-19 PROCEDURE — 1160F RVW MEDS BY RX/DR IN RCRD: CPT | Mod: CPTII,S$GLB,, | Performed by: FAMILY MEDICINE

## 2023-01-19 RX ORDER — PREGABALIN 150 MG/1
150 CAPSULE ORAL 2 TIMES DAILY
Qty: 60 CAPSULE | Refills: 6 | Status: SHIPPED | OUTPATIENT
Start: 2023-01-19 | End: 2023-03-14

## 2023-01-19 NOTE — PROGRESS NOTES
Subjective:       Patient ID: Danna Sorensen is a 82 y.o. female.    Chief Complaint: Follow-up (6mth f/u)    HPI  Review of Systems   Constitutional:  Negative for fatigue and unexpected weight change.   Respiratory:  Negative for chest tightness and shortness of breath.    Cardiovascular:  Negative for chest pain, palpitations and leg swelling.   Gastrointestinal:  Negative for abdominal pain.   Musculoskeletal:  Negative for arthralgias.   Neurological:  Negative for dizziness, syncope, light-headedness and headaches.     Patient Active Problem List   Diagnosis    Hypothyroid    Nuclear sclerosis    Hyperopia with astigmatism and presbyopia    DDD (degenerative disc disease), lumbar    Morbid obesity with BMI of 40.0-44.9, adult    Calcified granuloma of lung    History of colon cancer    Lumbar radiculitis    Other spondylosis, lumbar region    Chronic right-sided low back pain without sciatica    Vitamin D deficiency    Essential hypertension    Malignant neoplasm of ascending colon    Decreased functional mobility    Muscle tightness    Decreased strength    Decreased range of motion    Venous lake of lip     Patient is here for a chronic conditions follow up.    Reviewed labs 1/2023  mild normocytic anemia-slightly improved. Iron, folate and b12 normal 5/22  Dexa 5/22 osteoporosis on fosomax 5/22    ENT Dr. Tolentino venous lake of lip     F/u LBP. Started on lyrica 50mg 2 bid Tolerating well. Sleeps better. Pain in daytime 6/10. PT completed 2/22 not much help. Flexeril prn . Tramadol 50mg #60 lasts 2 months or more for low back pain-helps but still waking up from sleep with pain.  Tylenol also helps Sees chiropractor Dr. Stearns. Has gradually worsening of lower back pain. Ache that radiates to right hip. No paresthesias. No B/B incontinence.  Gabapentin- nausea.  Tried PT, KHARI and radiofrequency ablation without relief.       Eye Opt[facility] Dr. Berrios treating cataracts     Heme/onc Dr. Cali following for  "colon cancer s/p resection and chemo 2017     Had mri brast 2018- high risk.  3/22 mri breast negative. Cannot tolerate mammo.   Objective:      Physical Exam  Vitals and nursing note reviewed.   Constitutional:       Appearance: She is well-developed.   Cardiovascular:      Rate and Rhythm: Normal rate and regular rhythm.      Heart sounds: Normal heart sounds.   Pulmonary:      Effort: Pulmonary effort is normal.      Breath sounds: Normal breath sounds.   Skin:     General: Skin is warm and dry.   Neurological:      Mental Status: She is alert and oriented to person, place, and time.       Assessment:       1. Essential hypertension    2. Malignant neoplasm of ascending colon    3. Morbid obesity with BMI of 40.0-44.9, adult    4. Calcified granuloma of lung    5. Chronic bilateral low back pain without sciatica    6. Vitamin D deficiency    7. Hypothyroidism due to acquired atrophy of thyroid    8. Anemia, unspecified type    9. Osteoporosis, unspecified osteoporosis type, unspecified pathological fracture presence    10. Hyperglycemia    11. Hyperlipidemia, unspecified hyperlipidemia type        Plan:         1. Essential hypertension  Controlled on current medications.  Continue current medications.      2. Malignant neoplasm of ascending colon  S/p resection 2017    3. Morbid obesity with BMI of 40.0-44.9, adult  Counseled patient on his ideal body weight, health consequences of being obese and current recommendations including weekly exercise and a heart healthy diet.  Current BMI is:Estimated body mass index is 39.92 kg/m² as calculated from the following:    Height as of this encounter: 5' 4" (1.626 m).    Weight as of this encounter: 105.5 kg (232 lb 9.4 oz)..  Patient is aware that ideal BMI < 25 or Weight in (lb) to have BMI = 25: 145.3.      4. Calcified granuloma of lung  Stable and chronic.  Will continue to monitor q3-6 months and control chronic conditions as optimally as possible to preserve " function.      5. Chronic bilateral low back pain without sciatica  Cont current mgmt    6. Vitamin D deficiency  Controlled on current medications.  Continue current medications.      7. Hypothyroidism due to acquired atrophy of thyroid  Controlled on current medications.  Continue current medications.    - CBC Auto Differential; Future  - TSH; Future  - T4, Free; Future    8. Anemia, unspecified type  Stable and chronic.  Will continue to monitor q3-6 months and control chronic conditions as optimally as possible to preserve function.      9. Osteoporosis, unspecified osteoporosis type, unspecified pathological fracture presence  Cont fosamax     10. Hyperglycemia   Your blood sugar is borderline high.  This means you are at risk for developing type 2 diabetes mellitus.  To lessen your risk you should exercise regularly, avoid excess carbohydrates and work toward a body mass index of less than 25.      - Comprehensive Metabolic Panel; Future  - Hemoglobin A1C; Future    11. Hyperlipidemia, unspecified hyperlipidemia type  Controlled on current medications.  Continue current medications.    - Lipid Panel; Future      Time spent with patient: 20 minutes    Patient with be reevaluated in 6 months or sooner prn    Greater than 50% of this visit was spent counseling as described in above documentation:Yes

## 2023-01-20 ENCOUNTER — TELEPHONE (OUTPATIENT)
Dept: FAMILY MEDICINE | Facility: CLINIC | Age: 83
End: 2023-01-20

## 2023-01-20 ENCOUNTER — TELEPHONE (OUTPATIENT)
Dept: FAMILY MEDICINE | Facility: CLINIC | Age: 83
End: 2023-01-20
Payer: MEDICARE

## 2023-01-20 NOTE — TELEPHONE ENCOUNTER
----- Message from Ayesha Salazar, Patient Care Assistant sent at 1/20/2023  9:45 AM CST -----  Regarding: PA  Contact: pt  Type: Needs Medical Advice  Who Called:  pt     Pharmacy name and phone #:    [id number] - [place] - [address] [facility] [address]  [address]  [place] [address]  Phone: [phone] Fax: [phone]    Best Call Back Number: [phone]    Additional Information: pt states she would like a callback regarding an pre authorization for lisinopriL (PRINIVIL,ZESTRIL) 30 MG tablet. Thanks!

## 2023-01-23 ENCOUNTER — TELEPHONE (OUTPATIENT)
Dept: FAMILY MEDICINE | Facility: CLINIC | Age: 83
End: 2023-01-23
Payer: MEDICARE

## 2023-01-23 NOTE — TELEPHONE ENCOUNTER
Pharmacy just needed new refill request done.    ----- Message from Ayesha Salazar, Patient Care Assistant sent at 1/20/2023  9:45 AM CST -----  Regarding: PA  Contact: pt  Type: Needs Medical Advice  Who Called:  pt     Pharmacy name and phone #:    [id number] - [place] - [address] AT [facility] [address]  [address] [place] [address]  Phone: [phone] Fax: [phone]    Best Call Back Number: [phone]    Additional Information: pt states she would like a callback regarding an pre authorization for lisinopriL (PRINIVIL,ZESTRIL) 30 MG tablet. Thanks!

## 2023-02-28 ENCOUNTER — TELEPHONE (OUTPATIENT)
Dept: FAMILY MEDICINE | Facility: CLINIC | Age: 83
End: 2023-02-28
Payer: MEDICARE

## 2023-02-28 NOTE — TELEPHONE ENCOUNTER
----- Message from Shruti Strong sent at 2/28/2023  8:26 AM CST -----  Contact: pt  Patient is calling she is having issues with medication she states she is having side effects   Please give pt a call back asap [phone]

## 2023-03-01 ENCOUNTER — TELEPHONE (OUTPATIENT)
Dept: FAMILY MEDICINE | Facility: CLINIC | Age: 83
End: 2023-03-01
Payer: MEDICARE

## 2023-03-01 NOTE — TELEPHONE ENCOUNTER
----- Message from Ayesha Salazar, Patient Care Assistant sent at 3/1/2023  9:03 AM CST -----  Regarding: advice  Contact: pt  Type: Needs Medical Advice  Who Called:  pt     Symptoms (please be specific):  medication side affects - dry mouth, swollen arms and legs restless stomach pain , always upset   How long has patient had these symptoms:  2 weeks     Pharmacy name and phone #:    [facility] #[id number] - [place], [place] - [address] [facility] [address]  [address]  [place] [address]  Phone: [phone] Fax: [phone]    Best Call Back Number: [phone]    Additional Information: pt states she would like a callback regarding pregabalin (LYRICA) 150 MG capsule. Please call pt to advise. Thanks!

## 2023-03-01 NOTE — TELEPHONE ENCOUNTER
Called patient in regards to needing an appointment. No answer , unable to leave voicemail. Scheduled patient for next available.

## 2023-03-01 NOTE — TELEPHONE ENCOUNTER
----- Message from Tim Castillo sent at 2/28/2023  5:43 PM CST -----  Type:  Needs Medical Advice    Who Called: pt  Symptoms (please be specific): muscle pain,weakness,swollen arms and legs,no sleep, grouchy,upset stomach, toenails tuning bluish gray in color, light headed,nervous,nausea  How long has patient had these symptoms:  a few weeks  Pharmacy name and phone #:  [facility] #[id number] - [place], [place] - [address] AT [place] [address]   Phone: [phone]  Would the patient rather a call back or a response via [facility]? Call  Best Call Back Number: [phone]  Additional Information: pt states that she is having side affects from her medication, pt states that she called and left a message on 02/28/2023 @ 8am but no one responded back to her, even though I see in her chart that someone did try to reach out pt states that no one did. I offered the pt the nurse on call pt declined

## 2023-03-01 NOTE — TELEPHONE ENCOUNTER
Pt states she believes the lyrica is making her sick and has stopped taking rx since 2/25/23; has appt w/ Dr. Kim tomorrow on 3/2/23 to assess condition.

## 2023-03-02 ENCOUNTER — OFFICE VISIT (OUTPATIENT)
Dept: FAMILY MEDICINE | Facility: CLINIC | Age: 83
End: 2023-03-02
Payer: MEDICARE

## 2023-03-02 VITALS
RESPIRATION RATE: 16 BRPM | DIASTOLIC BLOOD PRESSURE: 60 MMHG | SYSTOLIC BLOOD PRESSURE: 122 MMHG | HEIGHT: 64 IN | WEIGHT: 226 LBS | TEMPERATURE: 97 F | BODY MASS INDEX: 38.58 KG/M2 | OXYGEN SATURATION: 97 % | HEART RATE: 70 BPM

## 2023-03-02 DIAGNOSIS — M48.061 SPINAL STENOSIS OF LUMBAR REGION, UNSPECIFIED WHETHER NEUROGENIC CLAUDICATION PRESENT: ICD-10-CM

## 2023-03-02 DIAGNOSIS — M54.50 CHRONIC BILATERAL LOW BACK PAIN WITHOUT SCIATICA: Primary | ICD-10-CM

## 2023-03-02 DIAGNOSIS — G89.29 CHRONIC BILATERAL LOW BACK PAIN WITHOUT SCIATICA: Primary | ICD-10-CM

## 2023-03-02 PROCEDURE — 3288F PR FALLS RISK ASSESSMENT DOCUMENTED: ICD-10-PCS | Mod: CPTII,S$GLB,, | Performed by: FAMILY MEDICINE

## 2023-03-02 PROCEDURE — 3074F PR MOST RECENT SYSTOLIC BLOOD PRESSURE < 130 MM HG: ICD-10-PCS | Mod: CPTII,S$GLB,, | Performed by: FAMILY MEDICINE

## 2023-03-02 PROCEDURE — 99999 PR PBB SHADOW E&M-EST. PATIENT-LVL III: CPT | Mod: PBBFAC,,, | Performed by: FAMILY MEDICINE

## 2023-03-02 PROCEDURE — 1159F PR MEDICATION LIST DOCUMENTED IN MEDICAL RECORD: ICD-10-PCS | Mod: CPTII,S$GLB,, | Performed by: FAMILY MEDICINE

## 2023-03-02 PROCEDURE — 3288F FALL RISK ASSESSMENT DOCD: CPT | Mod: CPTII,S$GLB,, | Performed by: FAMILY MEDICINE

## 2023-03-02 PROCEDURE — 1101F PT FALLS ASSESS-DOCD LE1/YR: CPT | Mod: CPTII,S$GLB,, | Performed by: FAMILY MEDICINE

## 2023-03-02 PROCEDURE — 3078F DIAST BP <80 MM HG: CPT | Mod: CPTII,S$GLB,, | Performed by: FAMILY MEDICINE

## 2023-03-02 PROCEDURE — 1160F RVW MEDS BY RX/DR IN RCRD: CPT | Mod: CPTII,S$GLB,, | Performed by: FAMILY MEDICINE

## 2023-03-02 PROCEDURE — 99999 PR PBB SHADOW E&M-EST. PATIENT-LVL III: ICD-10-PCS | Mod: PBBFAC,,, | Performed by: FAMILY MEDICINE

## 2023-03-02 PROCEDURE — 1160F PR REVIEW ALL MEDS BY PRESCRIBER/CLIN PHARMACIST DOCUMENTED: ICD-10-PCS | Mod: CPTII,S$GLB,, | Performed by: FAMILY MEDICINE

## 2023-03-02 PROCEDURE — 1126F AMNT PAIN NOTED NONE PRSNT: CPT | Mod: CPTII,S$GLB,, | Performed by: FAMILY MEDICINE

## 2023-03-02 PROCEDURE — 1126F PR PAIN SEVERITY QUANTIFIED, NO PAIN PRESENT: ICD-10-PCS | Mod: CPTII,S$GLB,, | Performed by: FAMILY MEDICINE

## 2023-03-02 PROCEDURE — 1159F MED LIST DOCD IN RCRD: CPT | Mod: CPTII,S$GLB,, | Performed by: FAMILY MEDICINE

## 2023-03-02 PROCEDURE — 3078F PR MOST RECENT DIASTOLIC BLOOD PRESSURE < 80 MM HG: ICD-10-PCS | Mod: CPTII,S$GLB,, | Performed by: FAMILY MEDICINE

## 2023-03-02 PROCEDURE — 99213 PR OFFICE/OUTPT VISIT, EST, LEVL III, 20-29 MIN: ICD-10-PCS | Mod: S$GLB,,, | Performed by: FAMILY MEDICINE

## 2023-03-02 PROCEDURE — 1101F PR PT FALLS ASSESS DOC 0-1 FALLS W/OUT INJ PAST YR: ICD-10-PCS | Mod: CPTII,S$GLB,, | Performed by: FAMILY MEDICINE

## 2023-03-02 PROCEDURE — 3074F SYST BP LT 130 MM HG: CPT | Mod: CPTII,S$GLB,, | Performed by: FAMILY MEDICINE

## 2023-03-02 PROCEDURE — 99213 OFFICE O/P EST LOW 20 MIN: CPT | Mod: S$GLB,,, | Performed by: FAMILY MEDICINE

## 2023-03-02 RX ORDER — TRAMADOL HYDROCHLORIDE 50 MG/1
50 TABLET ORAL EVERY 4 HOURS PRN
Qty: 40 EACH | Refills: 0 | Status: SHIPPED | OUTPATIENT
Start: 2023-03-02 | End: 2023-03-14 | Stop reason: SDUPTHER

## 2023-03-02 NOTE — PROGRESS NOTES
Subjective:       Patient ID: Danna Sorensen is a 82 y.o. female.    Chief Complaint: No chief complaint on file.    New to me patient here for UC visit.  CC - side effects from Lyrica - pt reports a list of approx 10-12 symptoms she relates to Lyrica.  She expressed frustration and difficulty getting calls back from PCP team and when asked to speak with management, an UC appt was scheduled.  Lyrica did help a fair amount and she has been gradually titrated to current dose of 150 mg BID.  Hx of chronic back pain with hx of DDD and spinal stenosis and has failed PT, pain mgt with KHARI and RFA.  She also sees a chiropractor.  Early on had nausea with Gabapentin.  She is now fearful of Lyrica and unwilling to try a reduced dose again.      Review of Systems   Constitutional:  Positive for fatigue. Negative for fever.   Respiratory:  Negative for shortness of breath.    Cardiovascular:  Negative for chest pain.   Gastrointestinal:  Positive for nausea. Negative for abdominal pain.   Musculoskeletal:  Positive for arthralgias and myalgias.   Skin:  Negative for rash.   Neurological:  Positive for dizziness.   Psychiatric/Behavioral:  Positive for sleep disturbance.    All other systems reviewed and are negative.    Objective:      Physical Exam  Vitals reviewed.   Constitutional:       General: She is not in acute distress.     Appearance: She is well-developed. She is obese.   Cardiovascular:      Rate and Rhythm: Normal rate and regular rhythm.      Heart sounds: No murmur heard.  Pulmonary:      Effort: Pulmonary effort is normal.      Breath sounds: Normal breath sounds.       Assessment:       1. Chronic bilateral low back pain without sciatica    2. Spinal stenosis of lumbar region, unspecified whether neurogenic claudication present          Plan:       Chronic bilateral low back pain without sciatica  -     traMADoL (ULTRAM) 50 mg tablet; Take 1 tablet (50 mg total) by mouth every 4 (four) hours as needed for Pain.   Dispense: 40 each; Refill: 0    Spinal stenosis of lumbar region, unspecified whether neurogenic claudication present  -     traMADoL (ULTRAM) 50 mg tablet; Take 1 tablet (50 mg total) by mouth every 4 (four) hours as needed for Pain.  Dispense: 40 each; Refill: 0

## 2023-03-14 ENCOUNTER — OFFICE VISIT (OUTPATIENT)
Dept: FAMILY MEDICINE | Facility: CLINIC | Age: 83
End: 2023-03-14
Payer: MEDICARE

## 2023-03-14 VITALS
BODY MASS INDEX: 37.52 KG/M2 | OXYGEN SATURATION: 97 % | TEMPERATURE: 98 F | WEIGHT: 219.81 LBS | RESPIRATION RATE: 20 BRPM | HEIGHT: 64 IN | HEART RATE: 73 BPM | SYSTOLIC BLOOD PRESSURE: 110 MMHG | DIASTOLIC BLOOD PRESSURE: 60 MMHG

## 2023-03-14 DIAGNOSIS — J30.1 SEASONAL ALLERGIC RHINITIS DUE TO POLLEN: ICD-10-CM

## 2023-03-14 DIAGNOSIS — M48.061 SPINAL STENOSIS OF LUMBAR REGION, UNSPECIFIED WHETHER NEUROGENIC CLAUDICATION PRESENT: ICD-10-CM

## 2023-03-14 DIAGNOSIS — K21.9 GASTROESOPHAGEAL REFLUX DISEASE, UNSPECIFIED WHETHER ESOPHAGITIS PRESENT: Primary | ICD-10-CM

## 2023-03-14 DIAGNOSIS — G89.29 CHRONIC BILATERAL LOW BACK PAIN WITHOUT SCIATICA: ICD-10-CM

## 2023-03-14 DIAGNOSIS — M54.50 CHRONIC BILATERAL LOW BACK PAIN WITHOUT SCIATICA: ICD-10-CM

## 2023-03-14 PROCEDURE — 1160F RVW MEDS BY RX/DR IN RCRD: CPT | Mod: CPTII,S$GLB,, | Performed by: FAMILY MEDICINE

## 2023-03-14 PROCEDURE — 3078F DIAST BP <80 MM HG: CPT | Mod: CPTII,S$GLB,, | Performed by: FAMILY MEDICINE

## 2023-03-14 PROCEDURE — 99999 PR PBB SHADOW E&M-EST. PATIENT-LVL III: CPT | Mod: PBBFAC,,, | Performed by: FAMILY MEDICINE

## 2023-03-14 PROCEDURE — 1160F PR REVIEW ALL MEDS BY PRESCRIBER/CLIN PHARMACIST DOCUMENTED: ICD-10-PCS | Mod: CPTII,S$GLB,, | Performed by: FAMILY MEDICINE

## 2023-03-14 PROCEDURE — 99214 OFFICE O/P EST MOD 30 MIN: CPT | Mod: S$GLB,,, | Performed by: FAMILY MEDICINE

## 2023-03-14 PROCEDURE — 3074F PR MOST RECENT SYSTOLIC BLOOD PRESSURE < 130 MM HG: ICD-10-PCS | Mod: CPTII,S$GLB,, | Performed by: FAMILY MEDICINE

## 2023-03-14 PROCEDURE — 3074F SYST BP LT 130 MM HG: CPT | Mod: CPTII,S$GLB,, | Performed by: FAMILY MEDICINE

## 2023-03-14 PROCEDURE — 1101F PR PT FALLS ASSESS DOC 0-1 FALLS W/OUT INJ PAST YR: ICD-10-PCS | Mod: CPTII,S$GLB,, | Performed by: FAMILY MEDICINE

## 2023-03-14 PROCEDURE — 3288F FALL RISK ASSESSMENT DOCD: CPT | Mod: CPTII,S$GLB,, | Performed by: FAMILY MEDICINE

## 2023-03-14 PROCEDURE — 1126F AMNT PAIN NOTED NONE PRSNT: CPT | Mod: CPTII,S$GLB,, | Performed by: FAMILY MEDICINE

## 2023-03-14 PROCEDURE — 3078F PR MOST RECENT DIASTOLIC BLOOD PRESSURE < 80 MM HG: ICD-10-PCS | Mod: CPTII,S$GLB,, | Performed by: FAMILY MEDICINE

## 2023-03-14 PROCEDURE — 1159F PR MEDICATION LIST DOCUMENTED IN MEDICAL RECORD: ICD-10-PCS | Mod: CPTII,S$GLB,, | Performed by: FAMILY MEDICINE

## 2023-03-14 PROCEDURE — 99214 PR OFFICE/OUTPT VISIT, EST, LEVL IV, 30-39 MIN: ICD-10-PCS | Mod: S$GLB,,, | Performed by: FAMILY MEDICINE

## 2023-03-14 PROCEDURE — 3288F PR FALLS RISK ASSESSMENT DOCUMENTED: ICD-10-PCS | Mod: CPTII,S$GLB,, | Performed by: FAMILY MEDICINE

## 2023-03-14 PROCEDURE — 1159F MED LIST DOCD IN RCRD: CPT | Mod: CPTII,S$GLB,, | Performed by: FAMILY MEDICINE

## 2023-03-14 PROCEDURE — 99999 PR PBB SHADOW E&M-EST. PATIENT-LVL III: ICD-10-PCS | Mod: PBBFAC,,, | Performed by: FAMILY MEDICINE

## 2023-03-14 PROCEDURE — 1101F PT FALLS ASSESS-DOCD LE1/YR: CPT | Mod: CPTII,S$GLB,, | Performed by: FAMILY MEDICINE

## 2023-03-14 PROCEDURE — 1126F PR PAIN SEVERITY QUANTIFIED, NO PAIN PRESENT: ICD-10-PCS | Mod: CPTII,S$GLB,, | Performed by: FAMILY MEDICINE

## 2023-03-14 RX ORDER — OMEPRAZOLE 40 MG/1
40 CAPSULE, DELAYED RELEASE ORAL DAILY
Qty: 90 CAPSULE | Status: SHIPPED | OUTPATIENT
Start: 2023-03-14 | End: 2023-04-25 | Stop reason: SDUPTHER

## 2023-03-14 RX ORDER — TRAMADOL HYDROCHLORIDE 50 MG/1
50 TABLET ORAL EVERY 8 HOURS PRN
Qty: 21 EACH | Refills: 0 | Status: SHIPPED | OUTPATIENT
Start: 2023-03-14

## 2023-03-14 RX ORDER — FLUTICASONE PROPIONATE 50 MCG
1 SPRAY, SUSPENSION (ML) NASAL DAILY
Qty: 16 G | Status: SHIPPED | OUTPATIENT
Start: 2023-03-14 | End: 2023-09-27

## 2023-03-14 RX ORDER — LEVOCETIRIZINE DIHYDROCHLORIDE 5 MG/1
5 TABLET, FILM COATED ORAL NIGHTLY PRN
Qty: 90 TABLET | Status: SHIPPED | OUTPATIENT
Start: 2023-03-14 | End: 2024-03-13

## 2023-04-25 ENCOUNTER — TELEPHONE (OUTPATIENT)
Dept: GASTROENTEROLOGY | Facility: CLINIC | Age: 83
End: 2023-04-25
Payer: MEDICARE

## 2023-04-25 ENCOUNTER — OFFICE VISIT (OUTPATIENT)
Dept: FAMILY MEDICINE | Facility: CLINIC | Age: 83
End: 2023-04-25
Payer: MEDICARE

## 2023-04-25 ENCOUNTER — HOSPITAL ENCOUNTER (OUTPATIENT)
Dept: RADIOLOGY | Facility: CLINIC | Age: 83
Discharge: HOME | End: 2023-04-25
Attending: FAMILY MEDICINE
Payer: MEDICARE

## 2023-04-25 VITALS
HEART RATE: 72 BPM | DIASTOLIC BLOOD PRESSURE: 60 MMHG | BODY MASS INDEX: 35.5 KG/M2 | WEIGHT: 206.81 LBS | TEMPERATURE: 98 F | RESPIRATION RATE: 18 BRPM | SYSTOLIC BLOOD PRESSURE: 102 MMHG | OXYGEN SATURATION: 98 %

## 2023-04-25 DIAGNOSIS — K21.9 GASTROESOPHAGEAL REFLUX DISEASE, UNSPECIFIED WHETHER ESOPHAGITIS PRESENT: ICD-10-CM

## 2023-04-25 DIAGNOSIS — D64.9 NORMOCYTIC ANEMIA: ICD-10-CM

## 2023-04-25 DIAGNOSIS — E46 PROTEIN-CALORIE MALNUTRITION, UNSPECIFIED SEVERITY: ICD-10-CM

## 2023-04-25 DIAGNOSIS — R06.02 SOB (SHORTNESS OF BREATH): ICD-10-CM

## 2023-04-25 DIAGNOSIS — F43.21 GRIEF REACTION: ICD-10-CM

## 2023-04-25 DIAGNOSIS — I95.9 HYPOTENSION, UNSPECIFIED HYPOTENSION TYPE: Primary | ICD-10-CM

## 2023-04-25 DIAGNOSIS — J40 BRONCHITIS: ICD-10-CM

## 2023-04-25 PROCEDURE — 1126F AMNT PAIN NOTED NONE PRSNT: CPT | Mod: CPTII,S$GLB,, | Performed by: FAMILY MEDICINE

## 2023-04-25 PROCEDURE — 3288F FALL RISK ASSESSMENT DOCD: CPT | Mod: CPTII,S$GLB,, | Performed by: FAMILY MEDICINE

## 2023-04-25 PROCEDURE — 99499 UNLISTED E&M SERVICE: CPT | Mod: S$GLB,,, | Performed by: FAMILY MEDICINE

## 2023-04-25 PROCEDURE — 71046 XR CHEST PA AND LATERAL: ICD-10-PCS | Mod: 26,,, | Performed by: RADIOLOGY

## 2023-04-25 PROCEDURE — 3078F PR MOST RECENT DIASTOLIC BLOOD PRESSURE < 80 MM HG: ICD-10-PCS | Mod: CPTII,S$GLB,, | Performed by: FAMILY MEDICINE

## 2023-04-25 PROCEDURE — 1160F PR REVIEW ALL MEDS BY PRESCRIBER/CLIN PHARMACIST DOCUMENTED: ICD-10-PCS | Mod: CPTII,S$GLB,, | Performed by: FAMILY MEDICINE

## 2023-04-25 PROCEDURE — 3074F PR MOST RECENT SYSTOLIC BLOOD PRESSURE < 130 MM HG: ICD-10-PCS | Mod: CPTII,S$GLB,, | Performed by: FAMILY MEDICINE

## 2023-04-25 PROCEDURE — 3288F PR FALLS RISK ASSESSMENT DOCUMENTED: ICD-10-PCS | Mod: CPTII,S$GLB,, | Performed by: FAMILY MEDICINE

## 2023-04-25 PROCEDURE — 99499 RISK ADDL DX/OHS AUDIT: ICD-10-PCS | Mod: S$GLB,,, | Performed by: FAMILY MEDICINE

## 2023-04-25 PROCEDURE — 1160F RVW MEDS BY RX/DR IN RCRD: CPT | Mod: CPTII,S$GLB,, | Performed by: FAMILY MEDICINE

## 2023-04-25 PROCEDURE — 71046 X-RAY EXAM CHEST 2 VIEWS: CPT | Mod: TC,FY,PO

## 2023-04-25 PROCEDURE — 3078F DIAST BP <80 MM HG: CPT | Mod: CPTII,S$GLB,, | Performed by: FAMILY MEDICINE

## 2023-04-25 PROCEDURE — 99999 PR PBB SHADOW E&M-EST. PATIENT-LVL V: CPT | Mod: PBBFAC,,, | Performed by: FAMILY MEDICINE

## 2023-04-25 PROCEDURE — 99999 PR PBB SHADOW E&M-EST. PATIENT-LVL V: ICD-10-PCS | Mod: PBBFAC,,, | Performed by: FAMILY MEDICINE

## 2023-04-25 PROCEDURE — 99214 PR OFFICE/OUTPT VISIT, EST, LEVL IV, 30-39 MIN: ICD-10-PCS | Mod: S$GLB,,, | Performed by: FAMILY MEDICINE

## 2023-04-25 PROCEDURE — 1126F PR PAIN SEVERITY QUANTIFIED, NO PAIN PRESENT: ICD-10-PCS | Mod: CPTII,S$GLB,, | Performed by: FAMILY MEDICINE

## 2023-04-25 PROCEDURE — 3074F SYST BP LT 130 MM HG: CPT | Mod: CPTII,S$GLB,, | Performed by: FAMILY MEDICINE

## 2023-04-25 PROCEDURE — 99214 OFFICE O/P EST MOD 30 MIN: CPT | Mod: S$GLB,,, | Performed by: FAMILY MEDICINE

## 2023-04-25 PROCEDURE — 1159F PR MEDICATION LIST DOCUMENTED IN MEDICAL RECORD: ICD-10-PCS | Mod: CPTII,S$GLB,, | Performed by: FAMILY MEDICINE

## 2023-04-25 PROCEDURE — 71046 X-RAY EXAM CHEST 2 VIEWS: CPT | Mod: 26,,, | Performed by: RADIOLOGY

## 2023-04-25 PROCEDURE — 1101F PT FALLS ASSESS-DOCD LE1/YR: CPT | Mod: CPTII,S$GLB,, | Performed by: FAMILY MEDICINE

## 2023-04-25 PROCEDURE — 1101F PR PT FALLS ASSESS DOC 0-1 FALLS W/OUT INJ PAST YR: ICD-10-PCS | Mod: CPTII,S$GLB,, | Performed by: FAMILY MEDICINE

## 2023-04-25 PROCEDURE — 1159F MED LIST DOCD IN RCRD: CPT | Mod: CPTII,S$GLB,, | Performed by: FAMILY MEDICINE

## 2023-04-25 RX ORDER — HYDROXYZINE HYDROCHLORIDE 25 MG/1
25 TABLET, FILM COATED ORAL 3 TIMES DAILY PRN
Qty: 30 TABLET | Status: SHIPPED | OUTPATIENT
Start: 2023-04-25

## 2023-04-25 RX ORDER — PROMETHAZINE HYDROCHLORIDE AND DEXTROMETHORPHAN HYDROBROMIDE 6.25; 15 MG/5ML; MG/5ML
5 SYRUP ORAL EVERY 4 HOURS PRN
Qty: 118 ML | Refills: 0 | Status: SHIPPED | OUTPATIENT
Start: 2023-04-25 | End: 2023-05-08

## 2023-04-25 RX ORDER — LISINOPRIL 10 MG/1
10 TABLET ORAL DAILY
Qty: 90 TABLET | Refills: 3 | Status: SHIPPED | OUTPATIENT
Start: 2023-04-25 | End: 2024-03-11

## 2023-04-25 RX ORDER — OMEPRAZOLE 40 MG/1
40 CAPSULE, DELAYED RELEASE ORAL
Qty: 180 CAPSULE | Status: SHIPPED | OUTPATIENT
Start: 2023-04-25 | End: 2024-04-24

## 2023-04-25 RX ORDER — AZITHROMYCIN 250 MG/1
TABLET, FILM COATED ORAL
Qty: 6 TABLET | Refills: 0 | Status: SHIPPED | OUTPATIENT
Start: 2023-04-25 | End: 2023-04-30

## 2023-04-25 NOTE — PROGRESS NOTES
Subjective:       Patient ID: Danna Sorensen is a 83 y.o. female.    Chief Complaint: Follow-up    HPI  Review of Systems   Constitutional:  Positive for fatigue. Negative for chills and fever.   HENT:  Positive for congestion, postnasal drip, rhinorrhea, sneezing and sore throat. Negative for ear discharge, ear pain and sinus pressure.    Respiratory:  Positive for cough. Negative for shortness of breath and wheezing.      Patient Active Problem List   Diagnosis    Hypothyroid    Nuclear sclerosis    Hyperopia with astigmatism and presbyopia    DDD (degenerative disc disease), lumbar    Morbid obesity with BMI of 40.0-44.9, adult    Calcified granuloma of lung    History of colon cancer    Lumbar radiculitis    Other spondylosis, lumbar region    Chronic right-sided low back pain without sciatica    Vitamin D deficiency    Essential hypertension    Malignant neoplasm of ascending colon    Decreased functional mobility    Muscle tightness    Decreased strength    Decreased range of motion    Venous lake of lip     Patient is here for a chronic conditions follow up.  Here with niece Barbie  33 lbs down since 8/22. Lots of stress.  BP running low on lisinopril 30mg. Lost  after hitting head after fall and brain bleed April 1, 2023.  Grieving. Anxiety and sadness. Interrupted sleep.  C/o uri sx x 2 weeks. Mild sob, mild edema. Not taking lasix.  Cough    Having breakthrough heartburn sx even after starting omeprazole 40mg a day      Seen by Dr. Urena  . Had side effects lyrica- dry mouth, swollen arms, stomach pain, nausea. Stopped it. Did not do well on gabapentin either. Rx for tramadol given     Reviewed labs 1/2023  mild normocytic anemia-slightly improved. Iron, folate and b12 normal 5/22  Dexa 5/22 osteoporosis on fosomax 5/22     ENT Dr. Tolentino venous lake of lip     F/u LBP. Started on lyrica 50mg 2 bid-caused side effects. PT completed 2/22 not much help. Flexeril prn . Tramadol 50mg #60 lasts 2  months or more for low back pain-helps but still waking up from sleep with pain.  Tylenol also helps Sees chiropractor Dr. Stearns. Has gradually worsening of lower back pain. Ache that radiates to right hip. No paresthesias. No B/B incontinence.  Gabapentin- nausea.  Tried PT, KHARI and radiofrequency ablation without relief.       Eye [facility] Dr. Berrios treating cataracts     Heme/onc Dr. Cali following for colon cancer s/p resection and chemo 2017     Had mri brast 2018- high risk.  3/22 mri breast negative. Cannot tolerate mammo.   Objective:      Physical Exam  Vitals and nursing note reviewed.   Constitutional:       Appearance: She is well-developed.   HENT:      Nose: Mucosal edema present.      Right Sinus: No maxillary sinus tenderness or frontal sinus tenderness.      Left Sinus: No maxillary sinus tenderness or frontal sinus tenderness.      Mouth/Throat:      Tonsils: No tonsillar abscesses.   Cardiovascular:      Rate and Rhythm: Normal rate and regular rhythm.      Heart sounds: Normal heart sounds.   Pulmonary:      Effort: Pulmonary effort is normal.      Breath sounds: Normal breath sounds.   Skin:     General: Skin is warm and dry.       Assessment:       1. Hypotension, unspecified hypotension type    2. Gastroesophageal reflux disease, unspecified whether esophagitis present    3. Normocytic anemia    4. SOB (shortness of breath)    5. Bronchitis    6. Protein-calorie malnutrition, unspecified severity    7. Grief reaction        Plan:       1. Gastroesophageal reflux disease, unspecified whether esophagitis present  Increase to bid  - omeprazole (PRILOSEC) 40 MG capsule; Take 1 capsule (40 mg total) by mouth 2 (two) times daily before meals.  Dispense: 180 capsule; Refill: PRN  Refer for consult and possible EGD  - Ambulatory referral/consult to Gastroenterology; Future    2. Hypotension, unspecified hypotension type  Reduce to  - lisinopriL 10 MG tablet; Take 1 tablet (10 mg total) by mouth once  daily.  Dispense: 90 tablet; Refill: 3  Increase fluids and monitor    3. Normocytic anemia  Screen and treat as indicated:    - CBC Auto Differential; Future  - Comprehensive Metabolic Panel; Future  - Ferritin; Future  - Iron and TIBC; Future  - Vitamin B12; Future  - TSH; Future    4. SOB (shortness of breath)  Screen and treat as indicated:  R/o chF, PE, pneumonia  - B-TYPE NATRIURETIC PEPTIDE; Future  - D-DIMER, QUANTITATIVE; Future  - X-Ray Chest PA And Lateral; Future    5. Bronchitis  treat  - azithromycin (Z-JACQUELIN) 250 MG tablet; Take 2 tablets by mouth on day 1; Take 1 tablet by mouth on days 2-5  Dispense: 6 tablet; Refill: 0  General home care guidelines for cough and congestion:increase fluid intake, get plenty of rest, and use OTC cough and cold medications for relief of symptoms.  I recommended guafenesin for congestion and dextromethorphan as directed for cough.  Brands like Mucinex DM or Chloricidin HBP or similar are recommended.  Avoidance of decongestants is recommended for patients with heart problems and hypertension.  Extra vitamin C may also benefit.  Return to clinic if symptoms last longer than 10 days or sooner if worsen with symptoms like fever > 100.4, severe sinus pain or headache, thick yellow nasal discharge or sputum, dehydration, sudden confusion or mental status changes, shortness of breath, labored breathing, or lethargy. Anti-fever medications can be alternated like OTC ibuprofen or tylenol as needed and as directed unless told to avoid these by your doctor.     - promethazine-dextromethorphan (PROMETHAZINE-DM) 6.25-15 mg/5 mL Syrp; Take 5 mLs by mouth every 4 (four) hours as needed (cough).  Dispense: 118 mL; Refill: 0    6. Protein-calorie malnutrition, unspecified severity  Screen and treat as indicated:    - Vitamin B12; Future    7. Grief reaction  treat  - hydrOXYzine HCL (ATARAX) 25 MG tablet; Take 1 tablet (25 mg total) by mouth 3 (three) times daily as needed for Anxiety  (insomnia).  Dispense: 30 tablet; Refill: PRN        Time spent with patient: 20 minutes    Patient with be reevaluated in 3 months or sooner prn    Greater than 50% of this visit was spent counseling as described in above documentation:Yes

## 2023-05-06 DIAGNOSIS — J40 BRONCHITIS: ICD-10-CM

## 2023-05-08 RX ORDER — PROMETHAZINE HYDROCHLORIDE AND DEXTROMETHORPHAN HYDROBROMIDE 6.25; 15 MG/5ML; MG/5ML
SYRUP ORAL
Qty: 118 ML | Refills: 0 | Status: SHIPPED | OUTPATIENT
Start: 2023-05-08 | End: 2023-07-20

## 2023-05-11 ENCOUNTER — OFFICE VISIT (OUTPATIENT)
Dept: GASTROENTEROLOGY | Facility: CLINIC | Age: 83
End: 2023-05-11
Payer: MEDICARE

## 2023-05-11 VITALS
WEIGHT: 207.44 LBS | HEIGHT: 64 IN | BODY MASS INDEX: 35.41 KG/M2 | SYSTOLIC BLOOD PRESSURE: 129 MMHG | DIASTOLIC BLOOD PRESSURE: 76 MMHG | HEART RATE: 72 BPM

## 2023-05-11 DIAGNOSIS — Z85.038 HISTORY OF COLON CANCER: ICD-10-CM

## 2023-05-11 DIAGNOSIS — J02.9 SORE THROAT: ICD-10-CM

## 2023-05-11 DIAGNOSIS — R63.4 WEIGHT LOSS: ICD-10-CM

## 2023-05-11 DIAGNOSIS — K21.9 GASTROESOPHAGEAL REFLUX DISEASE, UNSPECIFIED WHETHER ESOPHAGITIS PRESENT: Primary | ICD-10-CM

## 2023-05-11 DIAGNOSIS — R09.82 PND (POST-NASAL DRIP): ICD-10-CM

## 2023-05-11 DIAGNOSIS — R63.0 DECREASED APPETITE: ICD-10-CM

## 2023-05-11 DIAGNOSIS — R05.9 COUGH, UNSPECIFIED TYPE: ICD-10-CM

## 2023-05-11 DIAGNOSIS — K57.90 DIVERTICULOSIS: ICD-10-CM

## 2023-05-11 DIAGNOSIS — Z86.010 HISTORY OF COLON POLYPS: ICD-10-CM

## 2023-05-11 PROCEDURE — 99204 PR OFFICE/OUTPT VISIT, NEW, LEVL IV, 45-59 MIN: ICD-10-PCS | Mod: S$GLB,,,

## 2023-05-11 PROCEDURE — 1126F AMNT PAIN NOTED NONE PRSNT: CPT | Mod: CPTII,S$GLB,,

## 2023-05-11 PROCEDURE — 1159F PR MEDICATION LIST DOCUMENTED IN MEDICAL RECORD: ICD-10-PCS | Mod: CPTII,S$GLB,,

## 2023-05-11 PROCEDURE — 3074F PR MOST RECENT SYSTOLIC BLOOD PRESSURE < 130 MM HG: ICD-10-PCS | Mod: CPTII,S$GLB,,

## 2023-05-11 PROCEDURE — 1160F RVW MEDS BY RX/DR IN RCRD: CPT | Mod: CPTII,S$GLB,,

## 2023-05-11 PROCEDURE — 99204 OFFICE O/P NEW MOD 45 MIN: CPT | Mod: S$GLB,,,

## 2023-05-11 PROCEDURE — 1126F PR PAIN SEVERITY QUANTIFIED, NO PAIN PRESENT: ICD-10-PCS | Mod: CPTII,S$GLB,,

## 2023-05-11 PROCEDURE — 1101F PR PT FALLS ASSESS DOC 0-1 FALLS W/OUT INJ PAST YR: ICD-10-PCS | Mod: CPTII,S$GLB,,

## 2023-05-11 PROCEDURE — 1160F PR REVIEW ALL MEDS BY PRESCRIBER/CLIN PHARMACIST DOCUMENTED: ICD-10-PCS | Mod: CPTII,S$GLB,,

## 2023-05-11 PROCEDURE — 3074F SYST BP LT 130 MM HG: CPT | Mod: CPTII,S$GLB,,

## 2023-05-11 PROCEDURE — 3288F PR FALLS RISK ASSESSMENT DOCUMENTED: ICD-10-PCS | Mod: CPTII,S$GLB,,

## 2023-05-11 PROCEDURE — 1159F MED LIST DOCD IN RCRD: CPT | Mod: CPTII,S$GLB,,

## 2023-05-11 PROCEDURE — 3078F PR MOST RECENT DIASTOLIC BLOOD PRESSURE < 80 MM HG: ICD-10-PCS | Mod: CPTII,S$GLB,,

## 2023-05-11 PROCEDURE — 1101F PT FALLS ASSESS-DOCD LE1/YR: CPT | Mod: CPTII,S$GLB,,

## 2023-05-11 PROCEDURE — 3288F FALL RISK ASSESSMENT DOCD: CPT | Mod: CPTII,S$GLB,,

## 2023-05-11 PROCEDURE — 3078F DIAST BP <80 MM HG: CPT | Mod: CPTII,S$GLB,,

## 2023-05-11 PROCEDURE — 99999 PR PBB SHADOW E&M-EST. PATIENT-LVL IV: ICD-10-PCS | Mod: PBBFAC,,,

## 2023-05-11 PROCEDURE — 99999 PR PBB SHADOW E&M-EST. PATIENT-LVL IV: CPT | Mod: PBBFAC,,,

## 2023-05-11 NOTE — PROGRESS NOTES
Subjective:       Patient ID: Danna Sorensen is a 83 y.o. female Body mass index is 35.61 kg/m².    Chief Complaint: Gastroesophageal Reflux    This patient is new to me.  Referring Provider: Dr. Claudia Kim for GERD.  Established patient of Dr. Cordova (general surgeon last in 2016).     Gastroesophageal Reflux  She complains of coughing (Intermittent dry cough; she does report a history of postnasal drip) and a sore throat. She reports no abdominal pain, no belching, no chest pain, no choking, no dysphagia, no early satiety, no globus sensation, no heartburn, no hoarse voice, no nausea or no water brash. Patient slightly upset & has fasted today because she thought she was having EGD today; patient was educated patient's have to be seen in GI clinic prior to getting EGD. This is a new problem. The current episode started more than 1 month ago (started 02/23 around the time she started taking pregabalin (discontinued)). The problem occurs occasionally. The problem has been unchanged. Nothing (No specific known trigger) aggravates the symptoms. 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 Pertinent negatives include no anemia, fatigue, melena or muscle weakness. Currently having 1 BM daily rated stool 4 on Cl[place] scale.  Colonoscopy 10/18/16 - Preparation of the colon was poor. Diverticulosis in the sigmoid colon and in the descending colon. The examination was otherwise normal on direct and retroflexion views. No specimens collected. . Risk factors include obesity. She has tried a PPI (Currently taking Prilosec 40 mg b.i.d.) for the symptoms. The treatment provided no relief. Past procedures do not include an abdominal ultrasound, an EGD, esophageal manometry, esophageal pH monitoring, H. pylori  antibody titer or a UGI. Past history of colon cancer treated with chemo 2007 & resection. Past invasive treatments do not include gastroplasty, gastroplication or reflux surgery.     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 Negative for activity change, chills, diaphoresis, fatigue, fever and unexpected weight change.   HENT:  Positive for sore throat. Negative for hoarse voice and trouble swallowing.    Respiratory:  Positive for cough (Intermittent dry cough; she does report a history of postnasal drip). Negative for choking and shortness of breath.    Cardiovascular:  Negative for chest pain.   Gastrointestinal:  Negative for abdominal distention, abdominal pain, anal bleeding, blood in stool, constipation, diarrhea, dysphagia, heartburn, melena, nausea, rectal pain and vomiting.   Musculoskeletal:  Negative for muscle weakness.       No LMP recorded. Patient is postmenopausal.  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    Strabismus     Trouble in sleeping     Wears dentures     Uppers     Past Surgical History:   Procedure Laterality Date    COLONOSCOPY N/A 10/18/2016    Procedure: COLONOSCOPY;  Surgeon: Rell Cordova MD;  Location: [facility];  Service: Endoscopy;   Laterality: N/A;    HAND TENDON SURGERY Left     HEMICOLECTOMY      right    INJECTION OF ANESTHETIC AGENT AROUND MEDIAL BRANCH NERVES INNERVATING LUMBAR FACET JOINT Right 7/10/2018    Procedure: Block-nerve-medial branch-lumbar;  Surgeon: Parish Gaitan MD;  Location: [facility] OR;  Service: Pain Management;  Laterality: Right;  L3, 4, 5    RADIOFREQUENCY ABLATION OF LUMBAR MEDIAL BRANCH NERVE AT SINGLE LEVEL Right 2018    Procedure: RADIOFREQUENCY ABLATION, NERVE, MEDIAL BRANCH, LUMBAR, 1 LEVEL;  Surgeon: Parish Gaitan MD;  Location: [facility] OR;  Service: Pain Management;  Laterality: Right;  L3,4,5 - Burned at 80 degrees C. for 75 seconds x 2 each site    SHOULDER ARTHROSCOPY Right 2017    Reverse     TONSILLECTOMY, ADENOIDECTOMY, BILATERAL MYRINGOTOMY AND TUBES      TOTAL KNEE ARTHROPLASTY Bilateral 1998    total replacements    TUMOR REMOVAL Left     left foot as a child     Family History   Problem Relation Age of Onset    Hypertension Mother     Kidney disease Mother     Cataracts Father     Cataracts Sister     Cancer Sister         breast    No Known Problems Brother     Cancer Sister         breast    Arthritis Sister     Glaucoma Neg Hx     Amblyopia Neg Hx     Blindness Neg Hx     Macular degeneration Neg Hx     Retinal detachment Neg Hx     Strabismus Neg Hx     Stroke Neg Hx     Thyroid disease Neg Hx      Social History     Tobacco Use    Smoking status: Former     Packs/day: 0.50     Years: 1.00     Pack years: 0.50     Types: Cigarettes     Start date: 1960     Quit date: 1961     Years since quittin.4    Smokeless tobacco: Never   Substance Use Topics    Alcohol use: No    Drug use: No     Wt Readings from Last 10 Encounters:   23 94.1 kg (207 lb 7.3 oz)   23 93.8 kg (206 lb 12.7 oz)   23 99.7 kg (219 lb 12.8 oz)   23 102.5 kg (225 lb 15.5 oz)   23 105.5 kg (232 lb 9.4 oz)   22 108.8 kg (239 lb 13.8 oz)   22 107.3 kg (236 lb 8.9 oz)    07/18/22 108 kg (238 lb 1.6 oz)   05/20/22 107.3 kg (236 lb 8.9 oz)   02/15/22 106.1 kg (233 lb 14.5 oz)     Lab Results   Component Value Date    WBC 6.60 01/12/2023    HGB 10.5 (L) 01/12/2023    HCT 34.0 (L) 01/12/2023    MCV 95 01/12/2023     01/12/2023     CMP  Sodium   Date Value Ref Range Status   01/12/2023 140 136 - 145 mmol/L Final     Potassium   Date Value Ref Range Status   01/12/2023 4.3 3.5 - 5.1 mmol/L Final     Chloride   Date Value Ref Range Status   01/12/2023 108 95 - 110 mmol/L Final     CO2   Date Value Ref Range Status   01/12/2023 22 (L) 23 - 29 mmol/L Final     Glucose   Date Value Ref Range Status   01/12/2023 94 70 - 110 mg/dL Final     BUN   Date Value Ref Range Status   01/12/2023 15 8 - 23 mg/dL Final     Creatinine   Date Value Ref Range Status   01/12/2023 0.8 0.5 - 1.4 mg/dL Final     Calcium   Date Value Ref Range Status   01/12/2023 9.3 8.7 - 10.5 mg/dL Final     Total Protein   Date Value Ref Range Status   01/12/2023 6.0 6.0 - 8.4 g/dL Final     Albumin   Date Value Ref Range Status   01/12/2023 3.6 3.5 - 5.2 g/dL Final     Total Bilirubin   Date Value Ref Range Status   01/12/2023 1.3 (H) 0.1 - 1.0 mg/dL Final     Comment:     For infants and newborns, interpretation of results should be based  on gestational age, weight and in agreement with clinical  observations.    Premature Infant recommended reference ranges:  Up to 24 hours.............<8.0 mg/dL  Up to 48 hours............<12.0 mg/dL  3-5 days..................<15.0 mg/dL  6-29 days.................<15.0 mg/dL       Alkaline Phosphatase   Date Value Ref Range Status   01/12/2023 47 (L) 55 - 135 U/L Final     AST   Date Value Ref Range Status   01/12/2023 13 10 - 40 U/L Final     ALT   Date Value Ref Range Status   01/12/2023 12 10 - 44 U/L Final     Anion Gap   Date Value Ref Range Status   01/12/2023 10 8 - 16 mmol/L Final     eGFR if    Date Value Ref Range Status   07/14/2022 >60 >60  mL/min/1.73 m^2 Final     eGFR if non    Date Value Ref Range Status   07/14/2022 >60 >60 mL/min/1.73 m^2 Final     Comment:     Calculation used to obtain the estimated glomerular filtration  rate (eGFR) is the CKD-EPI equation.        Lab Results   Component Value Date    TSH 1.722 04/25/2023     Reviewed prior medical records including Dr. Kim office visit 03/14/2023, radiology report of chest x-ray 04/25/2023 & endoscopy history (see surgical history).    Objective:      Physical Exam  Vitals and nursing note reviewed.   Constitutional:       General: She is not in acute distress.     Appearance: Normal appearance. She is obese. She is not ill-appearing.   HENT:      Mouth/Throat:      Lips: Pink. No lesions.   Cardiovascular:      Rate and Rhythm: Normal rate.      Pulses: Normal pulses.   Pulmonary:      Effort: Pulmonary effort is normal. No respiratory distress.   Abdominal:      General: Abdomen is protuberant. Bowel sounds are normal. There is no distension or abdominal bruit. There are no signs of injury.      Palpations: Abdomen is soft. There is no shifting dullness, fluid wave, hepatomegaly, splenomegaly or mass.      Tenderness: There is no abdominal tenderness. There is no guarding or rebound. Negative signs include Singletary's sign, Rovsing's sign and McBurney's sign.   Skin:     General: Skin is warm and dry.      Coloration: Skin is not jaundiced or pale.   Neurological:      Mental Status: She is alert and oriented to person, place, and time.   Psychiatric:         Attention and Perception: Attention normal.         Mood and Affect: Mood normal.         Speech: Speech normal.         Behavior: Behavior normal.       Assessment:       1. Gastroesophageal reflux disease, unspecified whether esophagitis present    2. Cough, unspecified type    3. Sore throat    4. Weight loss    5. Decreased appetite    6. History of colon polyps    7. History of colon cancer    8. Diverticulosis     9. PND (post-nasal drip)        Plan:       Gastroesophageal reflux disease, unspecified whether esophagitis present  - schedule EGD, discussed procedure with patient, including risks and benefits, patient verbalized understanding  - avoid large meals, avoid eating within 2-3 hours of bedtime (avoid late night eating & lying down soon after eating), elevate head of bed if nocturnal symptoms are present, smoking cessation (if current smoker), & weight loss (if overweight).   - avoid known foods which trigger reflux symptoms & to minimize/avoid high-fat foods, chocolate, caffeine, citrus, alcohol, & tomato products.  -avoid/limit use of NSAID's, since they can cause GI upset, bleeding, and/or ulcers. If needed, take with food.  -continue Prilosec 40 mg b.i.d. for now; consider decreasing to once daily pending EGD results  -     Case Request Endoscopy: EGD (ESOPHAGOGASTRODUODENOSCOPY)    Cough, unspecified type & Sore throat  - schedule EGD, discussed procedure with patient, including risks and benefits, patient verbalized understanding    Decreased appetite & Weight loss  - schedule EGD, discussed procedure with patient, including risks and benefits, patient verbalized understanding  - schedule Colonoscopy, discussed procedure with the patient, including risks and benefits, patient verbalized understanding  - encouraged PO intake and daily calorie counts to ensure adequate nutrition is taken in, recommend at least 2,000 calories a day  - recommend nutritional drinks, such as Boost, Ensure or Glucerna, to supplement nutrition needs  -monitor weight at home    History of colon polyps & History of colon cancer   - schedule Colonoscopy, discussed procedure with the patient, including risks and benefits, patient verbalized understanding    Diverticulosis  - schedule Colonoscopy, discussed procedure with the patient, including risks and benefits, patient verbalized understanding  -Recommend high fiber diet (20-30 grams of  fiber daily)/OTC fiber supplements daily as directed.    PND (post-nasal drip)  -follow-up with PCP and/or ENT for continued evaluation and management    Follow up in about 4 weeks (around 6/8/2023), or if symptoms worsen or fail to improve.      If no improvement in symptoms or symptoms worsen, call/follow-up at clinic or go to ER.        45 minutes of total time spent on the encounter, which includes face to face time and non-face to face time preparing to see the patient (eg, review of tests), Obtaining and/or reviewing separately obtained history, Documenting clinical information in the electronic or other health record, Independently interpreting results (not separately reported) and communicating results to the patient/family/caregiver, or Care coordination (not separately reported).     A dictation software program was used for this note. Please expect some simple typographical  errors in this note.

## 2023-05-18 ENCOUNTER — TELEPHONE (OUTPATIENT)
Dept: GASTROENTEROLOGY | Facility: CLINIC | Age: 83
End: 2023-05-18
Payer: MEDICARE

## 2023-05-18 DIAGNOSIS — Z85.038 HISTORY OF COLON CANCER: Primary | ICD-10-CM

## 2023-05-18 DIAGNOSIS — R63.4 WEIGHT LOSS: ICD-10-CM

## 2023-05-18 DIAGNOSIS — K57.90 DIVERTICULOSIS: ICD-10-CM

## 2023-05-18 NOTE — TELEPHONE ENCOUNTER
Call placed to Ms. Clay in regards to scheduling a colonoscopy per DENITA Jimenez NP. No answer left message to return call.

## 2023-05-30 RX ORDER — TRIAMCINOLONE ACETONIDE 1 MG/G
CREAM TOPICAL
Qty: 60 G | Refills: 0 | Status: SHIPPED | OUTPATIENT
Start: 2023-05-30 | End: 2023-11-22

## 2023-05-30 NOTE — TELEPHONE ENCOUNTER
No care due was identified.  [facility] Embedded Care Due Messages. Reference number: [id number].   5/30/2023 8:17:59 AM CDT

## 2023-05-30 NOTE — TELEPHONE ENCOUNTER
----- Message from Sydnee Bunn sent at 5/30/2023  8:05 AM CDT -----  Regarding: RX REFILL  Contact: STEVEN FLORES [phone]  Type:  RX Refill Request      Who Called: STEVEN FLORES    Refill or New Rx:  REFILL    RX Name and Strength:    triamcinolone acetonide 0.1% (KENALOG) 0.1 % cream  nystatin (MYCOSTATIN) cream      How is the patient currently taking it? (ex. 1XDay):  2X DAY     Is this a 30 day or 90 day RX:  30 DAY     Preferred Pharmacy with phone number:   [id number] - [place], [place] - [address] AT [facility] [address]  [address] [address]  Phone: [phone] Fax: [phone]  Local or Mail Order:  LOCAL    Ordering Provider:  JL Lucio Call Back Number:  [phone]    Additional Information:  Patient is calling to request Rx refill on   triamcinolone acetonide 0.1% (KENALOG) 0.1 % cream  nystatin (MYCOSTATIN) cream  Please call back and advise. Thanks

## 2023-06-01 ENCOUNTER — OFFICE VISIT (OUTPATIENT)
Dept: OPTOMETRY | Facility: CLINIC | Age: 83
End: 2023-06-01
Payer: MEDICARE

## 2023-06-01 DIAGNOSIS — H25.13 NUCLEAR SCLEROSIS OF BOTH EYES: Primary | ICD-10-CM

## 2023-06-01 DIAGNOSIS — H52.203 HYPEROPIA OF BOTH EYES WITH ASTIGMATISM AND PRESBYOPIA: ICD-10-CM

## 2023-06-01 DIAGNOSIS — H52.03 HYPEROPIA OF BOTH EYES WITH ASTIGMATISM AND PRESBYOPIA: ICD-10-CM

## 2023-06-01 DIAGNOSIS — H52.4 HYPEROPIA OF BOTH EYES WITH ASTIGMATISM AND PRESBYOPIA: ICD-10-CM

## 2023-06-01 PROCEDURE — 1160F RVW MEDS BY RX/DR IN RCRD: CPT | Mod: CPTII,S$GLB,, | Performed by: OPTOMETRIST

## 2023-06-01 PROCEDURE — 1126F AMNT PAIN NOTED NONE PRSNT: CPT | Mod: CPTII,S$GLB,, | Performed by: OPTOMETRIST

## 2023-06-01 PROCEDURE — 92015 DETERMINE REFRACTIVE STATE: CPT | Mod: S$GLB,,, | Performed by: OPTOMETRIST

## 2023-06-01 PROCEDURE — 99999 PR PBB SHADOW E&M-EST. PATIENT-LVL III: ICD-10-PCS | Mod: PBBFAC,,, | Performed by: OPTOMETRIST

## 2023-06-01 PROCEDURE — 1101F PT FALLS ASSESS-DOCD LE1/YR: CPT | Mod: CPTII,S$GLB,, | Performed by: OPTOMETRIST

## 2023-06-01 PROCEDURE — 99999 PR PBB SHADOW E&M-EST. PATIENT-LVL III: CPT | Mod: PBBFAC,,, | Performed by: OPTOMETRIST

## 2023-06-01 PROCEDURE — 92015 PR REFRACTION: ICD-10-PCS | Mod: S$GLB,,, | Performed by: OPTOMETRIST

## 2023-06-01 PROCEDURE — 1159F PR MEDICATION LIST DOCUMENTED IN MEDICAL RECORD: ICD-10-PCS | Mod: CPTII,S$GLB,, | Performed by: OPTOMETRIST

## 2023-06-01 PROCEDURE — 1160F PR REVIEW ALL MEDS BY PRESCRIBER/CLIN PHARMACIST DOCUMENTED: ICD-10-PCS | Mod: CPTII,S$GLB,, | Performed by: OPTOMETRIST

## 2023-06-01 PROCEDURE — 92014 PR EYE EXAM, EST PATIENT,COMPREHESV: ICD-10-PCS | Mod: S$GLB,,, | Performed by: OPTOMETRIST

## 2023-06-01 PROCEDURE — 1126F PR PAIN SEVERITY QUANTIFIED, NO PAIN PRESENT: ICD-10-PCS | Mod: CPTII,S$GLB,, | Performed by: OPTOMETRIST

## 2023-06-01 PROCEDURE — 3288F PR FALLS RISK ASSESSMENT DOCUMENTED: ICD-10-PCS | Mod: CPTII,S$GLB,, | Performed by: OPTOMETRIST

## 2023-06-01 PROCEDURE — 3288F FALL RISK ASSESSMENT DOCD: CPT | Mod: CPTII,S$GLB,, | Performed by: OPTOMETRIST

## 2023-06-01 PROCEDURE — 1101F PR PT FALLS ASSESS DOC 0-1 FALLS W/OUT INJ PAST YR: ICD-10-PCS | Mod: CPTII,S$GLB,, | Performed by: OPTOMETRIST

## 2023-06-01 PROCEDURE — 1159F MED LIST DOCD IN RCRD: CPT | Mod: CPTII,S$GLB,, | Performed by: OPTOMETRIST

## 2023-06-01 PROCEDURE — 92014 COMPRE OPH EXAM EST PT 1/>: CPT | Mod: S$GLB,,, | Performed by: OPTOMETRIST

## 2023-06-01 NOTE — PROGRESS NOTES
HPI    Pt here for annual eye exam DLS- 08/27/2020    Pt states she is having more problems with dva.   HTN is well controlled on meds.   Denies F/F and GTTS.   Last edited by Elinor Peña on 6/1/2023  2:24 PM.            Assessment /Plan     For exam results, see Encounter Report.    Nuclear sclerosis of both eyes    Hyperopia of both eyes with astigmatism and presbyopia      Educated pt on presence of cataracts and effects on vision. No surgery at this time. Recheck in one year.   2. Spectacle Rx given, discussed different options for glasses. RTC 1 year routine eye exam.

## 2023-07-14 ENCOUNTER — TELEPHONE (OUTPATIENT)
Dept: HEMATOLOGY/ONCOLOGY | Facility: CLINIC | Age: 83
End: 2023-07-14
Payer: MEDICARE

## 2023-07-14 ENCOUNTER — HOSPITAL ENCOUNTER (OUTPATIENT)
Dept: RADIOLOGY | Facility: HOSPITAL | Age: 83
Discharge: HOME | End: 2023-07-14
Attending: NURSE PRACTITIONER
Payer: MEDICARE

## 2023-07-14 DIAGNOSIS — Z85.038 HISTORY OF COLON CANCER: ICD-10-CM

## 2023-07-14 PROCEDURE — 71046 X-RAY EXAM CHEST 2 VIEWS: CPT | Mod: TC

## 2023-07-14 PROCEDURE — 71046 XR CHEST PA AND LATERAL: ICD-10-PCS | Mod: 26,,, | Performed by: RADIOLOGY

## 2023-07-14 PROCEDURE — 71046 X-RAY EXAM CHEST 2 VIEWS: CPT | Mod: 26,,, | Performed by: RADIOLOGY

## 2023-07-17 DIAGNOSIS — Z85.038 HISTORY OF COLON CANCER: Primary | ICD-10-CM

## 2023-07-19 ENCOUNTER — LAB VISIT (OUTPATIENT)
Dept: LAB | Facility: HOSPITAL | Age: 83
End: 2023-07-19
Attending: FAMILY MEDICINE
Payer: MEDICARE

## 2023-07-19 DIAGNOSIS — Z85.038 HISTORY OF COLON CANCER: ICD-10-CM

## 2023-07-19 DIAGNOSIS — E03.4 HYPOTHYROIDISM DUE TO ACQUIRED ATROPHY OF THYROID: ICD-10-CM

## 2023-07-19 DIAGNOSIS — E78.5 HYPERLIPIDEMIA, UNSPECIFIED HYPERLIPIDEMIA TYPE: ICD-10-CM

## 2023-07-19 DIAGNOSIS — R73.9 HYPERGLYCEMIA: ICD-10-CM

## 2023-07-19 LAB
ALBUMIN SERPL BCP-MCNC: 3.5 G/DL (ref 3.5–5.2)
ALP SERPL-CCNC: 51 U/L (ref 55–135)
ALT SERPL W/O P-5'-P-CCNC: 6 U/L (ref 10–44)
ANION GAP SERPL CALC-SCNC: 10 MMOL/L (ref 8–16)
AST SERPL-CCNC: 13 U/L (ref 10–40)
BASOPHILS # BLD AUTO: 0.02 K/UL (ref 0–0.2)
BASOPHILS NFR BLD: 0.2 % (ref 0–1.9)
BILIRUB SERPL-MCNC: 0.8 MG/DL (ref 0.1–1)
BUN SERPL-MCNC: 14 MG/DL (ref 8–23)
CALCIUM SERPL-MCNC: 9 MG/DL (ref 8.7–10.5)
CHLORIDE SERPL-SCNC: 109 MMOL/L (ref 95–110)
CHOLEST SERPL-MCNC: 155 MG/DL (ref 120–199)
CHOLEST/HDLC SERPL: 3.4 {RATIO} (ref 2–5)
CO2 SERPL-SCNC: 21 MMOL/L (ref 23–29)
CREAT SERPL-MCNC: 0.8 MG/DL (ref 0.5–1.4)
DIFFERENTIAL METHOD: ABNORMAL
EOSINOPHIL # BLD AUTO: 0.1 K/UL (ref 0–0.5)
EOSINOPHIL NFR BLD: 1.7 % (ref 0–8)
ERYTHROCYTE [DISTWIDTH] IN BLOOD BY AUTOMATED COUNT: 15.9 % (ref 11.5–14.5)
EST. GFR  (NO RACE VARIABLE): >60 ML/MIN/1.73 M^2
ESTIMATED AVG GLUCOSE: 94 MG/DL (ref 68–131)
GLUCOSE SERPL-MCNC: 89 MG/DL (ref 70–110)
HBA1C MFR BLD: 4.9 % (ref 4–5.6)
HCT VFR BLD AUTO: 31 % (ref 37–48.5)
HDLC SERPL-MCNC: 45 MG/DL (ref 40–75)
HDLC SERPL: 29 % (ref 20–50)
HGB BLD-MCNC: 9.8 G/DL (ref 12–16)
IMM GRANULOCYTES # BLD AUTO: 0.13 K/UL (ref 0–0.04)
IMM GRANULOCYTES NFR BLD AUTO: 1.6 % (ref 0–0.5)
LDH SERPL L TO P-CCNC: 178 U/L (ref 110–260)
LDLC SERPL CALC-MCNC: 97.2 MG/DL (ref 63–159)
LYMPHOCYTES # BLD AUTO: 0.9 K/UL (ref 1–4.8)
LYMPHOCYTES NFR BLD: 10.5 % (ref 18–48)
MCH RBC QN AUTO: 28.7 PG (ref 27–31)
MCHC RBC AUTO-ENTMCNC: 31.6 G/DL (ref 32–36)
MCV RBC AUTO: 91 FL (ref 82–98)
MONOCYTES # BLD AUTO: 0.5 K/UL (ref 0.3–1)
MONOCYTES NFR BLD: 6.5 % (ref 4–15)
NEUTROPHILS # BLD AUTO: 6.7 K/UL (ref 1.8–7.7)
NEUTROPHILS NFR BLD: 79.5 % (ref 38–73)
NONHDLC SERPL-MCNC: 110 MG/DL
NRBC BLD-RTO: 0 /100 WBC
PLATELET # BLD AUTO: 219 K/UL (ref 150–450)
PMV BLD AUTO: 9.9 FL (ref 9.2–12.9)
POTASSIUM SERPL-SCNC: 4.2 MMOL/L (ref 3.5–5.1)
PROT SERPL-MCNC: 6.6 G/DL (ref 6–8.4)
RBC # BLD AUTO: 3.42 M/UL (ref 4–5.4)
SODIUM SERPL-SCNC: 140 MMOL/L (ref 136–145)
T4 FREE SERPL-MCNC: 1.01 NG/DL (ref 0.71–1.51)
TRIGL SERPL-MCNC: 64 MG/DL (ref 30–150)
TSH SERPL DL<=0.005 MIU/L-ACNC: 3.47 UIU/ML (ref 0.4–4)
WBC # BLD AUTO: 8.36 K/UL (ref 3.9–12.7)

## 2023-07-19 PROCEDURE — 84439 ASSAY OF FREE THYROXINE: CPT | Performed by: FAMILY MEDICINE

## 2023-07-19 PROCEDURE — 80053 COMPREHEN METABOLIC PANEL: CPT | Performed by: FAMILY MEDICINE

## 2023-07-19 PROCEDURE — 36415 COLL VENOUS BLD VENIPUNCTURE: CPT | Mod: PO | Performed by: NURSE PRACTITIONER

## 2023-07-19 PROCEDURE — 83615 LACTATE (LD) (LDH) ENZYME: CPT | Performed by: NURSE PRACTITIONER

## 2023-07-19 PROCEDURE — 80061 LIPID PANEL: CPT | Performed by: FAMILY MEDICINE

## 2023-07-19 PROCEDURE — 85025 COMPLETE CBC W/AUTO DIFF WBC: CPT | Mod: PO | Performed by: FAMILY MEDICINE

## 2023-07-19 PROCEDURE — 84443 ASSAY THYROID STIM HORMONE: CPT | Performed by: FAMILY MEDICINE

## 2023-07-19 PROCEDURE — 83036 HEMOGLOBIN GLYCOSYLATED A1C: CPT | Performed by: FAMILY MEDICINE

## 2023-07-20 ENCOUNTER — OFFICE VISIT (OUTPATIENT)
Dept: HEMATOLOGY/ONCOLOGY | Facility: CLINIC | Age: 83
End: 2023-07-20
Payer: MEDICARE

## 2023-07-20 VITALS
HEART RATE: 70 BPM | DIASTOLIC BLOOD PRESSURE: 79 MMHG | SYSTOLIC BLOOD PRESSURE: 156 MMHG | TEMPERATURE: 97 F | WEIGHT: 210.13 LBS | OXYGEN SATURATION: 98 % | RESPIRATION RATE: 16 BRPM | HEIGHT: 64 IN | BODY MASS INDEX: 35.87 KG/M2

## 2023-07-20 DIAGNOSIS — C18.2 MALIGNANT NEOPLASM OF ASCENDING COLON: Primary | ICD-10-CM

## 2023-07-20 DIAGNOSIS — Z85.038 HISTORY OF COLON CANCER: ICD-10-CM

## 2023-07-20 PROCEDURE — 99214 OFFICE O/P EST MOD 30 MIN: CPT | Mod: S$GLB,,, | Performed by: NURSE PRACTITIONER

## 2023-07-20 PROCEDURE — 3077F PR MOST RECENT SYSTOLIC BLOOD PRESSURE >= 140 MM HG: ICD-10-PCS | Mod: CPTII,S$GLB,, | Performed by: NURSE PRACTITIONER

## 2023-07-20 PROCEDURE — 1101F PR PT FALLS ASSESS DOC 0-1 FALLS W/OUT INJ PAST YR: ICD-10-PCS | Mod: CPTII,S$GLB,, | Performed by: NURSE PRACTITIONER

## 2023-07-20 PROCEDURE — 3077F SYST BP >= 140 MM HG: CPT | Mod: CPTII,S$GLB,, | Performed by: NURSE PRACTITIONER

## 2023-07-20 PROCEDURE — 1159F PR MEDICATION LIST DOCUMENTED IN MEDICAL RECORD: ICD-10-PCS | Mod: CPTII,S$GLB,, | Performed by: NURSE PRACTITIONER

## 2023-07-20 PROCEDURE — 3288F PR FALLS RISK ASSESSMENT DOCUMENTED: ICD-10-PCS | Mod: CPTII,S$GLB,, | Performed by: NURSE PRACTITIONER

## 2023-07-20 PROCEDURE — 3078F PR MOST RECENT DIASTOLIC BLOOD PRESSURE < 80 MM HG: ICD-10-PCS | Mod: CPTII,S$GLB,, | Performed by: NURSE PRACTITIONER

## 2023-07-20 PROCEDURE — 99999 PR PBB SHADOW E&M-EST. PATIENT-LVL IV: ICD-10-PCS | Mod: PBBFAC,,, | Performed by: NURSE PRACTITIONER

## 2023-07-20 PROCEDURE — 99999 PR PBB SHADOW E&M-EST. PATIENT-LVL IV: CPT | Mod: PBBFAC,,, | Performed by: NURSE PRACTITIONER

## 2023-07-20 PROCEDURE — 1159F MED LIST DOCD IN RCRD: CPT | Mod: CPTII,S$GLB,, | Performed by: NURSE PRACTITIONER

## 2023-07-20 PROCEDURE — 1160F PR REVIEW ALL MEDS BY PRESCRIBER/CLIN PHARMACIST DOCUMENTED: ICD-10-PCS | Mod: CPTII,S$GLB,, | Performed by: NURSE PRACTITIONER

## 2023-07-20 PROCEDURE — 1160F RVW MEDS BY RX/DR IN RCRD: CPT | Mod: CPTII,S$GLB,, | Performed by: NURSE PRACTITIONER

## 2023-07-20 PROCEDURE — 3288F FALL RISK ASSESSMENT DOCD: CPT | Mod: CPTII,S$GLB,, | Performed by: NURSE PRACTITIONER

## 2023-07-20 PROCEDURE — 99214 PR OFFICE/OUTPT VISIT, EST, LEVL IV, 30-39 MIN: ICD-10-PCS | Mod: S$GLB,,, | Performed by: NURSE PRACTITIONER

## 2023-07-20 PROCEDURE — 1126F AMNT PAIN NOTED NONE PRSNT: CPT | Mod: CPTII,S$GLB,, | Performed by: NURSE PRACTITIONER

## 2023-07-20 PROCEDURE — 3078F DIAST BP <80 MM HG: CPT | Mod: CPTII,S$GLB,, | Performed by: NURSE PRACTITIONER

## 2023-07-20 PROCEDURE — 1126F PR PAIN SEVERITY QUANTIFIED, NO PAIN PRESENT: ICD-10-PCS | Mod: CPTII,S$GLB,, | Performed by: NURSE PRACTITIONER

## 2023-07-20 PROCEDURE — 1101F PT FALLS ASSESS-DOCD LE1/YR: CPT | Mod: CPTII,S$GLB,, | Performed by: NURSE PRACTITIONER

## 2023-07-20 NOTE — PROGRESS NOTES
Subjective:      Name: Danna Sorensen  : 1940  MRN: [id number]    CC:  Annual colon evaluation    HPI:   Danna Sorensen is a 83 y.o. female presents for annual surveillance for history of colon cancer.    This is a 83-year-old white female known to Dr. Gilmore for Stage III adenocarcinoma of the colon.    She is status post resection and 12 cycles of adjuvant FOLFOX.   She is now out 16 years post-therapy.       TODAY:  23:  She presents to the clinic today for her annual evaluation with her niece.  She states that her   in April & her niece is helping her.    Her complaints are fatigue & SOB with exertion.  She was found to be anemic & has an UGI & Colonoscopy scheduled on .  She has epigastric tenderness. She was placed on Omeprazole 40 mg daily.  She denies any difficulties with fevers, chills, drenching night sweats, changes in the caliber or character of her stool, abdominal discomfort or bloating, nausea, vomiting, constipation, diarrhea, unexplained weight loss, irregular heartbeat, chest pain, bleeding, etc.  No other new complaints or pertinent findings on a 14-point review of systems.      Past Medical History:   Diagnosis Date    Back pain     Carpal tunnel syndrome     Cataract     Colon cancer     Colon polyp     Coronary artery disease     Essential hypertension 2019    History of squamous cell carcinoma 2015    Hx of colon cancer, stage IV 10/18/2016    Hypothyroidism     Obesity (BMI 30-39.9) 2016    Osteoarthritis     Osteoporosis     Personal history of colon cancer, stage III     Squamous cell carcinoma     Status post reverse total replacement of right shoulder 2017    Strabismus     Trouble in sleeping     Wears dentures     Uppers       Past Surgical History:   Procedure Laterality Date    COLONOSCOPY N/A 10/18/2016    Procedure: COLONOSCOPY;  Surgeon: Rlel Cordova MD;  Location: [facility];  Service: Endoscopy;  Laterality: N/A;    HAND TENDON  "SURGERY Left     HEMICOLECTOMY      right    INJECTION OF ANESTHETIC AGENT AROUND MEDIAL BRANCH NERVES INNERVATING LUMBAR FACET JOINT Right 07/10/2018    Procedure: Block-nerve-medial branch-lumbar;  Surgeon: Parish Gaitan MD;  Location: [facility] OR;  Service: Pain Management;  Laterality: Right;  L3, 4, 5    RADIOFREQUENCY ABLATION OF LUMBAR MEDIAL BRANCH NERVE AT SINGLE LEVEL Right 2018    Procedure: RADIOFREQUENCY ABLATION, NERVE, MEDIAL BRANCH, LUMBAR, 1 LEVEL;  Surgeon: Parish Gaitan MD;  Location: [facility] OR;  Service: Pain Management;  Laterality: Right;  L3,4,5 - Burned at 80 degrees C. for 75 seconds x 2 each site    SHOULDER ARTHROSCOPY Right 2017    Reverse     TONSILLECTOMY, ADENOIDECTOMY, BILATERAL MYRINGOTOMY AND TUBES      TOTAL KNEE ARTHROPLASTY Bilateral 1998    total replacements    TUMOR REMOVAL Left     left foot as a child       Family History   Problem Relation Age of Onset    Hypertension Mother     Kidney disease Mother     Cataracts Father     Cataracts Sister     Cancer Sister         breast    No Known Problems Brother     Cancer Sister         breast    Arthritis Sister     Glaucoma Neg Hx     Amblyopia Neg Hx     Blindness Neg Hx     Macular degeneration Neg Hx     Retinal detachment Neg Hx     Strabismus Neg Hx     Stroke Neg Hx     Thyroid disease Neg Hx        Social History     Socioeconomic History    Marital status: Single   Tobacco Use    Smoking status: Former     Packs/day: 0.50     Years: 1.00     Pack years: 0.50     Types: Cigarettes     Start date: 1960     Quit date: 1961     Years since quittin.5    Smokeless tobacco: Never   Substance and Sexual Activity    Alcohol use: No    Drug use: No    Sexual activity: Never       Review of patient's allergies indicates:   Allergen Reactions    Aspirin      Other reaction(s): Stomach upset    Gabapentin Other (See Comments)     Pt states she felt "funny" when she took the medication. Especially at the higher dose. " "   Lyrica [pregabalin]      Side effects    Mobic [meloxicam] Swelling     Edema and elevated blood pressure     Oxaliplatin      Other reaction(s): Joint pain  Other reaction(s): Itching  Other reaction(s): Hives       Review of Systems   Musculoskeletal:  Positive for arthritis and back pain.   All other systems reviewed and are negative.         Objective:    Weight:  Loss of 28 pounds in 1 year (grieving her )  Vitals:    07/20/23 1023   BP: (!) 156/79   BP Location: Right arm   Patient Position: Sitting   BP Method: Medium (Automatic)   Pulse: 70   Resp: 16   Temp: 97.3 °F (36.3 °C)   TempSrc: Temporal   SpO2: 98%   Weight: 95.3 kg (210 lb 1.6 oz)   Height: 5' 4" (1.626 m)        Physical Exam  Vitals reviewed.   Constitutional:       Appearance: She is obese.   HENT:      Head: Normocephalic and atraumatic.   Eyes:      Conjunctiva/sclera: Conjunctivae normal.   Cardiovascular:      Rate and Rhythm: Normal rate and regular rhythm.      Pulses: Normal pulses.      Heart sounds: Normal heart sounds.   Pulmonary:      Effort: Pulmonary effort is normal.      Breath sounds: Normal breath sounds.   Abdominal:      General: Bowel sounds are normal.      Palpations: Abdomen is soft.   Musculoskeletal:      Cervical back: Neck supple.      Right lower leg: Edema present.      Left lower leg: Edema present.      Comments: Use of cane for ambulation   Lymphadenopathy:      Cervical: No cervical adenopathy.      Upper Body:      Right upper body: No supraclavicular or axillary adenopathy.      Left upper body: No supraclavicular or axillary adenopathy.      Lower Body: No right inguinal adenopathy. No left inguinal adenopathy.   Skin:     General: Skin is warm and dry.      Capillary Refill: Capillary refill takes less than 2 seconds.   Neurological:      Mental Status: She is oriented to person, place, and time.   Psychiatric:         Behavior: Behavior normal.         Thought Content: Thought content normal.    "      Judgment: Judgment normal.           Current Outpatient Medications on File Prior to Visit   Medication Sig    acetaminophen (TYLENOL) 650 MG TbSR Take 650 mg by mouth every 8 (eight) hours.    alendronate (FOSAMAX) 70 MG tablet TAKE 1 TABLET(70 MG) BY MOUTH EVERY 7 DAYS    cyclobenzaprine (FLEXERIL) 5 MG tablet Take 1 tablet (5 mg total) by mouth 3 (three) times daily as needed for Muscle spasms.    ergocalciferol, vitamin D2, (VITAMIN D ORAL) Take 2,000 mg by mouth.    furosemide (LASIX) 20 MG tablet Take 1 tablet (20 mg total) by mouth daily as needed (edema).    hydrOXYzine HCL (ATARAX) 25 MG tablet Take 1 tablet (25 mg total) by mouth 3 (three) times daily as needed for Anxiety (insomnia).    levocetirizine (XYZAL) 5 MG tablet Take 1 tablet (5 mg total) by mouth nightly as needed for Allergies (allergies).    levothyroxine (SYNTHROID) 75 MCG tablet Take 1 tablet (75 mcg total) by mouth once daily.    lisinopriL 10 MG tablet Take 1 tablet (10 mg total) by mouth once daily.    nystatin (MYCOSTATIN) cream Apply topically 2 (two) times daily as needed. GRETCHEN EXT AA BID    omeprazole (PRILOSEC) 40 MG capsule Take 1 capsule (40 mg total) by mouth 2 (two) times daily before meals.    traMADoL (ULTRAM) 50 mg tablet Take 1 tablet (50 mg total) by mouth every 8 (eight) hours as needed for Pain.    triamcinolone acetonide 0.1% (KENALOG) 0.1 % cream APPLY EXTERNALLY TO THE AFFECTED AREA TWICE DAILY    fluticasone propionate (FLONASE) 50 mcg/actuation nasal spray 1 spray (50 mcg total) by Each Nostril route once daily. (Patient not taking: Reported on 7/20/2023)    promethazine-dextromethorphan (PROMETHAZINE-DM) 6.25-15 mg/5 mL Syrp TAKE 5 ML BY MOUTH EVERY 4 HOURS AS NEEDED FOR COUGH     No current facility-administered medications on file prior to visit.       CBC:  Lab Results   Component Value Date    WBC 8.36 07/19/2023    HGB 9.8 (L) 07/19/2023    HCT 31.0 (L) 07/19/2023    MCV 91 07/19/2023     07/19/2023      CMP:  Sodium   Date Value Ref Range Status   07/19/2023 140 136 - 145 mmol/L Final     Potassium   Date Value Ref Range Status   07/19/2023 4.2 3.5 - 5.1 mmol/L Final     Chloride   Date Value Ref Range Status   07/19/2023 109 95 - 110 mmol/L Final     CO2   Date Value Ref Range Status   07/19/2023 21 (L) 23 - 29 mmol/L Final     Glucose   Date Value Ref Range Status   07/19/2023 89 70 - 110 mg/dL Final     BUN   Date Value Ref Range Status   07/19/2023 14 8 - 23 mg/dL Final     Creatinine   Date Value Ref Range Status   07/19/2023 0.8 0.5 - 1.4 mg/dL Final     Calcium   Date Value Ref Range Status   07/19/2023 9.0 8.7 - 10.5 mg/dL Final     Total Protein   Date Value Ref Range Status   07/19/2023 6.6 6.0 - 8.4 g/dL Final     Albumin   Date Value Ref Range Status   07/19/2023 3.5 3.5 - 5.2 g/dL Final     Total Bilirubin   Date Value Ref Range Status   07/19/2023 0.8 0.1 - 1.0 mg/dL Final     Comment:     For infants and newborns, interpretation of results should be based  on gestational age, weight and in agreement with clinical  observations.    Premature Infant recommended reference ranges:  Up to 24 hours.............<8.0 mg/dL  Up to 48 hours............<12.0 mg/dL  3-5 days..................<15.0 mg/dL  6-29 days.................<15.0 mg/dL       Alkaline Phosphatase   Date Value Ref Range Status   07/19/2023 51 (L) 55 - 135 U/L Final     AST   Date Value Ref Range Status   07/19/2023 13 10 - 40 U/L Final     ALT   Date Value Ref Range Status   07/19/2023 6 (L) 10 - 44 U/L Final     Anion Gap   Date Value Ref Range Status   07/19/2023 10 8 - 16 mmol/L Final     eGFR if    Date Value Ref Range Status   07/14/2022 >60 >60 mL/min/1.73 m^2 Final     eGFR if non    Date Value Ref Range Status   07/14/2022 >60 >60 mL/min/1.73 m^2 Final     Comment:     Calculation used to obtain the estimated glomerular filtration  rate (eGFR) is the CKD-EPI equation.        LDH:   178    CXR  Narrative: EXAMINATION:  XR CHEST PA AND LATERAL    CLINICAL HISTORY:  Personal history of other malignant neoplasm of large intestine    TECHNIQUE:  PA and lateral views of the chest were performed.    COMPARISON:  None    FINDINGS:  The heart size is normal.  Prominence of the right hilar shadow is unchanged since 2007.  Lungs are well expanded without consolidation or pleural effusion.  There is a right shoulder arthroplasty.  Impression: No acute process.  No significant change.    Electronically signed by: Elvin Barnard MD  Date:    07/14/2023  Time:    08:33     Colonoscopy dated 12/18/2016:  Impression, diverticulosis; normal, repeat in 10 years.  Due 12/2026    All pertinent labs and imaging reviewed.    Assessment:       1. Malignant neoplasm of ascending colon    2. History of colon cancer    3.  Anemia - UGI/Colonoscopy set for 08/03     Plan:     Malignant neoplasm of ascending colon  -     CBC Auto Differential; Future; Expected date: 07/20/2023  -     Comprehensive Metabolic Panel; Future; Expected date: 07/20/2023    History of colon cancer       Hx of Stage III adenocarcinoma of the colon - presently 16 yrs out post tx - now with anemia, possible ulcer after her 's death, however Hx of Stage III colon cancer.; UGI/Colonoscopy scheduled    Follow up in 1 year with labs:  CBC, CMP prior  Call for any concerns.    Route Chart for Scheduling    Med Onc Chart Routing      Follow up with physician    Follow up with GRETCHEN 1 year. F/U IN 1 YEAR WITH cbc, cmp   Infusion scheduling note    Injection scheduling note    Labs    Imaging    Pharmacy appointment    Other referrals          30 minutes were spent in coordination of patient's care, record review and counseling.

## 2023-07-24 ENCOUNTER — OFFICE VISIT (OUTPATIENT)
Dept: FAMILY MEDICINE | Facility: CLINIC | Age: 83
End: 2023-07-24
Payer: MEDICARE

## 2023-07-24 VITALS
SYSTOLIC BLOOD PRESSURE: 126 MMHG | HEIGHT: 64 IN | TEMPERATURE: 98 F | BODY MASS INDEX: 36.02 KG/M2 | WEIGHT: 211 LBS | DIASTOLIC BLOOD PRESSURE: 82 MMHG | OXYGEN SATURATION: 98 % | HEART RATE: 63 BPM

## 2023-07-24 DIAGNOSIS — M51.36 DDD (DEGENERATIVE DISC DISEASE), LUMBAR: ICD-10-CM

## 2023-07-24 DIAGNOSIS — E03.4 HYPOTHYROIDISM DUE TO ACQUIRED ATROPHY OF THYROID: ICD-10-CM

## 2023-07-24 DIAGNOSIS — D64.9 NORMOCYTIC ANEMIA: ICD-10-CM

## 2023-07-24 DIAGNOSIS — L30.4 INTERTRIGO: ICD-10-CM

## 2023-07-24 DIAGNOSIS — I10 ESSENTIAL HYPERTENSION: Primary | ICD-10-CM

## 2023-07-24 DIAGNOSIS — R26.81 GAIT INSTABILITY: ICD-10-CM

## 2023-07-24 DIAGNOSIS — E55.9 VITAMIN D DEFICIENCY: ICD-10-CM

## 2023-07-24 DIAGNOSIS — E78.5 HYPERLIPIDEMIA, UNSPECIFIED HYPERLIPIDEMIA TYPE: ICD-10-CM

## 2023-07-24 DIAGNOSIS — E66.01 MORBID OBESITY WITH BMI OF 40.0-44.9, ADULT: ICD-10-CM

## 2023-07-24 PROCEDURE — 1126F PR PAIN SEVERITY QUANTIFIED, NO PAIN PRESENT: ICD-10-PCS | Mod: CPTII,S$GLB,, | Performed by: FAMILY MEDICINE

## 2023-07-24 PROCEDURE — 1160F RVW MEDS BY RX/DR IN RCRD: CPT | Mod: CPTII,S$GLB,, | Performed by: FAMILY MEDICINE

## 2023-07-24 PROCEDURE — 99999 PR PBB SHADOW E&M-EST. PATIENT-LVL IV: ICD-10-PCS | Mod: PBBFAC,,, | Performed by: FAMILY MEDICINE

## 2023-07-24 PROCEDURE — 3074F PR MOST RECENT SYSTOLIC BLOOD PRESSURE < 130 MM HG: ICD-10-PCS | Mod: CPTII,S$GLB,, | Performed by: FAMILY MEDICINE

## 2023-07-24 PROCEDURE — 1101F PR PT FALLS ASSESS DOC 0-1 FALLS W/OUT INJ PAST YR: ICD-10-PCS | Mod: CPTII,S$GLB,, | Performed by: FAMILY MEDICINE

## 2023-07-24 PROCEDURE — 1159F MED LIST DOCD IN RCRD: CPT | Mod: CPTII,S$GLB,, | Performed by: FAMILY MEDICINE

## 2023-07-24 PROCEDURE — 3288F FALL RISK ASSESSMENT DOCD: CPT | Mod: CPTII,S$GLB,, | Performed by: FAMILY MEDICINE

## 2023-07-24 PROCEDURE — 3074F SYST BP LT 130 MM HG: CPT | Mod: CPTII,S$GLB,, | Performed by: FAMILY MEDICINE

## 2023-07-24 PROCEDURE — 1101F PT FALLS ASSESS-DOCD LE1/YR: CPT | Mod: CPTII,S$GLB,, | Performed by: FAMILY MEDICINE

## 2023-07-24 PROCEDURE — 3079F DIAST BP 80-89 MM HG: CPT | Mod: CPTII,S$GLB,, | Performed by: FAMILY MEDICINE

## 2023-07-24 PROCEDURE — 1159F PR MEDICATION LIST DOCUMENTED IN MEDICAL RECORD: ICD-10-PCS | Mod: CPTII,S$GLB,, | Performed by: FAMILY MEDICINE

## 2023-07-24 PROCEDURE — 3079F PR MOST RECENT DIASTOLIC BLOOD PRESSURE 80-89 MM HG: ICD-10-PCS | Mod: CPTII,S$GLB,, | Performed by: FAMILY MEDICINE

## 2023-07-24 PROCEDURE — 99999 PR PBB SHADOW E&M-EST. PATIENT-LVL IV: CPT | Mod: PBBFAC,,, | Performed by: FAMILY MEDICINE

## 2023-07-24 PROCEDURE — 3288F PR FALLS RISK ASSESSMENT DOCUMENTED: ICD-10-PCS | Mod: CPTII,S$GLB,, | Performed by: FAMILY MEDICINE

## 2023-07-24 PROCEDURE — 1126F AMNT PAIN NOTED NONE PRSNT: CPT | Mod: CPTII,S$GLB,, | Performed by: FAMILY MEDICINE

## 2023-07-24 PROCEDURE — 1160F PR REVIEW ALL MEDS BY PRESCRIBER/CLIN PHARMACIST DOCUMENTED: ICD-10-PCS | Mod: CPTII,S$GLB,, | Performed by: FAMILY MEDICINE

## 2023-07-24 PROCEDURE — 99214 PR OFFICE/OUTPT VISIT, EST, LEVL IV, 30-39 MIN: ICD-10-PCS | Mod: S$GLB,,, | Performed by: FAMILY MEDICINE

## 2023-07-24 PROCEDURE — 99214 OFFICE O/P EST MOD 30 MIN: CPT | Mod: S$GLB,,, | Performed by: FAMILY MEDICINE

## 2023-07-24 RX ORDER — NYSTATIN 100000 U/G
CREAM TOPICAL 2 TIMES DAILY PRN
Qty: 30 G | Status: SHIPPED | OUTPATIENT
Start: 2023-07-24 | End: 2023-12-06

## 2023-07-24 NOTE — PROGRESS NOTES
Subjective:       Patient ID: Danna Sorensen is a 83 y.o. female.    Chief Complaint: Follow-up    HPI  Review of Systems   Constitutional:  Negative for fatigue and unexpected weight change.   Respiratory:  Negative for chest tightness and shortness of breath.    Cardiovascular:  Negative for chest pain, palpitations and leg swelling.   Gastrointestinal:  Negative for abdominal pain.   Musculoskeletal:  Positive for arthralgias and back pain.   Neurological:  Negative for dizziness, syncope, light-headedness and headaches.     Patient Active Problem List   Diagnosis    Hypothyroid    Nuclear sclerosis    Hyperopia with astigmatism and presbyopia    DDD (degenerative disc disease), lumbar    Morbid obesity with BMI of 40.0-44.9, adult    Calcified granuloma of lung    History of colon cancer    Lumbar radiculitis    Other spondylosis, lumbar region    Chronic right-sided low back pain without sciatica    Vitamin D deficiency    Essential hypertension    Malignant neoplasm of ascending colon    Decreased functional mobility    Muscle tightness    Decreased strength    Decreased range of motion    Venous lake of lip     Patient is here for a chronic conditions follow up.    Reviewed labs 7/2023   Had fall 6/2023. Knee gave out. Hit left side. Bruised but no major injuries. Using cane for support.    Here with niece Barbie  + Unintentional weight loss.  33 lbs down since 8/22 to lowest 4/23 206. Now 210. BP was  running low on lisinopril 30mg. Reduced to 10mg 4/2023  Lost  after hitting head after fall and brain bleed April 1, 2023.  Grieving. Anxiety and sadness. Interrupted sleep. Sx improving over time. Lives with sister and niece.  Has support at home. Denies depression      GI Dr. Pinon planning egd 8/3/23 and colonoscopy 8/8/23 . Having breakthrough heartburn sx even after starting omeprazole 40mg a day. Increased to bid 4/2023-has helped a lot        Seen by Dr. Romel MAY . Had side effects lyrica- dry  mouth, swollen arms, stomach pain, nausea. Stopped it. Did not do well on gabapentin either. Rx for tramadol given     mild normocytic anemia-slightly improved. Iron, folate and b12 normal 5/22  Dexa 5/22 osteoporosis on fosomax 5/22     ENT Dr. Tolentino venous lake of lip     F/u LBP. Started on lyrica 50mg 2 bid-caused side effects-stopped. PT completed 2/22 not much help. Flexeril prn . Tramadol 50mg #60 lasts 2 months or more for low back pain-helps but still waking up from sleep with pain.  Tylenol also helps Sees chiropractor Dr. Stearns. Has gradually worsening of lower back pain. Ache that radiates to right hip. No paresthesias. No B/B incontinence.  Gabapentin- nausea.  Tried PT, KHARI and radiofrequency ablation without relief.       Eye [facility] Dr. Berrios treating cataracts     Heme/onc NP Viraj following for colon cancer s/p resection and chemo 2007     Had mri brast 2018- high risk.  3/22 mri breast negative. Cannot tolerate mammo.   Objective:      Physical Exam  Vitals and nursing note reviewed.   Constitutional:       Appearance: She is well-developed.   Cardiovascular:      Rate and Rhythm: Normal rate and regular rhythm.      Heart sounds: Normal heart sounds.   Pulmonary:      Effort: Pulmonary effort is normal.      Breath sounds: Normal breath sounds.   Skin:     General: Skin is warm and dry.   Neurological:      Mental Status: She is alert and oriented to person, place, and time.   Psychiatric:         Mood and Affect: Mood normal.         Thought Content: Thought content normal.       Assessment:       1. Essential hypertension    2. Intertrigo    3. Vitamin D deficiency    4. Morbid obesity with BMI of 40.0-44.9, adult    5. Hypothyroidism due to acquired atrophy of thyroid    6. Normocytic anemia    7. Hyperlipidemia, unspecified hyperlipidemia type    8. DDD (degenerative disc disease), lumbar    9. Gait instability        Plan:         1. Intertrigo  Use prn  - nystatin (MYCOSTATIN) cream;  "Apply topically 2 (two) times daily as needed. GRETCHEN EXT AA BID  Dispense: 30 g; Refill: prn    2. Essential hypertension  Controlled on current medications.  Continue current medications.      3. Vitamin D deficiency  Normal.  Cont monitoring    4. Morbid obesity with BMI of 40.0-44.9, adult  Counseled patient on his ideal body weight, health consequences of being obese and current recommendations including weekly exercise and a heart healthy diet.  Current BMI is:Estimated body mass index is 36.21 kg/m² as calculated from the following:    Height as of this encounter: 5' 4" (1.626 m).    Weight as of this encounter: 95.7 kg (210 lb 15.7 oz)..  Patient is aware that ideal BMI < 25 or Weight in (lb) to have BMI = 25: 145.3.      5. Hypothyroidism due to acquired atrophy of thyroid  Controlled on current medications.  Continue current medications.    - CBC Auto Differential; Future  - TSH; Future  - T4, Free; Future    6. Normocytic anemia  Stable and chronic.  Will continue to monitor q3-6 months and control chronic conditions as optimally as possible to preserve function.  Screen and treat as indicated:    - Ferritin; Future  - Iron and TIBC; Future    7. Hyperlipidemia, unspecified hyperlipidemia type  I recommend a heart healthy diet rich in fiber, fresh vegetables and fruit and low in saturated fats (fried foods, red meat, etc.).  I also recommend regular exercise including a minimum of 150 minutes of cardio exercise per week and 2-30 minute workouts of strength training like light weights, yoga, pilates, etc.  You should work toward a body mass index of < 25.      - Comprehensive Metabolic Panel; Future  - Lipid Panel; Future    8. DDD (degenerative disc disease), lumbar  Cont current mgmt.  Order   - WALKER FOR HOME USE    9. Gait instability  Cont fall precautions  - WALKER FOR HOME USE      Time spent with patient: 20 minutes    Patient with be reevaluated in 4 months or sooner prn    Greater than 50% of this " visit was spent counseling as described in above documentation:Yes

## 2023-08-03 ENCOUNTER — ANESTHESIA EVENT (OUTPATIENT)
Dept: ENDOSCOPY | Facility: HOSPITAL | Age: 83
End: 2023-08-03
Payer: MEDICARE

## 2023-08-03 ENCOUNTER — ANESTHESIA (OUTPATIENT)
Dept: ENDOSCOPY | Facility: HOSPITAL | Age: 83
End: 2023-08-03
Payer: MEDICARE

## 2023-08-03 ENCOUNTER — HOSPITAL ENCOUNTER (OUTPATIENT)
Facility: HOSPITAL | Age: 83
Discharge: HOME | End: 2023-08-03
Attending: INTERNAL MEDICINE | Admitting: INTERNAL MEDICINE
Payer: MEDICARE

## 2023-08-03 VITALS — HEART RATE: 61 BPM | OXYGEN SATURATION: 95 % | SYSTOLIC BLOOD PRESSURE: 124 MMHG | DIASTOLIC BLOOD PRESSURE: 58 MMHG

## 2023-08-03 DIAGNOSIS — K21.9 GERD (GASTROESOPHAGEAL REFLUX DISEASE): ICD-10-CM

## 2023-08-03 DIAGNOSIS — K44.9 HIATAL HERNIA: ICD-10-CM

## 2023-08-03 DIAGNOSIS — K29.70 GASTRITIS, PRESENCE OF BLEEDING UNSPECIFIED, UNSPECIFIED CHRONICITY, UNSPECIFIED GASTRITIS TYPE: Primary | ICD-10-CM

## 2023-08-03 PROCEDURE — 25000003 PHARM REV CODE 250: Performed by: NURSE ANESTHETIST, CERTIFIED REGISTERED

## 2023-08-03 PROCEDURE — D9220A PRA ANESTHESIA: ICD-10-PCS | Mod: CRNA,,, | Performed by: NURSE ANESTHETIST, CERTIFIED REGISTERED

## 2023-08-03 PROCEDURE — 37000008 HC ANESTHESIA 1ST 15 MINUTES: Performed by: INTERNAL MEDICINE

## 2023-08-03 PROCEDURE — D9220A PRA ANESTHESIA: Mod: ANES,,, | Performed by: ANESTHESIOLOGY

## 2023-08-03 PROCEDURE — 43239 EGD BIOPSY SINGLE/MULTIPLE: CPT | Performed by: INTERNAL MEDICINE

## 2023-08-03 PROCEDURE — D9220A PRA ANESTHESIA: Mod: CRNA,,, | Performed by: NURSE ANESTHETIST, CERTIFIED REGISTERED

## 2023-08-03 PROCEDURE — D9220A PRA ANESTHESIA: ICD-10-PCS | Mod: ANES,,, | Performed by: ANESTHESIOLOGY

## 2023-08-03 PROCEDURE — 88305 TISSUE EXAM BY PATHOLOGIST: CPT | Mod: TC | Performed by: PATHOLOGY

## 2023-08-03 PROCEDURE — 25000003 PHARM REV CODE 250: Performed by: INTERNAL MEDICINE

## 2023-08-03 PROCEDURE — 43239 PR EGD, FLEX, W/BIOPSY, SGL/MULTI: ICD-10-PCS | Mod: ,,, | Performed by: INTERNAL MEDICINE

## 2023-08-03 PROCEDURE — 63600175 PHARM REV CODE 636 W HCPCS: Performed by: NURSE ANESTHETIST, CERTIFIED REGISTERED

## 2023-08-03 PROCEDURE — 27201012 HC FORCEPS, HOT/COLD, DISP: Performed by: INTERNAL MEDICINE

## 2023-08-03 PROCEDURE — 43239 EGD BIOPSY SINGLE/MULTIPLE: CPT | Mod: ,,, | Performed by: INTERNAL MEDICINE

## 2023-08-03 RX ORDER — SODIUM CHLORIDE 9 MG/ML
INJECTION, SOLUTION INTRAVENOUS CONTINUOUS
Status: DISCONTINUED | OUTPATIENT
Start: 2023-08-03 | End: 2023-08-03 | Stop reason: HOSPADM

## 2023-08-03 RX ORDER — LIDOCAINE HYDROCHLORIDE 20 MG/ML
JELLY TOPICAL
Status: DISCONTINUED | OUTPATIENT
Start: 2023-08-03 | End: 2023-08-03

## 2023-08-03 RX ORDER — PROPOFOL 10 MG/ML
VIAL (ML) INTRAVENOUS
Status: DISCONTINUED | OUTPATIENT
Start: 2023-08-03 | End: 2023-08-03

## 2023-08-03 RX ADMIN — LIDOCAINE HYDROCHLORIDE 50 ML: 20 JELLY TOPICAL at 07:08

## 2023-08-03 RX ADMIN — PROPOFOL 80 MG: 10 INJECTION, EMULSION INTRAVENOUS at 07:08

## 2023-08-03 RX ADMIN — PROPOFOL 20 MG: 10 INJECTION, EMULSION INTRAVENOUS at 07:08

## 2023-08-03 RX ADMIN — SODIUM CHLORIDE: 9 INJECTION, SOLUTION INTRAVENOUS at 07:08

## 2023-08-03 NOTE — DISCHARGE INSTRUCTIONS
"Discharge Instructions: After Your Surgery/Procedure  Youve just had surgery. During surgery you were given medicine called anesthesia to keep you relaxed and free of pain. After surgery you may have some pain or nausea. This is common. Here are some tips for feeling better and getting well after surgery.     Stay on schedule with your medication.   Going home  Your doctor or nurse will show you how to take care of yourself when you go home. He or she will also answer your questions. Have an adult family member or friend drive you home.      For your safety we recommend these precaution for the first 24 hours after your procedure:  Do not drive or use heavy equipment.  Do not make important decisions or sign legal papers.  Do not drink alcohol.  Have someone stay with you, if needed. He or she can watch for problems and help keep you safe.  Your concentration, balance, coordination, and judgement may be impaired for many hours after anesthesia.  Use caution when ambulating or standing up.     You may feel weak and "washed out" after anesthesia and surgery.      Subtle residual effects of general anesthesia or sedation with regional / local anesthesia can last more than 24 hours.  Rest for the remainder of the day or longer if your Doctor/Surgeon has advised you to do so.  Although you may feel normal within the first 24 hours, your reflexes and mental ability may be impaired without you realizing it.  You may feel dizzy, lightheaded or sleepy for 24 hours or longer.      Be sure to go to all follow-up visits with your doctor. And rest after your surgery for as long as your doctor tells you to.  Coping with pain  If you have pain after surgery, pain medicine will help you feel better. Take it as told, before pain becomes severe. Also, ask your doctor or pharmacist about other ways to control pain. This might be with heat, ice, or relaxation. And follow any other instructions your surgeon or nurse gives you.  Tips " for taking pain medicine  To get the best relief possible, remember these points:  Pain medicines can upset your stomach. Taking them with a little food may help.  Most pain relievers taken by mouth need at least 20 to 30 minutes to start to work.  Taking medicine on a schedule can help you remember to take it. Try to time your medicine so that you can take it before starting an activity. This might be before you get dressed, go for a walk, or sit down for dinner.  Constipation is a common side effect of pain medicines. Call your doctor before taking any medicines such as laxatives or stool softeners to help ease constipation. Also ask if you should skip any foods. Drinking lots of fluids and eating foods such as fruits and vegetables that are high in fiber can also help. Remember, do not take laxatives unless your surgeon has prescribed them.  Drinking alcohol and taking pain medicine can cause dizziness and slow your breathing. It can even be deadly. Do not drink alcohol while taking pain medicine.  Pain medicine can make you react more slowly to things. Do not drive or run machinery while taking pain medicine.  Your health care provider may tell you to take acetaminophen to help ease your pain. Ask him or her how much you are supposed to take each day. Acetaminophen or other pain relievers may interact with your prescription medicines or other over-the-counter (OTC) drugs. Some prescription medicines have acetaminophen and other ingredients. Using both prescription and OTC acetaminophen for pain can cause you to overdose. Read the labels on your OTC medicines with care. This will help you to clearly know the list of ingredients, how much to take, and any warnings. It may also help you not take too much acetaminophen. If you have questions or do not understand the information, ask your pharmacist or health care provider to explain it to you before you take the OTC medicine.  Managing nausea  Some people have an  upset stomach after surgery. This is often because of anesthesia, pain, or pain medicine, or the stress of surgery. These tips will help you handle nausea and eat healthy foods as you get better. If you were on a special food plan before surgery, ask your doctor if you should follow it while you get better. These tips may help:  Do not push yourself to eat. Your body will tell you when to eat and how much.  Start off with clear liquids and soup. They are easier to digest.  Next try semi-solid foods, such as mashed potatoes, applesauce, and gelatin, as you feel ready.  Slowly move to solid foods. Dont eat fatty, rich, or spicy foods at first.  Do not force yourself to have 3 large meals a day. Instead eat smaller amounts more often.  Take pain medicines with a small amount of solid food, such as crackers or toast, to avoid nausea.     Call your surgeon if  You still have pain an hour after taking medicine. The medicine may not be strong enough.  You feel too sleepy, dizzy, or groggy. The medicine may be too strong.  You have side effects like nausea, vomiting, or skin changes, such as rash, itching, or hives.       If you have obstructive sleep apnea  You were given anesthesia medicine during surgery to keep you comfortable and free of pain. After surgery, you may have more apnea spells because of this medicine and other medicines you were given. The spells may last longer than usual.   At home:  Keep using the continuous positive airway pressure (CPAP) device when you sleep. Unless your health care provider tells you not to, use it when you sleep, day or night. CPAP is a common device used to treat obstructive sleep apnea.  Talk with your provider before taking any pain medicine, muscle relaxants, or sedatives. Your provider will tell you about the possible dangers of taking these medicines.  © 5456-9885 [employer]. [address] [place] [address], [place] [address]. All rights reserved. This information is  "not intended as a substitute for professional medical care. Always follow your healthcare professional's instructions. astritis   The Basics   Written by the doctors and editors at [facility]   What is gastritis? -- "Gastritis" means inflammation of the stomach lining (figure 1).  Some people have gastritis that comes on suddenly and lasts only for a short time. Doctors call this "acute" gastritis. Other people have gastritis that lasts for months or years. Doctors call this "chronic" gastritis.  What causes gastritis? -- Different things can cause gastritis, including:  An infection in the stomach from bacteria called "H. pylori"  Medicines called "nonsteroidal antiinflammatory drugs" (NSAIDs) - These include aspirin, ibuprofen (brand names: Advil, Motrin), and naproxen (brand names: Aleve, Naprosyn).  Drinking alcohol  Conditions in which the body's infection-fighting system attacks the stomach lining  Having a serious or life-threatening illness  What are the symptoms of gastritis? -- People with gastritis have no symptoms. When people do have symptoms, they are due to other conditions that can happen with gastritis, like ulcers. Symptoms from ulcers include:  Pain in the upper belly  Feeling bloated, or feeling full after eating a small amount of food  Decreased appetite  Nausea or vomiting  Vomiting blood, or having black-colored bowel movements  Feeling more tired than usual - This happens if people with gastritis get a condition called "anemia."  Should I call my doctor or nurse? -- Call your doctor or nurse if:  You have belly pain that gets worse or doesn't go away  You vomit blood or have black bowel movements  You are losing weight (without trying to)  Will I need tests? -- Probably. Your doctor or nurse will ask about your symptoms and do an exam. They might also do:  An upper endoscopy - During this procedure, the doctor puts a thin tube with a camera on the end into your mouth and down into your stomach " (figure 2). they will look at the inside of your stomach. During the procedure, they might also do a test called a biopsy. For a biopsy, the doctor takes a small sample of the stomach lining. Then another doctor looks at the sample under a microscope.  Tests to check for H. pylori infection. These can include:  Blood tests  Breath tests - These tests measure substances in your breath after you drink a special liquid.  Tests on a small sample of your bowel movement  Blood tests to check for anemia  How is gastritis treated? -- Treatment depends on what's causing your gastritis.  For example, if NSAIDs are causing your gastritis, your doctor will recommend that you not take those medicines. If alcohol is causing your gastritis, they will recommend that you stop drinking alcohol.  Doctors can use medicines to treat gastritis caused by an H. pylori infection. Most people take 3 or more medicines for 2 weeks. The treatment includes antibiotics plus medicine that helps the stomach make less acid.  Doctors can use medicines that reduce or block stomach acid to treat other causes of gastritis (table 1). The main types of medicines that reduce or block stomach acid are:  Antacids  Surface agents  Histamine blockers  Proton pump inhibitors  If your doctor recommends acid-reducing treatment, they will tell you which medicine to use.  What happens after treatment? -- Sometimes, people who are treated for an H. pylori infection need follow-up tests to make sure the infection is gone. Follow-up tests include breath tests, lab tests on a sample of bowel movement, or endoscopy.  All topics are updated as new evidence becomes available and our peer review process is complete.  This topic retrieved from [employer] on: Sep 21, 2021.  Topic 10966 Version 8.0  Release: 29.4.2 - C29.263  © 2021 [employer] and/or its affiliates. All rights reserved Hiatal Hernia   The Basics   Written by the doctors and editors at [employer]   What is a  hiatal hernia? -- A hiatal hernia is what doctors call it when a part of the stomach moves up into the chest area. Normally, the stomach sits below the diaphragm, the layer of muscle that separates the organs in the chest from the organs in the belly. The esophagus, the tube that carries food from the mouth to the stomach, passes through a hole in the diaphragm. In people with a hiatal hernia, the stomach pushes up through that hole, too.  There are 2 types of hiatal hernia (figure 1):  Sliding hernia - A sliding hernia happens when the top of the stomach and the lower part of the esophagus squeeze up into the space above the diaphragm. This is the most common type of hiatal hernia.  Paraesophageal hernia - A paraesophageal hernia happens when the top of the stomach squeezes up into the space above the diaphragm. This is not very common, but it can be serious if the stomach folds up on itself. It can also cause bleeding from the stomach or trouble breathing.  What are the symptoms of a hiatal hernia? -- Hiatal hernias do not usually cause symptoms. In some cases, though, hiatal hernias cause stomach acid to leak into the esophagus. This is called acid reflux or gastroesophageal reflux, and it can cause symptoms, including:  Burning in the chest, known as heartburn  Burning in the throat or an acid taste in the throat  Stomach or chest pain  Trouble swallowing  A raspy voice or a sore throat  Unexplained cough  Is there a test for hiatal hernia? -- Yes, but doctors do not usually test for hiatal hernia. Instead, most people learn they have a hiatal hernia when they are having tests to find the cause of symptoms, or for other reasons. For instance, some people find out they have a hiatal hernia when they have an X-ray. Others find out when their doctor puts a tube with a tiny camera down their throat (called an endoscopy) (figure 2).  How are hiatal hernias treated? -- People who have symptoms caused by a hiatal  hernia can get treated for their symptoms.  Treatment for symptoms involves taking the medicines that are used for acid reflux (table 1). People with a paraesophageal hernia, and some people with a sliding hernia, need surgery. For this surgery, the surgeon pulls the stomach back down and repairs the hole in the diaphragm so the stomach does not slide up again.  All topics are updated as new evidence becomes available and our peer review process is complete.  This topic retrieved from [employer] on: Sep 21, 2021.  Topic 56987 Version 8.0  Release: 29.4.2 - C29.263  © 2021 UpToDate, Inc. and/or its affiliates. All rights reserved.  figure 1: Hiatal hernia

## 2023-08-03 NOTE — PROVATION PATIENT INSTRUCTIONS
Discharge Summary/Instructions after an Endoscopic Procedure  Patient Name: Danna Sorensen  Patient MRN: [id number]  Patient YOB: 1940  Thursday, August 3, 2023  Kaushik Schmidt MD  Dear patient,  As a result of recent federal legislation (The Federal Cures Act), you may   receive lab or pathology results from your procedure in your [facility]   account before your physician is able to contact you. Your physician or   their representative will relay the results to you with their   recommendations at their soonest availability.  Thank you,  RESTRICTIONS:  During your procedure today, you received medications for sedation.  These   medications may affect your judgment, balance and coordination.  Therefore,   for 24 hours, you have the following restrictions:   - DO NOT drive a car, operate machinery, make legal/financial decisions,   sign important papers or drink alcohol.    ACTIVITY:  Today: no heavy lifting, straining or running due to procedural   sedation/anesthesia.  The following day: return to full activity including work.  DIET:  Eat and drink normally unless instructed otherwise.     TREATMENT FOR COMMON SIDE EFFECTS:  - Mild abdominal pain, nausea, belching, bloating or excessive gas:  rest,   eat lightly and use a heating pad.  - Sore Throat: treat with throat lozenges and/or gargle with warm salt   water.  - Because air was used during the procedure, expelling large amounts of air   from your rectum or belching is normal.  - If a bowel prep was taken, you may not have a bowel movement for 1-3 days.    This is normal.  SYMPTOMS TO WATCH FOR AND REPORT TO YOUR PHYSICIAN:  1. Abdominal pain or bloating, other than gas cramps.  2. Chest pain.  3. Back pain.  4. Signs of infection such as: chills or fever occurring within 24 hours   after the procedure.  5. Rectal bleeding, which would show as bright red, maroon, or black stools.   (A tablespoon of blood from the rectum is not serious, especially if    hemorrhoids are present.)  6. Vomiting.  7. Weakness or dizziness.  GO DIRECTLY TO THE NEAREST EMERGENCY ROOM IF YOU HAVE ANY OF THE FOLLOWING:      Difficulty breathing              Chills and/or fever over 101 F   Persistent vomiting and/or vomiting blood   Severe abdominal pain   Severe chest pain   Black, tarry stools   Bleeding- more than one tablespoon   Any other symptom or condition that you feel may need urgent attention  Your doctor recommends these additional instructions:  If any biopsies were taken, your doctors clinic will contact you in 1 to 2   weeks with any results.  - Patient has a contact number available for emergencies.  The signs and   symptoms of potential delayed complications were discussed with the   patient.  Return to normal activities tomorrow.  Written discharge   instructions were provided to the patient.   - Resume previous diet.   - Continue present medications.   - No aspirin, ibuprofen, naproxen, or other non-steroidal anti-inflammatory   drugs.   - Await pathology results.   - Discharge patient to home (ambulatory).   - Follow an antireflux regimen.   - Return to GI office after studies are complete.  For questions, problems or results please call your physician - Kaushik Schmidt MD at Work:  [phone].  [facility], EMERGENCY ROOM PHONE NUMBER: [phone]  IF A COMPLICATION OR EMERGENCY SITUATION ARISES AND YOU ARE UNABLE TO REACH   YOUR PHYSICIAN - GO DIRECTLY TO THE EMERGENCY ROOM.  Kaushik Schmidt MD  8/3/2023 7:46:05 AM  This report has been verified and signed electronically.  Dear patient,  As a result of recent federal legislation (The Federal Cures Act), you may   receive lab or pathology results from your procedure in your [facility]   account before your physician is able to contact you. Your physician or   their representative will relay the results to you with their   recommendations at their soonest availability.  Thank you,  PROVATION

## 2023-08-03 NOTE — ANESTHESIA PREPROCEDURE EVALUATION
08/03/2023  Danna Sorensen is a 83 y.o., female.      Pre-op Assessment    I have reviewed the Patient Summary Reports.     I have reviewed the Nursing Notes.       Review of Systems  Anesthesia Hx:  No problems with previous Anesthesia    Cardiovascular:   Hypertension CAD      Musculoskeletal:   Arthritis     Neurological:   Neuromuscular Disease,    Endocrine:   Hypothyroidism        Physical Exam  General: Well nourished        Anesthesia Plan  Type of Anesthesia, risks & benefits discussed:    Anesthesia Type: Gen Natural Airway  Intra-op Monitoring Plan: Standard ASA Monitors  Induction:  IV  Informed Consent: Informed consent signed with the Patient and all parties understand the risks and agree with anesthesia plan.  All questions answered.   ASA Score: 3  Day of Surgery Review of History & Physical: H&P Update referred to the surgeon/provider.    Ready For Surgery From Anesthesia Perspective.     .

## 2023-08-03 NOTE — H&P
CC: GERD    83 year old female with above. States that symptoms are ongoing, no alleviating/exacerbating factors.  No bleeding or weight loss.     ROS:  No headache, no fever/chills, no chest pain/SOB, no nausea/vomiting/diarrhea/constipation/GI bleeding/abdominal pain, no dysuria/hematuria.    VSSAF   Exam:   Alert and oriented x 3; no apparent distress   PERRLA, sclera anicteric  CV: Regular rate/rhythm, normal PMI   Lungs: Clear bilaterally with no wheeze/rales   Abdomen: Soft, NT/ND, normal bowel sounds   Ext: No cyanosis, clubbing     Impression:   As above    Plan:   Proceed with endoscopy. Further recs to follow.

## 2023-08-03 NOTE — TRANSFER OF CARE
Anesthesia Transfer of Care Note    Patient: Danna Sorensen    Procedure(s) Performed: Procedure(s) (LRB):  EGD (ESOPHAGOGASTRODUODENOSCOPY) (N/A)    Patient location: PACU    Anesthesia Type: general    Transport from OR: Transported from OR on room air with adequate spontaneous ventilation    Post pain: adequate analgesia    Post assessment: no apparent anesthetic complications and tolerated procedure well    Post vital signs: stable    Level of consciousness: awake    Nausea/Vomiting: no nausea/vomiting    Complications: none    Transfer of care protocol was followed      Last vitals:   Visit Vitals  BP (!) 152/74 (BP Location: Left arm, Patient Position: Lying)   Pulse 72   Temp 36.7 °C (98.1 °F) (Skin)   Resp 18   Wt 95.3 kg (210 lb)   SpO2 98%   Breastfeeding No   BMI 36.05 kg/m²

## 2023-08-03 NOTE — ANESTHESIA POSTPROCEDURE EVALUATION
Anesthesia Post Evaluation    Patient: Danna Sorensen    Procedure(s) Performed: Procedure(s) (LRB):  EGD (ESOPHAGOGASTRODUODENOSCOPY) (N/A)    Final Anesthesia Type: general      Patient location during evaluation: PACU  Patient participation: Yes- Able to Participate  Level of consciousness: awake and alert  Post-procedure vital signs: reviewed and stable  Pain management: adequate  Airway patency: patent    PONV status at discharge: No PONV  Anesthetic complications: no      Cardiovascular status: blood pressure returned to baseline  Respiratory status: unassisted and room air  Hydration status: euvolemic  Follow-up not needed.          Vitals Value Taken Time   /66 08/03/23 0804   Temp 36.3 °C (97.3 °F) 08/03/23 0758   Pulse 59 08/03/23 0804   Resp 18 08/03/23 0804   SpO2 94 % 08/03/23 0804         No case tracking events are documented in the log.      Pain/Ronda Score: Ronda Score: 10 (8/3/2023  8:04 AM)

## 2023-08-03 NOTE — PLAN OF CARE
Vss, korey po fluids, denies pain, ambulates easily. IV removed, catheter intact. Discharge instructions provided and states understanding. States ready to go home.  Discharged from facility with family per wheelchair.

## 2023-08-04 VITALS
RESPIRATION RATE: 16 BRPM | BODY MASS INDEX: 36.05 KG/M2 | OXYGEN SATURATION: 99 % | SYSTOLIC BLOOD PRESSURE: 124 MMHG | DIASTOLIC BLOOD PRESSURE: 58 MMHG | WEIGHT: 210 LBS | HEART RATE: 55 BPM | TEMPERATURE: 97 F

## 2023-08-08 ENCOUNTER — ANESTHESIA (OUTPATIENT)
Dept: ENDOSCOPY | Facility: HOSPITAL | Age: 83
End: 2023-08-08
Payer: MEDICARE

## 2023-08-08 ENCOUNTER — HOSPITAL ENCOUNTER (OUTPATIENT)
Facility: HOSPITAL | Age: 83
Discharge: HOME | End: 2023-08-08
Attending: INTERNAL MEDICINE | Admitting: INTERNAL MEDICINE
Payer: MEDICARE

## 2023-08-08 ENCOUNTER — ANESTHESIA EVENT (OUTPATIENT)
Dept: ENDOSCOPY | Facility: HOSPITAL | Age: 83
End: 2023-08-08
Payer: MEDICARE

## 2023-08-08 DIAGNOSIS — K63.5 POLYP OF COLON, UNSPECIFIED PART OF COLON, UNSPECIFIED TYPE: Primary | ICD-10-CM

## 2023-08-08 DIAGNOSIS — Z85.038 HISTORY OF COLON CANCER: ICD-10-CM

## 2023-08-08 DIAGNOSIS — K52.9 COLITIS: ICD-10-CM

## 2023-08-08 PROCEDURE — 25000003 PHARM REV CODE 250: Performed by: INTERNAL MEDICINE

## 2023-08-08 PROCEDURE — 45385 COLONOSCOPY W/LESION REMOVAL: CPT | Mod: PT,,, | Performed by: INTERNAL MEDICINE

## 2023-08-08 PROCEDURE — 63600175 PHARM REV CODE 636 W HCPCS: Performed by: NURSE ANESTHETIST, CERTIFIED REGISTERED

## 2023-08-08 PROCEDURE — D9220A PRA ANESTHESIA: ICD-10-PCS | Mod: PT,ANES,, | Performed by: ANESTHESIOLOGY

## 2023-08-08 PROCEDURE — 88305 TISSUE EXAM BY PATHOLOGIST: CPT | Mod: TC,59 | Performed by: PATHOLOGY

## 2023-08-08 PROCEDURE — 45380 COLONOSCOPY AND BIOPSY: CPT | Mod: PT,59 | Performed by: INTERNAL MEDICINE

## 2023-08-08 PROCEDURE — D9220A PRA ANESTHESIA: ICD-10-PCS | Mod: PT,CRNA,, | Performed by: NURSE ANESTHETIST, CERTIFIED REGISTERED

## 2023-08-08 PROCEDURE — D9220A PRA ANESTHESIA: Mod: PT,ANES,, | Performed by: ANESTHESIOLOGY

## 2023-08-08 PROCEDURE — 37000008 HC ANESTHESIA 1ST 15 MINUTES: Performed by: INTERNAL MEDICINE

## 2023-08-08 PROCEDURE — 27201012 HC FORCEPS, HOT/COLD, DISP: Performed by: INTERNAL MEDICINE

## 2023-08-08 PROCEDURE — 25000003 PHARM REV CODE 250: Performed by: NURSE ANESTHETIST, CERTIFIED REGISTERED

## 2023-08-08 PROCEDURE — D9220A PRA ANESTHESIA: Mod: PT,CRNA,, | Performed by: NURSE ANESTHETIST, CERTIFIED REGISTERED

## 2023-08-08 PROCEDURE — 45385 PR COLONOSCOPY,REMV LESN,SNARE: ICD-10-PCS | Mod: PT,,, | Performed by: INTERNAL MEDICINE

## 2023-08-08 PROCEDURE — 45385 COLONOSCOPY W/LESION REMOVAL: CPT | Mod: PT | Performed by: INTERNAL MEDICINE

## 2023-08-08 PROCEDURE — 45380 COLONOSCOPY AND BIOPSY: CPT | Mod: PT,59,, | Performed by: INTERNAL MEDICINE

## 2023-08-08 PROCEDURE — 45380 PR COLONOSCOPY,BIOPSY: ICD-10-PCS | Mod: PT,59,, | Performed by: INTERNAL MEDICINE

## 2023-08-08 PROCEDURE — 37000009 HC ANESTHESIA EA ADD 15 MINS: Performed by: INTERNAL MEDICINE

## 2023-08-08 PROCEDURE — 27201089 HC SNARE, DISP (ANY): Performed by: INTERNAL MEDICINE

## 2023-08-08 RX ORDER — PROPOFOL 10 MG/ML
VIAL (ML) INTRAVENOUS
Status: DISCONTINUED | OUTPATIENT
Start: 2023-08-08 | End: 2023-08-08

## 2023-08-08 RX ORDER — SODIUM CHLORIDE 9 MG/ML
INJECTION, SOLUTION INTRAVENOUS CONTINUOUS
Status: DISCONTINUED | OUTPATIENT
Start: 2023-08-08 | End: 2023-08-08 | Stop reason: HOSPADM

## 2023-08-08 RX ORDER — LIDOCAINE HYDROCHLORIDE 20 MG/ML
INJECTION INTRAVENOUS
Status: DISCONTINUED | OUTPATIENT
Start: 2023-08-08 | End: 2023-08-08

## 2023-08-08 RX ORDER — DEXTROMETHORPHAN/PSEUDOEPHED 2.5-7.5/.8
DROPS ORAL
Status: DISCONTINUED | OUTPATIENT
Start: 2023-08-08 | End: 2023-08-08 | Stop reason: HOSPADM

## 2023-08-08 RX ADMIN — SODIUM CHLORIDE: 9 INJECTION, SOLUTION INTRAVENOUS at 08:08

## 2023-08-08 RX ADMIN — PROPOFOL 30 MG: 10 INJECTION, EMULSION INTRAVENOUS at 09:08

## 2023-08-08 RX ADMIN — LIDOCAINE HYDROCHLORIDE 50 MG: 20 INJECTION, SOLUTION INTRAVENOUS at 09:08

## 2023-08-08 RX ADMIN — PROPOFOL 30 MG: 10 INJECTION, EMULSION INTRAVENOUS at 10:08

## 2023-08-08 RX ADMIN — PROPOFOL 80 MG: 10 INJECTION, EMULSION INTRAVENOUS at 09:08

## 2023-08-08 NOTE — PROVATION PATIENT INSTRUCTIONS
Discharge Summary/Instructions after an Endoscopic Procedure  Patient Name: Danna Sorensen  Patient MRN: [id number]  Patient YOB: 1940  Tuesday, August 8, 2023  Kaushik Schmidt MD  Dear patient,  As a result of recent federal legislation (The Federal Cures Act), you may   receive lab or pathology results from your procedure in your [facility]   account before your physician is able to contact you. Your physician or   their representative will relay the results to you with their   recommendations at their soonest availability.  Thank you,  RESTRICTIONS:  During your procedure today, you received medications for sedation.  These   medications may affect your judgment, balance and coordination.  Therefore,   for 24 hours, you have the following restrictions:   - DO NOT drive a car, operate machinery, make legal/financial decisions,   sign important papers or drink alcohol.    ACTIVITY:  Today: no heavy lifting, straining or running due to procedural   sedation/anesthesia.  The following day: return to full activity including work.  DIET:  Eat and drink normally unless instructed otherwise.     TREATMENT FOR COMMON SIDE EFFECTS:  - Mild abdominal pain, nausea, belching, bloating or excessive gas:  rest,   eat lightly and use a heating pad.  - Sore Throat: treat with throat lozenges and/or gargle with warm salt   water.  - Because air was used during the procedure, expelling large amounts of air   from your rectum or belching is normal.  - If a bowel prep was taken, you may not have a bowel movement for 1-3 days.    This is normal.  SYMPTOMS TO WATCH FOR AND REPORT TO YOUR PHYSICIAN:  1. Abdominal pain or bloating, other than gas cramps.  2. Chest pain.  3. Back pain.  4. Signs of infection such as: chills or fever occurring within 24 hours   after the procedure.  5. Rectal bleeding, which would show as bright red, maroon, or black stools.   (A tablespoon of blood from the rectum is not serious, especially if    hemorrhoids are present.)  6. Vomiting.  7. Weakness or dizziness.  GO DIRECTLY TO THE NEAREST EMERGENCY ROOM IF YOU HAVE ANY OF THE FOLLOWING:      Difficulty breathing              Chills and/or fever over 101 F   Persistent vomiting and/or vomiting blood   Severe abdominal pain   Severe chest pain   Black, tarry stools   Bleeding- more than one tablespoon   Any other symptom or condition that you feel may need urgent attention  Your doctor recommends these additional instructions:  If any biopsies were taken, your doctors clinic will contact you in 1 to 2   weeks with any results.  - Patient has a contact number available for emergencies.  The signs and   symptoms of potential delayed complications were discussed with the   patient.  Return to normal activities tomorrow.  Written discharge   instructions were provided to the patient.   - High fiber diet.   - Continue present medications.   - Await pathology results.   - Repeat colonoscopy (date not yet determined) for surveillance.   - Discharge patient to home (ambulatory).   - Return to GI office after studies are complete.  For questions, problems or results please call your physician - Kaushik Schmidt MD at Work:  [phone].  [facility], EMERGENCY ROOM PHONE NUMBER: [phone]  IF A COMPLICATION OR EMERGENCY SITUATION ARISES AND YOU ARE UNABLE TO REACH   YOUR PHYSICIAN - GO DIRECTLY TO THE EMERGENCY ROOM.  Kaushik Schmidt MD  8/8/2023 10:12:17 AM  This report has been verified and signed electronically.  Dear patient,  As a result of recent federal legislation (The Federal Cures Act), you may   receive lab or pathology results from your procedure in your [facility]   account before your physician is able to contact you. Your physician or   their representative will relay the results to you with their   recommendations at their soonest availability.  Thank you,  [employer]OVATION

## 2023-08-08 NOTE — H&P (VIEW-ONLY)
CC: History of colon cancer    83 year old female with above. States that symptoms are absent, no alleviating/exacerbating factors. Positive personal history of colon cancer. No bleeding or weight loss.     ROS:  No headache, no fever/chills, no chest pain/SOB, no nausea/vomiting/diarrhea/constipation/GI bleeding/abdominal pain, no dysuria/hematuria.    VSSAF   Exam:   Alert and oriented x 3; no apparent distress   PERRLA, sclera anicteric  CV: Regular rate/rhythm, normal PMI   Lungs: Clear bilaterally with no wheeze/rales   Abdomen: Soft, NT/ND, normal bowel sounds   Ext: No cyanosis, clubbing     Impression:   As above    Plan:   Proceed with endoscopy. Further recs to follow.

## 2023-08-08 NOTE — ANESTHESIA POSTPROCEDURE EVALUATION
Anesthesia Post Evaluation    Patient: Danna Sorensen    Procedure(s) Performed: Procedure(s) (LRB):  COLONOSCOPY (N/A)    Final Anesthesia Type: general      Patient location during evaluation: PACU  Patient participation: Yes- Able to Participate  Level of consciousness: awake and alert  Post-procedure vital signs: reviewed and stable  Pain management: adequate  Airway patency: patent    PONV status at discharge: No PONV  Anesthetic complications: no      Cardiovascular status: blood pressure returned to baseline  Respiratory status: unassisted  Hydration status: euvolemic  Follow-up not needed.          Vitals Value Taken Time   /59 08/08/23 1050   Temp 36.4 °C (97.5 °F) 08/08/23 1050   Pulse 57 08/08/23 1050   Resp 16 08/08/23 1050   SpO2 97 % 08/08/23 1050         Event Time   Out of Recovery 10:56:37         Pain/Ronda Score: Ronda Score: 10 (8/8/2023 10:30 AM)

## 2023-08-08 NOTE — TRANSFER OF CARE
"Anesthesia Transfer of Care Note    Patient: Danna Sorensen    Procedure(s) Performed: Procedure(s) (LRB):  COLONOSCOPY (N/A)    Patient location: GI    Anesthesia Type: general    Transport from OR: Transported from OR on room air with adequate spontaneous ventilation    Post pain: adequate analgesia    Post assessment: no apparent anesthetic complications    Post vital signs: stable    Level of consciousness: awake    Nausea/Vomiting: no nausea/vomiting    Complications: none    Transfer of care protocol was followed      Last vitals:   Visit Vitals  /69 (BP Location: Left arm, Patient Position: Lying)   Pulse 77   Temp 37.1 °C (98.8 °F) (Skin)   Resp 20   Ht 5' 4" (1.626 m)   Wt 95.3 kg (210 lb)   SpO2 97%   Breastfeeding No   BMI 36.05 kg/m²     "

## 2023-08-08 NOTE — PLAN OF CARE
Patient is AAO, Vss, korey po fluids, denies pain, ambulates easily. IV removed, catheter intact.  discussed result with patient and niece. Discharge instructions provided and states understanding. States ready to go home.

## 2023-08-08 NOTE — ANESTHESIA PREPROCEDURE EVALUATION
08/08/2023  Danna Sorensen is a 83 y.o., female.      Pre-op Assessment    I have reviewed the Patient Summary Reports.     I have reviewed the Nursing Notes.       Review of Systems  Anesthesia Hx:  No problems with previous Anesthesia    Cardiovascular:   Hypertension CAD      Hepatic/GI:   Bowel Prep.    Musculoskeletal:   Arthritis     Neurological:   Neuromuscular Disease,    Endocrine:   Hypothyroidism        Physical Exam  General: Well nourished    Airway:  Mallampati: III / II          Anesthesia Plan  Type of Anesthesia, risks & benefits discussed:    Anesthesia Type: Gen Natural Airway  Intra-op Monitoring Plan: Standard ASA Monitors  Induction:  IV  Informed Consent: Informed consent signed with the Patient and all parties understand the risks and agree with anesthesia plan.  All questions answered.   ASA Score: 3    Ready For Surgery From Anesthesia Perspective.     .

## 2023-08-09 VITALS
BODY MASS INDEX: 35.85 KG/M2 | RESPIRATION RATE: 16 BRPM | HEIGHT: 64 IN | WEIGHT: 210 LBS | DIASTOLIC BLOOD PRESSURE: 59 MMHG | HEART RATE: 57 BPM | TEMPERATURE: 98 F | SYSTOLIC BLOOD PRESSURE: 125 MMHG | OXYGEN SATURATION: 97 %

## 2023-08-10 ENCOUNTER — TELEPHONE (OUTPATIENT)
Dept: GASTROENTEROLOGY | Facility: CLINIC | Age: 83
End: 2023-08-10
Payer: MEDICARE

## 2023-08-10 DIAGNOSIS — C18.9 MALIGNANT NEOPLASM OF COLON, UNSPECIFIED PART OF COLON: Primary | ICD-10-CM

## 2023-08-10 DIAGNOSIS — Z85.038 HISTORY OF COLON CANCER: ICD-10-CM

## 2023-08-10 NOTE — TELEPHONE ENCOUNTER
Discussed results with patient at length.  She already has CT scan scheduled.  Needs outpatient follow up with Dr. Gilmore (oncology) as well as referral to colorectal surgery.

## 2023-08-11 ENCOUNTER — TELEPHONE (OUTPATIENT)
Dept: SURGERY | Facility: CLINIC | Age: 83
End: 2023-08-11
Payer: MEDICARE

## 2023-08-11 NOTE — TELEPHONE ENCOUNTER
I called patient and scheduled her on Tuesday, 8/15/23 @ 2:15pm with Dr. Chavez in [place].  Aditya

## 2023-08-11 NOTE — TELEPHONE ENCOUNTER
Kaushik Pinon MD 15 hours ago (6:04 PM)       Discussed results with patient at length.  She already has CT scan scheduled.  Needs outpatient follow up with Dr. Gilmore (oncology) as well as referral to colorectal surgery.

## 2023-08-13 NOTE — TELEPHONE ENCOUNTER
Referral to colon rectal placed. Communication forwarded to Benji Cali NP's staff for follow up.    (4) Less than 3 years old

## 2023-08-15 ENCOUNTER — OFFICE VISIT (OUTPATIENT)
Dept: SURGERY | Facility: CLINIC | Age: 83
End: 2023-08-15
Payer: MEDICARE

## 2023-08-15 VITALS
TEMPERATURE: 98 F | SYSTOLIC BLOOD PRESSURE: 127 MMHG | WEIGHT: 196.19 LBS | BODY MASS INDEX: 33.49 KG/M2 | DIASTOLIC BLOOD PRESSURE: 64 MMHG | HEIGHT: 64 IN | HEART RATE: 79 BPM

## 2023-08-15 DIAGNOSIS — Z85.038 HISTORY OF COLON CANCER: ICD-10-CM

## 2023-08-15 DIAGNOSIS — C18.9 MALIGNANT NEOPLASM OF COLON, UNSPECIFIED PART OF COLON: ICD-10-CM

## 2023-08-15 PROCEDURE — 1100F PR PT FALLS ASSESS DOC 2+ FALLS/FALL W/INJURY/YR: ICD-10-PCS | Mod: CPTII,S$GLB,, | Performed by: SURGERY

## 2023-08-15 PROCEDURE — 99499 RISK ADDL DX/OHS AUDIT: ICD-10-PCS | Mod: S$GLB,,, | Performed by: SURGERY

## 2023-08-15 PROCEDURE — 3074F SYST BP LT 130 MM HG: CPT | Mod: CPTII,S$GLB,, | Performed by: SURGERY

## 2023-08-15 PROCEDURE — 1159F MED LIST DOCD IN RCRD: CPT | Mod: CPTII,S$GLB,, | Performed by: SURGERY

## 2023-08-15 PROCEDURE — 3288F PR FALLS RISK ASSESSMENT DOCUMENTED: ICD-10-PCS | Mod: CPTII,S$GLB,, | Performed by: SURGERY

## 2023-08-15 PROCEDURE — 3078F PR MOST RECENT DIASTOLIC BLOOD PRESSURE < 80 MM HG: ICD-10-PCS | Mod: CPTII,S$GLB,, | Performed by: SURGERY

## 2023-08-15 PROCEDURE — 99999 PR PBB SHADOW E&M-EST. PATIENT-LVL V: ICD-10-PCS | Mod: PBBFAC,,, | Performed by: SURGERY

## 2023-08-15 PROCEDURE — 99205 PR OFFICE/OUTPT VISIT, NEW, LEVL V, 60-74 MIN: ICD-10-PCS | Mod: S$GLB,,, | Performed by: SURGERY

## 2023-08-15 PROCEDURE — 1100F PTFALLS ASSESS-DOCD GE2>/YR: CPT | Mod: CPTII,S$GLB,, | Performed by: SURGERY

## 2023-08-15 PROCEDURE — 3074F PR MOST RECENT SYSTOLIC BLOOD PRESSURE < 130 MM HG: ICD-10-PCS | Mod: CPTII,S$GLB,, | Performed by: SURGERY

## 2023-08-15 PROCEDURE — 99205 OFFICE O/P NEW HI 60 MIN: CPT | Mod: S$GLB,,, | Performed by: SURGERY

## 2023-08-15 PROCEDURE — 1126F PR PAIN SEVERITY QUANTIFIED, NO PAIN PRESENT: ICD-10-PCS | Mod: CPTII,S$GLB,, | Performed by: SURGERY

## 2023-08-15 PROCEDURE — 99499 UNLISTED E&M SERVICE: CPT | Mod: S$GLB,,, | Performed by: SURGERY

## 2023-08-15 PROCEDURE — 3288F FALL RISK ASSESSMENT DOCD: CPT | Mod: CPTII,S$GLB,, | Performed by: SURGERY

## 2023-08-15 PROCEDURE — 1126F AMNT PAIN NOTED NONE PRSNT: CPT | Mod: CPTII,S$GLB,, | Performed by: SURGERY

## 2023-08-15 PROCEDURE — 1159F PR MEDICATION LIST DOCUMENTED IN MEDICAL RECORD: ICD-10-PCS | Mod: CPTII,S$GLB,, | Performed by: SURGERY

## 2023-08-15 PROCEDURE — 99999 PR PBB SHADOW E&M-EST. PATIENT-LVL V: CPT | Mod: PBBFAC,,, | Performed by: SURGERY

## 2023-08-15 PROCEDURE — 3078F DIAST BP <80 MM HG: CPT | Mod: CPTII,S$GLB,, | Performed by: SURGERY

## 2023-08-15 NOTE — PROGRESS NOTES
Subjective     Patient ID: Danna Sorensen is a 83 y.o. female.    Chief Complaint: Consult (Malignant neoplasm of colon/Hx of colon cancer)    HPI  pleasant 83-year-old female with history of colon cancer.  She had a right hemicolectomy performed laparoscopically over 10 years ago.  Patient was having surveillance colonoscopy performed last week and was noticed to have a proximally 10 cm area of inflamed mucosa in findings suggestive of ischemic colitis.  Biopsies were done and demonstrated findings consistent with adenocarcinoma.  She was referred to me for follow-up.  She denies any pain or discomfort.  Has not noticed any bleeding from her bowel movements.  Has no other complaints.  Her previous surgical history significant for laparoscopic right hemicolectomy.  Her past medical history significant for obesity, hypertension.      Review of Systems   Constitutional:  Negative for activity change and appetite change.   Respiratory:  Positive for cough. Negative for apnea.    Cardiovascular:  Negative for chest pain and claudication.   Gastrointestinal:  Negative for abdominal distention, abdominal pain, rectal pain and reflux.   Hematological:  Negative for adenopathy. Does not bruise/bleed easily.          Objective     Physical Exam  Vitals reviewed.   Constitutional:       Appearance: She is obese.   Cardiovascular:      Rate and Rhythm: Normal rate.      Pulses: Normal pulses.   Pulmonary:      Effort: Pulmonary effort is normal. No respiratory distress.      Breath sounds: No wheezing.   Abdominal:      General: Abdomen is flat. Bowel sounds are normal. There is no distension.      Tenderness: There is no abdominal tenderness.      Hernia: No hernia is present.   Neurological:      Mental Status: She is alert.     Cscope images reviewed and d/w pt and family    Path:    1. TRANSVERSE COLON, BIOPSY   - MODERATELY DIFFERENTIATED ADENOCARCINOMA     2. RECTUM, POLYPECTOMY   - HYPERPLASTIC POLYP.          Assessment  and Plan     1. Malignant neoplasm of colon, unspecified part of colon  -     Ambulatory referral/consult to Colorectal Surgery    2. History of colon cancer  -     Ambulatory referral/consult to Colorectal Surgery        Patient was recurrent colon cancer.  Lengthy discussion with the patient, her niece and also with Dr. Pinon.  We will need to obtain a CT scan of the chest abdomen pelvis to evaluate for any metastatic disease.  Discussed with Gastroenterology and I have asked to have another colonoscopy performed with tattooing of the distal extent of the tumor.  Given proximity of this new lesion with her old anastomosis would recommend resection of the anastomosis in conjunction with the mass itself.  Would then likely end up performing a ileo descending or ileus sigmoid colon anastomosis.  Lengthy discussion with him regarding the risks of the procedure.  These include but are not limited to bleeding, anastomotic leak, hernia, abscess, and need for further surgery.  Informed consent has been obtained.  A plan to perform robotic surgery with potential for open surgery was discussed.  Expectations for postoperative care and management were discussed in detail with the patient.  Plan to proceed with surgery on September 11th per patient's family's request.  Informed consent has been obtained         No follow-ups on file.

## 2023-08-15 NOTE — PATIENT INSTRUCTIONS
Surgery is scheduled for 9/11/23 arrival time will be given by the the preop nurse.  You may receive two calls from the Preop Nurses one a few days before surgery the second the day before surgery with the arrival time.  The preop nurse will call you from [phone]  Nothing to eat or drink after midnight.  Someone to drive you home if you are same day surgery.    THE PREOP NURSE WILL CALL, SOMETIMES AS LATE AS 4 or 5 PM IN THE AFTERNOON THE DAY BEFORE SURGERY.    Shower the night before and the morning of your procedure with Chlorhexidine Surgical Scrub also known as Hibiclens , can be purchased at most Pharmacy's no prescription needed.    Special Instruction:     Your procedure/surgery is scheduled at the [facility] known [facility] at [address] [place].     Bowel Prep for Colonoscopy/Surgery  Purchase:  Dulcolax Pills (Laxative) 4 tablets  14 dose bottle of Miralax Powder  64 ounces of Gatorade or Powerade    The day before your procedure no solid foods, clear liquids only to include water, Gatorade,Powerade.  Coffee no creamer  Soft Drinks  Popsicles   Jello   Herman Aid  Chicken or beef broth  Apple juice, white grape juice, Tea,   Hard Candy    NO RED OR PURPLE COLORED LIQUIDS, JELLO, POPSCILES.  Try to Avoid these foods 7 days before your Procedure:  beans, peas, corn, nuts, popcorn and tomatoes.  Try not to use Advil or Ibuprofen, Non-Steroidal Anti-inflammatories such as Aleve for 7 days before your colonoscopy, no fish oil for 5 days before the procedure.    Mix the entire 14 dose bottle of Miralax into  64 ounces of Gatorade into a large pitcher, stir and chill until ready to use.  The day before procedure starting at noon take all 4 Dulcolax tablets followed by clear liquids until 2pm.  Keep in mind to drink plenty of fluids this is how the laxatives work, plenty fluids.  After 2pm no later than 4pm start drinkg the Miralax/Gatorade mix 8 ounces  at a time over a 2-4 hour period until completing the entire 64 ounces, one glass every 15 to 30 minutes.  Keep sipping clear liquids between each dose  Contact your Diabetes doctor or Endocrinologist for detailed instruction regarding your insulin or oral diabetes medication.     Contact Dr. Chavez if you have any questions, [phone]    Be Sure to Notify Your Doctor if You are on a Prescription Blood Thinner.         Contact Giorgi Lord LPN for questions are concerns. [phone]

## 2023-08-16 ENCOUNTER — TELEPHONE (OUTPATIENT)
Dept: GASTROENTEROLOGY | Facility: CLINIC | Age: 83
End: 2023-08-16
Payer: MEDICARE

## 2023-08-16 ENCOUNTER — TELEPHONE (OUTPATIENT)
Dept: FAMILY MEDICINE | Facility: CLINIC | Age: 83
End: 2023-08-16
Payer: MEDICARE

## 2023-08-16 NOTE — TELEPHONE ENCOUNTER
----- Message from Douglas Matos sent at 8/16/2023  3:25 PM CDT -----  Contact: pt  Type: Needs Medical Advice  Who Called:  pt  Best Call Back Number: [phone]    Additional Information: Pt is calling the office needs cough medicine that she was prescribed last time she has a bad cough.Please call back and advise.

## 2023-08-16 NOTE — TELEPHONE ENCOUNTER
Returned patients call in regards to needing cough medicine. Advised an appointment is needed. Scheduled patient to next available appointment and placed it on the wait list.

## 2023-08-16 NOTE — TELEPHONE ENCOUNTER
----- Message from Kaushik Pinon MD sent at 8/16/2023  4:24 PM CDT -----  Needs repeat colonoscopy for tattooing ASAP if not already scheduled.      ----- Message -----  From: Jim Chavez MD  Sent: 8/15/2023   3:34 PM CDT  To: Kaushik Pinon MD

## 2023-08-17 ENCOUNTER — HOSPITAL ENCOUNTER (OUTPATIENT)
Dept: RADIOLOGY | Facility: HOSPITAL | Age: 83
Discharge: HOME | End: 2023-08-17
Attending: INTERNAL MEDICINE
Payer: MEDICARE

## 2023-08-17 DIAGNOSIS — K52.9 COLITIS: ICD-10-CM

## 2023-08-17 DIAGNOSIS — K63.5 POLYP OF COLON, UNSPECIFIED PART OF COLON, UNSPECIFIED TYPE: ICD-10-CM

## 2023-08-17 DIAGNOSIS — R93.5 ABNORMAL CT OF THE ABDOMEN: Primary | ICD-10-CM

## 2023-08-17 LAB
CREAT SERPL-MCNC: 0.7 MG/DL (ref 0.5–1.4)
SAMPLE: NORMAL

## 2023-08-17 PROCEDURE — 25500020 PHARM REV CODE 255: Mod: PO | Performed by: INTERNAL MEDICINE

## 2023-08-17 PROCEDURE — 74177 CT ABD & PELVIS W/CONTRAST: CPT | Mod: TC,PO

## 2023-08-17 RX ORDER — METRONIDAZOLE 500 MG/100ML
500 INJECTION, SOLUTION INTRAVENOUS
Status: CANCELLED | OUTPATIENT
Start: 2023-08-17

## 2023-08-17 RX ORDER — SODIUM CHLORIDE 9 MG/ML
INJECTION, SOLUTION INTRAVENOUS CONTINUOUS
Status: CANCELLED | OUTPATIENT
Start: 2023-08-17

## 2023-08-17 RX ADMIN — IOHEXOL 100 ML: 350 INJECTION, SOLUTION INTRAVENOUS at 11:08

## 2023-08-17 NOTE — PROGRESS NOTES
Please advise patient that CT scan showed colon mass as expected but there was also a finding in the left kidney.  Recommend referral to urology for this.  In addition, there was gallbladder thickening and an ultrasound is recommended for this.  Order placed.

## 2023-08-18 ENCOUNTER — HOSPITAL ENCOUNTER (OUTPATIENT)
Dept: RADIOLOGY | Facility: CLINIC | Age: 83
Discharge: HOME | End: 2023-08-18
Attending: NURSE PRACTITIONER
Payer: MEDICARE

## 2023-08-18 ENCOUNTER — OFFICE VISIT (OUTPATIENT)
Dept: FAMILY MEDICINE | Facility: CLINIC | Age: 83
End: 2023-08-18
Payer: MEDICARE

## 2023-08-18 VITALS — OXYGEN SATURATION: 95 % | WEIGHT: 196.63 LBS | BODY MASS INDEX: 33.57 KG/M2 | HEIGHT: 64 IN | HEART RATE: 98 BPM

## 2023-08-18 DIAGNOSIS — R05.9 COUGH, UNSPECIFIED TYPE: ICD-10-CM

## 2023-08-18 DIAGNOSIS — R06.2 WHEEZING: ICD-10-CM

## 2023-08-18 DIAGNOSIS — R06.2 WHEEZING: Primary | ICD-10-CM

## 2023-08-18 DIAGNOSIS — R91.1 SOLITARY PULMONARY NODULE: ICD-10-CM

## 2023-08-18 PROCEDURE — 3288F FALL RISK ASSESSMENT DOCD: CPT | Mod: CPTII,S$GLB,, | Performed by: NURSE PRACTITIONER

## 2023-08-18 PROCEDURE — 94640 PR INHAL RX, AIRWAY OBST/DX SPUTUM INDUCT: ICD-10-PCS | Mod: 59,S$GLB,, | Performed by: NURSE PRACTITIONER

## 2023-08-18 PROCEDURE — 99213 PR OFFICE/OUTPT VISIT, EST, LEVL III, 20-29 MIN: ICD-10-PCS | Mod: 25,S$GLB,, | Performed by: NURSE PRACTITIONER

## 2023-08-18 PROCEDURE — 1126F AMNT PAIN NOTED NONE PRSNT: CPT | Mod: CPTII,S$GLB,, | Performed by: NURSE PRACTITIONER

## 2023-08-18 PROCEDURE — 1159F PR MEDICATION LIST DOCUMENTED IN MEDICAL RECORD: ICD-10-PCS | Mod: CPTII,S$GLB,, | Performed by: NURSE PRACTITIONER

## 2023-08-18 PROCEDURE — 96372 THER/PROPH/DIAG INJ SC/IM: CPT | Mod: S$GLB,,, | Performed by: NURSE PRACTITIONER

## 2023-08-18 PROCEDURE — 1101F PT FALLS ASSESS-DOCD LE1/YR: CPT | Mod: CPTII,S$GLB,, | Performed by: NURSE PRACTITIONER

## 2023-08-18 PROCEDURE — 99999 PR PBB SHADOW E&M-EST. PATIENT-LVL IV: CPT | Mod: PBBFAC,,, | Performed by: NURSE PRACTITIONER

## 2023-08-18 PROCEDURE — 99213 OFFICE O/P EST LOW 20 MIN: CPT | Mod: 25,S$GLB,, | Performed by: NURSE PRACTITIONER

## 2023-08-18 PROCEDURE — 1160F PR REVIEW ALL MEDS BY PRESCRIBER/CLIN PHARMACIST DOCUMENTED: ICD-10-PCS | Mod: CPTII,S$GLB,, | Performed by: NURSE PRACTITIONER

## 2023-08-18 PROCEDURE — 99999 PR PBB SHADOW E&M-EST. PATIENT-LVL IV: ICD-10-PCS | Mod: PBBFAC,,, | Performed by: NURSE PRACTITIONER

## 2023-08-18 PROCEDURE — 1101F PR PT FALLS ASSESS DOC 0-1 FALLS W/OUT INJ PAST YR: ICD-10-PCS | Mod: CPTII,S$GLB,, | Performed by: NURSE PRACTITIONER

## 2023-08-18 PROCEDURE — 1126F PR PAIN SEVERITY QUANTIFIED, NO PAIN PRESENT: ICD-10-PCS | Mod: CPTII,S$GLB,, | Performed by: NURSE PRACTITIONER

## 2023-08-18 PROCEDURE — 94640 AIRWAY INHALATION TREATMENT: CPT | Mod: 59,S$GLB,, | Performed by: NURSE PRACTITIONER

## 2023-08-18 PROCEDURE — 3288F PR FALLS RISK ASSESSMENT DOCUMENTED: ICD-10-PCS | Mod: CPTII,S$GLB,, | Performed by: NURSE PRACTITIONER

## 2023-08-18 PROCEDURE — 71046 X-RAY EXAM CHEST 2 VIEWS: CPT | Mod: 26,,, | Performed by: RADIOLOGY

## 2023-08-18 PROCEDURE — 1159F MED LIST DOCD IN RCRD: CPT | Mod: CPTII,S$GLB,, | Performed by: NURSE PRACTITIONER

## 2023-08-18 PROCEDURE — 96372 PR INJECTION,THERAP/PROPH/DIAG2ST, IM OR SUBCUT: ICD-10-PCS | Mod: S$GLB,,, | Performed by: NURSE PRACTITIONER

## 2023-08-18 PROCEDURE — 1160F RVW MEDS BY RX/DR IN RCRD: CPT | Mod: CPTII,S$GLB,, | Performed by: NURSE PRACTITIONER

## 2023-08-18 PROCEDURE — 71046 X-RAY EXAM CHEST 2 VIEWS: CPT | Mod: TC,FY,PO

## 2023-08-18 PROCEDURE — 71046 XR CHEST PA AND LATERAL: ICD-10-PCS | Mod: 26,,, | Performed by: RADIOLOGY

## 2023-08-18 RX ORDER — DEXAMETHASONE SODIUM PHOSPHATE 4 MG/ML
4 INJECTION, SOLUTION INTRA-ARTICULAR; INTRALESIONAL; INTRAMUSCULAR; INTRAVENOUS; SOFT TISSUE
Status: COMPLETED | OUTPATIENT
Start: 2023-08-18 | End: 2023-08-18

## 2023-08-18 RX ORDER — PROMETHAZINE HYDROCHLORIDE AND DEXTROMETHORPHAN HYDROBROMIDE 6.25; 15 MG/5ML; MG/5ML
5 SYRUP ORAL 4 TIMES DAILY PRN
Qty: 180 ML | Refills: 1 | Status: SHIPPED | OUTPATIENT
Start: 2023-08-18 | End: 2023-08-28

## 2023-08-18 RX ORDER — IPRATROPIUM BROMIDE AND ALBUTEROL SULFATE 2.5; .5 MG/3ML; MG/3ML
3 SOLUTION RESPIRATORY (INHALATION)
Status: COMPLETED | OUTPATIENT
Start: 2023-08-18 | End: 2023-08-18

## 2023-08-18 RX ORDER — AMOXICILLIN AND CLAVULANATE POTASSIUM 875; 125 MG/1; MG/1
1 TABLET, FILM COATED ORAL EVERY 12 HOURS
Qty: 14 TABLET | Refills: 0 | Status: ON HOLD | OUTPATIENT
Start: 2023-08-18 | End: 2023-08-26 | Stop reason: HOSPADM

## 2023-08-18 RX ADMIN — IPRATROPIUM BROMIDE AND ALBUTEROL SULFATE 3 ML: 2.5; .5 SOLUTION RESPIRATORY (INHALATION) at 10:08

## 2023-08-18 RX ADMIN — DEXAMETHASONE SODIUM PHOSPHATE 4 MG: 4 INJECTION, SOLUTION INTRA-ARTICULAR; INTRALESIONAL; INTRAMUSCULAR; INTRAVENOUS; SOFT TISSUE at 10:08

## 2023-08-18 NOTE — PROGRESS NOTES
"Subjective:       Patient ID: Danna Sorensen is a 83 y.o. female.    Chief Complaint: Cough    Cough  This is a recurrent problem. The current episode started more than 1 month ago. The problem has been unchanged. The cough is Productive of sputum. Associated symptoms include postnasal drip, rhinorrhea, shortness of breath and wheezing. Pertinent negatives include no ear congestion, ear pain, fever, headaches or nasal congestion. The symptoms are aggravated by stress. She has tried OTC cough suppressant for the symptoms.       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 Especially at the higher dose.    Lyrica [pregabalin]      Side effects    Mobic [meloxicam] Swelling     Edema and elevated blood pressure     Oxaliplatin      Other reaction(s): Joint pain  Other reaction(s): Itching  Other reaction(s): Hives         Current Outpatient Medications:     acetaminophen (TYLENOL) 650 MG TbSR, Take 650 mg by mouth every 8 (eight) hours., Disp: , Rfl:     alendronate (FOSAMAX) 70 MG tablet, TAKE 1 TABLET(70 MG) BY MOUTH EVERY 7 DAYS, Disp: 12 tablet, Rfl: 3    cyclobenzaprine (FLEXERIL) 5 MG tablet, Take 1 tablet (5 mg total) by mouth 3 (three) times daily as needed for Muscle " spasms., Disp: 90 tablet, Rfl: 0    ergocalciferol, vitamin D2, (VITAMIN D ORAL), Take 2,000 mg by mouth., Disp: , Rfl:     fluticasone propionate (FLONASE) 50 mcg/actuation nasal spray, 1 spray (50 mcg total) by Each Nostril route once daily., Disp: 16 g, Rfl: PRN    furosemide (LASIX) 20 MG tablet, Take 1 tablet (20 mg total) by mouth daily as needed (edema)., Disp: 90 tablet, Rfl: 3    hydrOXYzine HCL (ATARAX) 25 MG tablet, Take 1 tablet (25 mg total) by mouth 3 (three) times daily as needed for Anxiety (insomnia)., Disp: 30 tablet, Rfl: PRN    levocetirizine (XYZAL) 5 MG tablet, Take 1 tablet (5 mg total) by mouth nightly as needed for Allergies (allergies)., Disp: 90 tablet, Rfl: PRN    levothyroxine (SYNTHROID) 75 MCG tablet, Take 1 tablet (75 mcg total) by mouth once daily., Disp: 90 tablet, Rfl: 3    lisinopriL 10 MG tablet, Take 1 tablet (10 mg total) by mouth once daily. (Patient taking differently: Take 30 mg by mouth once daily.), Disp: 90 tablet, Rfl: 3    multivitamin capsule, Take 1 capsule by mouth once daily., Disp: , Rfl:     nystatin (MYCOSTATIN) cream, Apply topically 2 (two) times daily as needed. GRETCHEN EXT AA BID, Disp: 30 g, Rfl: prn    omeprazole (PRILOSEC) 40 MG capsule, Take 1 capsule (40 mg total) by mouth 2 (two) times daily before meals., Disp: 180 capsule, Rfl: PRN    traMADoL (ULTRAM) 50 mg tablet, Take 1 tablet (50 mg total) by mouth every 8 (eight) hours as needed for Pain., Disp: 21 each, Rfl: 0    triamcinolone acetonide 0.1% (KENALOG) 0.1 % cream, APPLY EXTERNALLY TO THE AFFECTED AREA TWICE DAILY, Disp: 60 g, Rfl: 0    amoxicillin-clavulanate 875-125mg (AUGMENTIN) 875-125 mg per tablet, Take 1 tablet by mouth every 12 (twelve) hours. for 7 days, Disp: 14 tablet, Rfl: 0    promethazine-dextromethorphan (PROMETHAZINE-DM) 6.25-15 mg/5 mL Syrp, Take 5 mLs by mouth 4 (four) times daily as needed (cough)., Disp: 180 mL, Rfl: 1  No current facility-administered medications for this  "visit.    Review of Systems   Constitutional:  Negative for fever.   HENT:  Positive for postnasal drip and rhinorrhea. Negative for ear pain.    Respiratory:  Positive for cough, shortness of breath and wheezing.    Neurological:  Negative for headaches.       Objective:      Pulse 98   Ht 5' 4" (1.626 m)   Wt 89.2 kg (196 lb 10.4 oz)   SpO2 95%   BMI 33.76 kg/m²   Physical Exam  Constitutional:       Appearance: She is well-developed.   HENT:      Head: Normocephalic.   Cardiovascular:      Rate and Rhythm: Normal rate and regular rhythm.      Heart sounds: Normal heart sounds.   Pulmonary:      Effort: Pulmonary effort is normal.      Breath sounds: Examination of the right-upper field reveals wheezing. Examination of the left-upper field reveals wheezing. Examination of the right-middle field reveals wheezing. Examination of the left-middle field reveals wheezing. Examination of the right-lower field reveals wheezing. Examination of the left-lower field reveals wheezing. Wheezing present.   Musculoskeletal:         General: Normal range of motion.   Skin:     General: Skin is warm and dry.   Neurological:      Mental Status: She is alert and oriented to person, place, and time.   Psychiatric:         Behavior: Behavior normal.         Thought Content: Thought content normal.         Judgment: Judgment normal.         Assessment:       1. Wheezing    2. Cough, unspecified type        Plan:       Wheezing  -     X-Ray Chest PA And Lateral; Future; Expected date: 08/18/2023  -     dexAMETHasone injection 4 mg  -     albuterol-ipratropium 2.5 mg-0.5 mg/3 mL nebulizer solution 3 mL  -     amoxicillin-clavulanate 875-125mg (AUGMENTIN) 875-125 mg per tablet; Take 1 tablet by mouth every 12 (twelve) hours. for 7 days  Dispense: 14 tablet; Refill: 0  -     promethazine-dextromethorphan (PROMETHAZINE-DM) 6.25-15 mg/5 mL Syrp; Take 5 mLs by mouth 4 (four) times daily as needed (cough).  Dispense: 180 mL; Refill: " 1    Cough, unspecified type  -     dexAMETHasone injection 4 mg  -     albuterol-ipratropium 2.5 mg-0.5 mg/3 mL nebulizer solution 3 mL  -     amoxicillin-clavulanate 875-125mg (AUGMENTIN) 875-125 mg per tablet; Take 1 tablet by mouth every 12 (twelve) hours. for 7 days  Dispense: 14 tablet; Refill: 0  -     promethazine-dextromethorphan (PROMETHAZINE-DM) 6.25-15 mg/5 mL Syrp; Take 5 mLs by mouth 4 (four) times daily as needed (cough).  Dispense: 180 mL; Refill: 1          Time spent with patient:  20 minutes    Patient with be reevaluated in 4 weeks or sooner prn    Greater than 50% of this visit was spent counseling as described in above documentation:Yes

## 2023-08-21 NOTE — PROGRESS NOTES
Spoke with patient and her daughter, they expressed understanding and will have the CT scan on Friday at [facility]. JASMEET

## 2023-08-22 ENCOUNTER — ANESTHESIA EVENT (OUTPATIENT)
Dept: ENDOSCOPY | Facility: HOSPITAL | Age: 83
End: 2023-08-22
Payer: MEDICARE

## 2023-08-22 ENCOUNTER — HOSPITAL ENCOUNTER (OUTPATIENT)
Facility: HOSPITAL | Age: 83
Discharge: HOME | End: 2023-08-22
Attending: INTERNAL MEDICINE | Admitting: INTERNAL MEDICINE
Payer: MEDICARE

## 2023-08-22 ENCOUNTER — TELEPHONE (OUTPATIENT)
Dept: GASTROENTEROLOGY | Facility: CLINIC | Age: 83
End: 2023-08-22
Payer: MEDICARE

## 2023-08-22 ENCOUNTER — ANESTHESIA (OUTPATIENT)
Dept: ENDOSCOPY | Facility: HOSPITAL | Age: 83
End: 2023-08-22
Payer: MEDICARE

## 2023-08-22 VITALS
TEMPERATURE: 98 F | OXYGEN SATURATION: 96 % | HEART RATE: 65 BPM | SYSTOLIC BLOOD PRESSURE: 111 MMHG | WEIGHT: 196 LBS | BODY MASS INDEX: 33.46 KG/M2 | DIASTOLIC BLOOD PRESSURE: 62 MMHG | HEIGHT: 64 IN | RESPIRATION RATE: 16 BRPM

## 2023-08-22 DIAGNOSIS — Z85.038 HISTORY OF COLON CANCER: ICD-10-CM

## 2023-08-22 PROCEDURE — 63600175 PHARM REV CODE 636 W HCPCS: Performed by: NURSE ANESTHETIST, CERTIFIED REGISTERED

## 2023-08-22 PROCEDURE — 45381 PR COLONOSCPY,FLEX,W/DIR SUBMUC INJECT: ICD-10-PCS | Mod: ,,, | Performed by: INTERNAL MEDICINE

## 2023-08-22 PROCEDURE — 45381 COLONOSCOPY SUBMUCOUS NJX: CPT | Performed by: INTERNAL MEDICINE

## 2023-08-22 PROCEDURE — 37000009 HC ANESTHESIA EA ADD 15 MINS: Performed by: INTERNAL MEDICINE

## 2023-08-22 PROCEDURE — D9220A PRA ANESTHESIA: ICD-10-PCS | Mod: ANES,,, | Performed by: ANESTHESIOLOGY

## 2023-08-22 PROCEDURE — 25000003 PHARM REV CODE 250: Performed by: NURSE ANESTHETIST, CERTIFIED REGISTERED

## 2023-08-22 PROCEDURE — D9220A PRA ANESTHESIA: Mod: ANES,,, | Performed by: ANESTHESIOLOGY

## 2023-08-22 PROCEDURE — 27201028 HC NEEDLE, SCLERO: Performed by: INTERNAL MEDICINE

## 2023-08-22 PROCEDURE — 25000003 PHARM REV CODE 250: Performed by: INTERNAL MEDICINE

## 2023-08-22 PROCEDURE — D9220A PRA ANESTHESIA: ICD-10-PCS | Mod: CRNA,,, | Performed by: NURSE ANESTHETIST, CERTIFIED REGISTERED

## 2023-08-22 PROCEDURE — D9220A PRA ANESTHESIA: Mod: CRNA,,, | Performed by: NURSE ANESTHETIST, CERTIFIED REGISTERED

## 2023-08-22 PROCEDURE — 45381 COLONOSCOPY SUBMUCOUS NJX: CPT | Mod: ,,, | Performed by: INTERNAL MEDICINE

## 2023-08-22 PROCEDURE — 37000008 HC ANESTHESIA 1ST 15 MINUTES: Performed by: INTERNAL MEDICINE

## 2023-08-22 RX ORDER — PROPOFOL 10 MG/ML
VIAL (ML) INTRAVENOUS
Status: DISCONTINUED | OUTPATIENT
Start: 2023-08-22 | End: 2023-08-22

## 2023-08-22 RX ORDER — SODIUM CHLORIDE 9 MG/ML
INJECTION, SOLUTION INTRAVENOUS CONTINUOUS
Status: DISCONTINUED | OUTPATIENT
Start: 2023-08-22 | End: 2023-08-22 | Stop reason: HOSPADM

## 2023-08-22 RX ORDER — LIDOCAINE HYDROCHLORIDE 20 MG/ML
INJECTION INTRAVENOUS
Status: DISCONTINUED | OUTPATIENT
Start: 2023-08-22 | End: 2023-08-22

## 2023-08-22 RX ADMIN — PROPOFOL 30 MG: 10 INJECTION, EMULSION INTRAVENOUS at 09:08

## 2023-08-22 RX ADMIN — PROPOFOL 80 MG: 10 INJECTION, EMULSION INTRAVENOUS at 09:08

## 2023-08-22 RX ADMIN — SODIUM CHLORIDE: 9 INJECTION, SOLUTION INTRAVENOUS at 09:08

## 2023-08-22 RX ADMIN — LIDOCAINE HYDROCHLORIDE 50 MG: 20 INJECTION, SOLUTION INTRAVENOUS at 09:08

## 2023-08-22 NOTE — ANESTHESIA POSTPROCEDURE EVALUATION
Anesthesia Post Evaluation    Patient: Danna Sorensen    Procedure(s) Performed: Procedure(s) (LRB):  COLONOSCOPY (tattoo of colon ca) (N/A)    Final Anesthesia Type: general      Patient location during evaluation: PACU  Patient participation: Yes- Able to Participate  Level of consciousness: awake and alert and oriented  Post-procedure vital signs: reviewed and stable  Pain management: adequate  Airway patency: patent    PONV status at discharge: No PONV  Anesthetic complications: no      Cardiovascular status: blood pressure returned to baseline and stable  Respiratory status: unassisted and spontaneous ventilation  Hydration status: euvolemic  Follow-up not needed.          Vitals Value Taken Time   /62 08/22/23 1045   Temp 36.5 °C (97.7 °F) 08/22/23 0950   Pulse 65 08/22/23 1045   Resp 16 08/22/23 1045   SpO2 96 % 08/22/23 1045         Event Time   Out of Recovery 10:50:00         Pain/Ronda Score: Ronda Score: 10 (8/22/2023 10:45 AM)

## 2023-08-22 NOTE — TRANSFER OF CARE
"Anesthesia Transfer of Care Note    Patient: Danna Sorensen    Procedure(s) Performed: Procedure(s) (LRB):  COLONOSCOPY (tattoo of colon ca) (N/A)    Patient location: GI    Anesthesia Type: general    Transport from OR: Transported from OR on room air with adequate spontaneous ventilation    Post pain: adequate analgesia    Post assessment: no apparent anesthetic complications    Post vital signs: stable    Level of consciousness: awake    Nausea/Vomiting: no nausea/vomiting    Complications: none    Transfer of care protocol was followed      Last vitals:   Visit Vitals  /62 (BP Location: Left arm, Patient Position: Lying)   Pulse 87   Temp 36.7 °C (98.1 °F) (Skin)   Resp 16   Ht 5' 4" (1.626 m)   Wt 88.9 kg (196 lb)   SpO2 97%   Breastfeeding No   BMI 33.64 kg/m²     "

## 2023-08-22 NOTE — ANESTHESIA PREPROCEDURE EVALUATION
08/22/2023  Danna Sorensen is a 83 y.o., female.      Pre-op Assessment    I have reviewed the Patient Summary Reports.     I have reviewed the Nursing Notes. I have reviewed the NPO Status.   I have reviewed the Medications.     Review of Systems  Anesthesia Hx:  No problems with previous Anesthesia    Social:  Former Smoker    Hematology/Oncology:         -- Cancer in past history (colon CA, SCCA):   EENT/Dental:   Right lower lip old injury/purple mass.   Cardiovascular:   Hypertension, well controlled CAD asymptomatic     Pulmonary:  Pulmonary Normal    Renal/:  Renal/ Normal     Musculoskeletal:   Arthritis   Spine Disorders: lumbar    Neurological:   Neuromuscular Disease,    Endocrine:   Hypothyroidism  Obesity / BMI > 30      Physical Exam  General: Well nourished, Cooperative, Alert and Oriented    Airway:  Mallampati: II   Mouth Opening: Normal  TM Distance: Normal  Neck ROM: Normal ROM        Anesthesia Plan  Type of Anesthesia, risks & benefits discussed:    Anesthesia Type: Gen ETT, Gen Supraglottic Airway, Gen Natural Airway, MAC  Intra-op Monitoring Plan: Standard ASA Monitors  Post Op Pain Control Plan: multimodal analgesia  Induction:  IV  Airway Plan: Direct, Video and Fiberoptic, Post-Induction  Informed Consent: Informed consent signed with the Patient and all parties understand the risks and agree with anesthesia plan.  All questions answered.   ASA Score: 3    Ready For Surgery From Anesthesia Perspective.     .

## 2023-08-22 NOTE — TELEPHONE ENCOUNTER
----- Message from Jose Duffy sent at 8/22/2023 12:06 PM CDT -----  Contact: pt  Type:  Patient Returning Call    Who Called:  pt   Who Left Message for Patient:  n/a   Does the patient know what this is regarding?:  yes  Best Call Back Number:  [phone]    Additional Information:  pt is trying to return a call. Please advise.

## 2023-08-22 NOTE — PLAN OF CARE
Pt awake, alert, oriented. Vital signs stable. Discharge instructions reviewed with pt and pt's family. Pt and pt's family verbalized understanding. IV removed, catheter intact. All belongings returned to patient. Pt transferred to car via wheelchair. Safety maintained.

## 2023-08-22 NOTE — [employer] PATIENT INSTRUCTIONS
Discharge Summary/Instructions after an Endoscopic Procedure  Patient Name: Danna Sorensen  Patient MRN: [id number]  Patient YOB: 1940  Tuesday, August 22, 2023  Kaushik Schmidt MD  Dear patient,  As a result of recent federal legislation (The Federal Cures Act), you may   receive lab or pathology results from your procedure in your [facility]   account before your physician is able to contact you. Your physician or   their representative will relay the results to you with their   recommendations at their soonest availability.  Thank you,  RESTRICTIONS:  During your procedure today, you received medications for sedation.  These   medications may affect your judgment, balance and coordination.  Therefore,   for 24 hours, you have the following restrictions:   - DO NOT drive a car, operate machinery, make legal/financial decisions,   sign important papers or drink alcohol.    ACTIVITY:  Today: no heavy lifting, straining or running due to procedural   sedation/anesthesia.  The following day: return to full activity including work.  DIET:  Eat and drink normally unless instructed otherwise.     TREATMENT FOR COMMON SIDE EFFECTS:  - Mild abdominal pain, nausea, belching, bloating or excessive gas:  rest,   eat lightly and use a heating pad.  - Sore Throat: treat with throat lozenges and/or gargle with warm salt   water.  - Because air was used during the procedure, expelling large amounts of air   from your rectum or belching is normal.  - If a bowel prep was taken, you may not have a bowel movement for 1-3 days.    This is normal.  SYMPTOMS TO WATCH FOR AND REPORT TO YOUR PHYSICIAN:  1. Abdominal pain or bloating, other than gas cramps.  2. Chest pain.  3. Back pain.  4. Signs of infection such as: chills or fever occurring within 24 hours   after the procedure.  5. Rectal bleeding, which would show as bright red, maroon, or black stools.   (A tablespoon of blood from the rectum is not serious, especially if    hemorrhoids are present.)  6. Vomiting.  7. Weakness or dizziness.  GO DIRECTLY TO THE NEAREST EMERGENCY ROOM IF YOU HAVE ANY OF THE FOLLOWING:      Difficulty breathing              Chills and/or fever over 101 F   Persistent vomiting and/or vomiting blood   Severe abdominal pain   Severe chest pain   Black, tarry stools   Bleeding- more than one tablespoon   Any other symptom or condition that you feel may need urgent attention  Your doctor recommends these additional instructions:  If any biopsies were taken, your doctors clinic will contact you in 1 to 2   weeks with any results.  - Patient has a contact number available for emergencies.  The signs and   symptoms of potential delayed complications were discussed with the   patient.  Return to normal activities tomorrow.  Written discharge   instructions were provided to the patient.   - Resume previous diet.   - Continue present medications.   - Repeat colonoscopy (date not yet determined) for surveillance.   - Discharge patient to home (ambulatory).   - Return to referring physician.  For questions, problems or results please call your physician - Kaushik Schmidt MD at Work:  [phone].  [facility], EMERGENCY ROOM PHONE NUMBER: [phone]  IF A COMPLICATION OR EMERGENCY SITUATION ARISES AND YOU ARE UNABLE TO REACH   YOUR PHYSICIAN - GO DIRECTLY TO THE EMERGENCY ROOM.  Kaushik Schmidt MD  8/22/2023 9:50:22 AM  This report has been verified and signed electronically.  Dear patient,  As a result of recent federal legislation (The Federal Cures Act), you may   receive lab or pathology results from your procedure in your [facility]   account before your physician is able to contact you. Your physician or   their representative will relay the results to you with their   recommendations at their soonest availability.  Thank you,  [employer]

## 2023-08-23 ENCOUNTER — OFFICE VISIT (OUTPATIENT)
Dept: UROLOGY | Facility: CLINIC | Age: 83
End: 2023-08-23
Payer: MEDICARE

## 2023-08-23 ENCOUNTER — TELEPHONE (OUTPATIENT)
Dept: FAMILY MEDICINE | Facility: CLINIC | Age: 83
End: 2023-08-23
Payer: MEDICARE

## 2023-08-23 VITALS
SYSTOLIC BLOOD PRESSURE: 111 MMHG | HEART RATE: 65 BPM | HEIGHT: 64 IN | WEIGHT: 194 LBS | BODY MASS INDEX: 33.12 KG/M2 | DIASTOLIC BLOOD PRESSURE: 62 MMHG

## 2023-08-23 DIAGNOSIS — N28.89 RENAL MASS, LEFT: Primary | ICD-10-CM

## 2023-08-23 DIAGNOSIS — N28.89 RENAL MASS: ICD-10-CM

## 2023-08-23 DIAGNOSIS — R05.9 COUGH, UNSPECIFIED TYPE: Primary | ICD-10-CM

## 2023-08-23 LAB
BILIRUBIN, UA POC OHS: ABNORMAL
BLOOD, UA POC OHS: NEGATIVE
CLARITY, UA POC OHS: CLEAR
COLOR, UA POC OHS: ABNORMAL
GLUCOSE, UA POC OHS: NEGATIVE
KETONES, UA POC OHS: NEGATIVE
LEUKOCYTES, UA POC OHS: NEGATIVE
NITRITE, UA POC OHS: NEGATIVE
PH, UA POC OHS: 5.5
PROTEIN, UA POC OHS: 100
SPECIFIC GRAVITY, UA POC OHS: >=1.03
UROBILINOGEN, UA POC OHS: 0.2

## 2023-08-23 PROCEDURE — 3288F FALL RISK ASSESSMENT DOCD: CPT | Mod: CPTII,S$GLB,, | Performed by: NURSE PRACTITIONER

## 2023-08-23 PROCEDURE — 3074F SYST BP LT 130 MM HG: CPT | Mod: CPTII,S$GLB,, | Performed by: NURSE PRACTITIONER

## 2023-08-23 PROCEDURE — 1160F RVW MEDS BY RX/DR IN RCRD: CPT | Mod: CPTII,S$GLB,, | Performed by: NURSE PRACTITIONER

## 2023-08-23 PROCEDURE — 3078F DIAST BP <80 MM HG: CPT | Mod: CPTII,S$GLB,, | Performed by: NURSE PRACTITIONER

## 2023-08-23 PROCEDURE — 1159F MED LIST DOCD IN RCRD: CPT | Mod: CPTII,S$GLB,, | Performed by: NURSE PRACTITIONER

## 2023-08-23 PROCEDURE — 99999 PR PBB SHADOW E&M-EST. PATIENT-LVL IV: CPT | Mod: PBBFAC,,, | Performed by: NURSE PRACTITIONER

## 2023-08-23 PROCEDURE — 3288F PR FALLS RISK ASSESSMENT DOCUMENTED: ICD-10-PCS | Mod: CPTII,S$GLB,, | Performed by: NURSE PRACTITIONER

## 2023-08-23 PROCEDURE — 1160F PR REVIEW ALL MEDS BY PRESCRIBER/CLIN PHARMACIST DOCUMENTED: ICD-10-PCS | Mod: CPTII,S$GLB,, | Performed by: NURSE PRACTITIONER

## 2023-08-23 PROCEDURE — 81003 URINALYSIS AUTO W/O SCOPE: CPT | Mod: QW,S$GLB,, | Performed by: NURSE PRACTITIONER

## 2023-08-23 PROCEDURE — 81003 POCT URINALYSIS(INSTRUMENT): ICD-10-PCS | Mod: QW,S$GLB,, | Performed by: NURSE PRACTITIONER

## 2023-08-23 PROCEDURE — 3074F PR MOST RECENT SYSTOLIC BLOOD PRESSURE < 130 MM HG: ICD-10-PCS | Mod: CPTII,S$GLB,, | Performed by: NURSE PRACTITIONER

## 2023-08-23 PROCEDURE — 3078F PR MOST RECENT DIASTOLIC BLOOD PRESSURE < 80 MM HG: ICD-10-PCS | Mod: CPTII,S$GLB,, | Performed by: NURSE PRACTITIONER

## 2023-08-23 PROCEDURE — 99999 PR PBB SHADOW E&M-EST. PATIENT-LVL IV: ICD-10-PCS | Mod: PBBFAC,,, | Performed by: NURSE PRACTITIONER

## 2023-08-23 PROCEDURE — 99204 OFFICE O/P NEW MOD 45 MIN: CPT | Mod: S$GLB,,, | Performed by: NURSE PRACTITIONER

## 2023-08-23 PROCEDURE — 99204 PR OFFICE/OUTPT VISIT, NEW, LEVL IV, 45-59 MIN: ICD-10-PCS | Mod: S$GLB,,, | Performed by: NURSE PRACTITIONER

## 2023-08-23 PROCEDURE — 1100F PR PT FALLS ASSESS DOC 2+ FALLS/FALL W/INJURY/YR: ICD-10-PCS | Mod: CPTII,S$GLB,, | Performed by: NURSE PRACTITIONER

## 2023-08-23 PROCEDURE — 1159F PR MEDICATION LIST DOCUMENTED IN MEDICAL RECORD: ICD-10-PCS | Mod: CPTII,S$GLB,, | Performed by: NURSE PRACTITIONER

## 2023-08-23 PROCEDURE — 1100F PTFALLS ASSESS-DOCD GE2>/YR: CPT | Mod: CPTII,S$GLB,, | Performed by: NURSE PRACTITIONER

## 2023-08-23 NOTE — TELEPHONE ENCOUNTER
----- Message from Ana Min sent at 8/22/2023  3:02 PM CDT -----  Regarding: orders  Contact: Katia with [facility]  Type: Needs Medical Advice  Who Called:  Katia with [facility]  Symptoms (please be specific):    How long has patient had these symptoms:    Pharmacy name and phone #:    Best Call Back Number: [phone]  Additional Information: Patient is having a CT on 08/25/23 and needs an order for Bun and Creatine.  Please fax to [phone].  Please call Katia when faxed or in system.  Thanks!

## 2023-08-23 NOTE — PROGRESS NOTES
[facility] Note  Staff: COLIN Bruce    PCP: TOMMY Kim    Chief Complaint: Left Renal Mass    Subjective:        HPI: Danna Sorensen is a 83 y.o. female presents today for evaluation of new incidental finding of left renal lesion.    HPI  Pleasant 83-year-old female with history of colon cancer.  She had a right hemicolectomy performed laparoscopically over 10 years ago.  Patient was having surveillance colonoscopy performed few weeks ago and was noticed to have a proximally 10 cm area of inflamed mucosa in findings suggestive of ischemic colitis.  Biopsies were done and demonstrated findings consistent with adenocarcinoma.  She was referred to Dr. Chavez for intervention at this time.  A CT was also ordered and found incidental lesion in left kidney.  She denies any pain or discomfort.  Has not noticed any bleeding from her bowel movements.  Has no other complaints.  Her previous surgical history significant for laparoscopic right hemicolectomy.  Her past medical history significant for obesity, hypertension.    CT Abdomen Pelvis with Contrast  8/17/2023:  IMPRESSION:  1.  Short segment of concentric nodular-like bowel wall thickening of the colon at the splenic flexure with mild mesenteric fat stranding. Findings concerning for colonic carcinoma.  2.  Heterogeneously hyperattenuating, possibly enhancing structure at the inferior pole of the left kidney is suspicious for an enhancing mass.  3.  Irregular appearance of the gallbladder demonstrated with either noncalcified central gallstone and/or gallbladder thickening demonstrated. Consider further evaluation with ultrasound of the gallbladder.    TODAY:  Pt is scheduled for Colectomy-Robotic procedure by Dr. Chavez for 9/11/23.  UA performed in office showed small bili, 100 protein, otherwise WNL findings for all others.  Pt denies any hx of UTIs, or gross hematuria.  No family hx of  cancers  Hx of former smoker over 30 years  ago.  No hx of kidney stones    REVIEW OF SYSTEMS:  A comprehensive 10 system review was performed and is negative except as noted above in HPI    PMHx:  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    Strabismus     Trouble in sleeping     Wears dentures     Uppers     PSHx:  Past Surgical History:   Procedure Laterality Date    CARDIAC SURGERY      cardiac cath    COLON SURGERY      colon resection    COLONOSCOPY N/A 10/18/2016    Procedure: COLONOSCOPY;  Surgeon: Rell Cordova MD;  Location: [facility];  Service: Endoscopy;  Laterality: N/A;    COLONOSCOPY N/A 8/8/2023    Procedure: COLONOSCOPY;  Surgeon: Kaushik Pinon MD;  Location: [facility];  Service: Endoscopy;  Laterality: N/A;    COLONOSCOPY N/A 8/22/2023    Procedure: COLONOSCOPY (tattoo of colon ca);  Surgeon: Kaushik Pinon MD;  Location: [facility];  Service: Endoscopy;  Laterality: N/A;    ESOPHAGOGASTRODUODENOSCOPY N/A 8/3/2023    Procedure: EGD (ESOPHAGOGASTRODUODENOSCOPY);  Surgeon: Kaushik Pinon MD;  Location: [facility];  Service: Endoscopy;  Laterality: N/A;    HAND TENDON SURGERY Left     HEMICOLECTOMY      right    INJECTION OF ANESTHETIC AGENT AROUND MEDIAL BRANCH NERVES INNERVATING LUMBAR FACET JOINT Right 07/10/2018    Procedure: Block-nerve-medial branch-lumbar;  Surgeon: Parish Gaitan MD;  Location: [facility];  Service: Pain Management;  Laterality: Right;  L3, 4, 5    JOINT REPLACEMENT      bilateral    RADIOFREQUENCY ABLATION OF LUMBAR MEDIAL BRANCH NERVE AT SINGLE  LEVEL Right 07/24/2018    Procedure: RADIOFREQUENCY ABLATION, NERVE, MEDIAL BRANCH, LUMBAR, 1 LEVEL;  Surgeon: Parish Gaitan MD;  Location: [facility];  Service: Pain Management;  Laterality: Right;  L3,4,5 - Burned at 80 degrees C. for 75 seconds x 2 each site    SHOULDER ARTHROSCOPY Right 01/12/2017    Reverse     TONSILLECTOMY      TONSILLECTOMY, ADENOIDECTOMY, BILATERAL MYRINGOTOMY AND TUBES      TOTAL KNEE ARTHROPLASTY Bilateral 08/1998    total replacements    TUMOR REMOVAL Left     left foot as a child     Allergies:  Aspirin, Gabapentin, Lyrica [pregabalin], Mobic [meloxicam], and Oxaliplatin    Medications: reviewed   Objective:     Vitals:    08/23/23 1342   BP: 111/62   Pulse: 65     Physical Exam  Constitutional:       Appearance: She is well-developed.   HENT:      Head: Normocephalic and atraumatic.   Eyes:      Conjunctiva/sclera: Conjunctivae normal.      Pupils: Pupils are equal, round, and reactive to light.   Cardiovascular:      Rate and Rhythm: Normal rate and regular rhythm.      Heart sounds: Normal heart sounds.   Pulmonary:      Effort: Pulmonary effort is normal.      Breath sounds: Normal breath sounds.   Abdominal:      General: Bowel sounds are normal.      Palpations: Abdomen is soft.   Musculoskeletal:         General: Normal range of motion.      Cervical back: Normal range of motion and neck supple.   Skin:     General: Skin is warm and dry.   Neurological:      Mental Status: She is alert and oriented to person, place, and time.      Deep Tendon Reflexes: Reflexes are normal and symmetric.   Psychiatric:         Behavior: Behavior normal.         Thought Content: Thought content normal.         Judgment: Judgment normal.           Assessment:       1. Renal mass, left    2. Renal mass          Plan:   Left Renal Mass:    Discussed with pt and family during office visit today the above referenced imaging regarding left renal lesion.  I have consulted with Dr. Isabel Syed, Urologist  and she has reviewed pt's said CT imaging related to lesion.    At this time we will order a MRI of Abdomen/Pelvis with and without contrast to be completed in 3 months, then appt. With Dr. Syed afterwards in order to discuss further intervention if indicated at that time.  All questions answered, pt verbalized understanding at this time.    [facility]: N/A    Kacey Mccloud, MARTIN-C

## 2023-08-24 NOTE — PROGRESS NOTES
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  These results taken together are consistent with a sporadic cancer and are NOT consistent with Spencer syndrome.

## 2023-08-25 ENCOUNTER — HOSPITAL ENCOUNTER (OUTPATIENT)
Dept: RADIOLOGY | Facility: HOSPITAL | Age: 83
Discharge: HOME | End: 2023-08-25
Attending: INTERNAL MEDICINE
Payer: MEDICARE

## 2023-08-25 ENCOUNTER — HOSPITAL ENCOUNTER (OUTPATIENT)
Dept: RADIOLOGY | Facility: HOSPITAL | Age: 83
Discharge: HOME | End: 2023-08-25
Attending: NURSE PRACTITIONER
Payer: MEDICARE

## 2023-08-25 ENCOUNTER — TELEPHONE (OUTPATIENT)
Dept: FAMILY MEDICINE | Facility: CLINIC | Age: 83
End: 2023-08-25
Payer: MEDICARE

## 2023-08-25 ENCOUNTER — HOSPITAL ENCOUNTER (OUTPATIENT)
Facility: HOSPITAL | Age: 83
Discharge: HOME | End: 2023-08-26
Attending: EMERGENCY MEDICINE | Admitting: INTERNAL MEDICINE
Payer: MEDICARE

## 2023-08-25 DIAGNOSIS — R93.5 ABNORMAL CT OF THE ABDOMEN: ICD-10-CM

## 2023-08-25 DIAGNOSIS — I26.99 ACUTE PULMONARY EMBOLISM WITHOUT ACUTE COR PULMONALE, UNSPECIFIED PULMONARY EMBOLISM TYPE: Primary | ICD-10-CM

## 2023-08-25 DIAGNOSIS — R91.1 SOLITARY PULMONARY NODULE: ICD-10-CM

## 2023-08-25 DIAGNOSIS — R07.9 CHEST PAIN: ICD-10-CM

## 2023-08-25 DIAGNOSIS — R06.00 DYSPNEA: ICD-10-CM

## 2023-08-25 DIAGNOSIS — I26.99 PULMONARY EMBOLISM: ICD-10-CM

## 2023-08-25 PROBLEM — C80.1 ADENOCARCINOMA: Status: ACTIVE | Noted: 2023-08-25

## 2023-08-25 PROBLEM — K21.9 GERD (GASTROESOPHAGEAL REFLUX DISEASE): Status: ACTIVE | Noted: 2023-08-25

## 2023-08-25 LAB
ALBUMIN SERPL BCP-MCNC: 3.6 G/DL (ref 3.5–5.2)
ALP SERPL-CCNC: 51 U/L (ref 55–135)
ALT SERPL W/O P-5'-P-CCNC: 20 U/L (ref 10–44)
ANION GAP SERPL CALC-SCNC: 7 MMOL/L (ref 8–16)
APTT PPP: 24.2 SEC (ref 21–32)
AST SERPL-CCNC: 16 U/L (ref 10–40)
BASOPHILS # BLD AUTO: 0.03 K/UL (ref 0–0.2)
BASOPHILS NFR BLD: 0.4 % (ref 0–1.9)
BILIRUB SERPL-MCNC: 1.3 MG/DL (ref 0.1–1)
BUN SERPL-MCNC: 12 MG/DL (ref 8–23)
CALCIUM SERPL-MCNC: 8.7 MG/DL (ref 8.7–10.5)
CHLORIDE SERPL-SCNC: 110 MMOL/L (ref 95–110)
CO2 SERPL-SCNC: 21 MMOL/L (ref 23–29)
CREAT SERPL-MCNC: 0.9 MG/DL (ref 0.5–1.4)
DIFFERENTIAL METHOD: ABNORMAL
EOSINOPHIL # BLD AUTO: 0.1 K/UL (ref 0–0.5)
EOSINOPHIL NFR BLD: 1.1 % (ref 0–8)
ERYTHROCYTE [DISTWIDTH] IN BLOOD BY AUTOMATED COUNT: 15 % (ref 11.5–14.5)
EST. GFR  (NO RACE VARIABLE): >60 ML/MIN/1.73 M^2
GLUCOSE SERPL-MCNC: 103 MG/DL (ref 70–110)
HCT VFR BLD AUTO: 30 % (ref 37–48.5)
HGB BLD-MCNC: 9.7 G/DL (ref 12–16)
IMM GRANULOCYTES # BLD AUTO: 0.07 K/UL (ref 0–0.04)
IMM GRANULOCYTES NFR BLD AUTO: 0.9 % (ref 0–0.5)
INR PPP: 1 (ref 0.8–1.2)
LYMPHOCYTES # BLD AUTO: 1.1 K/UL (ref 1–4.8)
LYMPHOCYTES NFR BLD: 13.8 % (ref 18–48)
MCH RBC QN AUTO: 28.4 PG (ref 27–31)
MCHC RBC AUTO-ENTMCNC: 32.3 G/DL (ref 32–36)
MCV RBC AUTO: 88 FL (ref 82–98)
MONOCYTES # BLD AUTO: 0.6 K/UL (ref 0.3–1)
MONOCYTES NFR BLD: 8.4 % (ref 4–15)
NEUTROPHILS # BLD AUTO: 5.7 K/UL (ref 1.8–7.7)
NEUTROPHILS NFR BLD: 75.4 % (ref 38–73)
NRBC BLD-RTO: 0 /100 WBC
PLATELET # BLD AUTO: 231 K/UL (ref 150–450)
PMV BLD AUTO: 9.5 FL (ref 9.2–12.9)
POTASSIUM SERPL-SCNC: 3.2 MMOL/L (ref 3.5–5.1)
PROT SERPL-MCNC: 7 G/DL (ref 6–8.4)
PROTHROMBIN TIME: 11.2 SEC (ref 9–12.5)
RBC # BLD AUTO: 3.41 M/UL (ref 4–5.4)
SODIUM SERPL-SCNC: 138 MMOL/L (ref 136–145)
WBC # BLD AUTO: 7.59 K/UL (ref 3.9–12.7)

## 2023-08-25 PROCEDURE — 99285 EMERGENCY DEPT VISIT HI MDM: CPT | Mod: 25

## 2023-08-25 PROCEDURE — G0378 HOSPITAL OBSERVATION PER HR: HCPCS

## 2023-08-25 PROCEDURE — 76700 US EXAM ABDOM COMPLETE: CPT | Mod: 26,,, | Performed by: RADIOLOGY

## 2023-08-25 PROCEDURE — 96372 THER/PROPH/DIAG INJ SC/IM: CPT | Mod: 59 | Performed by: STUDENT IN AN ORGANIZED HEALTH CARE EDUCATION/TRAINING PROGRAM

## 2023-08-25 PROCEDURE — 63600175 PHARM REV CODE 636 W HCPCS: Performed by: STUDENT IN AN ORGANIZED HEALTH CARE EDUCATION/TRAINING PROGRAM

## 2023-08-25 PROCEDURE — 99900035 HC TECH TIME PER 15 MIN (STAT)

## 2023-08-25 PROCEDURE — 85610 PROTHROMBIN TIME: CPT | Performed by: STUDENT IN AN ORGANIZED HEALTH CARE EDUCATION/TRAINING PROGRAM

## 2023-08-25 PROCEDURE — 76700 US ABDOMEN COMPLETE: ICD-10-PCS | Mod: 26,,, | Performed by: RADIOLOGY

## 2023-08-25 PROCEDURE — 85730 THROMBOPLASTIN TIME PARTIAL: CPT | Performed by: STUDENT IN AN ORGANIZED HEALTH CARE EDUCATION/TRAINING PROGRAM

## 2023-08-25 PROCEDURE — 71260 CT THORAX DX C+: CPT | Mod: TC

## 2023-08-25 PROCEDURE — 25500020 PHARM REV CODE 255: Performed by: NURSE PRACTITIONER

## 2023-08-25 PROCEDURE — 93005 ELECTROCARDIOGRAM TRACING: CPT | Performed by: SPECIALIST

## 2023-08-25 PROCEDURE — 93010 ELECTROCARDIOGRAM REPORT: CPT | Mod: ,,, | Performed by: SPECIALIST

## 2023-08-25 PROCEDURE — 80053 COMPREHEN METABOLIC PANEL: CPT | Performed by: STUDENT IN AN ORGANIZED HEALTH CARE EDUCATION/TRAINING PROGRAM

## 2023-08-25 PROCEDURE — 93010 EKG 12-LEAD: ICD-10-PCS | Mod: ,,, | Performed by: SPECIALIST

## 2023-08-25 PROCEDURE — 85025 COMPLETE CBC W/AUTO DIFF WBC: CPT | Performed by: STUDENT IN AN ORGANIZED HEALTH CARE EDUCATION/TRAINING PROGRAM

## 2023-08-25 PROCEDURE — 25000003 PHARM REV CODE 250: Performed by: NURSE PRACTITIONER

## 2023-08-25 PROCEDURE — 76700 US EXAM ABDOM COMPLETE: CPT | Mod: TC

## 2023-08-25 RX ORDER — LEVOTHYROXINE SODIUM 25 UG/1
75 TABLET ORAL
Status: DISCONTINUED | OUTPATIENT
Start: 2023-08-26 | End: 2023-08-26 | Stop reason: HOSPADM

## 2023-08-25 RX ORDER — IBUPROFEN 200 MG
16 TABLET ORAL
Status: DISCONTINUED | OUTPATIENT
Start: 2023-08-25 | End: 2023-08-26 | Stop reason: HOSPADM

## 2023-08-25 RX ORDER — NALOXONE HCL 0.4 MG/ML
0.02 VIAL (ML) INJECTION
Status: DISCONTINUED | OUTPATIENT
Start: 2023-08-25 | End: 2023-08-26 | Stop reason: HOSPADM

## 2023-08-25 RX ORDER — IBUPROFEN 200 MG
24 TABLET ORAL
Status: DISCONTINUED | OUTPATIENT
Start: 2023-08-25 | End: 2023-08-26 | Stop reason: HOSPADM

## 2023-08-25 RX ORDER — PANTOPRAZOLE SODIUM 40 MG/1
40 TABLET, DELAYED RELEASE ORAL
Status: DISCONTINUED | OUTPATIENT
Start: 2023-08-26 | End: 2023-08-26 | Stop reason: HOSPADM

## 2023-08-25 RX ORDER — SODIUM,POTASSIUM PHOSPHATES 280-250MG
2 POWDER IN PACKET (EA) ORAL
Status: DISCONTINUED | OUTPATIENT
Start: 2023-08-25 | End: 2023-08-26 | Stop reason: HOSPADM

## 2023-08-25 RX ORDER — IPRATROPIUM BROMIDE AND ALBUTEROL SULFATE 2.5; .5 MG/3ML; MG/3ML
3 SOLUTION RESPIRATORY (INHALATION) EVERY 4 HOURS PRN
Status: DISCONTINUED | OUTPATIENT
Start: 2023-08-25 | End: 2023-08-26 | Stop reason: HOSPADM

## 2023-08-25 RX ORDER — CETIRIZINE HYDROCHLORIDE 10 MG/1
10 TABLET ORAL NIGHTLY
Status: DISCONTINUED | OUTPATIENT
Start: 2023-08-25 | End: 2023-08-26 | Stop reason: HOSPADM

## 2023-08-25 RX ORDER — GLUCAGON 1 MG
1 KIT INJECTION
Status: DISCONTINUED | OUTPATIENT
Start: 2023-08-25 | End: 2023-08-26 | Stop reason: HOSPADM

## 2023-08-25 RX ORDER — LANOLIN ALCOHOL/MO/W.PET/CERES
800 CREAM (GRAM) TOPICAL
Status: DISCONTINUED | OUTPATIENT
Start: 2023-08-25 | End: 2023-08-26 | Stop reason: HOSPADM

## 2023-08-25 RX ORDER — ACETAMINOPHEN 325 MG/1
650 TABLET ORAL EVERY 4 HOURS PRN
Status: DISCONTINUED | OUTPATIENT
Start: 2023-08-25 | End: 2023-08-26 | Stop reason: HOSPADM

## 2023-08-25 RX ORDER — SODIUM CHLORIDE 0.9 % (FLUSH) 0.9 %
10 SYRINGE (ML) INJECTION EVERY 12 HOURS PRN
Status: DISCONTINUED | OUTPATIENT
Start: 2023-08-25 | End: 2023-08-26 | Stop reason: HOSPADM

## 2023-08-25 RX ORDER — ENOXAPARIN SODIUM 100 MG/ML
1 INJECTION SUBCUTANEOUS
Status: COMPLETED | OUTPATIENT
Start: 2023-08-25 | End: 2023-08-25

## 2023-08-25 RX ORDER — TALC
6 POWDER (GRAM) TOPICAL NIGHTLY PRN
Status: DISCONTINUED | OUTPATIENT
Start: 2023-08-25 | End: 2023-08-26 | Stop reason: HOSPADM

## 2023-08-25 RX ADMIN — ENOXAPARIN SODIUM 90 MG: 100 INJECTION SUBCUTANEOUS at 07:08

## 2023-08-25 RX ADMIN — IOHEXOL 100 ML: 350 INJECTION, SOLUTION INTRAVENOUS at 01:08

## 2023-08-25 RX ADMIN — GUAIFENESIN AND DEXTROMETHORPHAN HYDROBROMIDE 1 TABLET: 600; 30 TABLET, EXTENDED RELEASE ORAL at 09:08

## 2023-08-25 RX ADMIN — Medication 6 MG: at 09:08

## 2023-08-25 RX ADMIN — CETIRIZINE HYDROCHLORIDE 10 MG: 10 TABLET, FILM COATED ORAL at 09:08

## 2023-08-25 NOTE — HPI
,Danna Sorensen is a 83 y.o. 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 who presented to the ED with a Shortness of Breath (Pt has a PE per CT scan done today )    Patient presents to the emergency room after being notified by primary care NP that she had a blood clot in the lungs that was noted on CT scan.  Patient reports he was seen by the nurse practitioner on last Friday with a complaint of bad cough.  She states she was started on antibiotics and a CT scan was ordered. She reports chronic dyspnea for years (not on home oxygen) and states it has not changed or gotten worse.      Denies fever, chills, diaphoresis, dizziness, HA, chest pain, palpitations, NVD. Does not smoke cigarettes, drink or do illegal drugs.     Patient reports hx of colon CA (2006) and recent diagnosis of adenocarcinoma and is scheduled for colectomy-robotic procedure by Dr. Chavez September 11, 2023.    Lab and imaging obtained and reviewed.  CBC completed and shows RBCs 3.41 H/H 9.7/3 RDW is 15.0 g% 75.4 lymphocytes 13.8 immature granulocytes 0.9.  PT/INR within normal range.  Chemistry profile shows potassium 3.2 CO2 21 Ag 7 alk-phos 51 T bili 1.3.  EKG nonischemic.  On admit afebrile, HR 95, R 18, BP 1 50-82, O2 sats 97% on room air.       CT chest  IMPRESSION:  1. Moderate volume of acute subsegmental and segmental pulmonary emboli in the right lung.  2. No finding of right heart strain/failure.  3. Small pulmonary nodules in the lungs as  outlined above.  4. Partially imaged masslike density in the splenic flexure the colon, highly suspicious for malignancy.  5. Cholelithiasis without acute cholecystitis.  6. Hepatic parenchymal findings of steatosis.  7. Coronary artery calcifications (three-vessel disease) and atheromatous calcified plaque at the aortic arch.    Per ED provider, patient with outpatient CT positive for Moderate volume of acute subsegmental and segmental pulmonary emboli in the right lung. Will admit for echo in AM and start on eliquis.  Given recent diagnosis of adenocarcinoma will likely require long-term anticoagulation.

## 2023-08-25 NOTE — ED PROVIDER NOTES
"Encounter Date: 8/25/2023       History     Chief Complaint   Patient presents with    Shortness of Breath     Pt has a PE per CT scan done today      83-year-old female past medical history significant for colon cancer status post right hemicolectomy 10 years prior, also with colonoscopy finding of colitis and multiple weeks of persistent cough presents for evaluation of CT diagnosed pulmonary emboli.  Patient was worked up by her outpatient primary provider and noted on imaging to have   "The pulmonary artery is mildly enlarged (a finding which may represent pulmonary vascular congestion or pulmonary arterial hypertension. Pulmonary vasculature shows a moderate volume of acute pulmonary thromboembolus in the anterior right upper lobe (image 42), posterior right upper lobe (image 42), anterior right upper lobe (image 53), right middle lobe (image 70), and right lower lobe (image 88)" also noted incidental colonic splenic flexure mass suspicious for malignancy.    Patient tells me that she is mildly dyspneic but at her baseline.  She does not use oxygen at home.  She has no chest pain.  Her only complaint really is this persistent cough.        Review of patient's allergies indicates:   Allergen Reactions    Aspirin      Other reaction(s): Stomach upset    Gabapentin Other (See Comments)     Pt states she felt "funny" when she took the medication. Especially at the higher dose.    Lyrica [pregabalin]      Side effects    Mobic [meloxicam] Swelling     Edema and elevated blood pressure     Oxaliplatin      Other reaction(s): Joint pain  Other reaction(s): Itching  Other reaction(s): Hives     Past Medical History:   Diagnosis Date    Acute pulmonary embolism without acute cor pulmonale 8/25/2023    Back pain     Carpal tunnel syndrome     Cataract     Colon cancer     Colon polyp     Coronary artery disease     Essential hypertension 08/09/2019    History of squamous cell carcinoma 07/27/2015    Hx of colon cancer, " stage IV 10/18/2016    Hypothyroidism     Obesity (BMI 30-39.9) 03/24/2016    Osteoarthritis     Osteoporosis     Personal history of colon cancer, stage III     Squamous cell carcinoma     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    Strabismus     Trouble in sleeping     Wears dentures     Uppers     Past Surgical History:   Procedure Laterality Date    CARDIAC SURGERY      cardiac cath    COLON SURGERY      colon resection    COLONOSCOPY N/A 10/18/2016    Procedure: COLONOSCOPY;  Surgeon: Rell Cordova MD;  Location: [facility] ENDO;  Service: Endoscopy;  Laterality: N/A;    COLONOSCOPY N/A 8/8/2023    Procedure: COLONOSCOPY;  Surgeon: Kaushik Pinon MD;  Location: [facility];  Service: Endoscopy;  Laterality: N/A;    COLONOSCOPY N/A 8/22/2023    Procedure: COLONOSCOPY (tattoo of colon ca);  Surgeon: Kaushik Pinon MD;  Location: [facility];  Service: Endoscopy;  Laterality: N/A;    ESOPHAGOGASTRODUODENOSCOPY N/A 8/3/2023    Procedure: EGD (ESOPHAGOGASTRODUODENOSCOPY);  Surgeon: Kaushik Pinon MD;  Location: [facility];  Service: Endoscopy;  Laterality: N/A;    HAND TENDON SURGERY Left     HEMICOLECTOMY      right    INJECTION OF ANESTHETIC AGENT AROUND MEDIAL BRANCH NERVES INNERVATING LUMBAR FACET JOINT Right 07/10/2018    Procedure: Block-nerve-medial branch-lumbar;  Surgeon: Parish Gaitan MD;  Location: [facility] OR;  Service: Pain Management;  Laterality: Right;  L3, 4, 5    JOINT REPLACEMENT      bilateral    RADIOFREQUENCY ABLATION OF LUMBAR MEDIAL BRANCH NERVE AT SINGLE LEVEL Right 07/24/2018    Procedure: RADIOFREQUENCY ABLATION, NERVE, MEDIAL BRANCH, LUMBAR, 1 LEVEL;  Surgeon: Parish Gaitan MD;  Location: [facility] OR;  Service: Pain Management;  Laterality: Right;  L3,4,5 - Burned at 80 degrees C. for 75 seconds x 2 each site    SHOULDER ARTHROSCOPY Right 01/12/2017    Reverse     TONSILLECTOMY      TONSILLECTOMY, ADENOIDECTOMY, BILATERAL MYRINGOTOMY AND TUBES      TOTAL KNEE ARTHROPLASTY  Bilateral 1998    total replacements    TUMOR REMOVAL Left     left foot as a child     Family History   Problem Relation Age of Onset    Hypertension Mother     Kidney disease Mother     Cataracts Father     Cataracts Sister     Cancer Sister         breast    No Known Problems Brother     Cancer Sister         breast    Arthritis Sister     Glaucoma Neg Hx     Amblyopia Neg Hx     Blindness Neg Hx     Macular degeneration Neg Hx     Retinal detachment Neg Hx     Strabismus Neg Hx     Stroke Neg Hx     Thyroid disease Neg Hx      Social History     Tobacco Use    Smoking status: Former     Current packs/day: 0.00     Average packs/day: 0.5 packs/day for 1 year (0.5 ttl pk-yrs)     Types: Cigarettes     Start date: 1960     Quit date: 1961     Years since quittin.7    Smokeless tobacco: Never   Substance Use Topics    Alcohol use: No    Drug use: No     Review of Systems   Constitutional:  Negative for chills, fatigue and fever.   HENT:  Negative for congestion, hearing loss, sore throat and trouble swallowing.    Eyes:  Negative for visual disturbance.   Respiratory:  Positive for cough. Negative for chest tightness and shortness of breath.    Cardiovascular:  Negative for chest pain.   Gastrointestinal:  Negative for abdominal pain and nausea.   Endocrine: Negative for polyuria.   Genitourinary:  Negative for difficulty urinating.   Musculoskeletal:  Negative for arthralgias and myalgias.   Skin:  Negative for rash.   Neurological:  Negative for dizziness and headaches.   Psychiatric/Behavioral:  The patient is not nervous/anxious.        Physical Exam     Initial Vitals [23 1550]   BP Pulse Resp Temp SpO2   (!) 152/82 95 18 97.9 °F (36.6 °C) 97 %      MAP       --         Physical Exam    Nursing note and vitals reviewed.  Constitutional: She appears well-developed and well-nourished.   HENT:   Head: Normocephalic and atraumatic.   Eyes: Conjunctivae are normal. Pupils are equal, round, and  reactive to light.   Neck: Neck supple.   Normal range of motion.  Cardiovascular:  Normal rate, regular rhythm and normal heart sounds.           Pulmonary/Chest: Breath sounds normal.   Abdominal: Abdomen is soft. She exhibits no distension. There is no abdominal tenderness.   Musculoskeletal:         General: Normal range of motion.      Cervical back: Normal range of motion and neck supple.     Neurological: She is alert and oriented to person, place, and time. GCS score is 15. GCS eye subscore is 4. GCS verbal subscore is 5. GCS motor subscore is 6.   Skin: Skin is warm and dry. Capillary refill takes less than 2 seconds.   Psychiatric: She has a normal mood and affect. Her behavior is normal. Judgment and thought content normal.         ED Course   Procedures  Labs Reviewed   CBC W/ AUTO DIFFERENTIAL - Abnormal; Notable for the following components:       Result Value    RBC 3.41 (*)     Hemoglobin 9.7 (*)     Hematocrit 30.0 (*)     RDW 15.0 (*)     Immature Granulocytes 0.9 (*)     Immature Grans (Abs) 0.07 (*)     Gran % 75.4 (*)     Lymph % 13.8 (*)     All other components within normal limits   COMPREHENSIVE METABOLIC PANEL - Abnormal; Notable for the following components:    Potassium 3.2 (*)     CO2 21 (*)     Total Bilirubin 1.3 (*)     Alkaline Phosphatase 51 (*)     Anion Gap 7 (*)     All other components within normal limits   APTT   PROTIME-INR        ECG Results              EKG 12-lead (Final result)  Result time 08/28/23 19:02:23      Final result by Interface, Lab In [facility] (08/28/23 19:02:23)                   Narrative:    Test Reason : R06.00,    Vent. Rate : 083 BPM     Atrial Rate : 083 BPM     P-R Int : 190 ms          QRS Dur : 088 ms      QT Int : 376 ms       P-R-T Axes : 065 -66 064 degrees     QTc Int : 441 ms    Normal sinus rhythm  Low voltage QRS clockwise rotation    Left anterior fascicular block  Abnormal ECG  When compared with ECG of 03-JAN-2017 08:28,  Incomplete right  bundle branch block is no longer Present  Confirmed by Chance Gallego MD ([id number]) on 8/28/2023 7:02:13 PM    Referred By: [email or web address]   SELF           Confirmed By:Chance Gallego MD                                  Imaging Results    None          Medications   enoxaparin injection 90 mg (90 mg Subcutaneous Given 8/25/23 1911)     Medical Decision Making  83-year-old female presents for emergent evaluation of CT confirmed pulmonary emboli.  Patient was being worked up outpatient, had CTA of the chest and found to have moderate clot burden.  She has no contraindication for anticoagulation.  I did order Lovenox 1 mg per kg in the emergency room.  She is in no respiratory distress, not hypoxic.  I consulted hospital medicine for admission.    Amount and/or Complexity of Data Reviewed  Labs: ordered.  ECG/medicine tests: ordered and independent interpretation performed. Decision-making details documented in ED Course.     Details: This represents my own interpretation of the EKG. EKG was reviewed by an attending physician for evidence of acute ischemia. Previous EKGs were reviewed as available and indicated for comparison.    - sinus rhythm at a rate of 84 beats per minute.  - Normal axis.   - Normal intervals. The QRS is not widened. The calculated QTc is not prolonged.  - No hypertrophy, no bundle branch block.  - No ST or T wave changes from baseline.     Discussion of management or test interpretation with external provider(s): [facility]    Risk  Prescription drug management.              Attending Attestation:     Physician Attestation Statement for NP/PA:       Other NP/PA Attestation Additions:    History of Present Illness: I was personally available for consultation in the emergency department.  I have reviewed the chart and agree with the documentation as recorded by the nurse practitioner/physician assistant, including the assessment, treatment plan,  and disposition.                ED Course as of 09/07/23 1654   Fri Aug 25, 2023   1845 Patient case discussed with Hospital Medicine. [AN]   1928 Discussed case with Hospital Medicine. [AN]      ED Course User Index  [AN] Yanick Julio PA-C                    Clinical Impression:   Final diagnoses:  [R06.00] Dyspnea  [I26.99] Acute pulmonary embolism without acute cor pulmonale, unspecified pulmonary embolism type (Primary)        ED Disposition Condition    Observation                 Yanick Julio PA-C  08/25/23 2135       Yanick Julio PA-C  08/25/23 2136       Jihan Gonsales MD  09/07/23 1654

## 2023-08-26 ENCOUNTER — CLINICAL SUPPORT (OUTPATIENT)
Dept: CARDIOLOGY | Facility: HOSPITAL | Age: 83
End: 2023-08-26
Attending: EMERGENCY MEDICINE
Payer: MEDICARE

## 2023-08-26 VITALS
HEART RATE: 62 BPM | RESPIRATION RATE: 16 BRPM | DIASTOLIC BLOOD PRESSURE: 55 MMHG | WEIGHT: 194.88 LBS | HEIGHT: 64 IN | BODY MASS INDEX: 33.27 KG/M2 | SYSTOLIC BLOOD PRESSURE: 100 MMHG | OXYGEN SATURATION: 98 % | TEMPERATURE: 98 F

## 2023-08-26 VITALS — BODY MASS INDEX: 33.27 KG/M2 | HEIGHT: 64 IN | WEIGHT: 194.88 LBS

## 2023-08-26 PROBLEM — I26.99 ACUTE PULMONARY EMBOLISM WITHOUT ACUTE COR PULMONALE: Status: RESOLVED | Noted: 2023-08-25 | Resolved: 2023-08-26

## 2023-08-26 LAB
ALBUMIN SERPL BCP-MCNC: 2.9 G/DL (ref 3.5–5.2)
ALP SERPL-CCNC: 41 U/L (ref 55–135)
ALT SERPL W/O P-5'-P-CCNC: 18 U/L (ref 10–44)
ANION GAP SERPL CALC-SCNC: 5 MMOL/L (ref 8–16)
AST SERPL-CCNC: 18 U/L (ref 10–40)
BASOPHILS # BLD AUTO: 0.03 K/UL (ref 0–0.2)
BASOPHILS NFR BLD: 0.5 % (ref 0–1.9)
BILIRUB SERPL-MCNC: 0.8 MG/DL (ref 0.1–1)
BUN SERPL-MCNC: 14 MG/DL (ref 8–23)
CALCIUM SERPL-MCNC: 8.1 MG/DL (ref 8.7–10.5)
CHLORIDE SERPL-SCNC: 114 MMOL/L (ref 95–110)
CO2 SERPL-SCNC: 21 MMOL/L (ref 23–29)
CREAT SERPL-MCNC: 0.8 MG/DL (ref 0.5–1.4)
DIFFERENTIAL METHOD: ABNORMAL
EOSINOPHIL # BLD AUTO: 0.1 K/UL (ref 0–0.5)
EOSINOPHIL NFR BLD: 1.3 % (ref 0–8)
ERYTHROCYTE [DISTWIDTH] IN BLOOD BY AUTOMATED COUNT: 15 % (ref 11.5–14.5)
EST. GFR  (NO RACE VARIABLE): >60 ML/MIN/1.73 M^2
GLUCOSE SERPL-MCNC: 97 MG/DL (ref 70–110)
HCT VFR BLD AUTO: 26.3 % (ref 37–48.5)
HGB BLD-MCNC: 8.6 G/DL (ref 12–16)
IMM GRANULOCYTES # BLD AUTO: 0.06 K/UL (ref 0–0.04)
IMM GRANULOCYTES NFR BLD AUTO: 1 % (ref 0–0.5)
LYMPHOCYTES # BLD AUTO: 1 K/UL (ref 1–4.8)
LYMPHOCYTES NFR BLD: 16 % (ref 18–48)
MAGNESIUM SERPL-MCNC: 1.8 MG/DL (ref 1.6–2.6)
MCH RBC QN AUTO: 29 PG (ref 27–31)
MCHC RBC AUTO-ENTMCNC: 32.7 G/DL (ref 32–36)
MCV RBC AUTO: 89 FL (ref 82–98)
MONOCYTES # BLD AUTO: 0.7 K/UL (ref 0.3–1)
MONOCYTES NFR BLD: 11.1 % (ref 4–15)
NEUTROPHILS # BLD AUTO: 4.3 K/UL (ref 1.8–7.7)
NEUTROPHILS NFR BLD: 70.1 % (ref 38–73)
NRBC BLD-RTO: 0 /100 WBC
PHOSPHATE SERPL-MCNC: 3.8 MG/DL (ref 2.7–4.5)
PLATELET # BLD AUTO: 186 K/UL (ref 150–450)
PMV BLD AUTO: 10.2 FL (ref 9.2–12.9)
POTASSIUM SERPL-SCNC: 3.5 MMOL/L (ref 3.5–5.1)
PROT SERPL-MCNC: 5.8 G/DL (ref 6–8.4)
RBC # BLD AUTO: 2.97 M/UL (ref 4–5.4)
SODIUM SERPL-SCNC: 140 MMOL/L (ref 136–145)
WBC # BLD AUTO: 6.19 K/UL (ref 3.9–12.7)

## 2023-08-26 PROCEDURE — 85025 COMPLETE CBC W/AUTO DIFF WBC: CPT | Performed by: NURSE PRACTITIONER

## 2023-08-26 PROCEDURE — 83735 ASSAY OF MAGNESIUM: CPT | Performed by: NURSE PRACTITIONER

## 2023-08-26 PROCEDURE — 80053 COMPREHEN METABOLIC PANEL: CPT | Performed by: NURSE PRACTITIONER

## 2023-08-26 PROCEDURE — 36415 COLL VENOUS BLD VENIPUNCTURE: CPT | Performed by: NURSE PRACTITIONER

## 2023-08-26 PROCEDURE — 93306 TTE W/DOPPLER COMPLETE: CPT

## 2023-08-26 PROCEDURE — 25000003 PHARM REV CODE 250: Performed by: NURSE PRACTITIONER

## 2023-08-26 PROCEDURE — 94761 N-INVAS EAR/PLS OXIMETRY MLT: CPT

## 2023-08-26 PROCEDURE — G0378 HOSPITAL OBSERVATION PER HR: HCPCS

## 2023-08-26 PROCEDURE — 84100 ASSAY OF PHOSPHORUS: CPT | Performed by: NURSE PRACTITIONER

## 2023-08-26 PROCEDURE — 99900035 HC TECH TIME PER 15 MIN (STAT)

## 2023-08-26 RX ORDER — APIXABAN 5 MG (74)
KIT ORAL
Qty: 74 TABLET | Refills: 1 | Status: SHIPPED | OUTPATIENT
Start: 2023-08-26 | End: 2023-11-24

## 2023-08-26 RX ADMIN — LISINOPRIL 30 MG: 10 TABLET ORAL at 08:08

## 2023-08-26 RX ADMIN — PANTOPRAZOLE SODIUM 40 MG: 40 TABLET, DELAYED RELEASE ORAL at 05:08

## 2023-08-26 RX ADMIN — MULTIVITAMIN TABLET 1 TABLET: TABLET at 08:08

## 2023-08-26 RX ADMIN — GUAIFENESIN AND DEXTROMETHORPHAN HYDROBROMIDE 1 TABLET: 600; 30 TABLET, EXTENDED RELEASE ORAL at 08:08

## 2023-08-26 RX ADMIN — LEVOTHYROXINE SODIUM 75 MCG: 0.03 TABLET ORAL at 05:08

## 2023-08-26 RX ADMIN — APIXABAN 10 MG: 5 TABLET, FILM COATED ORAL at 08:08

## 2023-08-26 RX ADMIN — POTASSIUM BICARBONATE 50 MEQ: 977.5 TABLET, EFFERVESCENT ORAL at 09:08

## 2023-08-26 NOTE — PLAN OF CARE
08/26/23 1354   Final Note   Assessment Type Final Discharge Note   Anticipated Discharge Disposition Home   What phone number can be called within the next 1-3 days to see how you are doing after discharge? [phone]   Post-Acute Status   Discharge Delays None known at this time     Patient cleared for discharge from case management standpoint.  Chart and discharge orders reviewed.  Patient discharged home with no further case management needs.

## 2023-08-26 NOTE — H&P
[facility] - Emergency Dept  Hospital Medicine  History & Physical    Patient Name: Danna Sorensen  MRN: [id number]  Patient Class: OP- Observation  Admission Date: 8/25/2023  Attending Physician: Frankie Resendez MD   Primary Care Provider: Claudia Kmi MD         Patient information was obtained from patient and ER records.     Subjective:     Principal Problem:<principal problem not specified>    Chief Complaint:   Chief Complaint   Patient presents with    Shortness of Breath     Pt has a PE per CT scan done today         HPI: ,Danna Sorensen is a 83 y.o. 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 who presented to the ED with a Shortness of Breath (Pt has a PE per CT scan done today )    Patient presents to the emergency room after being notified by primary care NP that she had a blood clot in the lungs that was noted on CT scan.  Patient reports he was seen by the nurse practitioner on last Friday with a complaint of bad cough.  She states she was started on antibiotics and a CT scan was ordered. She reports chronic dyspnea for years (not on home oxygen) and states it has not changed or gotten worse.      Denies fever, chills, diaphoresis, dizziness, HA, chest pain, palpitations, NVD. Does not smoke cigarettes, drink or do illegal drugs.     Patient reports hx of colon CA (2006) and recent diagnosis of adenocarcinoma and is scheduled for colectomy-robotic procedure by Dr. Chavez September 11,  2023.    Lab and imaging obtained and reviewed.  CBC completed and shows RBCs 3.41 H/H 9.7/3 RDW is 15.0 g% 75.4 lymphocytes 13.8 immature granulocytes 0.9.  PT/INR within normal range.  Chemistry profile shows potassium 3.2 CO2 21 Ag 7 alk-phos 51 T bili 1.3.  EKG nonischemic.  On admit afebrile, HR 95, R 18, BP 1 50-82, O2 sats 97% on room air.       CT chest  IMPRESSION:  1. Moderate volume of acute subsegmental and segmental pulmonary emboli in the right lung.  2. No finding of right heart strain/failure.  3. Small pulmonary nodules in the lungs as outlined above.  4. Partially imaged masslike density in the splenic flexure the colon, highly suspicious for malignancy.  5. Cholelithiasis without acute cholecystitis.  6. Hepatic parenchymal findings of steatosis.  7. Coronary artery calcifications (three-vessel disease) and atheromatous calcified plaque at the aortic arch.    Per ED provider, patient with outpatient CT positive for Moderate volume of acute subsegmental and segmental pulmonary emboli in the right lung. Will admit for echo in AM and start on eliquis.  Given recent diagnosis of adenocarcinoma will likely require long-term anticoagulation.          Past Medical History:   Diagnosis Date    Back pain     Carpal tunnel syndrome     Cataract     Colon cancer     Colon polyp     Coronary artery disease     Essential hypertension 08/09/2019    History of squamous cell carcinoma 07/27/2015    Hx of colon cancer, stage IV 10/18/2016    Hypothyroidism     Obesity (BMI 30-39.9) 03/24/2016    Osteoarthritis     Osteoporosis     Personal history of colon cancer, stage III     Squamous cell carcinoma     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    Strabismus     Trouble in sleeping     Wears dentures     Uppers       Past Surgical History:   Procedure Laterality Date    CARDIAC SURGERY      cardiac cath    COLON SURGERY      colon resection    COLONOSCOPY N/A  "10/18/2016    Procedure: COLONOSCOPY;  Surgeon: Rell Cordova MD;  Location: [facility] ENDO;  Service: Endoscopy;  Laterality: N/A;    COLONOSCOPY N/A 8/8/2023    Procedure: COLONOSCOPY;  Surgeon: Kaushik Pinon MD;  Location: [facility];  Service: Endoscopy;  Laterality: N/A;    COLONOSCOPY N/A 8/22/2023    Procedure: COLONOSCOPY (tattoo of colon ca);  Surgeon: Kaushik Pinon MD;  Location: [facility];  Service: Endoscopy;  Laterality: N/A;    ESOPHAGOGASTRODUODENOSCOPY N/A 8/3/2023    Procedure: EGD (ESOPHAGOGASTRODUODENOSCOPY);  Surgeon: Kaushik Pinon MD;  Location: [facility];  Service: Endoscopy;  Laterality: N/A;    HAND TENDON SURGERY Left     HEMICOLECTOMY      right    INJECTION OF ANESTHETIC AGENT AROUND MEDIAL BRANCH NERVES INNERVATING LUMBAR FACET JOINT Right 07/10/2018    Procedure: Block-nerve-medial branch-lumbar;  Surgeon: Parish Gaitan MD;  Location: [facility];  Service: Pain Management;  Laterality: Right;  L3, 4, 5    JOINT REPLACEMENT      bilateral    RADIOFREQUENCY ABLATION OF LUMBAR MEDIAL BRANCH NERVE AT SINGLE LEVEL Right 07/24/2018    Procedure: RADIOFREQUENCY ABLATION, NERVE, MEDIAL BRANCH, LUMBAR, 1 LEVEL;  Surgeon: Parish Gaitan MD;  Location: [facility] OR;  Service: Pain Management;  Laterality: Right;  L3,4,5 - Burned at 80 degrees C. for 75 seconds x 2 each site    SHOULDER ARTHROSCOPY Right 01/12/2017    Reverse     TONSILLECTOMY      TONSILLECTOMY, ADENOIDECTOMY, BILATERAL MYRINGOTOMY AND TUBES      TOTAL KNEE ARTHROPLASTY Bilateral 08/1998    total replacements    TUMOR REMOVAL Left     left foot as a child       Review of patient's allergies indicates:   Allergen Reactions    Aspirin      Other reaction(s): Stomach upset    Gabapentin Other (See Comments)     Pt states she felt "funny" when she took the medication. Especially at the higher dose.    Lyrica [pregabalin]      Side effects    Mobic [meloxicam] Swelling     Edema and elevated blood pressure     Oxaliplatin  "     Other reaction(s): Joint pain  Other reaction(s): Itching  Other reaction(s): Hives       Current Facility-Administered Medications on File Prior to Encounter   Medication    [COMPLETED] iohexoL (OMNIPAQUE 350) injection 100 mL     Current Outpatient Medications on File Prior to Encounter   Medication Sig    acetaminophen (TYLENOL) 650 MG TbSR Take 650 mg by mouth every 8 (eight) hours.    amoxicillin-clavulanate 875-125mg (AUGMENTIN) 875-125 mg per tablet Take 1 tablet by mouth every 12 (twelve) hours. for 7 days    ergocalciferol, vitamin D2, (VITAMIN D ORAL) Take 2,000 mg by mouth once daily.    furosemide (LASIX) 20 MG tablet Take 1 tablet (20 mg total) by mouth daily as needed (edema).    hydrOXYzine HCL (ATARAX) 25 MG tablet Take 1 tablet (25 mg total) by mouth 3 (three) times daily as needed for Anxiety (insomnia).    levocetirizine (XYZAL) 5 MG tablet Take 1 tablet (5 mg total) by mouth nightly as needed for Allergies (allergies).    levothyroxine (SYNTHROID) 75 MCG tablet Take 1 tablet (75 mcg total) by mouth once daily.    lisinopriL 10 MG tablet Take 1 tablet (10 mg total) by mouth once daily. (Patient taking differently: Take 30 mg by mouth once daily.)    multivitamin capsule Take 1 capsule by mouth once daily.    nystatin (MYCOSTATIN) cream Apply topically 2 (two) times daily as needed. GRETCHEN EXT AA BID    omeprazole (PRILOSEC) 40 MG capsule Take 1 capsule (40 mg total) by mouth 2 (two) times daily before meals.    promethazine-dextromethorphan (PROMETHAZINE-DM) 6.25-15 mg/5 mL Syrp Take 5 mLs by mouth 4 (four) times daily as needed (cough).    traMADoL (ULTRAM) 50 mg tablet Take 1 tablet (50 mg total) by mouth every 8 (eight) hours as needed for Pain.    triamcinolone acetonide 0.1% (KENALOG) 0.1 % cream APPLY EXTERNALLY TO THE AFFECTED AREA TWICE DAILY    alendronate (FOSAMAX) 70 MG tablet TAKE 1 TABLET(70 MG) BY MOUTH EVERY 7 DAYS    cyclobenzaprine (FLEXERIL) 5 MG tablet Take 1  tablet (5 mg total) by mouth 3 (three) times daily as needed for Muscle spasms.    fluticasone propionate (FLONASE) 50 mcg/actuation nasal spray 1 spray (50 mcg total) by Each Nostril route once daily.     Family History       Problem Relation (Age of Onset)    Arthritis Sister    Cancer Sister, Sister    Cataracts Father, Sister    Hypertension Mother    Kidney disease Mother    No Known Problems Brother          Tobacco Use    Smoking status: Former     Current packs/day: 0.00     Average packs/day: 0.5 packs/day for 1 year (0.5 ttl pk-yrs)     Types: Cigarettes     Start date: 1960     Quit date: 1961     Years since quittin.6    Smokeless tobacco: Never   Substance and Sexual Activity    Alcohol use: No    Drug use: No    Sexual activity: Never     Review of Systems   Constitutional:  Negative for chills, diaphoresis and fever.   HENT:  Negative for congestion, postnasal drip, sinus pressure and sore throat.    Eyes:  Negative for visual disturbance.   Respiratory:  Positive for cough and shortness of breath (Chronic, not requiring home oxygen). Negative for chest tightness and wheezing.    Cardiovascular:  Negative for chest pain, palpitations and leg swelling.   Gastrointestinal:  Negative for abdominal distention, abdominal pain, blood in stool, constipation, diarrhea, nausea and vomiting.   Endocrine: Negative.    Genitourinary:  Negative for dysuria.   Musculoskeletal: Negative.    Skin: Negative.    Allergic/Immunologic: Negative.    Neurological:  Negative for dizziness, weakness, numbness and headaches.   Hematological: Negative.    Psychiatric/Behavioral: Negative.       Objective:     Vital Signs (Most Recent):  Temp: 97.9 °F (36.6 °C) (23 1550)  Pulse: 67 (23 1720)  Resp: 18 (23 1550)  BP: 131/61 (23 1720)  SpO2: 100 % (23 1720) Vital Signs (24h Range):  Temp:  [97.9 °F (36.6 °C)] 97.9 °F (36.6 °C)  Pulse:  [67-95] 67  Resp:  [18] 18  SpO2:  [95 %-100  %] 100 %  BP: (130-152)/(61-82) 131/61     Weight: 88 kg (194 lb)  Body mass index is 33.3 kg/m².     Physical Exam  Constitutional:       General: She is not in acute distress.     Appearance: Normal appearance. She is well-developed. She is not toxic-appearing or diaphoretic.   HENT:      Head: Normocephalic and atraumatic.   Eyes:      General: Lids are normal.      Conjunctiva/sclera: Conjunctivae normal.      Pupils: Pupils are equal, round, and reactive to light.   Neck:      Thyroid: No thyroid mass or thyromegaly.      Vascular: Normal carotid pulses. No JVD.      Trachea: Trachea normal. No tracheal deviation.   Cardiovascular:      Rate and Rhythm: Normal rate and regular rhythm.      Pulses: Normal pulses.      Heart sounds: Normal heart sounds, S1 normal and S2 normal.   Pulmonary:      Effort: Pulmonary effort is normal.      Breath sounds: Normal breath sounds. No stridor.   Abdominal:      General: Bowel sounds are normal.      Palpations: Abdomen is soft.      Tenderness: There is no abdominal tenderness.   Musculoskeletal:         General: Normal range of motion.      Cervical back: Full passive range of motion without pain, normal range of motion and neck supple.   Skin:     General: Skin is warm and dry.      Nails: There is no clubbing.   Neurological:      Mental Status: She is alert and oriented to person, place, and time.      Cranial Nerves: No cranial nerve deficit.      Sensory: No sensory deficit.   Psychiatric:         Speech: Speech normal.         Behavior: Behavior normal. Behavior is cooperative.         Thought Content: Thought content normal.         Judgment: Judgment normal.              CRANIAL NERVES     CN III, IV, VI   Pupils are equal, round, and reactive to light.       Significant Labs: All pertinent labs within the past 24 hours have been reviewed.  A1C:   Recent Labs   Lab 07/19/23  0829   HGBA1C 4.9       Bilirubin:   Recent Labs   Lab 08/25/23  1706   BILITOT 1.3*      BMP:   Recent Labs   Lab 08/25/23  1706         K 3.2*      CO2 21*   BUN 12   CREATININE 0.9   CALCIUM 8.7     CBC:   Recent Labs   Lab 08/25/23  1706   WBC 7.59   HGB 9.7*   HCT 30.0*        CMP:   Recent Labs   Lab 08/25/23  1706      K 3.2*      CO2 21*      BUN 12   CREATININE 0.9   CALCIUM 8.7   PROT 7.0   ALBUMIN 3.6   BILITOT 1.3*   ALKPHOS 51*   AST 16   ALT 20   ANIONGAP 7*     Coagulation:   Recent Labs   Lab 08/25/23  1706   INR 1.0   APTT 24.2     TSH:   Recent Labs   Lab 07/19/23  0829   TSH 3.473     Significant Imaging: I have reviewed all pertinent imaging results/findings within the past 24 hours.  CT Chest With Contrast    Result Date: 8/25/2023  CMS MANDATED QUALITY DATA - CT RADIATION 436 All CT scans at this facility utilize dose modulation, iterative reconstruction, and/or weight based dosing when appropriate to reduce radiation dose to as low as reasonably achievable. CLINICAL HISTORY: 83 years (1940) Female Abnormal xray - lung nodule, < 1 cm, low risk TECHNIQUE: CT CHEST WITH IV CONTRAST. COMPARISON: CT of the abdomen and pelvis from August 25, 2025. FINDINGS:. Visualized neck: Within normal limits. Lungs: The lungs show scattered nodular densities as outlined below: -4 mm left upper lobe pulmonary nodule (image 22) -4 mm calcified granuloma in the left upper lobe (image 30) -2 mm pulmonary nodule in the left upper lobe (image 72) -9 x 9 mm triangular nodule along the juxtapleural right middle lobe (image 70) -2 mm pulmonary nodules in the right middle lobe (image 80) -Minimal linear bandlike atelectasis versus scarring is seen in the dependent lower lobes. Airway: The trachea and central bronchial tree appear normal. Pleura: There is no pleural effusion. There is no pneumothorax. Cardiovascular: The heart is normal in size with scattered coronary artery calcifications and atheromatous calcified plaque at the aortic arch. The pulmonary  artery is mildly enlarged (a finding which may represent pulmonary vascular congestion or pulmonary arterial hypertension. Pulmonary vasculature shows a moderate volume of acute pulmonary thromboembolus in the anterior right upper lobe (image 42), posterior right upper lobe (image 42), anterior right upper lobe (image 53), right middle lobe (image 70), and right lower lobe (image 88) Mediastinum: There is no supraclavicular, axillary, mediastinal, or hilar lymphadenopathy. Soft tissues: The peripheral soft tissues appear normal. Musculoskeletal: There are moderate degenerative changes of the thoracic spine.  There are no suspicious osseous lesions. Esophagus: The esophagus appears grossly normal. Upper Abdomen: Partially imaged in the upper abdomen is a masslike density in the splenic flexure the colon. There appears to be relative diffuse low-attenuation liver compatible with steatosis. There are stones seen in the gallbladder. IMPRESSION: 1. Moderate volume of acute subsegmental and segmental pulmonary emboli in the right lung. 2. No finding of right heart strain/failure. 3. Small pulmonary nodules in the lungs as outlined above. 4. Partially imaged masslike density in the splenic flexure the colon, highly suspicious for malignancy. 5. Cholelithiasis without acute cholecystitis. 6. Hepatic parenchymal findings of steatosis. 7. Coronary artery calcifications (three-vessel disease) and atheromatous calcified plaque at the aortic arch. RESULT NOTIFICATION: These observations were discussed by the dictating physician, by phone and acknowledged by the ordering provider CARLOS CARO at 8/25/2023 2:08 PM CDT Electronically signed by:  Rosalino Mon MD  8/25/2023 2:18 PM CDT Workstation: [id number]    US Abdomen Complete    Result Date: 8/25/2023  EXAMINATION: US ABDOMEN COMPLETE CLINICAL HISTORY: abnormal finding on CT of left kidney and gallbladder; Abnormal findings on diagnostic imaging of other abdominal  regions, including retroperitoneum TECHNIQUE: Complete abdominal ultrasound (including pancreas, aorta, liver, gallbladder, common bile duct, IVC, kidneys, and spleen) was performed. COMPARISON: None FINDINGS: Pancreas: The visualized portions of pancreas appear normal. Aorta: No aneurysm. Liver: 18 cm, normal in size. There is diffuse increased echogenicity of the liver parenchyma compared to the renal parenchyma consistent with probable fatty infiltration.  No focal lesions. Gallbladder: Large gallstones are identified.  Thickening of the gallbladder wall is not identified. Biliary system: 7.4 mm common bile duct.  No intrahepatic ductal dilatation. Inferior vena cava: Normal in appearance. Right kidney: 10.2 cm. No hydronephrosis. Left kidney: 10.8 cm. No hydronephrosis..  There is a 2.5 cm hyperechoic solid mass with blood flow identified in the lower pole of the left kidney suspicious for neoplasm.  This corresponds to the finding on the prior CT of August 17, 2023. Spleen: 10.8 cm.  Normal in size with homogeneous echotexture. Miscellaneous: No ascites.     2.5 cm solid mass at the lower pole of the left kidney suspicious for neoplasm.  Cholelithiasis.  Fatty infiltration of the liver parenchyma. This report was flagged in Epic as abnormal. Electronically signed by: Oren Maria MD Date:    08/25/2023 Time:    10:25    X-Ray Chest PA And Lateral    Result Date: 8/18/2023  EXAMINATION: XR CHEST PA AND LATERAL CLINICAL HISTORY: Wheezing TECHNIQUE: PA and lateral views of the chest were performed. COMPARISON: 07/14/2023 FINDINGS: The heart does not appear enlarged.  The cardiomediastinal silhouette is stable.  No pleural effusion.  Lungs are clear of infiltrate.  There is thin, oval, 11 mm nodular opacity in the right midlung field projecting over the anterior aspect of the right 5th rib not significantly changed compared to the prior study 07/14/2023, not definitely seen on earlier exams i.e. 07/06/2021 or  04/25/2023.  Right hilar prominence is not significantly changed compared to multiple prior exams. Right shoulder reverse arthroplasty     Somewhat oval nodular opacity in the right midlung field and suggest follow-up chest CT for further evaluation. No infiltrates or acute cardiopulmonary disease demonstrated Electronically signed by: Paulina Beck MD Date:    08/18/2023 Time:    11:56    CT Abdomen Pelvis With Contrast    Result Date: 8/17/2023  CMS MANDATED QUALITY DATA - CT RADIATION - 436 All CT scans at this facility utilize dose modulation, iterative reconstruction, and/or weight based dosing when appropriate to reduce radiation dose to as low as reasonably achievable. Reason: abnormal finding on colonoscopy TECHNIQUE: CT abdomen and pelvis with 100 mL Omnipaque 350. COMPARISON: None FINDINGS: There is subsegmental atelectasis of the lung bases. Heart size is normal. Liver is normal size. No enhancing hepatic lesion identified. Irregular appearance of the gallbladder is demonstrated with either noncalcified central gallstone and/or gallbladder wall thickening. There is no pericholecystic fluid. The common bile duct is within normal limits. There is fatty atrophy of the pancreas. The spleen and adrenal glands are unremarkable. The kidneys are normal size. There is a heterogeneously hyperattenuating lesion at the inferior pole of the left kidney measuring 1.8 cm, possibly reflecting an enhancing renal mass. There is no hydronephrosis or nephrolithiasis. The ureters are normal caliber without obstructions. The bladder is fluid-filled with no focal wall abnormalities. The uterus and adnexal structures are unremarkable. Pelvic phleboliths are noted. There is a short segment of concentric bowel wall thickening of the colon at the splenic flexure with mild surrounding mesenteric fat stranding. This thickening has a nodular appearance with thickness portion of the bowel measuring 2.2 cm. Other portions of the large  bowel are unremarkable. Small bowel is normal caliber with no bowel wall thickening observed. The stomach is grossly unremarkable. The abdominal aorta is normal caliber with atherosclerosis. No intra-abdominal lymphadenopathy observed. There are degenerative changes of the spine. There is no acute osseous abnormality. IMPRESSION: 1.  Short segment of concentric nodular-like bowel wall thickening of the colon at the splenic flexure with mild mesenteric fat stranding. Findings concerning for colonic carcinoma. 2.  Heterogeneously hyperattenuating, possibly enhancing structure at the inferior pole of the left kidney is suspicious for an enhancing mass. 3.  Irregular appearance of the gallbladder demonstrated with either noncalcified central gallstone and/or gallbladder thickening demonstrated. Consider further evaluation with ultrasound of the gallbladder. Electronically signed by:  Cole Power DO  8/17/2023 12:15 PM CDT Workstation: [id number]       Assessment/Plan:     Active Hospital Problems    Diagnosis  POA    *Acute pulmonary embolism without acute cor pulmonale [I26.99]  Yes     Priority: 1 - High    Adenocarcinoma [C80.1]  Yes     Priority: 2     GERD (gastroesophageal reflux disease) [K21.9]  Yes    Essential hypertension [I10]  Yes    History of colon cancer [Z85.038]  Yes    Hypothyroid [E03.9]  Yes      Resolved Hospital Problems   No resolved problems to display.     Plan:  Admit to observation - Moderate volume of acute subsegmental and segmental pulmonary emboli in the right lung w/o finding of right heart strain/failure  Continuous cardiac monitor  Pulse oximetry O2 PRN - keep sats >93%, pulse ox q4 with vital signs  Given full dose lovenox in ED  Start eliquis 10 mg q12 x 7 days, then 5mg BID   Strict I/O and daily weight  DUO-nebs q4 hours PRN  Echo in AM   Daily labs  Electrolytes sliding scale repletion  Continue chronic home medications  Further plan as per clinical course        VTE  Risk Mitigation (From admission, onward)         Ordered     apixaban tablet 10 mg  Every 12 hours         08/25/23 1923     Reason for No Pharmacological VTE Prophylaxis  Once        Question:  Reasons:  Answer:  Physician Provided (leave comment)  Comment:  starting on Eliquis    08/25/23 1916     IP VTE HIGH RISK PATIENT  Once         08/25/23 1916     Place sequential compression device  Until discontinued         08/25/23 1916              On 08/25/2023, patient should be placed in hospital observation services under my care in collaboration with Dr. Resendez.      MARTIN Gandhi  Department of Hospital Medicine  [facility] - Emergency Dept

## 2023-08-26 NOTE — PLAN OF CARE
[facility]  Initial Discharge Assessment       Primary Care Provider: Claudia Kim MD    Admission Diagnosis: Acute pulmonary embolism without acute cor pulmonale, unspecified pulmonary embolism type [I26.99]    Admission Date: 8/25/2023  Expected Discharge Date: 8/26/2023    Pt is an 83-year-old female who arrived from home with NO active principal problem. The assessment was completed at patient's bedside. The Pt does not have a Living Will or Advance Directive.  The Pt is oriented to person, place, and time. PCP last seen July 2023.  Pt is not on dialysis, or Coumadin and is capable of performing ADLs.  Pt's niece Barbie Plummer drives to and from medical appointments.  Pt takes medication as prescribed daily. The patient denies readmission to the hospital in the past thirty days. When the patient is discharged her niece Barbie Plummer [phone] will assist her in returning home. CM reviewed the chart and will continue to monitor.      Transition of Care Barriers: None    Payor: [employer] / Plan: [employer] CHOICES 65 / Product Type: Medicare Advantage /     Extended Emergency Contact Information  Primary Emergency Contact: Daryn Plummery  Mobile Phone: [phone]  Relation: Relative  Preferred language: English   needed? No  Secondary Emergency Contact: CrowleyLizbeth  Mobile Phone: [phone]  Relation: Sister  Preferred language: English   needed? No    Discharge Plan A: Home with family  Discharge Plan B: Home      [id number] - [place] - [address] AT [facility] [address]  [address] [address]  Phone: [phone] Fax: [phone]      Initial Assessment (most recent)       Adult Discharge Assessment - 08/26/23 1347          Discharge Assessment    Assessment Type Discharge Planning Assessment     Confirmed/corrected address, phone number and insurance Yes     Confirmed Demographics Correct  on Facesheet     Source of Information patient     When was your last doctors appointment? --   July 2023    Does patient/caregiver understand observation status Yes     Communicated RENETTA with patient/caregiver Date not available/Unable to determine     Reason For Admission No active principal problem     People in Home other relative(s)     Facility Arrived From: Home     Do you expect to return to your current living situation? Yes     Do you have help at home or someone to help you manage your care at home? Yes     Who are your caregiver(s) and their phone number(s)? Barbie Plummer/oksana [phone]     Prior to hospitilization cognitive status: Alert/Oriented     Current cognitive status: Alert/Oriented     Home Accessibility wheelchair accessible     Equipment Currently Used at Home none     Readmission within 30 days? No     Patient currently being followed by outpatient case management? No     Do you currently have service(s) that help you manage your care at home? No     Do you take prescription medications? Yes     Do you have prescription coverage? Yes     Coverage Payor:  [employer] - [employer] CHOICES 65     Do you have any problems affording any of your prescribed medications? No     Is the patient taking medications as prescribed? yes     Who is going to help you get home at discharge? Barbie Agustin [phone]     How do you get to doctors appointments? family or friend will provide     Are you on dialysis? No     Do you take coumadin? No     DME Needed Upon Discharge  none     Discharge Plan discussed with: Patient     Transition of Care Barriers None     Discharge Plan A Home with family     Discharge Plan B Home

## 2023-08-26 NOTE — CARE UPDATE
08/26/23 0819   Patient Assessment/Suction   Level of Consciousness (AVPU) alert   Respiratory Effort Normal;Unlabored   Expansion/Accessory Muscles/Retractions no use of accessory muscles   All Lung Fields Breath Sounds diminished   PRE-TX-O2   Device (Oxygen Therapy) room air   SpO2 99 %   Pulse Oximetry Type Intermittent   $ Pulse Oximetry - Multiple Charge Pulse Oximetry - Multiple   Pulse 72   Resp 18   Positioning Sitting on edge of bed (dangling)   Aerosol Therapy   $ Aerosol Therapy Charges PRN treatment not required   Respiratory Evaluation   $ Care Plan Tech Time 15 min

## 2023-08-26 NOTE — CARE UPDATE
08/25/23 2030   Patient Assessment/Suction   Level of Consciousness (AVPU) alert   Respiratory Effort Normal   PRE-TX-O2   Device (Oxygen Therapy) room air   SpO2 96 %   Pulse 72   Resp 20   Aerosol Therapy   $ Aerosol Therapy Charges PRN treatment not required

## 2023-08-26 NOTE — SUBJECTIVE & OBJECTIVE
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    Strabismus     Trouble in sleeping     Wears dentures     Uppers       Past Surgical History:   Procedure Laterality Date    CARDIAC SURGERY      cardiac cath    COLON SURGERY      colon resection    COLONOSCOPY N/A 10/18/2016    Procedure: COLONOSCOPY;  Surgeon: Rell Cordova MD;  Location: [facility];  Service: Endoscopy;  Laterality: N/A;    COLONOSCOPY N/A 8/8/2023    Procedure: COLONOSCOPY;  Surgeon: Kaushik Pinon MD;  Location: [facility];  Service: Endoscopy;  Laterality: N/A;    COLONOSCOPY N/A 8/22/2023    Procedure: COLONOSCOPY (tattoo of colon ca);  Surgeon: Kaushik Pinon MD;  Location: [facility];  Service: Endoscopy;  Laterality: N/A;    ESOPHAGOGASTRODUODENOSCOPY N/A 8/3/2023    Procedure: EGD (ESOPHAGOGASTRODUODENOSCOPY);  Surgeon: Kaushik Pinon MD;  Location: [facility];  Service: Endoscopy;  Laterality: N/A;    HAND TENDON SURGERY Left     HEMICOLECTOMY      right    INJECTION OF ANESTHETIC AGENT AROUND MEDIAL BRANCH NERVES INNERVATING LUMBAR FACET JOINT Right 07/10/2018    Procedure: Block-nerve-medial branch-lumbar;  Surgeon: Parish Gaitan MD;  Location: [facility];  Service: Pain Management;  Laterality: Right;  L3, 4, 5    JOINT REPLACEMENT      bilateral    RADIOFREQUENCY ABLATION OF LUMBAR MEDIAL BRANCH NERVE AT SINGLE LEVEL Right 07/24/2018    Procedure: RADIOFREQUENCY ABLATION, NERVE, MEDIAL BRANCH, LUMBAR, 1 LEVEL;  Surgeon: Parish Gaitan MD;  Location: [facility] OR;  Service:  "Pain Management;  Laterality: Right;  L3,4,5 - Burned at 80 degrees C. for 75 seconds x 2 each site    SHOULDER ARTHROSCOPY Right 01/12/2017    Reverse     TONSILLECTOMY      TONSILLECTOMY, ADENOIDECTOMY, BILATERAL MYRINGOTOMY AND TUBES      TOTAL KNEE ARTHROPLASTY Bilateral 08/1998    total replacements    TUMOR REMOVAL Left     left foot as a child       Review of patient's allergies indicates:   Allergen Reactions    Aspirin      Other reaction(s): Stomach upset    Gabapentin Other (See Comments)     Pt states she felt "funny" when she took the medication. Especially at the higher dose.    Lyrica [pregabalin]      Side effects    Mobic [meloxicam] Swelling     Edema and elevated blood pressure     Oxaliplatin      Other reaction(s): Joint pain  Other reaction(s): Itching  Other reaction(s): Hives       Current Facility-Administered Medications on File Prior to Encounter   Medication    [COMPLETED] iohexoL (OMNIPAQUE 350) injection 100 mL     Current Outpatient Medications on File Prior to Encounter   Medication Sig    acetaminophen (TYLENOL) 650 MG TbSR Take 650 mg by mouth every 8 (eight) hours.    amoxicillin-clavulanate 875-125mg (AUGMENTIN) 875-125 mg per tablet Take 1 tablet by mouth every 12 (twelve) hours. for 7 days    ergocalciferol, vitamin D2, (VITAMIN D ORAL) Take 2,000 mg by mouth once daily.    furosemide (LASIX) 20 MG tablet Take 1 tablet (20 mg total) by mouth daily as needed (edema).    hydrOXYzine HCL (ATARAX) 25 MG tablet Take 1 tablet (25 mg total) by mouth 3 (three) times daily as needed for Anxiety (insomnia).    levocetirizine (XYZAL) 5 MG tablet Take 1 tablet (5 mg total) by mouth nightly as needed for Allergies (allergies).    levothyroxine (SYNTHROID) 75 MCG tablet Take 1 tablet (75 mcg total) by mouth once daily.    lisinopriL 10 MG tablet Take 1 tablet (10 mg total) by mouth once daily. (Patient taking differently: Take 30 mg by mouth once daily.)    multivitamin capsule Take 1 capsule " by mouth once daily.    nystatin (MYCOSTATIN) cream Apply topically 2 (two) times daily as needed. GRETCHEN EXT AA BID    omeprazole (PRILOSEC) 40 MG capsule Take 1 capsule (40 mg total) by mouth 2 (two) times daily before meals.    promethazine-dextromethorphan (PROMETHAZINE-DM) 6.25-15 mg/5 mL Syrp Take 5 mLs by mouth 4 (four) times daily as needed (cough).    traMADoL (ULTRAM) 50 mg tablet Take 1 tablet (50 mg total) by mouth every 8 (eight) hours as needed for Pain.    triamcinolone acetonide 0.1% (KENALOG) 0.1 % cream APPLY EXTERNALLY TO THE AFFECTED AREA TWICE DAILY    alendronate (FOSAMAX) 70 MG tablet TAKE 1 TABLET(70 MG) BY MOUTH EVERY 7 DAYS    cyclobenzaprine (FLEXERIL) 5 MG tablet Take 1 tablet (5 mg total) by mouth 3 (three) times daily as needed for Muscle spasms.    fluticasone propionate (FLONASE) 50 mcg/actuation nasal spray 1 spray (50 mcg total) by Each Nostril route once daily.     Family History       Problem Relation (Age of Onset)    Arthritis Sister    Cancer Sister, Sister    Cataracts Father, Sister    Hypertension Mother    Kidney disease Mother    No Known Problems Brother          Tobacco Use    Smoking status: Former     Current packs/day: 0.00     Average packs/day: 0.5 packs/day for 1 year (0.5 ttl pk-yrs)     Types: Cigarettes     Start date: 1960     Quit date: 1961     Years since quittin.6    Smokeless tobacco: Never   Substance and Sexual Activity    Alcohol use: No    Drug use: No    Sexual activity: Never     Review of Systems   Constitutional:  Negative for chills, diaphoresis and fever.   HENT:  Negative for congestion, postnasal drip, sinus pressure and sore throat.    Eyes:  Negative for visual disturbance.   Respiratory:  Positive for cough and shortness of breath (Chronic, not requiring home oxygen). Negative for chest tightness and wheezing.    Cardiovascular:  Negative for chest pain, palpitations and leg swelling.   Gastrointestinal:  Negative for abdominal  distention, abdominal pain, blood in stool, constipation, diarrhea, nausea and vomiting.   Endocrine: Negative.    Genitourinary:  Negative for dysuria.   Musculoskeletal: Negative.    Skin: Negative.    Allergic/Immunologic: Negative.    Neurological:  Negative for dizziness, weakness, numbness and headaches.   Hematological: Negative.    Psychiatric/Behavioral: Negative.       Objective:     Vital Signs (Most Recent):  Temp: 97.9 °F (36.6 °C) (08/25/23 1550)  Pulse: 67 (08/25/23 1720)  Resp: 18 (08/25/23 1550)  BP: 131/61 (08/25/23 1720)  SpO2: 100 % (08/25/23 1720) Vital Signs (24h Range):  Temp:  [97.9 °F (36.6 °C)] 97.9 °F (36.6 °C)  Pulse:  [67-95] 67  Resp:  [18] 18  SpO2:  [95 %-100 %] 100 %  BP: (130-152)/(61-82) 131/61     Weight: 88 kg (194 lb)  Body mass index is 33.3 kg/m².     Physical Exam  Constitutional:       General: She is not in acute distress.     Appearance: Normal appearance. She is well-developed. She is not toxic-appearing or diaphoretic.   HENT:      Head: Normocephalic and atraumatic.   Eyes:      General: Lids are normal.      Conjunctiva/sclera: Conjunctivae normal.      Pupils: Pupils are equal, round, and reactive to light.   Neck:      Thyroid: No thyroid mass or thyromegaly.      Vascular: Normal carotid pulses. No JVD.      Trachea: Trachea normal. No tracheal deviation.   Cardiovascular:      Rate and Rhythm: Normal rate and regular rhythm.      Pulses: Normal pulses.      Heart sounds: Normal heart sounds, S1 normal and S2 normal.   Pulmonary:      Effort: Pulmonary effort is normal.      Breath sounds: Normal breath sounds. No stridor.   Abdominal:      General: Bowel sounds are normal.      Palpations: Abdomen is soft.      Tenderness: There is no abdominal tenderness.   Musculoskeletal:         General: Normal range of motion.      Cervical back: Full passive range of motion without pain, normal range of motion and neck supple.   Skin:     General: Skin is warm and dry.       Nails: There is no clubbing.   Neurological:      Mental Status: She is alert and oriented to person, place, and time.      Cranial Nerves: No cranial nerve deficit.      Sensory: No sensory deficit.   Psychiatric:         Speech: Speech normal.         Behavior: Behavior normal. Behavior is cooperative.         Thought Content: Thought content normal.         Judgment: Judgment normal.              CRANIAL NERVES     CN III, IV, VI   Pupils are equal, round, and reactive to light.       Significant Labs: All pertinent labs within the past 24 hours have been reviewed.  A1C:   Recent Labs   Lab 07/19/23  0829   HGBA1C 4.9       Bilirubin:   Recent Labs   Lab 08/25/23  1706   BILITOT 1.3*     BMP:   Recent Labs   Lab 08/25/23  1706         K 3.2*      CO2 21*   BUN 12   CREATININE 0.9   CALCIUM 8.7     CBC:   Recent Labs   Lab 08/25/23  1706   WBC 7.59   HGB 9.7*   HCT 30.0*        CMP:   Recent Labs   Lab 08/25/23  1706      K 3.2*      CO2 21*      BUN 12   CREATININE 0.9   CALCIUM 8.7   PROT 7.0   ALBUMIN 3.6   BILITOT 1.3*   ALKPHOS 51*   AST 16   ALT 20   ANIONGAP 7*     Coagulation:   Recent Labs   Lab 08/25/23  1706   INR 1.0   APTT 24.2     TSH:   Recent Labs   Lab 07/19/23  0829   TSH 3.473     Significant Imaging: I have reviewed all pertinent imaging results/findings within the past 24 hours.  CT Chest With Contrast    Result Date: 8/25/2023  CMS MANDATED QUALITY DATA - CT RADIATION 436 All CT scans at this facility utilize dose modulation, iterative reconstruction, and/or weight based dosing when appropriate to reduce radiation dose to as low as reasonably achievable. CLINICAL HISTORY: 83 years (1940) Female Abnormal xray - lung nodule, < 1 cm, low risk TECHNIQUE: CT CHEST WITH IV CONTRAST. COMPARISON: CT of the abdomen and pelvis from August 25, 2025. FINDINGS:. Visualized neck: Within normal limits. Lungs: The lungs show scattered nodular densities as  outlined below: -4 mm left upper lobe pulmonary nodule (image 22) -4 mm calcified granuloma in the left upper lobe (image 30) -2 mm pulmonary nodule in the left upper lobe (image 72) -9 x 9 mm triangular nodule along the juxtapleural right middle lobe (image 70) -2 mm pulmonary nodules in the right middle lobe (image 80) -Minimal linear bandlike atelectasis versus scarring is seen in the dependent lower lobes. Airway: The trachea and central bronchial tree appear normal. Pleura: There is no pleural effusion. There is no pneumothorax. Cardiovascular: The heart is normal in size with scattered coronary artery calcifications and atheromatous calcified plaque at the aortic arch. The pulmonary artery is mildly enlarged (a finding which may represent pulmonary vascular congestion or pulmonary arterial hypertension. Pulmonary vasculature shows a moderate volume of acute pulmonary thromboembolus in the anterior right upper lobe (image 42), posterior right upper lobe (image 42), anterior right upper lobe (image 53), right middle lobe (image 70), and right lower lobe (image 88) Mediastinum: There is no supraclavicular, axillary, mediastinal, or hilar lymphadenopathy. Soft tissues: The peripheral soft tissues appear normal. Musculoskeletal: There are moderate degenerative changes of the thoracic spine.  There are no suspicious osseous lesions. Esophagus: The esophagus appears grossly normal. Upper Abdomen: Partially imaged in the upper abdomen is a masslike density in the splenic flexure the colon. There appears to be relative diffuse low-attenuation liver compatible with steatosis. There are stones seen in the gallbladder. IMPRESSION: 1. Moderate volume of acute subsegmental and segmental pulmonary emboli in the right lung. 2. No finding of right heart strain/failure. 3. Small pulmonary nodules in the lungs as outlined above. 4. Partially imaged masslike density in the splenic flexure the colon, highly suspicious for  malignancy. 5. Cholelithiasis without acute cholecystitis. 6. Hepatic parenchymal findings of steatosis. 7. Coronary artery calcifications (three-vessel disease) and atheromatous calcified plaque at the aortic arch. RESULT NOTIFICATION: These observations were discussed by the dictating physician, by phone and acknowledged by the ordering provider CARLOS CARO at 8/25/2023 2:08 PM CDT Electronically signed by:  Rosalino Mon MD  8/25/2023 2:18 PM CDT Workstation: [id number]    US Abdomen Complete    Result Date: 8/25/2023  EXAMINATION: US ABDOMEN COMPLETE CLINICAL HISTORY: abnormal finding on CT of left kidney and gallbladder; Abnormal findings on diagnostic imaging of other abdominal regions, including retroperitoneum TECHNIQUE: Complete abdominal ultrasound (including pancreas, aorta, liver, gallbladder, common bile duct, IVC, kidneys, and spleen) was performed. COMPARISON: None FINDINGS: Pancreas: The visualized portions of pancreas appear normal. Aorta: No aneurysm. Liver: 18 cm, normal in size. There is diffuse increased echogenicity of the liver parenchyma compared to the renal parenchyma consistent with probable fatty infiltration.  No focal lesions. Gallbladder: Large gallstones are identified.  Thickening of the gallbladder wall is not identified. Biliary system: 7.4 mm common bile duct.  No intrahepatic ductal dilatation. Inferior vena cava: Normal in appearance. Right kidney: 10.2 cm. No hydronephrosis. Left kidney: 10.8 cm. No hydronephrosis..  There is a 2.5 cm hyperechoic solid mass with blood flow identified in the lower pole of the left kidney suspicious for neoplasm.  This corresponds to the finding on the prior CT of August 17, 2023. Spleen: 10.8 cm.  Normal in size with homogeneous echotexture. Miscellaneous: No ascites.     2.5 cm solid mass at the lower pole of the left kidney suspicious for neoplasm.  Cholelithiasis.  Fatty infiltration of the liver parenchyma. This report was  flagged in Epic as abnormal. Electronically signed by: Oren Maria MD Date:    08/25/2023 Time:    10:25    X-Ray Chest PA And Lateral    Result Date: 8/18/2023  EXAMINATION: XR CHEST PA AND LATERAL CLINICAL HISTORY: Wheezing TECHNIQUE: PA and lateral views of the chest were performed. COMPARISON: 07/14/2023 FINDINGS: The heart does not appear enlarged.  The cardiomediastinal silhouette is stable.  No pleural effusion.  Lungs are clear of infiltrate.  There is thin, oval, 11 mm nodular opacity in the right midlung field projecting over the anterior aspect of the right 5th rib not significantly changed compared to the prior study 07/14/2023, not definitely seen on earlier exams i.e. 07/06/2021 or 04/25/2023.  Right hilar prominence is not significantly changed compared to multiple prior exams. Right shoulder reverse arthroplasty     Somewhat oval nodular opacity in the right midlung field and suggest follow-up chest CT for further evaluation. No infiltrates or acute cardiopulmonary disease demonstrated Electronically signed by: Paulina Beck MD Date:    08/18/2023 Time:    11:56    CT Abdomen Pelvis With Contrast    Result Date: 8/17/2023  CMS MANDATED QUALITY DATA - CT RADIATION - 436 All CT scans at this facility utilize dose modulation, iterative reconstruction, and/or weight based dosing when appropriate to reduce radiation dose to as low as reasonably achievable. Reason: abnormal finding on colonoscopy TECHNIQUE: CT abdomen and pelvis with 100 mL Omnipaque 350. COMPARISON: None FINDINGS: There is subsegmental atelectasis of the lung bases. Heart size is normal. Liver is normal size. No enhancing hepatic lesion identified. Irregular appearance of the gallbladder is demonstrated with either noncalcified central gallstone and/or gallbladder wall thickening. There is no pericholecystic fluid. The common bile duct is within normal limits. There is fatty atrophy of the pancreas. The spleen and adrenal glands are  unremarkable. The kidneys are normal size. There is a heterogeneously hyperattenuating lesion at the inferior pole of the left kidney measuring 1.8 cm, possibly reflecting an enhancing renal mass. There is no hydronephrosis or nephrolithiasis. The ureters are normal caliber without obstructions. The bladder is fluid-filled with no focal wall abnormalities. The uterus and adnexal structures are unremarkable. Pelvic phleboliths are noted. There is a short segment of concentric bowel wall thickening of the colon at the splenic flexure with mild surrounding mesenteric fat stranding. This thickening has a nodular appearance with thickness portion of the bowel measuring 2.2 cm. Other portions of the large bowel are unremarkable. Small bowel is normal caliber with no bowel wall thickening observed. The stomach is grossly unremarkable. The abdominal aorta is normal caliber with atherosclerosis. No intra-abdominal lymphadenopathy observed. There are degenerative changes of the spine. There is no acute osseous abnormality. IMPRESSION: 1.  Short segment of concentric nodular-like bowel wall thickening of the colon at the splenic flexure with mild mesenteric fat stranding. Findings concerning for colonic carcinoma. 2.  Heterogeneously hyperattenuating, possibly enhancing structure at the inferior pole of the left kidney is suspicious for an enhancing mass. 3.  Irregular appearance of the gallbladder demonstrated with either noncalcified central gallstone and/or gallbladder thickening demonstrated. Consider further evaluation with ultrasound of the gallbladder. Electronically signed by:  Cole Power DO  8/17/2023 12:15 PM CDT Workstation: [id number]

## 2023-08-27 LAB
AORTIC ROOT ANNULUS: 3.7 CM
AORTIC VALVE CUSP SEPERATION: 3.5 CM
ASCENDING AORTA: 3.2 CM
AV INDEX (PROSTH): 0.7
AV MEAN GRADIENT: 9 MMHG
AV PEAK GRADIENT: 17 MMHG
AV REGURGITATION PRESSURE HALF TIME: 590 MS
AV VALVE AREA BY VELOCITY RATIO: 3.18 CM²
AV VALVE AREA: 3.18 CM²
AV VELOCITY RATIO: 0.7
BSA FOR ECHO PROCEDURE: 2 M2
CV ECHO LV RWT: 0.45 CM
DOP CALC AO PEAK VEL: 2.05 M/S
DOP CALC AO VTI: 39.2 CM
DOP CALC LVOT AREA: 4.5 CM2
DOP CALC LVOT DIAMETER: 2.4 CM
DOP CALC LVOT PEAK VEL: 1.44 M/S
DOP CALC LVOT STROKE VOLUME: 124.8 CM3
DOP CALC MV VTI: 29 CM
DOP CALCLVOT PEAK VEL VTI: 27.6 CM
E WAVE DECELERATION TIME: 243 MSEC
E/A RATIO: 0.86
E/E' RATIO: 6.67 M/S
ECHO LV POSTERIOR WALL: 1.03 CM (ref 0.6–1.1)
FRACTIONAL SHORTENING: 37 % (ref 28–44)
INTERVENTRICULAR SEPTUM: 1.08 CM (ref 0.6–1.1)
IVC DIAMETER: 1.67 CM
LEFT INTERNAL DIMENSION IN SYSTOLE: 2.88 CM (ref 2.1–4)
LEFT VENTRICLE DIASTOLIC VOLUME INDEX: 49.9 ML/M2
LEFT VENTRICLE DIASTOLIC VOLUME: 96.3 ML
LEFT VENTRICLE MASS INDEX: 88 G/M2
LEFT VENTRICLE SYSTOLIC VOLUME INDEX: 16.4 ML/M2
LEFT VENTRICLE SYSTOLIC VOLUME: 31.7 ML
LEFT VENTRICULAR INTERNAL DIMENSION IN DIASTOLE: 4.58 CM (ref 3.5–6)
LEFT VENTRICULAR MASS: 169.78 G
LV LATERAL E/E' RATIO: 5.38 M/S
LV SEPTAL E/E' RATIO: 8.75 M/S
LVOT MG: 4 MMHG
LVOT MV: 0.87 CM/S
MV MEAN GRADIENT: 1 MMHG
MV PEAK A VEL: 0.81 M/S
MV PEAK E VEL: 0.7 M/S
MV PEAK GRADIENT: 3 MMHG
MV STENOSIS PRESSURE HALF TIME: 70 MS
MV VALVE AREA BY CONTINUITY EQUATION: 4.3 CM2
MV VALVE AREA P 1/2 METHOD: 3.14 CM2
PISA AR MAX VEL: 4.27 M/S
PISA TR MAX VEL: 2.68 M/S
PV MV: 0.69 M/S
PV PEAK GRADIENT: 4 MMHG
PV PEAK VELOCITY: 1 M/S
RA PRESSURE ESTIMATED: 3 MMHG
RIGHT VENTRICULAR END-DIASTOLIC DIMENSION: 3.46 CM
RV TB RVSP: 6 MMHG
RV TISSUE DOPPLER FREE WALL SYSTOLIC VELOCITY 1 (APICAL 4 CHAMBER VIEW): 12.9 CM/S
TDI LATERAL: 0.13 M/S
TDI SEPTAL: 0.08 M/S
TDI: 0.11 M/S
TR MAX PG: 29 MMHG
TRICUSPID ANNULAR PLANE SYSTOLIC EXCURSION: 2.02 CM
TV REST PULMONARY ARTERY PRESSURE: 32 MMHG
Z-SCORE OF LEFT VENTRICULAR DIMENSION IN END DIASTOLE: -1.76
Z-SCORE OF LEFT VENTRICULAR DIMENSION IN END SYSTOLE: -1.23

## 2023-08-28 ENCOUNTER — TELEPHONE (OUTPATIENT)
Dept: SURGERY | Facility: CLINIC | Age: 83
End: 2023-08-28
Payer: MEDICARE

## 2023-08-28 NOTE — TELEPHONE ENCOUNTER
----- Message from Jim Chavez MD sent at 8/28/2023  1:51 PM CDT -----  Maybe see if pt is willing to move surgery up in light of these findings.    ----- Message -----  From: Giorgi Lord LPN  Sent: 8/28/2023  12:55 PM CDT  To: Jim Chavez MD    Orders? Pt has not started the Eliquis.

## 2023-08-28 NOTE — TELEPHONE ENCOUNTER
----- Message from Oren Nash sent at 8/28/2023 10:08 AM CDT -----  Regarding: Rx issues for appt on 9/11  Type:  Needs Medical Advice    Who Called: Pt        Would the patient rather a call back or a response via [facility]? Call back    Best Call Back Number: [phone]      Additional Information: Sts pt went to [facility] ED this weekend and was put on some meds (Eliquis) and needs to talk to the office soon about her appt coming up on 9/11 about the RX she was given.   Please advise -- Thank you

## 2023-08-29 NOTE — HOSPITAL COURSE
Pt got admitted with Acute pulmonary embolism without acute cor pulmonale  Pt was started on NOAC and was discharged back to home

## 2023-08-29 NOTE — DISCHARGE SUMMARY
[facility] Medicine  Discharge Summary      Patient Name: Danna Sorensen  MRN: [id number]  SHAN: [id number]  Patient Class: OP- Observation  Admission Date: 8/25/2023  Hospital Length of Stay: 0 days  Discharge Date and Time: 8/26/2023  2:38 PM  Attending Physician: No att. providers found   Discharging Provider: Chito Malcolm MD  Primary Care Provider: Claudia Kim MD    Primary Care Team: Networked reference to record PCT     HPI:   ,Danna Sorensen is a 83 y.o. 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 who presented to the ED with a Shortness of Breath (Pt has a PE per CT scan done today )    Patient presents to the emergency room after being notified by primary care NP that she had a blood clot in the lungs that was noted on CT scan.  Patient reports he was seen by the nurse practitioner on last Friday with a complaint of bad cough.  She states she was started on antibiotics and a CT scan was ordered. She reports chronic dyspnea for years (not on home oxygen) and states it has not changed or gotten worse.      Denies fever, chills, diaphoresis, dizziness, HA, chest pain, palpitations, NVD. Does not smoke cigarettes, drink or do illegal drugs.     Patient reports hx of colon CA (2006) and recent diagnosis of adenocarcinoma and is scheduled for colectomy-robotic procedure by Dr. Chavez September 11, 2023.    Lab and imaging obtained and reviewed.  CBC completed and shows RBCs 3.41 H/H 9.7/3 RDW  is 15.0 g% 75.4 lymphocytes 13.8 immature granulocytes 0.9.  PT/INR within normal range.  Chemistry profile shows potassium 3.2 CO2 21 Ag 7 alk-phos 51 T bili 1.3.  EKG nonischemic.  On admit afebrile, HR 95, R 18, BP 1 50-82, O2 sats 97% on room air.       CT chest  IMPRESSION:  1. Moderate volume of acute subsegmental and segmental pulmonary emboli in the right lung.  2. No finding of right heart strain/failure.  3. Small pulmonary nodules in the lungs as outlined above.  4. Partially imaged masslike density in the splenic flexure the colon, highly suspicious for malignancy.  5. Cholelithiasis without acute cholecystitis.  6. Hepatic parenchymal findings of steatosis.  7. Coronary artery calcifications (three-vessel disease) and atheromatous calcified plaque at the aortic arch.    Per ED provider, patient with outpatient CT positive for Moderate volume of acute subsegmental and segmental pulmonary emboli in the right lung. Will admit for echo in AM and start on eliquis.  Given recent diagnosis of adenocarcinoma will likely require long-term anticoagulation.          * No surgery found *      Hospital Course:   Pt got admitted with Acute pulmonary embolism without acute cor pulmonale  Pt was started on NOAC and was discharged back to home        Goals of Care Treatment Preferences:  Code Status: Full Code      Consults:     No new Assessment & Plan notes have been filed under this hospital service since the last note was generated.  Service: Hospital Medicine    Final Active Diagnoses:    Diagnosis Date Noted POA    GERD (gastroesophageal reflux disease) [K21.9] 08/25/2023 Yes    Adenocarcinoma [C80.1] 08/25/2023 Yes    Essential hypertension [I10] 08/09/2019 Yes    History of colon cancer [Z85.038] 03/19/2018 Yes    Hypothyroid [E03.9] 06/03/2012 Yes      Problems Resolved During this Admission:    Diagnosis Date Noted Date Resolved POA    PRINCIPAL PROBLEM:  Acute pulmonary embolism without acute cor  pulmonale [I26.99] 08/25/2023 08/26/2023 Yes       Discharged Condition: good    Disposition: Home or Self Care    Follow Up:    Patient Instructions:   No discharge procedures on file.      Pending Diagnostic Studies:     None         Medications:  Reconciled Home Medications:      Medication List      START taking these medications    ELIQUIS DVT-PE TREAT 30D START 5 mg (74 tabs) Dspk  Generic drug: apixaban  For the first 7 days take two 5 mg tablets twice daily.  After 7 days take one 5 mg tablet twice daily.        CHANGE how you take these medications    lisinopriL 10 MG tablet  Take 1 tablet (10 mg total) by mouth once daily.  What changed: how much to take        CONTINUE taking these medications    acetaminophen 650 MG Tbsr  Commonly known as: TYLENOL  Take 650 mg by mouth every 8 (eight) hours.     alendronate 70 MG tablet  Commonly known as: FOSAMAX  TAKE 1 TABLET(70 MG) BY MOUTH EVERY 7 DAYS     cyclobenzaprine 5 MG tablet  Commonly known as: FLEXERIL  Take 1 tablet (5 mg total) by mouth 3 (three) times daily as needed for Muscle spasms.     fluticasone propionate 50 mcg/actuation nasal spray  Commonly known as: FLONASE  1 spray (50 mcg total) by Each Nostril route once daily.     furosemide 20 MG tablet  Commonly known as: LASIX  Take 1 tablet (20 mg total) by mouth daily as needed (edema).     hydrOXYzine HCL 25 MG tablet  Commonly known as: ATARAX  Take 1 tablet (25 mg total) by mouth 3 (three) times daily as needed for Anxiety (insomnia).     levocetirizine 5 MG tablet  Commonly known as: XYZAL  Take 1 tablet (5 mg total) by mouth nightly as needed for Allergies (allergies).     levothyroxine 75 MCG tablet  Commonly known as: SYNTHROID  Take 1 tablet (75 mcg total) by mouth once daily.     multivitamin capsule  Take 1 capsule by mouth once daily.     nystatin cream  Commonly known as: MYCOSTATIN  Apply topically 2 (two) times daily as needed. GRETCHEN EXT AA BID     omeprazole 40 MG capsule  Commonly  known as: PRILOSEC  Take 1 capsule (40 mg total) by mouth 2 (two) times daily before meals.     traMADoL 50 mg tablet  Commonly known as: ULTRAM  Take 1 tablet (50 mg total) by mouth every 8 (eight) hours as needed for Pain.     triamcinolone acetonide 0.1% 0.1 % cream  Commonly known as: KENALOG  APPLY EXTERNALLY TO THE AFFECTED AREA TWICE DAILY     VITAMIN D ORAL  Take 2,000 mg by mouth once daily.        STOP taking these medications    amoxicillin-clavulanate 875-125mg 875-125 mg per tablet  Commonly known as: AUGMENTIN        ASK your doctor about these medications    promethazine-dextromethorphan 6.25-15 mg/5 mL Syrp  Commonly known as: PROMETHAZINE-DM  Take 5 mLs by mouth 4 (four) times daily as needed (cough).  Ask about: Should I take this medication?            Indwelling Lines/Drains at time of discharge:   Lines/Drains/Airways     None               Physical Exam  Cardiovascular:      Rate and Rhythm: Normal rate.   Neurological:      General: No focal deficit present.      Mental Status: She is alert.       Time spent on the discharge of patient: 23 minutes         Chito Malcolm MD  Department of Hospital Medicine  [facility]

## 2023-08-30 ENCOUNTER — TELEPHONE (OUTPATIENT)
Dept: SURGERY | Facility: CLINIC | Age: 83
End: 2023-08-30
Payer: MEDICARE

## 2023-08-30 ENCOUNTER — TELEPHONE (OUTPATIENT)
Dept: FAMILY MEDICINE | Facility: CLINIC | Age: 83
End: 2023-08-30
Payer: MEDICARE

## 2023-08-30 DIAGNOSIS — I26.99 PULMONARY EMBOLISM, UNSPECIFIED CHRONICITY, UNSPECIFIED PULMONARY EMBOLISM TYPE, UNSPECIFIED WHETHER ACUTE COR PULMONALE PRESENT: Primary | ICD-10-CM

## 2023-08-30 DIAGNOSIS — I26.99 OTHER PULMONARY EMBOLISM WITHOUT ACUTE COR PULMONALE, UNSPECIFIED CHRONICITY: ICD-10-CM

## 2023-08-30 DIAGNOSIS — Z76.89 ENCOUNTER FOR ASSESSMENT FOR DEEP VEIN THROMBOSIS (DVT): ICD-10-CM

## 2023-08-30 DIAGNOSIS — Z01.818 PREOP EXAMINATION: Primary | ICD-10-CM

## 2023-08-30 RX ORDER — ENOXAPARIN SODIUM 100 MG/ML
100 INJECTION SUBCUTANEOUS EVERY 12 HOURS
Qty: 10 ML | Refills: 0 | Status: SHIPPED | OUTPATIENT
Start: 2023-08-30 | End: 2023-09-04

## 2023-08-30 NOTE — TELEPHONE ENCOUNTER
----- Message from Giorgi Lord LPN sent at 8/30/2023 12:51 PM CDT -----  Regarding: Medication Management  Pt scheduled for surgery on 9/11, request assist with medication.    Giorgi Lord LPN  Licensed Nurse  Telephone Encounter  Signed  Encounter Date:  8/30/2023        Spoke to pt, Start her new Rx for Eliquis today, per Dr. Chavez request will notify her PCP office and request they manage a Lovenox bridge prior to her surgery on 9/11/23.  I will schedule a bilateral lower extremities US to r/o DVT.Notify PCP and Dr. Chavez.          Dr. Chavez' plan below:    Assessment and Plan   1. Malignant neoplasm of colon, unspecified part of colon  -     Ambulatory referral/consult to Colorectal Surgery     2. History of colon cancer  -     Ambulatory referral/consult to Colorectal Surgery           Patient was recurrent colon cancer.  Lengthy discussion with the patient, her niece and also with Dr. Pinon.  We will need to obtain a CT scan of the chest abdomen pelvis to evaluate for any metastatic disease.  Discussed with Gastroenterology and I have asked to have another colonoscopy performed with tattooing of the distal extent of the tumor.  Given proximity of this new lesion with her old anastomosis would recommend resection of the anastomosis in conjunction with the mass itself.  Would then likely end up performing a ileo descending or ileus sigmoid colon anastomosis.  Lengthy discussion with him regarding the risks of the procedure.  These include but are not limited to bleeding, anastomotic leak, hernia, abscess, and need for further surgery.  Informed consent has been obtained.  A plan to perform robotic surgery with potential for open surgery was discussed.  Expectations for postoperative care and management were discussed in detail with the patient.  Plan to proceed with surgery on September 11th per patient's family's request.  Informed consent has been obtained      Pt was seen in ER recently 8/25 and  started on Eliquis for PE.    Plan to proceed with surgery on 9/11,    Thanks,  Giorgi

## 2023-08-30 NOTE — TELEPHONE ENCOUNTER
Hold eliquis 5 days prior to surgery and start bridge with lovenox 1 mg/kg q 12 sq.Last dose 24 hours prior to surgery.  Resume eliquis 48 hours after surgery of stable and no sign of bleeding.

## 2023-08-30 NOTE — TELEPHONE ENCOUNTER
Medicare Annual Wellness Visit  Name: Lorena Torres Date: 12/10/2018   MRN: [id number] Sex: Male   Age: 30 y.o. Ethnicity: Non-/Non    : 1984 Race: Ariella Luke is here for Medicare AWV    Screenings for behavioral, psychosocial and functional/safety risks, and cognitive dysfunction are all negative except as indicated below. These results, as well as other patient data from the [address] form, are documented in Flowsheets linked to this Encounter. Allergies   Allergen Reactions    Eggs Or Egg-Derived Products Hives     Prior to Visit Medications    Medication Sig Taking? Authorizing Provider   levETIRAcetam (KEPPRA) 750 MG tablet TAKE 1 TABLET BY MOUTH 2 TIMES DAILY Yes Dat Suarez MD   hydrocortisone (CORTEF) 5 MG tablet TAKE 2 TABLETS BY MOUTH IN THE MORNING AND 1 TABLET IN THE EVENING Yes Dat Suarez MD   levothyroxine (SYNTHROID) 75 MCG tablet TAKE 1 TABLET BY MOUTH DAILY Yes Dat Suarez MD   propranolol (INDERAL) 20 MG tablet TAKE 1 TABLET BY MOUTH 3 TIMES DAILY Yes Dat Suarez MD   risperiDONE (RISPERDAL) 1 MG tablet TAKE 1 TABLET BY MOUTH 2 TIMES DAILY Yes Dat Suarez MD   carBAMazepine (TEGRETOL) 200 MG tablet TAKE 1.5 TABLETS IN THE MORNING, 1.5 IN THE AFTERNOON AND 1 AT NIGHT. Yes Dat Suarez MD   triamcinolone (KENALOG) 0.1 % cream Apply topically 2 times daily. Yes Agus Singh MD     Past Medical History:   Diagnosis Date    Autism     Blind in both eyes     Obese     Seizure disorder ([facility] [address]      No past surgical history on file.   Family History   Problem Relation Age of Onset    Diabetes Father     Other Father         Cortico-Basal Degeneration       CareTeam (Including outside providers/suppliers regularly involved in providing care):   Patient Care Team:  Agus Singh MD as PCP - General (Internal Medicine)  Agus Singh MD as PCP - [facility] Attributed Provider    Wt Readings from Last 3 Encounters:   12/10/18 292 lb (132.5 kg) See message below. Please advise.

## 2023-08-30 NOTE — TELEPHONE ENCOUNTER
Spoke to pt, Start her new Rx for Eliquis today, per Dr. Chavez request will notify her PCP office and request they manage a Lovenox bridge prior to her surgery on 9/11/23.  I will schedule a bilateral lower extremities US to r/o DVT.Notify PCP and Dr. Chavez.

## 2023-08-30 NOTE — TELEPHONE ENCOUNTER
----- Message from Jim Chavez MD sent at 8/29/2023  7:46 AM CDT -----  Could do this week as soon as Thursday.  She should go ahead and start her blood thinner and if we have to do with a lovenox bridge or admit her and start a heparin drip could arrange that.    ----- Message -----  From: Giorgi Lord LPN  Sent: 8/28/2023   3:17 PM CDT  To: Jim Chavez MD    When are you thinking?  ----- Message -----  From: Jim Chavez MD  Sent: 8/28/2023   1:51 PM CDT  To: Giorgi Lord LPN    Maybe see if pt is willing to move surgery up in light of these findings.    ----- Message -----  From: Giorgi Lord LPN  Sent: 8/28/2023  12:55 PM CDT  To: Jim Chavez MD    Orders? Pt has not started the Eliquis.

## 2023-09-01 ENCOUNTER — TELEPHONE (OUTPATIENT)
Dept: SURGERY | Facility: CLINIC | Age: 83
End: 2023-09-01
Payer: MEDICARE

## 2023-09-01 ENCOUNTER — TELEPHONE (OUTPATIENT)
Dept: FAMILY MEDICINE | Facility: CLINIC | Age: 83
End: 2023-09-01
Payer: MEDICARE

## 2023-09-01 ENCOUNTER — HOSPITAL ENCOUNTER (OUTPATIENT)
Dept: RADIOLOGY | Facility: HOSPITAL | Age: 83
Discharge: HOME | End: 2023-09-01
Attending: SURGERY
Payer: MEDICARE

## 2023-09-01 ENCOUNTER — TELEPHONE (OUTPATIENT)
Dept: FAMILY MEDICINE | Facility: CLINIC | Age: 83
End: 2023-09-01

## 2023-09-01 DIAGNOSIS — R19.7 DIARRHEA, UNSPECIFIED TYPE: Primary | ICD-10-CM

## 2023-09-01 DIAGNOSIS — Z01.818 PREOP EXAMINATION: ICD-10-CM

## 2023-09-01 DIAGNOSIS — I26.99 OTHER PULMONARY EMBOLISM WITHOUT ACUTE COR PULMONALE, UNSPECIFIED CHRONICITY: ICD-10-CM

## 2023-09-01 DIAGNOSIS — Z76.89 ENCOUNTER FOR ASSESSMENT FOR DEEP VEIN THROMBOSIS (DVT): ICD-10-CM

## 2023-09-01 DIAGNOSIS — K92.1 HEMATOCHEZIA: ICD-10-CM

## 2023-09-01 PROCEDURE — 93970 EXTREMITY STUDY: CPT | Mod: TC

## 2023-09-01 RX ORDER — DIPHENOXYLATE HYDROCHLORIDE AND ATROPINE SULFATE 2.5; .025 MG/1; MG/1
1 TABLET ORAL 4 TIMES DAILY PRN
Qty: 20 TABLET | Refills: 0 | Status: SHIPPED | OUTPATIENT
Start: 2023-09-01 | End: 2023-09-11

## 2023-09-01 NOTE — TELEPHONE ENCOUNTER
----- Message from Erin Pérez sent at 9/1/2023  9:41 AM CDT -----  Regarding: RX Request  Contact: patient at [phone]  Type:  RX Request    Who Called:  patient at [phone]    Additional Information:  patient is having diarrhea and would like to have something called in to her pharmacy. Please call and advise. Thank you      [id number] - [place] - [address] AT [facility] [address]  [address] [address]  Phone: [phone] Fax: [phone]

## 2023-09-01 NOTE — TELEPHONE ENCOUNTER
C/O multiple episodes of diarrhea last pm and what appeared to be somewhat blood tinged. This AM no blood with diarrhea stool. No other symptoms with the diarrhea. Informed her that Dr. Kim has e-scribed the Lovenox bridge. Instructed the process for holding Eliquis and using the Lovenox but informed her she may need to check with PCP about the diarrhea and it's control and instruction about the use of Eliquis. Informed that hemorrhoids can be the cause of the blood in stool and not necessarily the Eliquis. She will contact PCP.

## 2023-09-01 NOTE — TELEPHONE ENCOUNTER
----- Message from Nena Bazzi sent at 9/1/2023 10:53 AM CDT -----  Contact: self  Type:  Needs Medical Advice  ---2nd message--     Who Called: pt   Symptoms (please be specific): Diarrhea w/bloody stool       Would the patient rather a call back or a response via [facility]? Call   Best Call Back Number:  [phone]    Additional Information: Patient started taking her apixaban (ELIQUIS DVT-PE TREAT 30D START) 5 mg (74 tabs) DsPk and first dose was yesterday morning. But around 12 midnight last night she noticed a little blood in her stool it was a little diarrhea almost a burnt orange color. Please let her know if she should continue taking this medicine.      Please call to advise... Thank you..

## 2023-09-01 NOTE — TELEPHONE ENCOUNTER
Spoke with pt. Informed pt per Dr. Kim;    Continue eliquis for now.  Lomotil called in for diarrhea. Get CBC non fasting lab asap.  If bleeding continues, is large amount, accompanied by abdominal pain, fever or lightheadedness/weakness or lethargy then stop eliquis and go to the emergency room.    Pt VU. Pt scheduled labs for 9/5/23. Pt stated the bleeding has stopped. Pt stated she spoke with Dr. Chavez nurse and was told it may be a hemorrhoid.

## 2023-09-01 NOTE — TELEPHONE ENCOUNTER
Continue eliquis for now.  Lomotil called in for diarrhea. Get CBC non fasting lab asap.  If bleeding continues, is large amount, accompanied by abdominal pain, fever or lightheadedness/weakness or lethargy then stop eliquis and go to the emergency room

## 2023-09-01 NOTE — TELEPHONE ENCOUNTER
----- Message from Luiza Christina sent at 9/1/2023  7:05 AM CDT -----  Contact: self  Patient started taking her apixaban (ELIQUIS DVT-PE TREAT 30D START) 5 mg (74 tabs) DsPk and first dose was yesterday morning.  But around 12 midnight last night she noticed a little blood in her stool it was a little diarrhea almost a burnt orange color. Please let her know if she should continue taking this medicine.  Call back at [phone] and thanks

## 2023-09-05 ENCOUNTER — TELEPHONE (OUTPATIENT)
Dept: SURGERY | Facility: CLINIC | Age: 83
End: 2023-09-05
Payer: MEDICARE

## 2023-09-05 ENCOUNTER — TELEPHONE (OUTPATIENT)
Dept: FAMILY MEDICINE | Facility: CLINIC | Age: 83
End: 2023-09-05
Payer: MEDICARE

## 2023-09-05 ENCOUNTER — LAB VISIT (OUTPATIENT)
Dept: LAB | Facility: HOSPITAL | Age: 83
End: 2023-09-05
Attending: FAMILY MEDICINE
Payer: MEDICARE

## 2023-09-05 DIAGNOSIS — K92.1 HEMATOCHEZIA: ICD-10-CM

## 2023-09-05 DIAGNOSIS — I82.433 DEEP VEIN THROMBOSIS (DVT) OF POPLITEAL VEIN OF BOTH LOWER EXTREMITIES, UNSPECIFIED CHRONICITY: Primary | ICD-10-CM

## 2023-09-05 LAB
BASOPHILS # BLD AUTO: 0.01 K/UL (ref 0–0.2)
BASOPHILS NFR BLD: 0.2 % (ref 0–1.9)
DIFFERENTIAL METHOD: ABNORMAL
EOSINOPHIL # BLD AUTO: 0.1 K/UL (ref 0–0.5)
EOSINOPHIL NFR BLD: 1.3 % (ref 0–8)
ERYTHROCYTE [DISTWIDTH] IN BLOOD BY AUTOMATED COUNT: 15.9 % (ref 11.5–14.5)
HCT VFR BLD AUTO: 29.8 % (ref 37–48.5)
HGB BLD-MCNC: 9 G/DL (ref 12–16)
IMM GRANULOCYTES # BLD AUTO: 0.05 K/UL (ref 0–0.04)
IMM GRANULOCYTES NFR BLD AUTO: 0.8 % (ref 0–0.5)
LYMPHOCYTES # BLD AUTO: 0.8 K/UL (ref 1–4.8)
LYMPHOCYTES NFR BLD: 14 % (ref 18–48)
MCH RBC QN AUTO: 28 PG (ref 27–31)
MCHC RBC AUTO-ENTMCNC: 30.2 G/DL (ref 32–36)
MCV RBC AUTO: 93 FL (ref 82–98)
MONOCYTES # BLD AUTO: 0.7 K/UL (ref 0.3–1)
MONOCYTES NFR BLD: 11.1 % (ref 4–15)
NEUTROPHILS # BLD AUTO: 4.4 K/UL (ref 1.8–7.7)
NEUTROPHILS NFR BLD: 72.6 % (ref 38–73)
NRBC BLD-RTO: 0 /100 WBC
PLATELET # BLD AUTO: 289 K/UL (ref 150–450)
PMV BLD AUTO: 9.8 FL (ref 9.2–12.9)
RBC # BLD AUTO: 3.22 M/UL (ref 4–5.4)
WBC # BLD AUTO: 6.02 K/UL (ref 3.9–12.7)

## 2023-09-05 PROCEDURE — 36415 COLL VENOUS BLD VENIPUNCTURE: CPT | Mod: PO | Performed by: FAMILY MEDICINE

## 2023-09-05 PROCEDURE — 85025 COMPLETE CBC W/AUTO DIFF WBC: CPT | Performed by: FAMILY MEDICINE

## 2023-09-05 NOTE — TELEPHONE ENCOUNTER
Pt feeling better, diarrhea his under control and she is starting her Lovenox bridge tomorrow.Notified Dr. Kim' office.

## 2023-09-05 NOTE — TELEPHONE ENCOUNTER
----- Message from Asha Carlisle sent at 9/5/2023 11:27 AM CDT -----  Type: Needs Medical Advice  Who Called:  pt  Best Call Back Number: [phone]  Additional Information: pt is requesting to speak with the nurse or Dr about a bad cough she has had for over a mouth, pl call bk to advise thanks

## 2023-09-05 NOTE — TELEPHONE ENCOUNTER
Called patient to discuss cough she has been having for a month. No answer , left voicemail to return call.

## 2023-09-05 NOTE — TELEPHONE ENCOUNTER
OK. I 'm confused.  IS she feeling better or not?  If sick she needs assessment with me or any provider in person to determine if she is well enough for surgery. Get pulse ox if possible

## 2023-09-05 NOTE — TELEPHONE ENCOUNTER
Phone call from Dr. Chavez, post pone surgery on Monday, remain on Eliquis, referral to Dr. Verdin for bilateral DVT's.  Notify PCP.

## 2023-09-06 ENCOUNTER — TELEPHONE (OUTPATIENT)
Dept: SURGERY | Facility: CLINIC | Age: 83
End: 2023-09-06
Payer: MEDICARE

## 2023-09-06 NOTE — TELEPHONE ENCOUNTER
I called patient and she has an appointment on Monday, 9/18/23 in the afternoon with Dr. Chavez in [place].  Aditya

## 2023-09-07 ENCOUNTER — TELEPHONE (OUTPATIENT)
Dept: SURGERY | Facility: CLINIC | Age: 83
End: 2023-09-07
Payer: MEDICARE

## 2023-09-07 NOTE — TELEPHONE ENCOUNTER
----- Message from Chante Heath sent at 9/7/2023 10:48 AM CDT -----  Regarding: Advice  Contact: Patient  Type: Needs Medical Advice  Who Called:  Patient  Symptoms (please be specific):  Bleeding  How long has patient had these symptoms:  09/06/23  Pharmacy name and phone #:    Best Call Back Number: [phone] (home)     Additional Information: Patient states she took eliquis last night and she started bleeding. Please call patient to advise.

## 2023-09-07 NOTE — TELEPHONE ENCOUNTER
Message  Received: Today  Jim Chavez MD Orgeron, Warren N, LPN  Bleeding could also be from her known cancer   WOuld be somewhat reluctant to use lomotil as diarrhea could be related to cancer

## 2023-09-07 NOTE — TELEPHONE ENCOUNTER
Spoke to pt, had diarrhea stool early this am and had bleeding with this. Not  bleeding now, just wanted to make sure as she confirms history of Hemorrhoids.

## 2023-09-11 ENCOUNTER — TELEPHONE (OUTPATIENT)
Dept: SURGERY | Facility: HOSPITAL | Age: 83
End: 2023-09-11
Payer: MEDICARE

## 2023-09-11 NOTE — TELEPHONE ENCOUNTER
Case d/w pt and vascular surgery last week.  Given B DVT and R sided PE will post pone surgery.  WIll plan for IVC filter placement and then surgery.

## 2023-09-13 ENCOUNTER — HOSPITAL ENCOUNTER (OUTPATIENT)
Facility: HOSPITAL | Age: 83
Discharge: HOME | End: 2023-09-13
Attending: SURGERY | Admitting: SURGERY
Payer: MEDICARE

## 2023-09-13 DIAGNOSIS — O22.30 DVT (DEEP VEIN THROMBOSIS) IN PREGNANCY: Primary | ICD-10-CM

## 2023-09-13 PROCEDURE — 37191 INS ENDOVAS VENA CAVA FILTR: CPT | Performed by: SURGERY

## 2023-09-13 PROCEDURE — 25000003 PHARM REV CODE 250: Performed by: SURGERY

## 2023-09-13 PROCEDURE — 63600175 PHARM REV CODE 636 W HCPCS: Performed by: SURGERY

## 2023-09-13 PROCEDURE — C1894 INTRO/SHEATH, NON-LASER: HCPCS | Performed by: SURGERY

## 2023-09-13 PROCEDURE — C1769 GUIDE WIRE: HCPCS | Performed by: SURGERY

## 2023-09-13 PROCEDURE — C1880 VENA CAVA FILTER: HCPCS | Performed by: SURGERY

## 2023-09-13 DEVICE — FILTER FEMORAL GUNTHER TULIP 30X50 7FX65: Type: IMPLANTABLE DEVICE | Site: GROIN | Status: FUNCTIONAL

## 2023-09-13 RX ORDER — ONDANSETRON 4 MG/1
8 TABLET, ORALLY DISINTEGRATING ORAL EVERY 8 HOURS PRN
Status: DISCONTINUED | OUTPATIENT
Start: 2023-09-13 | End: 2023-09-13 | Stop reason: HOSPADM

## 2023-09-13 RX ORDER — SODIUM CHLORIDE 9 MG/ML
INJECTION, SOLUTION INTRAVENOUS CONTINUOUS
Status: DISCONTINUED | OUTPATIENT
Start: 2023-09-13 | End: 2023-09-13 | Stop reason: HOSPADM

## 2023-09-13 RX ORDER — ACETAMINOPHEN 325 MG/1
650 TABLET ORAL EVERY 4 HOURS PRN
Status: DISCONTINUED | OUTPATIENT
Start: 2023-09-13 | End: 2023-09-13 | Stop reason: HOSPADM

## 2023-09-13 RX ORDER — MIDAZOLAM HYDROCHLORIDE 1 MG/ML
INJECTION INTRAMUSCULAR; INTRAVENOUS
Status: DISCONTINUED | OUTPATIENT
Start: 2023-09-13 | End: 2023-09-13 | Stop reason: HOSPADM

## 2023-09-13 RX ORDER — SODIUM CHLORIDE 9 MG/ML
INJECTION, SOLUTION INTRAVENOUS ONCE
Status: ACTIVE | OUTPATIENT
Start: 2023-09-13

## 2023-09-13 RX ORDER — FENTANYL CITRATE 50 UG/ML
INJECTION, SOLUTION INTRAMUSCULAR; INTRAVENOUS
Status: DISCONTINUED | OUTPATIENT
Start: 2023-09-13 | End: 2023-09-13 | Stop reason: HOSPADM

## 2023-09-13 RX ORDER — LIDOCAINE HYDROCHLORIDE 10 MG/ML
INJECTION, SOLUTION EPIDURAL; INFILTRATION; INTRACAUDAL; PERINEURAL
Status: DISCONTINUED | OUTPATIENT
Start: 2023-09-13 | End: 2023-09-13 | Stop reason: HOSPADM

## 2023-09-13 NOTE — OP NOTE
[facility]  Surgery Department  Operative Note    SUMMARY     Date of Procedure: 9/13/2023     Procedure: Procedure(s) (LRB):  Insertion, Filter, Inferior Vena Cava (N/A)     Surgeon(s) and Role:     * Oren Verdin MD - Primary    Assisting Surgeon: None    Pre-Operative Diagnosis: DVT (deep vein thrombosis) in pregnancy [O22.30]    Post-Operative Diagnosis: Post-Op Diagnosis Codes:     * DVT (deep vein thrombosis) in pregnancy [O22.30]    Anesthesia: RN IV Sedation    Operative Findings (including complications, if any):  Bilateral lower extremity DVT of the superficial femoral veins    Description of Technical Procedures:  Vena cavogram, IVC filter placement.  Right ultrasound guided common femoral vein access    Right common femoral vein ultrasound guided access was performed and guidewire was advanced.  [place] sheath advanced.  Vena cavogram performed.  IVC filter placed at the L3 spinous body level and repeat venogram showed good positioning below the renal veins above the bifurcation.  Sheath was pulled and pressure was held.    Significant Surgical Tasks Conducted by the Assistant(s), if Applicable:     Estimated Blood Loss (EBL): * No values recorded between 9/13/2023  7:32 AM and 9/13/2023  7:37 AM *           Implants:   Implant Name Type Inv. Item Serial No.  Lot No. LRB No. Used Action   FILTER FEMORAL [employer] [id number] - [id number] IVC Filter FILTER FEMORAL [place] [place] [id number]   [id number] N/A 1 Implanted       Specimens:   Specimen (24h ago, onward)      None                    Condition: Good    Disposition: PACU - hemodynamically stable.    Attestation: I was present and scrubbed for the entire procedure.

## 2023-09-14 VITALS
WEIGHT: 190 LBS | BODY MASS INDEX: 32.44 KG/M2 | HEART RATE: 61 BPM | OXYGEN SATURATION: 100 % | RESPIRATION RATE: 20 BRPM | DIASTOLIC BLOOD PRESSURE: 59 MMHG | HEIGHT: 64 IN | SYSTOLIC BLOOD PRESSURE: 122 MMHG

## 2023-09-19 ENCOUNTER — TELEPHONE (OUTPATIENT)
Dept: SURGERY | Facility: CLINIC | Age: 83
End: 2023-09-19
Payer: MEDICARE

## 2023-09-19 NOTE — TELEPHONE ENCOUNTER
----- Message from Lucrecia Mazariegos sent at 9/19/2023  9:49 AM CDT -----  Contact: patient  Type:  Needs Medical Advice    Who Called: patient     Would the patient rather a call back or a response via [facility]? Call     Best Call Back Number: [phone] (home)      Additional Information: Patient would like to speak with the nurse in regards to her surgery.    Please call to advise

## 2023-09-21 ENCOUNTER — TELEPHONE (OUTPATIENT)
Dept: SURGERY | Facility: CLINIC | Age: 83
End: 2023-09-21
Payer: MEDICARE

## 2023-09-21 NOTE — TELEPHONE ENCOUNTER
Spoke to pt will schedule 9/29 and will contact Dr. Verdin regarding Eliquis and possible Lovenox bridge.

## 2023-09-21 NOTE — TELEPHONE ENCOUNTER
----- Message from Elo Briones sent at 9/21/2023 11:39 AM CDT -----  Regarding: pt called  Name of Who is Calling: STEVEN FLORES [[id number]]      What is the request in detail: pt is requesting to speak with a nurse please contact her. Please advise       Can the clinic reply by [facility]: No       What Number to Call Back if not in [facility]: Telephone Information:           [phone]

## 2023-09-22 ENCOUNTER — TELEPHONE (OUTPATIENT)
Dept: SURGERY | Facility: CLINIC | Age: 83
End: 2023-09-22
Payer: MEDICARE

## 2023-09-22 NOTE — TELEPHONE ENCOUNTER
----- Message from Randy Faith sent at 9/22/2023 11:11 AM CDT -----  Type: Needs Medical Advice  Who Called:  pt  Symptoms (please be specific):  pt said she having surgery with the dr next Friday 9/29 and she need the final details on the procedure--please call and advise    Best Call Back Number: [phone] (home)     Additional Information: thank you

## 2023-09-22 NOTE — TELEPHONE ENCOUNTER
----- Message from Ita Juan sent at 9/22/2023  2:38 PM CDT -----  Contact: pt  Type:  Needs Medical Advice    Who Called: pt  Would the patient rather a call back or a response via [facility]? call  Best Call Back Number: [phone]  Additional Information: Pt states that she need a callback as soon as possible. States that she need to speak to someone about her 9/29 procedure. Please advise thank you

## 2023-09-25 ENCOUNTER — TELEPHONE (OUTPATIENT)
Dept: SURGERY | Facility: CLINIC | Age: 83
End: 2023-09-25
Payer: MEDICARE

## 2023-09-25 NOTE — TELEPHONE ENCOUNTER
Holding Eliquis today bridging with Lovenox starting today last dose Wednesday 9/27/23. Notified Dr Chavez.

## 2023-09-25 NOTE — TELEPHONE ENCOUNTER
----- Message from Pebbles Wells sent at 9/25/2023  1:58 PM CDT -----  Type: Needs Medical Advice  Who Called:  pt     Best Call Back Number: [phone]    Additional Information: pt calling in regards to shot needed prior to surgery please advise

## 2023-09-27 ENCOUNTER — TELEPHONE (OUTPATIENT)
Dept: SURGERY | Facility: CLINIC | Age: 83
End: 2023-09-27
Payer: MEDICARE

## 2023-09-27 ENCOUNTER — HOSPITAL ENCOUNTER (OUTPATIENT)
Dept: PREADMISSION TESTING | Facility: HOSPITAL | Age: 83
Discharge: HOME | DRG: 331 | End: 2023-09-27
Attending: SURGERY
Payer: MEDICARE

## 2023-09-27 DIAGNOSIS — Z85.038 HISTORY OF COLON CANCER: ICD-10-CM

## 2023-09-27 DIAGNOSIS — Z01.818 PREOPERATIVE TESTING: Primary | ICD-10-CM

## 2023-09-27 DIAGNOSIS — C18.9 MALIGNANT NEOPLASM OF COLON, UNSPECIFIED PART OF COLON: ICD-10-CM

## 2023-09-27 PROCEDURE — 86920 COMPATIBILITY TEST SPIN: CPT | Performed by: ANESTHESIOLOGY

## 2023-09-27 NOTE — DISCHARGE INSTRUCTIONS
To confirm, Your doctor has instructed you that surgery is scheduled for: 9/29/23 with Dr. Chavez    Please report to [facility], Registration the morning of surgery. You must check-in and receive a wristband before going to your procedure.  [address] [place] [address] [place] [address]    Pre-Op will call the afternoon prior to surgery between 1:00 and 6:00 PM with the final arrival time.  Phone number: [phone]    PLEASE NOTE:  The surgery schedule has many variables which may affect the time of your surgery case.  Family members should be available if your surgery time changes.  Plan to be here the day of your procedure between 4-6 hours.    MEDICATIONS:  TAKE ONLY THESE MEDICATIONS WITH A SMALL SIP OF WATER THE MORNING OF YOUR PROCEDURE:  FLEXERIL IF NEEDED, LEVOTHYROXINE, OMEPRAZOLE, TRAMADOL IF NEEDED    NO LASIX AND NO LISINOPRIL MORNING OF SURGERY BUT DO NOT STOP DAYS BEFORE.      DO NOT TAKE THESE MEDICATIONS 5-7 DAYS PRIOR to your procedure or per your surgeon's request:   ASPIRIN, ALEVE, ADVIL, IBUPROFEN, FISH OIL VITAMIN E, HERBALS  (May take Tylenol)        Hold Lovenox on 9/28/23, surgery on 9/29/23.                Per Giorgi with Dr. Chavez    ONLY if you are prescribed any types of blood thinners such as:  Aspirin, Coumadin, Plavix, Pradaxa, Xarelto, Aggrenox, Effient, Eliquis, Savasya, Brilinta, or any other, ask your surgeon whether you should stop taking them and how long before surgery you should stop.  You may also need to verify with the prescribing physician if it is ok to stop your medication.      INSTRUCTIONS IMPORTANT!!  Do not eat or drink anything between midnight and the time of your procedure- this includes gum, mints, and candy.  Do not smoke or drink alcoholic beverages 24 hours prior to your procedure.  Shower the night before AND the morning of your procedure with a Chlorhexidine wash such as Hibiclens or Dial antibacterial soap from the neck down.  Do not get it  on your face or in your eyes.  You may use your own shampoo and face wash. This helps your skin to be as bacteria free as possible.    If you wear contact lenses, dentures, hearing aids or glasses, bring a container to put them in during surgery and give to a family member for safe keeping.  Please leave all jewelry, piercing's and valuables at home. You must remove your false eyelashes prior to surgery.    DO NOT remove hair from the surgery site.  Do not shave the incision site unless you are given specific instructions to do so.    ONLY if you have been diagnosed with sleep apnea please bring your C-PAP machine.  ONLY if you wear home oxygen please bring your portable oxygen tank the day of your procedure.  ONLY if you have a history of OPEN HEART SURGERY you will need a clearance from your Cardiologist per Anesthesia.      ONLY for patients requiring bowel prep, written instructions will be given by your doctor's office.  ONLY if you have a neuro stimulator, please bring the controller with you the morning of surgery  ONLY if a type and screen test is needed before surgery, please return:  If your doctor has scheduled you for an overnight stay, bring a small overnight bag with any personal items you need.  Make arrangements in advance for transportation home by a responsible adult.  It is not safe to drive a vehicle during the 24 hours after anesthesia.          All  facilities and properties are tobacco free.  Smoking is NOT allowed.   If you have any questions about these instructions, call Pre-Op Admit  Nursing at [phone] or the Pre-Op Day Surgery Unit at [phone].

## 2023-09-27 NOTE — TELEPHONE ENCOUNTER
----- Message from Patricia Brice sent at 9/27/2023  8:51 AM CDT -----  Contact: self  Type:  Patient Returning Call    Who Called:  pt  Who Left Message for Patient:  camelia  Does the patient know what this is regarding?:  yes  Best Call Back Number:  [phone]   Additional Information:  please call

## 2023-09-28 ENCOUNTER — ANESTHESIA EVENT (OUTPATIENT)
Dept: SURGERY | Facility: HOSPITAL | Age: 83
DRG: 331 | End: 2023-09-28
Payer: MEDICARE

## 2023-09-29 ENCOUNTER — HOSPITAL ENCOUNTER (INPATIENT)
Facility: HOSPITAL | Age: 83
LOS: 3 days | Discharge: HOME HEALTH | DRG: 331 | End: 2023-10-02
Attending: SURGERY | Admitting: SURGERY
Payer: MEDICARE

## 2023-09-29 ENCOUNTER — ANESTHESIA (OUTPATIENT)
Dept: SURGERY | Facility: HOSPITAL | Age: 83
DRG: 331 | End: 2023-09-29
Payer: MEDICARE

## 2023-09-29 DIAGNOSIS — Z85.038 HISTORY OF COLON CANCER: ICD-10-CM

## 2023-09-29 DIAGNOSIS — Z01.818 PREOP TESTING: Primary | ICD-10-CM

## 2023-09-29 DIAGNOSIS — C18.9 MALIGNANT NEOPLASM OF COLON, UNSPECIFIED PART OF COLON: ICD-10-CM

## 2023-09-29 DIAGNOSIS — Z91.89 AT RISK FOR LONG QT SYNDROME: ICD-10-CM

## 2023-09-29 PROBLEM — Z86.711 HISTORY OF PULMONARY EMBOLUS (PE): Status: ACTIVE | Noted: 2023-09-29

## 2023-09-29 LAB
APTT PPP: 26.5 SEC (ref 21–32)
BLD PROD TYP BPU: NORMAL
BLOOD UNIT EXPIRATION DATE: NORMAL
BLOOD UNIT TYPE CODE: 6200
BLOOD UNIT TYPE: NORMAL
CODING SYSTEM: NORMAL
CROSSMATCH INTERPRETATION: NORMAL
DISPENSE STATUS: NORMAL
NUM UNITS TRANS PACKED RBC: NORMAL

## 2023-09-29 PROCEDURE — 36000713 HC OR TIME LEV V EA ADD 15 MIN: Performed by: SURGERY

## 2023-09-29 PROCEDURE — D9220A PRA ANESTHESIA: Mod: CRNA,,, | Performed by: NURSE ANESTHETIST, CERTIFIED REGISTERED

## 2023-09-29 PROCEDURE — 63600175 PHARM REV CODE 636 W HCPCS: Performed by: SURGERY

## 2023-09-29 PROCEDURE — 36000712 HC OR TIME LEV V 1ST 15 MIN: Performed by: SURGERY

## 2023-09-29 PROCEDURE — 99900035 HC TECH TIME PER 15 MIN (STAT)

## 2023-09-29 PROCEDURE — C1765 ADHESION BARRIER: HCPCS | Performed by: SURGERY

## 2023-09-29 PROCEDURE — 44205 PR LAP,SURG,COLECTOMY,W/REMVL TERM ILEUM: ICD-10-PCS | Mod: ,,, | Performed by: SURGERY

## 2023-09-29 PROCEDURE — 63600175 PHARM REV CODE 636 W HCPCS: Performed by: HOSPITALIST

## 2023-09-29 PROCEDURE — 71000033 HC RECOVERY, INTIAL HOUR: Performed by: SURGERY

## 2023-09-29 PROCEDURE — 44213 PR LAP, SURG MOBIL SPLENIC FL DUR PTL COLECTOMY: ICD-10-PCS | Mod: ,,, | Performed by: SURGERY

## 2023-09-29 PROCEDURE — 27201423 OPTIME MED/SURG SUP & DEVICES STERILE SUPPLY: Performed by: SURGERY

## 2023-09-29 PROCEDURE — P9016 RBC LEUKOCYTES REDUCED: HCPCS | Performed by: SURGERY

## 2023-09-29 PROCEDURE — 25000003 PHARM REV CODE 250: Performed by: HOSPITALIST

## 2023-09-29 PROCEDURE — 25000003 PHARM REV CODE 250: Performed by: ANESTHESIOLOGY

## 2023-09-29 PROCEDURE — 25000003 PHARM REV CODE 250: Performed by: NURSE ANESTHETIST, CERTIFIED REGISTERED

## 2023-09-29 PROCEDURE — 27000221 HC OXYGEN, UP TO 24 HOURS

## 2023-09-29 PROCEDURE — 44205 LAP COLECTOMY PART W/ILEUM: CPT | Mod: ,,, | Performed by: SURGERY

## 2023-09-29 PROCEDURE — 94761 N-INVAS EAR/PLS OXIMETRY MLT: CPT

## 2023-09-29 PROCEDURE — 36415 COLL VENOUS BLD VENIPUNCTURE: CPT | Performed by: ANESTHESIOLOGY

## 2023-09-29 PROCEDURE — 37000009 HC ANESTHESIA EA ADD 15 MINS: Performed by: SURGERY

## 2023-09-29 PROCEDURE — 94799 UNLISTED PULMONARY SVC/PX: CPT

## 2023-09-29 PROCEDURE — 25000003 PHARM REV CODE 250: Performed by: SURGERY

## 2023-09-29 PROCEDURE — 63600175 PHARM REV CODE 636 W HCPCS: Performed by: NURSE ANESTHETIST, CERTIFIED REGISTERED

## 2023-09-29 PROCEDURE — P9016 RBC LEUKOCYTES REDUCED: HCPCS | Performed by: ANESTHESIOLOGY

## 2023-09-29 PROCEDURE — C9290 INJ, BUPIVACAINE LIPOSOME: HCPCS | Performed by: SURGERY

## 2023-09-29 PROCEDURE — 44213 LAP MOBIL SPLENIC FL ADD-ON: CPT | Mod: ,,, | Performed by: SURGERY

## 2023-09-29 PROCEDURE — D9220A PRA ANESTHESIA: ICD-10-PCS | Mod: CRNA,,, | Performed by: NURSE ANESTHETIST, CERTIFIED REGISTERED

## 2023-09-29 PROCEDURE — 85730 THROMBOPLASTIN TIME PARTIAL: CPT | Performed by: ANESTHESIOLOGY

## 2023-09-29 PROCEDURE — 11000001 HC ACUTE MED/SURG PRIVATE ROOM

## 2023-09-29 PROCEDURE — D9220A PRA ANESTHESIA: Mod: ANES,,, | Performed by: ANESTHESIOLOGY

## 2023-09-29 PROCEDURE — D9220A PRA ANESTHESIA: ICD-10-PCS | Mod: ANES,,, | Performed by: ANESTHESIOLOGY

## 2023-09-29 PROCEDURE — 88309 TISSUE EXAM BY PATHOLOGIST: CPT | Mod: TC | Performed by: PATHOLOGY

## 2023-09-29 PROCEDURE — 37000008 HC ANESTHESIA 1ST 15 MINUTES: Performed by: SURGERY

## 2023-09-29 DEVICE — MEMBRANE SEPRAFILM 5 X 6: Type: IMPLANTABLE DEVICE | Site: ABDOMEN | Status: FUNCTIONAL

## 2023-09-29 RX ORDER — DEXTROSE, SODIUM CHLORIDE, SODIUM LACTATE, POTASSIUM CHLORIDE, AND CALCIUM CHLORIDE 5; .6; .31; .03; .02 G/100ML; G/100ML; G/100ML; G/100ML; G/100ML
INJECTION, SOLUTION INTRAVENOUS CONTINUOUS
Status: DISCONTINUED | OUTPATIENT
Start: 2023-09-29 | End: 2023-10-02 | Stop reason: HOSPADM

## 2023-09-29 RX ORDER — METRONIDAZOLE 500 MG/100ML
500 INJECTION, SOLUTION INTRAVENOUS
Status: COMPLETED | OUTPATIENT
Start: 2023-09-29 | End: 2023-09-30

## 2023-09-29 RX ORDER — LIDOCAINE HYDROCHLORIDE 10 MG/ML
1 INJECTION, SOLUTION EPIDURAL; INFILTRATION; INTRACAUDAL; PERINEURAL ONCE
Status: DISCONTINUED | OUTPATIENT
Start: 2023-09-29 | End: 2023-09-29

## 2023-09-29 RX ORDER — SODIUM CHLORIDE 9 MG/ML
INJECTION, SOLUTION INTRAVENOUS CONTINUOUS
Status: DISCONTINUED | OUTPATIENT
Start: 2023-09-29 | End: 2023-09-29

## 2023-09-29 RX ORDER — FENTANYL CITRATE 50 UG/ML
INJECTION, SOLUTION INTRAMUSCULAR; INTRAVENOUS
Status: DISCONTINUED | OUTPATIENT
Start: 2023-09-29 | End: 2023-09-29

## 2023-09-29 RX ORDER — CEFAZOLIN SODIUM 2 G/50ML
2 SOLUTION INTRAVENOUS
Status: COMPLETED | OUTPATIENT
Start: 2023-09-29 | End: 2023-09-30

## 2023-09-29 RX ORDER — SODIUM CHLORIDE, SODIUM LACTATE, POTASSIUM CHLORIDE, CALCIUM CHLORIDE 600; 310; 30; 20 MG/100ML; MG/100ML; MG/100ML; MG/100ML
INJECTION, SOLUTION INTRAVENOUS CONTINUOUS
Status: DISCONTINUED | OUTPATIENT
Start: 2023-09-29 | End: 2023-09-29

## 2023-09-29 RX ORDER — ONDANSETRON 2 MG/ML
INJECTION INTRAMUSCULAR; INTRAVENOUS
Status: DISCONTINUED | OUTPATIENT
Start: 2023-09-29 | End: 2023-09-29

## 2023-09-29 RX ORDER — ROCURONIUM BROMIDE 10 MG/ML
INJECTION, SOLUTION INTRAVENOUS
Status: DISCONTINUED | OUTPATIENT
Start: 2023-09-29 | End: 2023-09-29

## 2023-09-29 RX ORDER — ENOXAPARIN SODIUM 100 MG/ML
40 INJECTION SUBCUTANEOUS EVERY 24 HOURS
Status: DISCONTINUED | OUTPATIENT
Start: 2023-09-29 | End: 2023-09-30

## 2023-09-29 RX ORDER — PANTOPRAZOLE SODIUM 40 MG/1
40 TABLET, DELAYED RELEASE ORAL 2 TIMES DAILY
Status: DISCONTINUED | OUTPATIENT
Start: 2023-09-29 | End: 2023-10-02 | Stop reason: HOSPADM

## 2023-09-29 RX ORDER — PROPOFOL 10 MG/ML
VIAL (ML) INTRAVENOUS
Status: DISCONTINUED | OUTPATIENT
Start: 2023-09-29 | End: 2023-09-29

## 2023-09-29 RX ORDER — SUCCINYLCHOLINE CHLORIDE 20 MG/ML
INJECTION INTRAMUSCULAR; INTRAVENOUS
Status: DISCONTINUED | OUTPATIENT
Start: 2023-09-29 | End: 2023-09-29

## 2023-09-29 RX ORDER — NALOXONE HCL 0.4 MG/ML
0.02 VIAL (ML) INJECTION
Status: DISCONTINUED | OUTPATIENT
Start: 2023-09-29 | End: 2023-10-02 | Stop reason: HOSPADM

## 2023-09-29 RX ORDER — METRONIDAZOLE 500 MG/100ML
500 INJECTION, SOLUTION INTRAVENOUS
Status: COMPLETED | OUTPATIENT
Start: 2023-09-29 | End: 2023-09-29

## 2023-09-29 RX ORDER — ACETAMINOPHEN 10 MG/ML
INJECTION, SOLUTION INTRAVENOUS
Status: DISCONTINUED | OUTPATIENT
Start: 2023-09-29 | End: 2023-09-29

## 2023-09-29 RX ORDER — OXYCODONE HYDROCHLORIDE 5 MG/1
5 TABLET ORAL ONCE AS NEEDED
Status: DISCONTINUED | OUTPATIENT
Start: 2023-09-29 | End: 2023-09-29

## 2023-09-29 RX ORDER — BUPIVACAINE HYDROCHLORIDE 2.5 MG/ML
INJECTION, SOLUTION EPIDURAL; INFILTRATION; INTRACAUDAL
Status: DISCONTINUED | OUTPATIENT
Start: 2023-09-29 | End: 2023-09-29 | Stop reason: HOSPADM

## 2023-09-29 RX ORDER — ACETAMINOPHEN 325 MG/1
650 TABLET ORAL EVERY 6 HOURS PRN
Status: DISCONTINUED | OUTPATIENT
Start: 2023-09-29 | End: 2023-10-02 | Stop reason: HOSPADM

## 2023-09-29 RX ORDER — ONDANSETRON 2 MG/ML
4 INJECTION INTRAMUSCULAR; INTRAVENOUS EVERY 12 HOURS PRN
Status: DISCONTINUED | OUTPATIENT
Start: 2023-09-29 | End: 2023-09-29 | Stop reason: SDUPTHER

## 2023-09-29 RX ORDER — VECURONIUM BROMIDE FOR INJECTION 1 MG/ML
INJECTION, POWDER, LYOPHILIZED, FOR SOLUTION INTRAVENOUS
Status: DISCONTINUED | OUTPATIENT
Start: 2023-09-29 | End: 2023-09-29

## 2023-09-29 RX ORDER — DEXAMETHASONE SODIUM PHOSPHATE 4 MG/ML
INJECTION, SOLUTION INTRA-ARTICULAR; INTRALESIONAL; INTRAMUSCULAR; INTRAVENOUS; SOFT TISSUE
Status: DISCONTINUED | OUTPATIENT
Start: 2023-09-29 | End: 2023-09-29

## 2023-09-29 RX ORDER — OXYCODONE HYDROCHLORIDE 5 MG/1
5 TABLET ORAL EVERY 4 HOURS PRN
Status: DISCONTINUED | OUTPATIENT
Start: 2023-09-29 | End: 2023-10-02 | Stop reason: HOSPADM

## 2023-09-29 RX ORDER — INDOCYANINE GREEN AND WATER 25 MG
KIT INJECTION
Status: DISCONTINUED | OUTPATIENT
Start: 2023-09-29 | End: 2023-09-29

## 2023-09-29 RX ORDER — BISACODYL 5 MG
5 TABLET, DELAYED RELEASE (ENTERIC COATED) ORAL NIGHTLY
Status: DISCONTINUED | OUTPATIENT
Start: 2023-09-29 | End: 2023-10-02 | Stop reason: HOSPADM

## 2023-09-29 RX ORDER — PHENYLEPHRINE HYDROCHLORIDE 10 MG/ML
INJECTION INTRAVENOUS
Status: DISCONTINUED | OUTPATIENT
Start: 2023-09-29 | End: 2023-09-29

## 2023-09-29 RX ORDER — LISINOPRIL 10 MG/1
10 TABLET ORAL DAILY
Status: DISCONTINUED | OUTPATIENT
Start: 2023-09-30 | End: 2023-10-02 | Stop reason: HOSPADM

## 2023-09-29 RX ORDER — ONDANSETRON 2 MG/ML
4 INJECTION INTRAMUSCULAR; INTRAVENOUS EVERY 6 HOURS PRN
Status: DISCONTINUED | OUTPATIENT
Start: 2023-09-29 | End: 2023-10-02 | Stop reason: HOSPADM

## 2023-09-29 RX ORDER — LORAZEPAM 2 MG/ML
0.25 INJECTION INTRAMUSCULAR EVERY 4 HOURS PRN
Status: DISCONTINUED | OUTPATIENT
Start: 2023-09-29 | End: 2023-10-02 | Stop reason: HOSPADM

## 2023-09-29 RX ORDER — HYDROMORPHONE HYDROCHLORIDE 1 MG/ML
1 INJECTION, SOLUTION INTRAMUSCULAR; INTRAVENOUS; SUBCUTANEOUS EVERY 4 HOURS PRN
Status: DISCONTINUED | OUTPATIENT
Start: 2023-09-29 | End: 2023-10-02 | Stop reason: HOSPADM

## 2023-09-29 RX ORDER — HYDROMORPHONE HYDROCHLORIDE 2 MG/ML
0.2 INJECTION, SOLUTION INTRAMUSCULAR; INTRAVENOUS; SUBCUTANEOUS EVERY 5 MIN PRN
Status: DISCONTINUED | OUTPATIENT
Start: 2023-09-29 | End: 2023-09-29

## 2023-09-29 RX ORDER — CALCIUM CARBONATE 200(500)MG
1000 TABLET,CHEWABLE ORAL EVERY 8 HOURS PRN
Status: DISCONTINUED | OUTPATIENT
Start: 2023-09-29 | End: 2023-10-02 | Stop reason: HOSPADM

## 2023-09-29 RX ORDER — MUPIROCIN 20 MG/G
OINTMENT TOPICAL 2 TIMES DAILY
Status: DISCONTINUED | OUTPATIENT
Start: 2023-09-29 | End: 2023-10-02 | Stop reason: HOSPADM

## 2023-09-29 RX ORDER — ACETAMINOPHEN 10 MG/ML
1000 INJECTION, SOLUTION INTRAVENOUS EVERY 8 HOURS
Status: COMPLETED | OUTPATIENT
Start: 2023-09-29 | End: 2023-09-30

## 2023-09-29 RX ORDER — METOCLOPRAMIDE HYDROCHLORIDE 5 MG/ML
10 INJECTION INTRAMUSCULAR; INTRAVENOUS EVERY 10 MIN PRN
Status: DISCONTINUED | OUTPATIENT
Start: 2023-09-29 | End: 2023-09-29

## 2023-09-29 RX ORDER — LIDOCAINE HYDROCHLORIDE 20 MG/ML
INJECTION INTRAVENOUS
Status: DISCONTINUED | OUTPATIENT
Start: 2023-09-29 | End: 2023-09-29

## 2023-09-29 RX ADMIN — PHENYLEPHRINE HYDROCHLORIDE 100 MCG: 10 INJECTION INTRAVENOUS at 02:09

## 2023-09-29 RX ADMIN — VECURONIUM BROMIDE 1 MG: 10 INJECTION, POWDER, LYOPHILIZED, FOR SOLUTION INTRAVENOUS at 02:09

## 2023-09-29 RX ADMIN — LIDOCAINE HYDROCHLORIDE 100 MG: 20 INJECTION, SOLUTION INTRAVENOUS at 12:09

## 2023-09-29 RX ADMIN — PROPOFOL 100 MG: 10 INJECTION, EMULSION INTRAVENOUS at 12:09

## 2023-09-29 RX ADMIN — INDOCYANINE GREEN 10 MG: KIT INTRAVENOUS at 02:09

## 2023-09-29 RX ADMIN — METRONIDAZOLE 500 MG: 5 INJECTION, SOLUTION INTRAVENOUS at 08:09

## 2023-09-29 RX ADMIN — PHENYLEPHRINE HYDROCHLORIDE 200 MCG: 10 INJECTION INTRAVENOUS at 12:09

## 2023-09-29 RX ADMIN — MUPIROCIN: 20 OINTMENT TOPICAL at 09:09

## 2023-09-29 RX ADMIN — CEFAZOLIN SODIUM 2 G: 2 SOLUTION INTRAVENOUS at 05:09

## 2023-09-29 RX ADMIN — SODIUM CHLORIDE, SODIUM GLUCONATE, SODIUM ACETATE, POTASSIUM CHLORIDE AND MAGNESIUM CHLORIDE: 526; 502; 368; 37; 30 INJECTION, SOLUTION INTRAVENOUS at 02:09

## 2023-09-29 RX ADMIN — FENTANYL CITRATE 50 MCG: 50 INJECTION, SOLUTION INTRAMUSCULAR; INTRAVENOUS at 12:09

## 2023-09-29 RX ADMIN — PANTOPRAZOLE SODIUM 40 MG: 40 TABLET, DELAYED RELEASE ORAL at 08:09

## 2023-09-29 RX ADMIN — FENTANYL CITRATE 25 MCG: 50 INJECTION, SOLUTION INTRAMUSCULAR; INTRAVENOUS at 03:09

## 2023-09-29 RX ADMIN — SUGAMMADEX 200 MG: 100 INJECTION, SOLUTION INTRAVENOUS at 03:09

## 2023-09-29 RX ADMIN — ENOXAPARIN SODIUM 40 MG: 40 INJECTION SUBCUTANEOUS at 05:09

## 2023-09-29 RX ADMIN — SODIUM CHLORIDE, SODIUM LACTATE, POTASSIUM CHLORIDE, CALCIUM CHLORIDE AND DEXTROSE MONOHYDRATE: 5; 600; 310; 30; 20 INJECTION, SOLUTION INTRAVENOUS at 05:09

## 2023-09-29 RX ADMIN — SUCCINYLCHOLINE CHLORIDE 120 MG: 20 INJECTION, SOLUTION INTRAMUSCULAR; INTRAVENOUS at 12:09

## 2023-09-29 RX ADMIN — ACETAMINOPHEN 1000 MG: 10 INJECTION INTRAVENOUS at 11:09

## 2023-09-29 RX ADMIN — SODIUM CHLORIDE, SODIUM GLUCONATE, SODIUM ACETATE, POTASSIUM CHLORIDE AND MAGNESIUM CHLORIDE: 526; 502; 368; 37; 30 INJECTION, SOLUTION INTRAVENOUS at 01:09

## 2023-09-29 RX ADMIN — SODIUM CHLORIDE, SODIUM GLUCONATE, SODIUM ACETATE, POTASSIUM CHLORIDE AND MAGNESIUM CHLORIDE 10 ML: 526; 502; 368; 37; 30 INJECTION, SOLUTION INTRAVENOUS at 12:09

## 2023-09-29 RX ADMIN — ONDANSETRON 4 MG: 2 INJECTION INTRAMUSCULAR; INTRAVENOUS at 12:09

## 2023-09-29 RX ADMIN — METRONIDAZOLE 500 MG: 500 INJECTION, SOLUTION INTRAVENOUS at 12:09

## 2023-09-29 RX ADMIN — ROCURONIUM BROMIDE 45 MG: 10 INJECTION, SOLUTION INTRAVENOUS at 12:09

## 2023-09-29 RX ADMIN — BISACODYL 5 MG: 5 TABLET, COATED ORAL at 08:09

## 2023-09-29 RX ADMIN — HYDROMORPHONE HYDROCHLORIDE 1 MG: 1 INJECTION, SOLUTION INTRAMUSCULAR; INTRAVENOUS; SUBCUTANEOUS at 08:09

## 2023-09-29 RX ADMIN — ACETAMINOPHEN 1000 MG: 10 INJECTION, SOLUTION INTRAVENOUS at 01:09

## 2023-09-29 RX ADMIN — PHENYLEPHRINE HYDROCHLORIDE 200 MCG: 10 INJECTION INTRAVENOUS at 01:09

## 2023-09-29 RX ADMIN — ROCURONIUM BROMIDE 5 MG: 10 INJECTION, SOLUTION INTRAVENOUS at 12:09

## 2023-09-29 RX ADMIN — DEXAMETHASONE SODIUM PHOSPHATE 4 MG: 4 INJECTION, SOLUTION INTRA-ARTICULAR; INTRALESIONAL; INTRAMUSCULAR; INTRAVENOUS; SOFT TISSUE at 12:09

## 2023-09-29 RX ADMIN — ONDANSETRON 4 MG: 2 INJECTION INTRAMUSCULAR; INTRAVENOUS at 08:09

## 2023-09-29 RX ADMIN — CEFTRIAXONE 2 G: 2 INJECTION, POWDER, FOR SOLUTION INTRAMUSCULAR; INTRAVENOUS at 12:09

## 2023-09-29 RX ADMIN — FENTANYL CITRATE 50 MCG: 50 INJECTION, SOLUTION INTRAMUSCULAR; INTRAVENOUS at 01:09

## 2023-09-29 NOTE — PLAN OF CARE
Pt in pre op assessment complete. Pt has history of blood clots in both legs and in lung on lovenox and eliquis  Has green field filter in right groin  Denies pain at present  Will contimue too monitortb  Blooid consent and anesthesia consent signed on paper  Pt tool bowel prep yesterday and is clear this am

## 2023-09-29 NOTE — OP NOTE
Date of procedure:  September 29, 2023     Staff surgeon:  Dr. Jim Chavez     Assistant: MOHINI RowleyA    Preoperative diagnosis:  Colon cancer     Postoperative diagnosis:  The same     Procedure:  Robotic colectomy with isoperistaltic ileocolic anastomosis   Mobilization of splenic flexure     Anesthesia: General endotracheal anesthesia     Indication for procedure:  Pleasant 83-year-old female who had remote history of colon cancer.  Patient had recent colonoscopy performed was found to have a area of inflamed tissue in the transverse colon which was biopsied and noted to be adenocarcinoma.  This was a proximally 10 cm distal to her previous ileocolic anastomosis.  Patient had workup and was found to have pulmonary embolism as well as DVT.  Discussion with vascular surgery who expressed the need to place a filter which was done.  She was then bridged from [facility] to [facility].  Additionally she does have a left renal mass  2.5 cm which per discussion with Urology would be addressed after colectomy.  On CT scan mass was noted in the splenic flexure     Description of procedure:  Following signing informed consent she was taken to the operating room placed supine position.  General anesthesia was administered.  Mckeon catheter was inserted.  Abdomen is prepped and draped standard fashion.  Appropriate time-out procedure was performed.  At the beginning of the case her hemoglobin is 8.2 and per discussion with anesthesia decision was made to transfuse her a unit of packed red blood cells as the case began.  The abdomen was then prepped and draped.  An appropriate time-out procedure was performed.  Having performed time-out procedure make a small transverse incision just below the umbilicus to the right of midline.  I dissect down to the abdominal cavity entered the abdominal cavity with a Veress needle.  Pneumoperitoneum to 15 mmHg was established.  A robotic 8 mm trocar was then placed under  direct visualization.  The abdominal cavity was evaluated for injury from the Veress needle.  There is no such injury appreciated.  The liver was evaluated no obvious masses noted on either lobe.  There is obvious inked from previous tattoo present in the left upper abdomen just distal to the splenic flexure as well as proximal.  There is obviously a large bulky mass present in the distal transverse colon at the splenic flexure.  Another 8 mm trocar was placed 8 cm to the patient's left of this 1st trocar.  Robotic trocar was then placed under direct visualization and then a 3rd 8 mm robotic trocar was placed in the left lower quadrant under direct visualization.  A 4th 8 mm trocar was placed in the right mid abdomen at the level of the umbilicus far-lateral.  A 5 mm assistant trocar is placed in lower abdomen between the first two trocars.  The patient is then placed in reverse trendelenburg with tilt to her Right.  I began by scoring the white line of Toldt at the junction of the sigmoid and descending colon.  A proceed proximally along the descending colon up to the splenic flexure.  I release the attachments to the retroperitoneum mobilized the descending colon as much as I can.  I exchanged the scissors for the vessel sealer and used the robotic vessel sealer to take down the splenic flexure.  This is slightly more tedious than normal as there is a large bulky mass and there significant fibrotic and inflammatory changes to the splenic colic ligament as well as to the omentum that is plastered over the mass.  With care I am able to fully released splenic colic ligament in into the lesser sac.  This fully releases the distal transverse colon as well as the proximal descending colon into a plane of view.  I pick a point of distal transection at the level of the IMV.  I preserved the IMV score the mesentery at its base order to allow appropriate polo sampling.  I resect the mesentery of the colon beginning at the  IMV in proceed proximally.  A separate the attachments of the ligament of Treitz I ligate the middle colic vessel robotically and proceed to the proximal transverse colon where the ileocolic anastomosis is.  Next I separate the omentum from the transverse colon entering the lesser sac and this position.  Care was taken to avoid injury to the duodenal stomach which were clearly visualized.  I fully release the attachments of the ileocolic anastomosis.  Next ICG was given to the patient this spot of distal resection was visualized and noted to have good perfusion.  The terminal ileum was noted to have good perfusion.  At this point decision was made to perform an extracorporeal anastomosis.  A 6 cm vertical incision was made in the upper midline and carried out the deep dermal tissue.  This was done after the robot was undocked.  The fascia was sharply divided in the abdominal cavity was then entered.  Theo wound retractor was placed.  A bring the large bulky mass from the splenic flexure out through the field of view.  I staple across the site of distal resection with a DUSTIN 75 device.  The colonic mesentery had been all ligated intracorporeal with the vessel sealer.  The point of proximal resection was chosen in the terminal ileum.  The mesentery was ligated and the stapled across with a DUSTIN 75 device.  Large bulky specimen was removed and sent off the field to pathology.  Next the terminal ileum was aligned adjacent to the descending colon and isoperistaltic fashion.  A proximally 6 cm beyond the colon staple line and make a colotomy.  I then make an enterotomy proximally 2 cm proximal to the staple line.  I then fashion create a stapled side-to-side isoperistaltic anastomosis.  No bleeding was noted.  Anastomosis was widely patent.  The common channel was then closed with a barbed suture in 2 layers.  Next I irrigate ensure adequate hemostasis.  No bleeding is noted.  Seprafilm was placed.  The fascia was then  closed with a heavy PDS suture.  All trocars were removed prior to closure of the fascia.  Also prior to closure of the fascia the fascia was injected with Exparel mixed with Marcaine.  Having closed the fascia the skin incisions closed with 4-0 Monocryl.  Patient was extubated taken to recovery room in stable condition.  There were no immediate complications.  Blood loss was 75 cc

## 2023-09-29 NOTE — H&P
[facility]  History & Physical     Subjective:     Chief Complaint/Reason for Admission: Colon cancer    History of Present Illness:   Patient 83 y.o. female with recent diagnosis of colon cancer.  She had an area of ischemia noted on cscope.  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  Pt has history of colon cancer and had a R demario for this.  PT new cancer appears to be present at the splenic flexure.  Pt surgery was originally scheduled for early September but had to be delayed secondary to PE and DVT.  She has been on blood thinners. Eliquis stopped 3 days ago with lovenox bridge. She also has a filter in place.      Patient Active Problem List    Diagnosis Date Noted    DVT (deep vein thrombosis) in pregnancy 09/13/2023    GERD (gastroesophageal reflux disease) 08/25/2023    Adenocarcinoma 08/25/2023    Venous lake of lip 08/11/2022    Decreased functional mobility 01/24/2022    Muscle tightness 01/24/2022    Decreased strength 01/24/2022    Decreased range of motion 01/24/2022    Malignant neoplasm of ascending colon 08/26/2020    Essential hypertension 08/09/2019    Vitamin D deficiency 03/22/2019    Chronic right-sided low back pain without sciatica 07/16/2018    Other spondylosis, lumbar region 07/10/2018    Lumbar radiculitis 05/22/2018    History of colon cancer 03/19/2018    Calcified granuloma of lung 12/22/2017    Morbid obesity with BMI of 40.0-44.9, adult 04/13/2017    DDD (degenerative disc disease), lumbar 01/05/2017    Nuclear sclerosis 04/14/2014    Hyperopia with astigmatism and presbyopia 04/14/2014    Hypothyroid 06/03/2012        Medications Prior to Admission   Medication Sig Dispense Refill Last Dose    acetaminophen (TYLENOL) 650 MG TbSR Take 650 mg by mouth every 8 (eight) hours.   Past Week    alendronate (FOSAMAX) 70 MG tablet TAKE 1 TABLET(70 MG) BY MOUTH EVERY 7 DAYS 12 tablet 3 Past Week    apixaban (ELIQUIS DVT-PE TREAT 30D START) 5 mg (74 tabs) DsPk For the  "first 7 days take two 5 mg tablets twice daily.  After 7 days take one 5 mg tablet twice daily. 74 tablet 1 Past Week    enoxaparin sodium (LOVENOX SUBQ) Inject into the skin.   Past Week    ergocalciferol, vitamin D2, (VITAMIN D ORAL) Take 2,000 mg by mouth once daily.   9/28/2023    furosemide (LASIX) 20 MG tablet Take 1 tablet (20 mg total) by mouth daily as needed (edema). 90 tablet 3 9/28/2023    hydrOXYzine HCL (ATARAX) 25 MG tablet Take 1 tablet (25 mg total) by mouth 3 (three) times daily as needed for Anxiety (insomnia). 30 tablet PRN 9/28/2023    levocetirizine (XYZAL) 5 MG tablet Take 1 tablet (5 mg total) by mouth nightly as needed for Allergies (allergies). 90 tablet PRN 9/28/2023    levothyroxine (SYNTHROID) 75 MCG tablet Take 1 tablet (75 mcg total) by mouth once daily. 90 tablet 3 9/28/2023    lisinopriL 10 MG tablet Take 1 tablet (10 mg total) by mouth once daily. 90 tablet 3 9/28/2023    multivitamin capsule Take 1 capsule by mouth once daily.   Past Week    nystatin (MYCOSTATIN) cream Apply topically 2 (two) times daily as needed. GRETCHEN EXT AA BID 30 g prn Past Week    omeprazole (PRILOSEC) 40 MG capsule Take 1 capsule (40 mg total) by mouth 2 (two) times daily before meals. 180 capsule PRN 9/29/2023    traMADoL (ULTRAM) 50 mg tablet Take 1 tablet (50 mg total) by mouth every 8 (eight) hours as needed for Pain. 21 each 0 9/28/2023    triamcinolone acetonide 0.1% (KENALOG) 0.1 % cream APPLY EXTERNALLY TO THE AFFECTED AREA TWICE DAILY 60 g 0 Past Week    cyclobenzaprine (FLEXERIL) 5 MG tablet Take 1 tablet (5 mg total) by mouth 3 (three) times daily as needed for Muscle spasms. 90 tablet 0 More than a month     Review of patient's allergies indicates:   Allergen Reactions    Aspirin      Other reaction(s): Stomach upset    Aspirin Other (See Comments)     Other reaction(s): Stomach upset    Gabapentin Other (See Comments)     Pt states she felt "funny" when she took the medication. Especially at the " higher dose.    Mobic [meloxicam] Swelling     Edema and elevated blood pressure     Oxaliplatin Other (See Comments)     Other reaction(s): Joint pain  Other reaction(s): Itching  Other reaction(s): Hives  Other reaction(s): Joint pain  Other reaction(s): Itching  Other reaction(s): Hives    Pregabalin Other (See Comments)     Side effects  Side effects        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    Strabismus     Trouble in sleeping     Wears dentures     Uppers      Past Surgical History:   Procedure Laterality Date    CARDIAC SURGERY      cardiac cath    COLON SURGERY      colon resection    COLONOSCOPY N/A 10/18/2016    Procedure: COLONOSCOPY;  Surgeon: Rell Cordova MD;  Location: [facility];  Service: Endoscopy;  Laterality: N/A;    COLONOSCOPY N/A 8/8/2023    Procedure: COLONOSCOPY;  Surgeon: Kaushik Pinon MD;  Location: [facility];  Service: Endoscopy;  Laterality: N/A;    COLONOSCOPY N/A 8/22/2023    Procedure: COLONOSCOPY (tattoo of colon ca);  Surgeon: Kaushik Pinon MD;  Location: [facility];  Service: Endoscopy;  Laterality: N/A;    ESOPHAGOGASTRODUODENOSCOPY N/A 8/3/2023    Procedure: EGD (ESOPHAGOGASTRODUODENOSCOPY);  Surgeon: Kaushik Pinon MD;  Location: [facility];  Service: Endoscopy;  Laterality: N/A;    HAND TENDON SURGERY Left     HEMICOLECTOMY      right    INJECTION OF ANESTHETIC AGENT AROUND MEDIAL BRANCH NERVES  INNERVATING LUMBAR FACET JOINT Right 07/10/2018    Procedure: Block-nerve-medial branch-lumbar;  Surgeon: Parish Gaitan MD;  Location: [facility];  Service: Pain Management;  Laterality: Right;  L3, 4, 5    INSERTION OF INFERIOR VENA CAVAL FILTER N/A 2023    Procedure: Insertion, Filter, Inferior Vena Cava;  Surgeon: Oren Verdin MD;  Location: [facility] CATH/EP LAB;  Service: General;  Laterality: N/A;    JOINT REPLACEMENT      bilateral    RADIOFREQUENCY ABLATION OF LUMBAR MEDIAL BRANCH NERVE AT SINGLE LEVEL Right 2018    Procedure: RADIOFREQUENCY ABLATION, NERVE, MEDIAL BRANCH, LUMBAR, 1 LEVEL;  Surgeon: Parish Gaitan MD;  Location: [facility] OR;  Service: Pain Management;  Laterality: Right;  L3,4,5 - Burned at 80 degrees C. for 75 seconds x 2 each site    SHOULDER ARTHROSCOPY Right 2017    Reverse     TONSILLECTOMY      TONSILLECTOMY, ADENOIDECTOMY, BILATERAL MYRINGOTOMY AND TUBES      TOTAL KNEE ARTHROPLASTY Bilateral 1998    total replacements    TUMOR REMOVAL Left     left foot as a child      Family History   Problem Relation Age of Onset    Hypertension Mother     Kidney disease Mother     Cataracts Father     Cataracts Sister     Cancer Sister         breast    No Known Problems Brother     Cancer Sister         breast    Arthritis Sister     Glaucoma Neg Hx     Amblyopia Neg Hx     Blindness Neg Hx     Macular degeneration Neg Hx     Retinal detachment Neg Hx     Strabismus Neg Hx     Stroke Neg Hx     Thyroid disease Neg Hx       Social History     Tobacco Use    Smoking status: Former     Current packs/day: 0.00     Average packs/day: 0.5 packs/day for 1 year (0.5 ttl pk-yrs)     Types: Cigarettes     Start date: 1960     Quit date: 1961     Years since quittin.7    Smokeless tobacco: Never   Substance Use Topics    Alcohol use: No        Review of Systems:  A comprehensive review of systems was negative.    OBJECTIVE:     Patient Vitals for the past 8 hrs:   BP Temp Temp src  "Pulse Resp SpO2 Height Weight   09/29/23 1154 128/68 -- -- -- -- -- -- --   09/29/23 1151 128/69 -- -- -- -- -- -- --   09/29/23 1150 128/69 98.5 °F (36.9 °C) Oral 67 16 99 % 5' 4" (1.626 m) 86.2 kg (190 lb)     AAx3  Sinus  Soft/nt/nd    Data Review:      ASSESSMENT/PLAN:     There are no hospital problems to display for this patient.  COlon cancer    Plan:  TO have cscope today    "

## 2023-09-29 NOTE — ANESTHESIA PROCEDURE NOTES
Intubation    Date/Time: 9/29/2023 12:39 PM    Performed by: Omar Jones CRNA  Authorized by: Yair Nails MD    Intubation:     Induction:  Intravenous    Intubated:  Postinduction    Mask Ventilation:  Easy with oral airway    Attempts:  1    Attempted By:  CRNA    Method of Intubation:  Video laryngoscopy    Blade:  [place] 3    Laryngeal View Grade: Grade I - full view of cords      Difficult Airway Encountered?: No      Complications:  None    Airway Device:  Oral endotracheal tube    Airway Device Size:  7.0    Style/Cuff Inflation:  Cuffed (inflated to minimal occlusive pressure)    Tube secured:  21    Secured at:  The lips    Placement Verified By:  Capnometry    Complicating Factors:  None    Findings Post-Intubation:  BS equal bilateral and atraumatic/condition of teeth unchanged

## 2023-09-29 NOTE — ANESTHESIA PREPROCEDURE EVALUATION
09/29/2023  Danna Sorensen is a 83 y.o., female.      Pre-op Assessment    I have reviewed the Patient Summary Reports.     I have reviewed the Nursing Notes. I have reviewed the NPO Status.   I have reviewed the Medications.     Review of Systems  Anesthesia Hx:  No problems with previous Anesthesia    Social:  Former Smoker    Hematology/Oncology:        Hematology Comments: Recent IVC filter --  Cancer in past history (colon CA, SCCA):    EENT/Dental:   Right lower lip old injury/purple mass.   Cardiovascular:   Hypertension, well controlled CAD asymptomatic  ECG has been reviewed.   Left Ventricle: The left ventricle is normal in size. Normal wall thickness. Normal wall motion. There is normal systolic function with a visually estimated ejection fraction of 65 - 70%. There is normal diastolic function.    Right Ventricle: Normal right ventricular cavity size. Wall thickness is normal. Right ventricle wall motion  is normal. Systolic function is normal.    Aortic Valve: There is moderate aortic valve sclerosis. There is mild aortic regurgitation.    Tricuspid Valve: There is mild to moderate regurgitation.  No pulmonary hypertension.    Pulmonic Valve: There is mild regurgitation.    IVC/SVC: Normal venous pressure at 3 mmHg.   Pulmonary:  Pulmonary Normal    Renal/:  Renal/ Normal     Musculoskeletal:   Arthritis   Spine Disorders: lumbar    Neurological:   Neuromuscular Disease,    Endocrine:   Hypothyroidism  Obesity / BMI > 30      Physical Exam  General: Well nourished, Cooperative, Alert and Oriented    Airway:  Mallampati: II   Mouth Opening: Normal  TM Distance: Normal  Neck ROM: Normal ROM        Anesthesia Plan  Type of Anesthesia, risks & benefits discussed:    Anesthesia Type: Gen ETT  Intra-op Monitoring Plan: Standard ASA Monitors  Post Op Pain Control Plan: multimodal analgesia and  IV/PO Opioids PRN  Induction:  IV and Inhalation  Airway Plan: Direct, Video and Fiberoptic, Post-Induction  Informed Consent: Informed consent signed with the Patient and all parties understand the risks and agree with anesthesia plan.  All questions answered.   ASA Score: 3    Ready For Surgery From Anesthesia Perspective.     .

## 2023-09-29 NOTE — BRIEF OP NOTE
[facility]  Brief Operative Note    Surgery Date: 9/29/2023     Surgeon(s) and Role:     * Jim Chavez MD - Primary    Assisting Surgeon: Karey Ta      Pre-op Diagnosis:  Malignant neoplasm of colon, unspecified part of colon [C18.9]  History of colon cancer [Z85.038]    Post-op Diagnosis:  Post-Op Diagnosis Codes:     * Malignant neoplasm of colon, unspecified part of colon [C18.9]     * History of colon cancer [Z85.038]    Procedure(s) (LRB):  ROBOTIC COLECTOMY (N/A)  Mobilization of splenic flexure    Anesthesia: General    Operative Findings: Large bulky mass of the distal transverse colon     Estimated Blood Loss: 75 mL         Specimens:   Specimen (24h ago, onward)       Start     Ordered    09/29/23 1455  Specimen to Pathology - Surgery  Once        Comments: Pre-op Diagnosis: Malignant neoplasm of colon, unspecified part of colon [C18.9]History of colon cancer [Z85.038]Procedure(s):ROBOTIC COLECTOMY Number of specimens: 1Name of specimens: 1. Segmental colectomy, splenic flexure, transverse colon     Question:  Release to patient  Answer:  Immediate    09/29/23 1508

## 2023-09-29 NOTE — TRANSFER OF CARE
"Anesthesia Transfer of Care Note    Patient: Danna Sorensen    Procedure(s) Performed: Procedure(s) (LRB):  ROBOTIC COLECTOMY (N/A)    Patient location: PACU    Anesthesia Type: general    Transport from OR: Transported from OR on 6-10 L/min O2 by face mask with adequate spontaneous ventilation    Post pain: adequate analgesia    Post assessment: no apparent anesthetic complications    Post vital signs: stable    Level of consciousness: sedated    Nausea/Vomiting: no nausea/vomiting    Complications: none    Transfer of care protocol was followed      Last vitals:   Visit Vitals  /68   Pulse 67   Temp 36.9 °C (98.5 °F) (Oral)   Resp 16   Ht 5' 4" (1.626 m)   Wt 86.2 kg (190 lb)   SpO2 99%   Breastfeeding No   BMI 32.61 kg/m²     "

## 2023-09-29 NOTE — ANESTHESIA POSTPROCEDURE EVALUATION
Anesthesia Post Evaluation    Patient: Danna Sorensen    Procedure(s) Performed: Procedure(s) (LRB):  ROBOTIC COLECTOMY (N/A)    Final Anesthesia Type: general      Patient location during evaluation: PACU  Patient participation: Yes- Able to Participate  Level of consciousness: sedated and awake  Post-procedure vital signs: reviewed and stable  Pain management: adequate  Airway patency: patent    PONV status at discharge: No PONV  Anesthetic complications: no      Cardiovascular status: blood pressure returned to baseline  Respiratory status: spontaneous ventilation  Hydration status: euvolemic  Follow-up not needed.          Vitals Value Taken Time   /58 09/29/23 1557   Temp 36.8 °C (98.2 °F) 09/29/23 1545   Pulse 62 09/29/23 1600   Resp 19 09/29/23 1600   SpO2 95 % 09/29/23 1600   Vitals shown include unvalidated device data.      No case tracking events are documented in the log.      Pain/Ronda Score: Ronda Score: 8 (9/29/2023  4:00 PM)

## 2023-09-30 LAB
ANION GAP SERPL CALC-SCNC: 9 MMOL/L (ref 8–16)
BASOPHILS # BLD AUTO: 0.01 K/UL (ref 0–0.2)
BASOPHILS NFR BLD: 0.1 % (ref 0–1.9)
BILIRUB UR QL STRIP: NEGATIVE
BUN SERPL-MCNC: 8 MG/DL (ref 8–23)
CALCIUM SERPL-MCNC: 8.4 MG/DL (ref 8.7–10.5)
CHLORIDE SERPL-SCNC: 107 MMOL/L (ref 95–110)
CLARITY UR: CLEAR
CO2 SERPL-SCNC: 22 MMOL/L (ref 23–29)
COLOR UR: YELLOW
CREAT SERPL-MCNC: 0.8 MG/DL (ref 0.5–1.4)
DIFFERENTIAL METHOD: ABNORMAL
EOSINOPHIL # BLD AUTO: 0 K/UL (ref 0–0.5)
EOSINOPHIL NFR BLD: 0 % (ref 0–8)
ERYTHROCYTE [DISTWIDTH] IN BLOOD BY AUTOMATED COUNT: 15.3 % (ref 11.5–14.5)
EST. GFR  (NO RACE VARIABLE): >60 ML/MIN/1.73 M^2
GLUCOSE SERPL-MCNC: 149 MG/DL (ref 70–110)
GLUCOSE UR QL STRIP: NEGATIVE
HCT VFR BLD AUTO: 28.7 % (ref 37–48.5)
HGB BLD-MCNC: 9 G/DL (ref 12–16)
HGB UR QL STRIP: NEGATIVE
IMM GRANULOCYTES # BLD AUTO: 0.05 K/UL (ref 0–0.04)
IMM GRANULOCYTES NFR BLD AUTO: 0.5 % (ref 0–0.5)
KETONES UR QL STRIP: NEGATIVE
LEUKOCYTE ESTERASE UR QL STRIP: NEGATIVE
LYMPHOCYTES # BLD AUTO: 0.4 K/UL (ref 1–4.8)
LYMPHOCYTES NFR BLD: 4.3 % (ref 18–48)
MCH RBC QN AUTO: 27.1 PG (ref 27–31)
MCHC RBC AUTO-ENTMCNC: 31.4 G/DL (ref 32–36)
MCV RBC AUTO: 86 FL (ref 82–98)
MONOCYTES # BLD AUTO: 0.6 K/UL (ref 0.3–1)
MONOCYTES NFR BLD: 5.6 % (ref 4–15)
NEUTROPHILS # BLD AUTO: 9 K/UL (ref 1.8–7.7)
NEUTROPHILS NFR BLD: 89.5 % (ref 38–73)
NITRITE UR QL STRIP: NEGATIVE
NRBC BLD-RTO: 0 /100 WBC
PH UR STRIP: 6 [PH] (ref 5–8)
PLATELET # BLD AUTO: 295 K/UL (ref 150–450)
PMV BLD AUTO: 9.4 FL (ref 9.2–12.9)
POTASSIUM SERPL-SCNC: 4.2 MMOL/L (ref 3.5–5.1)
PROT UR QL STRIP: NEGATIVE
RBC # BLD AUTO: 3.32 M/UL (ref 4–5.4)
SODIUM SERPL-SCNC: 138 MMOL/L (ref 136–145)
SP GR UR STRIP: 1.02 (ref 1–1.03)
URN SPEC COLLECT METH UR: NORMAL
UROBILINOGEN UR STRIP-ACNC: NEGATIVE EU/DL
WBC # BLD AUTO: 10.02 K/UL (ref 3.9–12.7)

## 2023-09-30 PROCEDURE — 11000001 HC ACUTE MED/SURG PRIVATE ROOM

## 2023-09-30 PROCEDURE — 94799 UNLISTED PULMONARY SVC/PX: CPT

## 2023-09-30 PROCEDURE — 94761 N-INVAS EAR/PLS OXIMETRY MLT: CPT

## 2023-09-30 PROCEDURE — 63600175 PHARM REV CODE 636 W HCPCS: Performed by: HOSPITALIST

## 2023-09-30 PROCEDURE — 99900035 HC TECH TIME PER 15 MIN (STAT)

## 2023-09-30 PROCEDURE — 85025 COMPLETE CBC W/AUTO DIFF WBC: CPT | Performed by: HOSPITALIST

## 2023-09-30 PROCEDURE — 27000221 HC OXYGEN, UP TO 24 HOURS

## 2023-09-30 PROCEDURE — 80048 BASIC METABOLIC PNL TOTAL CA: CPT | Performed by: HOSPITALIST

## 2023-09-30 PROCEDURE — 81003 URINALYSIS AUTO W/O SCOPE: CPT | Performed by: HOSPITALIST

## 2023-09-30 PROCEDURE — 25000003 PHARM REV CODE 250: Performed by: HOSPITALIST

## 2023-09-30 PROCEDURE — 36415 COLL VENOUS BLD VENIPUNCTURE: CPT | Performed by: HOSPITALIST

## 2023-09-30 RX ORDER — ENOXAPARIN SODIUM 100 MG/ML
1 INJECTION SUBCUTANEOUS EVERY 12 HOURS
Status: DISCONTINUED | OUTPATIENT
Start: 2023-09-30 | End: 2023-09-30

## 2023-09-30 RX ADMIN — ONDANSETRON 4 MG: 2 INJECTION INTRAMUSCULAR; INTRAVENOUS at 01:09

## 2023-09-30 RX ADMIN — SODIUM CHLORIDE, SODIUM LACTATE, POTASSIUM CHLORIDE, CALCIUM CHLORIDE AND DEXTROSE MONOHYDRATE: 5; 600; 310; 30; 20 INJECTION, SOLUTION INTRAVENOUS at 03:09

## 2023-09-30 RX ADMIN — ONDANSETRON 4 MG: 2 INJECTION INTRAMUSCULAR; INTRAVENOUS at 07:09

## 2023-09-30 RX ADMIN — MUPIROCIN: 20 OINTMENT TOPICAL at 08:09

## 2023-09-30 RX ADMIN — HYDROMORPHONE HYDROCHLORIDE 1 MG: 1 INJECTION, SOLUTION INTRAMUSCULAR; INTRAVENOUS; SUBCUTANEOUS at 08:09

## 2023-09-30 RX ADMIN — CEFAZOLIN SODIUM 2 G: 2 SOLUTION INTRAVENOUS at 12:09

## 2023-09-30 RX ADMIN — PANTOPRAZOLE SODIUM 40 MG: 40 TABLET, DELAYED RELEASE ORAL at 08:09

## 2023-09-30 RX ADMIN — ACETAMINOPHEN 1000 MG: 10 INJECTION INTRAVENOUS at 06:09

## 2023-09-30 RX ADMIN — ACETAMINOPHEN 1000 MG: 10 INJECTION INTRAVENOUS at 01:09

## 2023-09-30 RX ADMIN — SODIUM CHLORIDE, SODIUM LACTATE, POTASSIUM CHLORIDE, CALCIUM CHLORIDE AND DEXTROSE MONOHYDRATE: 5; 600; 310; 30; 20 INJECTION, SOLUTION INTRAVENOUS at 09:09

## 2023-09-30 RX ADMIN — METRONIDAZOLE 500 MG: 5 INJECTION, SOLUTION INTRAVENOUS at 05:09

## 2023-09-30 RX ADMIN — ENOXAPARIN SODIUM 90 MG: 100 INJECTION SUBCUTANEOUS at 10:09

## 2023-09-30 RX ADMIN — MUPIROCIN: 20 OINTMENT TOPICAL at 09:09

## 2023-09-30 RX ADMIN — LEVOTHYROXINE SODIUM 75 MCG: 0.03 TABLET ORAL at 05:09

## 2023-09-30 NOTE — ASSESSMENT & PLAN NOTE
Eliquis held  Discussed with Dr. Chavez, therapeutic Lovenox for now.  Plan to resume Eliquis Monday  Monitor CBC

## 2023-09-30 NOTE — ASSESSMENT & PLAN NOTE
Eliquis held  Resume per ok from surgery - appears minimal blood loss  CBC in am   lovenox therapeutic dose if unable to clear to resume eliquis

## 2023-09-30 NOTE — SUBJECTIVE & OBJECTIVE
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    Strabismus     Trouble in sleeping     Wears dentures     Uppers       Past Surgical History:   Procedure Laterality Date    CARDIAC SURGERY      cardiac cath    COLON SURGERY      colon resection    COLONOSCOPY N/A 10/18/2016    Procedure: COLONOSCOPY;  Surgeon: Rell Cordova MD;  Location: [facility];  Service: Endoscopy;  Laterality: N/A;    COLONOSCOPY N/A 8/8/2023    Procedure: COLONOSCOPY;  Surgeon: Kaushik Pinon MD;  Location: [facility];  Service: Endoscopy;  Laterality: N/A;    COLONOSCOPY N/A 8/22/2023    Procedure: COLONOSCOPY (tattoo of colon ca);  Surgeon: Kaushik Pinon MD;  Location: [facility];  Service: Endoscopy;  Laterality: N/A;    ESOPHAGOGASTRODUODENOSCOPY N/A 8/3/2023    Procedure: EGD (ESOPHAGOGASTRODUODENOSCOPY);  Surgeon: Kaushik Pinon MD;  Location: [facility];  Service: Endoscopy;  Laterality: N/A;    HAND TENDON SURGERY Left     HEMICOLECTOMY      right    INJECTION OF ANESTHETIC AGENT AROUND MEDIAL BRANCH NERVES INNERVATING LUMBAR FACET JOINT Right 07/10/2018    Procedure: Block-nerve-medial branch-lumbar;  Surgeon: Parish Gaitan MD;  [facility] OR;  Service: Pain Management;  Laterality: Right;  L3, 4, 5    INSERTION OF INFERIOR VENA CAVAL FILTER N/A 9/13/2023    Procedure: Insertion, Filter, Inferior Vena Cava;  Surgeon: Oren Verdin MD;  Location: [facility] CATH/EP  "LAB;  Service: General;  Laterality: N/A;    JOINT REPLACEMENT      bilateral    RADIOFREQUENCY ABLATION OF LUMBAR MEDIAL BRANCH NERVE AT SINGLE LEVEL Right 07/24/2018    Procedure: RADIOFREQUENCY ABLATION, NERVE, MEDIAL BRANCH, LUMBAR, 1 LEVEL;  Surgeon: Parish Gaitan MD;  Location: [facility] OR;  Service: Pain Management;  Laterality: Right;  L3,4,5 - Burned at 80 degrees C. for 75 seconds x 2 each site    SHOULDER ARTHROSCOPY Right 01/12/2017    Reverse     TONSILLECTOMY      TONSILLECTOMY, ADENOIDECTOMY, BILATERAL MYRINGOTOMY AND TUBES      TOTAL KNEE ARTHROPLASTY Bilateral 08/1998    total replacements    TUMOR REMOVAL Left     left foot as a child       Review of patient's allergies indicates:   Allergen Reactions    Aspirin      Other reaction(s): Stomach upset    Aspirin Other (See Comments)     Other reaction(s): Stomach upset    Gabapentin Other (See Comments)     Pt states she felt "funny" when she took the medication. Especially at the higher dose.    Mobic [meloxicam] Swelling     Edema and elevated blood pressure     Oxaliplatin Other (See Comments)     Other reaction(s): Joint pain  Other reaction(s): Itching  Other reaction(s): Hives  Other reaction(s): Joint pain  Other reaction(s): Itching  Other reaction(s): Hives    Pregabalin Other (See Comments)     Side effects  Side effects       No current facility-administered medications on file prior to encounter.     Current Outpatient Medications on File Prior to Encounter   Medication Sig    acetaminophen (TYLENOL) 650 MG TbSR Take 650 mg by mouth every 8 (eight) hours.    alendronate (FOSAMAX) 70 MG tablet TAKE 1 TABLET(70 MG) BY MOUTH EVERY 7 DAYS    ergocalciferol, vitamin D2, (VITAMIN D ORAL) Take 2,000 mg by mouth once daily.    furosemide (LASIX) 20 MG tablet Take 1 tablet (20 mg total) by mouth daily as needed (edema).    hydrOXYzine HCL (ATARAX) 25 MG tablet Take 1 tablet (25 mg total) by mouth 3 (three) times daily as needed for Anxiety (insomnia). "    levocetirizine (XYZAL) 5 MG tablet Take 1 tablet (5 mg total) by mouth nightly as needed for Allergies (allergies).    levothyroxine (SYNTHROID) 75 MCG tablet Take 1 tablet (75 mcg total) by mouth once daily.    lisinopriL 10 MG tablet Take 1 tablet (10 mg total) by mouth once daily.    multivitamin capsule Take 1 capsule by mouth once daily.    nystatin (MYCOSTATIN) cream Apply topically 2 (two) times daily as needed. GRETCHEN EXT AA BID    omeprazole (PRILOSEC) 40 MG capsule Take 1 capsule (40 mg total) by mouth 2 (two) times daily before meals.    traMADoL (ULTRAM) 50 mg tablet Take 1 tablet (50 mg total) by mouth every 8 (eight) hours as needed for Pain.    triamcinolone acetonide 0.1% (KENALOG) 0.1 % cream APPLY EXTERNALLY TO THE AFFECTED AREA TWICE DAILY    cyclobenzaprine (FLEXERIL) 5 MG tablet Take 1 tablet (5 mg total) by mouth 3 (three) times daily as needed for Muscle spasms.     Family History       Problem Relation (Age of Onset)    Arthritis Sister    Cancer Sister, Sister    Cataracts Father, Sister    Hypertension Mother    Kidney disease Mother    No Known Problems Brother          Tobacco Use    Smoking status: Former     Current packs/day: 0.00     Average packs/day: 0.5 packs/day for 1 year (0.5 ttl pk-yrs)     Types: Cigarettes     Start date: 1960     Quit date: 1961     Years since quittin.7    Smokeless tobacco: Never   Substance and Sexual Activity    Alcohol use: No    Drug use: No    Sexual activity: Never     Review of Systems   Constitutional:  Positive for appetite change and fatigue.   Respiratory: Negative.     Cardiovascular: Negative.    Gastrointestinal:  Positive for abdominal pain. Negative for blood in stool, nausea and vomiting.   Genitourinary: Negative.    Musculoskeletal: Negative.    Skin: Negative.    Neurological: Negative.    Psychiatric/Behavioral: Negative.       Objective:     Vital Signs (Most Recent):  Temp: 97.5 °F (36.4 °C) (23)  Pulse: (!)  54 (09/29/23 1927)  Resp: 18 (09/29/23 1927)  BP: (!) 142/67 (09/29/23 1927)  SpO2: 99 % (09/29/23 1927) Vital Signs (24h Range):  Temp:  [96.6 °F (35.9 °C)-98.5 °F (36.9 °C)] 97.5 °F (36.4 °C)  Pulse:  [54-70] 54  Resp:  [16-18] 18  SpO2:  [95 %-100 %] 99 %  BP: (119-142)/(57-69) 142/67     Weight: 86.2 kg (190 lb)  Body mass index is 32.61 kg/m².     Physical Exam  Constitutional:       General: She is not in acute distress.  HENT:      Head: Normocephalic and atraumatic.      Mouth/Throat:      Mouth: Mucous membranes are dry.      Pharynx: Oropharynx is clear.   Eyes:      Extraocular Movements: Extraocular movements intact.   Cardiovascular:      Rate and Rhythm: Normal rate and regular rhythm.   Pulmonary:      Effort: Pulmonary effort is normal. No respiratory distress.      Breath sounds: No wheezing.   Abdominal:      General: Bowel sounds are normal.      Tenderness: There is abdominal tenderness.      Comments: Incision in midline lower abdomen with, mild bloody drainage on dressing   Musculoskeletal:         General: Normal range of motion.      Cervical back: Normal range of motion and neck supple.      Right lower leg: No edema.      Left lower leg: No edema.   Skin:     General: Skin is warm and dry.      Capillary Refill: Capillary refill takes less than 2 seconds.      Coloration: Skin is pale.   Neurological:      General: No focal deficit present.      Mental Status: She is alert and oriented to person, place, and time. Mental status is at baseline.   Psychiatric:         Mood and Affect: Mood normal.         Behavior: Behavior normal.                Significant Labs: All pertinent labs within the past 24 hours have been reviewed.  Recent Lab Results         09/29/23  1144        aPTT 26.5  Comment: Refer to local heparin nomogram for intensity/dose specific   therapeutic   range.                 Significant Imaging: I have reviewed all pertinent imaging results/findings within the past 24  hours.

## 2023-09-30 NOTE — CARE UPDATE
09/30/23 0755   PRE-TX-O2   Device (Oxygen Therapy) room air   SpO2 95 %   Pulse 63   Resp 17

## 2023-09-30 NOTE — NURSING
Patient reports that she was assisted to bathroom to void per hospital staff and voided in toilet. Patient notified of need to collect UA specimen and to void in urine measuring device placed in toilet. Verbalized understanding. Patient reports nausea after drinking room temperature water. Given alcohol pad to smell to alleviate symptom as well as zofran IV. Emesis bag placed at bedside if needed.

## 2023-09-30 NOTE — ASSESSMENT & PLAN NOTE
POD 0 Robotic colectomy with isoperistaltic ileocolic anastomosis   Mobilization of splenic flexure     Clear liquids, advance per surgery recs  Pain control  Up out of bed  PT - ambulate  Anti-emetics   DVT prophylaxis - lovenox

## 2023-09-30 NOTE — HPI
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 Underwent robotic colectomy by Dr. Beasley. Post-operatively she c/o mild cramping in lower abdomen. No cp, sob, nausea or vomiting. Vitals stable. Receiving IVF. Hospital medicine consulted for medical management. H/o recent PE and DVT. Stopped eliquis 5 days and started lovenox injections.

## 2023-09-30 NOTE — ASSESSMENT & PLAN NOTE
Status post Robotic colectomy with isoperistaltic ileocolic anastomosis   Mobilization of splenic flexure     Clear liquids, advance per surgery recs  Pain control  Up out of bed  PT - ambulate  Anti-emetics   DVT prophylaxis - lovenox

## 2023-09-30 NOTE — CARE UPDATE
09/29/23 1951   Patient Assessment/Suction   Level of Consciousness (AVPU) alert   Respiratory Effort Unlabored   PRE-TX-O2   Device (Oxygen Therapy) nasal cannula   $ Is the patient on Low Flow Oxygen? Yes   Flow (L/min) 2   SpO2 99 %   Pulse Oximetry Type Intermittent   $ Pulse Oximetry - Multiple Charge Pulse Oximetry - Multiple   Pulse (!) 56   Resp 18   Incentive Spirometer   $ Incentive Spirometer Charges proper technique demonstrated;done with encouragement   Administration (IS) unable to perform;proper technique demonstrated   Number of Repetitions (IS) 8   Level Incentive Spirometer (mL) 1250   Patient Tolerance (IS) good;no adverse signs/symptoms present

## 2023-09-30 NOTE — PLAN OF CARE
[facility]/Surg  Initial Discharge Assessment       Primary Care Provider: Claudia Kim MD    Admission Diagnosis: Malignant neoplasm of colon, unspecified part of colon [C18.9]  History of colon cancer [Z85.038]  Preop testing [Z01.818]    Admission Date: 9/29/2023  Expected Discharge Date:     DC assessment completed with pt at bedside. Verified info on facesheet as correct. Lives with sarah Valentin. Olu provides transportation and assists with care. PCP [facility]. [id number] DME sc, rw. Denies hd/dm/hh. Pt takes eliquis. Pt without recent admission. Family to provide ride home. CM to follow for discharge needs.      Transition of Care Barriers: None    Payor: [employer] MEDICARE / Plan: [employer] 65 / Product Type: Medicare Advantage /     Extended Emergency Contact Information  Primary Emergency Contact: Olu Plummer  Mobile Phone: [phone]  Relation: Relative  Preferred language: English   needed? No  Secondary Emergency Contact: Lizbeth Crowley  Mobile Phone: [phone]  Relation: Sister  Preferred language: English   needed? No    Discharge Plan A: Home with family  Discharge Plan B: Home      [id number] - [place] - [address] AT [facility] [address]  [address] [address]  Phone: [phone] Fax: [phone]      Initial Assessment (most recent)       Adult Discharge Assessment - 09/30/23 1024          Discharge Assessment    Assessment Type Discharge Planning Assessment     Confirmed/corrected address, phone number and insurance Yes     Confirmed Demographics Correct on Facesheet     Source of Information patient     People in Home other relative(s)   tommie- olu    Do you expect to return to your current living situation? Yes     Do you have help at home or someone to help you manage your care at home? Yes     Prior to hospitilization cognitive status: Unable to Assess      Current cognitive status: Alert/Oriented     Walking or Climbing Stairs ambulation difficulty, requires equipment     Dressing/Bathing bathing difficulty, requires equipment     Equipment Currently Used at Home walker, rolling;shower chair     Readmission within 30 days? No     Patient currently being followed by outpatient case management? No     Do you currently have service(s) that help you manage your care at home? No     Do you take prescription medications? Yes     Do you have prescription coverage? Yes     Do you have any problems affording any of your prescribed medications? No     Is the patient taking medications as prescribed? yes     How do you get to doctors appointments? family or friend will provide     Are you on dialysis? No     Do you take coumadin? No     DME Needed Upon Discharge  none     Discharge Plan discussed with: Patient     Transition of Care Barriers None     Discharge Plan A Home with family     Discharge Plan B Home

## 2023-09-30 NOTE — H&P
[facility] Medicine  History & Physical    Patient Name: Danna Sorensen  MRN: [id number]  Patient Class: IP- Inpatient  Admission Date: 9/29/2023  Attending Physician: Jim Chavez MD   Primary Care Provider: Claudia iKm MD         Patient information was obtained from patient, past medical records and primary team.     Subjective:     Principal Problem:Malignant neoplasm of colon    Chief Complaint:   Chief Complaint   Patient presents with    Abdominal Cramping        HPI: 82 yo female with h/o right hemicolectomy due to malignancy then treated with chemotherapy had reoccurrence of cancer in the transverse colon. Underwent robotic colectomy by Dr. Chavez. Post-operatively she c/o mild cramping in lower abdomen. No cp, sob, nausea or vomiting. Vitals stable. Receiving IVF. Hospital medicine consulted for medical management. H/o recent PE and DVT. Stopped eliquis 5 days and started lovenox injections.       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    Strabismus     Trouble in sleeping     Wears dentures     Uppers       Past Surgical History:   Procedure Laterality Date    CARDIAC SURGERY      cardiac cath    COLON SURGERY      colon resection    COLONOSCOPY N/A 10/18/2016    Procedure: COLONOSCOPY;  Surgeon: Rell Cordova MD;  Location: [facility];  Service: Endoscopy;  Laterality: N/A;     "COLONOSCOPY N/A 8/8/2023    Procedure: COLONOSCOPY;  Surgeon: Kaushik Pinon MD;  Location: [facility];  Service: Endoscopy;  Laterality: N/A;    COLONOSCOPY N/A 8/22/2023    Procedure: COLONOSCOPY (tattoo of colon ca);  Surgeon: Kaushik Pinon MD;  Location: [facility] ENDO;  Service: Endoscopy;  Laterality: N/A;    ESOPHAGOGASTRODUODENOSCOPY N/A 8/3/2023    Procedure: EGD (ESOPHAGOGASTRODUODENOSCOPY);  Surgeon: Kaushik Pinon MD;  Location: [facility];  Service: Endoscopy;  Laterality: N/A;    HAND TENDON SURGERY Left     HEMICOLECTOMY      right    INJECTION OF ANESTHETIC AGENT AROUND MEDIAL BRANCH NERVES INNERVATING LUMBAR FACET JOINT Right 07/10/2018    Procedure: Block-nerve-medial branch-lumbar;  Surgeon: Parish Gaitan MD;  Location: [facility];  Service: Pain Management;  Laterality: Right;  L3, 4, 5    INSERTION OF INFERIOR VENA CAVAL FILTER N/A 9/13/2023    Procedure: Insertion, Filter, Inferior Vena Cava;  Surgeon: Oren Verdin MD;  Location: [facility] CATH/EP LAB;  Service: General;  Laterality: N/A;    JOINT REPLACEMENT      bilateral    RADIOFREQUENCY ABLATION OF LUMBAR MEDIAL BRANCH NERVE AT SINGLE LEVEL Right 07/24/2018    Procedure: RADIOFREQUENCY ABLATION, NERVE, MEDIAL BRANCH, LUMBAR, 1 LEVEL;  Surgeon: Parish Gaitan MD;  Location: [facility] OR;  Service: Pain Management;  Laterality: Right;  L3,4,5 - Burned at 80 degrees C. for 75 seconds x 2 each site    SHOULDER ARTHROSCOPY Right 01/12/2017    Reverse     TONSILLECTOMY      TONSILLECTOMY, ADENOIDECTOMY, BILATERAL MYRINGOTOMY AND TUBES      TOTAL KNEE ARTHROPLASTY Bilateral 08/1998    total replacements    TUMOR REMOVAL Left     left foot as a child       Review of patient's allergies indicates:   Allergen Reactions    Aspirin      Other reaction(s): Stomach upset    Aspirin Other (See Comments)     Other reaction(s): Stomach upset    Gabapentin Other (See Comments)     Pt states she felt "funny" when she took the medication. Especially " at the higher dose.    Mobic [meloxicam] Swelling     Edema and elevated blood pressure     Oxaliplatin Other (See Comments)     Other reaction(s): Joint pain  Other reaction(s): Itching  Other reaction(s): Hives  Other reaction(s): Joint pain  Other reaction(s): Itching  Other reaction(s): Hives    Pregabalin Other (See Comments)     Side effects  Side effects       No current facility-administered medications on file prior to encounter.     Current Outpatient Medications on File Prior to Encounter   Medication Sig    acetaminophen (TYLENOL) 650 MG TbSR Take 650 mg by mouth every 8 (eight) hours.    alendronate (FOSAMAX) 70 MG tablet TAKE 1 TABLET(70 MG) BY MOUTH EVERY 7 DAYS    ergocalciferol, vitamin D2, (VITAMIN D ORAL) Take 2,000 mg by mouth once daily.    furosemide (LASIX) 20 MG tablet Take 1 tablet (20 mg total) by mouth daily as needed (edema).    hydrOXYzine HCL (ATARAX) 25 MG tablet Take 1 tablet (25 mg total) by mouth 3 (three) times daily as needed for Anxiety (insomnia).    levocetirizine (XYZAL) 5 MG tablet Take 1 tablet (5 mg total) by mouth nightly as needed for Allergies (allergies).    levothyroxine (SYNTHROID) 75 MCG tablet Take 1 tablet (75 mcg total) by mouth once daily.    lisinopriL 10 MG tablet Take 1 tablet (10 mg total) by mouth once daily.    multivitamin capsule Take 1 capsule by mouth once daily.    nystatin (MYCOSTATIN) cream Apply topically 2 (two) times daily as needed. GRETCHEN EXT AA BID    omeprazole (PRILOSEC) 40 MG capsule Take 1 capsule (40 mg total) by mouth 2 (two) times daily before meals.    traMADoL (ULTRAM) 50 mg tablet Take 1 tablet (50 mg total) by mouth every 8 (eight) hours as needed for Pain.    triamcinolone acetonide 0.1% (KENALOG) 0.1 % cream APPLY EXTERNALLY TO THE AFFECTED AREA TWICE DAILY    cyclobenzaprine (FLEXERIL) 5 MG tablet Take 1 tablet (5 mg total) by mouth 3 (three) times daily as needed for Muscle spasms.     Family History       Problem  Relation (Age of Onset)    Arthritis Sister    Cancer Sister, Sister    Cataracts Father, Sister    Hypertension Mother    Kidney disease Mother    No Known Problems Brother          Tobacco Use    Smoking status: Former     Current packs/day: 0.00     Average packs/day: 0.5 packs/day for 1 year (0.5 ttl pk-yrs)     Types: Cigarettes     Start date: 1960     Quit date: 1961     Years since quittin.7    Smokeless tobacco: Never   Substance and Sexual Activity    Alcohol use: No    Drug use: No    Sexual activity: Never     Review of Systems   Constitutional:  Positive for appetite change and fatigue.   Respiratory: Negative.     Cardiovascular: Negative.    Gastrointestinal:  Positive for abdominal pain. Negative for blood in stool, nausea and vomiting.   Genitourinary: Negative.    Musculoskeletal: Negative.    Skin: Negative.    Neurological: Negative.    Psychiatric/Behavioral: Negative.       Objective:     Vital Signs (Most Recent):  Temp: 97.5 °F (36.4 °C) (23)  Pulse: (!) 54 (23)  Resp: 18 (23)  BP: (!) 142/67 (23)  SpO2: 99 % (23) Vital Signs (24h Range):  Temp:  [96.6 °F (35.9 °C)-98.5 °F (36.9 °C)] 97.5 °F (36.4 °C)  Pulse:  [54-70] 54  Resp:  [16-18] 18  SpO2:  [95 %-100 %] 99 %  BP: (119-142)/(57-69) 142/67     Weight: 86.2 kg (190 lb)  Body mass index is 32.61 kg/m².     Physical Exam  Constitutional:       General: She is not in acute distress.  HENT:      Head: Normocephalic and atraumatic.      Mouth/Throat:      Mouth: Mucous membranes are dry.      Pharynx: Oropharynx is clear.   Eyes:      Extraocular Movements: Extraocular movements intact.   Cardiovascular:      Rate and Rhythm: Normal rate and regular rhythm.   Pulmonary:      Effort: Pulmonary effort is normal. No respiratory distress.      Breath sounds: No wheezing.   Abdominal:      General: Bowel sounds are normal.      Tenderness: There is abdominal tenderness.       Comments: Incision in midline lower abdomen with, mild bloody drainage on dressing   Musculoskeletal:         General: Normal range of motion.      Cervical back: Normal range of motion and neck supple.      Right lower leg: No edema.      Left lower leg: No edema.   Skin:     General: Skin is warm and dry.      Capillary Refill: Capillary refill takes less than 2 seconds.      Coloration: Skin is pale.   Neurological:      General: No focal deficit present.      Mental Status: She is alert and oriented to person, place, and time. Mental status is at baseline.   Psychiatric:         Mood and Affect: Mood normal.         Behavior: Behavior normal.                Significant Labs: All pertinent labs within the past 24 hours have been reviewed.  Recent Lab Results         09/29/23  1144        aPTT 26.5  Comment: Refer to local heparin nomogram for intensity/dose specific   therapeutic   range.                 Significant Imaging: I have reviewed all pertinent imaging results/findings within the past 24 hours.    Assessment/Plan:     * Malignant neoplasm of colon  POD 0 Robotic colectomy with isoperistaltic ileocolic anastomosis   Mobilization of splenic flexure     Clear liquids, advance per surgery recs  Pain control  Up out of bed  PT - ambulate  Anti-emetics   DVT prophylaxis - lovenox       History of pulmonary embolus (PE)  Eliquis held  Resume per ok from surgery - appears minimal blood loss  CBC in am   lovenox therapeutic dose if unable to clear to resume eliquis       GERD (gastroesophageal reflux disease)  H/o severe gastritis   PPI BID      Essential hypertension  Resume lisinopril       Hypothyroid    Resume synthroid in am       VTE Risk Mitigation (From admission, onward)         Ordered     enoxaparin injection 40 mg  Daily         09/29/23 1643     IP VTE HIGH RISK PATIENT  Once         09/29/23 1643     Place sequential compression device  Until discontinued         09/29/23 1643                            Elly Matos MD  Department of Hospital Medicine  [facility]/Surg

## 2023-09-30 NOTE — PROGRESS NOTES
[facility] Medicine  Progress Note    Patient Name: Danna Sorensen  MRN: [id number]  Patient Class: IP- Inpatient   Admission Date: 9/29/2023  Length of Stay: 1 days  Attending Physician: Ana Cristina Carballo MD  Primary Care Provider: Claudia Kim MD        Subjective:     Principal Problem:Malignant neoplasm of colon        HPI:  82 yo female with h/o right hemicolectomy due to malignancy then treated with chemotherapy had reoccurrence of cancer in the transverse colon. Underwent robotic colectomy by Dr. Chavez. Post-operatively she c/o mild cramping in lower abdomen. No cp, sob, nausea or vomiting. Vitals stable. Receiving IVF. Hospital medicine consulted for medical management. H/o recent PE and DVT. Stopped eliquis 5 days and started lovenox injections.       Overview/Hospital Course:  No notes on file    Interval History:  Patient seen and examined.  Reports mild abdominal pain.  Reports passing some flatus.  Tolerating clears.  Discussed with Dr. Chavez who recommends therapeutic Lovenox over the weekend, can resume Eliquis on Monday.    Review of Systems   Constitutional:  Positive for appetite change and fatigue.   Respiratory: Negative.     Cardiovascular: Negative.    Gastrointestinal:  Positive for abdominal pain. Negative for blood in stool, nausea and vomiting.   Genitourinary: Negative.    Musculoskeletal: Negative.    Skin: Negative.    Neurological: Negative.    Psychiatric/Behavioral: Negative.       Objective:     Vital Signs (Most Recent):  Temp: 97.6 °F (36.4 °C) (09/30/23 0730)  Pulse: 63 (09/30/23 0755)  Resp: 17 (09/30/23 0755)  BP: (!) 112/56 (09/30/23 0730)  SpO2: 95 % (09/30/23 0755) Vital Signs (24h Range):  Temp:  [96.6 °F (35.9 °C)-98.5 °F (36.9 °C)] 97.6 °F (36.4 °C)  Pulse:  [50-70] 63  Resp:  [16-20] 17  SpO2:  [95 %-100 %] 95 %  BP: (110-142)/(56-69) 112/56     Weight: 86.2 kg (190 lb)  Body mass index is 32.61 kg/m².    Intake/Output Summary (Last 24  hours) at 9/30/2023 1115  Last data filed at 9/30/2023 1026  Gross per 24 hour   Intake 2810 ml   Output 1100 ml   Net 1710 ml         Physical Exam  Constitutional:       General: She is not in acute distress.  HENT:      Head: Normocephalic and atraumatic.      Mouth/Throat:      Mouth: Mucous membranes are dry.      Pharynx: Oropharynx is clear.   Eyes:      Extraocular Movements: Extraocular movements intact.   Cardiovascular:      Rate and Rhythm: Normal rate and regular rhythm.   Pulmonary:      Effort: Pulmonary effort is normal. No respiratory distress.      Breath sounds: No wheezing.   Abdominal:      Tenderness: There is abdominal tenderness.      Comments: Incision in midline lower abdomen with, mild bloody drainage on dressing   Musculoskeletal:         General: Normal range of motion.      Cervical back: Normal range of motion and neck supple.      Right lower leg: No edema.      Left lower leg: No edema.   Skin:     General: Skin is warm and dry.      Capillary Refill: Capillary refill takes less than 2 seconds.      Coloration: Skin is pale.   Neurological:      General: No focal deficit present.      Mental Status: She is alert and oriented to person, place, and time. Mental status is at baseline.   Psychiatric:         Mood and Affect: Mood normal.         Behavior: Behavior normal.             Significant Labs: All pertinent labs within the past 24 hours have been reviewed.    Significant Imaging: I have reviewed all pertinent imaging results/findings within the past 24 hours.      Assessment/Plan:      * Malignant neoplasm of colon  Status post Robotic colectomy with isoperistaltic ileocolic anastomosis   Mobilization of splenic flexure     Clear liquids, advance per surgery recs  Pain control  Up out of bed  PT - ambulate  Anti-emetics   DVT prophylaxis - lovenox       History of pulmonary embolus (PE)  Eliquis held  Discussed with Dr. Chavez, therapeutic Lovenox for now.  Plan to resume Eliquis  Monday  Monitor CBC      GERD (gastroesophageal reflux disease)  H/o severe gastritis   PPI BID      Essential hypertension  Continue lisinopril      Hypothyroid  Continue Synthroid      VTE Risk Mitigation (From admission, onward)         Ordered     enoxaparin injection 90 mg  Every 12 hours         09/30/23 0844     IP VTE HIGH RISK PATIENT  Once         09/29/23 1643     Place sequential compression device  Until discontinued         09/29/23 1643                Discharge Planning   RENETTA:      Code Status: Full Code   Is the patient medically ready for discharge?:     Reason for patient still in hospital (select all that apply): Patient trending condition and Treatment  Discharge Plan A: Home with family                  Ana Cristina Carballo MD  Department of Hospital Medicine   [facility]/Surg

## 2023-09-30 NOTE — PLAN OF CARE
Problem: Adult Inpatient Plan of Care  Goal: Plan of Care Review  Outcome: Ongoing, Progressing  Flowsheets (Taken 9/30/2023 1650)  Plan of Care Reviewed With: patient  Goal: Optimal Comfort and Wellbeing  Outcome: Ongoing, Progressing  Intervention: Monitor Pain and Promote Comfort  Flowsheets (Taken 9/30/2023 1650)  Pain Management Interventions:   pillow support provided   quiet environment facilitated  Intervention: Provide Person-Centered Care  Flowsheets (Taken 9/30/2023 1650)  Trust Relationship/Rapport: care explained

## 2023-09-30 NOTE — CARE UPDATE
09/30/23 0728   Patient Assessment/Suction   Level of Consciousness (AVPU) alert   Respiratory Effort Unlabored;Normal   Expansion/Accessory Muscles/Retractions no use of accessory muscles;no retractions   All Lung Fields Breath Sounds equal bilaterally   Rhythm/Pattern, Respiratory depth regular;pattern regular;unlabored   PRE-TX-O2   Device (Oxygen Therapy) nasal cannula;room air  (placed on room air)   $ Is the patient on Low Flow Oxygen? Yes   Flow (L/min) 2  (placed on room air)   SpO2 100 %   Pulse Oximetry Type Intermittent   $ Pulse Oximetry - Multiple Charge Pulse Oximetry - Multiple   Pulse (!) 52   Resp 17   Incentive Spirometer   $ Incentive Spirometer Charges done with encouragement   Incentive Spirometer Predicted Level (mL) 760   Administration (IS) proper technique demonstrated   Number of Repetitions (IS) 10   Level Incentive Spirometer (mL) 1500   Patient Tolerance (IS) good

## 2023-09-30 NOTE — SUBJECTIVE & OBJECTIVE
Interval History:  Patient seen and examined.  Reports mild abdominal pain.  Reports passing some flatus.  Tolerating clears.  Discussed with Dr. Chavez who recommends therapeutic Lovenox over the weekend, can resume Eliquis on Monday.    Review of Systems   Constitutional:  Positive for appetite change and fatigue.   Respiratory: Negative.     Cardiovascular: Negative.    Gastrointestinal:  Positive for abdominal pain. Negative for blood in stool, nausea and vomiting.   Genitourinary: Negative.    Musculoskeletal: Negative.    Skin: Negative.    Neurological: Negative.    Psychiatric/Behavioral: Negative.       Objective:     Vital Signs (Most Recent):  Temp: 97.6 °F (36.4 °C) (09/30/23 0730)  Pulse: 63 (09/30/23 0755)  Resp: 17 (09/30/23 0755)  BP: (!) 112/56 (09/30/23 0730)  SpO2: 95 % (09/30/23 0755) Vital Signs (24h Range):  Temp:  [96.6 °F (35.9 °C)-98.5 °F (36.9 °C)] 97.6 °F (36.4 °C)  Pulse:  [50-70] 63  Resp:  [16-20] 17  SpO2:  [95 %-100 %] 95 %  BP: (110-142)/(56-69) 112/56     Weight: 86.2 kg (190 lb)  Body mass index is 32.61 kg/m².    Intake/Output Summary (Last 24 hours) at 9/30/2023 1115  Last data filed at 9/30/2023 1026  Gross per 24 hour   Intake 2810 ml   Output 1100 ml   Net 1710 ml         Physical Exam  Constitutional:       General: She is not in acute distress.  HENT:      Head: Normocephalic and atraumatic.      Mouth/Throat:      Mouth: Mucous membranes are dry.      Pharynx: Oropharynx is clear.   Eyes:      Extraocular Movements: Extraocular movements intact.   Cardiovascular:      Rate and Rhythm: Normal rate and regular rhythm.   Pulmonary:      Effort: Pulmonary effort is normal. No respiratory distress.      Breath sounds: No wheezing.   Abdominal:      Tenderness: There is abdominal tenderness.      Comments: Incision in midline lower abdomen with, mild bloody drainage on dressing   Musculoskeletal:         General: Normal range of motion.      Cervical back: Normal range of motion  and neck supple.      Right lower leg: No edema.      Left lower leg: No edema.   Skin:     General: Skin is warm and dry.      Capillary Refill: Capillary refill takes less than 2 seconds.      Coloration: Skin is pale.   Neurological:      General: No focal deficit present.      Mental Status: She is alert and oriented to person, place, and time. Mental status is at baseline.   Psychiatric:         Mood and Affect: Mood normal.         Behavior: Behavior normal.             Significant Labs: All pertinent labs within the past 24 hours have been reviewed.    Significant Imaging: I have reviewed all pertinent imaging results/findings within the past 24 hours.

## 2023-10-01 LAB
ANION GAP SERPL CALC-SCNC: 11 MMOL/L (ref 8–16)
BASOPHILS # BLD AUTO: 0.01 K/UL (ref 0–0.2)
BASOPHILS NFR BLD: 0.1 % (ref 0–1.9)
BUN SERPL-MCNC: 7 MG/DL (ref 8–23)
CALCIUM SERPL-MCNC: 8.8 MG/DL (ref 8.7–10.5)
CHLORIDE SERPL-SCNC: 107 MMOL/L (ref 95–110)
CO2 SERPL-SCNC: 22 MMOL/L (ref 23–29)
CREAT SERPL-MCNC: 0.8 MG/DL (ref 0.5–1.4)
DIFFERENTIAL METHOD: ABNORMAL
EOSINOPHIL # BLD AUTO: 0 K/UL (ref 0–0.5)
EOSINOPHIL NFR BLD: 0.1 % (ref 0–8)
ERYTHROCYTE [DISTWIDTH] IN BLOOD BY AUTOMATED COUNT: 15.5 % (ref 11.5–14.5)
EST. GFR  (NO RACE VARIABLE): >60 ML/MIN/1.73 M^2
GLUCOSE SERPL-MCNC: 139 MG/DL (ref 70–110)
HCT VFR BLD AUTO: 33.7 % (ref 37–48.5)
HGB BLD-MCNC: 10.6 G/DL (ref 12–16)
IMM GRANULOCYTES # BLD AUTO: 0.09 K/UL (ref 0–0.04)
IMM GRANULOCYTES NFR BLD AUTO: 0.8 % (ref 0–0.5)
LYMPHOCYTES # BLD AUTO: 0.4 K/UL (ref 1–4.8)
LYMPHOCYTES NFR BLD: 3.1 % (ref 18–48)
MCH RBC QN AUTO: 28 PG (ref 27–31)
MCHC RBC AUTO-ENTMCNC: 31.5 G/DL (ref 32–36)
MCV RBC AUTO: 89 FL (ref 82–98)
MONOCYTES # BLD AUTO: 0.6 K/UL (ref 0.3–1)
MONOCYTES NFR BLD: 4.8 % (ref 4–15)
NEUTROPHILS # BLD AUTO: 10.8 K/UL (ref 1.8–7.7)
NEUTROPHILS NFR BLD: 91.1 % (ref 38–73)
NRBC BLD-RTO: 0 /100 WBC
PLATELET # BLD AUTO: 374 K/UL (ref 150–450)
PMV BLD AUTO: 9.6 FL (ref 9.2–12.9)
POTASSIUM SERPL-SCNC: 4.5 MMOL/L (ref 3.5–5.1)
RBC # BLD AUTO: 3.79 M/UL (ref 4–5.4)
SODIUM SERPL-SCNC: 140 MMOL/L (ref 136–145)
WBC # BLD AUTO: 11.8 K/UL (ref 3.9–12.7)

## 2023-10-01 PROCEDURE — 93010 EKG 12-LEAD: ICD-10-PCS | Mod: ,,, | Performed by: INTERNAL MEDICINE

## 2023-10-01 PROCEDURE — 11000001 HC ACUTE MED/SURG PRIVATE ROOM

## 2023-10-01 PROCEDURE — 93010 ELECTROCARDIOGRAM REPORT: CPT | Mod: ,,, | Performed by: INTERNAL MEDICINE

## 2023-10-01 PROCEDURE — 94799 UNLISTED PULMONARY SVC/PX: CPT

## 2023-10-01 PROCEDURE — 25000003 PHARM REV CODE 250: Performed by: NURSE PRACTITIONER

## 2023-10-01 PROCEDURE — 94761 N-INVAS EAR/PLS OXIMETRY MLT: CPT

## 2023-10-01 PROCEDURE — 63600175 PHARM REV CODE 636 W HCPCS: Performed by: HOSPITALIST

## 2023-10-01 PROCEDURE — 93005 ELECTROCARDIOGRAM TRACING: CPT

## 2023-10-01 PROCEDURE — 80048 BASIC METABOLIC PNL TOTAL CA: CPT | Performed by: HOSPITALIST

## 2023-10-01 PROCEDURE — 99900035 HC TECH TIME PER 15 MIN (STAT)

## 2023-10-01 PROCEDURE — 25000003 PHARM REV CODE 250: Performed by: HOSPITALIST

## 2023-10-01 PROCEDURE — 63600175 PHARM REV CODE 636 W HCPCS: Performed by: NURSE PRACTITIONER

## 2023-10-01 PROCEDURE — 36415 COLL VENOUS BLD VENIPUNCTURE: CPT | Performed by: HOSPITALIST

## 2023-10-01 PROCEDURE — 25000003 PHARM REV CODE 250: Performed by: SURGERY

## 2023-10-01 PROCEDURE — 85025 COMPLETE CBC W/AUTO DIFF WBC: CPT | Performed by: HOSPITALIST

## 2023-10-01 RX ADMIN — APIXABAN 5 MG: 2.5 TABLET, FILM COATED ORAL at 09:10

## 2023-10-01 RX ADMIN — MUPIROCIN: 20 OINTMENT TOPICAL at 09:10

## 2023-10-01 RX ADMIN — SODIUM CHLORIDE, SODIUM LACTATE, POTASSIUM CHLORIDE, CALCIUM CHLORIDE AND DEXTROSE MONOHYDRATE: 5; 600; 310; 30; 20 INJECTION, SOLUTION INTRAVENOUS at 07:10

## 2023-10-01 RX ADMIN — PROMETHAZINE HYDROCHLORIDE 12.5 MG: 25 INJECTION INTRAMUSCULAR; INTRAVENOUS at 01:10

## 2023-10-01 RX ADMIN — LISINOPRIL 10 MG: 10 TABLET ORAL at 09:10

## 2023-10-01 RX ADMIN — ONDANSETRON 4 MG: 2 INJECTION INTRAMUSCULAR; INTRAVENOUS at 02:10

## 2023-10-01 RX ADMIN — HYDROMORPHONE HYDROCHLORIDE 1 MG: 1 INJECTION, SOLUTION INTRAMUSCULAR; INTRAVENOUS; SUBCUTANEOUS at 01:10

## 2023-10-01 RX ADMIN — PANTOPRAZOLE SODIUM 40 MG: 40 TABLET, DELAYED RELEASE ORAL at 09:10

## 2023-10-01 RX ADMIN — PROMETHAZINE HYDROCHLORIDE 25 MG: 25 INJECTION INTRAMUSCULAR; INTRAVENOUS at 05:10

## 2023-10-01 RX ADMIN — SODIUM CHLORIDE, SODIUM LACTATE, POTASSIUM CHLORIDE, CALCIUM CHLORIDE AND DEXTROSE MONOHYDRATE: 5; 600; 310; 30; 20 INJECTION, SOLUTION INTRAVENOUS at 11:10

## 2023-10-01 NOTE — PROGRESS NOTES
[facility]/Surg  General Surgery  Progress Note    Subjective:     Interval History: Less nasueagted today. Passing flatus. Has had loose BM. Afebrile.       Post-Op Info:  Procedure(s) (LRB):  ROBOTIC COLECTOMY (N/A)   2 Days Post-Op      Medications:  Continuous Infusions:   dextrose 5% lactated ringers 125 mL/hr at 10/01/23 0719     Scheduled Meds:   apixaban  5 mg Oral BID    bisacodyL  5 mg Oral QHS    levothyroxine  75 mcg Oral Before breakfast    lisinopriL  10 mg Oral Daily    mupirocin   Nasal BID    pantoprazole  40 mg Oral BID     PRN Meds:acetaminophen, calcium carbonate, HYDROmorphone, lorazepam, naloxone, ondansetron, oxyCODONE     Objective:     Vital Signs (Most Recent):  Temp: 97.3 °F (36.3 °C) (10/01/23 1131)  Pulse: 74 (10/01/23 1131)  Resp: 16 (10/01/23 1131)  BP: (!) 141/74 (10/01/23 1131)  SpO2: 96 % (10/01/23 1131) Vital Signs (24h Range):  Temp:  [97 °F (36.1 °C)-97.9 °F (36.6 °C)] 97.3 °F (36.3 °C)  Pulse:  [51-77] 74  Resp:  [16-18] 16  SpO2:  [95 %-98 %] 96 %  BP: (133-167)/(61-78) 141/74       Intake/Output Summary (Last 24 hours) at 10/1/2023 1319  Last data filed at 10/1/2023 0501  Gross per 24 hour   Intake 3030.78 ml   Output 700 ml   Net 2330.78 ml       Physical Exam  Constitutional:       General: She is awake. She is not in acute distress.  Pulmonary:      Effort: No tachypnea or bradypnea.   Abdominal:      General: There is no distension.      Palpations: Abdomen is soft.      Tenderness: There is abdominal tenderness.      Comments: Do not appreciate any significant distention.  Appropriately tender.  Incisions intact.   Neurological:      Mental Status: She is alert.   Psychiatric:         Behavior: Behavior is cooperative.         Significant Labs:  CBC:   Recent Labs   Lab 10/01/23  0505   WBC 11.80   RBC 3.79*   HGB 10.6*   HCT 33.7*      MCV 89   MCH 28.0   MCHC 31.5*     CMP:   Recent Labs   Lab 10/01/23  0505   *   CALCIUM 8.8      K  4.5   CO2 22*      BUN 7*   CREATININE 0.8       Significant Diagnostics:  I have reviewed all pertinent imaging results/findings within the past 24 hours.    Assessment/Plan:     Active Diagnoses:    Diagnosis Date Noted POA    PRINCIPAL PROBLEM:  Malignant neoplasm of colon [C18.9] 08/26/2020 Yes    History of pulmonary embolus (PE) [Z86.711] 09/29/2023 Yes    GERD (gastroesophageal reflux disease) [K21.9] 08/25/2023 Yes    Essential hypertension [I10] 08/09/2019 Yes    Hypothyroid [E03.9] 06/03/2012 Yes      Problems Resolved During this Admission:     -advance diet as tolerated.   -PT   -IS   -Eliquis restarted. No sign of bleeding.     Damien Pearson III, MD  General Surgery  [facility]/Surg

## 2023-10-01 NOTE — PROGRESS NOTES
[facility] Medicine  Progress Note    Patient Name: Danna Sorensen  MRN: [id number]  Patient Class: IP- Inpatient   Admission Date: 9/29/2023  Length of Stay: 2 days  Attending Physician: Ana Cristina Carballo MD  Primary Care Provider: Claudia Kim MD        Subjective:     Principal Problem:Malignant neoplasm of colon        HPI:  82 yo female with h/o right hemicolectomy due to malignancy then treated with chemotherapy had reoccurrence of cancer in the transverse colon. Underwent robotic colectomy by Dr. Beasley. Post-operatively she c/o mild cramping in lower abdomen. No cp, sob, nausea or vomiting. Vitals stable. Receiving IVF. Hospital medicine consulted for medical management. H/o recent PE and DVT. Stopped eliquis 5 days and started lovenox injections.       Overview/Hospital Course:  No notes on file    Interval History:  Patient seen and examined.  Had nausea this morning.  Eliquis restarted by surgeon as patient refused Lovenox secondary to abdominal cramping afterwards.    Review of Systems   Constitutional:  Positive for appetite change and fatigue.   Respiratory: Negative.     Cardiovascular: Negative.    Gastrointestinal:  Positive for abdominal pain, nausea and vomiting. Negative for blood in stool.   Genitourinary: Negative.    Musculoskeletal: Negative.    Skin: Negative.    Neurological: Negative.    Psychiatric/Behavioral: Negative.       Objective:     Vital Signs (Most Recent):  Temp: 97.3 °F (36.3 °C) (10/01/23 1131)  Pulse: 74 (10/01/23 1131)  Resp: 16 (10/01/23 1131)  BP: (!) 141/74 (10/01/23 1131)  SpO2: 96 % (10/01/23 1131) Vital Signs (24h Range):  Temp:  [97 °F (36.1 °C)-97.9 °F (36.6 °C)] 97.3 °F (36.3 °C)  Pulse:  [51-77] 74  Resp:  [16-18] 16  SpO2:  [95 %-98 %] 96 %  BP: (133-167)/(61-78) 141/74     Weight: 86.2 kg (190 lb)  Body mass index is 32.61 kg/m².    Intake/Output Summary (Last 24 hours) at 10/1/2023 1158  Last data filed at 10/1/2023 0501  Gross  per 24 hour   Intake 3030.78 ml   Output 900 ml   Net 2130.78 ml           Physical Exam  Constitutional:       General: She is not in acute distress.  HENT:      Head: Normocephalic and atraumatic.      Mouth/Throat:      Mouth: Mucous membranes are dry.      Pharynx: Oropharynx is clear.   Eyes:      Extraocular Movements: Extraocular movements intact.   Cardiovascular:      Rate and Rhythm: Normal rate and regular rhythm.   Pulmonary:      Effort: Pulmonary effort is normal. No respiratory distress.      Breath sounds: No wheezing.   Abdominal:      Tenderness: There is abdominal tenderness.      Comments: Incision in midline lower abdomen with, mild bloody drainage on dressing   Musculoskeletal:         General: Normal range of motion.      Cervical back: Normal range of motion and neck supple.      Right lower leg: No edema.      Left lower leg: No edema.   Skin:     General: Skin is warm and dry.      Capillary Refill: Capillary refill takes less than 2 seconds.      Coloration: Skin is pale.   Neurological:      General: No focal deficit present.      Mental Status: She is alert and oriented to person, place, and time. Mental status is at baseline.   Psychiatric:         Mood and Affect: Mood normal.         Behavior: Behavior normal.             Significant Labs: All pertinent labs within the past 24 hours have been reviewed.    Significant Imaging: I have reviewed all pertinent imaging results/findings within the past 24 hours.      Assessment/Plan:      * Malignant neoplasm of colon  Status post Robotic colectomy with isoperistaltic ileocolic anastomosis   Mobilization of splenic flexure     Clear liquids, advance per surgery recs  Pain control  Up out of bed  PT - ambulate  Anti-emetics   DVT prophylaxis - lovenox       History of pulmonary embolus (PE)  Continue Eliquis  Monitor CBC      GERD (gastroesophageal reflux disease)  H/o severe gastritis   PPI BID      Essential hypertension  Continue  lisinopril      Hypothyroid  Continue Synthroid      VTE Risk Mitigation (From admission, onward)         Ordered     apixaban tablet 5 mg  2 times daily         09/30/23 2312     IP VTE HIGH RISK PATIENT  Once         09/29/23 1643     Place sequential compression device  Until discontinued         09/29/23 1643                Discharge Planning   RENETTA:      Code Status: Full Code   Is the patient medically ready for discharge?:     Reason for patient still in hospital (select all that apply): Patient trending condition and Treatment  Discharge Plan A: Home with family                  Ana Cristina Carballo MD  Department of Hospital Medicine   [facility]/Surg

## 2023-10-01 NOTE — CARE UPDATE
10/01/23 0703   Patient Assessment/Suction   Level of Consciousness (AVPU) alert   Respiratory Effort Unlabored;Normal   Expansion/Accessory Muscles/Retractions no use of accessory muscles   All Lung Fields Breath Sounds equal bilaterally   Rhythm/Pattern, Respiratory pattern regular   PRE-TX-O2   Device (Oxygen Therapy) room air   SpO2 95 %   Pulse Oximetry Type Intermittent   $ Pulse Oximetry - Multiple Charge Pulse Oximetry - Multiple   Pulse 77   Resp 18   Incentive Spirometer   $ Incentive Spirometer Charges done with encouragement   Administration (IS) proper technique demonstrated   Number of Repetitions (IS) 10   Level Incentive Spirometer (mL) 1000   Patient Tolerance (IS) good

## 2023-10-01 NOTE — PLAN OF CARE
POC/Meds reviewed, pt verbalized understanding. Vitals stable.  Afebrile. Clear liquid diet. D5 LR infusing at 125. Prn medication given for pain and nausea. EKG performed this shift per NP order for qt interval. Phenergan 1 time dose given twice. Pt vomited multiple times this shift. One liquid bm noted.  Up with x1 assist. Repositions self. Hourly/Q2hr rounding performed, safety maintained. Bed in lowest position, wheels locked, SR up x2, call light in easy reach. No  complaints at this time. Will continue to monitor.

## 2023-10-01 NOTE — SUBJECTIVE & OBJECTIVE
Interval History:  Patient seen and examined.  Had nausea this morning.  Eliquis restarted by surgeon as patient refused Lovenox secondary to abdominal cramping afterwards.    Review of Systems   Constitutional:  Positive for appetite change and fatigue.   Respiratory: Negative.     Cardiovascular: Negative.    Gastrointestinal:  Positive for abdominal pain, nausea and vomiting. Negative for blood in stool.   Genitourinary: Negative.    Musculoskeletal: Negative.    Skin: Negative.    Neurological: Negative.    Psychiatric/Behavioral: Negative.       Objective:     Vital Signs (Most Recent):  Temp: 97.3 °F (36.3 °C) (10/01/23 1131)  Pulse: 74 (10/01/23 1131)  Resp: 16 (10/01/23 1131)  BP: (!) 141/74 (10/01/23 1131)  SpO2: 96 % (10/01/23 1131) Vital Signs (24h Range):  Temp:  [97 °F (36.1 °C)-97.9 °F (36.6 °C)] 97.3 °F (36.3 °C)  Pulse:  [51-77] 74  Resp:  [16-18] 16  SpO2:  [95 %-98 %] 96 %  BP: (133-167)/(61-78) 141/74     Weight: 86.2 kg (190 lb)  Body mass index is 32.61 kg/m².    Intake/Output Summary (Last 24 hours) at 10/1/2023 1158  Last data filed at 10/1/2023 0501  Gross per 24 hour   Intake 3030.78 ml   Output 900 ml   Net 2130.78 ml           Physical Exam  Constitutional:       General: She is not in acute distress.  HENT:      Head: Normocephalic and atraumatic.      Mouth/Throat:      Mouth: Mucous membranes are dry.      Pharynx: Oropharynx is clear.   Eyes:      Extraocular Movements: Extraocular movements intact.   Cardiovascular:      Rate and Rhythm: Normal rate and regular rhythm.   Pulmonary:      Effort: Pulmonary effort is normal. No respiratory distress.      Breath sounds: No wheezing.   Abdominal:      Tenderness: There is abdominal tenderness.      Comments: Incision in midline lower abdomen with, mild bloody drainage on dressing   Musculoskeletal:         General: Normal range of motion.      Cervical back: Normal range of motion and neck supple.      Right lower leg: No edema.      Left  lower leg: No edema.   Skin:     General: Skin is warm and dry.      Capillary Refill: Capillary refill takes less than 2 seconds.      Coloration: Skin is pale.   Neurological:      General: No focal deficit present.      Mental Status: She is alert and oriented to person, place, and time. Mental status is at baseline.   Psychiatric:         Mood and Affect: Mood normal.         Behavior: Behavior normal.             Significant Labs: All pertinent labs within the past 24 hours have been reviewed.    Significant Imaging: I have reviewed all pertinent imaging results/findings within the past 24 hours.

## 2023-10-01 NOTE — PROGRESS NOTES
[facility]/Surg  General Surgery  Progress Note    Subjective:     Interval History: Postop day 1.    Patient reports feeling little more nauseated this afternoon.  Afebrile.  Hemodynamically stable.  She states she did pass small amount of flatus this morning but no flatus since.     Post-Op Info:  Procedure(s) (LRB):  ROBOTIC COLECTOMY (N/A)   1 Day Post-Op      Medications:  Continuous Infusions:   dextrose 5% lactated ringers 125 mL/hr at 09/30/23 2136     Scheduled Meds:   bisacodyL  5 mg Oral QHS    enoxparin  1 mg/kg Subcutaneous Q12H (prophylaxis, 0900/2100)    levothyroxine  75 mcg Oral Before breakfast    lisinopriL  10 mg Oral Daily    mupirocin   Nasal BID    pantoprazole  40 mg Oral BID     PRN Meds:acetaminophen, calcium carbonate, HYDROmorphone, lorazepam, naloxone, ondansetron, oxyCODONE     Objective:     Vital Signs (Most Recent):  Temp: 97.9 °F (36.6 °C) (09/30/23 2001)  Pulse: (!) 56 (09/30/23 2006)  Resp: 18 (09/30/23 2037)  BP: (!) 150/68 (09/30/23 2001)  SpO2: 97 % (09/30/23 2006) Vital Signs (24h Range):  Temp:  [97.2 °F (36.2 °C)-97.9 °F (36.6 °C)] 97.9 °F (36.6 °C)  Pulse:  [50-63] 56  Resp:  [16-18] 18  SpO2:  [95 %-100 %] 97 %  BP: (104-150)/(54-68) 150/68       Intake/Output Summary (Last 24 hours) at 9/30/2023 2310  Last data filed at 9/30/2023 1809  Gross per 24 hour   Intake 3090.78 ml   Output 1450 ml   Net 1640.78 ml       Physical Exam  Constitutional:       General: She is awake. She is not in acute distress.  Pulmonary:      Effort: No tachypnea or bradypnea.   Abdominal:      General: There is no distension.      Palpations: Abdomen is soft.      Tenderness: There is abdominal tenderness.      Comments: Do not appreciate any significant distention.  Appropriately tender.  Incisions intact.   Neurological:      Mental Status: She is alert.   Psychiatric:         Behavior: Behavior is cooperative.         Significant Labs:  CBC:   Recent Labs   Lab 09/30/23  0532    WBC 10.02   RBC 3.32*   HGB 9.0*   HCT 28.7*      MCV 86   MCH 27.1   MCHC 31.4*     CMP:   Recent Labs   Lab 09/30/23  0532   *   CALCIUM 8.4*      K 4.2   CO2 22*      BUN 8   CREATININE 0.8       Significant Diagnostics:  I have reviewed all pertinent imaging results/findings within the past 24 hours.    Assessment/Plan:     Active Diagnoses:    Diagnosis Date Noted POA    PRINCIPAL PROBLEM:  Malignant neoplasm of colon [C18.9] 08/26/2020 Yes    History of pulmonary embolus (PE) [Z86.711] 09/29/2023 Yes    GERD (gastroesophageal reflux disease) [K21.9] 08/25/2023 Yes    Essential hypertension [I10] 08/09/2019 Yes    Hypothyroid [E03.9] 06/03/2012 Yes      Problems Resolved During this Admission:   -postop day 1 partial colectomy.  -will continue sips of clears  -PT  -patient states that she always has a severe crampy abdominal pain reaction with her Lovenox.  She states it happens after every Lovenox injection she is ever had.  She would like to refused Lovenox tonight.  In that case will restart Eliquis.   -IS    Damien Pearson III, MD  General Surgery  [facility]/Surg

## 2023-10-01 NOTE — CARE UPDATE
09/30/23 2006   Patient Assessment/Suction   Level of Consciousness (AVPU) alert   Respiratory Effort Unlabored   PRE-TX-O2   Device (Oxygen Therapy) room air   SpO2 97 %   Pulse Oximetry Type Intermittent   $ Pulse Oximetry - Multiple Charge Pulse Oximetry - Multiple   Pulse (!) 56   Resp 17   Incentive Spirometer   $ Incentive Spirometer Charges done with encouragement;proper technique demonstrated   Administration (IS) proper technique demonstrated   Number of Repetitions (IS) 6   Level Incentive Spirometer (mL) 1000   Patient Tolerance (IS) good;no adverse signs/symptoms present

## 2023-10-02 ENCOUNTER — TELEPHONE (OUTPATIENT)
Dept: SURGERY | Facility: CLINIC | Age: 83
End: 2023-10-02
Payer: MEDICARE

## 2023-10-02 VITALS
OXYGEN SATURATION: 97 % | SYSTOLIC BLOOD PRESSURE: 140 MMHG | RESPIRATION RATE: 18 BRPM | HEART RATE: 78 BPM | TEMPERATURE: 98 F | WEIGHT: 190 LBS | BODY MASS INDEX: 32.44 KG/M2 | HEIGHT: 64 IN | DIASTOLIC BLOOD PRESSURE: 66 MMHG

## 2023-10-02 LAB
ANION GAP SERPL CALC-SCNC: 6 MMOL/L (ref 8–16)
BASOPHILS # BLD AUTO: 0.02 K/UL (ref 0–0.2)
BASOPHILS NFR BLD: 0.2 % (ref 0–1.9)
BUN SERPL-MCNC: 9 MG/DL (ref 8–23)
CALCIUM SERPL-MCNC: 8.6 MG/DL (ref 8.7–10.5)
CHLORIDE SERPL-SCNC: 110 MMOL/L (ref 95–110)
CO2 SERPL-SCNC: 24 MMOL/L (ref 23–29)
CREAT SERPL-MCNC: 0.8 MG/DL (ref 0.5–1.4)
DIFFERENTIAL METHOD: ABNORMAL
EOSINOPHIL # BLD AUTO: 0 K/UL (ref 0–0.5)
EOSINOPHIL NFR BLD: 0.3 % (ref 0–8)
ERYTHROCYTE [DISTWIDTH] IN BLOOD BY AUTOMATED COUNT: 15.3 % (ref 11.5–14.5)
EST. GFR  (NO RACE VARIABLE): >60 ML/MIN/1.73 M^2
GLUCOSE SERPL-MCNC: 127 MG/DL (ref 70–110)
HCT VFR BLD AUTO: 31.8 % (ref 37–48.5)
HGB BLD-MCNC: 9.8 G/DL (ref 12–16)
IMM GRANULOCYTES # BLD AUTO: 0.11 K/UL (ref 0–0.04)
IMM GRANULOCYTES NFR BLD AUTO: 1.2 % (ref 0–0.5)
LYMPHOCYTES # BLD AUTO: 0.7 K/UL (ref 1–4.8)
LYMPHOCYTES NFR BLD: 7.7 % (ref 18–48)
MCH RBC QN AUTO: 27.5 PG (ref 27–31)
MCHC RBC AUTO-ENTMCNC: 30.8 G/DL (ref 32–36)
MCV RBC AUTO: 89 FL (ref 82–98)
MONOCYTES # BLD AUTO: 0.8 K/UL (ref 0.3–1)
MONOCYTES NFR BLD: 7.8 % (ref 4–15)
NEUTROPHILS # BLD AUTO: 7.9 K/UL (ref 1.8–7.7)
NEUTROPHILS NFR BLD: 82.8 % (ref 38–73)
NRBC BLD-RTO: 0 /100 WBC
PLATELET # BLD AUTO: 375 K/UL (ref 150–450)
PMV BLD AUTO: 9.4 FL (ref 9.2–12.9)
POTASSIUM SERPL-SCNC: 5 MMOL/L (ref 3.5–5.1)
RBC # BLD AUTO: 3.56 M/UL (ref 4–5.4)
SODIUM SERPL-SCNC: 140 MMOL/L (ref 136–145)
WBC # BLD AUTO: 9.56 K/UL (ref 3.9–12.7)

## 2023-10-02 PROCEDURE — 85025 COMPLETE CBC W/AUTO DIFF WBC: CPT | Performed by: HOSPITALIST

## 2023-10-02 PROCEDURE — 36415 COLL VENOUS BLD VENIPUNCTURE: CPT | Performed by: HOSPITALIST

## 2023-10-02 PROCEDURE — 97161 PT EVAL LOW COMPLEX 20 MIN: CPT

## 2023-10-02 PROCEDURE — 80048 BASIC METABOLIC PNL TOTAL CA: CPT | Performed by: HOSPITALIST

## 2023-10-02 PROCEDURE — 94761 N-INVAS EAR/PLS OXIMETRY MLT: CPT

## 2023-10-02 PROCEDURE — 25000003 PHARM REV CODE 250: Performed by: SURGERY

## 2023-10-02 PROCEDURE — 94799 UNLISTED PULMONARY SVC/PX: CPT

## 2023-10-02 PROCEDURE — 25000003 PHARM REV CODE 250: Performed by: HOSPITALIST

## 2023-10-02 PROCEDURE — 97116 GAIT TRAINING THERAPY: CPT

## 2023-10-02 PROCEDURE — 63600175 PHARM REV CODE 636 W HCPCS: Performed by: HOSPITALIST

## 2023-10-02 PROCEDURE — 99900031 HC PATIENT EDUCATION (STAT)

## 2023-10-02 RX ORDER — HYDROCODONE BITARTRATE AND ACETAMINOPHEN 5; 325 MG/1; MG/1
1 TABLET ORAL EVERY 6 HOURS PRN
Qty: 20 TABLET | Refills: 0 | Status: ON HOLD | OUTPATIENT
Start: 2023-10-02 | End: 2023-11-15 | Stop reason: HOSPADM

## 2023-10-02 RX ORDER — ONDANSETRON HYDROCHLORIDE 8 MG/1
8 TABLET, FILM COATED ORAL EVERY 8 HOURS PRN
Qty: 20 TABLET | Refills: 1 | Status: SHIPPED | OUTPATIENT
Start: 2023-10-02 | End: 2023-12-06 | Stop reason: SDUPTHER

## 2023-10-02 RX ADMIN — MUPIROCIN: 20 OINTMENT TOPICAL at 09:10

## 2023-10-02 RX ADMIN — APIXABAN 5 MG: 2.5 TABLET, FILM COATED ORAL at 09:10

## 2023-10-02 RX ADMIN — ONDANSETRON 4 MG: 2 INJECTION INTRAMUSCULAR; INTRAVENOUS at 05:10

## 2023-10-02 RX ADMIN — LISINOPRIL 10 MG: 10 TABLET ORAL at 09:10

## 2023-10-02 RX ADMIN — LEVOTHYROXINE SODIUM 75 MCG: 0.03 TABLET ORAL at 06:10

## 2023-10-02 RX ADMIN — PANTOPRAZOLE SODIUM 40 MG: 40 TABLET, DELAYED RELEASE ORAL at 09:10

## 2023-10-02 NOTE — PLAN OF CARE
Less naseated today. Passing flatus.loose BM.Vss afebrile denies pain this shift ABdominal dressings CDI with small area of old drainage noted and outlined Tolerated Full liquid diet Ambulatory to toilet

## 2023-10-02 NOTE — PT/OT/SLP EVAL
Physical Therapy Evaluation    Patient Name:  Danna Sorensen   MRN:  [id number]    Recommendations:     Discharge Recommendations: home, home health PT   Discharge Equipment Recommendations: none   Barriers to discharge: None    Assessment:     Danna Sorensen is a 83 y.o. female admitted with a medical diagnosis of Malignant neoplasm of colon.  She presents with the following impairments/functional limitations: weakness, impaired endurance, impaired functional mobility, gait instability, impaired balance, decreased lower extremity function, pain, decreased ROM . Patient agreeable to PT evaluation this morning.  Patient presented supine in bed and was able to transfer supine to sit and sit to stand with sBA.  Patient then ambulated x 250 feet RW sBA.    Rehab Prognosis: Good; patient would benefit from acute skilled PT services to address these deficits and reach maximum level of function.    Recent Surgery: Procedure(s) (LRB):  ROBOTIC COLECTOMY (N/A) 3 Days Post-Op    Plan:     During this hospitalization, patient to be seen 5 x/week to address the identified rehab impairments via gait training, therapeutic activities, therapeutic exercises and progress toward the following goals:    Plan of Care Expires:  11/06/23    Subjective     Chief Complaint: pain  Patient/Family Comments/goals: go home  Pain/Comfort:  Pain Rating 1: 2/10  Location - Side 1: Bilateral  Location - Orientation 1: anterior  Location 1: abdomen  Pain Addressed 1: Reposition, Cessation of Activity  Pain Rating Post-Intervention 1: 4/10    Patients cultural, spiritual, Caodaism conflicts given the current situation:      Living Environment:  Currently lives with family in 2 story home but patient bedroom downstairs and patient does not have to go upstairs.    Prior to admission, patients level of function was independent.  Equipment used at home: shower chair, walker, rolling, cane, straight.  DME owned (not currently used): none.  Upon discharge,  patient will have assistance from family.    Objective:     Communicated with nurse prior to session.  Patient found supine with bed alarm  upon PT entry to room.    General Precautions: Standard, fall  Orthopedic Precautions:N/A   Braces:    Respiratory Status: Room air    Exams:  RLE ROM: WFL  RLE Strength: Deficits: 4+/5 overall  LLE ROM: WFL  LLE Strength: Deficits: 4+/5 overall    Functional Mobility:  Bed Mobility:     Supine to Sit: stand by assistance  Transfers:     Sit to Stand:  stand by assistance with rolling walker  Toilet Transfer: stand by assistance with  rolling walker  using  Step Transfer  Gait: x 250 feet RW sBA      AM-PAC 6 CLICK MOBILITY  Total Score:20       Treatment & Education:  Gait training x 250 feet RW sBA    Patient left up in chair with call button in reach, chair alarm on, and nurse notified.    GOALS:   Multidisciplinary Problems       Physical Therapy Goals          Problem: Physical Therapy    Goal Priority Disciplines Outcome Goal Variances Interventions   Physical Therapy Goal     PT, PT/OT Ongoing, Progressing     Description: Goals to be met by: 10/9/23     Patient will increase functional independence with mobility by performin. Supine to sit with [place]  2. Sit to supine with [place]  3. Sit to stand transfer with [place]  4. Bed to chair transfer with Modified [place] using Rolling Walker  5. Gait  x 250 feet with Modified [place] using Rolling Walker.                          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    Strabismus     Trouble in sleeping     Wears dentures     Uppers       Past Surgical History:   Procedure Laterality Date    CARDIAC SURGERY      cardiac cath    COLON SURGERY      colon resection    COLONOSCOPY N/A 10/18/2016    Procedure: COLONOSCOPY;  Surgeon: Rell Cordova MD;  Location: [facility] ENDO;  Service: Endoscopy;  Laterality: N/A;    COLONOSCOPY N/A 8/8/2023    Procedure: COLONOSCOPY;  Surgeon: Kaushik Pinon MD;  Location: [facility];  Service: Endoscopy;  Laterality: N/A;    COLONOSCOPY N/A 8/22/2023    Procedure: COLONOSCOPY (tattoo of colon ca);  Surgeon: Kaushik Pinon MD;  Location: [facility];  Service: Endoscopy;  Laterality: N/A;    ESOPHAGOGASTRODUODENOSCOPY N/A 8/3/2023    Procedure: EGD (ESOPHAGOGASTRODUODENOSCOPY);  Surgeon: Kaushik Pinon MD;  Location: [facility];  Service: Endoscopy;  Laterality: N/A;    HAND TENDON SURGERY Left     HEMICOLECTOMY      right    INJECTION OF ANESTHETIC AGENT AROUND MEDIAL BRANCH NERVES INNERVATING LUMBAR FACET JOINT Right 07/10/2018    Procedure: Block-nerve-medial branch-lumbar;  Surgeon: Parish Gaitan MD;  Location: [facility];  Service: Pain Management;  Laterality: Right;  L3, 4, 5    INSERTION OF INFERIOR VENA CAVAL FILTER N/A 9/13/2023    Procedure: Insertion, Filter, Inferior Vena Cava;  Surgeon: Oren Verdin MD;  Location: [facility] CATH/EP LAB;  Service: General;  Laterality: N/A;    JOINT REPLACEMENT      bilateral    RADIOFREQUENCY ABLATION OF LUMBAR MEDIAL BRANCH NERVE AT SINGLE LEVEL Right 07/24/2018    Procedure: RADIOFREQUENCY ABLATION, NERVE, MEDIAL BRANCH, LUMBAR, 1 LEVEL;  Surgeon: Parish Gaitan MD;  Location: [facility];  Service: Pain Management;  Laterality: Right;  L3,4,5 - Burned at 80 degrees C. for 75 seconds x 2 each site    SHOULDER ARTHROSCOPY Right 01/12/2017    Reverse      TONSILLECTOMY      TONSILLECTOMY, ADENOIDECTOMY, BILATERAL MYRINGOTOMY AND TUBES      TOTAL KNEE ARTHROPLASTY Bilateral 08/1998    total replacements    TUMOR REMOVAL Left     left foot as a child       Time Tracking:     PT Received On: 10/02/23  PT Start Time: 1114     PT Stop Time: 1139  PT Total Time (min): 25 min     Billable Minutes: Evaluation 15 and Gait Training 10      10/02/2023

## 2023-10-02 NOTE — PLAN OF CARE
Problem: Physical Therapy  Goal: Physical Therapy Goal  Description: Goals to be met by: 10/9/23     Patient will increase functional independence with mobility by performin. Supine to sit with [place]  2. Sit to supine with [place]  3. Sit to stand transfer with [place]  4. Bed to chair transfer with Modified [place] using Rolling Walker  5. Gait  x 250 feet with Modified [place] using Rolling Walker.     Outcome: Ongoing, Progressing

## 2023-10-02 NOTE — CARE UPDATE
10/01/23 2027   Patient Assessment/Suction   Level of Consciousness (AVPU) alert   Respiratory Effort Unlabored   PRE-TX-O2   Device (Oxygen Therapy) room air   SpO2 95 %   Pulse Oximetry Type Intermittent   $ Pulse Oximetry - Multiple Charge Pulse Oximetry - Multiple   Pulse 69   Resp 18   Incentive Spirometer   $ Incentive Spirometer Charges done with encouragement;proper technique demonstrated   Administration (IS) proper technique demonstrated   Number of Repetitions (IS) 8   Level Incentive Spirometer (mL) 1000   Patient Tolerance (IS) good;no adverse signs/symptoms present

## 2023-10-02 NOTE — TELEPHONE ENCOUNTER
----- Message from Luciano Carreno LPN sent at 10/2/2023  9:57 AM CDT -----  Regarding: postop  Pt is discharging today from [facility]  Procedure:  Robotic colectomy with isoperistaltic ileocolic anastomosis   Mobilization of splenic flexure   Please schedule appropriate post op  Thanks!

## 2023-10-02 NOTE — PLAN OF CARE
AAO x 4, VSS, no complaints of pain, PRN nausea medication given, up to bathroom with assist, free from falls, resting between care, will monitor,    Problem: Adult Inpatient Plan of Care  Goal: Plan of Care Review  Outcome: Ongoing, Progressing     Problem: Adult Inpatient Plan of Care  Goal: Optimal Comfort and Wellbeing  Outcome: Ongoing, Progressing     Problem: Skin Injury Risk Increased  Goal: Skin Health and Integrity  Outcome: Ongoing, Progressing     Problem: Fall Injury Risk  Goal: Absence of Fall and Fall-Related Injury  Outcome: Ongoing, Progressing

## 2023-10-02 NOTE — PROGRESS NOTES
Pt seen and examined.  Resting comfortably in bed.  Reports flatus and BM.    Denies n/v.  Tolerating full liquid.  Ambulating    Wt Readings from Last 3 Encounters:   09/29/23 86.2 kg (190 lb)   09/13/23 86.2 kg (190 lb)   08/25/23 88.4 kg (194 lb 14.4 oz)     Temp Readings from Last 3 Encounters:   10/02/23 98 °F (36.7 °C)   08/26/23 98 °F (36.7 °C)   08/22/23 97.7 °F (36.5 °C)     BP Readings from Last 3 Encounters:   10/02/23 (!) 148/73   09/13/23 (!) 122/59   08/26/23 (!) 100/55     Pulse Readings from Last 3 Encounters:   10/02/23 70   09/13/23 61   08/26/23 62     AAOx3  Sinus  Soft/nd/nt       Lab Results   Component Value Date    WBC 9.56 10/02/2023    HGB 9.8 (L) 10/02/2023    HCT 31.8 (L) 10/02/2023    MCV 89 10/02/2023     10/02/2023       BMP  Lab Results   Component Value Date     10/02/2023    K 5.0 10/02/2023     10/02/2023    CO2 24 10/02/2023    BUN 9 10/02/2023    CREATININE 0.8 10/02/2023    CALCIUM 8.6 (L) 10/02/2023    ANIONGAP 6 (L) 10/02/2023    EGFRNORACEVR >60 10/02/2023     A/P: s/p robotic colectomy    Ambulate  Adv to gi soft diet  Ok to d/c home or to step down facility later today if tolerates

## 2023-10-02 NOTE — NURSING
IV removed. Orders reviewed. Prescriptions sent to pharmacy. Pt ride here. Pt escorted to car via w/c. Pt d/c home.

## 2023-10-02 NOTE — CARE UPDATE
10/02/23 0854   Patient Assessment/Suction   Level of Consciousness (AVPU) alert   Respiratory Effort Normal;Unlabored   Expansion/Accessory Muscles/Retractions no use of accessory muscles;no retractions;expansion symmetric   EDI Breath Sounds clear   LLL Breath Sounds clear   RUL Breath Sounds diminished   RML Breath Sounds diminished   RLL Breath Sounds diminished   Rhythm/Pattern, Respiratory unlabored;pattern regular;depth regular;no shortness of breath reported   PRE-TX-O2   Device (Oxygen Therapy) room air   SpO2 95 %   Pulse Oximetry Type Intermittent   $ Pulse Oximetry - Multiple Charge Pulse Oximetry - Multiple   Pulse 71   Resp 18   Positioning HOB elevated 45 degrees   Incentive Spirometer   $ Incentive Spirometer Charges done with encouragement   Incentive Spirometer Predicted Level (mL) 760   Administration (IS) mouthpiece utilized   Number of Repetitions (IS) 8   Level Incentive Spirometer (mL) 1000   Patient Tolerance (IS) good   Education   $ Education 15 min

## 2023-10-02 NOTE — PLAN OF CARE
Pt was accepted with [facility]   Pt is clear for DC from          10/02/23 1313   Final Note   Assessment Type Final Discharge Note   Anticipated Discharge Disposition Home-Health   Hospital Resources/Appts/Education Provided Appointments scheduled and added to AVS   Post-Acute Status   Post-Acute Authorization Home Health   Home Health Status Referrals Sent

## 2023-10-02 NOTE — PLAN OF CARE
Home health referral sent r/t payor source           10/02/23 1208   Post-Acute Status   Post-Acute Authorization Home Health   Home Health Status Referrals Sent

## 2023-10-03 ENCOUNTER — PATIENT OUTREACH (OUTPATIENT)
Dept: ADMINISTRATIVE | Facility: CLINIC | Age: 83
End: 2023-10-03
Payer: MEDICARE

## 2023-10-03 NOTE — HOSPITAL COURSE
Patient was observed by the hospital medicine service after surgery with Dr. Chavez.  Her diet was advanced as tolerated.  She was resumed on her home Eliquis.  She was cleared for discharge by surgeon.  She will follow-up with PCP and surgery.  She expressed understanding of discharge plan.

## 2023-10-03 NOTE — PROGRESS NOTES
C3 nurse spoke with Danna Sorensen  for a TCC post hospital discharge follow up call. The patient has a scheduled HOSFU appointment with Lucrecia Verma PA-C on 10/10/23 @ 2910.

## 2023-10-03 NOTE — DISCHARGE SUMMARY
[facility] Medicine  Discharge Summary      Patient Name: Danna Sorensen  MRN: [id number]  SHAN: [id number]  Patient Class: IP- Inpatient  Admission Date: 9/29/2023  Hospital Length of Stay: 3 days  Discharge Date and Time: 10/2/2023  3:08 PM  Attending Physician: No att. 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 Underwent robotic colectomy by Dr. Chavez. Post-operatively she c/o mild cramping in lower abdomen. No cp, sob, nausea or vomiting. Vitals stable. Receiving IVF. Hospital medicine consulted for medical management. H/o recent PE and DVT. Stopped eliquis 5 days and started lovenox injections.       Procedure(s) (LRB):  ROBOTIC COLECTOMY (N/A)      Hospital Course:   Patient was observed by the hospital medicine service after surgery with Dr. Chavez.  Her diet was advanced as tolerated.  She was resumed on her home Eliquis.  She was cleared for discharge by surgeon.  She will follow-up with PCP and surgery.  She expressed understanding of discharge plan.       Goals of Care Treatment Preferences:  Code Status: Full Code      Consults:     No new Assessment & Plan notes have been filed under this hospital service since the last note was generated.  Service: Hospital Medicine    Final Active Diagnoses:    Diagnosis Date Noted POA    PRINCIPAL PROBLEM:  Malignant neoplasm of colon [C18.9] 08/26/2020 Yes    History of pulmonary embolus (PE) [Z86.711] 09/29/2023 Yes    GERD (gastroesophageal reflux disease) [K21.9] 08/25/2023 Yes    Essential hypertension [I10] 08/09/2019 Yes    Hypothyroid [E03.9] 06/03/2012 Yes      Problems Resolved During this Admission:       Discharged Condition: good    Disposition: Home-Health  Care Svc    Follow Up:   Follow-up Information     Jim Chavez MD .    Specialties: General Surgery, Colon and Rectal Surgery, Surgery  Why: 10/17 @ 1:30 for post op  Contact information:  [address]  [address]  [place] [address]  [phone]             Lucrecia Verma PA-C Follow up.    Specialty: Family Medicine  Why: 10/10 @ 9:30 am  Contact information:  [address]  [place] [address]  [phone]             [facility] Follow up.    Specialties: Home Health Services, Hospice and Palliative Medicine, Hospice Services, Physical Therapy  Contact information:  [address]  [address]  [place] [address]  [phone]                       Patient Instructions:      Ambulatory referral/consult to Home Health   Standing Status: Future   Referral Priority: Routine Referral Type: Home Health   Referral Reason: Specialty Services Required   Requested Specialty: Home Health Services   Number of Visits Requested: 1     Notify your health care provider if you experience any of the following:  temperature >100.4     Notify your health care provider if you experience any of the following:  persistent nausea and vomiting or diarrhea     Notify your health care provider if you experience any of the following:  severe uncontrolled pain     Notify your health care provider if you experience any of the following:  redness, tenderness, or signs of infection (pain, swelling, redness, odor or green/yellow discharge around incision site)     Notify your health care provider if you experience any of the following:  difficulty breathing or increased cough     Notify your health care provider if you experience any of the following:  severe persistent headache     Notify your health care provider if you experience any of the following:  persistent dizziness, light-headedness, or visual disturbances     Notify your health care provider if you experience any of the following:  increased confusion or  weakness     Activity as tolerated       Significant Diagnostic Studies: Labs:   BMP:   Recent Labs   Lab 10/02/23  0433   *      K 5.0      CO2 24   BUN 9   CREATININE 0.8   CALCIUM 8.6*    and CBC   Recent Labs   Lab 10/02/23  0433   WBC 9.56   HGB 9.8*   HCT 31.8*          Pending Diagnostic Studies:     Procedure Component Value Units Date/Time    EKG 12-lead [[id number]]     Order Status: Sent Lab Status: No result     Specimen to Pathology - Surgery [[id number]] Collected: 09/29/23 1501    Order Status: Sent Lab Status: No result     Specimen: Tissue          Medications:  Reconciled Home Medications:      Medication List      START taking these medications    HYDROcodone-acetaminophen 5-325 mg per tablet  Commonly known as: NORCO  Take 1 tablet by mouth every 6 (six) hours as needed for Pain.     ondansetron 8 MG tablet  Commonly known as: ZOFRAN  Take 1 tablet (8 mg total) by mouth every 8 (eight) hours as needed for Nausea.        CONTINUE taking these medications    acetaminophen 650 MG Tbsr  Commonly known as: TYLENOL  Take 650 mg by mouth every 8 (eight) hours.     alendronate 70 MG tablet  Commonly known as: FOSAMAX  TAKE 1 TABLET(70 MG) BY MOUTH EVERY 7 DAYS     cyclobenzaprine 5 MG tablet  Commonly known as: FLEXERIL  Take 1 tablet (5 mg total) by mouth 3 (three) times daily as needed for Muscle spasms.     ELIQUIS DVT-PE TREAT 30D START 5 mg (74 tabs) Dspk  Generic drug: apixaban  For the first 7 days take two 5 mg tablets twice daily.  After 7 days take one 5 mg tablet twice daily.     furosemide 20 MG tablet  Commonly known as: LASIX  Take 1 tablet (20 mg total) by mouth daily as needed (edema).     hydrOXYzine HCL 25 MG tablet  Commonly known as: ATARAX  Take 1 tablet (25 mg total) by mouth 3 (three) times daily as needed for Anxiety (insomnia).     levocetirizine 5 MG tablet  Commonly known as: XYZAL  Take 1 tablet (5 mg total) by mouth nightly as needed for Allergies  (allergies).     levothyroxine 75 MCG tablet  Commonly known as: SYNTHROID  Take 1 tablet (75 mcg total) by mouth once daily.     lisinopriL 10 MG tablet  Take 1 tablet (10 mg total) by mouth once daily.     LOVENOX SUBQ  Inject into the skin.     multivitamin capsule  Take 1 capsule by mouth once daily.     nystatin cream  Commonly known as: MYCOSTATIN  Apply topically 2 (two) times daily as needed. GRETCHEN EXT AA BID     omeprazole 40 MG capsule  Commonly known as: PRILOSEC  Take 1 capsule (40 mg total) by mouth 2 (two) times daily before meals.     traMADoL 50 mg tablet  Commonly known as: ULTRAM  Take 1 tablet (50 mg total) by mouth every 8 (eight) hours as needed for Pain.     triamcinolone acetonide 0.1% 0.1 % cream  Commonly known as: KENALOG  APPLY EXTERNALLY TO THE AFFECTED AREA TWICE DAILY     VITAMIN D ORAL  Take 2,000 mg by mouth once daily.            Indwelling Lines/Drains at time of discharge:   Lines/Drains/Airways     None                 Time spent on the discharge of patient: 35 minutes         Ana Cristina Carballo MD  Department of Hospital Medicine  [facility]/Surg

## 2023-10-04 ENCOUNTER — TELEPHONE (OUTPATIENT)
Dept: SURGERY | Facility: CLINIC | Age: 83
End: 2023-10-04
Payer: MEDICARE

## 2023-10-04 PROCEDURE — G0180 MD CERTIFICATION HHA PATIENT: HCPCS | Mod: ,,, | Performed by: FAMILY MEDICINE

## 2023-10-04 PROCEDURE — G0180 PR HOME HEALTH MD CERTIFICATION: ICD-10-PCS | Mod: ,,, | Performed by: FAMILY MEDICINE

## 2023-10-04 NOTE — TELEPHONE ENCOUNTER
----- Message from Ivanna Heath sent at 10/4/2023 10:18 AM CDT -----  Contact: self  Type: Needs Medical Advice  Who Called:  Patient    Best Call Back Number: [phone]    Additional Information: Pt states she would like to speak with office regards to having diarrhea want to know if she can take something and also want to know if its okay to take a shower.Please call back

## 2023-10-05 LAB
BLD PROD TYP BPU: NORMAL
BLOOD UNIT EXPIRATION DATE: NORMAL
BLOOD UNIT TYPE CODE: 600
BLOOD UNIT TYPE: NORMAL
CODING SYSTEM: NORMAL
CROSSMATCH INTERPRETATION: NORMAL
DISPENSE STATUS: NORMAL
NUM UNITS TRANS PACKED RBC: NORMAL

## 2023-10-06 NOTE — TELEPHONE ENCOUNTER
View All Conversations on this Encounter  Jim Chavez MD  You Yesterday (7:42 AM)       Fiber is ok   Regular diet ok

## 2023-10-13 ENCOUNTER — OFFICE VISIT (OUTPATIENT)
Dept: FAMILY MEDICINE | Facility: CLINIC | Age: 83
End: 2023-10-13
Payer: MEDICARE

## 2023-10-13 ENCOUNTER — TELEPHONE (OUTPATIENT)
Dept: FAMILY MEDICINE | Facility: CLINIC | Age: 83
End: 2023-10-13

## 2023-10-13 VITALS
OXYGEN SATURATION: 100 % | WEIGHT: 188.06 LBS | DIASTOLIC BLOOD PRESSURE: 66 MMHG | HEIGHT: 64 IN | HEART RATE: 73 BPM | RESPIRATION RATE: 16 BRPM | BODY MASS INDEX: 32.11 KG/M2 | SYSTOLIC BLOOD PRESSURE: 120 MMHG

## 2023-10-13 DIAGNOSIS — R53.81 PHYSICAL DECONDITIONING: ICD-10-CM

## 2023-10-13 DIAGNOSIS — Z98.890 HISTORY OF COLON SURGERY: ICD-10-CM

## 2023-10-13 DIAGNOSIS — Z85.038 HISTORY OF COLON CANCER: Primary | ICD-10-CM

## 2023-10-13 PROCEDURE — 3078F DIAST BP <80 MM HG: CPT | Mod: CPTII,S$GLB,, | Performed by: NURSE PRACTITIONER

## 2023-10-13 PROCEDURE — 99495 TCM SERVICES (MODERATE COMPLEXITY): ICD-10-PCS | Mod: S$GLB,,, | Performed by: NURSE PRACTITIONER

## 2023-10-13 PROCEDURE — 99999 PR PBB SHADOW E&M-EST. PATIENT-LVL III: ICD-10-PCS | Mod: PBBFAC,,, | Performed by: NURSE PRACTITIONER

## 2023-10-13 PROCEDURE — 3288F PR FALLS RISK ASSESSMENT DOCUMENTED: ICD-10-PCS | Mod: CPTII,S$GLB,, | Performed by: NURSE PRACTITIONER

## 2023-10-13 PROCEDURE — 3074F SYST BP LT 130 MM HG: CPT | Mod: CPTII,S$GLB,, | Performed by: NURSE PRACTITIONER

## 2023-10-13 PROCEDURE — 3078F PR MOST RECENT DIASTOLIC BLOOD PRESSURE < 80 MM HG: ICD-10-PCS | Mod: CPTII,S$GLB,, | Performed by: NURSE PRACTITIONER

## 2023-10-13 PROCEDURE — 1101F PR PT FALLS ASSESS DOC 0-1 FALLS W/OUT INJ PAST YR: ICD-10-PCS | Mod: CPTII,S$GLB,, | Performed by: NURSE PRACTITIONER

## 2023-10-13 PROCEDURE — 3074F PR MOST RECENT SYSTOLIC BLOOD PRESSURE < 130 MM HG: ICD-10-PCS | Mod: CPTII,S$GLB,, | Performed by: NURSE PRACTITIONER

## 2023-10-13 PROCEDURE — 99495 TRANSJ CARE MGMT MOD F2F 14D: CPT | Mod: S$GLB,,, | Performed by: NURSE PRACTITIONER

## 2023-10-13 PROCEDURE — 1101F PT FALLS ASSESS-DOCD LE1/YR: CPT | Mod: CPTII,S$GLB,, | Performed by: NURSE PRACTITIONER

## 2023-10-13 PROCEDURE — 1126F PR PAIN SEVERITY QUANTIFIED, NO PAIN PRESENT: ICD-10-PCS | Mod: CPTII,S$GLB,, | Performed by: NURSE PRACTITIONER

## 2023-10-13 PROCEDURE — 99999 PR PBB SHADOW E&M-EST. PATIENT-LVL III: CPT | Mod: PBBFAC,,, | Performed by: NURSE PRACTITIONER

## 2023-10-13 PROCEDURE — 1126F AMNT PAIN NOTED NONE PRSNT: CPT | Mod: CPTII,S$GLB,, | Performed by: NURSE PRACTITIONER

## 2023-10-13 PROCEDURE — 3288F FALL RISK ASSESSMENT DOCD: CPT | Mod: CPTII,S$GLB,, | Performed by: NURSE PRACTITIONER

## 2023-10-13 NOTE — PROGRESS NOTES
This dictation has been generated using Modal Fluency Dictation some phonetic errors may occur. Please contact author for clarification if needed.     Problem List Items Addressed This Visit       History of colon cancer - Primary     Other Visit Diagnoses       History of colon surgery        Physical deconditioning                     Colon surgery.   Deconditioned  Home health consult.     No follow-ups on file.    ________________________________________________________________  ________________________________________________________________      No chief complaint on file.    History of present illness  This 83 y.o. presents today for complaint of hosp follow-up  Transitional Care Note    Family and/or Caretaker present at visit?  Yes.  Diagnostic tests reviewed/disposition: No diagnosic tests pending after this hospitalization.  TRANSVERSE COLON AND SPLENIC FLEXURE, SEGMENTAL COLECTOMY   - INVASIVE MODERATELY DIFFERENTIATED ADENOCARCINOMA WITH MUCINOUS    FEATURES   - THIRTY-THREE BENIGN LYMPH NODES   - SEROSA WITH GIANT CELL REACTION TO SUTURE MATERIAL   - TATTOO INK PRESENT   Disease/illness education: none  Home health/community services discussion/referrals: Patient does not have home health established from hospital visit.  They do need home health.  If needed, we will set up home health for the patient. Refer to home health.   Establishment or re-establishment of referral orders for community resources: No other necessary community resources.   Discussion with other health care providers: No discussion with other health care providers necessary.       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;  Service: Endoscopy;  Laterality: N/A;    COLONOSCOPY N/A 8/22/2023    Procedure: COLONOSCOPY (tattoo of colon ca);  Surgeon: Kaushik Pinon MD;  Location: [facility];  Service: Endoscopy;  Laterality: N/A;    ESOPHAGOGASTRODUODENOSCOPY N/A 8/3/2023    Procedure: EGD (ESOPHAGOGASTRODUODENOSCOPY);  Surgeon: Kaushik Pinon MD;  Location: [facility];  Service: Endoscopy;  Laterality: N/A;    HAND TENDON SURGERY Left     HEMICOLECTOMY      right    INJECTION OF ANESTHETIC AGENT AROUND MEDIAL BRANCH NERVES INNERVATING LUMBAR FACET JOINT Right 07/10/2018    Procedure: Block-nerve-medial branch-lumbar;  Surgeon: Parish Gaitan MD;  Location: [facility] OR;  Service: Pain Management;  Laterality: Right;  L3, 4, 5    INSERTION OF INFERIOR VENA CAVAL FILTER N/A 9/13/2023    Procedure: Insertion, Filter, Inferior Vena Cava;  Surgeon: Oren Verdin MD;  Location: [facility] CATH/EP LAB;  Service: General;  Laterality: N/A;    JOINT REPLACEMENT      bilateral    RADIOFREQUENCY ABLATION OF LUMBAR MEDIAL BRANCH NERVE AT SINGLE LEVEL Right 07/24/2018    Procedure: RADIOFREQUENCY ABLATION, NERVE, MEDIAL BRANCH, LUMBAR, 1 LEVEL;  Surgeon: Parish Gaitan MD;  Location: [facility] OR;  Service: Pain Management;  Laterality: Right;  L3,4,5 - Burned at  80 degrees C. for 75 seconds x 2 each site    ROBOT-ASSISTED COLECTOMY N/A 2023    Procedure: ROBOTIC COLECTOMY;  Surgeon: Jim Chavez MD;  Location: [facility];  Service: General;  Laterality: N/A;    SHOULDER ARTHROSCOPY Right 2017    Reverse     TONSILLECTOMY      TONSILLECTOMY, ADENOIDECTOMY, BILATERAL MYRINGOTOMY AND TUBES      TOTAL KNEE ARTHROPLASTY Bilateral 1998    total replacements    TUMOR REMOVAL Left     left foot as a child       Family History   Problem Relation Age of Onset    Hypertension Mother     Kidney disease Mother     Cataracts Father     Cataracts Sister     Cancer Sister         breast    No Known Problems Brother     Cancer Sister         breast    Arthritis Sister     Glaucoma Neg Hx     Amblyopia Neg Hx     Blindness Neg Hx     Macular degeneration Neg Hx     Retinal detachment Neg Hx     Strabismus Neg Hx     Stroke Neg Hx     Thyroid disease Neg Hx        Social History     Socioeconomic History    Marital status: Single   Tobacco Use    Smoking status: Former     Current packs/day: 0.00     Average packs/day: 0.5 packs/day for 1 year (0.5 ttl pk-yrs)     Types: Cigarettes     Start date: 1960     Quit date: 1961     Years since quittin.8    Smokeless tobacco: Never   Substance and Sexual Activity    Alcohol use: No    Drug use: No    Sexual activity: Never       Current Outpatient Medications   Medication Sig Dispense Refill    acetaminophen (TYLENOL) 650 MG TbSR Take 650 mg by mouth every 8 (eight) hours.      alendronate (FOSAMAX) 70 MG tablet TAKE 1 TABLET(70 MG) BY MOUTH EVERY 7 DAYS 12 tablet 3    apixaban (ELIQUIS DVT-PE TREAT 30D START) 5 mg (74 tabs) DsPk For the first 7 days take two 5 mg tablets twice daily.  After 7 days take one 5 mg tablet twice daily. 74 tablet 1    cyclobenzaprine (FLEXERIL) 5 MG tablet Take 1 tablet (5 mg total) by mouth 3 (three) times daily as needed for Muscle spasms. 90 tablet 0    enoxaparin sodium (LOVENOX SUBQ)  Inject into the skin.      ergocalciferol, vitamin D2, (VITAMIN D ORAL) Take 2,000 mg by mouth once daily.      furosemide (LASIX) 20 MG tablet Take 1 tablet (20 mg total) by mouth daily as needed (edema). 90 tablet 3    HYDROcodone-acetaminophen (NORCO) 5-325 mg per tablet Take 1 tablet by mouth every 6 (six) hours as needed for Pain. 20 tablet 0    hydrOXYzine HCL (ATARAX) 25 MG tablet Take 1 tablet (25 mg total) by mouth 3 (three) times daily as needed for Anxiety (insomnia). 30 tablet PRN    levocetirizine (XYZAL) 5 MG tablet Take 1 tablet (5 mg total) by mouth nightly as needed for Allergies (allergies). 90 tablet PRN    levothyroxine (SYNTHROID) 75 MCG tablet Take 1 tablet (75 mcg total) by mouth once daily. 90 tablet 3    lisinopriL 10 MG tablet Take 1 tablet (10 mg total) by mouth once daily. 90 tablet 3    multivitamin capsule Take 1 capsule by mouth once daily.      nystatin (MYCOSTATIN) cream Apply topically 2 (two) times daily as needed. GRETCHEN EXT AA BID 30 g prn    omeprazole (PRILOSEC) 40 MG capsule Take 1 capsule (40 mg total) by mouth 2 (two) times daily before meals. 180 capsule PRN    ondansetron (ZOFRAN) 8 MG tablet Take 1 tablet (8 mg total) by mouth every 8 (eight) hours as needed for Nausea. 20 tablet 1    traMADoL (ULTRAM) 50 mg tablet Take 1 tablet (50 mg total) by mouth every 8 (eight) hours as needed for Pain. 21 each 0    triamcinolone acetonide 0.1% (KENALOG) 0.1 % cream APPLY EXTERNALLY TO THE AFFECTED AREA TWICE DAILY 60 g 0     No current facility-administered medications for this visit.     Facility-Administered Medications Ordered in Other Visits   Medication Dose Route Frequency Provider Last Rate Last Admin    0.9%  NaCl infusion   Intravenous Once Oren Verdin MD           Review of patient's allergies indicates:   Allergen Reactions    Aspirin      Other reaction(s): Stomach upset    Aspirin Other (See Comments)     Other reaction(s): Stomach upset    Gabapentin Other (See  "Comments)     Pt states she felt "funny" when she took the medication. Especially at the higher dose.    Mobic [meloxicam] Swelling     Edema and elevated blood pressure     Oxaliplatin Other (See Comments)     Other reaction(s): Joint pain  Other reaction(s): Itching  Other reaction(s): Hives  Other reaction(s): Joint pain  Other reaction(s): Itching  Other reaction(s): Hives    Pregabalin Other (See Comments)     Side effects  Side effects       Physical examination  Vitals Reviewed\  Vitals:    10/13/23 1427   BP: 120/66   Pulse: 73   Resp: 16     Body mass index is 32.28 kg/m².  Weight: 85.3 kg (188 lb 0.8 oz)    Gen. Well-dressed well-nourished   Skin warm dry and intact.  No rashes noted.  Neck is supple without adenopathy  Chest.  Respirations are even unlabored.  Lungs are clear to auscultation.  Cardiac regular rate and rhythm.  No chest wall adenopathy noted.  Abdomen is soft and not distended.  Bowel sounds are present.  No tenderness during palpation of the abdomen.    Neuro. Awake alert oriented x4.  Normal judgment and cognition noted.  Extremities no clubbing cyanosis or edema noted.     Call or return to clinic prn if these symptoms worsen or fail to improve as anticipated.      "

## 2023-10-17 ENCOUNTER — OFFICE VISIT (OUTPATIENT)
Dept: SURGERY | Facility: CLINIC | Age: 83
End: 2023-10-17
Payer: MEDICARE

## 2023-10-17 VITALS
SYSTOLIC BLOOD PRESSURE: 118 MMHG | DIASTOLIC BLOOD PRESSURE: 60 MMHG | HEART RATE: 63 BPM | BODY MASS INDEX: 32.37 KG/M2 | TEMPERATURE: 97 F | WEIGHT: 189.63 LBS | HEIGHT: 64 IN

## 2023-10-17 DIAGNOSIS — Z85.038 HISTORY OF COLON CANCER: Primary | ICD-10-CM

## 2023-10-17 PROCEDURE — 99024 POSTOP FOLLOW-UP VISIT: CPT | Mod: S$GLB,,, | Performed by: SURGERY

## 2023-10-17 PROCEDURE — 1100F PR PT FALLS ASSESS DOC 2+ FALLS/FALL W/INJURY/YR: ICD-10-PCS | Mod: CPTII,S$GLB,, | Performed by: SURGERY

## 2023-10-17 PROCEDURE — 3074F PR MOST RECENT SYSTOLIC BLOOD PRESSURE < 130 MM HG: ICD-10-PCS | Mod: CPTII,S$GLB,, | Performed by: SURGERY

## 2023-10-17 PROCEDURE — 3074F SYST BP LT 130 MM HG: CPT | Mod: CPTII,S$GLB,, | Performed by: SURGERY

## 2023-10-17 PROCEDURE — 1159F PR MEDICATION LIST DOCUMENTED IN MEDICAL RECORD: ICD-10-PCS | Mod: CPTII,S$GLB,, | Performed by: SURGERY

## 2023-10-17 PROCEDURE — 99024 PR POST-OP FOLLOW-UP VISIT: ICD-10-PCS | Mod: S$GLB,,, | Performed by: SURGERY

## 2023-10-17 PROCEDURE — 3288F PR FALLS RISK ASSESSMENT DOCUMENTED: ICD-10-PCS | Mod: CPTII,S$GLB,, | Performed by: SURGERY

## 2023-10-17 PROCEDURE — 1100F PTFALLS ASSESS-DOCD GE2>/YR: CPT | Mod: CPTII,S$GLB,, | Performed by: SURGERY

## 2023-10-17 PROCEDURE — 99999 PR PBB SHADOW E&M-EST. PATIENT-LVL III: CPT | Mod: PBBFAC,,, | Performed by: SURGERY

## 2023-10-17 PROCEDURE — 1126F PR PAIN SEVERITY QUANTIFIED, NO PAIN PRESENT: ICD-10-PCS | Mod: CPTII,S$GLB,, | Performed by: SURGERY

## 2023-10-17 PROCEDURE — 99999 PR PBB SHADOW E&M-EST. PATIENT-LVL III: ICD-10-PCS | Mod: PBBFAC,,, | Performed by: SURGERY

## 2023-10-17 PROCEDURE — 3078F DIAST BP <80 MM HG: CPT | Mod: CPTII,S$GLB,, | Performed by: SURGERY

## 2023-10-17 PROCEDURE — 1159F MED LIST DOCD IN RCRD: CPT | Mod: CPTII,S$GLB,, | Performed by: SURGERY

## 2023-10-17 PROCEDURE — 3078F PR MOST RECENT DIASTOLIC BLOOD PRESSURE < 80 MM HG: ICD-10-PCS | Mod: CPTII,S$GLB,, | Performed by: SURGERY

## 2023-10-17 PROCEDURE — 1126F AMNT PAIN NOTED NONE PRSNT: CPT | Mod: CPTII,S$GLB,, | Performed by: SURGERY

## 2023-10-17 PROCEDURE — 3288F FALL RISK ASSESSMENT DOCD: CPT | Mod: CPTII,S$GLB,, | Performed by: SURGERY

## 2023-10-17 NOTE — PROGRESS NOTES
Cc: post op    HPI: 83 y.o.  female  2 weeks s/p colectomy with ileocolic anastomosis for recurrent colon cancer.   Pt notes she is feeling weell. No pain.  Having good appetite.  Bowel function      PE: AFVSS    AAOx3  CTA  Soft/NT/nd  Inc: c/d/i        Path:     TRANSVERSE COLON AND SPLENIC FLEXURE, SEGMENTAL COLECTOMY   - INVASIVE MODERATELY DIFFERENTIATED ADENOCARCINOMA WITH MUCINOUS    FEATURES   - THIRTY-THREE BENIGN LYMPH NODES   - SEROSA WITH GIANT CELL REACTION TO SUTURE MATERIAL   - TATTOO INK PRESENT       COLON:   PROCEDURE:   Segmental colectomy   TUMOR SITE:   Splenic flexure   TUMOR SIZE:   12 cm   MACROSCOPIC TUMOR PERFORATION:   Negative   MACROSCOPIC EVALUATION OF MESORECTUM:   Negative   HISTOLOGIC TYPE:   Adenocarcinoma with mucinous features   HISTOLOGIC GRADE:   Moderately differentiated   TUMOR EXTENT:   Tumor invades renan-intestinal adipose tissues   MARGINS:      PROXIMAL:   Negative    DISTAL:   Negative    RADIAL:   Negative   DISTANCE OF TUMOR FROM CLOSEST MARGIN:   5.2 cm from nearest margin   TREATMENT EFFECT:   N/A   LYMPHOVASCULAR INVASION:   Positive   PERINEURAL INVASION:   Positive   TUMOR DEPOSITS:   Negative   DNA MISMATCH REPAIR STATUS (MMR):   See prior biopsy ([id number])   REGIONAL LYMPH NODES:      NUMBER OF LYMPH NODES WITH TUMOR:   0    NUMBER OF LYMPH NODES EXAMINED:   33   PATHOLOGIC STAGE CLASSIFICATION:   pT3 pN0       A/P: St II colon cancer   Pt doing well post surgery.   F/U with me in 4 weeks  Referred to Oncology.  She is established in [place].  T3 N0 cancer but does have evidence of perineural invasion as well as lymphovascular invasion.  As such may be candidate for chemotherapy will defer to Oncology.

## 2023-10-17 NOTE — TELEPHONE ENCOUNTER
Spoke with representative; pt was admitted [facility]

## 2023-10-19 ENCOUNTER — TELEPHONE (OUTPATIENT)
Dept: HEMATOLOGY/ONCOLOGY | Facility: CLINIC | Age: 83
End: 2023-10-19
Payer: MEDICARE

## 2023-10-19 NOTE — NURSING
Spoke with patient and Barbie (oksana) to schedule appointment.  Patient stated she prefers to come to [place].  Appointment scheduled with Dr. Cameron on 11/3.  Barbie agreed and verbalized understanding of date, time and location.

## 2023-11-03 ENCOUNTER — LAB VISIT (OUTPATIENT)
Dept: LAB | Facility: HOSPITAL | Age: 83
End: 2023-11-03
Attending: INTERNAL MEDICINE
Payer: MEDICARE

## 2023-11-03 ENCOUNTER — OFFICE VISIT (OUTPATIENT)
Dept: HEMATOLOGY/ONCOLOGY | Facility: CLINIC | Age: 83
End: 2023-11-03
Payer: MEDICARE

## 2023-11-03 ENCOUNTER — TELEPHONE (OUTPATIENT)
Dept: HEMATOLOGY/ONCOLOGY | Facility: CLINIC | Age: 83
End: 2023-11-03

## 2023-11-03 ENCOUNTER — DOCUMENTATION ONLY (OUTPATIENT)
Dept: PHARMACY | Facility: HOSPITAL | Age: 83
End: 2023-11-03
Payer: MEDICARE

## 2023-11-03 VITALS
OXYGEN SATURATION: 99 % | HEIGHT: 64 IN | SYSTOLIC BLOOD PRESSURE: 128 MMHG | DIASTOLIC BLOOD PRESSURE: 70 MMHG | HEART RATE: 63 BPM | TEMPERATURE: 98 F | BODY MASS INDEX: 33.88 KG/M2 | WEIGHT: 198.44 LBS

## 2023-11-03 DIAGNOSIS — N28.89 RENAL MASS, LEFT: ICD-10-CM

## 2023-11-03 DIAGNOSIS — D64.9 NORMOCYTIC ANEMIA: ICD-10-CM

## 2023-11-03 DIAGNOSIS — R91.8 PULMONARY NODULES: ICD-10-CM

## 2023-11-03 DIAGNOSIS — I82.433 ACUTE DEEP VEIN THROMBOSIS (DVT) OF POPLITEAL VEIN OF BOTH LOWER EXTREMITIES: ICD-10-CM

## 2023-11-03 DIAGNOSIS — C18.5 MALIGNANT NEOPLASM OF SPLENIC FLEXURE: ICD-10-CM

## 2023-11-03 DIAGNOSIS — C18.5 MALIGNANT NEOPLASM OF SPLENIC FLEXURE: Primary | ICD-10-CM

## 2023-11-03 PROCEDURE — 1159F MED LIST DOCD IN RCRD: CPT | Mod: CPTII,S$GLB,, | Performed by: INTERNAL MEDICINE

## 2023-11-03 PROCEDURE — 1125F AMNT PAIN NOTED PAIN PRSNT: CPT | Mod: CPTII,S$GLB,, | Performed by: INTERNAL MEDICINE

## 2023-11-03 PROCEDURE — 99215 PR OFFICE/OUTPT VISIT, EST, LEVL V, 40-54 MIN: ICD-10-PCS | Mod: S$GLB,,, | Performed by: INTERNAL MEDICINE

## 2023-11-03 PROCEDURE — 1125F PR PAIN SEVERITY QUANTIFIED, PAIN PRESENT: ICD-10-PCS | Mod: CPTII,S$GLB,, | Performed by: INTERNAL MEDICINE

## 2023-11-03 PROCEDURE — 1159F PR MEDICATION LIST DOCUMENTED IN MEDICAL RECORD: ICD-10-PCS | Mod: CPTII,S$GLB,, | Performed by: INTERNAL MEDICINE

## 2023-11-03 PROCEDURE — 1101F PT FALLS ASSESS-DOCD LE1/YR: CPT | Mod: CPTII,S$GLB,, | Performed by: INTERNAL MEDICINE

## 2023-11-03 PROCEDURE — 99215 OFFICE O/P EST HI 40 MIN: CPT | Mod: S$GLB,,, | Performed by: INTERNAL MEDICINE

## 2023-11-03 PROCEDURE — 3288F FALL RISK ASSESSMENT DOCD: CPT | Mod: CPTII,S$GLB,, | Performed by: INTERNAL MEDICINE

## 2023-11-03 PROCEDURE — 99999 PR PBB SHADOW E&M-EST. PATIENT-LVL V: ICD-10-PCS | Mod: PBBFAC,,, | Performed by: INTERNAL MEDICINE

## 2023-11-03 PROCEDURE — 3078F DIAST BP <80 MM HG: CPT | Mod: CPTII,S$GLB,, | Performed by: INTERNAL MEDICINE

## 2023-11-03 PROCEDURE — 1101F PR PT FALLS ASSESS DOC 0-1 FALLS W/OUT INJ PAST YR: ICD-10-PCS | Mod: CPTII,S$GLB,, | Performed by: INTERNAL MEDICINE

## 2023-11-03 PROCEDURE — 36415 COLL VENOUS BLD VENIPUNCTURE: CPT | Mod: PN | Performed by: INTERNAL MEDICINE

## 2023-11-03 PROCEDURE — 99999 PR PBB SHADOW E&M-EST. PATIENT-LVL V: CPT | Mod: PBBFAC,,, | Performed by: INTERNAL MEDICINE

## 2023-11-03 PROCEDURE — 3074F PR MOST RECENT SYSTOLIC BLOOD PRESSURE < 130 MM HG: ICD-10-PCS | Mod: CPTII,S$GLB,, | Performed by: INTERNAL MEDICINE

## 2023-11-03 PROCEDURE — 3288F PR FALLS RISK ASSESSMENT DOCUMENTED: ICD-10-PCS | Mod: CPTII,S$GLB,, | Performed by: INTERNAL MEDICINE

## 2023-11-03 PROCEDURE — 3078F PR MOST RECENT DIASTOLIC BLOOD PRESSURE < 80 MM HG: ICD-10-PCS | Mod: CPTII,S$GLB,, | Performed by: INTERNAL MEDICINE

## 2023-11-03 PROCEDURE — 3074F SYST BP LT 130 MM HG: CPT | Mod: CPTII,S$GLB,, | Performed by: INTERNAL MEDICINE

## 2023-11-03 RX ORDER — LIDOCAINE AND PRILOCAINE 25; 25 MG/G; MG/G
CREAM TOPICAL
Qty: 30 G | Refills: 0 | Status: SHIPPED | OUTPATIENT
Start: 2023-11-03

## 2023-11-03 NOTE — PROGRESS NOTES
PATIENT: Danna Sorensen  MRN: [id number]  DATE: 11/3/2023      Diagnosis:   1. Malignant neoplasm of splenic flexure    2. Renal mass, left    3. Pulmonary nodules    4. Acute deep vein thrombosis (DVT) of popliteal vein of both lower extremities    5. Normocytic anemia        Chief Complaint: Colon Cancer      Oncologic History:      Oncologic History     Oncologic Treatment     Pathology           Subjective:    Initial History: Ms. Sorensen is a 83 y.o. female with Pulmonary embolism, carpal tunnel, CAD, HTN, hypothyroidism, osteoporosis, osteoarthritis, who presents for colon cancer.  Pt was previously diagnosed with Stage III adenocarcinoma of the colon in 2007 and underwent 12 cycles of FOLFOX.  Pt underwent colonoscopy on 8/08/23 showing 1 7 mm polyp in the rectum; altered vascular, congested, eroded, friable and ulcerated mucosa in the transverse colon which was biopsied; patent and to side ileocolonic anastomosis.  Pathology showed moderately differentiated adenocarcinoma. CT abdomen and pelvis 8/17/23 showing irregular appearance of the gallbladder; heterogeneously enhancing lesion in the inferior pole of the left kidney measuring 1.8 cm; short segment of concentric bowel wall thickening of the colon at the splenic flexure with surrounding mesenteric fat stranding with nodular thickness of 2.2 cm.  The patient underwent colonoscopy on 08/22/2023 showing nonbleeding internal hemorrhoids, partially obstructing tumor in the transverse colon which was tattooed.  Pt saw Urology 8/23/23 with plans for re-imaging the renal lesion in 3 months with an MRI.  US abdomen 8/25/23 showed a 2.5 cm solid mass at the lower pole of the left kidney suspicious for neoplasm.  CT chest 8/25/23 showed 4 mm left upper lobe pulmonary nodule, 4 mm calcified granuloma left upper lobe, 2 mm pulmonary nodule in the left upper lobe, 9 x 9 mm triangular nodule along the juxtapleural right middle lobe, 2 mm pulmonary nodule in the right middle  lobe, and a partially imaged masslike density in the splenic flexure of the colon. Pt then underwent resection of the transverse colon and splenic flexure with path showing invasive moderately differentiated adenocarcinoma with mucinous features, 33 benign lymph nodes, positive lymphovascular invasion, positive perineural invasion pT3N0.  Tumor shows intact expression of MLH1, PMS2, MSH2, MSH6.  Patient was positive for B Celio V600E mutation.    Currently the patient endorses SOB which is chronic. The patient denies CP, cough, abdominal pain, nausea, vomiting, constipation, diarrhea.  The patient denies fever, chills, night sweats, weight loss, new lumps or bumps, easy bruising or bleeding.    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    Strabismus     Trouble in sleeping     Wears dentures     Uppers       Past Surgical HIstory:   Past Surgical History:   Procedure Laterality Date    CARDIAC SURGERY      cardiac cath    COLON SURGERY      colon resection    COLONOSCOPY N/A 10/18/2016    Procedure: COLONOSCOPY;  Surgeon: Rell Cordova MD;  Location: [facility];  Service: Endoscopy;  Laterality: N/A;    COLONOSCOPY N/A 8/8/2023    Procedure: COLONOSCOPY;  Surgeon: Kaushik Pinon MD;  Location: [facility];  Service: Endoscopy;  Laterality: N/A;    COLONOSCOPY N/A 8/22/2023    Procedure: COLONOSCOPY (tattoo of colon ca);  Surgeon:  Kaushik Pinon MD;  Location: [facility];  Service: Endoscopy;  Laterality: N/A;    ESOPHAGOGASTRODUODENOSCOPY N/A 8/3/2023    Procedure: EGD (ESOPHAGOGASTRODUODENOSCOPY);  Surgeon: Kaushik Pinon MD;  Location: [facility];  Service: Endoscopy;  Laterality: N/A;    HAND TENDON SURGERY Left     HEMICOLECTOMY      right    INJECTION OF ANESTHETIC AGENT AROUND MEDIAL BRANCH NERVES INNERVATING LUMBAR FACET JOINT Right 07/10/2018    Procedure: Block-nerve-medial branch-lumbar;  Surgeon: Parish Gaitan MD;  Location: [facility];  Service: Pain Management;  Laterality: Right;  L3, 4, 5    INSERTION OF INFERIOR VENA CAVAL FILTER N/A 9/13/2023    Procedure: Insertion, Filter, Inferior Vena Cava;  Surgeon: Oren Verdin MD;  Location: [facility] CATH/EP LAB;  Service: General;  Laterality: N/A;    JOINT REPLACEMENT      bilateral    RADIOFREQUENCY ABLATION OF LUMBAR MEDIAL BRANCH NERVE AT SINGLE LEVEL Right 07/24/2018    Procedure: RADIOFREQUENCY ABLATION, NERVE, MEDIAL BRANCH, LUMBAR, 1 LEVEL;  Surgeon: Parish Gaitan MD;  Location: [facility];  Service: Pain Management;  Laterality: Right;  L3,4,5 - Burned at 80 degrees C. for 75 seconds x 2 each site    ROBOT-ASSISTED COLECTOMY N/A 9/29/2023    Procedure: ROBOTIC COLECTOMY;  Surgeon: Jim Chavez MD;  Location: [facility];  Service: General;  Laterality: N/A;    SHOULDER ARTHROSCOPY Right 01/12/2017    Reverse     TONSILLECTOMY      TONSILLECTOMY, ADENOIDECTOMY, BILATERAL MYRINGOTOMY AND TUBES      TOTAL KNEE ARTHROPLASTY Bilateral 08/1998    total replacements    TUMOR REMOVAL Left     left foot as a child       Family History:   Family History   Problem Relation Age of Onset    Hypertension Mother     Kidney disease Mother     Cataracts Father     Cataracts Sister     Cancer Sister         breast    No Known Problems Brother     Cancer Sister         breast    Arthritis Sister     Glaucoma Neg Hx     Amblyopia Neg Hx     Blindness Neg Hx     Macular degeneration Neg Hx      "Retinal detachment Neg Hx     Strabismus Neg Hx     Stroke Neg Hx     Thyroid disease Neg Hx        Social History:  reports that she quit smoking about 62 years ago. Her smoking use included cigarettes. She started smoking about 63 years ago. She has a 0.5 pack-year smoking history. She has never used smokeless tobacco. She reports that she does not drink alcohol and does not use drugs.    Allergies:  Review of patient's allergies indicates:   Allergen Reactions    Aspirin      Other reaction(s): Stomach upset    Aspirin Other (See Comments)     Other reaction(s): Stomach upset    Gabapentin Other (See Comments)     Pt states she felt "funny" when she took the medication. Especially at the higher dose.    Mobic [meloxicam] Swelling     Edema and elevated blood pressure     Oxaliplatin Other (See Comments)     Other reaction(s): Joint pain  Other reaction(s): Itching  Other reaction(s): Hives  Other reaction(s): Joint pain  Other reaction(s): Itching  Other reaction(s): Hives    Pregabalin Other (See Comments)     Side effects  Side effects       Medications:  Current Outpatient Medications   Medication Sig Dispense Refill    alendronate (FOSAMAX) 70 MG tablet TAKE 1 TABLET(70 MG) BY MOUTH EVERY 7 DAYS 12 tablet 3    apixaban (ELIQUIS DVT-PE TREAT 30D START) 5 mg (74 tabs) DsPk For the first 7 days take two 5 mg tablets twice daily.  After 7 days take one 5 mg tablet twice daily. 74 tablet 1    cyclobenzaprine (FLEXERIL) 5 MG tablet Take 1 tablet (5 mg total) by mouth 3 (three) times daily as needed for Muscle spasms. 90 tablet 0    ergocalciferol, vitamin D2, (VITAMIN D ORAL) Take 2,000 mg by mouth once daily.      furosemide (LASIX) 20 MG tablet Take 1 tablet (20 mg total) by mouth daily as needed (edema). 90 tablet 3    levocetirizine (XYZAL) 5 MG tablet Take 1 tablet (5 mg total) by mouth nightly as needed for Allergies (allergies). 90 tablet PRN    levothyroxine (SYNTHROID) 75 MCG tablet Take 1 tablet (75 mcg " total) by mouth once daily. 90 tablet 3    lisinopriL 10 MG tablet Take 1 tablet (10 mg total) by mouth once daily. 90 tablet 3    multivitamin capsule Take 1 capsule by mouth once daily.      nystatin (MYCOSTATIN) cream Apply topically 2 (two) times daily as needed. GRETCHEN EXT AA BID 30 g prn    omeprazole (PRILOSEC) 40 MG capsule Take 1 capsule (40 mg total) by mouth 2 (two) times daily before meals. 180 capsule PRN    ondansetron (ZOFRAN) 8 MG tablet Take 1 tablet (8 mg total) by mouth every 8 (eight) hours as needed for Nausea. 20 tablet 1    traMADoL (ULTRAM) 50 mg tablet Take 1 tablet (50 mg total) by mouth every 8 (eight) hours as needed for Pain. 21 each 0    acetaminophen (TYLENOL) 650 MG TbSR Take 650 mg by mouth every 8 (eight) hours.      enoxaparin sodium (LOVENOX SUBQ) Inject into the skin.      HYDROcodone-acetaminophen (NORCO) 5-325 mg per tablet Take 1 tablet by mouth every 6 (six) hours as needed for Pain. (Patient not taking: Reported on 10/17/2023) 20 tablet 0    hydrOXYzine HCL (ATARAX) 25 MG tablet Take 1 tablet (25 mg total) by mouth 3 (three) times daily as needed for Anxiety (insomnia). (Patient not taking: Reported on 10/17/2023) 30 tablet PRN    LIDOcaine-prilocaine (EMLA) cream Apply topically as needed (Chemo). 30 g 0    triamcinolone acetonide 0.1% (KENALOG) 0.1 % cream APPLY EXTERNALLY TO THE AFFECTED AREA TWICE DAILY (Patient not taking: Reported on 10/17/2023) 60 g 0     No current facility-administered medications for this visit.     Facility-Administered Medications Ordered in Other Visits   Medication Dose Route Frequency Provider Last Rate Last Admin    0.9%  NaCl infusion   Intravenous Once Oren Verdin MD           Review of Systems   Constitutional:  Negative for chills, fatigue, fever and unexpected weight change.   Respiratory:  Positive for shortness of breath. Negative for cough.    Cardiovascular:  Negative for chest pain and palpitations.   Gastrointestinal:  Negative  "for abdominal pain, constipation, diarrhea, nausea and vomiting.   Skin:  Negative for rash.   Neurological:  Negative for headaches.   Hematological:  Negative for adenopathy. Does not bruise/bleed easily.       ECOG Performance Status: 2   Objective:      Vitals:   Vitals:    11/03/23 0755   BP: 128/70   BP Location: Right arm   Patient Position: Sitting   BP Method: Large (Automatic)   Pulse: 63   Temp: 97.8 °F (36.6 °C)   SpO2: 99%   Weight: 90 kg (198 lb 6.6 oz)   Height: 5' 4" (1.626 m)       Physical Exam  Constitutional:       General: She is not in acute distress.     Appearance: She is well-developed. She is not diaphoretic.   HENT:      Head: Normocephalic and atraumatic.   Cardiovascular:      Rate and Rhythm: Normal rate and regular rhythm.      Heart sounds: Normal heart sounds. No murmur heard.     No friction rub. No gallop.   Pulmonary:      Effort: Pulmonary effort is normal. No respiratory distress.      Breath sounds: Normal breath sounds. No wheezing or rales.   Chest:      Chest wall: No tenderness.   Abdominal:      General: Bowel sounds are normal. There is no distension.      Palpations: Abdomen is soft. There is no mass.      Tenderness: There is no abdominal tenderness. There is no guarding or rebound.   Lymphadenopathy:      Cervical: No cervical adenopathy.      Upper Body:      Right upper body: No supraclavicular or axillary adenopathy.      Left upper body: No supraclavicular or axillary adenopathy.   Skin:     Findings: No erythema or rash.   Neurological:      Mental Status: She is alert and oriented to person, place, and time.   Psychiatric:         Behavior: Behavior normal.         Laboratory Data:  No visits with results within 1 Week(s) from this visit.   Latest known visit with results is:   Admission on 09/29/2023, Discharged on 10/02/2023   Component Date Value Ref Range Status    UNIT NUMBER 09/27/2023 [id number]   Final    Product Code 09/27/2023 A3337T89   Final    " DISPENSE STATUS 09/27/2023 TRANSFUSED   Final    CODING SYSTEM 09/27/2023 [id number]   Final    Unit Blood Type Code 09/27/2023 6200   Final    Unit Blood Type 09/27/2023 A POS   Final    Unit Expiration 09/27/2023 [id number]   Final    CROSSMATCH INTERPRETATION 09/27/2023 Compatible   Final    aPTT 09/29/2023 26.5  21.0 - 32.0 sec Final    Comment: Refer to local heparin nomogram for intensity/dose specific   therapeutic   range.      UNIT NUMBER 09/27/2023 [id number]   Final    Product Code 09/27/2023 H5544T65   Final    DISPENSE STATUS 09/27/2023 RETURNED   Final    CODING SYSTEM 09/27/2023 [id number]   Final    Unit Blood Type Code 09/27/2023 0600   Final    Unit Blood Type 09/27/2023 A NEG   Final    Unit Expiration 09/27/2023 [id number]   Final    CROSSMATCH INTERPRETATION 09/27/2023 Compatible   Final    Specimen UA 09/30/2023 Urine, Clean Catch   Final    Color, UA 09/30/2023 Yellow  Yellow, Straw, Yvonne Final    Appearance, UA 09/30/2023 Clear  Clear Final    pH, UA 09/30/2023 6.0  5.0 - 8.0 Final    Specific Gravity, UA 09/30/2023 1.020  1.005 - 1.030 Final    Protein, UA 09/30/2023 Negative  Negative Final    Comment: Recommend a 24 hour urine protein or a urine   protein/creatinine ratio if globulin induced proteinuria is  clinically suspected.      Glucose, UA 09/30/2023 Negative  Negative Final    Ketones, UA 09/30/2023 Negative  Negative Final    Bilirubin (UA) 09/30/2023 Negative  Negative Final    Occult Blood UA 09/30/2023 Negative  Negative Final    Nitrite, UA 09/30/2023 Negative  Negative Final    Urobilinogen, UA 09/30/2023 Negative  <2.0 EU/dL Final    Leukocytes, UA 09/30/2023 Negative  Negative Final    Sodium 09/30/2023 138  136 - 145 mmol/L Final    Potassium 09/30/2023 4.2  3.5 - 5.1 mmol/L Final    Chloride 09/30/2023 107  95 - 110 mmol/L Final    CO2 09/30/2023 22 (L)  23 - 29 mmol/L Final    Glucose 09/30/2023 149 (H)  70 - 110 mg/dL Final    BUN 09/30/2023 8  8 - 23 mg/dL Final     Creatinine 09/30/2023 0.8  0.5 - 1.4 mg/dL Final    Calcium 09/30/2023 8.4 (L)  8.7 - 10.5 mg/dL Final    Anion Gap 09/30/2023 9  8 - 16 mmol/L Final    eGFR 09/30/2023 >60  >60 mL/min/1.73 m^2 Final    WBC 09/30/2023 10.02  3.90 - 12.70 K/uL Final    RBC 09/30/2023 3.32 (L)  4.00 - 5.40 M/uL Final    Hemoglobin 09/30/2023 9.0 (L)  12.0 - 16.0 g/dL Final    Hematocrit 09/30/2023 28.7 (L)  37.0 - 48.5 % Final    MCV 09/30/2023 86  82 - 98 fL Final    MCH 09/30/2023 27.1  27.0 - 31.0 pg Final    MCHC 09/30/2023 31.4 (L)  32.0 - 36.0 g/dL Final    RDW 09/30/2023 15.3 (H)  11.5 - 14.5 % Final    Platelets 09/30/2023 295  150 - 450 K/uL Final    MPV 09/30/2023 9.4  9.2 - 12.9 fL Final    Immature Granulocytes 09/30/2023 0.5  0.0 - 0.5 % Final    Gran # (ANC) 09/30/2023 9.0 (H)  1.8 - 7.7 K/uL Final    Immature Grans (Abs) 09/30/2023 0.05 (H)  0.00 - 0.04 K/uL Final    Comment: Mild elevation in immature granulocytes is non specific and   can be seen in a variety of conditions including stress response,   acute inflammation, trauma and pregnancy. Correlation with other   laboratory and clinical findings is essential.      Lymph # 09/30/2023 0.4 (L)  1.0 - 4.8 K/uL Final    Mono # 09/30/2023 0.6  0.3 - 1.0 K/uL Final    Eos # 09/30/2023 0.0  0.0 - 0.5 K/uL Final    Baso # 09/30/2023 0.01  0.00 - 0.20 K/uL Final    nRBC 09/30/2023 0  0 /100 WBC Final    Gran % 09/30/2023 89.5 (H)  38.0 - 73.0 % Final    Lymph % 09/30/2023 4.3 (L)  18.0 - 48.0 % Final    Mono % 09/30/2023 5.6  4.0 - 15.0 % Final    Eosinophil % 09/30/2023 0.0  0.0 - 8.0 % Final    Basophil % 09/30/2023 0.1  0.0 - 1.9 % Final    Differential Method 09/30/2023 Automated   Final    Sodium 10/01/2023 140  136 - 145 mmol/L Final    Potassium 10/01/2023 4.5  3.5 - 5.1 mmol/L Final    Chloride 10/01/2023 107  95 - 110 mmol/L Final    CO2 10/01/2023 22 (L)  23 - 29 mmol/L Final    Glucose 10/01/2023 139 (H)  70 - 110 mg/dL Final    BUN 10/01/2023 7 (L)  8 - 23 mg/dL  Final    Creatinine 10/01/2023 0.8  0.5 - 1.4 mg/dL Final    Calcium 10/01/2023 8.8  8.7 - 10.5 mg/dL Final    Anion Gap 10/01/2023 11  8 - 16 mmol/L Final    eGFR 10/01/2023 >60  >60 mL/min/1.73 m^2 Final    WBC 10/01/2023 11.80  3.90 - 12.70 K/uL Final    RBC 10/01/2023 3.79 (L)  4.00 - 5.40 M/uL Final    Hemoglobin 10/01/2023 10.6 (L)  12.0 - 16.0 g/dL Final    Hematocrit 10/01/2023 33.7 (L)  37.0 - 48.5 % Final    MCV 10/01/2023 89  82 - 98 fL Final    MCH 10/01/2023 28.0  27.0 - 31.0 pg Final    MCHC 10/01/2023 31.5 (L)  32.0 - 36.0 g/dL Final    RDW 10/01/2023 15.5 (H)  11.5 - 14.5 % Final    Platelets 10/01/2023 374  150 - 450 K/uL Final    MPV 10/01/2023 9.6  9.2 - 12.9 fL Final    Immature Granulocytes 10/01/2023 0.8 (H)  0.0 - 0.5 % Final    Gran # (ANC) 10/01/2023 10.8 (H)  1.8 - 7.7 K/uL Final    Immature Grans (Abs) 10/01/2023 0.09 (H)  0.00 - 0.04 K/uL Final    Comment: Mild elevation in immature granulocytes is non specific and   can be seen in a variety of conditions including stress response,   acute inflammation, trauma and pregnancy. Correlation with other   laboratory and clinical findings is essential.      Lymph # 10/01/2023 0.4 (L)  1.0 - 4.8 K/uL Final    Mono # 10/01/2023 0.6  0.3 - 1.0 K/uL Final    Eos # 10/01/2023 0.0  0.0 - 0.5 K/uL Final    Baso # 10/01/2023 0.01  0.00 - 0.20 K/uL Final    nRBC 10/01/2023 0  0 /100 WBC Final    Gran % 10/01/2023 91.1 (H)  38.0 - 73.0 % Final    Lymph % 10/01/2023 3.1 (L)  18.0 - 48.0 % Final    Mono % 10/01/2023 4.8  4.0 - 15.0 % Final    Eosinophil % 10/01/2023 0.1  0.0 - 8.0 % Final    Basophil % 10/01/2023 0.1  0.0 - 1.9 % Final    Differential Method 10/01/2023 Automated   Final    Sodium 10/02/2023 140  136 - 145 mmol/L Final    Potassium 10/02/2023 5.0  3.5 - 5.1 mmol/L Final    Chloride 10/02/2023 110  95 - 110 mmol/L Final    CO2 10/02/2023 24  23 - 29 mmol/L Final    Glucose 10/02/2023 127 (H)  70 - 110 mg/dL Final    BUN 10/02/2023 9  8 - 23  mg/dL Final    Creatinine 10/02/2023 0.8  0.5 - 1.4 mg/dL Final    Calcium 10/02/2023 8.6 (L)  8.7 - 10.5 mg/dL Final    Anion Gap 10/02/2023 6 (L)  8 - 16 mmol/L Final    eGFR 10/02/2023 >60  >60 mL/min/1.73 m^2 Final    WBC 10/02/2023 9.56  3.90 - 12.70 K/uL Final    RBC 10/02/2023 3.56 (L)  4.00 - 5.40 M/uL Final    Hemoglobin 10/02/2023 9.8 (L)  12.0 - 16.0 g/dL Final    Hematocrit 10/02/2023 31.8 (L)  37.0 - 48.5 % Final    MCV 10/02/2023 89  82 - 98 fL Final    MCH 10/02/2023 27.5  27.0 - 31.0 pg Final    MCHC 10/02/2023 30.8 (L)  32.0 - 36.0 g/dL Final    RDW 10/02/2023 15.3 (H)  11.5 - 14.5 % Final    Platelets 10/02/2023 375  150 - 450 K/uL Final    MPV 10/02/2023 9.4  9.2 - 12.9 fL Final    Immature Granulocytes 10/02/2023 1.2 (H)  0.0 - 0.5 % Final    Gran # (ANC) 10/02/2023 7.9 (H)  1.8 - 7.7 K/uL Final    Immature Grans (Abs) 10/02/2023 0.11 (H)  0.00 - 0.04 K/uL Final    Comment: Mild elevation in immature granulocytes is non specific and   can be seen in a variety of conditions including stress response,   acute inflammation, trauma and pregnancy. Correlation with other   laboratory and clinical findings is essential.      Lymph # 10/02/2023 0.7 (L)  1.0 - 4.8 K/uL Final    Mono # 10/02/2023 0.8  0.3 - 1.0 K/uL Final    Eos # 10/02/2023 0.0  0.0 - 0.5 K/uL Final    Baso # 10/02/2023 0.02  0.00 - 0.20 K/uL Final    nRBC 10/02/2023 0  0 /100 WBC Final    Gran % 10/02/2023 82.8 (H)  38.0 - 73.0 % Final    Lymph % 10/02/2023 7.7 (L)  18.0 - 48.0 % Final    Mono % 10/02/2023 7.8  4.0 - 15.0 % Final    Eosinophil % 10/02/2023 0.3  0.0 - 8.0 % Final    Basophil % 10/02/2023 0.2  0.0 - 1.9 % Final    Differential Method 10/02/2023 Automated   Final         Imaging:     CT Abdomen and Pelvis 8/17/23    There is subsegmental atelectasis of the lung bases. Heart size is normal.     Liver is normal size. No enhancing hepatic lesion identified. Irregular appearance of the gallbladder is demonstrated with either  noncalcified central gallstone and/or gallbladder wall thickening. There is no pericholecystic fluid. The common bile duct is within normal limits. There is fatty atrophy of the pancreas. The spleen and adrenal glands are unremarkable.     The kidneys are normal size. There is a heterogeneously hyperattenuating lesion at the inferior pole of the left kidney measuring 1.8 cm, possibly reflecting an enhancing renal mass. There is no hydronephrosis or nephrolithiasis. The ureters are normal caliber without obstructions. The bladder is fluid-filled with no focal wall abnormalities. The uterus and adnexal structures are unremarkable. Pelvic phleboliths are noted.     There is a short segment of concentric bowel wall thickening of the colon at the splenic flexure with mild surrounding mesenteric fat stranding. This thickening has a nodular appearance with thickness portion of the bowel measuring 2.2 cm. Other portions of the large bowel are unremarkable. Small bowel is normal caliber with no bowel wall thickening observed. The stomach is grossly unremarkable.     The abdominal aorta is normal caliber with atherosclerosis. No intra-abdominal lymphadenopathy observed.     There are degenerative changes of the spine. There is no acute osseous abnormality.     US abdomen 8/25/23    Pancreas: The visualized portions of pancreas appear normal.     Aorta: No aneurysm.     Liver: 18 cm, normal in size. There is diffuse increased echogenicity of the liver parenchyma compared to the renal parenchyma consistent with probable fatty infiltration.  No focal lesions.     Gallbladder: Large gallstones are identified.  Thickening of the gallbladder wall is not identified.     Biliary system: 7.4 mm common bile duct.  No intrahepatic ductal dilatation.     Inferior vena cava: Normal in appearance.     Right kidney: 10.2 cm. No hydronephrosis.     Left kidney: 10.8 cm. No hydronephrosis..  There is a 2.5 cm hyperechoic solid mass with blood  flow identified in the lower pole of the left kidney suspicious for neoplasm.  This corresponds to the finding on the prior CT of August 17, 2023.     Spleen: 10.8 cm.  Normal in size with homogeneous echotexture.     Miscellaneous: No ascites.    CT Chest 8/25/23    Visualized neck: Within normal limits.  Lungs: The lungs show scattered nodular densities as outlined below:  -4 mm left upper lobe pulmonary nodule (image 22)  -4 mm calcified granuloma in the left upper lobe (image 30)  -2 mm pulmonary nodule in the left upper lobe (image 72)  -9 x 9 mm triangular nodule along the juxtapleural right middle lobe (image 70)  -2 mm pulmonary nodules in the right middle lobe (image 80)  -Minimal linear bandlike atelectasis versus scarring is seen in the dependent lower lobes.  Airway: The trachea and central bronchial tree appear normal.  Pleura: There is no pleural effusion. There is no pneumothorax.  Cardiovascular: The heart is normal in size with scattered coronary artery calcifications and atheromatous calcified plaque at the aortic arch. The pulmonary artery is mildly enlarged (a finding which may represent pulmonary vascular congestion or pulmonary arterial hypertension. Pulmonary vasculature shows a moderate volume of acute pulmonary thromboembolus in the anterior right upper lobe (image 42), posterior right upper lobe (image 42), anterior right upper lobe (image 53), right middle lobe (image 70), and right lower lobe (image 88)  Mediastinum: There is no supraclavicular, axillary, mediastinal, or hilar lymphadenopathy.  Soft tissues: The peripheral soft tissues appear normal.  Musculoskeletal: There are moderate degenerative changes of the thoracic spine.  There are no suspicious osseous lesions.  Esophagus: The esophagus appears grossly normal.  Upper Abdomen: Partially imaged in the upper abdomen is a masslike density in the splenic flexure the colon. There appears to be relative diffuse low-attenuation liver  compatible with steatosis. There are stones seen in the gallbladder.       Assessment:       1. Malignant neoplasm of splenic flexure    2. Renal mass, left    3. Pulmonary nodules    4. Acute deep vein thrombosis (DVT) of popliteal vein of both lower extremities    5. Normocytic anemia           Plan:     Colon Cancer - Pt with h/o colon cancer s/p resection in 2007 followed by adjuvant FOLFOX for 12 cycles  -Pt recently with resection of transverse colon and splenic flexure 9/29/23 showing path showing invasive moderately differentiated adenocarcinoma with mucinous features, 33 benign lymph nodes, positive lymphovascular invasion, positive perineural invasion pT3N0  -Tumor shows intact expression of MLH1, PMS2, MSH2, MSH6  -Patient was positive for B Celio V600E mutation  -Pt has high risk stage II colon cancer given positive LVI and PNI  -PT is a candidate for treatment with 6 months of 5-FU or Capecitabine  -Will need to potentially clarify pulmonary nodules and renal mass prior to treatment  -Will obtain PET/CT    Renal Mass - Pt with a mass on the left kidney seen on CT abdomen 8/17/23 and US abdomen 8/25/23  -Pt saw Urology [facility] under the supervision of Dr. Isabel Syed on 8/23/23 with plans for MRI abdomen 11/20/23  -Case discussed with Dr syed whom recommended surveillance  -Will obtain PET/CT and discuss pt at tumor board    Pulmonary Nodules - seen on Ct chest 8/25/23  -Will discuss at pulmonary tumor board    DVT - PT with bilateral nonocclusive DVT demonstrated on the SFV to the popliteal veins seen on US 9/01/23  -Pt on Eliquis  -Will monitor    Normocytic Anemia - concern for possible iron deficiency in the setting of colon cancer  -Will check iron studies    Route Chart for Scheduling    Med Onc Chart Routing      Follow up with physician Other. Pt needs a pharmacogenomic panel today.  PET/CT ASAP with CBC, CMP ferritin adn iron/TIBC day of PET.  Return appt with me once PET/CT  completed.   Follow up with GRETCHEN    Infusion scheduling note    Injection scheduling note    Labs    Imaging    Pharmacy appointment    Other referrals                Treatment Plan Information   OP COLORECTAL FLUOROURACIL LEUCOVORIN Q2W (MOD DE GRAMONT)   Mahesh Cameron MD   Upcoming Treatment Dates - OP COLORECTAL FLUOROURACIL LEUCOVORIN Q2W (MOD DE GRAMONT)    11/13/2023       Chemotherapy       leucovorin calcium 400 mg/m2 = 810 mg in dextrose 5 % (D5W) 250 mL infusion       fluorouraciL injection 810 mg       fluorouracil (ADRUCIL) 2,400 mg/m2 = 4,850 mg in sodium chloride 0.9% 100 mL chemo infusion       Antiemetics       ONDANSETRON HCL (PF) 4 MG/2 ML INJ SOLN  11/27/2023       Chemotherapy       leucovorin calcium 400 mg/m2 = 810 mg in dextrose 5 % (D5W) 250 mL infusion       fluorouraciL injection 810 mg       fluorouracil (ADRUCIL) 2,400 mg/m2 = 4,850 mg in sodium chloride 0.9% 100 mL chemo infusion       Antiemetics       ONDANSETRON HCL (PF) 4 MG/2 ML INJ SOLN  12/11/2023       Chemotherapy       leucovorin calcium 400 mg/m2 = 810 mg in dextrose 5 % (D5W) 250 mL infusion       fluorouraciL injection 810 mg       fluorouracil (ADRUCIL) 2,400 mg/m2 = 4,850 mg in sodium chloride 0.9% 100 mL chemo infusion       Antiemetics       ONDANSETRON HCL (PF) 4 MG/2 ML INJ SOLN  12/25/2023       Chemotherapy       leucovorin calcium 400 mg/m2 = 810 mg in dextrose 5 % (D5W) 250 mL infusion       fluorouraciL injection 810 mg       fluorouracil (ADRUCIL) 2,400 mg/m2 = 4,850 mg in sodium chloride 0.9% 100 mL chemo infusion       Antiemetics       ONDANSETRON HCL (PF) 4 MG/2 ML INJ SOLN      Mahesh Cameron MD  [facility]  Hematology and Oncology  [facility] [address] [facility] [place] [address]   O: [phone]  F: [phone]

## 2023-11-03 NOTE — TELEPHONE ENCOUNTER
Called pt and pt's sister. Advised that appointment for PET and labs has been made. Gave date, time, and location. V/U

## 2023-11-03 NOTE — PLAN OF CARE
START ON PATHWAY REGIMEN - Colorectal    COS81        Leucovorin       Fluorouracil       Fluorouracil     **Always confirm dose/schedule in your pharmacy ordering system**    Patient Characteristics:  Postoperative without Neoadjuvant Therapy, M0 (Pathologic Staging), Colon, Stage   IIA, Clinical High Risk  Tumor Location: Colon  Therapeutic Status: Postoperative without Neoadjuvant Therapy, M0 (Pathologic   Staging)  AJCC M Category: cM0  AJCC T Category: pT3  AJCC N Category: pN0  AJCC 8 Stage Grouping: IIA  Intent of Therapy:  Curative Intent, Discussed with Patient

## 2023-11-03 NOTE — TELEPHONE ENCOUNTER
----- Message from Mahesh Cameron MD sent at 11/3/2023  9:17 AM CDT -----  The patient needs a PET/CT set up ASAP with labs the day of the PET/CT with ferritin, iron/TIBC, CBC and CMP.  Pt needs an appt with me at least a day after the PET/CT.

## 2023-11-03 NOTE — PROGRESS NOTES
Pharmacist Treatment Plan Review Note    The patient's treatment plan was reviewed by a pharmacist and adjustments, if needed, were made in collaboration with the physician including premedications, chemotherapy/immunotherapy/biotherapy, supportive care, and drug interactions. The treatment plan was compared to primary literature and NCCN guidelines. Available laboratory values were reviewed.      Michelle Bartholomew, Leonie, BCOP

## 2023-11-06 ENCOUNTER — TELEPHONE (OUTPATIENT)
Dept: HEMATOLOGY/ONCOLOGY | Facility: CLINIC | Age: 83
End: 2023-11-06
Payer: MEDICARE

## 2023-11-06 DIAGNOSIS — C18.5 MALIGNANT NEOPLASM OF SPLENIC FLEXURE: Primary | ICD-10-CM

## 2023-11-06 NOTE — TELEPHONE ENCOUNTER
----- Message from Danna Canales MA sent at 11/3/2023  8:37 AM CDT -----  Follow up with physician Other. Pt needs a pharmacogenomic panel today.  She needs approval for chemo.  She needs appt with general surgery for PORT palcement.  She needs an appt with me once she is ready to start treatment with CBC, CMP and CEA prior.

## 2023-11-06 NOTE — NURSING
Patient called to agreed to Education on 11/15 @ 1 pm.  She verbalized understanding of date, time and location.  Oncology Navigation   Intake  Date of Diagnosis: 09/29/23  Cancer Type: GI  Date of Referral: 10/19/23  Initial Nurse Navigator Contact: 10/19/23  Referral to Initial Contact Timeline (days): 0  Date Worked: 11/06/23  First Appointment Available: 11/03/23  Appointment Date: 11/03/23  First Available Date vs. Scheduled Date (days): 0     Treatment  Current Status: Active       Chemotherapy: Planned  Chemotherapy Regimen: OP COLORECTAL FLUOROURACIL LEUCOVORIN Q2W (MOD D                       Acuity      Follow Up  No follow-ups on file.

## 2023-11-07 ENCOUNTER — PATIENT MESSAGE (OUTPATIENT)
Dept: HEMATOLOGY/ONCOLOGY | Facility: CLINIC | Age: 83
End: 2023-11-07
Payer: MEDICARE

## 2023-11-07 ENCOUNTER — TELEPHONE (OUTPATIENT)
Dept: HEMATOLOGY/ONCOLOGY | Facility: CLINIC | Age: 83
End: 2023-11-07
Payer: MEDICARE

## 2023-11-07 ENCOUNTER — TUMOR BOARD CONFERENCE (OUTPATIENT)
Dept: HEMATOLOGY/ONCOLOGY | Facility: CLINIC | Age: 83
End: 2023-11-07
Payer: MEDICARE

## 2023-11-07 NOTE — PROGRESS NOTES
[facility]      THORACIC MULTIDISCIPLINARY TUMOR BOARD  PATIENT REVIEW FORM  ___________________________________________________________________    CLINIC #: [id number]  DATE: 11/07/2023    TUMOR SITE:   Cancer Type: Other (lung nodule)     Cancer Site: middle lobe     Tumor Laterality: Right     Metastatic Site(s): Lung (left upper lobe)     Cancer Staging Complete: No       Presenting Hospital / Clinic: [facility]     Virtual Tumor Board Conference: In person       PRESENTER:   Presenter: Dr. Cameron     Specialties Present: Medical Oncology; Hematology; Radiation Oncology; Surgical Oncology; Pathology; Navigation; Research; Radiology; Pulmonology       PATIENT SUMMARY:   a 83 y.o. female with Pulmonary embolism, carpal tunnel, CAD, HTN, hypothyroidism, osteoporosis, osteoarthritis, who presents for colon cancer.  Pt was previously diagnosed with Stage III adenocarcinoma of the colon in 2007 and underwent 12 cycles of FOLFOX.  Pt underwent colonoscopy on 8/08/23 showing 1 7 mm polyp in the rectum; altered vascular, congested, eroded, friable and ulcerated mucosa in the transverse colon which was biopsied; patent and to side ileocolonic anastomosis.  Pathology showed moderately differentiated adenocarcinoma. CT abdomen and pelvis 8/17/23 showing irregular appearance of the gallbladder; heterogeneously enhancing lesion in the inferior pole of the left kidney measuring 1.8 cm; short segment of concentric bowel wall thickening of the colon at the splenic flexure with surrounding mesenteric fat stranding with nodular thickness of 2.2 cm.  The patient underwent colonoscopy on 08/22/2023 showing nonbleeding internal hemorrhoids, partially obstructing tumor in the transverse colon which was tattooed.  Pt saw Urology 8/23/23 with plans for re-imaging the renal lesion in 3 months with an MRI.  US abdomen 8/25/23 showed a 2.5 cm  solid mass at the lower pole of the left kidney suspicious for neoplasm.  CT chest 8/25/23 showed 4 mm left upper lobe pulmonary nodule, 4 mm calcified granuloma left upper lobe, 2 mm pulmonary nodule in the left upper lobe, 9 x 9 mm triangular nodule along the juxtapleural right middle lobe, 2 mm pulmonary nodule in the right middle lobe, and a partially imaged masslike density in the splenic flexure of the colon. Pt then underwent resection of the transverse colon and splenic flexure with path showing invasive moderately differentiated adenocarcinoma with mucinous features, 33 benign lymph nodes, positive lymphovascular invasion, positive perineural invasion pT3N0.  Tumor shows intact expression of MLH1, PMS2, MSH2, MSH6.  Patient was positive for B Celio V600E mutation.     Currently the patient endorses SOB which is chronic. The patient denies CP, cough, abdominal pain, nausea, vomiting, constipation, diarrhea.  The patient denies fever, chills, night sweats, weight loss, new lumps or bumps, easy bruising or bleeding.    Background Information  Patient Status: a current patient  Reason for Consultation: Initial Presentation  Treatment to Date: None         BOARD RECOMMENDATIONS:   Recommended Plan (General)  Recommended Plan: Imaging  Recommended Plan Note: PET CT, if lung nodules are positive, biopsy to see if mets or new primary         CONSULT NEEDED:     [] Surgery    [] Hem/Onc    [] Rad/Onc    [] Dietary                 [] Social Service    [] Psychology       [] Pulmonology    Cancer Staging   Malignant neoplasm of splenic flexure  Staging form: Colon and Rectum, AJCC 8th Edition  - Clinical: Stage IIA (cT3, cN0, cM0) - Signed by Mahesh Cameron MD on 11/3/2023           [x] [facility]eri[facility] Treatment Guidelines reviewed and care planned is consistent with guidelines.         (i.e., NCCN, NCI, PD, ACO, AUA, etc.)    PRESENTATION AT CANCER CONFERENCE:   Presentation at Cancer Conference: Prospective       CLINICAL  TRIAL ELIGIBILITY:   Clinical Trial Eligibility  Clinical Trial Eligibility: Not discussed         Amee Collier

## 2023-11-07 NOTE — TELEPHONE ENCOUNTER
Spoke with patient to schedule IO consult per referral but pt asked for relative to call to schedule when she gets back.

## 2023-11-08 ENCOUNTER — HOSPITAL ENCOUNTER (OUTPATIENT)
Dept: RADIOLOGY | Facility: HOSPITAL | Age: 83
Discharge: HOME | End: 2023-11-08
Attending: INTERNAL MEDICINE
Payer: MEDICARE

## 2023-11-08 DIAGNOSIS — C18.5 MALIGNANT NEOPLASM OF SPLENIC FLEXURE: Primary | ICD-10-CM

## 2023-11-08 DIAGNOSIS — R91.8 PULMONARY NODULES: ICD-10-CM

## 2023-11-08 LAB — GLUCOSE SERPL-MCNC: 90 MG/DL (ref 70–110)

## 2023-11-08 PROCEDURE — 78815 NM PET CT FDG SKULL BASE TO MID THIGH: ICD-10-PCS | Mod: 26,PI,, | Performed by: RADIOLOGY

## 2023-11-08 PROCEDURE — 78815 PET IMAGE W/CT SKULL-THIGH: CPT | Mod: 26,PI,, | Performed by: RADIOLOGY

## 2023-11-08 PROCEDURE — A9552 F18 FDG: HCPCS | Mod: PN

## 2023-11-08 NOTE — PROGRESS NOTES
PET Imaging Questionnaire    Are you a Diabetic? Recent Blood Sugar level? No    Are you anemic? Bone Marrow Stimulation Meds? Yes    Have you had a CT Scan, if so when & where was your last one? Yes -     Have you had a PET Scan, if so when & where was your last one? No    Chemotherapy or currently on Chemotherapy? Yes    Radiation therapy? No    Surgical History:   Past Surgical History:   Procedure Laterality Date    CARDIAC SURGERY      cardiac cath    COLON SURGERY      colon resection    COLONOSCOPY N/A 10/18/2016    Procedure: COLONOSCOPY;  Surgeon: Rell Cordova MD;  Location: [facility];  Service: Endoscopy;  Laterality: N/A;    COLONOSCOPY N/A 8/8/2023    Procedure: COLONOSCOPY;  Surgeon: Kaushik Pinon MD;  Location: [facility];  Service: Endoscopy;  Laterality: N/A;    COLONOSCOPY N/A 8/22/2023    Procedure: COLONOSCOPY (tattoo of colon ca);  Surgeon: Kaushik Pinon MD;  Location: [facility];  Service: Endoscopy;  Laterality: N/A;    ESOPHAGOGASTRODUODENOSCOPY N/A 8/3/2023    Procedure: EGD (ESOPHAGOGASTRODUODENOSCOPY);  Surgeon: Kaushik Pinon MD;  Location: [facility];  Service: Endoscopy;  Laterality: N/A;    HAND TENDON SURGERY Left     HEMICOLECTOMY      right    INJECTION OF ANESTHETIC AGENT AROUND MEDIAL BRANCH NERVES INNERVATING LUMBAR FACET JOINT Right 07/10/2018    Procedure: Block-nerve-medial branch-lumbar;  Surgeon: Parish Gaitan MD;  Location: [facility];  Service: Pain Management;  Laterality: Right;  L3, 4, 5    INSERTION OF INFERIOR VENA CAVAL FILTER N/A 9/13/2023    Procedure: Insertion, Filter, Inferior Vena Cava;  Surgeon: Oren Verdin MD;  Location: [facility] CATH/EP LAB;  Service: General;  Laterality: N/A;    JOINT REPLACEMENT      bilateral    RADIOFREQUENCY ABLATION OF LUMBAR MEDIAL BRANCH NERVE AT SINGLE LEVEL Right 07/24/2018    Procedure: RADIOFREQUENCY ABLATION, NERVE, MEDIAL BRANCH, LUMBAR, 1 LEVEL;  Surgeon: Parish Gaitan MD;  Location: [facility];  Service: Pain Management;   Laterality: Right;  L3,4,5 - Burned at 80 degrees C. for 75 seconds x 2 each site    ROBOT-ASSISTED COLECTOMY N/A 9/29/2023    Procedure: ROBOTIC COLECTOMY;  Surgeon: Jim Chavez MD;  Location: [facility];  Service: General;  Laterality: N/A;    SHOULDER ARTHROSCOPY Right 01/12/2017    Reverse     TONSILLECTOMY      TONSILLECTOMY, ADENOIDECTOMY, BILATERAL MYRINGOTOMY AND TUBES      TOTAL KNEE ARTHROPLASTY Bilateral 08/1998    total replacements    TUMOR REMOVAL Left     left foot as a child        Have you been fasting for at least 6 hours? Yes    Is there any chance you may be pregnant or breastfeeding? No    Assay: 13.13 MCi@:7:32   Injection Site:RT Hand    Residual: 2.88 mCi@: 7:34   Technologist: Daron Gutierrez Injected:10.25 mCi

## 2023-11-09 ENCOUNTER — PATIENT MESSAGE (OUTPATIENT)
Dept: HEMATOLOGY/ONCOLOGY | Facility: CLINIC | Age: 83
End: 2023-11-09
Payer: MEDICARE

## 2023-11-09 ENCOUNTER — TELEPHONE (OUTPATIENT)
Dept: HEMATOLOGY/ONCOLOGY | Facility: CLINIC | Age: 83
End: 2023-11-09
Payer: MEDICARE

## 2023-11-09 RX ORDER — SODIUM CHLORIDE 9 MG/ML
INJECTION, SOLUTION INTRAVENOUS CONTINUOUS
Status: CANCELLED | OUTPATIENT
Start: 2023-11-15

## 2023-11-09 NOTE — TELEPHONE ENCOUNTER
I spoke with the patient about getting an IO appt scheduled per referral. I explained in detail what we offer and listed some symptoms/side effects that we can help address using our support services. They verbalized understanding and accepted a date/time. Location was reviewed and a message was sent to the portal if they had any follow up questions.    
180.34

## 2023-11-10 LAB
ONEOME COMMENT: NORMAL
ONEOME METHOD: NORMAL

## 2023-11-13 ENCOUNTER — TELEPHONE (OUTPATIENT)
Dept: SURGERY | Facility: HOSPITAL | Age: 83
End: 2023-11-13
Payer: MEDICARE

## 2023-11-13 NOTE — TELEPHONE ENCOUNTER
Good morning,     Ms. Sorensen is scheduled for a port insertion on Wednesday, 11/15. She takes eliquis BID. She did take a dose this morning. She is unsure if she needs to hold this prior to surgery. Please contact her to discuss.     Thank you!

## 2023-11-14 ENCOUNTER — OFFICE VISIT (OUTPATIENT)
Dept: SURGERY | Facility: CLINIC | Age: 83
End: 2023-11-14
Payer: MEDICARE

## 2023-11-14 ENCOUNTER — ANESTHESIA EVENT (OUTPATIENT)
Dept: SURGERY | Facility: HOSPITAL | Age: 83
End: 2023-11-14
Payer: MEDICARE

## 2023-11-14 VITALS
HEART RATE: 64 BPM | SYSTOLIC BLOOD PRESSURE: 139 MMHG | DIASTOLIC BLOOD PRESSURE: 61 MMHG | TEMPERATURE: 98 F | BODY MASS INDEX: 34.28 KG/M2 | WEIGHT: 200.81 LBS | HEIGHT: 64 IN

## 2023-11-14 DIAGNOSIS — Z85.038 HISTORY OF COLON CANCER: Primary | ICD-10-CM

## 2023-11-14 PROCEDURE — 1125F AMNT PAIN NOTED PAIN PRSNT: CPT | Mod: CPTII,S$GLB,, | Performed by: SURGERY

## 2023-11-14 PROCEDURE — 3075F SYST BP GE 130 - 139MM HG: CPT | Mod: CPTII,S$GLB,, | Performed by: SURGERY

## 2023-11-14 PROCEDURE — 99024 PR POST-OP FOLLOW-UP VISIT: ICD-10-PCS | Mod: S$GLB,,, | Performed by: SURGERY

## 2023-11-14 PROCEDURE — 3288F PR FALLS RISK ASSESSMENT DOCUMENTED: ICD-10-PCS | Mod: CPTII,S$GLB,, | Performed by: SURGERY

## 2023-11-14 PROCEDURE — 3078F PR MOST RECENT DIASTOLIC BLOOD PRESSURE < 80 MM HG: ICD-10-PCS | Mod: CPTII,S$GLB,, | Performed by: SURGERY

## 2023-11-14 PROCEDURE — 99024 POSTOP FOLLOW-UP VISIT: CPT | Mod: S$GLB,,, | Performed by: SURGERY

## 2023-11-14 PROCEDURE — 99999 PR PBB SHADOW E&M-EST. PATIENT-LVL IV: ICD-10-PCS | Mod: PBBFAC,,, | Performed by: SURGERY

## 2023-11-14 PROCEDURE — 1101F PR PT FALLS ASSESS DOC 0-1 FALLS W/OUT INJ PAST YR: ICD-10-PCS | Mod: CPTII,S$GLB,, | Performed by: SURGERY

## 2023-11-14 PROCEDURE — 1125F PR PAIN SEVERITY QUANTIFIED, PAIN PRESENT: ICD-10-PCS | Mod: CPTII,S$GLB,, | Performed by: SURGERY

## 2023-11-14 PROCEDURE — 1159F PR MEDICATION LIST DOCUMENTED IN MEDICAL RECORD: ICD-10-PCS | Mod: CPTII,S$GLB,, | Performed by: SURGERY

## 2023-11-14 PROCEDURE — 3075F PR MOST RECENT SYSTOLIC BLOOD PRESS GE 130-139MM HG: ICD-10-PCS | Mod: CPTII,S$GLB,, | Performed by: SURGERY

## 2023-11-14 PROCEDURE — 1101F PT FALLS ASSESS-DOCD LE1/YR: CPT | Mod: CPTII,S$GLB,, | Performed by: SURGERY

## 2023-11-14 PROCEDURE — 3078F DIAST BP <80 MM HG: CPT | Mod: CPTII,S$GLB,, | Performed by: SURGERY

## 2023-11-14 PROCEDURE — 1159F MED LIST DOCD IN RCRD: CPT | Mod: CPTII,S$GLB,, | Performed by: SURGERY

## 2023-11-14 PROCEDURE — 3288F FALL RISK ASSESSMENT DOCD: CPT | Mod: CPTII,S$GLB,, | Performed by: SURGERY

## 2023-11-14 PROCEDURE — 99999 PR PBB SHADOW E&M-EST. PATIENT-LVL IV: CPT | Mod: PBBFAC,,, | Performed by: SURGERY

## 2023-11-14 NOTE — H&P (VIEW-ONLY)
Subjective     Patient ID: Danna Sorensen is a 83 y.o. female.    Chief Complaint: Post-op Evaluation (Check colon)    HPI  Pleasant 83-year-old female with whom I am familiar.  She was status post extended colectomy of the  transverse colon and splenic flexure with ileo colic anastomosis.  This was performed on September 29th.  Patient had a T3 N0 colon cancer.  She did however have perineural and lymphovascular invasion.  As such she is been recommended to proceed with chemotherapy.   Also of note she does have a known suspected malignancy of the left kidney.  This has been discussed with Urology and observation has been recommended. She is in need of port placement.  Review of Systems   Constitutional:  Negative for activity change and appetite change.   Gastrointestinal:  Negative for abdominal distention and abdominal pain.   Genitourinary:  Negative for dyspareunia.          Objective     Physical Exam  Vitals reviewed.   Cardiovascular:      Rate and Rhythm: Normal rate.      Pulses: Normal pulses.   Pulmonary:      Effort: Pulmonary effort is normal.   Abdominal:      General: Abdomen is flat. Bowel sounds are normal. There is no distension.      Tenderness: There is no abdominal tenderness.      Hernia: No hernia is present.   Neurological:      Mental Status: She is alert.            Assessment and Plan     Colon cancer      Discussion with patient.  I have recommended and offered to proceed with port placement.  She was scheduled for port tomorrow.  Informed consent has been obtained         No follow-ups on file.

## 2023-11-15 ENCOUNTER — TELEPHONE (OUTPATIENT)
Dept: HEMATOLOGY/ONCOLOGY | Facility: CLINIC | Age: 83
End: 2023-11-15
Payer: MEDICARE

## 2023-11-15 ENCOUNTER — HOSPITAL ENCOUNTER (OUTPATIENT)
Facility: HOSPITAL | Age: 83
Discharge: HOME | End: 2023-11-15
Attending: SURGERY | Admitting: SURGERY
Payer: MEDICARE

## 2023-11-15 ENCOUNTER — HOSPITAL ENCOUNTER (OUTPATIENT)
Dept: RADIOLOGY | Facility: HOSPITAL | Age: 83
Discharge: HOME | End: 2023-11-15
Attending: SURGERY
Payer: MEDICARE

## 2023-11-15 ENCOUNTER — ANESTHESIA (OUTPATIENT)
Dept: SURGERY | Facility: HOSPITAL | Age: 83
End: 2023-11-15
Payer: MEDICARE

## 2023-11-15 DIAGNOSIS — C18.5 MALIGNANT NEOPLASM OF SPLENIC FLEXURE: Primary | ICD-10-CM

## 2023-11-15 DIAGNOSIS — Z85.038 HISTORY OF COLON CANCER: ICD-10-CM

## 2023-11-15 DIAGNOSIS — Z95.828 PORT-A-CATH IN PLACE: ICD-10-CM

## 2023-11-15 PROCEDURE — 71045 XR CHEST 1 VIEW: ICD-10-PCS | Mod: 26,,, | Performed by: RADIOLOGY

## 2023-11-15 PROCEDURE — 71045 X-RAY EXAM CHEST 1 VIEW: CPT | Mod: TC,FY,PO

## 2023-11-15 PROCEDURE — 77001 FLUOROGUIDE FOR VEIN DEVICE: CPT

## 2023-11-15 PROCEDURE — 71000016 HC POSTOP RECOV ADDL HR: Mod: PO | Performed by: SURGERY

## 2023-11-15 PROCEDURE — 25000003 PHARM REV CODE 250: Mod: PO | Performed by: NURSE ANESTHETIST, CERTIFIED REGISTERED

## 2023-11-15 PROCEDURE — D9220A PRA ANESTHESIA: Mod: ANES,,, | Performed by: ANESTHESIOLOGY

## 2023-11-15 PROCEDURE — 77001 FLUOROGUIDE FOR VEIN DEVICE: CPT | Mod: 26,,, | Performed by: SURGERY

## 2023-11-15 PROCEDURE — 63600175 PHARM REV CODE 636 W HCPCS: Mod: PO | Performed by: SURGERY

## 2023-11-15 PROCEDURE — D9220A PRA ANESTHESIA: ICD-10-PCS | Mod: CRNA,,, | Performed by: NURSE ANESTHETIST, CERTIFIED REGISTERED

## 2023-11-15 PROCEDURE — 71045 X-RAY EXAM CHEST 1 VIEW: CPT | Mod: 26,,, | Performed by: RADIOLOGY

## 2023-11-15 PROCEDURE — D9220A PRA ANESTHESIA: Mod: CRNA,,, | Performed by: NURSE ANESTHETIST, CERTIFIED REGISTERED

## 2023-11-15 PROCEDURE — 37000008 HC ANESTHESIA 1ST 15 MINUTES: Mod: PO | Performed by: SURGERY

## 2023-11-15 PROCEDURE — 71000033 HC RECOVERY, INTIAL HOUR: Mod: PO | Performed by: SURGERY

## 2023-11-15 PROCEDURE — 37000009 HC ANESTHESIA EA ADD 15 MINS: Mod: PO | Performed by: SURGERY

## 2023-11-15 PROCEDURE — 63600175 PHARM REV CODE 636 W HCPCS: Mod: PO | Performed by: NURSE ANESTHETIST, CERTIFIED REGISTERED

## 2023-11-15 PROCEDURE — 71000015 HC POSTOP RECOV 1ST HR: Mod: PO | Performed by: SURGERY

## 2023-11-15 PROCEDURE — 36561 INSERT TUNNELED CV CATH: CPT | Mod: 79,RT,, | Performed by: SURGERY

## 2023-11-15 PROCEDURE — D9220A PRA ANESTHESIA: ICD-10-PCS | Mod: ANES,,, | Performed by: ANESTHESIOLOGY

## 2023-11-15 PROCEDURE — 63600175 PHARM REV CODE 636 W HCPCS: Mod: PO | Performed by: ANESTHESIOLOGY

## 2023-11-15 PROCEDURE — 36561 PR INSERT TUNNELED CV CATH WITH PORT: ICD-10-PCS | Mod: 79,RT,, | Performed by: SURGERY

## 2023-11-15 PROCEDURE — 36000707: Mod: PO | Performed by: SURGERY

## 2023-11-15 PROCEDURE — 25000003 PHARM REV CODE 250: Mod: PO | Performed by: SURGERY

## 2023-11-15 PROCEDURE — 27200651 HC AIRWAY, LMA: Mod: PO | Performed by: ANESTHESIOLOGY

## 2023-11-15 PROCEDURE — 36000706: Mod: PO | Performed by: SURGERY

## 2023-11-15 PROCEDURE — 77001 CHG FLUOROGUIDE CNTRL VEN ACCESS,PLACE,REPLACE,REMOVE: ICD-10-PCS | Mod: 26,,, | Performed by: SURGERY

## 2023-11-15 PROCEDURE — C1788 PORT, INDWELLING, IMP: HCPCS | Mod: PO | Performed by: SURGERY

## 2023-11-15 DEVICE — KIT POWERPORT SINGLE 8FR: Type: IMPLANTABLE DEVICE | Site: CHEST | Status: FUNCTIONAL

## 2023-11-15 RX ORDER — HYDROCODONE BITARTRATE AND ACETAMINOPHEN 5; 325 MG/1; MG/1
1 TABLET ORAL EVERY 4 HOURS PRN
Status: DISCONTINUED | OUTPATIENT
Start: 2023-11-15 | End: 2023-11-15 | Stop reason: HOSPADM

## 2023-11-15 RX ORDER — HEPARIN 100 UNIT/ML
SYRINGE INTRAVENOUS
Status: DISCONTINUED | OUTPATIENT
Start: 2023-11-15 | End: 2023-11-15 | Stop reason: HOSPADM

## 2023-11-15 RX ORDER — PROCHLORPERAZINE EDISYLATE 5 MG/ML
5 INJECTION INTRAMUSCULAR; INTRAVENOUS EVERY 4 HOURS PRN
Status: DISCONTINUED | OUTPATIENT
Start: 2023-11-15 | End: 2023-11-15 | Stop reason: HOSPADM

## 2023-11-15 RX ORDER — MEPERIDINE HYDROCHLORIDE 50 MG/ML
12.5 INJECTION INTRAMUSCULAR; INTRAVENOUS; SUBCUTANEOUS ONCE
Status: DISCONTINUED | OUTPATIENT
Start: 2023-11-15 | End: 2023-11-15 | Stop reason: HOSPADM

## 2023-11-15 RX ORDER — DEXAMETHASONE SODIUM PHOSPHATE 4 MG/ML
INJECTION, SOLUTION INTRA-ARTICULAR; INTRALESIONAL; INTRAMUSCULAR; INTRAVENOUS; SOFT TISSUE
Status: DISCONTINUED | OUTPATIENT
Start: 2023-11-15 | End: 2023-11-15

## 2023-11-15 RX ORDER — PROPOFOL 10 MG/ML
VIAL (ML) INTRAVENOUS
Status: DISCONTINUED | OUTPATIENT
Start: 2023-11-15 | End: 2023-11-15

## 2023-11-15 RX ORDER — FENTANYL CITRATE 50 UG/ML
INJECTION, SOLUTION INTRAMUSCULAR; INTRAVENOUS
Status: DISCONTINUED | OUTPATIENT
Start: 2023-11-15 | End: 2023-11-15

## 2023-11-15 RX ORDER — SODIUM CHLORIDE, SODIUM LACTATE, POTASSIUM CHLORIDE, CALCIUM CHLORIDE 600; 310; 30; 20 MG/100ML; MG/100ML; MG/100ML; MG/100ML
INJECTION, SOLUTION INTRAVENOUS CONTINUOUS
Status: DISCONTINUED | OUTPATIENT
Start: 2023-11-15 | End: 2023-11-15 | Stop reason: HOSPADM

## 2023-11-15 RX ORDER — CEFAZOLIN SODIUM 2 G/50ML
2 SOLUTION INTRAVENOUS
Status: COMPLETED | OUTPATIENT
Start: 2023-11-15 | End: 2023-11-15

## 2023-11-15 RX ORDER — SODIUM CHLORIDE 0.9 % (FLUSH) 0.9 %
10 SYRINGE (ML) INJECTION ONCE
Status: DISCONTINUED | OUTPATIENT
Start: 2023-11-15 | End: 2023-11-15 | Stop reason: HOSPADM

## 2023-11-15 RX ORDER — EPHEDRINE SULFATE 50 MG/ML
INJECTION, SOLUTION INTRAVENOUS
Status: DISCONTINUED | OUTPATIENT
Start: 2023-11-15 | End: 2023-11-15

## 2023-11-15 RX ORDER — LIDOCAINE HYDROCHLORIDE AND EPINEPHRINE 10; 10 MG/ML; UG/ML
INJECTION, SOLUTION INFILTRATION; PERINEURAL
Status: DISCONTINUED | OUTPATIENT
Start: 2023-11-15 | End: 2023-11-15 | Stop reason: HOSPADM

## 2023-11-15 RX ORDER — SODIUM CHLORIDE 9 MG/ML
INJECTION, SOLUTION INTRAVENOUS CONTINUOUS
Status: DISCONTINUED | OUTPATIENT
Start: 2023-11-15 | End: 2023-11-15 | Stop reason: HOSPADM

## 2023-11-15 RX ORDER — DIPHENHYDRAMINE HYDROCHLORIDE 50 MG/ML
25 INJECTION INTRAMUSCULAR; INTRAVENOUS EVERY 6 HOURS PRN
Status: DISCONTINUED | OUTPATIENT
Start: 2023-11-15 | End: 2023-11-15 | Stop reason: HOSPADM

## 2023-11-15 RX ORDER — HYDROMORPHONE HYDROCHLORIDE 2 MG/ML
0.2 INJECTION, SOLUTION INTRAMUSCULAR; INTRAVENOUS; SUBCUTANEOUS EVERY 5 MIN PRN
Status: DISCONTINUED | OUTPATIENT
Start: 2023-11-15 | End: 2023-11-15 | Stop reason: HOSPADM

## 2023-11-15 RX ORDER — OXYCODONE HYDROCHLORIDE 5 MG/1
5 TABLET ORAL
Status: DISCONTINUED | OUTPATIENT
Start: 2023-11-15 | End: 2023-11-15 | Stop reason: HOSPADM

## 2023-11-15 RX ORDER — LIDOCAINE HYDROCHLORIDE 10 MG/ML
1 INJECTION, SOLUTION EPIDURAL; INFILTRATION; INTRACAUDAL; PERINEURAL ONCE
Status: DISCONTINUED | OUTPATIENT
Start: 2023-11-15 | End: 2023-11-15 | Stop reason: HOSPADM

## 2023-11-15 RX ORDER — ONDANSETRON 2 MG/ML
INJECTION INTRAMUSCULAR; INTRAVENOUS
Status: DISCONTINUED | OUTPATIENT
Start: 2023-11-15 | End: 2023-11-15

## 2023-11-15 RX ORDER — ACETAMINOPHEN 10 MG/ML
INJECTION, SOLUTION INTRAVENOUS
Status: DISCONTINUED | OUTPATIENT
Start: 2023-11-15 | End: 2023-11-15

## 2023-11-15 RX ORDER — ONDANSETRON 2 MG/ML
4 INJECTION INTRAMUSCULAR; INTRAVENOUS EVERY 12 HOURS PRN
Status: DISCONTINUED | OUTPATIENT
Start: 2023-11-15 | End: 2023-11-15 | Stop reason: HOSPADM

## 2023-11-15 RX ORDER — HYDROCODONE BITARTRATE AND ACETAMINOPHEN 5; 325 MG/1; MG/1
1 TABLET ORAL EVERY 6 HOURS PRN
Qty: 20 TABLET | Refills: 0 | Status: SHIPPED | OUTPATIENT
Start: 2023-11-15

## 2023-11-15 RX ORDER — MIDAZOLAM HYDROCHLORIDE 1 MG/ML
INJECTION INTRAMUSCULAR; INTRAVENOUS
Status: DISCONTINUED | OUTPATIENT
Start: 2023-11-15 | End: 2023-11-15

## 2023-11-15 RX ORDER — LIDOCAINE HYDROCHLORIDE 20 MG/ML
INJECTION INTRAVENOUS
Status: DISCONTINUED | OUTPATIENT
Start: 2023-11-15 | End: 2023-11-15

## 2023-11-15 RX ADMIN — EPHEDRINE SULFATE 5 MG: 50 INJECTION INTRAVENOUS at 10:11

## 2023-11-15 RX ADMIN — DEXAMETHASONE SODIUM PHOSPHATE 4 MG: 4 INJECTION, SOLUTION INTRAMUSCULAR; INTRAVENOUS at 10:11

## 2023-11-15 RX ADMIN — FENTANYL CITRATE 50 MCG: 50 INJECTION, SOLUTION INTRAMUSCULAR; INTRAVENOUS at 10:11

## 2023-11-15 RX ADMIN — MIDAZOLAM HYDROCHLORIDE 2 MG: 1 INJECTION INTRAMUSCULAR; INTRAVENOUS at 10:11

## 2023-11-15 RX ADMIN — SODIUM CHLORIDE, POTASSIUM CHLORIDE, SODIUM LACTATE AND CALCIUM CHLORIDE: 600; 310; 30; 20 INJECTION, SOLUTION INTRAVENOUS at 08:11

## 2023-11-15 RX ADMIN — ACETAMINOPHEN 1000 MG: 10 INJECTION, SOLUTION INTRAVENOUS at 10:11

## 2023-11-15 RX ADMIN — PROPOFOL 100 MG: 10 INJECTION, EMULSION INTRAVENOUS at 10:11

## 2023-11-15 RX ADMIN — CEFAZOLIN SODIUM 2 G: 2 SOLUTION INTRAVENOUS at 10:11

## 2023-11-15 RX ADMIN — LIDOCAINE HYDROCHLORIDE 80 MG: 20 INJECTION INTRAVENOUS at 10:11

## 2023-11-15 RX ADMIN — GLYCOPYRROLATE 0.2 MG: 0.2 INJECTION, SOLUTION INTRAMUSCULAR; INTRAVENOUS at 10:11

## 2023-11-15 RX ADMIN — ONDANSETRON 4 MG: 2 INJECTION, SOLUTION INTRAMUSCULAR; INTRAVENOUS at 10:11

## 2023-11-15 NOTE — ANESTHESIA PREPROCEDURE EVALUATION
11/15/2023  Danna Sorensen is a 83 y.o., female.      Pre-op Assessment    I have reviewed the Patient Summary Reports.     I have reviewed the Nursing Notes. I have reviewed the NPO Status.   I have reviewed the Medications.     Review of Systems  Anesthesia Hx:  No problems with previous Anesthesia                Social:  Former Smoker       Hematology/Oncology:                        --  Cancer in past history (colon CA, SCCA):                 Oncology Comments: Colon CA     EENT/Dental:   Right lower lip old injury/purple mass.          Cardiovascular:  Exercise tolerance: good   Hypertension, well controlled   CAD  asymptomatic            ECG has been reviewed.                          Pulmonary:  Pulmonary Normal                       Renal/:  Renal/ Normal                 Hepatic/GI:     GERD, well controlled             Musculoskeletal:  Arthritis          Spine Disorders: lumbar            Neurological:    Neuromuscular Disease,                                   Endocrine:   Hypothyroidism        Obesity / BMI > 30      Physical Exam  General: Well nourished, Cooperative, Alert and Oriented    Airway:  Mallampati: II   Mouth Opening: Normal  TM Distance: Normal  Tongue: Normal  Neck ROM: Normal ROM    Dental:  Intact    Chest/Lungs:  Clear to auscultation, Normal Respiratory Rate        Anesthesia Plan  Type of Anesthesia, risks & benefits discussed:    Anesthesia Type: Gen Natural Airway, MAC  Intra-op Monitoring Plan: Standard ASA Monitors  Post Op Pain Control Plan: multimodal analgesia  Induction:  IV  Airway Plan: Direct, Video and Fiberoptic, Post-Induction  Informed Consent: Informed consent signed with the Patient and all parties understand the risks and agree with anesthesia plan.  All questions answered.   ASA Score: 3    Ready For Surgery From Anesthesia Perspective.     .

## 2023-11-15 NOTE — BRIEF OP NOTE
[facility] - Surgery  Brief Operative Note    Surgery Date: 11/15/2023     Surgeon(s) and Role:     * Jim Chavez MD - Primary    Assisting Surgeon: None    Pre-op Diagnosis:  History of colon cancer [Z85.038]    Post-op Diagnosis:  Post-Op Diagnosis Codes:     * History of colon cancer [Z85.038]    Procedure(s) (LRB):  [id number] (Right)    Anesthesia: General    Operative Findings: Normal venous anatomy.  Right subclavian port placed without event    Estimated Blood Loss: 10 cc's           Specimens:   Specimen (24h ago, onward)      None              Discharge Note    OUTCOME: Patient tolerated treatment/procedure well without complication and is now ready for discharge.    DISPOSITION: Home or Self Care    FINAL DIAGNOSIS:  History of colon cancer    FOLLOWUP: In clinic    DISCHARGE INSTRUCTIONS:    Discharge Procedure Orders   Diet general     Call MD for:  extreme fatigue     Call MD for:  persistent dizziness or light-headedness     Call MD for:  hives     Call MD for:  redness, tenderness, or signs of infection (pain, swelling, redness, odor or green/yellow discharge around incision site)     Call MD for:  difficulty breathing, headache or visual disturbances     Call MD for:  persistent nausea and vomiting     Call MD for:  temperature >100.4     Call MD for:  severe uncontrolled pain     Remove dressing in 48 hours     Activity as tolerated

## 2023-11-15 NOTE — DISCHARGE INSTRUCTIONS
WOUND CARE    After Surgery    DO:  May shower in 48 hours.  Minimal activity for 48 hours.  May remove outer dressing in 48 hours. If steri-strips are present, leave them in place. If they get wet, pat them dry. Steri-strips will fall off on their own. Do not pull them off.  Advance diet as tolerated.  Resume home medications as prescribed.    DO NOT:  Do not soak in a tub or pool until directed by your physician.  Do not drive for 24 hours or while taking narcotic pain medication.  Do not take additional tylenol/acetaminophen while taking narcotic pain medication that contains tylenol/acetaminophen.     CALL PHYSICIAN FOR:  Redness, swelling or bleeding.  Fever greater than 101.  Drainage from the incision site that appears infected.  Persistent pain not relieved by pain medication.    RETURN APPOINTMENT: Call to schedule appointment in 10-14 days.    FOR EMERGENCIES: Contact your doctor at [phone].

## 2023-11-15 NOTE — OP NOTE
Date of procedure:  November 15, 2023     Staff surgeon:  Dr. Jim Chavez     Preoperative diagnosis:  Colon cancer     Postoperative diagnosis:  The same     Procedure:  Placement of right subclavian Port-A-Cath     Anesthesia:  General via LMA    Indication for procedure:  83-year-old female with recently diagnosed recurrent colon cancer.  She is in need of chemotherapy.  She was referred for port placement.    Description procedure:   Following signing of informed consent patient was taken to the operating room and placed in supine position.  SCDs were placed. Monitored anesthesia was administered without event.  Both arms were tucked and shoulder roll was placed. The patient was prepped and draped in standard fashion. An appropriate time-out procedure was then performed. Patient is placed in Trendelenburg. I obtained access to the Right subclavian vein with one percutaneous stick.  Guidewire was placed and position confirmed with fluoroscopy.  Next I make an incision incorporating the insertion site into my incision. I next took the tunneling sheath and dilator passed over the guidewire under fluoroscopic visualization. I removed the guidewire and dilator.  I next passed the catheter through the tunneling sheath and removed the tunneling sheath.  I used fluoroscopy to position the tip of the catheter such that the proximal tip is at the confluence of the subclavian vein. I then cut the distal tip of the catheter.  I secured the catheter to the port. I secure the port down to the chest wall with 2 Vicryl suture. Skin incision closed with 3 0 Vicryl and 4 0 Monocryl.  Patient was awakened taken recovery room stable condition there were no immediate complications blood loss 10 cc.  Port was aspirated and flushed easily.  POrt is ok to use if CxR is without complication.

## 2023-11-15 NOTE — ANESTHESIA PROCEDURE NOTES
LMA insertion    Date/Time: 11/15/2023 10:07 AM    Performed by: Analy Price CRNA  Authorized by: Quintin Hsieh MD    Intubation:     Induction:  Intravenous    Intubated:  Postinduction    Mask Ventilation:  N/a    Attempts:  1    Attempted By:  CRNA    Difficult Airway Encountered?: No      Complications:  None    Airway Device:  Supraglottic airway/LMA    Airway Device Size:  3.0    Style/Cuff Inflation:  Cuffed (inflated to minimal occlusive pressure)    Inflation Amount (mL):  10    Secured at:  The lips    Placement Verified By:  Capnometry    Complicating Factors:  None    Findings Post-Intubation:  BS equal bilateral and atraumatic/condition of teeth unchanged

## 2023-11-15 NOTE — TRANSFER OF CARE
"Anesthesia Transfer of Care Note    Patient: Danna Sorensen    Procedure(s) Performed: Procedure(s) (LRB):  [id number] (Right)    Patient location: PACU    Anesthesia Type: general    Transport from OR: Transported from OR on 2-3 L/min O2 by NC with adequate spontaneous ventilation    Post pain: adequate analgesia    Post assessment: no apparent anesthetic complications    Post vital signs: stable    Level of consciousness: awake and sedated    Nausea/Vomiting: no nausea/vomiting    Complications: none    Transfer of care protocol was followed      Last vitals: Visit Vitals  BP (!) 153/68 (BP Location: Right arm, Patient Position: Sitting)   Pulse 75   Temp 36.2 °C (97.2 °F) (Skin)   Resp 18   Ht 5' 4" (1.626 m)   Wt 89.8 kg (198 lb)   SpO2 97%   Breastfeeding No   BMI 33.99 kg/m²     "

## 2023-11-16 VITALS
RESPIRATION RATE: 16 BRPM | OXYGEN SATURATION: 98 % | BODY MASS INDEX: 33.8 KG/M2 | TEMPERATURE: 97 F | HEIGHT: 64 IN | DIASTOLIC BLOOD PRESSURE: 72 MMHG | WEIGHT: 198 LBS | SYSTOLIC BLOOD PRESSURE: 134 MMHG | HEART RATE: 68 BPM

## 2023-11-16 NOTE — ANESTHESIA POSTPROCEDURE EVALUATION
Anesthesia Post Evaluation    Patient: Danna Sorensen    Procedure(s) Performed: Procedure(s) (LRB):  [id number] (Right)    Final Anesthesia Type: general      Patient location during evaluation: PACU  Patient participation: Yes- Able to Participate  Level of consciousness: awake and alert and oriented  Post-procedure vital signs: reviewed and stable  Pain management: adequate  Airway patency: patent  RAÚL mitigation strategies: Multimodal analgesia  PONV status at discharge: No PONV  Anesthetic complications: no      Cardiovascular status: blood pressure returned to baseline  Respiratory status: unassisted, spontaneous ventilation and room air  Hydration status: euvolemic  Follow-up not needed.          Vitals Value Taken Time   /72 11/15/23 1145   Temp 36.3 °C (97.3 °F) 11/15/23 1052   Pulse 68 11/15/23 1145   Resp 16 11/15/23 1145   SpO2 98 % 11/15/23 1145         Event Time   Out of Recovery 10:59:00         Pain/Ronda Score: Ronda Score: 10 (11/15/2023 11:55 AM)

## 2023-11-17 ENCOUNTER — OFFICE VISIT (OUTPATIENT)
Dept: HEMATOLOGY/ONCOLOGY | Facility: CLINIC | Age: 83
End: 2023-11-17
Payer: MEDICARE

## 2023-11-17 ENCOUNTER — LAB VISIT (OUTPATIENT)
Dept: LAB | Facility: HOSPITAL | Age: 83
End: 2023-11-17
Attending: NURSE PRACTITIONER
Payer: MEDICARE

## 2023-11-17 VITALS
HEIGHT: 64 IN | BODY MASS INDEX: 34.26 KG/M2 | SYSTOLIC BLOOD PRESSURE: 118 MMHG | DIASTOLIC BLOOD PRESSURE: 70 MMHG | HEART RATE: 52 BPM | TEMPERATURE: 98 F | WEIGHT: 200.69 LBS | OXYGEN SATURATION: 100 %

## 2023-11-17 DIAGNOSIS — C18.5 MALIGNANT NEOPLASM OF SPLENIC FLEXURE: ICD-10-CM

## 2023-11-17 DIAGNOSIS — R53.83 OTHER FATIGUE: ICD-10-CM

## 2023-11-17 DIAGNOSIS — G47.00 INSOMNIA, UNSPECIFIED TYPE: ICD-10-CM

## 2023-11-17 DIAGNOSIS — M54.50 CHRONIC RIGHT-SIDED LOW BACK PAIN WITHOUT SCIATICA: Primary | ICD-10-CM

## 2023-11-17 DIAGNOSIS — E78.5 HYPERLIPIDEMIA, UNSPECIFIED HYPERLIPIDEMIA TYPE: ICD-10-CM

## 2023-11-17 DIAGNOSIS — G89.29 CHRONIC RIGHT-SIDED LOW BACK PAIN WITHOUT SCIATICA: Primary | ICD-10-CM

## 2023-11-17 DIAGNOSIS — R06.02 SOB (SHORTNESS OF BREATH): ICD-10-CM

## 2023-11-17 DIAGNOSIS — M54.59 OTHER LOW BACK PAIN: ICD-10-CM

## 2023-11-17 DIAGNOSIS — E03.4 HYPOTHYROIDISM DUE TO ACQUIRED ATROPHY OF THYROID: ICD-10-CM

## 2023-11-17 DIAGNOSIS — D64.9 NORMOCYTIC ANEMIA: ICD-10-CM

## 2023-11-17 LAB
ALBUMIN SERPL BCP-MCNC: 3.6 G/DL (ref 3.5–5.2)
ALP SERPL-CCNC: 41 U/L (ref 55–135)
ALT SERPL W/O P-5'-P-CCNC: 9 U/L (ref 10–44)
ANION GAP SERPL CALC-SCNC: 8 MMOL/L (ref 8–16)
AST SERPL-CCNC: 11 U/L (ref 10–40)
BASOPHILS # BLD AUTO: 0.03 K/UL (ref 0–0.2)
BASOPHILS NFR BLD: 0.5 % (ref 0–1.9)
BILIRUB SERPL-MCNC: 0.6 MG/DL (ref 0.1–1)
BNP SERPL-MCNC: 144 PG/ML (ref 0–99)
BUN SERPL-MCNC: 21 MG/DL (ref 8–23)
CALCIUM SERPL-MCNC: 8.6 MG/DL (ref 8.7–10.5)
CHLORIDE SERPL-SCNC: 115 MMOL/L (ref 95–110)
CHOLEST SERPL-MCNC: 148 MG/DL (ref 120–199)
CHOLEST/HDLC SERPL: 2.6 {RATIO} (ref 2–5)
CO2 SERPL-SCNC: 18 MMOL/L (ref 23–29)
CREAT SERPL-MCNC: 0.8 MG/DL (ref 0.5–1.4)
D DIMER PPP IA.FEU-MCNC: 0.88 MG/L FEU
DIFFERENTIAL METHOD: ABNORMAL
EOSINOPHIL # BLD AUTO: 0.1 K/UL (ref 0–0.5)
EOSINOPHIL NFR BLD: 1.4 % (ref 0–8)
ERYTHROCYTE [DISTWIDTH] IN BLOOD BY AUTOMATED COUNT: 16.5 % (ref 11.5–14.5)
EST. GFR  (NO RACE VARIABLE): >60 ML/MIN/1.73 M^2
FERRITIN SERPL-MCNC: 16 NG/ML (ref 20–300)
GLUCOSE SERPL-MCNC: 88 MG/DL (ref 70–110)
HCT VFR BLD AUTO: 29.3 % (ref 37–48.5)
HDLC SERPL-MCNC: 58 MG/DL (ref 40–75)
HDLC SERPL: 39.2 % (ref 20–50)
HGB BLD-MCNC: 9.2 G/DL (ref 12–16)
IMM GRANULOCYTES # BLD AUTO: 0.04 K/UL (ref 0–0.04)
IMM GRANULOCYTES NFR BLD AUTO: 0.7 % (ref 0–0.5)
IRON SERPL-MCNC: 41 UG/DL (ref 30–160)
LDLC SERPL CALC-MCNC: 75.8 MG/DL (ref 63–159)
LYMPHOCYTES # BLD AUTO: 1.5 K/UL (ref 1–4.8)
LYMPHOCYTES NFR BLD: 26.4 % (ref 18–48)
MCH RBC QN AUTO: 28.4 PG (ref 27–31)
MCHC RBC AUTO-ENTMCNC: 31.4 G/DL (ref 32–36)
MCV RBC AUTO: 90 FL (ref 82–98)
MONOCYTES # BLD AUTO: 0.5 K/UL (ref 0.3–1)
MONOCYTES NFR BLD: 9.6 % (ref 4–15)
NEUTROPHILS # BLD AUTO: 3.4 K/UL (ref 1.8–7.7)
NEUTROPHILS NFR BLD: 61.4 % (ref 38–73)
NONHDLC SERPL-MCNC: 90 MG/DL
NRBC BLD-RTO: 0 /100 WBC
PLATELET # BLD AUTO: 233 K/UL (ref 150–450)
PMV BLD AUTO: 9.6 FL (ref 9.2–12.9)
POTASSIUM SERPL-SCNC: 4.2 MMOL/L (ref 3.5–5.1)
PROT SERPL-MCNC: 6.4 G/DL (ref 6–8.4)
RBC # BLD AUTO: 3.24 M/UL (ref 4–5.4)
SATURATED IRON: 10 % (ref 20–50)
SODIUM SERPL-SCNC: 141 MMOL/L (ref 136–145)
T4 FREE SERPL-MCNC: 0.87 NG/DL (ref 0.71–1.51)
TOTAL IRON BINDING CAPACITY: 423 UG/DL (ref 250–450)
TRANSFERRIN SERPL-MCNC: 286 MG/DL (ref 200–375)
TRIGL SERPL-MCNC: 71 MG/DL (ref 30–150)
TSH SERPL DL<=0.005 MIU/L-ACNC: 6.21 UIU/ML (ref 0.4–4)
WBC # BLD AUTO: 5.6 K/UL (ref 3.9–12.7)

## 2023-11-17 PROCEDURE — 85025 COMPLETE CBC W/AUTO DIFF WBC: CPT | Mod: PN | Performed by: FAMILY MEDICINE

## 2023-11-17 PROCEDURE — 99999 PR PBB SHADOW E&M-EST. PATIENT-LVL IV: ICD-10-PCS | Mod: PBBFAC,,, | Performed by: NURSE PRACTITIONER

## 2023-11-17 PROCEDURE — 1126F PR PAIN SEVERITY QUANTIFIED, NO PAIN PRESENT: ICD-10-PCS | Mod: CPTII,S$GLB,, | Performed by: NURSE PRACTITIONER

## 2023-11-17 PROCEDURE — 3288F FALL RISK ASSESSMENT DOCD: CPT | Mod: CPTII,S$GLB,, | Performed by: NURSE PRACTITIONER

## 2023-11-17 PROCEDURE — 1126F AMNT PAIN NOTED NONE PRSNT: CPT | Mod: CPTII,S$GLB,, | Performed by: NURSE PRACTITIONER

## 2023-11-17 PROCEDURE — 3074F SYST BP LT 130 MM HG: CPT | Mod: CPTII,S$GLB,, | Performed by: NURSE PRACTITIONER

## 2023-11-17 PROCEDURE — 83540 ASSAY OF IRON: CPT | Performed by: FAMILY MEDICINE

## 2023-11-17 PROCEDURE — 85379 FIBRIN DEGRADATION QUANT: CPT | Performed by: FAMILY MEDICINE

## 2023-11-17 PROCEDURE — 84466 ASSAY OF TRANSFERRIN: CPT | Performed by: FAMILY MEDICINE

## 2023-11-17 PROCEDURE — 36415 COLL VENOUS BLD VENIPUNCTURE: CPT | Mod: PN | Performed by: FAMILY MEDICINE

## 2023-11-17 PROCEDURE — 3078F DIAST BP <80 MM HG: CPT | Mod: CPTII,S$GLB,, | Performed by: NURSE PRACTITIONER

## 2023-11-17 PROCEDURE — 80061 LIPID PANEL: CPT | Performed by: FAMILY MEDICINE

## 2023-11-17 PROCEDURE — 83880 ASSAY OF NATRIURETIC PEPTIDE: CPT | Performed by: FAMILY MEDICINE

## 2023-11-17 PROCEDURE — 99999 PR PBB SHADOW E&M-EST. PATIENT-LVL IV: CPT | Mod: PBBFAC,,, | Performed by: NURSE PRACTITIONER

## 2023-11-17 PROCEDURE — 1159F PR MEDICATION LIST DOCUMENTED IN MEDICAL RECORD: ICD-10-PCS | Mod: CPTII,S$GLB,, | Performed by: NURSE PRACTITIONER

## 2023-11-17 PROCEDURE — 1160F RVW MEDS BY RX/DR IN RCRD: CPT | Mod: CPTII,S$GLB,, | Performed by: NURSE PRACTITIONER

## 2023-11-17 PROCEDURE — 3078F PR MOST RECENT DIASTOLIC BLOOD PRESSURE < 80 MM HG: ICD-10-PCS | Mod: CPTII,S$GLB,, | Performed by: NURSE PRACTITIONER

## 2023-11-17 PROCEDURE — 82728 ASSAY OF FERRITIN: CPT | Performed by: FAMILY MEDICINE

## 2023-11-17 PROCEDURE — 1159F MED LIST DOCD IN RCRD: CPT | Mod: CPTII,S$GLB,, | Performed by: NURSE PRACTITIONER

## 2023-11-17 PROCEDURE — 1101F PT FALLS ASSESS-DOCD LE1/YR: CPT | Mod: CPTII,S$GLB,, | Performed by: NURSE PRACTITIONER

## 2023-11-17 PROCEDURE — 84443 ASSAY THYROID STIM HORMONE: CPT | Performed by: FAMILY MEDICINE

## 2023-11-17 PROCEDURE — 1160F PR REVIEW ALL MEDS BY PRESCRIBER/CLIN PHARMACIST DOCUMENTED: ICD-10-PCS | Mod: CPTII,S$GLB,, | Performed by: NURSE PRACTITIONER

## 2023-11-17 PROCEDURE — 84439 ASSAY OF FREE THYROXINE: CPT | Performed by: FAMILY MEDICINE

## 2023-11-17 PROCEDURE — 99215 OFFICE O/P EST HI 40 MIN: CPT | Mod: S$GLB,,, | Performed by: NURSE PRACTITIONER

## 2023-11-17 PROCEDURE — 99215 PR OFFICE/OUTPT VISIT, EST, LEVL V, 40-54 MIN: ICD-10-PCS | Mod: S$GLB,,, | Performed by: NURSE PRACTITIONER

## 2023-11-17 PROCEDURE — 1101F PR PT FALLS ASSESS DOC 0-1 FALLS W/OUT INJ PAST YR: ICD-10-PCS | Mod: CPTII,S$GLB,, | Performed by: NURSE PRACTITIONER

## 2023-11-17 PROCEDURE — 3074F PR MOST RECENT SYSTOLIC BLOOD PRESSURE < 130 MM HG: ICD-10-PCS | Mod: CPTII,S$GLB,, | Performed by: NURSE PRACTITIONER

## 2023-11-17 PROCEDURE — 3288F PR FALLS RISK ASSESSMENT DOCUMENTED: ICD-10-PCS | Mod: CPTII,S$GLB,, | Performed by: NURSE PRACTITIONER

## 2023-11-17 PROCEDURE — 80053 COMPREHEN METABOLIC PANEL: CPT | Mod: PN | Performed by: FAMILY MEDICINE

## 2023-11-17 RX ORDER — TRAZODONE HYDROCHLORIDE 50 MG/1
50 TABLET ORAL NIGHTLY
Qty: 90 TABLET | Refills: 0 | Status: SHIPPED | OUTPATIENT
Start: 2023-11-17 | End: 2024-11-16

## 2023-11-17 NOTE — PROGRESS NOTES
Danna Sorensen  83 y.o. is here to seek an integrative approach to discuss side effects related to colon cancer treatment. Danna Sorensen  was referred by Dr. Cameron     HPI  Mrs. Sorensen is here today with her niece Barbie. She had treatment in [id number] for colon cancer. She states she has been cancer free until now when she was diagnosed again with colon cancer. She reports she does not sleep well. She will sleep 2 for 2 hours and then wake up for 2 hours and she does this through the night. Her poor sleep is not new as she has not slept well previously. She has a good appetite and eats a lot of vegetables. She states she eats more vegetables than meat. She states she does not feel stressed, but she does worry. She states her activity level is low since her  . She was diagnosed with arthritis when she was 21 years old so she has a long history of chronic low back pain and joint pain.    She is a , her   in April. She lives alone and her niece Barbie lives with her. She has a good support system       7 pillars Assessment    Sleep  How many hours of sleep per night? 4 hours  Do you have trouble falling asleep, staying asleep or waking up earlier than you need to? no  Do you have daytime fatigue? yes  Do you need medication for sleep? no  Do you use any supplements or other interventions for sleep? no    Resilience  Rate your current level of stress- low    Purpose  Do you feel you have a vision or a life purpose? Yes    Environment  Any exposures: Worked in a building with asbestos    Spirituality-  P[employer]ism    Nutrition   Food allergies or sensitivities: no  Do you adhere to a particular type of diet? no  Do you have any concerns with your eating habits? no    Exercise  How would you describe your physical activity level? 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    Strabismus     Trouble in sleeping     Wears dentures     Uppers      Past Surgical History   Past Surgical History:   Procedure Laterality Date    CARDIAC SURGERY      cardiac cath    COLON SURGERY      colon resection    COLONOSCOPY N/A 10/18/2016    Procedure: COLONOSCOPY;  Surgeon: Rell Cordova MD;  Location: [facility];  Service: Endoscopy;  Laterality: N/A;    COLONOSCOPY N/A 8/8/2023    Procedure: COLONOSCOPY;  Surgeon: Kaushik Pinon MD;  Location: [facility];  Service: Endoscopy;  Laterality: N/A;    COLONOSCOPY N/A 8/22/2023    Procedure: COLONOSCOPY (tattoo of colon ca);  Surgeon: Kaushik Pinon MD;  Location: [facility];  Service: Endoscopy;  Laterality: N/A;    ESOPHAGOGASTRODUODENOSCOPY N/A 8/3/2023    Procedure: EGD (ESOPHAGOGASTRODUODENOSCOPY);  Surgeon: Kaushik Pinon MD;  Location: [facility];  Service: Endoscopy;  Laterality: N/A;    HAND TENDON SURGERY Left     HEMICOLECTOMY      right    INJECTION OF ANESTHETIC AGENT AROUND MEDIAL BRANCH NERVES INNERVATING LUMBAR FACET JOINT Right 07/10/2018    Procedure: Block-nerve-medial branch-lumbar;  Surgeon: Parish Gaitan MD;  [facility];  Service: Pain Management;  Laterality: Right;  L3, 4, 5    INSERTION OF INFERIOR VENA CAVAL FILTER N/A 9/13/2023    Procedure: Insertion, Filter, Inferior Vena Cava;  Surgeon: Oren Verdin MD;  Location: [facility] CATH/EP LAB;  Service: General;  Laterality: N/A;    INSERTION OF TUNNELED CENTRAL VENOUS CATHETER (CVC) WITH SUBCUTANEOUS  "PORT Right 11/15/2023    Procedure: [id number];  Surgeon: Jim Chavez MD;  Location: [facility] OR;  Service: General;  Laterality: Right;    JOINT REPLACEMENT      bilateral    RADIOFREQUENCY ABLATION OF LUMBAR MEDIAL BRANCH NERVE AT SINGLE LEVEL Right 07/24/2018    Procedure: RADIOFREQUENCY ABLATION, NERVE, MEDIAL BRANCH, LUMBAR, 1 LEVEL;  Surgeon: Parish Gaitan MD;  Location: [facility] OR;  Service: Pain Management;  Laterality: Right;  L3,4,5 - Burned at 80 degrees C. for 75 seconds x 2 each site    ROBOT-ASSISTED COLECTOMY N/A 9/29/2023    Procedure: ROBOTIC COLECTOMY;  Surgeon: Jim Chavez MD;  Location: [facility] OR;  Service: General;  Laterality: N/A;    SHOULDER ARTHROSCOPY Right 01/12/2017    Reverse     TONSILLECTOMY      TONSILLECTOMY, ADENOIDECTOMY, BILATERAL MYRINGOTOMY AND TUBES      TOTAL KNEE ARTHROPLASTY Bilateral 08/1998    total replacements    TUMOR REMOVAL Left     left foot as a child      Family History   Family History   Problem Relation Age of Onset    Hypertension Mother     Kidney disease Mother     Cataracts Father     Cataracts Sister     Cancer Sister         breast    No Known Problems Brother     Cancer Sister         breast    Arthritis Sister     Glaucoma Neg Hx     Amblyopia Neg Hx     Blindness Neg Hx     Macular degeneration Neg Hx     Retinal detachment Neg Hx     Strabismus Neg Hx     Stroke Neg Hx     Thyroid disease Neg Hx       Allergies  Review of patient's allergies indicates:   Allergen Reactions    Aspirin      Other reaction(s): Stomach upset    Aspirin Other (See Comments)     Other reaction(s): Stomach upset    Gabapentin Other (See Comments)     Pt states she felt "funny" when she took the medication. Especially at the higher dose.    Mobic [meloxicam] Swelling     Edema and elevated blood pressure     Oxaliplatin Other (See Comments)     Other reaction(s): Joint pain  Other reaction(s): Itching  Other reaction(s): Hives  Other reaction(s): Joint pain  Other " reaction(s): Itching  Other reaction(s): Hives    Pregabalin Other (See Comments)     Side effects  Side effects      Current Medications:    Current Outpatient Medications:     acetaminophen (TYLENOL) 650 MG TbSR, Take 650 mg by mouth every 8 (eight) hours., Disp: , Rfl:     alendronate (FOSAMAX) 70 MG tablet, TAKE 1 TABLET(70 MG) BY MOUTH EVERY 7 DAYS, Disp: 12 tablet, Rfl: 3    apixaban (ELIQUIS DVT-PE TREAT 30D START) 5 mg (74 tabs) DsPk, For the first 7 days take two 5 mg tablets twice daily.  After 7 days take one 5 mg tablet twice daily., Disp: 74 tablet, Rfl: 1    cyclobenzaprine (FLEXERIL) 5 MG tablet, Take 1 tablet (5 mg total) by mouth 3 (three) times daily as needed for Muscle spasms. (Patient not taking: Reported on 11/14/2023), Disp: 90 tablet, Rfl: 0    enoxaparin sodium (LOVENOX SUBQ), Inject into the skin., Disp: , Rfl:     ergocalciferol, vitamin D2, (VITAMIN D ORAL), Take 2,000 mg by mouth once daily., Disp: , Rfl:     furosemide (LASIX) 20 MG tablet, Take 1 tablet (20 mg total) by mouth daily as needed (edema)., Disp: 90 tablet, Rfl: 3    HYDROcodone-acetaminophen (NORCO) 5-325 mg per tablet, Take 1 tablet by mouth every 6 (six) hours as needed for Pain., Disp: 20 tablet, Rfl: 0    hydrOXYzine HCL (ATARAX) 25 MG tablet, Take 1 tablet (25 mg total) by mouth 3 (three) times daily as needed for Anxiety (insomnia). (Patient not taking: Reported on 11/14/2023), Disp: 30 tablet, Rfl: PRN    levocetirizine (XYZAL) 5 MG tablet, Take 1 tablet (5 mg total) by mouth nightly as needed for Allergies (allergies)., Disp: 90 tablet, Rfl: PRN    levothyroxine (SYNTHROID) 75 MCG tablet, Take 1 tablet (75 mcg total) by mouth once daily., Disp: 90 tablet, Rfl: 3    LIDOcaine-prilocaine (EMLA) cream, Apply topically as needed (Chemo). (Patient not taking: Reported on 11/8/2023), Disp: 30 g, Rfl: 0    lisinopriL 10 MG tablet, Take 1 tablet (10 mg total) by mouth once daily., Disp: 90 tablet, Rfl: 3    multivitamin  "capsule, Take 1 capsule by mouth once daily., Disp: , Rfl:     nystatin (MYCOSTATIN) cream, Apply topically 2 (two) times daily as needed. GRETCHEN EXT AA BID (Patient not taking: Reported on 11/8/2023), Disp: 30 g, Rfl: prn    omeprazole (PRILOSEC) 40 MG capsule, Take 1 capsule (40 mg total) by mouth 2 (two) times daily before meals., Disp: 180 capsule, Rfl: PRN    ondansetron (ZOFRAN) 8 MG tablet, Take 1 tablet (8 mg total) by mouth every 8 (eight) hours as needed for Nausea. (Patient not taking: Reported on 11/8/2023), Disp: 20 tablet, Rfl: 1    traMADoL (ULTRAM) 50 mg tablet, Take 1 tablet (50 mg total) by mouth every 8 (eight) hours as needed for Pain., Disp: 21 each, Rfl: 0    triamcinolone acetonide 0.1% (KENALOG) 0.1 % cream, APPLY EXTERNALLY TO THE AFFECTED AREA TWICE DAILY (Patient not taking: Reported on 10/17/2023), Disp: 60 g, Rfl: 0  No current facility-administered medications for this visit.    Facility-Administered Medications Ordered in Other Visits:     0.9%  NaCl infusion, , Intravenous, Once, Oren Verdin MD     Review of Systems  Review of Systems   Constitutional:  Positive for malaise/fatigue.   HENT: Negative.     Eyes: Negative.    Respiratory: Negative.     Cardiovascular: Negative.    Gastrointestinal: Negative.    Genitourinary: Negative.    Musculoskeletal:  Positive for back pain and joint pain.   Skin: Negative.    Neurological: Negative.    Endo/Heme/Allergies: Negative.    Psychiatric/Behavioral:  The patient is nervous/anxious and has insomnia.         Physical Exam      Vitals:    11/17/23 0815   BP: 118/70   Pulse: (!) 52   Temp: 97.6 °F (36.4 °C)   TempSrc: Temporal   SpO2: 100%   Weight: 91 kg (200 lb 11.2 oz)   Height: 5' 4" (1.626 m)     Body mass index is 34.45 kg/m².  Physical Exam  Vitals reviewed.   Constitutional:       Appearance: Normal appearance.   Neurological:      Mental Status: She is alert.   Psychiatric:         Mood and Affect: Mood normal.         Behavior: " Behavior normal.        ASSESSMENT :  1. Chronic right-sided low back pain without sciatica    2. Insomnia, unspecified type    3. Other fatigue    4. Other low back pain    5. Malignant neoplasm of splenic flexure      PLAN:  Reviewed all information discussed at today's visit and all questions were answered.    Counseled on healthy lifestyle and behavior modifications   Referral to Acupuncture  I explained acupuncture can reduce fatigue and pain. It can also assist with behavioral health such as depression and anxiety and improve overall sleep quality. Acupuncture helps improve overall symptoms from treatment.   See nutrition during treatment as needed  Counseled on sleep hygiene: recommended insight timer.   Trazodone 50 mg nightly as needed for sleep  I discussed and recommended the following support services:  Jack Chi and Yoga I suggested Jack Chi and/or Yoga as these practices reduce stress, increases flexibility and muscle strength, improves balance and promotes serenity in the power of movement to help fight disease and boost your immune system.   Music and relaxation therapy and Meditation which can decrease stress by lowering blood pressure, slowing breathing, and helping you be more present in the moment. It improves sleep by relaxing the body and mind at the end of the day.Meditation also trains you how to focus on one thing at a time, improving concentration. It also promotes emotional well-being by decreasing depression and anxiety, and helping create a more positive outlook on life.    Follow up with [facility] in 1 month, chairside     I spent a total of 49 minutes on the day of the visit.This includes face to face time and non-face to face time preparing to see the patient (eg, review of tests), obtaining and/or reviewing separately obtained history, documenting clinical information in the electronic or other health record, independently interpreting results and communicating results to the  patient/family/caregiver, or care coordinator.

## 2023-11-20 ENCOUNTER — TELEPHONE (OUTPATIENT)
Dept: HEMATOLOGY/ONCOLOGY | Facility: CLINIC | Age: 83
End: 2023-11-20
Payer: MEDICARE

## 2023-11-20 ENCOUNTER — HOSPITAL ENCOUNTER (OUTPATIENT)
Dept: RADIOLOGY | Facility: HOSPITAL | Age: 83
Discharge: HOME | End: 2023-11-20
Attending: NURSE PRACTITIONER
Payer: MEDICARE

## 2023-11-20 ENCOUNTER — TELEPHONE (OUTPATIENT)
Dept: FAMILY MEDICINE | Facility: CLINIC | Age: 83
End: 2023-11-20
Payer: MEDICARE

## 2023-11-20 DIAGNOSIS — N28.89 RENAL MASS: ICD-10-CM

## 2023-11-20 DIAGNOSIS — R07.9 CHEST PAIN, UNSPECIFIED TYPE: Primary | ICD-10-CM

## 2023-11-20 PROCEDURE — 74183 MRI ABDOMEN W WO CONTRAST: ICD-10-PCS | Mod: 26,,, | Performed by: RADIOLOGY

## 2023-11-20 PROCEDURE — 25500020 PHARM REV CODE 255

## 2023-11-20 PROCEDURE — A9585 GADOBUTROL INJECTION: HCPCS

## 2023-11-20 PROCEDURE — 74183 MRI ABD W/O CNTR FLWD CNTR: CPT | Mod: 26,,, | Performed by: RADIOLOGY

## 2023-11-20 PROCEDURE — 74183 MRI ABD W/O CNTR FLWD CNTR: CPT | Mod: TC

## 2023-11-20 RX ORDER — GADOBUTROL 604.72 MG/ML
INJECTION INTRAVENOUS
Status: COMPLETED
Start: 2023-11-20 | End: 2023-11-20

## 2023-11-20 RX ADMIN — GADOBUTROL 9 ML: 604.72 INJECTION INTRAVENOUS at 09:11

## 2023-11-20 NOTE — TELEPHONE ENCOUNTER
----- Message from Mahesh Cameron MD sent at 11/18/2023  3:28 PM CST -----  PT needs a follow ujp appt with me.

## 2023-11-20 NOTE — TELEPHONE ENCOUNTER
Returned patients call in regards to lab results. Provider would like to know if patient has shortness of breath , patient stated she does have SOB but she's had it for 5 + years. It does not bother her and has imaging and testing done to check it. Will forward message to provider for further assistance.

## 2023-11-20 NOTE — TELEPHONE ENCOUNTER
Called patient to scheduled her for her follow up, with Dr. Cameron, on 12/13/23. Pt verbalized understanding.

## 2023-11-21 ENCOUNTER — TELEPHONE (OUTPATIENT)
Dept: FAMILY MEDICINE | Facility: CLINIC | Age: 83
End: 2023-11-21
Payer: MEDICARE

## 2023-11-21 NOTE — TELEPHONE ENCOUNTER
Call to patient, read message and advised her to have a CT Scan asap, due to blood work elevation. Patient and niece state they are out of town until late tomorrow. States she is always short of breath and want to know if this is really needed. Please advise.

## 2023-11-22 ENCOUNTER — TELEPHONE (OUTPATIENT)
Dept: HEMATOLOGY/ONCOLOGY | Facility: CLINIC | Age: 83
End: 2023-11-22
Payer: MEDICARE

## 2023-11-22 DIAGNOSIS — C18.2 MALIGNANT NEOPLASM OF ASCENDING COLON: Primary | ICD-10-CM

## 2023-11-22 NOTE — NURSING
Spoke with oksana to reschedule education.  Niece agreed to 12/5 @ 9 am.  Discussed trying to schedule CV and start tx on 12/6, but will confirm with infusion and let patient know.  Niece confirmed and verbalized understanding.

## 2023-11-22 NOTE — TELEPHONE ENCOUNTER
I recommend she have CTA chest asap to exclude the possibility of a clot in the lungs.  If sx worsen then go to ED. Otherwise ok to wait until back in town to get done

## 2023-11-22 NOTE — TELEPHONE ENCOUNTER
Called patient to advise per Dr Kim      I recommend she have CTA chest asap to exclude the possibility of a clot in the lungs.  If sx worsen then go to ED. Otherwise ok to wait until back in town to get done       Verbalized understanding.

## 2023-11-24 ENCOUNTER — OFFICE VISIT (OUTPATIENT)
Dept: FAMILY MEDICINE | Facility: CLINIC | Age: 83
End: 2023-11-24
Payer: MEDICARE

## 2023-11-24 VITALS
TEMPERATURE: 97 F | OXYGEN SATURATION: 85 % | HEART RATE: 90 BPM | DIASTOLIC BLOOD PRESSURE: 70 MMHG | BODY MASS INDEX: 34.74 KG/M2 | SYSTOLIC BLOOD PRESSURE: 114 MMHG | WEIGHT: 203.5 LBS | HEIGHT: 64 IN

## 2023-11-24 DIAGNOSIS — I10 ESSENTIAL HYPERTENSION: ICD-10-CM

## 2023-11-24 DIAGNOSIS — M51.36 DDD (DEGENERATIVE DISC DISEASE), LUMBAR: ICD-10-CM

## 2023-11-24 DIAGNOSIS — L30.4 INTERTRIGO: ICD-10-CM

## 2023-11-24 DIAGNOSIS — E03.4 HYPOTHYROIDISM DUE TO ACQUIRED ATROPHY OF THYROID: ICD-10-CM

## 2023-11-24 DIAGNOSIS — I82.4Y3 DEEP VEIN THROMBOSIS (DVT) OF PROXIMAL VEIN OF BOTH LOWER EXTREMITIES, UNSPECIFIED CHRONICITY: ICD-10-CM

## 2023-11-24 DIAGNOSIS — C18.9 MALIGNANT NEOPLASM OF COLON, UNSPECIFIED PART OF COLON: Primary | ICD-10-CM

## 2023-11-24 DIAGNOSIS — D50.9 IRON DEFICIENCY ANEMIA, UNSPECIFIED IRON DEFICIENCY ANEMIA TYPE: ICD-10-CM

## 2023-11-24 DIAGNOSIS — Z23 FLU VACCINE NEED: ICD-10-CM

## 2023-11-24 DIAGNOSIS — Z95.828 S/P IVC FILTER: ICD-10-CM

## 2023-11-24 DIAGNOSIS — N28.89 LEFT RENAL MASS: ICD-10-CM

## 2023-11-24 DIAGNOSIS — Z95.828 PORT-A-CATH IN PLACE: ICD-10-CM

## 2023-11-24 DIAGNOSIS — E66.01 MORBID OBESITY WITH BMI OF 40.0-44.9, ADULT: ICD-10-CM

## 2023-11-24 DIAGNOSIS — R79.89 POSITIVE D DIMER: ICD-10-CM

## 2023-11-24 DIAGNOSIS — M81.0 OSTEOPOROSIS, UNSPECIFIED OSTEOPOROSIS TYPE, UNSPECIFIED PATHOLOGICAL FRACTURE PRESENCE: ICD-10-CM

## 2023-11-24 PROCEDURE — 3078F PR MOST RECENT DIASTOLIC BLOOD PRESSURE < 80 MM HG: ICD-10-PCS | Mod: CPTII,S$GLB,, | Performed by: FAMILY MEDICINE

## 2023-11-24 PROCEDURE — 1159F MED LIST DOCD IN RCRD: CPT | Mod: CPTII,S$GLB,, | Performed by: FAMILY MEDICINE

## 2023-11-24 PROCEDURE — 99999 PR PBB SHADOW E&M-EST. PATIENT-LVL IV: CPT | Mod: PBBFAC,,, | Performed by: FAMILY MEDICINE

## 2023-11-24 PROCEDURE — 3074F SYST BP LT 130 MM HG: CPT | Mod: CPTII,S$GLB,, | Performed by: FAMILY MEDICINE

## 2023-11-24 PROCEDURE — 1159F PR MEDICATION LIST DOCUMENTED IN MEDICAL RECORD: ICD-10-PCS | Mod: CPTII,S$GLB,, | Performed by: FAMILY MEDICINE

## 2023-11-24 PROCEDURE — 1160F PR REVIEW ALL MEDS BY PRESCRIBER/CLIN PHARMACIST DOCUMENTED: ICD-10-PCS | Mod: CPTII,S$GLB,, | Performed by: FAMILY MEDICINE

## 2023-11-24 PROCEDURE — G0008 FLU VACCINE - QUADRIVALENT - ADJUVANTED: ICD-10-PCS | Mod: S$GLB,,, | Performed by: FAMILY MEDICINE

## 2023-11-24 PROCEDURE — 1126F PR PAIN SEVERITY QUANTIFIED, NO PAIN PRESENT: ICD-10-PCS | Mod: CPTII,S$GLB,, | Performed by: FAMILY MEDICINE

## 2023-11-24 PROCEDURE — 99214 PR OFFICE/OUTPT VISIT, EST, LEVL IV, 30-39 MIN: ICD-10-PCS | Mod: S$GLB,,, | Performed by: FAMILY MEDICINE

## 2023-11-24 PROCEDURE — 90694 VACC AIIV4 NO PRSRV 0.5ML IM: CPT | Mod: S$GLB,,, | Performed by: FAMILY MEDICINE

## 2023-11-24 PROCEDURE — 99999 PR PBB SHADOW E&M-EST. PATIENT-LVL IV: ICD-10-PCS | Mod: PBBFAC,,, | Performed by: FAMILY MEDICINE

## 2023-11-24 PROCEDURE — 1160F RVW MEDS BY RX/DR IN RCRD: CPT | Mod: CPTII,S$GLB,, | Performed by: FAMILY MEDICINE

## 2023-11-24 PROCEDURE — 1101F PR PT FALLS ASSESS DOC 0-1 FALLS W/OUT INJ PAST YR: ICD-10-PCS | Mod: CPTII,S$GLB,, | Performed by: FAMILY MEDICINE

## 2023-11-24 PROCEDURE — 3288F FALL RISK ASSESSMENT DOCD: CPT | Mod: CPTII,S$GLB,, | Performed by: FAMILY MEDICINE

## 2023-11-24 PROCEDURE — 99214 OFFICE O/P EST MOD 30 MIN: CPT | Mod: S$GLB,,, | Performed by: FAMILY MEDICINE

## 2023-11-24 PROCEDURE — 1126F AMNT PAIN NOTED NONE PRSNT: CPT | Mod: CPTII,S$GLB,, | Performed by: FAMILY MEDICINE

## 2023-11-24 PROCEDURE — G0008 ADMIN INFLUENZA VIRUS VAC: HCPCS | Mod: S$GLB,,, | Performed by: FAMILY MEDICINE

## 2023-11-24 PROCEDURE — 3074F PR MOST RECENT SYSTOLIC BLOOD PRESSURE < 130 MM HG: ICD-10-PCS | Mod: CPTII,S$GLB,, | Performed by: FAMILY MEDICINE

## 2023-11-24 PROCEDURE — 90694 FLU VACCINE - QUADRIVALENT - ADJUVANTED: ICD-10-PCS | Mod: S$GLB,,, | Performed by: FAMILY MEDICINE

## 2023-11-24 PROCEDURE — 3288F PR FALLS RISK ASSESSMENT DOCUMENTED: ICD-10-PCS | Mod: CPTII,S$GLB,, | Performed by: FAMILY MEDICINE

## 2023-11-24 PROCEDURE — 1101F PT FALLS ASSESS-DOCD LE1/YR: CPT | Mod: CPTII,S$GLB,, | Performed by: FAMILY MEDICINE

## 2023-11-24 PROCEDURE — 3078F DIAST BP <80 MM HG: CPT | Mod: CPTII,S$GLB,, | Performed by: FAMILY MEDICINE

## 2023-11-24 RX ORDER — ALENDRONATE SODIUM 70 MG/1
70 TABLET ORAL
Qty: 12 TABLET | Refills: 3 | Status: SHIPPED | OUTPATIENT
Start: 2023-11-24

## 2023-11-24 NOTE — PROGRESS NOTES
Subjective:       Patient ID: Danna Sorensen is a 83 y.o. female.    Chief Complaint: Follow-up (6 month f/u)    HPI  Review of Systems   Constitutional:  Negative for fatigue and unexpected weight change.   Respiratory:  Negative for chest tightness and shortness of breath.    Cardiovascular:  Negative for chest pain, palpitations and leg swelling.   Gastrointestinal:  Negative for abdominal pain.   Musculoskeletal:  Negative for arthralgias.   Neurological:  Negative for dizziness, syncope, light-headedness and headaches.       Patient Active Problem List   Diagnosis    Hypothyroid    Nuclear sclerosis    Hyperopia with astigmatism and presbyopia    DDD (degenerative disc disease), lumbar    Morbid obesity with BMI of 40.0-44.9, adult    Calcified granuloma of lung    History of colon cancer    Lumbar radiculitis    Other spondylosis, lumbar region    Chronic right-sided low back pain without sciatica    Vitamin D deficiency    Essential hypertension    Malignant neoplasm of colon    Decreased functional mobility    Muscle tightness    Decreased strength    Decreased range of motion    Venous lake of lip    GERD (gastroesophageal reflux disease)    Adenocarcinoma    DVT (deep vein thrombosis) in pregnancy    History of pulmonary embolus (PE)    Malignant neoplasm of splenic flexure    Insomnia    Other fatigue    Other low back pain    S/P IVC filter    Left renal mass    Port-A-Cath in place     Patient is here for a chronic conditions follow up.    Reviewed labs 11/2023   Ferritin low 16, anemia  TSH high, t4 normal  D dimer and bnp elevated-CTA chest ordered.  Likely elevated chronically with no sign of worsening SOB over baseline    C/o trouble sleeping. Trazodone  mg no help. Told to try melatonin 5 mg     Urology NP O'Kristine left renal mass suspected malignancy. Mri showed benign findings    GI Surg Dr. Chavez and Surgery Dr. Evans colon cancer Heme/onc NP Viraj following for colon cancer s/p  resection and chemo 2007 with recent recurrence s/p extended robotic colectomy of the  transverse colon and splenic flexure with ileo colic anastomosis.  This was performed on September 29th, 2023 She did however have perineural and lymphovascular invasion.  As such she is been recommended to proceed with chemotherapy.   Also of note she does have a known suspected malignancy of the left kidney.    port-a-cath placement 11/15/23.  PET 11/2023 Patient is status post resection of colon carcinoma with no findings to indicate metastatic or recurrent disease.  Lung nodules, some of which are unchanged since 2007, benign, and others of which are new since 2007 but stable compared to 08/25/2023.  Continued CT follow-up is recommended.  Mass at the lower pole of the left kidney for which follow-up with MRI or multiphasic CT is recommended, please see comments above.       Vasc surg Dr. Verdin h/o DVT s/p IVC filter 9/2023 on eliquis       Here with niece Barbie  + Unintentional weight loss.  33 lbs down since 8/22 to lowest 4/23 206. Now 210. BP was  running low on lisinopril 30mg. Reduced to 10mg 4/2023  Lost  after hitting head after fall and brain bleed April 1, 2023.  Grieving. Anxiety and sadness. Interrupted sleep. Sx improving over time. Lives with sister and niece.  Has support at home. Denies depression      GI Dr. Pinon planning egd 8/3/23 and colonoscopy 8/8/23 . Having breakthrough heartburn sx even after starting omeprazole 40mg a day. Increased to bid 4/2023-has helped a lot        Seen by Dr. Urena  . Had side effects lyrica- dry mouth, swollen arms, stomach pain, nausea. Stopped it. Did not do well on gabapentin either. Rx for tramadol given     mild normocytic anemia-slightly improved. Iron, folate and b12 normal 5/22  Dexa 5/22 osteoporosis on fosomax 5/22     ENT Dr. Tolentino venous lake of lip     F/u LBP. Started on lyrica 50mg 2 bid-caused side effects-stopped. PT completed 2/22 not much  help. Flexeril prn . Tramadol 50mg #60 lasts 2 months or more for low back pain-helps but still waking up from sleep with pain.  Tylenol also helps Sees chiropractor Dr. Stearns. Has gradually worsening of lower back pain. Ache that radiates to right hip. No paresthesias. No B/B incontinence.  Gabapentin- nausea.  Tried PT, KHARI and radiofrequency ablation without relief.       Eye [facility] Dr. Berrios treating cataracts        Had mri brast 2018- high risk.  3/22 mri breast negative. Cannot tolerate mammo.   Objective:      Physical Exam  Vitals and nursing note reviewed.   Constitutional:       Appearance: She is well-developed.   Cardiovascular:      Rate and Rhythm: Normal rate and regular rhythm.      Heart sounds: Normal heart sounds.   Pulmonary:      Effort: Pulmonary effort is normal.      Breath sounds: Normal breath sounds.   Skin:     General: Skin is warm and dry.   Neurological:      Mental Status: She is alert and oriented to person, place, and time.         Assessment:       1. Malignant neoplasm of colon, unspecified part of colon    2. Flu vaccine need    3. Left renal mass    4. S/P IVC filter    5. Port-A-Cath in place    6. Positive D dimer    7. Hypothyroidism due to acquired atrophy of thyroid    8. DDD (degenerative disc disease), lumbar    9. Morbid obesity with BMI of 40.0-44.9, adult    10. Essential hypertension    11. Iron deficiency anemia, unspecified iron deficiency anemia type    12. Osteoporosis, unspecified osteoporosis type, unspecified pathological fracture presence    13. Intertrigo    14. Deep vein thrombosis (DVT) of proximal vein of both lower extremities, unspecified chronicity        Plan:         1. Malignant neoplasm of colon, unspecified part of colon  Cont onc monitoring    2. Flu vaccine need  Immunize today.  Counseled patient on risks, benefits and side effects.  Patient elected to proceed with vaccination.    - Influenza (FLUAD) - Quadrivalent (Adjuvanted) *Preferred*  "(65+) (PF)    3. 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 S/P IVC filter  Cont monitoring    5. Port-A-Cath in place  Cont use and care    6. Positive D dimer  Likely elevated due to DVT. No sign of PE or sx.  Hold CTA chest and monito    7. Hypothyroidism due to acquired atrophy of thyroid  Stable condition.  Continue current medications.  Will adjust based on lab findings or if condition changes.    - CBC Auto Differential; Future  - Comprehensive Metabolic Panel; Future  - TSH; Future  - T4, Free; Future    8. DDD (degenerative disc disease), lumbar  Cont current mgmt    9. Morbid obesity with BMI of 40.0-44.9, adult  Counseled patient on his ideal body weight, health consequences of being obese and current recommendations including weekly exercise and a heart healthy diet.  Current BMI is:Estimated body mass index is 34.93 kg/m² as calculated from the following:    Height as of this encounter: 5' 4" (1.626 m).    Weight as of this encounter: 92.3 kg (203 lb 7.8 oz)..  Patient is aware that ideal BMI < 25 or Weight in (lb) to have BMI = 25: 145.3.      10. Essential hypertension  Controlled on current medications.  Continue current medications.      11. Iron deficiency anemia, unspecified iron deficiency anemia type  Screen and treat as indicated:    - Ferritin; Future  - Iron and TIBC; Future    12. Osteoporosis, unspecified osteoporosis type, unspecified pathological fracture presence  Continue  I counseled the patient on risks versus benefits of bisphosphonate therapy including the risk of erosive esophagitis, and jaw osteonecrosis.  We discussed the proper method of taking the drug with a glass of water weekly with instructions to sit or stand for a minimum of an hour after ingestion.  I discussed the length of therapy to be 5 years and then stopping due to increased risk of femoral microfractures if extended.  I also " recommended informing his/her dentist of the addition of this medication and monitoring for symptoms of jaw pain while taking.  All questions were answered and patient elects to proceed with therapy.  Plan to repeat BMD 1 year.    - alendronate (FOSAMAX) 70 MG tablet; Take 1 tablet (70 mg total) by mouth every 7 days.  Dispense: 12 tablet; Refill: 3    13. Intertrigo  Treat prn    14. Deep vein thrombosis (DVT) of proximal vein of both lower extremities, unspecified chronicity  continue  - apixaban (ELIQUIS) 5 mg Tab; Take 1 tablet (5 mg total) by mouth 2 (two) times daily.  Dispense: 180 tablet; Refill: 3      Time spent with patient: 20 minutes    Patient with be reevaluated in 4 months or sooner prn    Greater than 50% of this visit was spent counseling as described in above documentation:Yes

## 2023-11-28 ENCOUNTER — EXTERNAL HOME HEALTH (OUTPATIENT)
Dept: HOME HEALTH SERVICES | Facility: HOSPITAL | Age: 83
End: 2023-11-28
Payer: MEDICARE

## 2023-11-29 ENCOUNTER — CLINICAL SUPPORT (OUTPATIENT)
Dept: REHABILITATION | Facility: HOSPITAL | Age: 83
End: 2023-11-29
Payer: MEDICARE

## 2023-11-29 DIAGNOSIS — M54.59 OTHER LOW BACK PAIN: ICD-10-CM

## 2023-11-29 DIAGNOSIS — M54.50 CHRONIC RIGHT-SIDED LOW BACK PAIN WITHOUT SCIATICA: ICD-10-CM

## 2023-11-29 DIAGNOSIS — G89.29 CHRONIC RIGHT-SIDED LOW BACK PAIN WITHOUT SCIATICA: ICD-10-CM

## 2023-11-29 PROCEDURE — 97814 ACUP 1/> W/ESTIM EA ADDL 15: CPT | Mod: PN | Performed by: ACUPUNCTURIST

## 2023-11-29 PROCEDURE — 97813 ACUP 1/> W/ESTIM 1ST 15 MIN: CPT | Mod: PN | Performed by: ACUPUNCTURIST

## 2023-11-29 NOTE — PROGRESS NOTES
"  Acupuncture Evaluation Note     Name: Danna Sorensen  Clinic Number: [id number]    Traditional Chinese Medicine (TCM) Diagnosis: Qi Stagnation, Blood Stasis, Qi Deficiency, Blood Deficiency, and Damp  Medical Diagnosis:   1. Chronic right-sided low back pain without sciatica    2. Other low back pain         Evaluation Date: 11/29/2023    Visit #/Visits authorized:     Precautions: Standard    Subjective     Chief Concern: Low-back Pain (Patient has a history of bulging discs and a pinched nerve in her right hip from the bulges.  Today she reports pain as 8/10)        Medical necessity is demonstrated by the following IMPAIRMENTS: Medical Necessity: Decreased mobility limits day to day activities, social, and emergent situations and Decreased quality of life              Aggravating Factors:  movement   Relieving Factors:  rest    Symptom Description:     Quality:  Aching, Dull, Burning, Throbbing, Grabbing, Deep, and Shooting  Severity:  8  Frequency:  every day    Previous Treatments Tried:  Injection(s), Medication, Imaging, Therapy , and Surgery      Digestion:     Diet: in general, a "healthy" diet     Fluids:  is drinking moderate amounts of fluids,       Sleep: 10/10 insomnia    Energy Levels:  4/10 fatigue    Psychological Symptoms:  3/10 stress and anxiety    Other Symptoms: 5/10 SOB    GYN Symptoms: 0/10 hot flashes    Objective     Observation: Patient has never had acupuncture she has a history of severe bulging discs in her lumbar spine on the left and a nerve impingement on the right.    Pulse:        wiry       New Findings:  na    Treatment     Treatment Principles:  move qi and blood, relieve bi,     Acupuncture points used:  Du20, Gb34, Sp6, Sp9, and St36    Bilateral points: UB 53-56  Unilateral points:  Auricular Treatment:  brewster men    Needles In: 19  Needles Out: 19  Needles W/ STIM placed: 935  Needles W/ STIM removed: 955      Other Traditional Chinese Medicine Modalities - " Cupping    Assessment     After treatment, patient felt less pain     Patient prognosis is Good.     Patient will continue to benefit from acupuncture treatment to address the deficits listed in the problem list box on initial evaluation, provide patient family education and to maximize pt's level of independence in the home and community environment.     Patient's spiritual, cultural and educational needs considered and pt agreeable to plan of care and goals.     Anticipated barriers to treatment: none    Plan     Recommend 2 /week for 6 sessions then re-assess.      Education:  Patient is aware of cumulative benefit of acupuncture

## 2023-11-30 ENCOUNTER — DOCUMENTATION ONLY (OUTPATIENT)
Dept: HEMATOLOGY/ONCOLOGY | Facility: CLINIC | Age: 83
End: 2023-11-30
Payer: MEDICARE

## 2023-12-01 NOTE — PROGRESS NOTES
"[facility] Note    PCP: Claudia Kim MD    Chief Complaint: renal mass    SUBJECTIVE:       History of Present Illness:  Danna Sorensen is a 83 y.o. female who presents to clinic for renal mass. She is Established  to our clinic.     Patient presents to discuss renal mass incidentally noted on imaging for colon cancer. This is a recurrence from 2007.  She underwent robotic colectomy with Dr. Chavez on 9/29/23 and is set to undergo adjuvant chemotherapy starting this week.     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     No issues with urination.   No gross hematuria.   No hx of recurrent UTIs.   No hx of kidney stones.     Last urine culture: no documented UTIs    Lab Results   Component Value Date    CREATININE 0.9 11/20/2023     Family  hx: no malignancy   Hx of HTN, GERD, colon cancer     Past medical, family, and social history reviewed as documented in chart with pertinent positive medical, family, and social history detailed in HPI.    Review of patient's allergies indicates:   Allergen Reactions    Aspirin      Other reaction(s): Stomach upset    Aspirin Other (See Comments)     Other reaction(s): Stomach upset    Gabapentin Other (See Comments)     Pt states she felt "funny" when she took the medication. Especially at the higher dose.    Mobic [meloxicam] Swelling     Edema and elevated blood pressure     Oxaliplatin Other (See Comments)     Other reaction(s): Joint pain  Other reaction(s): Itching  Other reaction(s): Hives  Other reaction(s): Joint pain  Other reaction(s): Itching  Other reaction(s): Hives    Pregabalin Other (See Comments)     Side effects  Side effects       Past Medical History:   Diagnosis Date    Acute pulmonary embolism without acute cor pulmonale 08/25/2023    Back pain     Carpal tunnel syndrome     Cataract     Colon cancer     Colon polyp     Coronary artery disease     Essential hypertension " 08/09/2019    History of blood clots     History of squamous cell carcinoma 07/27/2015    Hx of colon cancer, stage IV 10/18/2016    Hypothyroidism     Obesity (BMI 30-39.9) 03/24/2016    Osteoarthritis     Osteoporosis     Personal history of colon cancer, stage III     Squamous cell carcinoma     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    Strabismus     Trouble in sleeping     Wears dentures     Uppers     Past Surgical History:   Procedure Laterality Date    CARDIAC SURGERY      cardiac cath    COLON SURGERY      colon resection    COLONOSCOPY N/A 10/18/2016    Procedure: COLONOSCOPY;  Surgeon: Rell Cordova MD;  Location: [facility] ENDO;  Service: Endoscopy;  Laterality: N/A;    COLONOSCOPY N/A 8/8/2023    Procedure: COLONOSCOPY;  Surgeon: Kaushik Pinon MD;  Location: [facility];  Service: Endoscopy;  Laterality: N/A;    COLONOSCOPY N/A 8/22/2023    Procedure: COLONOSCOPY (tattoo of colon ca);  Surgeon: Kaushik Pinon MD;  Location: [facility];  Service: Endoscopy;  Laterality: N/A;    ESOPHAGOGASTRODUODENOSCOPY N/A 8/3/2023    Procedure: EGD (ESOPHAGOGASTRODUODENOSCOPY);  Surgeon: Kaushik Pinon MD;  Location: [facility];  Service: Endoscopy;  Laterality: N/A;    HAND TENDON SURGERY Left     HEMICOLECTOMY      right    INJECTION OF ANESTHETIC AGENT AROUND MEDIAL BRANCH NERVES INNERVATING LUMBAR FACET JOINT Right 07/10/2018    Procedure: Block-nerve-medial branch-lumbar;  Surgeon: Parish Gaitan MD;  [facility];  Service: Pain Management;  Laterality: Right;  L3, 4, 5    INSERTION OF INFERIOR VENA CAVAL FILTER N/A 9/13/2023    Procedure: Insertion, Filter, Inferior Vena Cava;  Surgeon: Oren Verdin MD;  Location: [facility] CATH/EP LAB;  Service: General;  Laterality: N/A;    INSERTION OF TUNNELED CENTRAL VENOUS CATHETER (CVC) WITH SUBCUTANEOUS PORT Right 11/15/2023    Procedure: [id number];  Surgeon: Jim Chavez MD;  Location: [facility] OR;  Service: General;   "Laterality: Right;    JOINT REPLACEMENT      bilateral    RADIOFREQUENCY ABLATION OF LUMBAR MEDIAL BRANCH NERVE AT SINGLE LEVEL Right 2018    Procedure: RADIOFREQUENCY ABLATION, NERVE, MEDIAL BRANCH, LUMBAR, 1 LEVEL;  Surgeon: Parish Gaitan MD;  Location: [facility] OR;  Service: Pain Management;  Laterality: Right;  L3,4,5 - Burned at 80 degrees C. for 75 seconds x 2 each site    ROBOT-ASSISTED COLECTOMY N/A 2023    Procedure: ROBOTIC COLECTOMY;  Surgeon: Jim Chavez MD;  Location: [facility] OR;  Service: General;  Laterality: N/A;    SHOULDER ARTHROSCOPY Right 2017    Reverse     TONSILLECTOMY      TONSILLECTOMY, ADENOIDECTOMY, BILATERAL MYRINGOTOMY AND TUBES      TOTAL KNEE ARTHROPLASTY Bilateral 1998    total replacements    TUMOR REMOVAL Left     left foot as a child     Family History   Problem Relation Age of Onset    Hypertension Mother     Kidney disease Mother     Cataracts Father     Cataracts Sister     Cancer Sister         breast    No Known Problems Brother     Cancer Sister         breast    Arthritis Sister     Glaucoma Neg Hx     Amblyopia Neg Hx     Blindness Neg Hx     Macular degeneration Neg Hx     Retinal detachment Neg Hx     Strabismus Neg Hx     Stroke Neg Hx     Thyroid disease Neg Hx      Social History     Tobacco Use    Smoking status: Former     Current packs/day: 0.00     Average packs/day: 0.5 packs/day for 1 year (0.5 ttl pk-yrs)     Types: Cigarettes     Start date: 1960     Quit date: 1961     Years since quittin.9    Smokeless tobacco: Never   Substance Use Topics    Alcohol use: No    Drug use: No        Review of Systems    OBJECTIVE:     Anticoagulation:  no    Estimated body mass index is 34.93 kg/m² as calculated from the following:    Height as of 23: 5' 4" (1.626 m).    Weight as of 23: 92.3 kg (203 lb 7.8 oz).    Vital Signs (Most Recent)       Physical Exam  Vitals reviewed.   Constitutional:       General: She is not in acute " distress.     Appearance: Normal appearance. She is not ill-appearing.   HENT:      Head: Normocephalic and atraumatic.   Eyes:      General: No scleral icterus.  Pulmonary:      Effort: Pulmonary effort is normal. No respiratory distress.   Abdominal:      Palpations: Abdomen is soft.   Skin:     Coloration: Skin is not jaundiced.   Neurological:      General: No focal deficit present.      Mental Status: She is alert and oriented to person, place, and time.   Psychiatric:         Mood and Affect: Mood normal.         Behavior: Behavior normal.         BMP  Lab Results   Component Value Date     11/17/2023    K 4.2 11/17/2023     (H) 11/17/2023    CO2 18 (L) 11/17/2023    BUN 21 11/17/2023    CREATININE 0.9 11/20/2023    CALCIUM 8.6 (L) 11/17/2023    ANIONGAP 8 11/17/2023    ESTGFRAFRICA >60 07/14/2022    EGFRNONAA >60 07/14/2022       Lab Results   Component Value Date    WBC 5.60 11/17/2023    HGB 9.2 (L) 11/17/2023    HCT 29.3 (L) 11/17/2023    MCV 90 11/17/2023     11/17/2023       Imaging:  Per HPI    ASSESSMENT     1. Angiomyolipoma of left kidney    2. History of colon cancer      PLAN:     - Reviewed her MRI with patient and niece. Suggestive of a 2 cm AML on the left kidney.   - Discussed nature of AMLS. This is a benign mass. Discussed risk of bleeding if mass is > 4 cm. At that time would require embolization by IR.   - Will need updated imaging in 1 year, however suspect that she will be getting repeat imaging from her colon cancer prior to this.   - If there are any changes to the mass can see her back sooner, otherwise plan to have her follow up in 1 year     Isabel Syed MD

## 2023-12-04 ENCOUNTER — OFFICE VISIT (OUTPATIENT)
Dept: UROLOGY | Facility: CLINIC | Age: 83
End: 2023-12-04
Payer: MEDICARE

## 2023-12-04 DIAGNOSIS — Z85.038 HISTORY OF COLON CANCER: ICD-10-CM

## 2023-12-04 DIAGNOSIS — D17.71 ANGIOMYOLIPOMA OF LEFT KIDNEY: Primary | ICD-10-CM

## 2023-12-04 PROCEDURE — 99999 PR PBB SHADOW E&M-EST. PATIENT-LVL I: CPT | Mod: PBBFAC,,, | Performed by: STUDENT IN AN ORGANIZED HEALTH CARE EDUCATION/TRAINING PROGRAM

## 2023-12-04 PROCEDURE — 1126F PR PAIN SEVERITY QUANTIFIED, NO PAIN PRESENT: ICD-10-PCS | Mod: CPTII,S$GLB,, | Performed by: STUDENT IN AN ORGANIZED HEALTH CARE EDUCATION/TRAINING PROGRAM

## 2023-12-04 PROCEDURE — 3288F PR FALLS RISK ASSESSMENT DOCUMENTED: ICD-10-PCS | Mod: CPTII,S$GLB,, | Performed by: STUDENT IN AN ORGANIZED HEALTH CARE EDUCATION/TRAINING PROGRAM

## 2023-12-04 PROCEDURE — 1159F PR MEDICATION LIST DOCUMENTED IN MEDICAL RECORD: ICD-10-PCS | Mod: CPTII,S$GLB,, | Performed by: STUDENT IN AN ORGANIZED HEALTH CARE EDUCATION/TRAINING PROGRAM

## 2023-12-04 PROCEDURE — 1101F PR PT FALLS ASSESS DOC 0-1 FALLS W/OUT INJ PAST YR: ICD-10-PCS | Mod: CPTII,S$GLB,, | Performed by: STUDENT IN AN ORGANIZED HEALTH CARE EDUCATION/TRAINING PROGRAM

## 2023-12-04 PROCEDURE — 3288F FALL RISK ASSESSMENT DOCD: CPT | Mod: CPTII,S$GLB,, | Performed by: STUDENT IN AN ORGANIZED HEALTH CARE EDUCATION/TRAINING PROGRAM

## 2023-12-04 PROCEDURE — 1159F MED LIST DOCD IN RCRD: CPT | Mod: CPTII,S$GLB,, | Performed by: STUDENT IN AN ORGANIZED HEALTH CARE EDUCATION/TRAINING PROGRAM

## 2023-12-04 PROCEDURE — 1101F PT FALLS ASSESS-DOCD LE1/YR: CPT | Mod: CPTII,S$GLB,, | Performed by: STUDENT IN AN ORGANIZED HEALTH CARE EDUCATION/TRAINING PROGRAM

## 2023-12-04 PROCEDURE — 1126F AMNT PAIN NOTED NONE PRSNT: CPT | Mod: CPTII,S$GLB,, | Performed by: STUDENT IN AN ORGANIZED HEALTH CARE EDUCATION/TRAINING PROGRAM

## 2023-12-04 PROCEDURE — 99214 OFFICE O/P EST MOD 30 MIN: CPT | Mod: S$GLB,,, | Performed by: STUDENT IN AN ORGANIZED HEALTH CARE EDUCATION/TRAINING PROGRAM

## 2023-12-04 PROCEDURE — 99999 PR PBB SHADOW E&M-EST. PATIENT-LVL I: ICD-10-PCS | Mod: PBBFAC,,, | Performed by: STUDENT IN AN ORGANIZED HEALTH CARE EDUCATION/TRAINING PROGRAM

## 2023-12-04 PROCEDURE — 99214 PR OFFICE/OUTPT VISIT, EST, LEVL IV, 30-39 MIN: ICD-10-PCS | Mod: S$GLB,,, | Performed by: STUDENT IN AN ORGANIZED HEALTH CARE EDUCATION/TRAINING PROGRAM

## 2023-12-04 NOTE — PROGRESS NOTES
Subjective:      Name: Danna Sorensen  : 1940  MRN: [id number]    CC:  Education    HPI:   Danna Sorensen is a 83 y.o. female presents to the clinic today with her niece for education for a diagnosis of Colon cancer.    23:  Ms. Sorensen, an 83-year-old white female known to Dr. Gilmore for Stage III adenocarcinoma of the colon.    She is status post resection and 12 cycles of adjuvant FOLFOX.   Out 16 years post-therapy.    Presented to the clinic for her annual surveillance for Hx of colon cancer.    Her  had recently  & c/o of fatigue & SOB with exertion. She was found to be anemic with UGI & Colonoscopy scheduled for 23.  23:  UGI:   - Normal esophagus.                          - Z-line regular, 41 cm from the incisors.                          - Small hiatal hernia.                          - Erythematous mucosa in the prepyloric region of                          the stomach. Biopsied.                          - Normal examined duodenum.   23:  Colonoscopy:  Impression:            - Non-bleeding internal hemorrhoids.                          - One 7 mm polyp in the rectum, removed with a                          cold snare. Resected and retrieved.                          - Altered vascular, congested, eroded, friable                          (with contact bleeding) and ulcerated mucosa in                          the transverse colon. Biopsied.                          - Patent end-to-side ileo-colonic anastomosis,                          characterized by healthy appearing mucosa.                          - The examined portion of the ileum was normal.   23:  Colonoscopy  Impression:            - Non-bleeding internal hemorrhoids.                          - Diverticulosis in the sigmoid colon.                          - Malignant partially obstructing tumor in the                          transverse colon. Tattooed.                          - The proximal transverse  colon is normal.                          - Patent end-to-side ileo-colonic anastomosis,                          characterized by healthy appearing mucosa.                          - The examined portion of the ileum was normal.                          - No specimens collected.     09/01/23:  US Lower ext veins bilateral:  Bilateral nonocclusive DVT demonstrated on the SFV to the popliteal veins.     09/13/23:  filter vena cava    09/29/23:  Robotic Colectomy (Chavez)    11/08/23:  CT PET  Impression:     Patient is status post resection of colon carcinoma with no findings to indicate metastatic or recurrent disease.     Lung nodules, some of which are unchanged since 2007, benign, and others of which are new since 2007 but stable compared to 08/25/2023.  Continued CT follow-up is recommended.     Mass at the lower pole of the left kidney for which follow-up with MRI or multiphasic CT is recommended, please see comments above.    11/15/23:  Port placed (Kathy)    Oncology History   Malignant neoplasm of splenic flexure   11/3/2023 Initial Diagnosis    Malignant neoplasm of splenic flexure     11/3/2023 Cancer Staged    Staging form: Colon and Rectum, AJCC 8th Edition  - Clinical: Stage IIA (cT3, cN0, cM0)     11/13/2023 -  Chemotherapy    Treatment Summary   Plan Name: OP COLORECTAL FLUOROURACIL LEUCOVORIN Q2W ([facility])  Treatment Goal: Curative  Status: Active  Start Date: 11/13/2023 (Planned)  End Date: 10/30/2024 (Planned)  Provider: Mahesh Cameron MD  Chemotherapy: fluorouraciL injection 810 mg, 400 mg/m2, Intravenous, Clinic/HOD 1 time, 0 of 26 cycles            Past Medical History:   Diagnosis Date    Acute pulmonary embolism without acute cor pulmonale 08/25/2023    Back pain     Carpal tunnel syndrome     Cataract     Colon cancer     Colon polyp     Coronary artery disease     Essential hypertension 08/09/2019    History of blood clots     History of squamous cell carcinoma 07/27/2015    Hx of  colon cancer, stage IV 10/18/2016    Hypothyroidism     Obesity (BMI 30-39.9) 03/24/2016    Osteoarthritis     Osteoporosis     Personal history of colon cancer, stage III     Squamous cell carcinoma     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 ENDO;  Service: Endoscopy;  Laterality: N/A;    COLONOSCOPY N/A 8/8/2023    Procedure: COLONOSCOPY;  Surgeon: Kaushik Pinon MD;  Location: [facility];  Service: Endoscopy;  Laterality: N/A;    COLONOSCOPY N/A 8/22/2023    Procedure: COLONOSCOPY (tattoo of colon ca);  Surgeon: Kaushik Pinon MD;  Location: [facility];  Service: Endoscopy;  Laterality: N/A;    ESOPHAGOGASTRODUODENOSCOPY N/A 8/3/2023    Procedure: EGD (ESOPHAGOGASTRODUODENOSCOPY);  Surgeon: Kaushik Pinon MD;  Location: [facility];  Service: Endoscopy;  Laterality: N/A;    HAND TENDON SURGERY Left     HEMICOLECTOMY      right    INJECTION OF ANESTHETIC AGENT AROUND MEDIAL BRANCH NERVES INNERVATING LUMBAR FACET JOINT Right 07/10/2018    Procedure: Block-nerve-medial branch-lumbar;  Surgeon: Parish Gaitan MD;  Location: [facility] OR;  Service: Pain Management;  Laterality: Right;  L3, 4, 5    INSERTION OF INFERIOR VENA CAVAL FILTER N/A 9/13/2023    Procedure: Insertion, Filter, Inferior Vena Cava;  Surgeon: Oren Verdin MD;  Location: [facility] CATH/EP LAB;  Service: General;  Laterality: N/A;    INSERTION OF TUNNELED CENTRAL VENOUS CATHETER (CVC) WITH SUBCUTANEOUS PORT Right 11/15/2023    Procedure: [id number];  Surgeon: Jim Chavez MD;  Location: [facility] OR;  Service: General;  Laterality: Right;    JOINT REPLACEMENT      bilateral    RADIOFREQUENCY ABLATION OF LUMBAR MEDIAL  "BRANCH NERVE AT SINGLE LEVEL Right 2018    Procedure: RADIOFREQUENCY ABLATION, NERVE, MEDIAL BRANCH, LUMBAR, 1 LEVEL;  Surgeon: Parish Gaitan MD;  Location: [facility] OR;  Service: Pain Management;  Laterality: Right;  L3,4,5 - Burned at 80 degrees C. for 75 seconds x 2 each site    ROBOT-ASSISTED COLECTOMY N/A 2023    Procedure: ROBOTIC COLECTOMY;  Surgeon: Jim Chavez MD;  Location: [facility];  Service: General;  Laterality: N/A;    SHOULDER ARTHROSCOPY Right 2017    Reverse     TONSILLECTOMY      TONSILLECTOMY, ADENOIDECTOMY, BILATERAL MYRINGOTOMY AND TUBES      TOTAL KNEE ARTHROPLASTY Bilateral 1998    total replacements    TUMOR REMOVAL Left     left foot as a child       Family History   Problem Relation Age of Onset    Hypertension Mother     Kidney disease Mother     Cataracts Father     Cataracts Sister     Cancer Sister         breast    No Known Problems Brother     Cancer Sister         breast    Arthritis Sister     Glaucoma Neg Hx     Amblyopia Neg Hx     Blindness Neg Hx     Macular degeneration Neg Hx     Retinal detachment Neg Hx     Strabismus Neg Hx     Stroke Neg Hx     Thyroid disease Neg Hx        Social History     Socioeconomic History    Marital status: Single   Tobacco Use    Smoking status: Former     Current packs/day: 0.00     Average packs/day: 0.5 packs/day for 1 year (0.5 ttl pk-yrs)     Types: Cigarettes     Start date: 1960     Quit date: 1961     Years since quittin.9    Smokeless tobacco: Never   Substance and Sexual Activity    Alcohol use: No    Drug use: No    Sexual activity: Never       Review of patient's allergies indicates:   Allergen Reactions    Aspirin      Other reaction(s): Stomach upset    Aspirin Other (See Comments)     Other reaction(s): Stomach upset    Gabapentin Other (See Comments)     Pt states she felt "funny" when she took the medication. Especially at the higher dose.    Mobic [meloxicam] Swelling     Edema and elevated blood " pressure     Oxaliplatin Other (See Comments)     Other reaction(s): Joint pain  Other reaction(s): Itching  Other reaction(s): Hives  Other reaction(s): Joint pain  Other reaction(s): Itching  Other reaction(s): Hives    Pregabalin Other (See Comments)     Side effects  Side effects       Review of Systems   All other systems reviewed and are negative.           Objective:   There were no vitals filed for this visit.     Physical Exam  Vitals reviewed.   Constitutional:       General: She is not in acute distress.  HENT:      Head: Normocephalic and atraumatic.   Pulmonary:      Effort: Pulmonary effort is normal.   Skin:            Comments: POC to RCW without erythema   Neurological:      Mental Status: She is alert and oriented to person, place, and time.   Psychiatric:         Behavior: Behavior normal.         Thought Content: Thought content normal.             Current Outpatient Medications on File Prior to Visit   Medication Sig    acetaminophen (TYLENOL) 650 MG TbSR Take 650 mg by mouth every 8 (eight) hours.    alendronate (FOSAMAX) 70 MG tablet Take 1 tablet (70 mg total) by mouth every 7 days.    apixaban (ELIQUIS) 5 mg Tab Take 1 tablet (5 mg total) by mouth 2 (two) times daily.    cyclobenzaprine (FLEXERIL) 5 MG tablet Take 1 tablet (5 mg total) by mouth 3 (three) times daily as needed for Muscle spasms.    ergocalciferol, vitamin D2, (VITAMIN D ORAL) Take 2,000 mg by mouth once daily.    furosemide (LASIX) 20 MG tablet Take 1 tablet (20 mg total) by mouth daily as needed (edema).    HYDROcodone-acetaminophen (NORCO) 5-325 mg per tablet Take 1 tablet by mouth every 6 (six) hours as needed for Pain.    hydrOXYzine HCL (ATARAX) 25 MG tablet Take 1 tablet (25 mg total) by mouth 3 (three) times daily as needed for Anxiety (insomnia).    levocetirizine (XYZAL) 5 MG tablet Take 1 tablet (5 mg total) by mouth nightly as needed for Allergies (allergies).    levothyroxine (SYNTHROID) 75 MCG tablet Take 1  tablet (75 mcg total) by mouth once daily.    LIDOcaine-prilocaine (EMLA) cream Apply topically as needed (Chemo).    lisinopriL 10 MG tablet Take 1 tablet (10 mg total) by mouth once daily.    multivitamin capsule Take 1 capsule by mouth once daily.    nystatin (MYCOSTATIN) cream Apply topically 2 (two) times daily as needed. GRETCHEN EXT AA BID    omeprazole (PRILOSEC) 40 MG capsule Take 1 capsule (40 mg total) by mouth 2 (two) times daily before meals.    ondansetron (ZOFRAN) 8 MG tablet Take 1 tablet (8 mg total) by mouth every 8 (eight) hours as needed for Nausea.    traMADoL (ULTRAM) 50 mg tablet Take 1 tablet (50 mg total) by mouth every 8 (eight) hours as needed for Pain.    traZODone (DESYREL) 50 MG tablet Take 1 tablet (50 mg total) by mouth nightly.    triamcinolone acetonide 0.1% (KENALOG) 0.1 % cream APPLY TOPICALLY TO THE AFFECTED AREA TWICE DAILY     Current Facility-Administered Medications on File Prior to Visit   Medication    0.9%  NaCl infusion       CBC:  Lab Results   Component Value Date    WBC 5.60 11/17/2023    HGB 9.2 (L) 11/17/2023    HCT 29.3 (L) 11/17/2023    MCV 90 11/17/2023     11/17/2023     Lab Results   Component Value Date    UIBC 249 07/02/2007    IRON 41 11/17/2023    TRANSFERRIN 286 11/17/2023    TIBC 423 11/17/2023    FESATURATED 10 (L) 11/17/2023      Lab Results   Component Value Date    FERRITIN 16 (L) 11/17/2023     CMP:  Sodium   Date Value Ref Range Status   11/17/2023 141 136 - 145 mmol/L Final     Potassium   Date Value Ref Range Status   11/17/2023 4.2 3.5 - 5.1 mmol/L Final     Chloride   Date Value Ref Range Status   11/17/2023 115 (H) 95 - 110 mmol/L Final     CO2   Date Value Ref Range Status   11/17/2023 18 (L) 23 - 29 mmol/L Final     Glucose   Date Value Ref Range Status   11/17/2023 88 70 - 110 mg/dL Final     BUN   Date Value Ref Range Status   11/17/2023 21 8 - 23 mg/dL Final     Creatinine   Date Value Ref Range Status   11/20/2023 0.9 0.5 - 1.4 mg/dL  Final     Calcium   Date Value Ref Range Status   11/17/2023 8.6 (L) 8.7 - 10.5 mg/dL Final     Total Protein   Date Value Ref Range Status   11/17/2023 6.4 6.0 - 8.4 g/dL Final     Albumin   Date Value Ref Range Status   11/17/2023 3.6 3.5 - 5.2 g/dL Final     Total Bilirubin   Date Value Ref Range Status   11/17/2023 0.6 0.1 - 1.0 mg/dL Final     Comment:     For infants and newborns, interpretation of results should be based  on gestational age, weight and in agreement with clinical  observations.    Premature Infant recommended reference ranges:  Up to 24 hours.............<8.0 mg/dL  Up to 48 hours............<12.0 mg/dL  3-5 days..................<15.0 mg/dL  6-29 days.................<15.0 mg/dL       Alkaline Phosphatase   Date Value Ref Range Status   11/17/2023 41 (L) 55 - 135 U/L Final     AST   Date Value Ref Range Status   11/17/2023 11 10 - 40 U/L Final     ALT   Date Value Ref Range Status   11/17/2023 9 (L) 10 - 44 U/L Final     Anion Gap   Date Value Ref Range Status   11/17/2023 8 8 - 16 mmol/L Final     eGFR if    Date Value Ref Range Status   07/14/2022 >60 >60 mL/min/1.73 m^2 Final     eGFR if non    Date Value Ref Range Status   07/14/2022 >60 >60 mL/min/1.73 m^2 Final     Comment:     Calculation used to obtain the estimated glomerular filtration  rate (eGFR) is the CKD-EPI equation.          MRI Abdomen W WO Contrast  Narrative: EXAMINATION:  MRI ABDOMEN W WO CONTRAST    CLINICAL HISTORY:  Renal mass suspected (Ped 0-18y);LEFT RENAL MASS;  Other specified disorders of kidney and ureter    TECHNIQUE:  To sequence multiplanar imaging of the abdomen obtained without and with contrast with 9 mL Gadavist injected venous Patricia    COMPARISON:  None.  Correlation with ultrasound 08/25/2023 and images from CT 08/17/2023    FINDINGS:  Unremarkable appearance.  No masses    Cholelithiasis with large stone within the gallbladder without findings of acute cholecystitis or  biliary duct dilatation    Spleen not enlarged    Adrenal glands unremarkable appearance    Pancreas unremarkable appearance    Abdominal aorta no aneurysm    Artifact in region of IVC suggesting IVC filter.    Postoperative changes anterior abdominal wall and fat containing supraumbilical ventral hernia.    Right kidney; unremarkable appearance of the right kidney.  No mass.    Kidney; 2 cm heterogeneous fat containing enhancing mass in the lower pole.  Presence of fat strongly suggest angiomyolipoma.  Can be seen although rarely with renal cell carcinoma.  Impression: Fat containing 2 cm mass lower pole of the left kidney suggesting angiomyolipoma.    Electronically signed by: Paulina Beck MD  Date:    11/20/2023  Time:    12:23       All pertinent labs and imaging reviewed.    Assessment:       1. Malignant neoplasm of splenic flexure    2. History of colon cancer         Plan:     Malignant neoplasm of splenic flexure  -     Ambulatory referral/consult to Chemo School  -     Ambulatory referral/consult to Hematology/Oncology/Nutrition  -     Ambulatory referral/consult to Oncology Social Work  -     ondansetron (ZOFRAN-ODT) 8 MG TbDL; Take 1 tablet (8 mg total) by mouth every 8 (eight) hours as needed (nausea).  Dispense: 40 tablet; Refill: 3  -     promethazine (PHENERGAN) 12.5 MG Tab; (Take 1-2 tabs every 6 hours as needed for nausea persistent despite zofran)  Dispense: 40 tablet; Refill: 3    History of colon cancer         1.  Treatment plan of Leucovorin/Fluorouracil every 2 weeks discussed with patient and niece.  2.  Handouts provided on chemotherapy agents.    3.  Discussed the mechanism of action, potential side effects of this treatment as well as ways to manage them at home.   4.  Dietary modifications discussed.  Detail instructions to be provided by dietician.   5.  Chemotherapy Portfolio supplied with contact information.   6.  Discussed follow-up with the physician for toxicity monitoring  throughout treatment.    7.  Scripts were escribed (Zofran ODT, Phenergan) and explained for home use.    8.  Consented the patient to the treatment plan and the patient was educated on the planned duration of the treatment and schedule of the treatment administration.        40 minutes were spent in coordination of patient's care, record review and counseling.

## 2023-12-05 ENCOUNTER — DOCUMENTATION ONLY (OUTPATIENT)
Dept: INFUSION THERAPY | Facility: HOSPITAL | Age: 83
End: 2023-12-05
Payer: MEDICARE

## 2023-12-05 ENCOUNTER — CLINICAL SUPPORT (OUTPATIENT)
Dept: HEMATOLOGY/ONCOLOGY | Facility: CLINIC | Age: 83
End: 2023-12-05
Payer: MEDICARE

## 2023-12-05 ENCOUNTER — CLINICAL SUPPORT (OUTPATIENT)
Dept: REHABILITATION | Facility: HOSPITAL | Age: 83
End: 2023-12-05
Payer: MEDICARE

## 2023-12-05 ENCOUNTER — LAB VISIT (OUTPATIENT)
Dept: LAB | Facility: HOSPITAL | Age: 83
End: 2023-12-05
Attending: NURSE PRACTITIONER
Payer: MEDICARE

## 2023-12-05 ENCOUNTER — PATIENT MESSAGE (OUTPATIENT)
Dept: HEMATOLOGY/ONCOLOGY | Facility: CLINIC | Age: 83
End: 2023-12-05

## 2023-12-05 VITALS
DIASTOLIC BLOOD PRESSURE: 70 MMHG | WEIGHT: 204.81 LBS | RESPIRATION RATE: 16 BRPM | OXYGEN SATURATION: 97 % | BODY MASS INDEX: 34.97 KG/M2 | HEIGHT: 64 IN | SYSTOLIC BLOOD PRESSURE: 141 MMHG | HEART RATE: 60 BPM | TEMPERATURE: 98 F

## 2023-12-05 DIAGNOSIS — Z85.038 HISTORY OF COLON CANCER: ICD-10-CM

## 2023-12-05 DIAGNOSIS — M54.50 CHRONIC RIGHT-SIDED LOW BACK PAIN WITHOUT SCIATICA: Primary | ICD-10-CM

## 2023-12-05 DIAGNOSIS — M54.59 OTHER LOW BACK PAIN: ICD-10-CM

## 2023-12-05 DIAGNOSIS — C18.5 MALIGNANT NEOPLASM OF SPLENIC FLEXURE: Primary | ICD-10-CM

## 2023-12-05 DIAGNOSIS — C18.2 MALIGNANT NEOPLASM OF ASCENDING COLON: ICD-10-CM

## 2023-12-05 DIAGNOSIS — G89.29 CHRONIC RIGHT-SIDED LOW BACK PAIN WITHOUT SCIATICA: Primary | ICD-10-CM

## 2023-12-05 LAB
ALBUMIN SERPL BCP-MCNC: 3.8 G/DL (ref 3.5–5.2)
ALP SERPL-CCNC: 44 U/L (ref 55–135)
ALT SERPL W/O P-5'-P-CCNC: 11 U/L (ref 10–44)
ANION GAP SERPL CALC-SCNC: 10 MMOL/L (ref 8–16)
AST SERPL-CCNC: 14 U/L (ref 10–40)
BASOPHILS # BLD AUTO: 0.03 K/UL (ref 0–0.2)
BASOPHILS NFR BLD: 0.5 % (ref 0–1.9)
BILIRUB SERPL-MCNC: 0.7 MG/DL (ref 0.1–1)
BUN SERPL-MCNC: 21 MG/DL (ref 8–23)
CALCIUM SERPL-MCNC: 8.8 MG/DL (ref 8.7–10.5)
CHLORIDE SERPL-SCNC: 114 MMOL/L (ref 95–110)
CO2 SERPL-SCNC: 18 MMOL/L (ref 23–29)
CREAT SERPL-MCNC: 0.9 MG/DL (ref 0.5–1.4)
DIFFERENTIAL METHOD: ABNORMAL
EOSINOPHIL # BLD AUTO: 0.1 K/UL (ref 0–0.5)
EOSINOPHIL NFR BLD: 1.3 % (ref 0–8)
ERYTHROCYTE [DISTWIDTH] IN BLOOD BY AUTOMATED COUNT: 16 % (ref 11.5–14.5)
EST. GFR  (NO RACE VARIABLE): >60 ML/MIN/1.73 M^2
GLUCOSE SERPL-MCNC: 98 MG/DL (ref 70–110)
HCT VFR BLD AUTO: 31 % (ref 37–48.5)
HGB BLD-MCNC: 9.7 G/DL (ref 12–16)
IMM GRANULOCYTES # BLD AUTO: 0.03 K/UL (ref 0–0.04)
IMM GRANULOCYTES NFR BLD AUTO: 0.5 % (ref 0–0.5)
LYMPHOCYTES # BLD AUTO: 1.3 K/UL (ref 1–4.8)
LYMPHOCYTES NFR BLD: 20.1 % (ref 18–48)
MCH RBC QN AUTO: 28 PG (ref 27–31)
MCHC RBC AUTO-ENTMCNC: 31.3 G/DL (ref 32–36)
MCV RBC AUTO: 90 FL (ref 82–98)
MONOCYTES # BLD AUTO: 0.5 K/UL (ref 0.3–1)
MONOCYTES NFR BLD: 8.3 % (ref 4–15)
NEUTROPHILS # BLD AUTO: 4.4 K/UL (ref 1.8–7.7)
NEUTROPHILS NFR BLD: 69.3 % (ref 38–73)
NRBC BLD-RTO: 0 /100 WBC
PLATELET # BLD AUTO: 233 K/UL (ref 150–450)
PMV BLD AUTO: 9.6 FL (ref 9.2–12.9)
POTASSIUM SERPL-SCNC: 4.7 MMOL/L (ref 3.5–5.1)
PROT SERPL-MCNC: 6.7 G/DL (ref 6–8.4)
RBC # BLD AUTO: 3.46 M/UL (ref 4–5.4)
SODIUM SERPL-SCNC: 142 MMOL/L (ref 136–145)
WBC # BLD AUTO: 6.38 K/UL (ref 3.9–12.7)

## 2023-12-05 PROCEDURE — 36415 COLL VENOUS BLD VENIPUNCTURE: CPT | Mod: PN | Performed by: INTERNAL MEDICINE

## 2023-12-05 PROCEDURE — 99999 PR PBB SHADOW E&M-EST. PATIENT-LVL V: ICD-10-PCS | Mod: PBBFAC,,, | Performed by: NURSE PRACTITIONER

## 2023-12-05 PROCEDURE — 99215 OFFICE O/P EST HI 40 MIN: CPT | Mod: S$GLB,,, | Performed by: NURSE PRACTITIONER

## 2023-12-05 PROCEDURE — 80053 COMPREHEN METABOLIC PANEL: CPT | Mod: PN | Performed by: INTERNAL MEDICINE

## 2023-12-05 PROCEDURE — 97814 ACUP 1/> W/ESTIM EA ADDL 15: CPT | Mod: PN | Performed by: ACUPUNCTURIST

## 2023-12-05 PROCEDURE — 85025 COMPLETE CBC W/AUTO DIFF WBC: CPT | Mod: PN | Performed by: INTERNAL MEDICINE

## 2023-12-05 PROCEDURE — 99999 PR PBB SHADOW E&M-EST. PATIENT-LVL V: CPT | Mod: PBBFAC,,, | Performed by: NURSE PRACTITIONER

## 2023-12-05 PROCEDURE — 99215 PR OFFICE/OUTPT VISIT, EST, LEVL V, 40-54 MIN: ICD-10-PCS | Mod: S$GLB,,, | Performed by: NURSE PRACTITIONER

## 2023-12-05 PROCEDURE — 97813 ACUP 1/> W/ESTIM 1ST 15 MIN: CPT | Mod: PN | Performed by: ACUPUNCTURIST

## 2023-12-05 RX ORDER — ONDANSETRON 8 MG/1
8 TABLET, ORALLY DISINTEGRATING ORAL EVERY 8 HOURS PRN
Qty: 40 TABLET | Refills: 3 | Status: SHIPPED | OUTPATIENT
Start: 2023-12-05 | End: 2024-12-04

## 2023-12-05 RX ORDER — PROMETHAZINE HYDROCHLORIDE 12.5 MG/1
TABLET ORAL
Qty: 40 TABLET | Refills: 3 | Status: SHIPPED | OUTPATIENT
Start: 2023-12-05

## 2023-12-05 NOTE — PROGRESS NOTES
Oncology Nutrition   New Patient Education  Danna Sorensen   1940    Nutrition Education   This is a 83 y.o.female with a medical diagnosis of colon cancer.     Met w/ pt and her niece, Babrie, to discuss current nutritional status and nutrition as it relates to cancer and cancer treatment. Pt currently low nutrition risk. Her niece, Barbie, does live with her and does most of the cooking.    Pt was treated for colon cancer 16 years ago and is having a reoccurrence.     Wt Readings from Last 10 Encounters:   12/05/23 92.9 kg (204 lb 12.9 oz)   11/24/23 92.3 kg (203 lb 7.8 oz)   11/17/23 91 kg (200 lb 11.2 oz)   11/13/23 89.8 kg (198 lb)   11/14/23 91.1 kg (200 lb 13.4 oz)   11/08/23 90 kg (198 lb 6.6 oz)   11/03/23 90 kg (198 lb 6.6 oz)   10/17/23 86 kg (189 lb 9.5 oz)   10/13/23 85.3 kg (188 lb 0.8 oz)   09/29/23 86.2 kg (190 lb)      [x] PMHx reviewed  [x] Labs reviewed    Educated on food safety and common nutrition impact symptoms associated with chemotherapy treatment. Reinforced the importance of good hydration. Handouts provided.    Answered all nutrition related questions.     Patient provided with dietitian contact number and advised to call with questions or make future appointment if further intervention is needed. RD to follow throughout tx PRN.    Ana Cristina Coles, MS, RD, LDN  12/05/2023  11:13 AM

## 2023-12-05 NOTE — PROGRESS NOTES
"Oncology   Chemotherapy School Education  Danna Sorensen   1940    Social Service Education   This is a 83 y.o.female with a medical diagnosis of colon caner. This is a reoccurrence of colon cancer. Pt has aport and will be starting tomorrow. Pt was provided a port pillow.     Current Support System: Pt has good family support. Pt's niece Barbie moved in tot take care of her this past April after the pt's  of 42 years passed away. Pt said her sisters and brother also check on her and Barbie's  comes on weekends to help around the house as needed. Pt said "I have the best help."     Met with pt for new pt education. Reviewed role of  during cancer treatment. Educated on role of SW on care team and resources available at the cancer center.     Answered all psychosocial/socioeconomic related questions. Pt reports at present she is doing alright and denied any worries about finances. She is aware there is a financial counselor here if needed. SW encourage pt to let SW know if her situation changes and if needs arise. She agreed.     Patient provided with social contact number and advised to call with questions or make future appointment if further intervention is needed. SW to follow throughout tx.    Amparo Lyle, LISHA  12/05/2023  11:18 AM      "

## 2023-12-05 NOTE — PROGRESS NOTES
PATIENT: Danna Sorensen  MRN: [id number]  DATE: 12/6/2023      Diagnosis:   1. Malignant neoplasm of splenic flexure    2. Renal mass, left    3. Pulmonary nodules    4. Acute deep vein thrombosis (DVT) of popliteal vein of both lower extremities    5. Normocytic anemia          Chief Complaint: Malignant neoplasm of splenic flexure (1 Month follow up)      Oncologic History:      Oncologic History     Oncologic Treatment     Pathology           Subjective:    Interval History: Ms. Sorensen is a 83 y.o. female with Pulmonary embolism, carpal tunnel, CAD, HTN, hypothyroidism, osteoporosis, osteoarthritis, who presents for colon cancer.  Since the last clinic visit the patient was discussed at tumor Board on 11/07/2023 with recommendation for PET-CT with biopsy of lung nodules if positive.  PET-CT on 11/08/2023 showed evidence of pulmonary hypertension; stable 5 mm nodule left lung apex; calcified granuloma left upper lobe; stable 6 mm inferior right upper lobe nodule; stable 17 mm ground-glass opacity lateral right middle lobe; stable 4 mm nodule in the right lower lobe of the diaphragm; no activity in the lesion in the left kidney.  MRI abdomen 11/20/2023 showed heterogeneous fat containing enhancing mass in the lower pole of the left kidney suggestive of an angio myelolipoma.    The patient denies CP, cough, SOB, abdominal pain, nausea, vomiting, constipation, diarrhea.  The patient denies fever, chills, night sweats, weight loss, new lumps or bumps, easy bruising or bleeding.    Prior History:  Pt was previously diagnosed with Stage III adenocarcinoma of the colon in 2007 and underwent 12 cycles of FOLFOX.  Pt underwent colonoscopy on 8/08/23 showing 1 7 mm polyp in the rectum; altered vascular, congested, eroded, friable and ulcerated mucosa in the transverse colon which was biopsied; patent and to side ileocolonic anastomosis.  Pathology showed moderately differentiated adenocarcinoma. CT abdomen and pelvis 8/17/23  showing irregular appearance of the gallbladder; heterogeneously enhancing lesion in the inferior pole of the left kidney measuring 1.8 cm; short segment of concentric bowel wall thickening of the colon at the splenic flexure with surrounding mesenteric fat stranding with nodular thickness of 2.2 cm.  The patient underwent colonoscopy on 08/22/2023 showing nonbleeding internal hemorrhoids, partially obstructing tumor in the transverse colon which was tattooed.  Pt saw Urology 8/23/23 with plans for re-imaging the renal lesion in 3 months with an MRI.  US abdomen 8/25/23 showed a 2.5 cm solid mass at the lower pole of the left kidney suspicious for neoplasm.  CT chest 8/25/23 showed 4 mm left upper lobe pulmonary nodule, 4 mm calcified granuloma left upper lobe, 2 mm pulmonary nodule in the left upper lobe, 9 x 9 mm triangular nodule along the juxtapleural right middle lobe, 2 mm pulmonary nodule in the right middle lobe, and a partially imaged masslike density in the splenic flexure of the colon. Pt then underwent resection of the transverse colon and splenic flexure with path showing invasive moderately differentiated adenocarcinoma with mucinous features, 33 benign lymph nodes, positive lymphovascular invasion, positive perineural invasion pT3N0.  Tumor shows intact expression of MLH1, PMS2, MSH2, MSH6.  Patient was positive for B Celio V600E mutation.    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    Strabismus     Trouble in sleeping     Wears dentures     Uppers       Past Surgical HIstory:   Past Surgical History:   Procedure Laterality Date    CARDIAC SURGERY      cardiac cath    COLON SURGERY      colon resection    COLONOSCOPY N/A 10/18/2016    Procedure: COLONOSCOPY;  Surgeon: Rell Cordova MD;  Location: [facility] ENDO;  Service: Endoscopy;  Laterality: N/A;    COLONOSCOPY N/A 8/8/2023    Procedure: COLONOSCOPY;  Surgeon: Kaushik Pinon MD;  Location: [facility];  Service: Endoscopy;  Laterality: N/A;    COLONOSCOPY N/A 8/22/2023    Procedure: COLONOSCOPY (tattoo of colon ca);  Surgeon: Kaushik Pinon MD;  Location: [facility];  Service: Endoscopy;  Laterality: N/A;    ESOPHAGOGASTRODUODENOSCOPY N/A 8/3/2023    Procedure: EGD (ESOPHAGOGASTRODUODENOSCOPY);  Surgeon: Kaushik Pinon MD;  Location: [facility];  Service: Endoscopy;  Laterality: N/A;    HAND TENDON SURGERY Left     HEMICOLECTOMY      right    INJECTION OF ANESTHETIC AGENT AROUND MEDIAL BRANCH NERVES INNERVATING LUMBAR FACET JOINT Right 07/10/2018    Procedure: Block-nerve-medial branch-lumbar;  Surgeon: Parish Gaitan MD;  Location: [facility];  Service: Pain Management;  Laterality: Right;  L3, 4, 5    INSERTION OF INFERIOR VENA CAVAL FILTER N/A 9/13/2023    Procedure: Insertion, Filter, Inferior Vena Cava;  Surgeon: Oren Verdin MD;  Location: [facility] CATH/EP LAB;  Service: General;  Laterality: N/A;    INSERTION OF TUNNELED CENTRAL VENOUS CATHETER (CVC) WITH SUBCUTANEOUS PORT Right 11/15/2023    Procedure: [id number];  Surgeon: iJm Chavez MD;  Location: [facility] OR;  Service: General;  Laterality: Right;    JOINT REPLACEMENT      bilateral    RADIOFREQUENCY ABLATION OF LUMBAR MEDIAL BRANCH NERVE AT SINGLE LEVEL Right 07/24/2018    Procedure: RADIOFREQUENCY ABLATION, NERVE, MEDIAL BRANCH, LUMBAR, 1 LEVEL;  Surgeon: Parish Gaitan MD;  Location: [facility] OR;   "Service: Pain Management;  Laterality: Right;  L3,4,5 - Burned at 80 degrees C. for 75 seconds x 2 each site    ROBOT-ASSISTED COLECTOMY N/A 9/29/2023    Procedure: ROBOTIC COLECTOMY;  Surgeon: Jim Chavez MD;  Location: [facility];  Service: General;  Laterality: N/A;    SHOULDER ARTHROSCOPY Right 01/12/2017    Reverse     TONSILLECTOMY      TONSILLECTOMY, ADENOIDECTOMY, BILATERAL MYRINGOTOMY AND TUBES      TOTAL KNEE ARTHROPLASTY Bilateral 08/1998    total replacements    TUMOR REMOVAL Left     left foot as a child       Family History:   Family History   Problem Relation Age of Onset    Hypertension Mother     Kidney disease Mother     Cataracts Father     Cataracts Sister     Cancer Sister         breast    No Known Problems Brother     Cancer Sister         breast    Arthritis Sister     Glaucoma Neg Hx     Amblyopia Neg Hx     Blindness Neg Hx     Macular degeneration Neg Hx     Retinal detachment Neg Hx     Strabismus Neg Hx     Stroke Neg Hx     Thyroid disease Neg Hx        Social History:  reports that she quit smoking about 62 years ago. Her smoking use included cigarettes. She started smoking about 63 years ago. She has a 0.5 pack-year smoking history. She has never used smokeless tobacco. She reports that she does not drink alcohol and does not use drugs.    Allergies:  Review of patient's allergies indicates:   Allergen Reactions    Aspirin      Other reaction(s): Stomach upset    Aspirin Other (See Comments)     Other reaction(s): Stomach upset    Gabapentin Other (See Comments)     Pt states she felt "funny" when she took the medication. Especially at the higher dose.    Mobic [meloxicam] Swelling     Edema and elevated blood pressure     Oxaliplatin Other (See Comments)     Other reaction(s): Joint pain  Other reaction(s): Itching  Other reaction(s): Hives  Other reaction(s): Joint pain  Other reaction(s): Itching  Other reaction(s): Hives    Pregabalin Other (See Comments)     Side effects  Side " effects       Medications:  Current Outpatient Medications   Medication Sig Dispense Refill    acetaminophen (TYLENOL) 650 MG TbSR Take 650 mg by mouth every 8 (eight) hours.      alendronate (FOSAMAX) 70 MG tablet Take 1 tablet (70 mg total) by mouth every 7 days. 12 tablet 3    apixaban (ELIQUIS) 5 mg Tab Take 1 tablet (5 mg total) by mouth 2 (two) times daily. 180 tablet 3    cyclobenzaprine (FLEXERIL) 5 MG tablet Take 1 tablet (5 mg total) by mouth 3 (three) times daily as needed for Muscle spasms. 90 tablet 0    ergocalciferol, vitamin D2, (VITAMIN D ORAL) Take 2,000 mg by mouth once daily.      furosemide (LASIX) 20 MG tablet Take 1 tablet (20 mg total) by mouth daily as needed (edema). 90 tablet 3    HYDROcodone-acetaminophen (NORCO) 5-325 mg per tablet Take 1 tablet by mouth every 6 (six) hours as needed for Pain. 20 tablet 0    hydrOXYzine HCL (ATARAX) 25 MG tablet Take 1 tablet (25 mg total) by mouth 3 (three) times daily as needed for Anxiety (insomnia). 30 tablet PRN    levocetirizine (XYZAL) 5 MG tablet Take 1 tablet (5 mg total) by mouth nightly as needed for Allergies (allergies). 90 tablet PRN    levothyroxine (SYNTHROID) 75 MCG tablet Take 1 tablet (75 mcg total) by mouth once daily. 90 tablet 3    LIDOcaine-prilocaine (EMLA) cream Apply topically as needed (Chemo). 30 g 0    lisinopriL 10 MG tablet Take 1 tablet (10 mg total) by mouth once daily. 90 tablet 3    multivitamin capsule Take 1 capsule by mouth once daily.      omeprazole (PRILOSEC) 40 MG capsule Take 1 capsule (40 mg total) by mouth 2 (two) times daily before meals. 180 capsule PRN    ondansetron (ZOFRAN-ODT) 8 MG TbDL Take 1 tablet (8 mg total) by mouth every 8 (eight) hours as needed (nausea). 40 tablet 3    promethazine (PHENERGAN) 12.5 MG Tab (Take 1-2 tabs every 6 hours as needed for nausea persistent despite zofran) 40 tablet 3    traMADoL (ULTRAM) 50 mg tablet Take 1 tablet (50 mg total) by mouth every 8 (eight) hours as needed  "for Pain. 21 each 0    traZODone (DESYREL) 50 MG tablet Take 1 tablet (50 mg total) by mouth nightly. 90 tablet 0    triamcinolone acetonide 0.1% (KENALOG) 0.1 % cream APPLY TOPICALLY TO THE AFFECTED AREA TWICE DAILY 60 g 0     No current facility-administered medications for this visit.     Facility-Administered Medications Ordered in Other Visits   Medication Dose Route Frequency Provider Last Rate Last Admin    0.9%  NaCl infusion   Intravenous Once Oren Verdin MD           Review of Systems   Constitutional:  Negative for chills, fatigue, fever and unexpected weight change.   Respiratory:  Negative for cough and shortness of breath.    Cardiovascular:  Negative for chest pain and palpitations.   Gastrointestinal:  Negative for abdominal pain, constipation, diarrhea, nausea and vomiting.   Skin:  Negative for rash.   Neurological:  Negative for headaches.   Hematological:  Negative for adenopathy. Does not bruise/bleed easily.       ECOG Performance Status: 2   Objective:      Vitals:   Vitals:    12/06/23 1256   BP: 134/60   BP Location: Right arm   Patient Position: Sitting   BP Method: Large (Manual)   Pulse: (!) 53   Resp: 20   Temp: 97.6 °F (36.4 °C)   TempSrc: Temporal   SpO2: 99%   Weight: 93.8 kg (206 lb 12.7 oz)   Height: 5' 4" (1.626 m)         Physical Exam  Constitutional:       General: She is not in acute distress.     Appearance: She is well-developed. She is not diaphoretic.   HENT:      Head: Normocephalic and atraumatic.   Cardiovascular:      Rate and Rhythm: Normal rate and regular rhythm.      Heart sounds: Normal heart sounds. No murmur heard.     No friction rub. No gallop.   Pulmonary:      Effort: Pulmonary effort is normal. No respiratory distress.      Breath sounds: Normal breath sounds. No wheezing or rales.   Chest:      Chest wall: No tenderness.   Abdominal:      General: Bowel sounds are normal. There is no distension.      Palpations: Abdomen is soft. There is no mass.      " Tenderness: There is no abdominal tenderness. There is no guarding or rebound.   Lymphadenopathy:      Cervical: No cervical adenopathy.      Upper Body:      Right upper body: No supraclavicular or axillary adenopathy.      Left upper body: No supraclavicular or axillary adenopathy.   Skin:     Findings: No erythema or rash.   Neurological:      Mental Status: She is alert and oriented to person, place, and time.   Psychiatric:         Behavior: Behavior normal.         Laboratory Data:  Lab Visit on 12/05/2023   Component Date Value Ref Range Status    WBC 12/05/2023 6.38  3.90 - 12.70 K/uL Final    RBC 12/05/2023 3.46 (L)  4.00 - 5.40 M/uL Final    Hemoglobin 12/05/2023 9.7 (L)  12.0 - 16.0 g/dL Final    Hematocrit 12/05/2023 31.0 (L)  37.0 - 48.5 % Final    MCV 12/05/2023 90  82 - 98 fL Final    MCH 12/05/2023 28.0  27.0 - 31.0 pg Final    MCHC 12/05/2023 31.3 (L)  32.0 - 36.0 g/dL Final    RDW 12/05/2023 16.0 (H)  11.5 - 14.5 % Final    Platelets 12/05/2023 233  150 - 450 K/uL Final    MPV 12/05/2023 9.6  9.2 - 12.9 fL Final    Immature Granulocytes 12/05/2023 0.5  0.0 - 0.5 % Final    Gran # (ANC) 12/05/2023 4.4  1.8 - 7.7 K/uL Final    Immature Grans (Abs) 12/05/2023 0.03  0.00 - 0.04 K/uL Final    Comment: Mild elevation in immature granulocytes is non specific and   can be seen in a variety of conditions including stress response,   acute inflammation, trauma and pregnancy. Correlation with other   laboratory and clinical findings is essential.      Lymph # 12/05/2023 1.3  1.0 - 4.8 K/uL Final    Mono # 12/05/2023 0.5  0.3 - 1.0 K/uL Final    Eos # 12/05/2023 0.1  0.0 - 0.5 K/uL Final    Baso # 12/05/2023 0.03  0.00 - 0.20 K/uL Final    nRBC 12/05/2023 0  0 /100 WBC Final    Gran % 12/05/2023 69.3  38.0 - 73.0 % Final    Lymph % 12/05/2023 20.1  18.0 - 48.0 % Final    Mono % 12/05/2023 8.3  4.0 - 15.0 % Final    Eosinophil % 12/05/2023 1.3  0.0 - 8.0 % Final    Basophil % 12/05/2023 0.5  0.0 - 1.9 % Final     Differential Method 12/05/2023 Automated   Final    Sodium 12/05/2023 142  136 - 145 mmol/L Final    Potassium 12/05/2023 4.7  3.5 - 5.1 mmol/L Final    Chloride 12/05/2023 114 (H)  95 - 110 mmol/L Final    CO2 12/05/2023 18 (L)  23 - 29 mmol/L Final    Glucose 12/05/2023 98  70 - 110 mg/dL Final    BUN 12/05/2023 21  8 - 23 mg/dL Final    Creatinine 12/05/2023 0.9  0.5 - 1.4 mg/dL Final    Calcium 12/05/2023 8.8  8.7 - 10.5 mg/dL Final    Total Protein 12/05/2023 6.7  6.0 - 8.4 g/dL Final    Albumin 12/05/2023 3.8  3.5 - 5.2 g/dL Final    Total Bilirubin 12/05/2023 0.7  0.1 - 1.0 mg/dL Final    Comment: For infants and newborns, interpretation of results should be based  on gestational age, weight and in agreement with clinical  observations.    Premature Infant recommended reference ranges:  Up to 24 hours.............<8.0 mg/dL  Up to 48 hours............<12.0 mg/dL  3-5 days..................<15.0 mg/dL  6-29 days.................<15.0 mg/dL      Alkaline Phosphatase 12/05/2023 44 (L)  55 - 135 U/L Final    AST 12/05/2023 14  10 - 40 U/L Final    ALT 12/05/2023 11  10 - 44 U/L Final    eGFR 12/05/2023 >60.0  >60 mL/min/1.73 m^2 Final    Anion Gap 12/05/2023 10  8 - 16 mmol/L Final         Imaging:     PET/CT 11/08/23  Neck: There is moderate brown fat uptake at the base of the neck, normal variant. No pathologic activity is present. No cervical adenopathy is noted.     Chest: There is no significant abnormal activity in the chest. There is fairly extensive postsurgical uptake in the soft tissues adjacent to the right shoulder in this patient status post reverse right shoulder arthroplasty.     There is no mediastinal nor axillary adenopathy. There is dilatation of the main pulmonary artery compared to the aorta, a finding associated with pulmonary arterial hypertension. Calcific plaque is present in the coronary arteries. There are no pleural effusions.     There is a 5 mm nodule in the left apex unchanged  series 5, image 117. This is stable compared to CT from 2007. There is also a calcified granuloma in the left upper lobe. Bands of scarring or atelectasis are seen bilaterally. 6 mm nodule anterior inferior right upper lobe unchanged since recent comparison exam series 5, image 159; 17 mm ground-glass opacity lateral right middle lobe unchanged since the 08/25/2023 series 5, image 168. There is a stable 4 mm nodule just above the diaphragm in the right lower lobe series 5, image 205, unchanged since 2007.     Abdomen/pelvis: There is uptake along the midline surgical scar in the anterior abdominal wall. Otherwise, no sites of abnormal activity are present. Specifically, there is no definite abnormal activity associated with the mass at the lower pole of the left kidney. This is noted to have a density consistent with fat on series 3, image 157, and the possibility of an angiomyolipoma is raised. Follow-up with MRI or with a multi phasic CT with and without IV contrast would be helpful for better characterization. The hyperechoic appearance on ultrasound of 08/25/2023 suggests an angiomyolipoma.     Limited noncontrast CT images of the liver, spleen, adrenal glands, pancreas and right kidney are grossly normal. There are gallstones. No abnormal activity is present in the gallbladder. The aorta is normal in caliber with scattered calcific plaque. There is an IVC filter.     The urinary bladder is collapsed. The uterus is atrophic or absent.     There has been partial colectomy with anastomosis in the right lower quadrant. A small amount of ascites is present. There is a small midline fat-containing ventral hernia. No adenopathy nor peritoneal implant is noted.     Bones: There are extensive degenerative changes in the spine. No findings to indicate metastatic disease to bone.    MRI Abdomen 11/20/23  Unremarkable appearance. No masses     Cholelithiasis with large stone within the gallbladder without findings of acute  cholecystitis or biliary duct dilatation     Spleen not enlarged     Adrenal glands unremarkable appearance     Pancreas unremarkable appearance     Abdominal aorta no aneurysm     Artifact in region of IVC suggesting IVC filter.     Postoperative changes anterior abdominal wall and fat containing supraumbilical ventral hernia.     Right kidney; unremarkable appearance of the right kidney. No mass.     Kidney; 2 cm heterogeneous fat containing enhancing mass in the lower pole. Presence of fat strongly suggest angiomyolipoma. Can be seen although rarely with renal cell carcinoma.       Assessment:       1. Malignant neoplasm of splenic flexure    2. Renal mass, left    3. Pulmonary nodules    4. Acute deep vein thrombosis (DVT) of popliteal vein of both lower extremities    5. Normocytic anemia             Plan:     Colon Cancer - Pt with h/o colon cancer s/p resection in 2007 followed by adjuvant FOLFOX for 12 cycles  -Pt recently with resection of transverse colon and splenic flexure 9/29/23 showing path showing invasive moderately differentiated adenocarcinoma with mucinous features, 33 benign lymph nodes, positive lymphovascular invasion, positive perineural invasion pT3N0  -Tumor shows intact expression of MLH1, PMS2, MSH2, MSH6  -Patient was positive for B Celio V600E mutation  -Pt has high risk stage II colon cancer given positive LVI and PNI  -PT is a candidate for treatment with 6 months of 5-FU or Capecitabine  -no clear evidence of metastatic disease on PET-CT 11/08/2023   Will proceed with 6 months of 5 fluorouracil  -Pharmacogenomic panel on 11/03/23 showed normal DPYD metabolizer but 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 Pt can proceed with treatment.  PT will need CBC and CMP with NP visit in 2 weeks with treatment.  Pt needs an appt with me in 4 weeks with visit and treatment.   Follow up with GRETCHEN 2 weeks.   Infusion scheduling note    Injection scheduling note    Labs    Imaging    Pharmacy appointment    Other referrals              Treatment Plan Information   OP COLORECTAL FLUOROURACIL LEUCOVORIN Q2W (MOD DE GRAMONT)   Mahesh Cameron MD   Upcoming Treatment Dates - OP COLORECTAL FLUOROURACIL LEUCOVORIN Q2W (MOD DE GRAMONT)    12/19/2023       Chemotherapy       leucovorin calcium 400 mg/m2 = 825 mg in dextrose 5 % (D5W) 250 mL infusion       fluorouraciL injection 825 mg       fluorouracil (ADRUCIL) 2,400 mg/m2 = 4,945 mg in sodium chloride 0.9% 100 mL chemo infusion       Antiemetics       ondansetron injection 8 mg  1/2/2024       Chemotherapy       leucovorin calcium 400 mg/m2 = 825 mg in dextrose 5 % (D5W) 250 mL infusion       fluorouraciL injection 825 mg       fluorouracil (ADRUCIL) 2,400 mg/m2 = 4,945 mg in sodium chloride 0.9% 100 mL chemo infusion       Antiemetics       ondansetron injection 8 mg  1/16/2024       Chemotherapy       leucovorin calcium 400 mg/m2 = 825 mg in dextrose 5 % (D5W) 250 mL infusion       fluorouraciL injection 825 mg       fluorouracil (ADRUCIL) 2,400 mg/m2 = 4,945 mg in sodium chloride 0.9% 100 mL chemo infusion       Antiemetics       ondansetron injection 8 mg  1/30/2024       Chemotherapy       leucovorin calcium 400 mg/m2 = 825 mg in dextrose 5 % (D5W) 250 mL  infusion       fluorouraciL injection 825 mg       fluorouracil (ADRUCIL) 2,400 mg/m2 = 4,945 mg in sodium chloride 0.9% 100 mL chemo infusion       Antiemetics       ondansetron injection 8 mg      Mahesh Cameron MD  [facility]  Hematology and Oncology  [facility]   [address] [place] [address]   O: [phone]  F: [phone]

## 2023-12-06 ENCOUNTER — DOCUMENTATION ONLY (OUTPATIENT)
Dept: INFUSION THERAPY | Facility: HOSPITAL | Age: 83
End: 2023-12-06
Payer: MEDICARE

## 2023-12-06 ENCOUNTER — OFFICE VISIT (OUTPATIENT)
Dept: HEMATOLOGY/ONCOLOGY | Facility: CLINIC | Age: 83
End: 2023-12-06
Payer: MEDICARE

## 2023-12-06 ENCOUNTER — INFUSION (OUTPATIENT)
Dept: INFUSION THERAPY | Facility: HOSPITAL | Age: 83
End: 2023-12-06
Attending: INTERNAL MEDICINE
Payer: MEDICARE

## 2023-12-06 VITALS
HEART RATE: 53 BPM | HEIGHT: 64 IN | SYSTOLIC BLOOD PRESSURE: 134 MMHG | BODY MASS INDEX: 35.31 KG/M2 | DIASTOLIC BLOOD PRESSURE: 60 MMHG | OXYGEN SATURATION: 99 % | TEMPERATURE: 98 F | WEIGHT: 206.81 LBS | RESPIRATION RATE: 20 BRPM

## 2023-12-06 VITALS
HEIGHT: 64 IN | BODY MASS INDEX: 34.72 KG/M2 | WEIGHT: 203.38 LBS | SYSTOLIC BLOOD PRESSURE: 125 MMHG | RESPIRATION RATE: 16 BRPM | TEMPERATURE: 98 F | OXYGEN SATURATION: 100 % | DIASTOLIC BLOOD PRESSURE: 74 MMHG | HEART RATE: 61 BPM

## 2023-12-06 DIAGNOSIS — R91.8 PULMONARY NODULES: ICD-10-CM

## 2023-12-06 DIAGNOSIS — I82.433 ACUTE DEEP VEIN THROMBOSIS (DVT) OF POPLITEAL VEIN OF BOTH LOWER EXTREMITIES: ICD-10-CM

## 2023-12-06 DIAGNOSIS — D64.9 NORMOCYTIC ANEMIA: ICD-10-CM

## 2023-12-06 DIAGNOSIS — N28.89 RENAL MASS, LEFT: ICD-10-CM

## 2023-12-06 DIAGNOSIS — C18.5 MALIGNANT NEOPLASM OF SPLENIC FLEXURE: Primary | ICD-10-CM

## 2023-12-06 PROCEDURE — 96367 TX/PROPH/DG ADDL SEQ IV INF: CPT

## 2023-12-06 PROCEDURE — 96366 THER/PROPH/DIAG IV INF ADDON: CPT | Mod: PN

## 2023-12-06 PROCEDURE — 96416 CHEMO PROLONG INFUSE W/PUMP: CPT | Mod: PN

## 2023-12-06 PROCEDURE — 99999 PR PBB SHADOW E&M-EST. PATIENT-LVL IV: ICD-10-PCS | Mod: PBBFAC,,, | Performed by: INTERNAL MEDICINE

## 2023-12-06 PROCEDURE — 1125F PR PAIN SEVERITY QUANTIFIED, PAIN PRESENT: ICD-10-PCS | Mod: CPTII,S$GLB,, | Performed by: INTERNAL MEDICINE

## 2023-12-06 PROCEDURE — 96409 CHEMO IV PUSH SNGL DRUG: CPT | Mod: PN

## 2023-12-06 PROCEDURE — 63600175 PHARM REV CODE 636 W HCPCS: Mod: PN | Performed by: INTERNAL MEDICINE

## 2023-12-06 PROCEDURE — 1125F AMNT PAIN NOTED PAIN PRSNT: CPT | Mod: CPTII,S$GLB,, | Performed by: INTERNAL MEDICINE

## 2023-12-06 PROCEDURE — 3078F DIAST BP <80 MM HG: CPT | Mod: CPTII,S$GLB,, | Performed by: INTERNAL MEDICINE

## 2023-12-06 PROCEDURE — 3288F PR FALLS RISK ASSESSMENT DOCUMENTED: ICD-10-PCS | Mod: CPTII,S$GLB,, | Performed by: INTERNAL MEDICINE

## 2023-12-06 PROCEDURE — 1100F PTFALLS ASSESS-DOCD GE2>/YR: CPT | Mod: CPTII,S$GLB,, | Performed by: INTERNAL MEDICINE

## 2023-12-06 PROCEDURE — 1100F PR PT FALLS ASSESS DOC 2+ FALLS/FALL W/INJURY/YR: ICD-10-PCS | Mod: CPTII,S$GLB,, | Performed by: INTERNAL MEDICINE

## 2023-12-06 PROCEDURE — 3075F PR MOST RECENT SYSTOLIC BLOOD PRESS GE 130-139MM HG: ICD-10-PCS | Mod: CPTII,S$GLB,, | Performed by: INTERNAL MEDICINE

## 2023-12-06 PROCEDURE — 3288F FALL RISK ASSESSMENT DOCD: CPT | Mod: CPTII,S$GLB,, | Performed by: INTERNAL MEDICINE

## 2023-12-06 PROCEDURE — 99999 PR PBB SHADOW E&M-EST. PATIENT-LVL IV: CPT | Mod: PBBFAC,,, | Performed by: INTERNAL MEDICINE

## 2023-12-06 PROCEDURE — 99215 OFFICE O/P EST HI 40 MIN: CPT | Mod: S$GLB,,, | Performed by: INTERNAL MEDICINE

## 2023-12-06 PROCEDURE — 96375 TX/PRO/DX INJ NEW DRUG ADDON: CPT | Mod: PN

## 2023-12-06 PROCEDURE — 99215 PR OFFICE/OUTPT VISIT, EST, LEVL V, 40-54 MIN: ICD-10-PCS | Mod: S$GLB,,, | Performed by: INTERNAL MEDICINE

## 2023-12-06 PROCEDURE — A4216 STERILE WATER/SALINE, 10 ML: HCPCS | Mod: PN | Performed by: INTERNAL MEDICINE

## 2023-12-06 PROCEDURE — 3078F PR MOST RECENT DIASTOLIC BLOOD PRESSURE < 80 MM HG: ICD-10-PCS | Mod: CPTII,S$GLB,, | Performed by: INTERNAL MEDICINE

## 2023-12-06 PROCEDURE — 25000003 PHARM REV CODE 250: Mod: PN | Performed by: INTERNAL MEDICINE

## 2023-12-06 PROCEDURE — 3075F SYST BP GE 130 - 139MM HG: CPT | Mod: CPTII,S$GLB,, | Performed by: INTERNAL MEDICINE

## 2023-12-06 RX ORDER — FLUOROURACIL 50 MG/ML
400 INJECTION, SOLUTION INTRAVENOUS
Status: CANCELLED | OUTPATIENT
Start: 2023-12-06

## 2023-12-06 RX ORDER — PROCHLORPERAZINE EDISYLATE 5 MG/ML
5 INJECTION INTRAMUSCULAR; INTRAVENOUS ONCE AS NEEDED
Status: DISCONTINUED | OUTPATIENT
Start: 2023-12-06 | End: 2023-12-06 | Stop reason: HOSPADM

## 2023-12-06 RX ORDER — SODIUM CHLORIDE 0.9 % (FLUSH) 0.9 %
10 SYRINGE (ML) INJECTION
Status: DISCONTINUED | OUTPATIENT
Start: 2023-12-06 | End: 2023-12-06 | Stop reason: HOSPADM

## 2023-12-06 RX ORDER — ONDANSETRON 2 MG/ML
8 INJECTION INTRAMUSCULAR; INTRAVENOUS
Status: COMPLETED | OUTPATIENT
Start: 2023-12-06 | End: 2023-12-06

## 2023-12-06 RX ORDER — DIPHENHYDRAMINE HYDROCHLORIDE 50 MG/ML
50 INJECTION INTRAMUSCULAR; INTRAVENOUS ONCE AS NEEDED
Status: DISCONTINUED | OUTPATIENT
Start: 2023-12-06 | End: 2023-12-06 | Stop reason: HOSPADM

## 2023-12-06 RX ORDER — PROCHLORPERAZINE EDISYLATE 5 MG/ML
5 INJECTION INTRAMUSCULAR; INTRAVENOUS ONCE AS NEEDED
Status: CANCELLED
Start: 2023-12-06

## 2023-12-06 RX ORDER — HEPARIN 100 UNIT/ML
500 SYRINGE INTRAVENOUS
Status: DISCONTINUED | OUTPATIENT
Start: 2023-12-06 | End: 2023-12-06 | Stop reason: HOSPADM

## 2023-12-06 RX ORDER — ONDANSETRON 2 MG/ML
8 INJECTION INTRAMUSCULAR; INTRAVENOUS
Status: CANCELLED | OUTPATIENT
Start: 2023-12-06

## 2023-12-06 RX ORDER — SODIUM CHLORIDE 0.9 % (FLUSH) 0.9 %
10 SYRINGE (ML) INJECTION
Status: CANCELLED | OUTPATIENT
Start: 2023-12-06

## 2023-12-06 RX ORDER — DIPHENHYDRAMINE HYDROCHLORIDE 50 MG/ML
50 INJECTION INTRAMUSCULAR; INTRAVENOUS ONCE AS NEEDED
Status: CANCELLED | OUTPATIENT
Start: 2023-12-06

## 2023-12-06 RX ORDER — FLUOROURACIL 50 MG/ML
400 INJECTION, SOLUTION INTRAVENOUS
Status: COMPLETED | OUTPATIENT
Start: 2023-12-06 | End: 2023-12-06

## 2023-12-06 RX ORDER — HEPARIN 100 UNIT/ML
500 SYRINGE INTRAVENOUS
Status: CANCELLED | OUTPATIENT
Start: 2023-12-06

## 2023-12-06 RX ORDER — EPINEPHRINE 0.3 MG/.3ML
0.3 INJECTION SUBCUTANEOUS ONCE AS NEEDED
Status: DISCONTINUED | OUTPATIENT
Start: 2023-12-06 | End: 2023-12-06 | Stop reason: HOSPADM

## 2023-12-06 RX ORDER — SODIUM CHLORIDE 0.9 % (FLUSH) 0.9 %
10 SYRINGE (ML) INJECTION
Status: CANCELLED | OUTPATIENT
Start: 2023-12-07

## 2023-12-06 RX ORDER — EPINEPHRINE 0.3 MG/.3ML
0.3 INJECTION SUBCUTANEOUS ONCE AS NEEDED
Status: CANCELLED | OUTPATIENT
Start: 2023-12-06

## 2023-12-06 RX ORDER — HEPARIN 100 UNIT/ML
500 SYRINGE INTRAVENOUS
Status: CANCELLED | OUTPATIENT
Start: 2023-12-07

## 2023-12-06 RX ADMIN — LEUCOVORIN CALCIUM 825 MG: 350 INJECTION, POWDER, LYOPHILIZED, FOR SOLUTION INTRAMUSCULAR; INTRAVENOUS at 02:12

## 2023-12-06 RX ADMIN — SODIUM CHLORIDE: 9 INJECTION, SOLUTION INTRAVENOUS at 02:12

## 2023-12-06 RX ADMIN — Medication 10 ML: at 02:12

## 2023-12-06 RX ADMIN — FLUOROURACIL 825 MG: 50 INJECTION, SOLUTION INTRAVENOUS at 04:12

## 2023-12-06 RX ADMIN — ONDANSETRON 8 MG: 2 INJECTION INTRAMUSCULAR; INTRAVENOUS at 02:12

## 2023-12-06 RX ADMIN — FLUOROURACIL 4945 MG: 50 INJECTION, SOLUTION INTRAVENOUS at 04:12

## 2023-12-06 NOTE — PROGRESS NOTES
Acupuncture Follow-Up Note     Name: Danna Sorensen  [facility] Number: [id number]    Traditional Chinese Medicine (TCM) Diagnosis: Qi Stagnation, Blood Stasis, Qi Deficiency, Blood Deficiency, Damp, and Yin Deficiency  Medical Diagnosis:   1. Chronic right-sided low back pain without sciatica    2. Other low back pain         Evaluation Date: 12/6/2023    Visit #/Visits authorized:     Precautions: Standard    Subjective     Chief Concern: Low-back Pain (Low back pain today is 4/10)       Medical necessity is demonstrated by the following IMPAIRMENTS: Medical Necessity: Decreased mobility limits day to day activities, social, and emergent situations              Aggravating Factors:  movement     Relieving Factors:  rest    Symptom Description:     Quality:  Aching, Dull, and Deep  Severity:  4  Frequency:  every day      Objective     Observation: Patient got some relief with first treatment and plans to continue as recommended.      Pulse:        thready       New Findings:  na    Treatment     Treatment Principles:  move qi and blood, relieve bi, drain damp, nourish yin    Acupuncture points used:  4 DONOHUE, Du20, Gb34, Ki3, Ki6, Li11, Sp6, Sp9, St36, and YIN MOORE    Bilateral points:  Unilateral points:  Auricular Treatment:  brewster men, cervical spine, thoracic spine, lumbar spine, sacral spine, and muscle relaxation    Needles In: 32  Needles Out: 32  Needles W/ STIM placed: 835  Needles W/ STIM removed: 855      Other Traditional Chinese Medicine Modalities -  na    Assessment     After treatment, patient felt relief and plans to continue     Patient prognosis is Good.     Patient will continue to benefit from acupuncture treatment to address the deficits listed in the problem list box on initial evaluation, provide patient family education and to maximize pt's level of independence in the home and community environment.     Patient's spiritual, cultural and educational needs considered and pt agreeable to plan of  care and goals.     Anticipated barriers to treatment: none    Plan     Recommend 2 /week for 6 sessions then re-assess.      Education:  Patient is aware of cumulative benefit of acupuncture

## 2023-12-06 NOTE — PROGRESS NOTES
Nutrition Visit:    RD met with patient and her niece at chairside during infusion treatment. Today is patient's first day. Pt denies any f/u questions from new patient education yesterday. RD reinforced role in POC. Pt very pleasant and appreciative.     Ana Cristina Coles 12/06/2023  3:24 PM

## 2023-12-06 NOTE — PLAN OF CARE
Problem: Cognitive Impairment (Anxiety Signs/Symptoms)  Goal: Optimized Cognitive Function (Anxiety Signs/Symptoms)  Outcome: Ongoing, Progressing   Answered questions and concerns regarding chemo today  Problem: Adult Inpatient Plan of Care  Goal: Plan of Care Review  Outcome: Met   Tolerated tx will today Able to be d/c to home with instructions  Verbally acknowledged understanding   Ambulated to private vehicle without difficulty NAD

## 2023-12-07 ENCOUNTER — DOCUMENT SCAN (OUTPATIENT)
Dept: HOME HEALTH SERVICES | Facility: HOSPITAL | Age: 83
End: 2023-12-07
Payer: MEDICARE

## 2023-12-07 ENCOUNTER — CLINICAL SUPPORT (OUTPATIENT)
Dept: REHABILITATION | Facility: HOSPITAL | Age: 83
End: 2023-12-07
Payer: MEDICARE

## 2023-12-07 DIAGNOSIS — G89.29 CHRONIC RIGHT-SIDED LOW BACK PAIN WITHOUT SCIATICA: Primary | ICD-10-CM

## 2023-12-07 DIAGNOSIS — M54.59 OTHER LOW BACK PAIN: ICD-10-CM

## 2023-12-07 DIAGNOSIS — M54.50 CHRONIC RIGHT-SIDED LOW BACK PAIN WITHOUT SCIATICA: Primary | ICD-10-CM

## 2023-12-07 PROCEDURE — 97814 ACUP 1/> W/ESTIM EA ADDL 15: CPT | Mod: PN | Performed by: ACUPUNCTURIST

## 2023-12-07 PROCEDURE — 97813 ACUP 1/> W/ESTIM 1ST 15 MIN: CPT | Mod: PN | Performed by: ACUPUNCTURIST

## 2023-12-08 ENCOUNTER — INFUSION (OUTPATIENT)
Dept: INFUSION THERAPY | Facility: HOSPITAL | Age: 83
End: 2023-12-08
Attending: INTERNAL MEDICINE
Payer: MEDICARE

## 2023-12-08 VITALS
HEART RATE: 61 BPM | RESPIRATION RATE: 18 BRPM | DIASTOLIC BLOOD PRESSURE: 74 MMHG | SYSTOLIC BLOOD PRESSURE: 125 MMHG | TEMPERATURE: 98 F

## 2023-12-08 DIAGNOSIS — C18.5 MALIGNANT NEOPLASM OF SPLENIC FLEXURE: Primary | ICD-10-CM

## 2023-12-08 PROCEDURE — 25000003 PHARM REV CODE 250: Mod: PN | Performed by: INTERNAL MEDICINE

## 2023-12-08 PROCEDURE — A4216 STERILE WATER/SALINE, 10 ML: HCPCS | Mod: PN | Performed by: INTERNAL MEDICINE

## 2023-12-08 PROCEDURE — 63600175 PHARM REV CODE 636 W HCPCS: Mod: PN | Performed by: INTERNAL MEDICINE

## 2023-12-08 RX ORDER — SODIUM CHLORIDE 0.9 % (FLUSH) 0.9 %
10 SYRINGE (ML) INJECTION
Status: DISCONTINUED | OUTPATIENT
Start: 2023-12-08 | End: 2023-12-08 | Stop reason: HOSPADM

## 2023-12-08 RX ORDER — HEPARIN 100 UNIT/ML
500 SYRINGE INTRAVENOUS
Status: DISCONTINUED | OUTPATIENT
Start: 2023-12-08 | End: 2023-12-08 | Stop reason: HOSPADM

## 2023-12-08 RX ADMIN — Medication 500 UNITS: at 02:12

## 2023-12-08 RX ADMIN — Medication 10 ML: at 02:12

## 2023-12-08 NOTE — PROGRESS NOTES
Acupuncture Follow-Up Note     Name: Danna Sorensen  [facility] Number: [id number]    Traditional Chinese Medicine (TCM) Diagnosis: Qi Stagnation, Blood Stasis, Qi Deficiency, Blood Deficiency, Damp, and Yin Deficiency  Medical Diagnosis: No diagnosis found.     Evaluation Date: 12/07/2023  Visit #/Visits authorized:     Precautions: Standard    Subjective     Chief Concern: Low-back Pain (Pain is 3/10 today)       Medical necessity is demonstrated by the following IMPAIRMENTS: Medical Necessity: Decreased mobility limits day to day activities, social, and emergent situations              Aggravating Factors:  movement     Relieving Factors:  rest    Symptom Description:     Quality:  Aching, Dull, Tingling, and Numb  Severity:  3  Frequency:  every day      Objective     Observation: Patient is responding well to treatment and plans to continue with protocol as recommended.      Pulse:        thready       New Findings:  na    Treatment     Treatment Principles:  move qi and blood, relieve bi, clear channels, nourish yin    Acupuncture points used:  4 DONOHUE, Du20, Gb34, Ki3, Ki6, Li11, Sp6, Sp9, St36, and YIN MOORE    Bilateral points:  Unilateral points:  Auricular Treatment:  brewster men    Needles In: 22  Needles Out: 22  Needles W/ STIM placed: 835  Needles W/ STIM removed: 855      Other Traditional Chinese Medicine Modalities -  na    Assessment     After treatment, patient felt relief and plans to continue     Patient prognosis is Good.     Patient will continue to benefit from acupuncture treatment to address the deficits listed in the problem list box on initial evaluation, provide patient family education and to maximize pt's level of independence in the home and community environment.     Patient's spiritual, cultural and educational needs considered and pt agreeable to plan of care and goals.     Anticipated barriers to treatment: none    Plan     Recommend 1 /week for 12 sessions then re-assess.      Education:   Patient is aware of cumulative benefit of acupuncture

## 2023-12-11 ENCOUNTER — CLINICAL SUPPORT (OUTPATIENT)
Dept: REHABILITATION | Facility: HOSPITAL | Age: 83
End: 2023-12-11
Payer: MEDICARE

## 2023-12-11 DIAGNOSIS — M51.36 DDD (DEGENERATIVE DISC DISEASE), LUMBAR: ICD-10-CM

## 2023-12-11 DIAGNOSIS — G89.29 CHRONIC RIGHT-SIDED LOW BACK PAIN WITHOUT SCIATICA: Primary | ICD-10-CM

## 2023-12-11 DIAGNOSIS — M54.59 OTHER LOW BACK PAIN: ICD-10-CM

## 2023-12-11 DIAGNOSIS — M54.50 CHRONIC RIGHT-SIDED LOW BACK PAIN WITHOUT SCIATICA: Primary | ICD-10-CM

## 2023-12-11 PROCEDURE — 97813 ACUP 1/> W/ESTIM 1ST 15 MIN: CPT | Mod: PN | Performed by: ACUPUNCTURIST

## 2023-12-11 PROCEDURE — 97814 ACUP 1/> W/ESTIM EA ADDL 15: CPT | Mod: PN | Performed by: ACUPUNCTURIST

## 2023-12-11 NOTE — PROGRESS NOTES
Acupuncture Follow-Up Note     Name: Danna Sorensen  Clinic Number: [id number]    Traditional Chinese Medicine (TCM) Diagnosis: Qi Stagnation, Blood Stasis, Qi Deficiency, Blood Deficiency, Damp, and Yin Deficiency  Medical Diagnosis:   1. Chronic right-sided low back pain without sciatica    2. Other low back pain         Evaluation Date: 12/11/2023    Visit #/Visits authorized:     Precautions: Standard    Subjective     Chief Concern: Low-back Pain (Patient reports chronic low back pain today R>L.  Today right hip is elevated and causing referred knee pain. 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  She was receiving PT at home but completed the first round.  She still needs regular strength training and pelvic stabilization because she presents today with an elevated right PSIS which is putting unnecessary stress on the right knee appliance and distorting her gait. She should continue with acupuncture pre or post PT weekly to help with any discomfort from her PT sessions.       Pulse:        thready       New Findings:  na    Treatment     Treatment Principles:  move qi and blood, relieve bi, clear channels.     Acupuncture points used:  4 DONOHUE, Du20, Gb34, Ki3, Ki6, and Li11    Bilateral points:  Unilateral points:UB 53-56, GB 29-31  Auricular Treatment:  brewster men    Needles In: 23  Needles Out: 23  [place] W/ STIM placed: 935  Needles W/ STIM removed: 955      Other Traditional  Chinese Medicine Modalities -  yuli campo    Assessment     After treatment, patient felt better and plans to continue     Patient prognosis is Good.     Patient will continue to benefit from acupuncture treatment to address the deficits listed in the problem list box on initial evaluation, provide patient family education and to maximize pt's level of independence in the home and community environment.     Patient's spiritual, cultural and educational needs considered and pt agreeable to plan of care and goals.     Anticipated barriers to treatment: none    Plan     Recommend 1 /week for 12 sessions then re-assess.      Education:  Patient is aware of cumulative benefit of acupuncture

## 2023-12-12 ENCOUNTER — TELEPHONE (OUTPATIENT)
Dept: HEMATOLOGY/ONCOLOGY | Facility: CLINIC | Age: 83
End: 2023-12-12
Payer: MEDICARE

## 2023-12-12 NOTE — TELEPHONE ENCOUNTER
"Called pt to get her scheduled for PT eval; pt stated that she would like to wait until the beginning of the year to get started because "she has too much going on right now'. I will give her a call in Jan 2024 to get her PT scheduled  "

## 2023-12-14 ENCOUNTER — CLINICAL SUPPORT (OUTPATIENT)
Dept: REHABILITATION | Facility: HOSPITAL | Age: 83
End: 2023-12-14
Payer: MEDICARE

## 2023-12-14 DIAGNOSIS — G89.29 CHRONIC RIGHT-SIDED LOW BACK PAIN WITHOUT SCIATICA: Primary | ICD-10-CM

## 2023-12-14 DIAGNOSIS — M54.50 CHRONIC RIGHT-SIDED LOW BACK PAIN WITHOUT SCIATICA: Primary | ICD-10-CM

## 2023-12-14 DIAGNOSIS — M54.59 OTHER LOW BACK PAIN: ICD-10-CM

## 2023-12-14 PROCEDURE — 97814 ACUP 1/> W/ESTIM EA ADDL 15: CPT | Mod: PN | Performed by: ACUPUNCTURIST

## 2023-12-14 PROCEDURE — 97813 ACUP 1/> W/ESTIM 1ST 15 MIN: CPT | Mod: PN | Performed by: ACUPUNCTURIST

## 2023-12-14 NOTE — PROGRESS NOTES
Acupuncture Follow-Up Note     Name: Danna Sorensen  [facility] Number: [id number]    Traditional Chinese Medicine (TCM) Diagnosis: Qi Stagnation, Blood Stasis, Qi Deficiency, Blood Deficiency, Wind , Damp, and Yin Deficiency  Medical Diagnosis:   1. Chronic right-sided low back pain without sciatica    2. Other low back pain         Evaluation Date: 12/14/2023    Visit #/Visits authorized:     Precautions: Standard    Subjective     Chief Concern: Low-back Pain (Low back and hip pain today is 1/10  ) and Chronic Pain (Other low back pain today is also 1/10)       Medical necessity is demonstrated by the following IMPAIRMENTS: Medical Necessity: Decreased mobility limits day to day activities, social, and emergent situations              Aggravating Factors:  movement     Relieving Factors:  nothing    Symptom Description:     Quality:  Aching, Dull, and Throbbing  Severity:  1  Frequency:  every day      Objective     Observation: Patient is progressing as anticipated. Pain markers are below 3/10 in all areas so she will be reduced to once per week for 3 weeks and if those numbers can be maintained sessions will be further reduced to monthly.       Pulse:        thready       New Findings:  na    Treatment     Treatment Principles:  move qi and blood, relieve bi, drain damp, nourish yin.    Acupuncture points used:  Du20, Gb34, Ki3, Ki6, Sp6, Sp9, and St36    Bilateral points:  Unilateral points:  Auricular Treatment:  brewster men    Needles In: 15  Needles Out: 15  Needles W/ STIM placed: 905  [place] W/ STIM removed: 925      Other Traditional Chinese Medicine Modalities -  na    Assessment     After treatment, patient felt relief and greater rom     Patient prognosis is Good.     Patient will continue to benefit from acupuncture treatment to address the deficits listed in the problem list box on initial evaluation, provide patient family education and to maximize pt's level of independence in the home and community  environment.     Patient's spiritual, cultural and educational needs considered and pt agreeable to plan of care and goals.     Anticipated barriers to treatment: none    Plan     Recommend 1 /week for 12 sessions then re-assess.      Education:  Patient is aware of cumulative benefit of acupuncture

## 2023-12-18 ENCOUNTER — TELEPHONE (OUTPATIENT)
Dept: HEMATOLOGY/ONCOLOGY | Facility: CLINIC | Age: 83
End: 2023-12-18
Payer: MEDICARE

## 2023-12-18 NOTE — TELEPHONE ENCOUNTER
LVM about r/s IO appt from 12/22 due to NP being out of office. I left option to r/s appt on another day and to call with any questions.

## 2023-12-18 NOTE — PROGRESS NOTES
PATIENT: Danna Sorensen  MRN: [id number]  DATE: 12/20/2023      Diagnosis:   1. Malignant neoplasm of splenic flexure    2. Immunodeficiency due to chemotherapy    3. Lip ulcer    4. Renal mass, left    5. Pulmonary nodules    6. Acute deep vein thrombosis (DVT) of popliteal vein of both lower extremities    7. Iron deficiency anemia, unspecified iron deficiency anemia type            Chief Complaint: Colon Cancer      Oncologic History:      Oncologic History     Oncologic Treatment     Pathology           Subjective:    Interval History: Ms. Sorensen is a 83 y.o. female with Pulmonary embolism, carpal tunnel, CAD, HTN, hypothyroidism, osteoporosis, osteoarthritis, who presents for colon cancer.  Since the last clinic visit the patient states she developed a sore on her lower lip.  She was using Listerine to help.  Pt endorses chronic SOB which she attributes to allergies.  The patient denies CP, cough, SOB, abdominal pain, nausea, vomiting, constipation, diarrhea.  The patient denies fever, chills, night sweats, weight loss, new lumps or bumps, easy bruising or bleeding.    Prior History:  Pt was previously diagnosed with Stage III adenocarcinoma of the colon in 2007 and underwent 12 cycles of FOLFOX.  Pt underwent colonoscopy on 8/08/23 showing 1 7 mm polyp in the rectum; altered vascular, congested, eroded, friable and ulcerated mucosa in the transverse colon which was biopsied; patent and to side ileocolonic anastomosis.  Pathology showed moderately differentiated adenocarcinoma. CT abdomen and pelvis 8/17/23 showing irregular appearance of the gallbladder; heterogeneously enhancing lesion in the inferior pole of the left kidney measuring 1.8 cm; short segment of concentric bowel wall thickening of the colon at the splenic flexure with surrounding mesenteric fat stranding with nodular thickness of 2.2 cm.  The patient underwent colonoscopy on 08/22/2023 showing nonbleeding internal hemorrhoids, partially obstructing  tumor in the transverse colon which was tattooed.  Pt saw Urology 8/23/23 with plans for re-imaging the renal lesion in 3 months with an MRI.  US abdomen 8/25/23 showed a 2.5 cm solid mass at the lower pole of the left kidney suspicious for neoplasm.  CT chest 8/25/23 showed 4 mm left upper lobe pulmonary nodule, 4 mm calcified granuloma left upper lobe, 2 mm pulmonary nodule in the left upper lobe, 9 x 9 mm triangular nodule along the juxtapleural right middle lobe, 2 mm pulmonary nodule in the right middle lobe, and a partially imaged masslike density in the splenic flexure of the colon. Pt then underwent resection of the transverse colon and splenic flexure with path showing invasive moderately differentiated adenocarcinoma with mucinous features, 33 benign lymph nodes, positive lymphovascular invasion, positive perineural invasion pT3N0.  Tumor shows intact expression of MLH1, PMS2, MSH2, MSH6.  Patient was positive for B Celio V600E mutation.   The patient was discussed at tumor Board on 11/07/2023 with recommendation for PET-CT with biopsy of lung nodules if positive.  PET-CT on 11/08/2023 showed evidence of pulmonary hypertension; stable 5 mm nodule left lung apex; calcified granuloma left upper lobe; stable 6 mm inferior right upper lobe nodule; stable 17 mm ground-glass opacity lateral right middle lobe; stable 4 mm nodule in the right lower lobe of the diaphragm; no activity in the lesion in the left kidney.  MRI abdomen 11/20/2023 showed heterogeneous fat containing enhancing mass in the lower pole of the left kidney suggestive of an angio myelolipoma.    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    Strabismus     Trouble in sleeping     Wears dentures     Uppers       Past Surgical HIstory:   Past Surgical History:   Procedure Laterality Date    CARDIAC SURGERY      cardiac cath    COLON SURGERY      colon resection    COLONOSCOPY N/A 10/18/2016    Procedure: COLONOSCOPY;  Surgeon: Rell Cordova MD;  Location: [facility] ENDO;  Service: Endoscopy;  Laterality: N/A;    COLONOSCOPY N/A 8/8/2023    Procedure: COLONOSCOPY;  Surgeon: Kaushik Pinon MD;  Location: [facility];  Service: Endoscopy;  Laterality: N/A;    COLONOSCOPY N/A 8/22/2023    Procedure: COLONOSCOPY (tattoo of colon ca);  Surgeon: Kaushik Pinon MD;  Location: [facility];  Service: Endoscopy;  Laterality: N/A;    ESOPHAGOGASTRODUODENOSCOPY N/A 8/3/2023    Procedure: EGD (ESOPHAGOGASTRODUODENOSCOPY);  Surgeon: Kaushik Pinon MD;  Location: [facility];  Service: Endoscopy;  Laterality: N/A;    HAND TENDON SURGERY Left     HEMICOLECTOMY      right    INJECTION OF ANESTHETIC AGENT AROUND MEDIAL BRANCH NERVES INNERVATING LUMBAR FACET JOINT Right 07/10/2018    Procedure: Block-nerve-medial branch-lumbar;  Surgeon: Parish Gaitan MD;  Location: [facility] OR;  Service: Pain Management;  Laterality: Right;  L3, 4, 5    INSERTION OF INFERIOR VENA CAVAL FILTER N/A 9/13/2023    Procedure: Insertion, Filter, Inferior Vena Cava;  Surgeon: Oren Verdin MD;  Location: [facility] CATH/EP LAB;  Service: General;  Laterality: N/A;    INSERTION OF TUNNELED CENTRAL VENOUS CATHETER (CVC) WITH SUBCUTANEOUS PORT Right 11/15/2023    Procedure: [id number];  Surgeon: Jim Chavez MD;  Location: [facility] OR;  Service: General;  Laterality: Right;    JOINT REPLACEMENT      bilateral     "RADIOFREQUENCY ABLATION OF LUMBAR MEDIAL BRANCH NERVE AT SINGLE LEVEL Right 07/24/2018    Procedure: RADIOFREQUENCY ABLATION, NERVE, MEDIAL BRANCH, LUMBAR, 1 LEVEL;  Surgeon: Parish Gaitan MD;  Location: [facility] OR;  Service: Pain Management;  Laterality: Right;  L3,4,5 - Burned at 80 degrees C. for 75 seconds x 2 each site    ROBOT-ASSISTED COLECTOMY N/A 9/29/2023    Procedure: ROBOTIC COLECTOMY;  Surgeon: Jim Chavez MD;  Location: [facility] OR;  Service: General;  Laterality: N/A;    SHOULDER ARTHROSCOPY Right 01/12/2017    Reverse     TONSILLECTOMY      TONSILLECTOMY, ADENOIDECTOMY, BILATERAL MYRINGOTOMY AND TUBES      TOTAL KNEE ARTHROPLASTY Bilateral 08/1998    total replacements    TUMOR REMOVAL Left     left foot as a child       Family History:   Family History   Problem Relation Age of Onset    Hypertension Mother     Kidney disease Mother     Cataracts Father     Cataracts Sister     Cancer Sister         breast    No Known Problems Brother     Cancer Sister         breast    Arthritis Sister     Glaucoma Neg Hx     Amblyopia Neg Hx     Blindness Neg Hx     Macular degeneration Neg Hx     Retinal detachment Neg Hx     Strabismus Neg Hx     Stroke Neg Hx     Thyroid disease Neg Hx        Social History:  reports that she quit smoking about 63 years ago. Her smoking use included cigarettes. She started smoking about 63 years ago. She has a 0.5 pack-year smoking history. She has never used smokeless tobacco. She reports that she does not drink alcohol and does not use drugs.    Allergies:  Review of patient's allergies indicates:   Allergen Reactions    Aspirin      Other reaction(s): Stomach upset    Aspirin Other (See Comments)     Other reaction(s): Stomach upset    Gabapentin Other (See Comments)     Pt states she felt "funny" when she took the medication. Especially at the higher dose.    Mobic [meloxicam] Swelling     Edema and elevated blood pressure     Oxaliplatin Other (See Comments)     Other " reaction(s): Joint pain  Other reaction(s): Itching  Other reaction(s): Hives  Other reaction(s): Joint pain  Other reaction(s): Itching  Other reaction(s): Hives    Pregabalin Other (See Comments)     Side effects  Side effects       Medications:  Current Outpatient Medications   Medication Sig Dispense Refill    acetaminophen (TYLENOL) 650 MG TbSR Take 650 mg by mouth every 8 (eight) hours.      alendronate (FOSAMAX) 70 MG tablet Take 1 tablet (70 mg total) by mouth every 7 days. 12 tablet 3    apixaban (ELIQUIS) 5 mg Tab Take 1 tablet (5 mg total) by mouth 2 (two) times daily. 180 tablet 3    cyclobenzaprine (FLEXERIL) 5 MG tablet Take 1 tablet (5 mg total) by mouth 3 (three) times daily as needed for Muscle spasms. 90 tablet 0    ergocalciferol, vitamin D2, (VITAMIN D ORAL) Take 2,000 mg by mouth once daily.      furosemide (LASIX) 20 MG tablet Take 1 tablet (20 mg total) by mouth daily as needed (edema). 90 tablet 3    HYDROcodone-acetaminophen (NORCO) 5-325 mg per tablet Take 1 tablet by mouth every 6 (six) hours as needed for Pain. 20 tablet 0    hydrOXYzine HCL (ATARAX) 25 MG tablet Take 1 tablet (25 mg total) by mouth 3 (three) times daily as needed for Anxiety (insomnia). 30 tablet PRN    levocetirizine (XYZAL) 5 MG tablet Take 1 tablet (5 mg total) by mouth nightly as needed for Allergies (allergies). 90 tablet PRN    levothyroxine (SYNTHROID) 75 MCG tablet Take 1 tablet (75 mcg total) by mouth once daily. 90 tablet 3    LIDOcaine-prilocaine (EMLA) cream Apply topically as needed (Chemo). 30 g 0    lisinopriL 10 MG tablet Take 1 tablet (10 mg total) by mouth once daily. 90 tablet 3    multivitamin capsule Take 1 capsule by mouth once daily.      omeprazole (PRILOSEC) 40 MG capsule Take 1 capsule (40 mg total) by mouth 2 (two) times daily before meals. 180 capsule PRN    ondansetron (ZOFRAN-ODT) 8 MG TbDL Take 1 tablet (8 mg total) by mouth every 8 (eight) hours as needed (nausea). 40 tablet 3     promethazine (PHENERGAN) 12.5 MG Tab (Take 1-2 tabs every 6 hours as needed for nausea persistent despite zofran) 40 tablet 3    traMADoL (ULTRAM) 50 mg tablet Take 1 tablet (50 mg total) by mouth every 8 (eight) hours as needed for Pain. 21 each 0    traZODone (DESYREL) 50 MG tablet Take 1 tablet (50 mg total) by mouth nightly. 90 tablet 0    triamcinolone acetonide 0.1% (KENALOG) 0.1 % cream APPLY TOPICALLY TO THE AFFECTED AREA TWICE DAILY 60 g 0     No current facility-administered medications for this visit.     Facility-Administered Medications Ordered in Other Visits   Medication Dose Route Frequency Provider Last Rate Last Admin    0.9%  NaCl infusion   Intravenous Once Oren Verdin MD        fluorouracil (ADRUCIL) 2,400 mg/m2 = 4,895 mg in sodium chloride 0.9% 100 mL chemo infusion  2,400 mg/m2 (Treatment Plan Recorded) Intravenous over 46 hr Mahesh Cameron MD        fluorouraciL injection 815 mg  400 mg/m2 (Treatment Plan Recorded) Intravenous 1 time in Clinic/Mahesh Lamb MD        leucovorin calcium 400 mg/m2 = 815 mg in dextrose 5 % (D5W) 325.8 mL infusion  400 mg/m2 (Treatment Plan Recorded) Intravenous 1 time in Clinic/Mahesh Lamb MD        ondansetron injection 8 mg  8 mg Intravenous 1 time in Clinic/Mahesh Lamb MD        sodium chloride 0.9% 100 mL flush bag   Intravenous 1 time in Clinic/Mahesh Lamb MD        sodium chloride 0.9% flush 10 mL  10 mL Intravenous PRN Mahesh Cameron MD           Review of Systems   Constitutional:  Negative for chills, fatigue, fever and unexpected weight change.   HENT:          Sore on lip   Respiratory:  Positive for shortness of breath. Negative for cough.    Cardiovascular:  Negative for chest pain and palpitations.   Gastrointestinal:  Negative for abdominal pain, constipation, diarrhea, nausea and vomiting.   Skin:  Negative for rash.   Neurological:  Negative for headaches.   Hematological:  Negative for  "adenopathy. Does not bruise/bleed easily.       ECOG Performance Status: 2   Objective:      Vitals:   Vitals:    12/20/23 0953   BP: 138/70   BP Location: Right arm   Patient Position: Sitting   BP Method: Large (Manual)   Pulse: (!) 56   Resp: 20   Temp: 97.6 °F (36.4 °C)   TempSrc: Temporal   SpO2: 100%   Weight: 91.9 kg (202 lb 9.6 oz)   Height: 5' 4" (1.626 m)           Physical Exam  Constitutional:       General: She is not in acute distress.     Appearance: She is well-developed. She is not diaphoretic.   HENT:      Head: Normocephalic and atraumatic.   Cardiovascular:      Rate and Rhythm: Normal rate and regular rhythm.      Heart sounds: Normal heart sounds. No murmur heard.     No friction rub. No gallop.   Pulmonary:      Effort: Pulmonary effort is normal. No respiratory distress.      Breath sounds: Normal breath sounds. No wheezing or rales.   Chest:      Chest wall: No tenderness.   Abdominal:      General: Bowel sounds are normal. There is no distension.      Palpations: Abdomen is soft. There is no mass.      Tenderness: There is no abdominal tenderness. There is no guarding or rebound.   Lymphadenopathy:      Cervical: No cervical adenopathy.      Upper Body:      Right upper body: No supraclavicular or axillary adenopathy.      Left upper body: No supraclavicular or axillary adenopathy.   Skin:     Findings: No erythema or rash.   Neurological:      Mental Status: She is alert and oriented to person, place, and time.   Psychiatric:         Behavior: Behavior normal.         Laboratory Data:  Lab Visit on 12/19/2023   Component Date Value Ref Range Status    WBC 12/19/2023 4.77  3.90 - 12.70 K/uL Final    RBC 12/19/2023 3.42 (L)  4.00 - 5.40 M/uL Final    Hemoglobin 12/19/2023 9.7 (L)  12.0 - 16.0 g/dL Final    Hematocrit 12/19/2023 30.9 (L)  37.0 - 48.5 % Final    MCV 12/19/2023 90  82 - 98 fL Final    MCH 12/19/2023 28.4  27.0 - 31.0 pg Final    MCHC 12/19/2023 31.4 (L)  32.0 - 36.0 g/dL Final "    RDW 12/19/2023 16.2 (H)  11.5 - 14.5 % Final    Platelets 12/19/2023 207  150 - 450 K/uL Final    MPV 12/19/2023 9.1 (L)  9.2 - 12.9 fL Final    Immature Granulocytes 12/19/2023 0.4  0.0 - 0.5 % Final    Gran # (ANC) 12/19/2023 3.0  1.8 - 7.7 K/uL Final    Immature Grans (Abs) 12/19/2023 0.02  0.00 - 0.04 K/uL Final    Comment: Mild elevation in immature granulocytes is non specific and   can be seen in a variety of conditions including stress response,   acute inflammation, trauma and pregnancy. Correlation with other   laboratory and clinical findings is essential.      Lymph # 12/19/2023 1.1  1.0 - 4.8 K/uL Final    Mono # 12/19/2023 0.6  0.3 - 1.0 K/uL Final    Eos # 12/19/2023 0.1  0.0 - 0.5 K/uL Final    Baso # 12/19/2023 0.01  0.00 - 0.20 K/uL Final    nRBC 12/19/2023 0  0 /100 WBC Final    Gran % 12/19/2023 63.0  38.0 - 73.0 % Final    Lymph % 12/19/2023 22.6  18.0 - 48.0 % Final    Mono % 12/19/2023 11.9  4.0 - 15.0 % Final    Eosinophil % 12/19/2023 1.9  0.0 - 8.0 % Final    Basophil % 12/19/2023 0.2  0.0 - 1.9 % Final    Differential Method 12/19/2023 Automated   Final    Sodium 12/19/2023 142  136 - 145 mmol/L Final    Potassium 12/19/2023 4.2  3.5 - 5.1 mmol/L Final    Chloride 12/19/2023 114 (H)  95 - 110 mmol/L Final    CO2 12/19/2023 20 (L)  23 - 29 mmol/L Final    Glucose 12/19/2023 90  70 - 110 mg/dL Final    BUN 12/19/2023 17  8 - 23 mg/dL Final    Creatinine 12/19/2023 0.9  0.5 - 1.4 mg/dL Final    Calcium 12/19/2023 8.5 (L)  8.7 - 10.5 mg/dL Final    Total Protein 12/19/2023 6.3  6.0 - 8.4 g/dL Final    Albumin 12/19/2023 3.7  3.5 - 5.2 g/dL Final    Total Bilirubin 12/19/2023 0.8  0.1 - 1.0 mg/dL Final    Comment: For infants and newborns, interpretation of results should be based  on gestational age, weight and in agreement with clinical  observations.    Premature Infant recommended reference ranges:  Up to 24 hours.............<8.0 mg/dL  Up to 48 hours............<12.0 mg/dL  3-5  days..................<15.0 mg/dL  6-29 days.................<15.0 mg/dL      Alkaline Phosphatase 12/19/2023 44 (L)  55 - 135 U/L Final    AST 12/19/2023 10  10 - 40 U/L Final    ALT 12/19/2023 8 (L)  10 - 44 U/L Final    eGFR 12/19/2023 >60.0  >60 mL/min/1.73 m^2 Final    Anion Gap 12/19/2023 8  8 - 16 mmol/L Final         Imaging:     PET/CT 11/08/23  Neck: There is moderate brown fat uptake at the base of the neck, normal variant. No pathologic activity is present. No cervical adenopathy is noted.     Chest: There is no significant abnormal activity in the chest. There is fairly extensive postsurgical uptake in the soft tissues adjacent to the right shoulder in this patient status post reverse right shoulder arthroplasty.     There is no mediastinal nor axillary adenopathy. There is dilatation of the main pulmonary artery compared to the aorta, a finding associated with pulmonary arterial hypertension. Calcific plaque is present in the coronary arteries. There are no pleural effusions.     There is a 5 mm nodule in the left apex unchanged series 5, image 117. This is stable compared to CT from 2007. There is also a calcified granuloma in the left upper lobe. Bands of scarring or atelectasis are seen bilaterally. 6 mm nodule anterior inferior right upper lobe unchanged since recent comparison exam series 5, image 159; 17 mm ground-glass opacity lateral right middle lobe unchanged since the 08/25/2023 series 5, image 168. There is a stable 4 mm nodule just above the diaphragm in the right lower lobe series 5, image 205, unchanged since 2007.     Abdomen/pelvis: There is uptake along the midline surgical scar in the anterior abdominal wall. Otherwise, no sites of abnormal activity are present. Specifically, there is no definite abnormal activity associated with the mass at the lower pole of the left kidney. This is noted to have a density consistent with fat on series 3, image 157, and the possibility of an  angiomyolipoma is raised. Follow-up with MRI or with a multi phasic CT with and without IV contrast would be helpful for better characterization. The hyperechoic appearance on ultrasound of 08/25/2023 suggests an angiomyolipoma.     Limited noncontrast CT images of the liver, spleen, adrenal glands, pancreas and right kidney are grossly normal. There are gallstones. No abnormal activity is present in the gallbladder. The aorta is normal in caliber with scattered calcific plaque. There is an IVC filter.     The urinary bladder is collapsed. The uterus is atrophic or absent.     There has been partial colectomy with anastomosis in the right lower quadrant. A small amount of ascites is present. There is a small midline fat-containing ventral hernia. No adenopathy nor peritoneal implant is noted.     Bones: There are extensive degenerative changes in the spine. No findings to indicate metastatic disease to bone.    MRI Abdomen 11/20/23  Unremarkable appearance. No masses     Cholelithiasis with large stone within the gallbladder without findings of acute cholecystitis or biliary duct dilatation     Spleen not enlarged     Adrenal glands unremarkable appearance     Pancreas unremarkable appearance     Abdominal aorta no aneurysm     Artifact in region of IVC suggesting IVC filter.     Postoperative changes anterior abdominal wall and fat containing supraumbilical ventral hernia.     Right kidney; unremarkable appearance of the right kidney. No mass.     Kidney; 2 cm heterogeneous fat containing enhancing mass in the lower pole. Presence of fat strongly suggest angiomyolipoma. Can be seen although rarely with renal cell carcinoma.       Assessment:       1. Malignant neoplasm of splenic flexure    2. Immunodeficiency due to chemotherapy    3. Lip ulcer    4. Renal mass, left    5. Pulmonary nodules    6. Acute deep vein thrombosis (DVT) of popliteal vein of both lower extremities    7. Iron deficiency anemia, unspecified  iron deficiency anemia type               Plan:     Colon Cancer - Pt with h/o colon cancer s/p resection in 2007 followed by adjuvant FOLFOX for 12 cycles  -Pt recently with resection of transverse colon and splenic flexure 9/29/23 showing path showing invasive moderately differentiated adenocarcinoma with mucinous features, 33 benign lymph nodes, positive lymphovascular invasion, positive perineural invasion pT3N0  -Tumor shows intact expression of MLH1, PMS2, MSH2, MSH6  -Patient was positive for B Celio V600E mutation  -Pt has high risk stage II colon cancer given positive LVI and PNI  -PT is a candidate for treatment with 6 months of 5-FU or Capecitabine  -no clear evidence of metastatic disease on PET-CT 11/08/2023   -Will proceed with 6 months of 5 fluorouracil  -Pt to receive cycle 2 this week  -Pharmacogenomic panel on 11/03/23 showed normal DPYD metabolizer but 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 Pt needs ferritin and iron/TIBC drawn form the port.  Pt can proceed with chemo.  she will  need a CBC and CMP with an appt with me in 2 weeks the day before treatment.   Follow up with GRETCHEN    Infusion scheduling note    Injection scheduling note    Labs    Imaging    Pharmacy appointment    Other referrals              Treatment Plan Information   OP COLORECTAL FLUOROURACIL LEUCOVORIN Q2W (MOD DE GRAMONT)   Mahesh Cameron MD   Upcoming Treatment Dates - OP COLORECTAL FLUOROURACIL LEUCOVORIN Q2W (MOD DE GRAMONT)    1/3/2024       Chemotherapy       leucovorin calcium 400 mg/m2 = 815 mg in dextrose 5 % (D5W) 250 mL infusion       fluorouraciL injection 815 mg       fluorouracil (ADRUCIL) 2,400 mg/m2 = 4,895 mg in sodium chloride 0.9% 100 mL chemo infusion       Antiemetics       ondansetron injection 8 mg  1/17/2024       Chemotherapy       leucovorin calcium 400 mg/m2 = 815 mg in dextrose 5 % (D5W) 250 mL infusion       fluorouraciL injection 815 mg       fluorouracil (ADRUCIL) 2,400 mg/m2 = 4,895 mg in sodium chloride 0.9% 100 mL chemo infusion       Antiemetics       ondansetron injection 8 mg  1/31/2024       Chemotherapy       leucovorin calcium 400 mg/m2 = 815 mg in dextrose 5 % (D5W) 250 mL infusion       fluorouraciL injection 815 mg       fluorouracil (ADRUCIL) 2,400 mg/m2 = 4,895 mg in sodium chloride 0.9% 100 mL chemo infusion       Antiemetics       ondansetron injection 8 mg  2/14/2024       Chemotherapy       leucovorin calcium 400 mg/m2 = 815 mg in dextrose 5 % (D5W) 250 mL infusion       fluorouraciL injection 815 mg       fluorouracil (ADRUCIL) 2,400 mg/m2 = 4,895 mg in sodium chloride 0.9% 100 mL chemo infusion       Antiemetics       ondansetron injection 8 mg    Supportive Plan Information  OP FERRIC CARBOXYMALTOSE Q2W   Mahesh Cameron MD   Upcoming Treatment Dates - OP FERRIC CARBOXYMALTOSE Q2W    12/22/2023       Supportive Care       ferric carboxymaltose (INJECTAFER) 750 mg in sodium chloride 0.9% 265 mL IVPB (ready to mix)  12/29/2023       Supportive Care       ferric  carboxymaltose (INJECTAFER) 750 mg in sodium chloride 0.9% 265 mL IVPB (ready to mix)      Mahesh Cameron MD  [facility]  Hematology and Oncology  [facility]   [address]   [place] [address]   O: [phone]  F: [phone]

## 2023-12-19 ENCOUNTER — LAB VISIT (OUTPATIENT)
Dept: LAB | Facility: HOSPITAL | Age: 83
End: 2023-12-19
Attending: INTERNAL MEDICINE
Payer: MEDICARE

## 2023-12-19 DIAGNOSIS — C18.5 MALIGNANT NEOPLASM OF SPLENIC FLEXURE: ICD-10-CM

## 2023-12-19 LAB
ALBUMIN SERPL BCP-MCNC: 3.7 G/DL (ref 3.5–5.2)
ALP SERPL-CCNC: 44 U/L (ref 55–135)
ALT SERPL W/O P-5'-P-CCNC: 8 U/L (ref 10–44)
ANION GAP SERPL CALC-SCNC: 8 MMOL/L (ref 8–16)
AST SERPL-CCNC: 10 U/L (ref 10–40)
BASOPHILS # BLD AUTO: 0.01 K/UL (ref 0–0.2)
BASOPHILS NFR BLD: 0.2 % (ref 0–1.9)
BILIRUB SERPL-MCNC: 0.8 MG/DL (ref 0.1–1)
BUN SERPL-MCNC: 17 MG/DL (ref 8–23)
CALCIUM SERPL-MCNC: 8.5 MG/DL (ref 8.7–10.5)
CHLORIDE SERPL-SCNC: 114 MMOL/L (ref 95–110)
CO2 SERPL-SCNC: 20 MMOL/L (ref 23–29)
CREAT SERPL-MCNC: 0.9 MG/DL (ref 0.5–1.4)
DIFFERENTIAL METHOD: ABNORMAL
EOSINOPHIL # BLD AUTO: 0.1 K/UL (ref 0–0.5)
EOSINOPHIL NFR BLD: 1.9 % (ref 0–8)
ERYTHROCYTE [DISTWIDTH] IN BLOOD BY AUTOMATED COUNT: 16.2 % (ref 11.5–14.5)
EST. GFR  (NO RACE VARIABLE): >60 ML/MIN/1.73 M^2
GLUCOSE SERPL-MCNC: 90 MG/DL (ref 70–110)
HCT VFR BLD AUTO: 30.9 % (ref 37–48.5)
HGB BLD-MCNC: 9.7 G/DL (ref 12–16)
IMM GRANULOCYTES # BLD AUTO: 0.02 K/UL (ref 0–0.04)
IMM GRANULOCYTES NFR BLD AUTO: 0.4 % (ref 0–0.5)
LYMPHOCYTES # BLD AUTO: 1.1 K/UL (ref 1–4.8)
LYMPHOCYTES NFR BLD: 22.6 % (ref 18–48)
MCH RBC QN AUTO: 28.4 PG (ref 27–31)
MCHC RBC AUTO-ENTMCNC: 31.4 G/DL (ref 32–36)
MCV RBC AUTO: 90 FL (ref 82–98)
MONOCYTES # BLD AUTO: 0.6 K/UL (ref 0.3–1)
MONOCYTES NFR BLD: 11.9 % (ref 4–15)
NEUTROPHILS # BLD AUTO: 3 K/UL (ref 1.8–7.7)
NEUTROPHILS NFR BLD: 63 % (ref 38–73)
NRBC BLD-RTO: 0 /100 WBC
PLATELET # BLD AUTO: 207 K/UL (ref 150–450)
PMV BLD AUTO: 9.1 FL (ref 9.2–12.9)
POTASSIUM SERPL-SCNC: 4.2 MMOL/L (ref 3.5–5.1)
PROT SERPL-MCNC: 6.3 G/DL (ref 6–8.4)
RBC # BLD AUTO: 3.42 M/UL (ref 4–5.4)
SODIUM SERPL-SCNC: 142 MMOL/L (ref 136–145)
WBC # BLD AUTO: 4.77 K/UL (ref 3.9–12.7)

## 2023-12-19 PROCEDURE — 85025 COMPLETE CBC W/AUTO DIFF WBC: CPT | Mod: PN | Performed by: INTERNAL MEDICINE

## 2023-12-19 PROCEDURE — 80053 COMPREHEN METABOLIC PANEL: CPT | Mod: PN | Performed by: INTERNAL MEDICINE

## 2023-12-19 PROCEDURE — 36415 COLL VENOUS BLD VENIPUNCTURE: CPT | Mod: PN | Performed by: INTERNAL MEDICINE

## 2023-12-20 ENCOUNTER — INFUSION (OUTPATIENT)
Dept: INFUSION THERAPY | Facility: HOSPITAL | Age: 83
End: 2023-12-20
Attending: INTERNAL MEDICINE
Payer: MEDICARE

## 2023-12-20 ENCOUNTER — OFFICE VISIT (OUTPATIENT)
Dept: HEMATOLOGY/ONCOLOGY | Facility: CLINIC | Age: 83
End: 2023-12-20
Payer: MEDICARE

## 2023-12-20 ENCOUNTER — DOCUMENTATION ONLY (OUTPATIENT)
Dept: INFUSION THERAPY | Facility: HOSPITAL | Age: 83
End: 2023-12-20
Payer: MEDICARE

## 2023-12-20 VITALS
DIASTOLIC BLOOD PRESSURE: 65 MMHG | OXYGEN SATURATION: 100 % | HEART RATE: 45 BPM | RESPIRATION RATE: 20 BRPM | BODY MASS INDEX: 34.59 KG/M2 | TEMPERATURE: 98 F | SYSTOLIC BLOOD PRESSURE: 129 MMHG | WEIGHT: 202.63 LBS | HEIGHT: 64 IN

## 2023-12-20 VITALS
SYSTOLIC BLOOD PRESSURE: 138 MMHG | HEART RATE: 56 BPM | DIASTOLIC BLOOD PRESSURE: 70 MMHG | BODY MASS INDEX: 34.59 KG/M2 | RESPIRATION RATE: 20 BRPM | WEIGHT: 202.63 LBS | TEMPERATURE: 98 F | OXYGEN SATURATION: 100 % | HEIGHT: 64 IN

## 2023-12-20 DIAGNOSIS — I82.433 ACUTE DEEP VEIN THROMBOSIS (DVT) OF POPLITEAL VEIN OF BOTH LOWER EXTREMITIES: ICD-10-CM

## 2023-12-20 DIAGNOSIS — N28.89 RENAL MASS, LEFT: ICD-10-CM

## 2023-12-20 DIAGNOSIS — D50.9 IRON DEFICIENCY ANEMIA, UNSPECIFIED IRON DEFICIENCY ANEMIA TYPE: ICD-10-CM

## 2023-12-20 DIAGNOSIS — R91.8 PULMONARY NODULES: ICD-10-CM

## 2023-12-20 DIAGNOSIS — Z79.899 IMMUNODEFICIENCY DUE TO CHEMOTHERAPY: ICD-10-CM

## 2023-12-20 DIAGNOSIS — C18.5 MALIGNANT NEOPLASM OF SPLENIC FLEXURE: Primary | ICD-10-CM

## 2023-12-20 DIAGNOSIS — D84.821 IMMUNODEFICIENCY DUE TO CHEMOTHERAPY: ICD-10-CM

## 2023-12-20 DIAGNOSIS — K13.0 LIP ULCER: ICD-10-CM

## 2023-12-20 DIAGNOSIS — T45.1X5A IMMUNODEFICIENCY DUE TO CHEMOTHERAPY: ICD-10-CM

## 2023-12-20 LAB
FERRITIN SERPL-MCNC: 25 NG/ML (ref 20–300)
IRON SERPL-MCNC: 160 UG/DL (ref 30–160)
SATURATED IRON: 38 % (ref 20–50)
TOTAL IRON BINDING CAPACITY: 417 UG/DL (ref 250–450)
TRANSFERRIN SERPL-MCNC: 282 MG/DL (ref 200–375)

## 2023-12-20 PROCEDURE — 1126F PR PAIN SEVERITY QUANTIFIED, NO PAIN PRESENT: ICD-10-PCS | Mod: CPTII,S$GLB,, | Performed by: INTERNAL MEDICINE

## 2023-12-20 PROCEDURE — 82728 ASSAY OF FERRITIN: CPT | Performed by: INTERNAL MEDICINE

## 2023-12-20 PROCEDURE — 99215 PR OFFICE/OUTPT VISIT, EST, LEVL V, 40-54 MIN: ICD-10-PCS | Mod: S$GLB,,, | Performed by: INTERNAL MEDICINE

## 2023-12-20 PROCEDURE — 3288F FALL RISK ASSESSMENT DOCD: CPT | Mod: CPTII,S$GLB,, | Performed by: INTERNAL MEDICINE

## 2023-12-20 PROCEDURE — 25000003 PHARM REV CODE 250: Mod: PN | Performed by: INTERNAL MEDICINE

## 2023-12-20 PROCEDURE — 1126F AMNT PAIN NOTED NONE PRSNT: CPT | Mod: CPTII,S$GLB,, | Performed by: INTERNAL MEDICINE

## 2023-12-20 PROCEDURE — 96416 CHEMO PROLONG INFUSE W/PUMP: CPT | Mod: PN

## 2023-12-20 PROCEDURE — 96411 CHEMO IV PUSH ADDL DRUG: CPT | Mod: PN

## 2023-12-20 PROCEDURE — 3075F PR MOST RECENT SYSTOLIC BLOOD PRESS GE 130-139MM HG: ICD-10-PCS | Mod: CPTII,S$GLB,, | Performed by: INTERNAL MEDICINE

## 2023-12-20 PROCEDURE — 84466 ASSAY OF TRANSFERRIN: CPT | Performed by: INTERNAL MEDICINE

## 2023-12-20 PROCEDURE — 96375 TX/PRO/DX INJ NEW DRUG ADDON: CPT | Mod: PN

## 2023-12-20 PROCEDURE — 96409 CHEMO IV PUSH SNGL DRUG: CPT

## 2023-12-20 PROCEDURE — 99215 OFFICE O/P EST HI 40 MIN: CPT | Mod: S$GLB,,, | Performed by: INTERNAL MEDICINE

## 2023-12-20 PROCEDURE — 3075F SYST BP GE 130 - 139MM HG: CPT | Mod: CPTII,S$GLB,, | Performed by: INTERNAL MEDICINE

## 2023-12-20 PROCEDURE — 96367 TX/PROPH/DG ADDL SEQ IV INF: CPT | Mod: PN

## 2023-12-20 PROCEDURE — 1101F PR PT FALLS ASSESS DOC 0-1 FALLS W/OUT INJ PAST YR: ICD-10-PCS | Mod: CPTII,S$GLB,, | Performed by: INTERNAL MEDICINE

## 2023-12-20 PROCEDURE — 3078F DIAST BP <80 MM HG: CPT | Mod: CPTII,S$GLB,, | Performed by: INTERNAL MEDICINE

## 2023-12-20 PROCEDURE — 3288F PR FALLS RISK ASSESSMENT DOCUMENTED: ICD-10-PCS | Mod: CPTII,S$GLB,, | Performed by: INTERNAL MEDICINE

## 2023-12-20 PROCEDURE — 99999 PR PBB SHADOW E&M-EST. PATIENT-LVL III: ICD-10-PCS | Mod: PBBFAC,,, | Performed by: INTERNAL MEDICINE

## 2023-12-20 PROCEDURE — 63600175 PHARM REV CODE 636 W HCPCS: Mod: PN | Performed by: INTERNAL MEDICINE

## 2023-12-20 PROCEDURE — 99999 PR PBB SHADOW E&M-EST. PATIENT-LVL III: CPT | Mod: PBBFAC,,, | Performed by: INTERNAL MEDICINE

## 2023-12-20 PROCEDURE — 83540 ASSAY OF IRON: CPT | Performed by: INTERNAL MEDICINE

## 2023-12-20 PROCEDURE — 1101F PT FALLS ASSESS-DOCD LE1/YR: CPT | Mod: CPTII,S$GLB,, | Performed by: INTERNAL MEDICINE

## 2023-12-20 PROCEDURE — 96366 THER/PROPH/DIAG IV INF ADDON: CPT | Mod: PN

## 2023-12-20 PROCEDURE — 3078F PR MOST RECENT DIASTOLIC BLOOD PRESSURE < 80 MM HG: ICD-10-PCS | Mod: CPTII,S$GLB,, | Performed by: INTERNAL MEDICINE

## 2023-12-20 RX ORDER — EPINEPHRINE 0.3 MG/.3ML
0.3 INJECTION SUBCUTANEOUS ONCE AS NEEDED
Status: CANCELLED | OUTPATIENT
Start: 2023-12-20

## 2023-12-20 RX ORDER — SODIUM CHLORIDE 0.9 % (FLUSH) 0.9 %
10 SYRINGE (ML) INJECTION
Status: DISCONTINUED | OUTPATIENT
Start: 2023-12-20 | End: 2023-12-20 | Stop reason: HOSPADM

## 2023-12-20 RX ORDER — HEPARIN 100 UNIT/ML
500 SYRINGE INTRAVENOUS
Status: CANCELLED | OUTPATIENT
Start: 2023-12-20

## 2023-12-20 RX ORDER — FLUOROURACIL 50 MG/ML
400 INJECTION, SOLUTION INTRAVENOUS
Status: COMPLETED | OUTPATIENT
Start: 2023-12-20 | End: 2023-12-20

## 2023-12-20 RX ORDER — SODIUM CHLORIDE 0.9 % (FLUSH) 0.9 %
10 SYRINGE (ML) INJECTION
Status: CANCELLED | OUTPATIENT
Start: 2023-12-20

## 2023-12-20 RX ORDER — ONDANSETRON 2 MG/ML
8 INJECTION INTRAMUSCULAR; INTRAVENOUS
Status: CANCELLED | OUTPATIENT
Start: 2023-12-20

## 2023-12-20 RX ORDER — DIPHENHYDRAMINE HYDROCHLORIDE 50 MG/ML
50 INJECTION INTRAMUSCULAR; INTRAVENOUS ONCE AS NEEDED
Status: CANCELLED | OUTPATIENT
Start: 2023-12-20

## 2023-12-20 RX ORDER — ONDANSETRON 2 MG/ML
8 INJECTION INTRAMUSCULAR; INTRAVENOUS
Status: COMPLETED | OUTPATIENT
Start: 2023-12-20 | End: 2023-12-20

## 2023-12-20 RX ORDER — SODIUM CHLORIDE 0.9 % (FLUSH) 0.9 %
10 SYRINGE (ML) INJECTION
Status: CANCELLED | OUTPATIENT
Start: 2023-12-22

## 2023-12-20 RX ORDER — PROCHLORPERAZINE EDISYLATE 5 MG/ML
5 INJECTION INTRAMUSCULAR; INTRAVENOUS ONCE AS NEEDED
Status: CANCELLED
Start: 2023-12-20

## 2023-12-20 RX ORDER — FLUOROURACIL 50 MG/ML
400 INJECTION, SOLUTION INTRAVENOUS
Status: CANCELLED | OUTPATIENT
Start: 2023-12-20

## 2023-12-20 RX ORDER — HEPARIN 100 UNIT/ML
500 SYRINGE INTRAVENOUS
Status: CANCELLED | OUTPATIENT
Start: 2023-12-22

## 2023-12-20 RX ADMIN — FLUOROURACIL 815 MG: 50 INJECTION, SOLUTION INTRAVENOUS at 01:12

## 2023-12-20 RX ADMIN — FLUOROURACIL 4895 MG: 50 INJECTION, SOLUTION INTRAVENOUS at 01:12

## 2023-12-20 RX ADMIN — SODIUM CHLORIDE: 9 INJECTION, SOLUTION INTRAVENOUS at 10:12

## 2023-12-20 RX ADMIN — ONDANSETRON 8 MG: 2 INJECTION INTRAMUSCULAR; INTRAVENOUS at 11:12

## 2023-12-20 RX ADMIN — LEUCOVORIN CALCIUM 815 MG: 350 INJECTION, POWDER, LYOPHILIZED, FOR SOLUTION INTRAMUSCULAR; INTRAVENOUS at 11:12

## 2023-12-20 NOTE — PROGRESS NOTES
Oncology Nutrition   Chemotherapy Infusion Visit    Nutrition Follow Up   RD met with patient at chairside during infusion treatment. Pt completed one cycle thus far and denies any significant nutrition related side effects. Weight has been stable.     Wt Readings from Last 10 Encounters:   12/20/23 91.9 kg (202 lb 9.6 oz)   12/20/23 91.9 kg (202 lb 9.6 oz)   12/06/23 92.2 kg (203 lb 5.6 oz)   12/06/23 93.8 kg (206 lb 12.7 oz)   12/05/23 92.9 kg (204 lb 12.9 oz)   11/24/23 92.3 kg (203 lb 7.8 oz)   11/17/23 91 kg (200 lb 11.2 oz)   11/13/23 89.8 kg (198 lb)   11/14/23 91.1 kg (200 lb 13.4 oz)   11/08/23 90 kg (198 lb 6.6 oz)       All other nutrition questions/concerns addressed as appropriate. Will continue to follow and monitor throughout treatment PRN.     Ana Cristina Coles, MS, RD, LDN  12/20/2023  10:32 AM

## 2023-12-20 NOTE — PLAN OF CARE
Pt arrived to clinic for Leucovorin and 5FU treatment and tolerated well with no changes throughout therapy. Pt educated on line care at home and aware of number to call for any pump issues. Pt with chemo spill kit and aware of how to use if needed. Pt aware of side effects of meds and aware of f/u appts and discharged to home in NAD with niece with pump infusing.

## 2023-12-22 ENCOUNTER — INFUSION (OUTPATIENT)
Dept: INFUSION THERAPY | Facility: HOSPITAL | Age: 83
End: 2023-12-22
Attending: INTERNAL MEDICINE
Payer: MEDICARE

## 2023-12-22 ENCOUNTER — CLINICAL SUPPORT (OUTPATIENT)
Dept: REHABILITATION | Facility: HOSPITAL | Age: 83
End: 2023-12-22
Payer: MEDICARE

## 2023-12-22 VITALS
RESPIRATION RATE: 20 BRPM | TEMPERATURE: 98 F | HEART RATE: 52 BPM | DIASTOLIC BLOOD PRESSURE: 72 MMHG | SYSTOLIC BLOOD PRESSURE: 123 MMHG

## 2023-12-22 DIAGNOSIS — M54.59 OTHER LOW BACK PAIN: ICD-10-CM

## 2023-12-22 DIAGNOSIS — M54.50 CHRONIC RIGHT-SIDED LOW BACK PAIN WITHOUT SCIATICA: Primary | ICD-10-CM

## 2023-12-22 DIAGNOSIS — G89.29 CHRONIC RIGHT-SIDED LOW BACK PAIN WITHOUT SCIATICA: Primary | ICD-10-CM

## 2023-12-22 DIAGNOSIS — C18.5 MALIGNANT NEOPLASM OF SPLENIC FLEXURE: Primary | ICD-10-CM

## 2023-12-22 PROCEDURE — 97813 ACUP 1/> W/ESTIM 1ST 15 MIN: CPT | Mod: PN | Performed by: ACUPUNCTURIST

## 2023-12-22 PROCEDURE — 97814 ACUP 1/> W/ESTIM EA ADDL 15: CPT | Mod: PN | Performed by: ACUPUNCTURIST

## 2023-12-22 PROCEDURE — A4216 STERILE WATER/SALINE, 10 ML: HCPCS | Mod: PN | Performed by: INTERNAL MEDICINE

## 2023-12-22 PROCEDURE — 63600175 PHARM REV CODE 636 W HCPCS: Mod: PN | Performed by: INTERNAL MEDICINE

## 2023-12-22 PROCEDURE — 25000003 PHARM REV CODE 250: Mod: PN | Performed by: INTERNAL MEDICINE

## 2023-12-22 RX ORDER — SODIUM CHLORIDE 0.9 % (FLUSH) 0.9 %
10 SYRINGE (ML) INJECTION
Status: DISCONTINUED | OUTPATIENT
Start: 2023-12-22 | End: 2023-12-22 | Stop reason: HOSPADM

## 2023-12-22 RX ORDER — HEPARIN 100 UNIT/ML
500 SYRINGE INTRAVENOUS
Status: DISCONTINUED | OUTPATIENT
Start: 2023-12-22 | End: 2023-12-22 | Stop reason: HOSPADM

## 2023-12-22 RX ADMIN — Medication 10 ML: at 11:12

## 2023-12-22 RX ADMIN — Medication 500 UNITS: at 12:12

## 2023-12-22 NOTE — PROGRESS NOTES
Acupuncture Follow-Up Note     Name: Danna Sorensen  Clinic Number: [id number]    Traditional Chinese Medicine (TCM) Diagnosis: Qi Stagnation, Blood Stasis, Qi Deficiency, Blood Deficiency, Damp, and Yin Deficiency  Medical Diagnosis:   1. Chronic right-sided low back pain without sciatica    2. Other low back pain         Evaluation Date: 12/22/2023    Visit #/Visits authorized:     Precautions: Standard    Subjective     Chief Concern: Low-back Pain (Low back pain today is 2/10)       Medical necessity is demonstrated by the following IMPAIRMENTS: Medical Necessity: Decreased mobility limits day to day activities, social, and emergent situations              Aggravating Factors:  movement, lying down, standing, prolonged sitting, and changing positions     Relieving Factors:  nothing and rest    Symptom Description:     Quality:  Aching, Dull, Burning, Throbbing, and Tight  Severity:  2  Frequency:  every day      Objective     Observation: Patient was progress as anticipated then had a flare while standing for long period helping cook for raquel.  However, pain is only at 2/10 but is constant  Fatigue and insomnia are 1/10, given this patients age she is responding very well to treatment and will soon be released to monthly/prn maintenance.      Pulse:        thready       New Findings:  na    Treatment     Treatment Principles:  move qi and blood, relieve bi, nourish yin    Acupuncture points used:  4 DONOHUE, Du20, Gb34, Ki3, Ki6, and Li11    Bilateral points:Ub 23-26  Unilateral points:  Auricular Treatment:  brewster men    Needles In: 23  Needles Out: 23  [place] W/ STIM placed: 1035  Needles W/ STIM removed: 1055      Other Traditional Chinese Medicine Modalities - Cupping    Assessment     After treatment, patient felt better and plans to continue     Patient prognosis is Good.     Patient will continue to benefit from acupuncture treatment to address the deficits listed in the problem list box on initial  evaluation, provide patient family education and to maximize pt's level of independence in the home and community environment.     Patient's spiritual, cultural and educational needs considered and pt agreeable to plan of care and goals.     Anticipated barriers to treatment: none    Plan     Recommend 1 /week for 12   sessions then re-assess.      Education:  Patient is aware of cumulative benefit of acupuncture

## 2024-01-02 ENCOUNTER — LAB VISIT (OUTPATIENT)
Dept: LAB | Facility: HOSPITAL | Age: 84
End: 2024-01-02
Attending: INTERNAL MEDICINE
Payer: MEDICARE

## 2024-01-02 DIAGNOSIS — C18.5 MALIGNANT NEOPLASM OF SPLENIC FLEXURE: ICD-10-CM

## 2024-01-02 LAB
ALBUMIN SERPL BCP-MCNC: 3.7 G/DL (ref 3.5–5.2)
ALP SERPL-CCNC: 53 U/L (ref 55–135)
ALT SERPL W/O P-5'-P-CCNC: 6 U/L (ref 10–44)
ANION GAP SERPL CALC-SCNC: 7 MMOL/L (ref 8–16)
AST SERPL-CCNC: 12 U/L (ref 10–40)
BASOPHILS # BLD AUTO: 0.02 K/UL (ref 0–0.2)
BASOPHILS NFR BLD: 0.4 % (ref 0–1.9)
BILIRUB SERPL-MCNC: 1 MG/DL (ref 0.1–1)
BUN SERPL-MCNC: 17 MG/DL (ref 8–23)
CALCIUM SERPL-MCNC: 9.1 MG/DL (ref 8.7–10.5)
CHLORIDE SERPL-SCNC: 110 MMOL/L (ref 95–110)
CO2 SERPL-SCNC: 22 MMOL/L (ref 23–29)
CREAT SERPL-MCNC: 1 MG/DL (ref 0.5–1.4)
DIFFERENTIAL METHOD BLD: ABNORMAL
EOSINOPHIL # BLD AUTO: 0.1 K/UL (ref 0–0.5)
EOSINOPHIL NFR BLD: 1.7 % (ref 0–8)
ERYTHROCYTE [DISTWIDTH] IN BLOOD BY AUTOMATED COUNT: 16.3 % (ref 11.5–14.5)
EST. GFR  (NO RACE VARIABLE): 56.6 ML/MIN/1.73 M^2
GLUCOSE SERPL-MCNC: 88 MG/DL (ref 70–110)
HCT VFR BLD AUTO: 31.2 % (ref 37–48.5)
HGB BLD-MCNC: 10.4 G/DL (ref 12–16)
IMM GRANULOCYTES # BLD AUTO: 0.02 K/UL (ref 0–0.04)
IMM GRANULOCYTES NFR BLD AUTO: 0.4 % (ref 0–0.5)
LYMPHOCYTES # BLD AUTO: 1.5 K/UL (ref 1–4.8)
LYMPHOCYTES NFR BLD: 27.9 % (ref 18–48)
MCH RBC QN AUTO: 29.5 PG (ref 27–31)
MCHC RBC AUTO-ENTMCNC: 33.3 G/DL (ref 32–36)
MCV RBC AUTO: 88 FL (ref 82–98)
MONOCYTES # BLD AUTO: 0.6 K/UL (ref 0.3–1)
MONOCYTES NFR BLD: 11.2 % (ref 4–15)
NEUTROPHILS # BLD AUTO: 3.1 K/UL (ref 1.8–7.7)
NEUTROPHILS NFR BLD: 58.4 % (ref 38–73)
NRBC BLD-RTO: 0 /100 WBC
PLATELET # BLD AUTO: 186 K/UL (ref 150–450)
PMV BLD AUTO: 9.2 FL (ref 9.2–12.9)
POTASSIUM SERPL-SCNC: 4.7 MMOL/L (ref 3.5–5.1)
PROT SERPL-MCNC: 6.5 G/DL (ref 6–8.4)
RBC # BLD AUTO: 3.53 M/UL (ref 4–5.4)
SODIUM SERPL-SCNC: 139 MMOL/L (ref 136–145)
WBC # BLD AUTO: 5.34 K/UL (ref 3.9–12.7)

## 2024-01-02 PROCEDURE — 36415 COLL VENOUS BLD VENIPUNCTURE: CPT | Mod: PN | Performed by: INTERNAL MEDICINE

## 2024-01-02 PROCEDURE — 85025 COMPLETE CBC W/AUTO DIFF WBC: CPT | Mod: PN | Performed by: INTERNAL MEDICINE

## 2024-01-02 PROCEDURE — 80053 COMPREHEN METABOLIC PANEL: CPT | Mod: PN | Performed by: INTERNAL MEDICINE

## 2024-01-03 ENCOUNTER — OFFICE VISIT (OUTPATIENT)
Dept: HEMATOLOGY/ONCOLOGY | Facility: CLINIC | Age: 84
End: 2024-01-03
Payer: MEDICARE

## 2024-01-03 ENCOUNTER — INFUSION (OUTPATIENT)
Dept: INFUSION THERAPY | Facility: HOSPITAL | Age: 84
End: 2024-01-03
Attending: INTERNAL MEDICINE
Payer: MEDICARE

## 2024-01-03 VITALS
HEART RATE: 57 BPM | BODY MASS INDEX: 34.4 KG/M2 | RESPIRATION RATE: 22 BRPM | OXYGEN SATURATION: 99 % | HEIGHT: 64 IN | DIASTOLIC BLOOD PRESSURE: 70 MMHG | WEIGHT: 201.5 LBS | TEMPERATURE: 97 F | SYSTOLIC BLOOD PRESSURE: 138 MMHG

## 2024-01-03 VITALS
TEMPERATURE: 97 F | DIASTOLIC BLOOD PRESSURE: 67 MMHG | OXYGEN SATURATION: 99 % | BODY MASS INDEX: 34.4 KG/M2 | HEIGHT: 64 IN | WEIGHT: 201.5 LBS | SYSTOLIC BLOOD PRESSURE: 157 MMHG | HEART RATE: 55 BPM | RESPIRATION RATE: 22 BRPM

## 2024-01-03 DIAGNOSIS — I82.433 ACUTE DEEP VEIN THROMBOSIS (DVT) OF POPLITEAL VEIN OF BOTH LOWER EXTREMITIES: ICD-10-CM

## 2024-01-03 DIAGNOSIS — G47.00 INSOMNIA, UNSPECIFIED TYPE: Primary | ICD-10-CM

## 2024-01-03 DIAGNOSIS — G89.29 CHRONIC RIGHT-SIDED LOW BACK PAIN WITHOUT SCIATICA: ICD-10-CM

## 2024-01-03 DIAGNOSIS — Z79.899 IMMUNODEFICIENCY DUE TO CHEMOTHERAPY: ICD-10-CM

## 2024-01-03 DIAGNOSIS — N28.89 RENAL MASS, LEFT: ICD-10-CM

## 2024-01-03 DIAGNOSIS — T45.1X5A IMMUNODEFICIENCY DUE TO CHEMOTHERAPY: ICD-10-CM

## 2024-01-03 DIAGNOSIS — R53.83 OTHER FATIGUE: ICD-10-CM

## 2024-01-03 DIAGNOSIS — C18.5 MALIGNANT NEOPLASM OF SPLENIC FLEXURE: Primary | ICD-10-CM

## 2024-01-03 DIAGNOSIS — C18.5 MALIGNANT NEOPLASM OF SPLENIC FLEXURE: ICD-10-CM

## 2024-01-03 DIAGNOSIS — D50.9 IRON DEFICIENCY ANEMIA, UNSPECIFIED IRON DEFICIENCY ANEMIA TYPE: ICD-10-CM

## 2024-01-03 DIAGNOSIS — R91.8 PULMONARY NODULES: ICD-10-CM

## 2024-01-03 DIAGNOSIS — D84.821 IMMUNODEFICIENCY DUE TO CHEMOTHERAPY: ICD-10-CM

## 2024-01-03 DIAGNOSIS — M54.50 CHRONIC RIGHT-SIDED LOW BACK PAIN WITHOUT SCIATICA: ICD-10-CM

## 2024-01-03 PROBLEM — Z79.69 IMMUNODEFICIENCY DUE TO CHEMOTHERAPY: Status: ACTIVE | Noted: 2024-01-03

## 2024-01-03 PROCEDURE — 63600175 PHARM REV CODE 636 W HCPCS: Mod: PN | Performed by: INTERNAL MEDICINE

## 2024-01-03 PROCEDURE — 96416 CHEMO PROLONG INFUSE W/PUMP: CPT | Mod: PN

## 2024-01-03 PROCEDURE — 99999 PR PBB SHADOW E&M-EST. PATIENT-LVL IV: CPT | Mod: PBBFAC,,, | Performed by: INTERNAL MEDICINE

## 2024-01-03 PROCEDURE — 99215 OFFICE O/P EST HI 40 MIN: CPT | Mod: S$GLB,,, | Performed by: NURSE PRACTITIONER

## 2024-01-03 PROCEDURE — G2211 COMPLEX E/M VISIT ADD ON: HCPCS | Mod: S$GLB,,, | Performed by: INTERNAL MEDICINE

## 2024-01-03 PROCEDURE — 25000003 PHARM REV CODE 250: Mod: PN | Performed by: INTERNAL MEDICINE

## 2024-01-03 PROCEDURE — 96411 CHEMO IV PUSH ADDL DRUG: CPT | Mod: PN

## 2024-01-03 PROCEDURE — 99499 UNLISTED E&M SERVICE: CPT | Mod: S$GLB,,, | Performed by: INTERNAL MEDICINE

## 2024-01-03 PROCEDURE — 96413 CHEMO IV INFUSION 1 HR: CPT | Mod: PN

## 2024-01-03 PROCEDURE — 99999 PR PBB SHADOW E&M-EST. PATIENT-LVL III: CPT | Mod: PBBFAC,,, | Performed by: NURSE PRACTITIONER

## 2024-01-03 PROCEDURE — 99215 OFFICE O/P EST HI 40 MIN: CPT | Mod: S$GLB,,, | Performed by: INTERNAL MEDICINE

## 2024-01-03 PROCEDURE — 96375 TX/PRO/DX INJ NEW DRUG ADDON: CPT | Mod: PN

## 2024-01-03 RX ORDER — ONDANSETRON 2 MG/ML
8 INJECTION INTRAMUSCULAR; INTRAVENOUS
Status: COMPLETED | OUTPATIENT
Start: 2024-01-03 | End: 2024-01-03

## 2024-01-03 RX ORDER — SODIUM CHLORIDE 0.9 % (FLUSH) 0.9 %
10 SYRINGE (ML) INJECTION
Status: CANCELLED | OUTPATIENT
Start: 2024-01-05

## 2024-01-03 RX ORDER — EPINEPHRINE 0.3 MG/.3ML
0.3 INJECTION SUBCUTANEOUS ONCE AS NEEDED
Status: CANCELLED | OUTPATIENT
Start: 2024-01-03

## 2024-01-03 RX ORDER — DIPHENHYDRAMINE HYDROCHLORIDE 50 MG/ML
50 INJECTION, SOLUTION INTRAMUSCULAR; INTRAVENOUS ONCE AS NEEDED
Status: CANCELLED | OUTPATIENT
Start: 2024-01-03

## 2024-01-03 RX ORDER — HEPARIN 100 UNIT/ML
500 SYRINGE INTRAVENOUS
Status: DISCONTINUED | OUTPATIENT
Start: 2024-01-03 | End: 2024-01-03 | Stop reason: HOSPADM

## 2024-01-03 RX ORDER — FLUOROURACIL 50 MG/ML
400 INJECTION, SOLUTION INTRAVENOUS
Status: CANCELLED | OUTPATIENT
Start: 2024-01-03

## 2024-01-03 RX ORDER — DIPHENHYDRAMINE HYDROCHLORIDE 50 MG/ML
50 INJECTION, SOLUTION INTRAMUSCULAR; INTRAVENOUS ONCE AS NEEDED
Status: DISCONTINUED | OUTPATIENT
Start: 2024-01-03 | End: 2024-01-03 | Stop reason: HOSPADM

## 2024-01-03 RX ORDER — FLUOROURACIL 50 MG/ML
400 INJECTION, SOLUTION INTRAVENOUS
Status: COMPLETED | OUTPATIENT
Start: 2024-01-03 | End: 2024-01-03

## 2024-01-03 RX ORDER — HEPARIN 100 UNIT/ML
500 SYRINGE INTRAVENOUS
Status: CANCELLED | OUTPATIENT
Start: 2024-01-03

## 2024-01-03 RX ORDER — ONDANSETRON 2 MG/ML
8 INJECTION INTRAMUSCULAR; INTRAVENOUS
Status: CANCELLED | OUTPATIENT
Start: 2024-01-03

## 2024-01-03 RX ORDER — EPINEPHRINE 0.3 MG/.3ML
0.3 INJECTION SUBCUTANEOUS ONCE AS NEEDED
Status: DISCONTINUED | OUTPATIENT
Start: 2024-01-03 | End: 2024-01-03 | Stop reason: HOSPADM

## 2024-01-03 RX ORDER — PROCHLORPERAZINE EDISYLATE 5 MG/ML
5 INJECTION INTRAMUSCULAR; INTRAVENOUS ONCE AS NEEDED
Status: DISCONTINUED | OUTPATIENT
Start: 2024-01-03 | End: 2024-01-03 | Stop reason: HOSPADM

## 2024-01-03 RX ORDER — SODIUM CHLORIDE 0.9 % (FLUSH) 0.9 %
10 SYRINGE (ML) INJECTION
Status: CANCELLED | OUTPATIENT
Start: 2024-01-03

## 2024-01-03 RX ORDER — SODIUM CHLORIDE 0.9 % (FLUSH) 0.9 %
10 SYRINGE (ML) INJECTION
Status: DISCONTINUED | OUTPATIENT
Start: 2024-01-03 | End: 2024-01-03 | Stop reason: HOSPADM

## 2024-01-03 RX ORDER — HEPARIN 100 UNIT/ML
500 SYRINGE INTRAVENOUS
Status: CANCELLED | OUTPATIENT
Start: 2024-01-05

## 2024-01-03 RX ORDER — PROCHLORPERAZINE EDISYLATE 5 MG/ML
5 INJECTION INTRAMUSCULAR; INTRAVENOUS ONCE AS NEEDED
Status: CANCELLED
Start: 2024-01-03

## 2024-01-03 RX ADMIN — FLUOROURACIL 810 MG: 50 INJECTION, SOLUTION INTRAVENOUS at 01:01

## 2024-01-03 RX ADMIN — ONDANSETRON 8 MG: 2 INJECTION INTRAMUSCULAR; INTRAVENOUS at 10:01

## 2024-01-03 RX ADMIN — FLUOROURACIL 4870 MG: 50 INJECTION, SOLUTION INTRAVENOUS at 01:01

## 2024-01-03 RX ADMIN — LEUCOVORIN CALCIUM 810 MG: 350 INJECTION, POWDER, LYOPHILIZED, FOR SOLUTION INTRAMUSCULAR; INTRAVENOUS at 10:01

## 2024-01-03 NOTE — PROGRESS NOTES
Danna Sorensen  83 y.o. is here to seek an integrative approach to discuss side effects related to colon cancer treatment.     HPI  Mrs. Sorensen is here today with her niece Barbie. She had treatment in [id number] for colon cancer. She states she has been cancer free until now when she was diagnosed again with colon cancer. She reports she does not sleep well. She will sleep 2 for 2 hours and then wake up for 2 hours and she does this through the night. Her poor sleep is not new as she has not slept well previously. She has a good appetite and eats a lot of vegetables. She states she eats more vegetables than meat. She states she does not feel stressed, but she does worry. She states her activity level is low since her  . She was diagnosed with arthritis when she was 21 years old so she has a long history of chronic low back pain and joint pain.    She is a , her   in April. She lives alone and her niece Barbie lives with her. She has a good support system     Today's Visit  Mrs. Clay is here today getting treatment. She is not sleeping well, trazodone not helping. She sleeps 2-3 hours and then wakes up and gets up for an hour and then goes back to sleep. She states this is not a new habit and has occurred for years.   She has nausea but uses soft peppermint candies and gets relief. She reports low stress. She reports a good appetite and is eating healthy. She is now eating more vegetables and less meat. She has been walking more recently, especially shopping with her niece.   She states she is doing well overall. She states this chemo treatment is not as bad as the last time she had treatment.     Pillars Assessment    Sleep  How many hours of sleep per night? 4-6 hours of broken sleep  Do you have trouble falling asleep, staying asleep or waking up earlier than you need to? no  Do you have daytime fatigue? yes  Do you need medication for sleep? no  Do you use any supplements or other  interventions for sleep? no    Resilience  Rate your current level of stress- low    Purpose  Do you feel you have a vision or a life purpose? Yes    Nutrition   Food allergies or sensitivities: no  Do you adhere to a particular type of diet? no  Do you have any concerns with your eating habits? no    Exercise  How would you describe your physical activity level? 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;  Service: Endoscopy;  Laterality: N/A;    COLONOSCOPY N/A 8/8/2023    Procedure: COLONOSCOPY;  Surgeon: Kaushik Pinon MD;  Location: [facility];  Service: Endoscopy;  Laterality: N/A;    COLONOSCOPY N/A 8/22/2023    Procedure: COLONOSCOPY (tattoo of colon ca);  Surgeon: Kaushik Pinon MD;  Location: [facility];  Service: Endoscopy;  Laterality: N/A;    ESOPHAGOGASTRODUODENOSCOPY N/A 8/3/2023    Procedure: EGD (ESOPHAGOGASTRODUODENOSCOPY);  Surgeon: Kaushik Pinon MD;  Location: [facility];  Service: Endoscopy;  Laterality: N/A;    HAND TENDON SURGERY Left     HEMICOLECTOMY      right     INJECTION OF ANESTHETIC AGENT AROUND MEDIAL BRANCH NERVES INNERVATING LUMBAR FACET JOINT Right 07/10/2018    Procedure: Block-nerve-medial branch-lumbar;  Surgeon: Parish Gaitan MD;  Location: [facility];  Service: Pain Management;  Laterality: Right;  L3, 4, 5    INSERTION OF INFERIOR VENA CAVAL FILTER N/A 9/13/2023    Procedure: Insertion, Filter, Inferior Vena Cava;  Surgeon: Oren Verdin MD;  Location: [facility] CATH/EP LAB;  Service: General;  Laterality: N/A;    INSERTION OF TUNNELED CENTRAL VENOUS CATHETER (CVC) WITH SUBCUTANEOUS PORT Right 11/15/2023    Procedure: [id number];  Surgeon: Jim Chavez MD;  Location: [facility] OR;  Service: General;  Laterality: Right;    JOINT REPLACEMENT      bilateral    RADIOFREQUENCY ABLATION OF LUMBAR MEDIAL BRANCH NERVE AT SINGLE LEVEL Right 07/24/2018    Procedure: RADIOFREQUENCY ABLATION, NERVE, MEDIAL BRANCH, LUMBAR, 1 LEVEL;  Surgeon: Parish Gaitan MD;  Location: [facility];  Service: Pain Management;  Laterality: Right;  L3,4,5 - Burned at 80 degrees C. for 75 seconds x 2 each site    ROBOT-ASSISTED COLECTOMY N/A 9/29/2023    Procedure: ROBOTIC COLECTOMY;  Surgeon: Jim Chavez MD;  Location: [facility] OR;  Service: General;  Laterality: N/A;    SHOULDER ARTHROSCOPY Right 01/12/2017    Reverse     TONSILLECTOMY      TONSILLECTOMY, ADENOIDECTOMY, BILATERAL MYRINGOTOMY AND TUBES      TOTAL KNEE ARTHROPLASTY Bilateral 08/1998    total replacements    TUMOR REMOVAL Left     left foot as a child      Family History   Family History   Problem Relation Age of Onset    Hypertension Mother     Kidney disease Mother     Cataracts Father     Cataracts Sister     Cancer Sister         breast    No Known Problems Brother     Cancer Sister         breast    Arthritis Sister     Glaucoma Neg Hx     Amblyopia Neg Hx     Blindness Neg Hx     Macular degeneration Neg Hx     Retinal detachment Neg Hx     Strabismus Neg Hx     Stroke Neg Hx     Thyroid disease Neg Hx      "  Allergies  Review of patient's allergies indicates:   Allergen Reactions    Aspirin      Other reaction(s): Stomach upset    Aspirin Other (See Comments)     Other reaction(s): Stomach upset    Gabapentin Other (See Comments)     Pt states she felt "funny" when she took the medication. Especially at the higher dose.    Mobic [meloxicam] Swelling     Edema and elevated blood pressure     Oxaliplatin Other (See Comments)     Other reaction(s): Joint pain  Other reaction(s): Itching  Other reaction(s): Hives  Other reaction(s): Joint pain  Other reaction(s): Itching  Other reaction(s): Hives    Pregabalin Other (See Comments)     Side effects  Side effects      Current Medications:    Current Outpatient Medications:     acetaminophen (TYLENOL) 650 MG TbSR, Take 650 mg by mouth every 8 (eight) hours., Disp: , Rfl:     alendronate (FOSAMAX) 70 MG tablet, Take 1 tablet (70 mg total) by mouth every 7 days., Disp: 12 tablet, Rfl: 3    apixaban (ELIQUIS) 5 mg Tab, Take 1 tablet (5 mg total) by mouth 2 (two) times daily., Disp: 180 tablet, Rfl: 3    cyclobenzaprine (FLEXERIL) 5 MG tablet, Take 1 tablet (5 mg total) by mouth 3 (three) times daily as needed for Muscle spasms., Disp: 90 tablet, Rfl: 0    ergocalciferol, vitamin D2, (VITAMIN D ORAL), Take 2,000 mg by mouth once daily., Disp: , Rfl:     furosemide (LASIX) 20 MG tablet, Take 1 tablet (20 mg total) by mouth daily as needed (edema)., Disp: 90 tablet, Rfl: 3    HYDROcodone-acetaminophen (NORCO) 5-325 mg per tablet, Take 1 tablet by mouth every 6 (six) hours as needed for Pain., Disp: 20 tablet, Rfl: 0    hydrOXYzine HCL (ATARAX) 25 MG tablet, Take 1 tablet (25 mg total) by mouth 3 (three) times daily as needed for Anxiety (insomnia)., Disp: 30 tablet, Rfl: PRN    levocetirizine (XYZAL) 5 MG tablet, Take 1 tablet (5 mg total) by mouth nightly as needed for Allergies (allergies)., Disp: 90 tablet, Rfl: PRN    levothyroxine (SYNTHROID) 75 MCG tablet, Take 1 tablet (75 " mcg total) by mouth once daily., Disp: 90 tablet, Rfl: 3    LIDOcaine-prilocaine (EMLA) cream, Apply topically as needed (Chemo)., Disp: 30 g, Rfl: 0    lisinopriL 10 MG tablet, Take 1 tablet (10 mg total) by mouth once daily., Disp: 90 tablet, Rfl: 3    multivitamin capsule, Take 1 capsule by mouth once daily., Disp: , Rfl:     omeprazole (PRILOSEC) 40 MG capsule, Take 1 capsule (40 mg total) by mouth 2 (two) times daily before meals., Disp: 180 capsule, Rfl: PRN    ondansetron (ZOFRAN-ODT) 8 MG TbDL, Take 1 tablet (8 mg total) by mouth every 8 (eight) hours as needed (nausea)., Disp: 40 tablet, Rfl: 3    promethazine (PHENERGAN) 12.5 MG Tab, (Take 1-2 tabs every 6 hours as needed for nausea persistent despite zofran), Disp: 40 tablet, Rfl: 3    traMADoL (ULTRAM) 50 mg tablet, Take 1 tablet (50 mg total) by mouth every 8 (eight) hours as needed for Pain., Disp: 21 each, Rfl: 0    traZODone (DESYREL) 50 MG tablet, Take 1 tablet (50 mg total) by mouth nightly., Disp: 90 tablet, Rfl: 0    triamcinolone acetonide 0.1% (KENALOG) 0.1 % cream, APPLY TOPICALLY TO THE AFFECTED AREA TWICE DAILY, Disp: 60 g, Rfl: 0  No current facility-administered medications for this visit.    Facility-Administered Medications Ordered in Other Visits:     0.9%  NaCl infusion, , Intravenous, Once, Oren Verdin MD    alteplase injection 2 mg, 2 mg, Intra-Catheter, PRN, Mahesh Cameron MD    diphenhydrAMINE injection 50 mg, 50 mg, Intravenous, Once PRN, Mahesh Cameron MD    EPINEPHrine (EPIPEN) 0.3 mg/0.3 mL pen injection 0.3 mg, 0.3 mg, Intramuscular, Once PRN, Mahesh Cameron MD    fluorouracil (ADRUCIL) 2,400 mg/m2 = 4,870 mg in sodium chloride 0.9% 100 mL chemo infusion, 2,400 mg/m2 (Treatment Plan Recorded), Intravenous, over 46 hr, Mahesh Cameron MD    fluorouraciL injection 810 mg, 400 mg/m2 (Treatment Plan Recorded), Intravenous, 1 time in Clinic/HOD, Mahesh Cameron MD    heparin, porcine (PF) 100 unit/mL injection  flush 500 Units, 500 Units, Intravenous, PRN, Mahesh Cameron MD    hydrocortisone sodium succinate injection 100 mg, 100 mg, Intravenous, Once PRN, Mahesh Cameron MD    leucovorin calcium 400 mg/m2 = 810 mg in dextrose 5 % (D5W) 325.5 mL infusion, 400 mg/m2 (Treatment Plan Recorded), Intravenous, 1 time in Clinic/HOD, Mahesh Cameron MD, Last Rate: 162.8 mL/hr at 01/03/24 1057, 810 mg at 01/03/24 1057    prochlorperazine injection Soln 5 mg, 5 mg, Intravenous, Once PRN, Mahesh Cameron MD    sodium chloride 0.9% 100 mL flush bag, , Intravenous, 1 time in Clinic/HOD, Mahesh Cameron MD    sodium chloride 0.9% flush 10 mL, 10 mL, Intravenous, PRN, Mahesh Cameron MD     Review of Systems  Review of Systems   Constitutional:  Positive for malaise/fatigue.   HENT: Negative.     Eyes: Negative.    Respiratory: Negative.     Cardiovascular: Negative.    Gastrointestinal: Negative.    Genitourinary: Negative.    Musculoskeletal:  Positive for back pain and joint pain.   Skin: Negative.    Neurological: Negative.    Endo/Heme/Allergies: Negative.    Psychiatric/Behavioral:  The patient is nervous/anxious and has insomnia.       Physical Exam      /70  HR 57  TEMP 96.5  RR 16      Physical Exam  Vitals reviewed.   Constitutional:       Appearance: Normal appearance.   Neurological:      Mental Status: She is alert.   Psychiatric:         Mood and Affect: Mood normal.         Behavior: Behavior normal.      ASSESSMENT :  1. Insomnia, unspecified type    2. Chronic right-sided low back pain without sciatica    3. Other fatigue    4. Malignant neoplasm of splenic flexure      PLAN:  Reviewed all information discussed at today's visit and all questions were answered.    Counseled on healthy lifestyle and behavior modifications   Continue Acupuncture if desired  I explained acupuncture can reduce fatigue and pain. It can also assist with behavioral health such as depression and anxiety and improve overall  sleep quality. Acupuncture helps improve overall symptoms from treatment.   See nutrition during treatment as needed  Stop Trazodone 50 mg   Magnesium Glycinate 400mg nightly  Melatonin 3-5 mg nightly as needed  I discussed and recommended the following support services:  Jack Chi and Yoga I suggested Jack Chi and/or Yoga as these practices reduce stress, increases flexibility and muscle strength, improves balance and promotes serenity in the power of movement to help fight disease and boost your immune system.   Music and relaxation therapy and Meditation which can decrease stress by lowering blood pressure, slowing breathing, and helping you be more present in the moment. It improves sleep by relaxing the body and mind at the end of the day.Meditation also trains you how to focus on one thing at a time, improving concentration. It also promotes emotional well-being by decreasing depression and anxiety, and helping create a more positive outlook on life.    Follow up with [facility] in 3 months, chairside    I spent a total of 40 minutes on the day of the visit.This includes face to face time and non-face to face time preparing to see the patient (eg, review of tests), obtaining and/or reviewing separately obtained history, documenting clinical information in the electronic or other health record, independently interpreting results and communicating results to the patient/family/caregiver, or care coordinator.

## 2024-01-03 NOTE — PLAN OF CARE
Problem: Adult Inpatient Plan of Care  Goal: Patient-Specific Goal (Individualized)  Outcome: Ongoing, Progressing  Flowsheets (Taken 1/3/2024 1333)  Anxieties, Fears or Concerns: none  Individualized Care Needs: recliner/warm blanket/snacks  Goal: Absence of Hospital-Acquired Illness or Injury  Outcome: Ongoing, Progressing  Goal: Optimal Comfort and Wellbeing  Outcome: Ongoing, Progressing  Goal: Readiness for Transition of Care  Outcome: Ongoing, Progressing     Problem: Fatigue  Goal: Improved Activity Tolerance  Outcome: Ongoing, Progressing  Intervention: Promote Improved Energy  Flowsheets (Taken 1/3/2024 1333)  Fatigue Management:   activity schedule adjusted   activity assistance provided   fatigue-related activity identified   frequent rest breaks encouraged   paced activity encouraged  Sleep/Rest Enhancement:   noise level reduced   reading promoted   regular sleep/rest pattern promoted   relaxation techniques promoted   family presence promoted   room darkened  Activity Management:   Ambulated -L4   Ambulated to bathroom - L4   Ambulated in jenkins - L4   Walk with assistive devise and /or staff member - L3  Blood return noted prior to pump connection, all connections secured with coban, pt has spill kit and tip sheet at home from previous infusions. Verified pump infusing prior to d/c.     Pt tolerated Leucovorin/5FU push and pump well today. NAD/no concerns voiced. Reviewed follow-up appointments. All questions were answered, ambulated independently at d/c with cane and nephew at side.

## 2024-01-03 NOTE — PROGRESS NOTES
PATIENT: Danna Sorensen  MRN: [id number]  DATE: 1/3/2024      Diagnosis:   1. Malignant neoplasm of splenic flexure    2. Immunodeficiency due to chemotherapy    3. Renal mass, left    4. Pulmonary nodules    5. Acute deep vein thrombosis (DVT) of popliteal vein of both lower extremities    6. Iron deficiency anemia, unspecified iron deficiency anemia type              Chief Complaint: Malignant neoplasm of splenic flexure (2 week follow up)      Oncologic History:      Oncologic History     Oncologic Treatment     Pathology           Subjective:    Interval History: Ms. Sorensen is a 83 y.o. female with Pulmonary embolism, carpal tunnel, CAD, HTN, hypothyroidism, osteoporosis, osteoarthritis, who presents for colon cancer.  Since the last clinic visit the patient states she has occasional nausea with treatment which improves with eating soft peppermints.  The patient denies CP, cough, SOB, abdominal pain, vomiting, constipation, diarrhea.  The patient denies fever, chills, night sweats, weight loss, new lumps or bumps, easy bruising or bleeding.    Prior History:  Pt was previously diagnosed with Stage III adenocarcinoma of the colon in 2007 and underwent 12 cycles of FOLFOX.  Pt underwent colonoscopy on 8/08/23 showing 1 7 mm polyp in the rectum; altered vascular, congested, eroded, friable and ulcerated mucosa in the transverse colon which was biopsied; patent and to side ileocolonic anastomosis.  Pathology showed moderately differentiated adenocarcinoma. CT abdomen and pelvis 8/17/23 showing irregular appearance of the gallbladder; heterogeneously enhancing lesion in the inferior pole of the left kidney measuring 1.8 cm; short segment of concentric bowel wall thickening of the colon at the splenic flexure with surrounding mesenteric fat stranding with nodular thickness of 2.2 cm.  The patient underwent colonoscopy on 08/22/2023 showing nonbleeding internal hemorrhoids, partially obstructing tumor in the transverse  colon which was tattooed.  Pt saw Urology 8/23/23 with plans for re-imaging the renal lesion in 3 months with an MRI.  US abdomen 8/25/23 showed a 2.5 cm solid mass at the lower pole of the left kidney suspicious for neoplasm.  CT chest 8/25/23 showed 4 mm left upper lobe pulmonary nodule, 4 mm calcified granuloma left upper lobe, 2 mm pulmonary nodule in the left upper lobe, 9 x 9 mm triangular nodule along the juxtapleural right middle lobe, 2 mm pulmonary nodule in the right middle lobe, and a partially imaged masslike density in the splenic flexure of the colon. Pt then underwent resection of the transverse colon and splenic flexure with path showing invasive moderately differentiated adenocarcinoma with mucinous features, 33 benign lymph nodes, positive lymphovascular invasion, positive perineural invasion pT3N0.  Tumor shows intact expression of MLH1, PMS2, MSH2, MSH6.  Patient was positive for B Celio V600E mutation.   The patient was discussed at tumor Board on 11/07/2023 with recommendation for PET-CT with biopsy of lung nodules if positive.  PET-CT on 11/08/2023 showed evidence of pulmonary hypertension; stable 5 mm nodule left lung apex; calcified granuloma left upper lobe; stable 6 mm inferior right upper lobe nodule; stable 17 mm ground-glass opacity lateral right middle lobe; stable 4 mm nodule in the right lower lobe of the diaphragm; no activity in the lesion in the left kidney.  MRI abdomen 11/20/2023 showed heterogeneous fat containing enhancing mass in the lower pole of the left kidney suggestive of an angio myelolipoma.    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    Strabismus     Trouble in sleeping     Wears dentures     Uppers       Past Surgical HIstory:   Past Surgical History:   Procedure Laterality Date    CARDIAC SURGERY      cardiac cath    COLON SURGERY      colon resection    COLONOSCOPY N/A 10/18/2016    Procedure: COLONOSCOPY;  Surgeon: Rell Cordova MD;  Location: [facility];  Service: Endoscopy;  Laterality: N/A;    COLONOSCOPY N/A 8/8/2023    Procedure: COLONOSCOPY;  Surgeon: Kaushik Pinon MD;  Location: [facility];  Service: Endoscopy;  Laterality: N/A;    COLONOSCOPY N/A 8/22/2023    Procedure: COLONOSCOPY (tattoo of colon ca);  Surgeon: Kaushik Pinon MD;  Location: [facility];  Service: Endoscopy;  Laterality: N/A;    ESOPHAGOGASTRODUODENOSCOPY N/A 8/3/2023    Procedure: EGD (ESOPHAGOGASTRODUODENOSCOPY);  Surgeon: Kaushik Pinon MD;  Location: [facility];  Service: Endoscopy;  Laterality: N/A;    HAND TENDON SURGERY Left     HEMICOLECTOMY      right    INJECTION OF ANESTHETIC AGENT AROUND MEDIAL BRANCH NERVES INNERVATING LUMBAR FACET JOINT Right 07/10/2018    Procedure: Block-nerve-medial branch-lumbar;  Surgeon: Parish Gaitan MD;  Location: [facility] OR;  Service: Pain Management;  Laterality: Right;  L3, 4, 5    INSERTION OF INFERIOR VENA CAVAL FILTER N/A 9/13/2023    Procedure: Insertion, Filter, Inferior Vena Cava;  Surgeon: Oren Verdin MD;  Location: [facility] CATH/EP LAB;  Service: General;  Laterality: N/A;    INSERTION OF TUNNELED CENTRAL VENOUS CATHETER (CVC) WITH SUBCUTANEOUS PORT Right 11/15/2023    Procedure: [id number];  Surgeon: Jim Chavez MD;  Location: [facility] OR;  Service: General;  Laterality: Right;    JOINT REPLACEMENT      bilateral    RADIOFREQUENCY ABLATION OF  "LUMBAR MEDIAL BRANCH NERVE AT SINGLE LEVEL Right 07/24/2018    Procedure: RADIOFREQUENCY ABLATION, NERVE, MEDIAL BRANCH, LUMBAR, 1 LEVEL;  Surgeon: Parish Gaitan MD;  Location: [facility] OR;  Service: Pain Management;  Laterality: Right;  L3,4,5 - Burned at 80 degrees C. for 75 seconds x 2 each site    ROBOT-ASSISTED COLECTOMY N/A 9/29/2023    Procedure: ROBOTIC COLECTOMY;  Surgeon: Jim Chavez MD;  Location: [facility] OR;  Service: General;  Laterality: N/A;    SHOULDER ARTHROSCOPY Right 01/12/2017    Reverse     TONSILLECTOMY      TONSILLECTOMY, ADENOIDECTOMY, BILATERAL MYRINGOTOMY AND TUBES      TOTAL KNEE ARTHROPLASTY Bilateral 08/1998    total replacements    TUMOR REMOVAL Left     left foot as a child       Family History:   Family History   Problem Relation Age of Onset    Hypertension Mother     Kidney disease Mother     Cataracts Father     Cataracts Sister     Cancer Sister         breast    No Known Problems Brother     Cancer Sister         breast    Arthritis Sister     Glaucoma Neg Hx     Amblyopia Neg Hx     Blindness Neg Hx     Macular degeneration Neg Hx     Retinal detachment Neg Hx     Strabismus Neg Hx     Stroke Neg Hx     Thyroid disease Neg Hx        Social History:  reports that she quit smoking about 63 years ago. Her smoking use included cigarettes. She started smoking about 63 years ago. She has a 0.5 pack-year smoking history. She has never used smokeless tobacco. She reports that she does not drink alcohol and does not use drugs.    Allergies:  Review of patient's allergies indicates:   Allergen Reactions    Aspirin      Other reaction(s): Stomach upset    Aspirin Other (See Comments)     Other reaction(s): Stomach upset    Gabapentin Other (See Comments)     Pt states she felt "funny" when she took the medication. Especially at the higher dose.    Mobic [meloxicam] Swelling     Edema and elevated blood pressure     Oxaliplatin Other (See Comments)     Other reaction(s): Joint pain  Other " reaction(s): Itching  Other reaction(s): Hives  Other reaction(s): Joint pain  Other reaction(s): Itching  Other reaction(s): Hives    Pregabalin Other (See Comments)     Side effects  Side effects       Medications:  Current Outpatient Medications   Medication Sig Dispense Refill    acetaminophen (TYLENOL) 650 MG TbSR Take 650 mg by mouth every 8 (eight) hours.      alendronate (FOSAMAX) 70 MG tablet Take 1 tablet (70 mg total) by mouth every 7 days. 12 tablet 3    apixaban (ELIQUIS) 5 mg Tab Take 1 tablet (5 mg total) by mouth 2 (two) times daily. 180 tablet 3    cyclobenzaprine (FLEXERIL) 5 MG tablet Take 1 tablet (5 mg total) by mouth 3 (three) times daily as needed for Muscle spasms. 90 tablet 0    ergocalciferol, vitamin D2, (VITAMIN D ORAL) Take 2,000 mg by mouth once daily.      furosemide (LASIX) 20 MG tablet Take 1 tablet (20 mg total) by mouth daily as needed (edema). 90 tablet 3    HYDROcodone-acetaminophen (NORCO) 5-325 mg per tablet Take 1 tablet by mouth every 6 (six) hours as needed for Pain. 20 tablet 0    hydrOXYzine HCL (ATARAX) 25 MG tablet Take 1 tablet (25 mg total) by mouth 3 (three) times daily as needed for Anxiety (insomnia). 30 tablet PRN    levocetirizine (XYZAL) 5 MG tablet Take 1 tablet (5 mg total) by mouth nightly as needed for Allergies (allergies). 90 tablet PRN    levothyroxine (SYNTHROID) 75 MCG tablet Take 1 tablet (75 mcg total) by mouth once daily. 90 tablet 3    LIDOcaine-prilocaine (EMLA) cream Apply topically as needed (Chemo). 30 g 0    lisinopriL 10 MG tablet Take 1 tablet (10 mg total) by mouth once daily. 90 tablet 3    multivitamin capsule Take 1 capsule by mouth once daily.      omeprazole (PRILOSEC) 40 MG capsule Take 1 capsule (40 mg total) by mouth 2 (two) times daily before meals. 180 capsule PRN    ondansetron (ZOFRAN-ODT) 8 MG TbDL Take 1 tablet (8 mg total) by mouth every 8 (eight) hours as needed (nausea). 40 tablet 3    promethazine (PHENERGAN) 12.5 MG Tab  (Take 1-2 tabs every 6 hours as needed for nausea persistent despite zofran) 40 tablet 3    traMADoL (ULTRAM) 50 mg tablet Take 1 tablet (50 mg total) by mouth every 8 (eight) hours as needed for Pain. 21 each 0    traZODone (DESYREL) 50 MG tablet Take 1 tablet (50 mg total) by mouth nightly. 90 tablet 0    triamcinolone acetonide 0.1% (KENALOG) 0.1 % cream APPLY TOPICALLY TO THE AFFECTED AREA TWICE DAILY 60 g 0     No current facility-administered medications for this visit.     Facility-Administered Medications Ordered in Other Visits   Medication Dose Route Frequency Provider Last Rate Last Admin    0.9%  NaCl infusion   Intravenous Once Oren Verdin MD        alteplase injection 2 mg  2 mg Intra-Catheter PRN Mahesh Cameron MD        diphenhydrAMINE injection 50 mg  50 mg Intravenous Once PRN Mahesh Cameron MD        EPINEPHrine (EPIPEN) 0.3 mg/0.3 mL pen injection 0.3 mg  0.3 mg Intramuscular Once PRN Mahesh Cameron MD        fluorouracil (ADRUCIL) 2,400 mg/m2 = 4,870 mg in sodium chloride 0.9% 100 mL chemo infusion  2,400 mg/m2 (Treatment Plan Recorded) Intravenous over 46 hr Mahesh Cameron MD   4,870 mg at 01/03/24 1313    heparin, porcine (PF) 100 unit/mL injection flush 500 Units  500 Units Intravenous PRN Mahesh Cameron MD        hydrocortisone sodium succinate injection 100 mg  100 mg Intravenous Once PRN Mahesh Cameron MD        prochlorperazine injection Soln 5 mg  5 mg Intravenous Once PRN Mahesh Cameron MD        sodium chloride 0.9% 100 mL flush bag   Intravenous 1 time in Clinic/HOD Mahesh Cameron MD        sodium chloride 0.9% flush 10 mL  10 mL Intravenous PRN Mahesh Cameron MD           Review of Systems   Constitutional:  Negative for chills, fatigue, fever and unexpected weight change.   Respiratory:  Negative for cough and shortness of breath.    Cardiovascular:  Negative for chest pain and palpitations.   Gastrointestinal:  Positive for nausea. Negative for  "abdominal pain, constipation, diarrhea and vomiting.   Skin:  Negative for rash.   Neurological:  Negative for headaches.   Hematological:  Negative for adenopathy. Does not bruise/bleed easily.       ECOG Performance Status: 2   Objective:      Vitals:   Vitals:    01/03/24 0932   BP: 138/70   BP Location: Right arm   Patient Position: Sitting   BP Method: Large (Manual)   Pulse: (!) 57   Resp: (!) 22   Temp: 96.5 °F (35.8 °C)   TempSrc: Temporal   SpO2: 99%   Weight: 91.4 kg (201 lb 8 oz)   Height: 5' 4" (1.626 m)             Physical Exam  Constitutional:       General: She is not in acute distress.     Appearance: She is well-developed. She is not diaphoretic.   HENT:      Head: Normocephalic and atraumatic.   Cardiovascular:      Rate and Rhythm: Normal rate and regular rhythm.      Heart sounds: Normal heart sounds. No murmur heard.     No friction rub. No gallop.   Pulmonary:      Effort: Pulmonary effort is normal. No respiratory distress.      Breath sounds: Normal breath sounds. No wheezing or rales.   Chest:      Chest wall: No tenderness.   Abdominal:      General: Bowel sounds are normal. There is no distension.      Palpations: Abdomen is soft. There is no mass.      Tenderness: There is no abdominal tenderness. There is no guarding or rebound.   Lymphadenopathy:      Cervical: No cervical adenopathy.      Upper Body:      Right upper body: No supraclavicular or axillary adenopathy.      Left upper body: No supraclavicular or axillary adenopathy.   Skin:     Findings: No erythema or rash.   Neurological:      Mental Status: She is alert and oriented to person, place, and time.   Psychiatric:         Behavior: Behavior normal.         Laboratory Data:  Lab Visit on 01/02/2024   Component Date Value Ref Range Status    WBC 01/02/2024 5.34  3.90 - 12.70 K/uL Final    RBC 01/02/2024 3.53 (L)  4.00 - 5.40 M/uL Final    Hemoglobin 01/02/2024 10.4 (L)  12.0 - 16.0 g/dL Final    Hematocrit 01/02/2024 31.2 (L)  " 37.0 - 48.5 % Final    MCV 01/02/2024 88  82 - 98 fL Final    MCH 01/02/2024 29.5  27.0 - 31.0 pg Final    MCHC 01/02/2024 33.3  32.0 - 36.0 g/dL Final    RDW 01/02/2024 16.3 (H)  11.5 - 14.5 % Final    Platelets 01/02/2024 186  150 - 450 K/uL Final    MPV 01/02/2024 9.2  9.2 - 12.9 fL Final    Immature Granulocytes 01/02/2024 0.4  0.0 - 0.5 % Final    Gran # (ANC) 01/02/2024 3.1  1.8 - 7.7 K/uL Final    Immature Grans (Abs) 01/02/2024 0.02  0.00 - 0.04 K/uL Final    Comment: Mild elevation in immature granulocytes is non specific and   can be seen in a variety of conditions including stress response,   acute inflammation, trauma and pregnancy. Correlation with other   laboratory and clinical findings is essential.      Lymph # 01/02/2024 1.5  1.0 - 4.8 K/uL Final    Mono # 01/02/2024 0.6  0.3 - 1.0 K/uL Final    Eos # 01/02/2024 0.1  0.0 - 0.5 K/uL Final    Baso # 01/02/2024 0.02  0.00 - 0.20 K/uL Final    nRBC 01/02/2024 0  0 /100 WBC Final    Gran % 01/02/2024 58.4  38.0 - 73.0 % Final    Lymph % 01/02/2024 27.9  18.0 - 48.0 % Final    Mono % 01/02/2024 11.2  4.0 - 15.0 % Final    Eosinophil % 01/02/2024 1.7  0.0 - 8.0 % Final    Basophil % 01/02/2024 0.4  0.0 - 1.9 % Final    Differential Method 01/02/2024 Automated   Final    Sodium 01/02/2024 139  136 - 145 mmol/L Final    Potassium 01/02/2024 4.7  3.5 - 5.1 mmol/L Final    Chloride 01/02/2024 110  95 - 110 mmol/L Final    CO2 01/02/2024 22 (L)  23 - 29 mmol/L Final    Glucose 01/02/2024 88  70 - 110 mg/dL Final    BUN 01/02/2024 17  8 - 23 mg/dL Final    Creatinine 01/02/2024 1.0  0.5 - 1.4 mg/dL Final    Calcium 01/02/2024 9.1  8.7 - 10.5 mg/dL Final    Total Protein 01/02/2024 6.5  6.0 - 8.4 g/dL Final    Albumin 01/02/2024 3.7  3.5 - 5.2 g/dL Final    Total Bilirubin 01/02/2024 1.0  0.1 - 1.0 mg/dL Final    Comment: For infants and newborns, interpretation of results should be based  on gestational age, weight and in agreement with  clinical  observations.    Premature Infant recommended reference ranges:  Up to 24 hours.............<8.0 mg/dL  Up to 48 hours............<12.0 mg/dL  3-5 days..................<15.0 mg/dL  6-29 days.................<15.0 mg/dL      Alkaline Phosphatase 01/02/2024 53 (L)  55 - 135 U/L Final    AST 01/02/2024 12  10 - 40 U/L Final    ALT 01/02/2024 6 (L)  10 - 44 U/L Final    eGFR 01/02/2024 56.6 (A)  >60 mL/min/1.73 m^2 Final    Anion Gap 01/02/2024 7 (L)  8 - 16 mmol/L Final         Imaging:     PET/CT 11/08/23  Neck: There is moderate brown fat uptake at the base of the neck, normal variant. No pathologic activity is present. No cervical adenopathy is noted.     Chest: There is no significant abnormal activity in the chest. There is fairly extensive postsurgical uptake in the soft tissues adjacent to the right shoulder in this patient status post reverse right shoulder arthroplasty.     There is no mediastinal nor axillary adenopathy. There is dilatation of the main pulmonary artery compared to the aorta, a finding associated with pulmonary arterial hypertension. Calcific plaque is present in the coronary arteries. There are no pleural effusions.     There is a 5 mm nodule in the left apex unchanged series 5, image 117. This is stable compared to CT from 2007. There is also a calcified granuloma in the left upper lobe. Bands of scarring or atelectasis are seen bilaterally. 6 mm nodule anterior inferior right upper lobe unchanged since recent comparison exam series 5, image 159; 17 mm ground-glass opacity lateral right middle lobe unchanged since the 08/25/2023 series 5, image 168. There is a stable 4 mm nodule just above the diaphragm in the right lower lobe series 5, image 205, unchanged since 2007.     Abdomen/pelvis: There is uptake along the midline surgical scar in the anterior abdominal wall. Otherwise, no sites of abnormal activity are present. Specifically, there is no definite abnormal activity  associated with the mass at the lower pole of the left kidney. This is noted to have a density consistent with fat on series 3, image 157, and the possibility of an angiomyolipoma is raised. Follow-up with MRI or with a multi phasic CT with and without IV contrast would be helpful for better characterization. The hyperechoic appearance on ultrasound of 08/25/2023 suggests an angiomyolipoma.     Limited noncontrast CT images of the liver, spleen, adrenal glands, pancreas and right kidney are grossly normal. There are gallstones. No abnormal activity is present in the gallbladder. The aorta is normal in caliber with scattered calcific plaque. There is an IVC filter.     The urinary bladder is collapsed. The uterus is atrophic or absent.     There has been partial colectomy with anastomosis in the right lower quadrant. A small amount of ascites is present. There is a small midline fat-containing ventral hernia. No adenopathy nor peritoneal implant is noted.     Bones: There are extensive degenerative changes in the spine. No findings to indicate metastatic disease to bone.    MRI Abdomen 11/20/23  Unremarkable appearance. No masses     Cholelithiasis with large stone within the gallbladder without findings of acute cholecystitis or biliary duct dilatation     Spleen not enlarged     Adrenal glands unremarkable appearance     Pancreas unremarkable appearance     Abdominal aorta no aneurysm     Artifact in region of IVC suggesting IVC filter.     Postoperative changes anterior abdominal wall and fat containing supraumbilical ventral hernia.     Right kidney; unremarkable appearance of the right kidney. No mass.     Kidney; 2 cm heterogeneous fat containing enhancing mass in the lower pole. Presence of fat strongly suggest angiomyolipoma. Can be seen although rarely with renal cell carcinoma.       Assessment:       1. Malignant neoplasm of splenic flexure    2. Immunodeficiency due to chemotherapy    3. Renal mass, left     4. Pulmonary nodules    5. Acute deep vein thrombosis (DVT) of popliteal vein of both lower extremities    6. Iron deficiency anemia, unspecified iron deficiency anemia type                 Plan:     Colon Cancer - Pt with h/o colon cancer s/p resection in 2007 followed by adjuvant FOLFOX for 12 cycles  -Pt recently with resection of transverse colon and splenic flexure 9/29/23 showing path showing invasive moderately differentiated adenocarcinoma with mucinous features, 33 benign lymph nodes, positive lymphovascular invasion, positive perineural invasion pT3N0  -Tumor shows intact expression of MLH1, PMS2, MSH2, MSH6  -Patient was positive for B Celio V600E mutation  -Pt has high risk stage II colon cancer given positive LVI and PNI  -PT is a candidate for treatment with 6 months of 5-FU or Capecitabine  -no clear evidence of metastatic disease on PET-CT 11/08/2023   -Will proceed with 6 months of 5 fluorouracil  -Pt to receive cycle 3 this week  -Pharmacogenomic panel on 11/03/23 showed normal DPYD metabolizer but 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 Pt can proceed with treatment.  Pt needs a CBC, CMP and clinic visit with me in 2 weeks the day before the treatment.   Follow up with GRETCHEN    Infusion scheduling note    Injection scheduling note    Labs    Imaging    Pharmacy appointment    Other referrals              Treatment Plan Information   OP COLORECTAL FLUOROURACIL LEUCOVORIN Q2W (MOD DE GRAMONT)   Mahesh Cameron MD   Upcoming Treatment Dates - OP COLORECTAL FLUOROURACIL LEUCOVORIN Q2W (MOD DE GRAMONT)    1/17/2024       Chemotherapy       leucovorin calcium 400 mg/m2 = 810 mg in dextrose 5 % (D5W) 250 mL infusion       fluorouraciL injection 810 mg       fluorouracil (ADRUCIL) 2,400 mg/m2 = 4,870 mg in sodium chloride 0.9% 100 mL chemo infusion       Antiemetics       ondansetron injection 8 mg  1/31/2024       Chemotherapy       leucovorin calcium 400 mg/m2 = 810 mg in dextrose 5 % (D5W) 250 mL infusion       fluorouraciL injection 810 mg       fluorouracil (ADRUCIL) 2,400 mg/m2 = 4,870 mg in sodium chloride 0.9% 100 mL chemo infusion       Antiemetics       ondansetron injection 8 mg  2/14/2024       Chemotherapy       leucovorin calcium 400 mg/m2 = 810 mg in dextrose 5 % (D5W) 250 mL infusion       fluorouraciL injection 810 mg       fluorouracil (ADRUCIL) 2,400 mg/m2 = 4,870 mg in sodium chloride 0.9% 100 mL chemo infusion       Antiemetics       ondansetron injection 8 mg  2/28/2024       Chemotherapy       leucovorin calcium 400 mg/m2 = 810 mg in dextrose 5 % (D5W) 250 mL infusion       fluorouraciL injection 810 mg       fluorouracil (ADRUCIL) 2,400 mg/m2 = 4,870 mg in sodium chloride 0.9% 100 mL chemo infusion       Antiemetics       ondansetron injection 8 mg    Supportive Plan Information  OP FERRIC CARBOXYMALTOSE Q2W   Mahesh Cameron MD    Upcoming Treatment Dates - OP FERRIC CARBOXYMALTOSE Q2W    12/22/2023       Supportive Care       ferric carboxymaltose (INJECTAFER) 750 mg in sodium chloride 0.9% 265 mL IVPB (ready to mix)  12/29/2023       Supportive Care       ferric carboxymaltose (INJECTAFER) 750 mg in sodium chloride 0.9% 265 mL IVPB (ready to mix)      Mahesh Cameron MD  [facility]  Hematology and Oncology  [facility] [address] [place] [address]   O: [phone]  F: [phone]

## 2024-01-05 ENCOUNTER — INFUSION (OUTPATIENT)
Dept: INFUSION THERAPY | Facility: HOSPITAL | Age: 84
End: 2024-01-05
Attending: INTERNAL MEDICINE
Payer: MEDICARE

## 2024-01-05 VITALS
DIASTOLIC BLOOD PRESSURE: 65 MMHG | HEART RATE: 59 BPM | RESPIRATION RATE: 14 BRPM | TEMPERATURE: 98 F | SYSTOLIC BLOOD PRESSURE: 148 MMHG

## 2024-01-05 DIAGNOSIS — C18.5 MALIGNANT NEOPLASM OF SPLENIC FLEXURE: Primary | ICD-10-CM

## 2024-01-05 PROCEDURE — A4216 STERILE WATER/SALINE, 10 ML: HCPCS | Mod: PN | Performed by: INTERNAL MEDICINE

## 2024-01-05 PROCEDURE — 63600175 PHARM REV CODE 636 W HCPCS: Mod: PN | Performed by: INTERNAL MEDICINE

## 2024-01-05 PROCEDURE — 25000003 PHARM REV CODE 250: Mod: PN | Performed by: INTERNAL MEDICINE

## 2024-01-05 RX ORDER — HEPARIN 100 UNIT/ML
500 SYRINGE INTRAVENOUS
Status: DISCONTINUED | OUTPATIENT
Start: 2024-01-05 | End: 2024-01-05 | Stop reason: HOSPADM

## 2024-01-05 RX ORDER — SODIUM CHLORIDE 0.9 % (FLUSH) 0.9 %
10 SYRINGE (ML) INJECTION
Status: DISCONTINUED | OUTPATIENT
Start: 2024-01-05 | End: 2024-01-05 | Stop reason: HOSPADM

## 2024-01-05 RX ADMIN — Medication 500 UNITS: at 11:01

## 2024-01-05 RX ADMIN — Medication 10 ML: at 11:01

## 2024-01-08 ENCOUNTER — TELEPHONE (OUTPATIENT)
Dept: FAMILY MEDICINE | Facility: CLINIC | Age: 84
End: 2024-01-08
Payer: MEDICARE

## 2024-01-08 NOTE — TELEPHONE ENCOUNTER
----- Message from Ayesha Salazar Patient Care Assistant sent at 1/8/2024 10:32 AM CST -----  Regarding: appointment  Contact: pt  Type:  Sooner Appointment Request    Caller is requesting a sooner appointment.  Caller declined first available appointment listed below.  Caller will not accept being placed on the waitlist and is requesting a message be sent to doctor.    Name of Caller:   pt     When is the first available appointment?  2024    Symptoms:  sore throat     Would the patient rather a call back or a response via [facility]? Callback     Best Call Back Number:  [phone] (home)     Additional Information:  please call pt to advise. Thanks!

## 2024-01-09 ENCOUNTER — OFFICE VISIT (OUTPATIENT)
Dept: FAMILY MEDICINE | Facility: CLINIC | Age: 84
End: 2024-01-09
Payer: MEDICARE

## 2024-01-09 VITALS
BODY MASS INDEX: 33.88 KG/M2 | SYSTOLIC BLOOD PRESSURE: 120 MMHG | HEART RATE: 62 BPM | TEMPERATURE: 98 F | WEIGHT: 198.44 LBS | DIASTOLIC BLOOD PRESSURE: 70 MMHG | OXYGEN SATURATION: 98 % | HEIGHT: 64 IN

## 2024-01-09 DIAGNOSIS — Z79.899 IMMUNODEFICIENCY DUE TO CHEMOTHERAPY: ICD-10-CM

## 2024-01-09 DIAGNOSIS — J02.9 SORE THROAT: ICD-10-CM

## 2024-01-09 DIAGNOSIS — D84.821 IMMUNODEFICIENCY DUE TO CHEMOTHERAPY: ICD-10-CM

## 2024-01-09 DIAGNOSIS — T45.1X5A IMMUNODEFICIENCY DUE TO CHEMOTHERAPY: ICD-10-CM

## 2024-01-09 DIAGNOSIS — J02.0 STREP PHARYNGITIS: ICD-10-CM

## 2024-01-09 DIAGNOSIS — B37.0 OROPHARYNGEAL CANDIDIASIS: Primary | ICD-10-CM

## 2024-01-09 DIAGNOSIS — Z85.038 HISTORY OF COLON CANCER: ICD-10-CM

## 2024-01-09 LAB — GROUP A STREP, MOLECULAR: POSITIVE

## 2024-01-09 PROCEDURE — 99999 PR PBB SHADOW E&M-EST. PATIENT-LVL V: CPT | Mod: PBBFAC,,,

## 2024-01-09 PROCEDURE — 99213 OFFICE O/P EST LOW 20 MIN: CPT | Mod: S$GLB,,,

## 2024-01-09 PROCEDURE — 87651 STREP A DNA AMP PROBE: CPT | Mod: PO

## 2024-01-09 RX ORDER — NYSTATIN 100000 [USP'U]/ML
4 SUSPENSION ORAL 4 TIMES DAILY
Qty: 224 ML | Refills: 0 | Status: SHIPPED | OUTPATIENT
Start: 2024-01-09 | End: 2024-01-23

## 2024-01-09 RX ORDER — AMOXICILLIN 500 MG/1
500 TABLET, FILM COATED ORAL EVERY 12 HOURS
Qty: 20 TABLET | Refills: 0 | Status: SHIPPED | OUTPATIENT
Start: 2024-01-09 | End: 2024-01-19

## 2024-01-09 NOTE — PATIENT INSTRUCTIONS
Moisés Clay,     If you are due for any health screening(s) below please notify me so we can arrange them to be ordered and scheduled. Most healthy patients at your age complete them, but you are free to accept or refuse.     If you can't do it, I'll definitely understand. If you can, I'd certainly appreciate it!    All of your core healthy metrics are met.

## 2024-01-09 NOTE — PROGRESS NOTES
Subjective:       Patient ID: Danna Sorensen is a 83 y.o. female.    Chief Complaint: sore throat       Danna Sorensen is a 83 y.o. female with history of colon cancer currently undergoing chemotherapy who presents to clinic for evaluation of sore throat ongoing for 5 days. Also reports runny nose and dry cough, but patient notes this is not new as she has allergies. She has tried antihistamines and flonase as needed for her symptoms without relief.         Review of Systems   Constitutional:  Negative for appetite change, chills, fatigue and fever.   HENT:  Positive for rhinorrhea and sore throat. Negative for congestion, ear pain, postnasal drip and sinus pressure.    Respiratory:  Positive for cough (dry). Negative for shortness of breath.    Cardiovascular:  Negative for chest pain, palpitations and leg swelling.   Gastrointestinal:  Negative for abdominal pain, diarrhea, nausea and vomiting.   Skin:  Negative for rash.   Neurological:  Negative for dizziness, light-headedness and headaches.         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 Especially at the higher dose.    Mobic [meloxicam] Swelling     Edema and elevated blood pressure     Oxaliplatin Other (See Comments)     Other reaction(s): Joint pain  Other reaction(s): Itching  Other reaction(s): Hives  Other reaction(s): Joint pain  Other reaction(s): Itching  Other reaction(s): Hives    Pregabalin Other (See Comments)     Side effects  Side effects         Current Outpatient Medications:     acetaminophen (TYLENOL) 650 MG TbSR, Take 650 mg by mouth every 8 (eight) hours., Disp: , Rfl:     alendronate (FOSAMAX) 70 MG tablet, Take 1 tablet (70 mg total) by mouth every 7 days., Disp: 12 tablet, Rfl: 3    apixaban (ELIQUIS) 5 mg Tab, Take 1 tablet (5 mg total) by mouth 2 (two) times daily., Disp: 180 tablet, Rfl: 3    cyclobenzaprine (FLEXERIL) 5 MG tablet, Take 1 tablet (5 mg total) by mouth 3 (three) times daily as needed for Muscle spasms., Disp: 90 tablet, Rfl: 0    ergocalciferol, vitamin D2, (VITAMIN D ORAL), Take 2,000 mg by mouth once daily., Disp: , Rfl:     furosemide (LASIX) 20 MG tablet, Take 1 tablet (20 mg total) by mouth daily as needed (edema)., Disp: 90 tablet, Rfl: 3    HYDROcodone-acetaminophen (NORCO) 5-325 mg per tablet, Take 1 tablet by mouth every 6 (six) hours as needed for Pain., Disp: 20 tablet, Rfl: 0    hydrOXYzine HCL (ATARAX) 25 MG tablet, Take 1 tablet (25 mg total) by mouth 3 (three) times daily as needed for Anxiety (insomnia)., Disp: 30 tablet, Rfl: PRN    levocetirizine (XYZAL) 5 MG tablet, Take 1 tablet (5 mg total) by mouth nightly as needed for Allergies (allergies)., Disp: 90 tablet, Rfl: PRN    levothyroxine (SYNTHROID) 75 MCG tablet, Take 1 tablet (75 mcg total) by mouth once daily., Disp: 90 tablet, Rfl: 3    LIDOcaine-prilocaine (EMLA) cream, Apply topically as needed (Chemo)., Disp: 30 g, Rfl: 0    lisinopriL 10 MG tablet, Take 1 tablet (10 mg total) by mouth once daily., Disp: 90 tablet, Rfl: 3    multivitamin " capsule, Take 1 capsule by mouth once daily., Disp: , Rfl:     omeprazole (PRILOSEC) 40 MG capsule, Take 1 capsule (40 mg total) by mouth 2 (two) times daily before meals., Disp: 180 capsule, Rfl: PRN    ondansetron (ZOFRAN-ODT) 8 MG TbDL, Take 1 tablet (8 mg total) by mouth every 8 (eight) hours as needed (nausea)., Disp: 40 tablet, Rfl: 3    promethazine (PHENERGAN) 12.5 MG Tab, (Take 1-2 tabs every 6 hours as needed for nausea persistent despite zofran), Disp: 40 tablet, Rfl: 3    traMADoL (ULTRAM) 50 mg tablet, Take 1 tablet (50 mg total) by mouth every 8 (eight) hours as needed for Pain., Disp: 21 each, Rfl: 0    traZODone (DESYREL) 50 MG tablet, Take 1 tablet (50 mg total) by mouth nightly., Disp: 90 tablet, Rfl: 0    triamcinolone acetonide 0.1% (KENALOG) 0.1 % cream, APPLY TOPICALLY TO THE AFFECTED AREA TWICE DAILY, Disp: 60 g, Rfl: 0    amoxicillin (AMOXIL) 500 MG Tab, Take 1 tablet (500 mg total) by mouth every 12 (twelve) hours. for 10 days, Disp: 20 tablet, Rfl: 0    nystatin (MYCOSTATIN) 100,000 unit/mL suspension, Take 4 mLs (400,000 Units total) by mouth 4 (four) times daily. Swish and swallow for 14 days, Disp: 224 mL, Rfl: 0  No current facility-administered medications for this visit.    Facility-Administered Medications Ordered in Other Visits:     0.9%  NaCl infusion, , Intravenous, Once, Oren Verdin MD    Objective:        Physical Exam  Vitals reviewed.   Constitutional:       Appearance: Normal appearance.   HENT:      Head: Normocephalic and atraumatic.      Right Ear: Tympanic membrane and ear canal normal.      Left Ear: Tympanic membrane and ear canal normal.      Nose: Nose normal.      Mouth/Throat:      Lips: Lesions present.      Mouth: Mucous membranes are moist.      Comments: White plaque-like areas present on tongue and oropharynx  Mild erythema of oropharynx without exudate    Eyes:      Conjunctiva/sclera: Conjunctivae normal.   Cardiovascular:      Rate and Rhythm: Normal  "rate and regular rhythm.      Heart sounds: Normal heart sounds.   Pulmonary:      Effort: Pulmonary effort is normal.      Breath sounds: Normal breath sounds.   Abdominal:      General: Bowel sounds are normal.      Palpations: Abdomen is soft.      Tenderness: There is no abdominal tenderness.   Musculoskeletal:         General: Normal range of motion.      Cervical back: Normal range of motion and neck supple.      Right lower leg: No edema.      Left lower leg: No edema.   Skin:     General: Skin is warm and dry.   Neurological:      Mental Status: She is alert and oriented to person, place, and time.           Visit Vitals  /70 (BP Location: Right arm, Patient Position: Sitting, BP Method: Large (Manual))   Pulse 62   Temp 98 °F (36.7 °C) (Oral)   Ht 5' 4" (1.626 m)   Wt 90 kg (198 lb 6.6 oz)   SpO2 98%   BMI 34.06 kg/m²      Assessment:         1. Oropharyngeal candidiasis    2. Sore throat    3. Immunodeficiency due to chemotherapy    4. History of colon cancer    5. Strep pharyngitis        Plan:         Diagnoses and all orders for this visit:    Oropharyngeal candidiasis  -     nystatin (MYCOSTATIN) 100,000 unit/mL suspension; Take 4 mLs (400,000 Units total) by mouth 4 (four) times daily. Swish and swallow for 14 days  Dispense: 224 mL; Refill: 0    Sore throat  -     Group A Strep, Molecular; Future - positive     Immunodeficiency due to chemotherapy    Strep pharyngitis  -     amoxicillin (AMOXIL) 500 MG Tab; Take 1 tablet (500 mg total) by mouth every 12 (twelve) hours. for 10 days  Dispense: 20 tablet; Refill: 0         Follow up if symptoms worsen or fail to improve.         Family Medicine Physician Assistant     Future Appointments       Next 10 Appointments       Date Provider Specialty Appt Notes    1/10/2024  Chemotherapy injectafer 2/2    1/16/2024  Lab hemonc    1/17/2024 Mahesh Cameron MD Hematology and Oncology 2 wk md f/u appt w/labs/tx cl    1/17/2024  Chemotherapy C4D1 Leuco 5FU "    1/19/2024 [facility]llano [facility] 7/12 Acup    1/19/2024  Chemotherapy dc pump    1/30/2024  Lab lab    1/31/2024  Chemotherapy C5D1 Leuco 5FU    2/2/2024  Chemotherapy dc pump    3/7/2024  Lab .              Displaying the next 10 appointments. This patient has additional appointments scheduled.             All of your core healthy metrics are met.       I spent a total of 20 minutes on the day of the visit.This includes face to face time and non-face to face time preparing to see the patient (eg, review of tests), obtaining and/or reviewing separately obtained history, documenting clinical information in the electronic or other health record, independently interpreting results and communicating results to the patient/family/caregiver, or care coordinator.    We have addressed [3] Low: 2 or more self-limited or minor problems / 1 stable chronic illness / 1 acute, uncomplicated illness or injury  The complexity of the data reviewed and analyzed for this visit was [3] Limited (Reviewed prior external note, ordered unique testing or reviewed the results of each unique test)   The risk of complications and/or morbidity or mortality are [3] Low risk   The level of Medical Decision Making for this visit is [3] Low

## 2024-01-11 DIAGNOSIS — Z00.00 ENCOUNTER FOR MEDICARE ANNUAL WELLNESS EXAM: ICD-10-CM

## 2024-01-16 ENCOUNTER — TELEPHONE (OUTPATIENT)
Dept: HEMATOLOGY/ONCOLOGY | Facility: CLINIC | Age: 84
End: 2024-01-16
Payer: MEDICARE

## 2024-01-16 NOTE — TELEPHONE ENCOUNTER
Returned call and reviewed upcoming changes in appts----- Message from Mimi Trujillo sent at 1/16/2024 12:25 PM CST -----  Type:  Patient Returning Call    Who Called:  Patient   Who Left Message for Patient:  Eliza  Does the patient know what this is regarding?:  yes  Best Call Back Number: [phone]   Additional Information:  Patient returning missed call

## 2024-01-16 NOTE — TELEPHONE ENCOUNTER
Patient rescheduling all appt due to weather. Assisted in rescheduling to 1/22 for lab , MD and treatement.    ----- Message from Ramirez Yuan sent at 1/16/2024  6:58 AM CST -----  Type: Need Medical Advice   Who Called:Patient   Best callback number: [phone]  Additional Information: Patient called to reschedule today's labs and tomorrow Dr. siddiqui   Please call to further assist, Thanks.              1. TAKE ALL MEDICATIONS AS DIRECTED.  REST APPLY ICE OR HEAT AS NEEDED. FOR PAIN YOU CAN TAKE IBUPROFEN (MOTRIN, ADVIL) OR ACETAMINOPHEN (TYLENOL) AS NEEDED, AS DIRECTED ON PACKAGING.  2. FOLLOW UP WITH ____ORTHOPEDIST______ AS DIRECTED.  YOU WERE GIVEN COPIES OF ALL LABS AND IMAGING RESULTS FROM YOUR ER VISIT--PLEASE TAKE THEM WITH YOU TO YOUR APPOINTMENT.  3. IF NEEDED, CALL [phone] TO FIND A PRIMARY CARE PHYSICIAN.  OR CALL [phone] TO MAKE AN APPOINTMENT WITH THE MEDICAL CLINIC.  4. RETURN TO THE ER FOR ANY WORSENING SYMPTOMS. 1. INCREASE MOBIC TO TWICE DAILY. REST APPLY ICE OR HEAT AS NEEDED. FOR PAIN YOU CAN TAKE IBUPROFEN (MOTRIN, ADVIL) OR ACETAMINOPHEN (TYLENOL) AS NEEDED, AS DIRECTED ON PACKAGING.  2. FOLLOW UP WITH ____ORTHOPEDIST______ AS DIRECTED.  YOU WERE GIVEN COPIES OF ALL LABS AND IMAGING RESULTS FROM YOUR ER VISIT--PLEASE TAKE THEM WITH YOU TO YOUR APPOINTMENT.  3. IF NEEDED, CALL [phone] TO FIND A PRIMARY CARE PHYSICIAN.  OR CALL [phone] TO MAKE AN APPOINTMENT WITH THE MEDICAL CLINIC.  4. RETURN TO THE ER FOR ANY WORSENING SYMPTOMS.

## 2024-01-21 NOTE — PROGRESS NOTES
PATIENT: Danna Sorensen  MRN: [id number]  DATE: 1/22/2024      Diagnosis:   1. Malignant neoplasm of splenic flexure    2. Immunodeficiency due to chemotherapy    3. Renal mass, left    4. Pulmonary nodules    5. Thrombophilia    6. Iron deficiency anemia, unspecified iron deficiency anemia type                Chief Complaint: Malignant neoplasm of splenic flexure (3 week follow up)      Oncologic History:      Oncologic History     Oncologic Treatment     Pathology           Subjective:    Interval History: Ms. Sorensen is a 83 y.o. female with Pulmonary embolism, carpal tunnel, CAD, HTN, hypothyroidism, osteoporosis, osteoarthritis, who presents for colon cancer.  Since the last clinic visit the patient was redoing the barn her house requiring frequent use of a hammer.  Patient states she then developed pain in her right shoulder and stiffness in her neck.  The patient states she is taking Tylenol with benefit.   The patient denies CP, cough, SOB, abdominal pain, nausea, vomiting, constipation, diarrhea.  The patient denies fever, chills, night sweats, weight loss, new lumps or bumps, easy bruising or bleeding.    Prior History:  Pt was previously diagnosed with Stage III adenocarcinoma of the colon in 2007 and underwent 12 cycles of FOLFOX.  Pt underwent colonoscopy on 8/08/23 showing 1 7 mm polyp in the rectum; altered vascular, congested, eroded, friable and ulcerated mucosa in the transverse colon which was biopsied; patent and to side ileocolonic anastomosis.  Pathology showed moderately differentiated adenocarcinoma. CT abdomen and pelvis 8/17/23 showing irregular appearance of the gallbladder; heterogeneously enhancing lesion in the inferior pole of the left kidney measuring 1.8 cm; short segment of concentric bowel wall thickening of the colon at the splenic flexure with surrounding mesenteric fat stranding with nodular thickness of 2.2 cm.  The patient underwent colonoscopy on 08/22/2023 showing nonbleeding  internal hemorrhoids, partially obstructing tumor in the transverse colon which was tattooed.  Pt saw Urology 8/23/23 with plans for re-imaging the renal lesion in 3 months with an MRI.  US abdomen 8/25/23 showed a 2.5 cm solid mass at the lower pole of the left kidney suspicious for neoplasm.  CT chest 8/25/23 showed 4 mm left upper lobe pulmonary nodule, 4 mm calcified granuloma left upper lobe, 2 mm pulmonary nodule in the left upper lobe, 9 x 9 mm triangular nodule along the juxtapleural right middle lobe, 2 mm pulmonary nodule in the right middle lobe, and a partially imaged masslike density in the splenic flexure of the colon. Pt then underwent resection of the transverse colon and splenic flexure with path showing invasive moderately differentiated adenocarcinoma with mucinous features, 33 benign lymph nodes, positive lymphovascular invasion, positive perineural invasion pT3N0.  Tumor shows intact expression of MLH1, PMS2, MSH2, MSH6.  Patient was positive for B Celio V600E mutation.   The patient was discussed at tumor Board on 11/07/2023 with recommendation for PET-CT with biopsy of lung nodules if positive.  PET-CT on 11/08/2023 showed evidence of pulmonary hypertension; stable 5 mm nodule left lung apex; calcified granuloma left upper lobe; stable 6 mm inferior right upper lobe nodule; stable 17 mm ground-glass opacity lateral right middle lobe; stable 4 mm nodule in the right lower lobe of the diaphragm; no activity in the lesion in the left kidney.  MRI abdomen 11/20/2023 showed heterogeneous fat containing enhancing mass in the lower pole of the left kidney suggestive of an angio myelolipoma.    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    Strabismus     Trouble in sleeping     Wears dentures     Uppers       Past Surgical HIstory:   Past Surgical History:   Procedure Laterality Date    CARDIAC SURGERY      cardiac cath    COLON SURGERY      colon resection    COLONOSCOPY N/A 10/18/2016    Procedure: COLONOSCOPY;  Surgeon: Rell Cordova MD;  Location: [facility];  Service: Endoscopy;  Laterality: N/A;    COLONOSCOPY N/A 8/8/2023    Procedure: COLONOSCOPY;  Surgeon: Kaushik Pinon MD;  Location: [facility];  Service: Endoscopy;  Laterality: N/A;    COLONOSCOPY N/A 8/22/2023    Procedure: COLONOSCOPY (tattoo of colon ca);  Surgeon: Kaushik Pinon MD;  Location: [facility];  Service: Endoscopy;  Laterality: N/A;    ESOPHAGOGASTRODUODENOSCOPY N/A 8/3/2023    Procedure: EGD (ESOPHAGOGASTRODUODENOSCOPY);  Surgeon: Kaushik Pinon MD;  Location: [facility];  Service: Endoscopy;  Laterality: N/A;    HAND TENDON SURGERY Left     HEMICOLECTOMY      right    INJECTION OF ANESTHETIC AGENT AROUND MEDIAL BRANCH NERVES INNERVATING LUMBAR FACET JOINT Right 07/10/2018    Procedure: Block-nerve-medial branch-lumbar;  Surgeon: Parish Gaitan MD;  Location: [facility] OR;  Service: Pain Management;  Laterality: Right;  L3, 4, 5    INSERTION OF INFERIOR VENA CAVAL FILTER N/A 9/13/2023    Procedure: Insertion, Filter, Inferior Vena Cava;  Surgeon: Oren Verdin MD;  Location: [facility] CATH/EP LAB;  Service: General;  Laterality: N/A;    INSERTION OF TUNNELED CENTRAL VENOUS CATHETER (CVC) WITH SUBCUTANEOUS PORT Right 11/15/2023    Procedure: [id number];  Surgeon: Jim Chavez MD;  Location: [facility] OR;  Service: General;  Laterality: Right;  "   JOINT REPLACEMENT      bilateral    RADIOFREQUENCY ABLATION OF LUMBAR MEDIAL BRANCH NERVE AT SINGLE LEVEL Right 07/24/2018    Procedure: RADIOFREQUENCY ABLATION, NERVE, MEDIAL BRANCH, LUMBAR, 1 LEVEL;  Surgeon: Parish Gaitan MD;  Location: [facility] OR;  Service: Pain Management;  Laterality: Right;  L3,4,5 - Burned at 80 degrees C. for 75 seconds x 2 each site    ROBOT-ASSISTED COLECTOMY N/A 9/29/2023    Procedure: ROBOTIC COLECTOMY;  Surgeon: Jim Chavez MD;  Location: [facility] OR;  Service: General;  Laterality: N/A;    SHOULDER ARTHROSCOPY Right 01/12/2017    Reverse     TONSILLECTOMY      TONSILLECTOMY, ADENOIDECTOMY, BILATERAL MYRINGOTOMY AND TUBES      TOTAL KNEE ARTHROPLASTY Bilateral 08/1998    total replacements    TUMOR REMOVAL Left     left foot as a child       Family History:   Family History   Problem Relation Age of Onset    Hypertension Mother     Kidney disease Mother     Cataracts Father     Cataracts Sister     Cancer Sister         breast    No Known Problems Brother     Cancer Sister         breast    Arthritis Sister     Glaucoma Neg Hx     Amblyopia Neg Hx     Blindness Neg Hx     Macular degeneration Neg Hx     Retinal detachment Neg Hx     Strabismus Neg Hx     Stroke Neg Hx     Thyroid disease Neg Hx        Social History:  reports that she quit smoking about 63 years ago. Her smoking use included cigarettes. She started smoking about 63 years ago. She has a 0.5 pack-year smoking history. She has never used smokeless tobacco. She reports that she does not drink alcohol and does not use drugs.    Allergies:  Review of patient's allergies indicates:   Allergen Reactions    Aspirin      Other reaction(s): Stomach upset    Aspirin Other (See Comments)     Other reaction(s): Stomach upset    Gabapentin Other (See Comments)     Pt states she felt "funny" when she took the medication. Especially at the higher dose.    Mobic [meloxicam] Swelling     Edema and elevated blood pressure     Oxaliplatin " Other (See Comments)     Other reaction(s): Joint pain  Other reaction(s): Itching  Other reaction(s): Hives  Other reaction(s): Joint pain  Other reaction(s): Itching  Other reaction(s): Hives    Pregabalin Other (See Comments)     Side effects  Side effects       Medications:  Current Outpatient Medications   Medication Sig Dispense Refill    acetaminophen (TYLENOL) 650 MG TbSR Take 650 mg by mouth every 8 (eight) hours.      alendronate (FOSAMAX) 70 MG tablet Take 1 tablet (70 mg total) by mouth every 7 days. 12 tablet 3    apixaban (ELIQUIS) 5 mg Tab Take 1 tablet (5 mg total) by mouth 2 (two) times daily. 180 tablet 3    cyclobenzaprine (FLEXERIL) 5 MG tablet Take 1 tablet (5 mg total) by mouth 3 (three) times daily as needed for Muscle spasms. 90 tablet 0    ergocalciferol, vitamin D2, (VITAMIN D ORAL) Take 2,000 mg by mouth once daily.      furosemide (LASIX) 20 MG tablet Take 1 tablet (20 mg total) by mouth daily as needed (edema). 90 tablet 3    HYDROcodone-acetaminophen (NORCO) 5-325 mg per tablet Take 1 tablet by mouth every 6 (six) hours as needed for Pain. 20 tablet 0    hydrOXYzine HCL (ATARAX) 25 MG tablet Take 1 tablet (25 mg total) by mouth 3 (three) times daily as needed for Anxiety (insomnia). 30 tablet PRN    levocetirizine (XYZAL) 5 MG tablet Take 1 tablet (5 mg total) by mouth nightly as needed for Allergies (allergies). 90 tablet PRN    levothyroxine (SYNTHROID) 75 MCG tablet Take 1 tablet (75 mcg total) by mouth once daily. 90 tablet 3    LIDOcaine-prilocaine (EMLA) cream Apply topically as needed (Chemo). 30 g 0    lisinopriL 10 MG tablet Take 1 tablet (10 mg total) by mouth once daily. 90 tablet 3    multivitamin capsule Take 1 capsule by mouth once daily.      nystatin (MYCOSTATIN) 100,000 unit/mL suspension Take 4 mLs (400,000 Units total) by mouth 4 (four) times daily. Swish and swallow for 14 days 224 mL 0    omeprazole (PRILOSEC) 40 MG capsule Take 1 capsule (40 mg total) by mouth 2  "(two) times daily before meals. 180 capsule PRN    ondansetron (ZOFRAN-ODT) 8 MG TbDL Take 1 tablet (8 mg total) by mouth every 8 (eight) hours as needed (nausea). 40 tablet 3    promethazine (PHENERGAN) 12.5 MG Tab (Take 1-2 tabs every 6 hours as needed for nausea persistent despite zofran) 40 tablet 3    traMADoL (ULTRAM) 50 mg tablet Take 1 tablet (50 mg total) by mouth every 8 (eight) hours as needed for Pain. 21 each 0    traZODone (DESYREL) 50 MG tablet Take 1 tablet (50 mg total) by mouth nightly. 90 tablet 0    triamcinolone acetonide 0.1% (KENALOG) 0.1 % cream APPLY TOPICALLY TO THE AFFECTED AREA TWICE DAILY 60 g 0     No current facility-administered medications for this visit.     Facility-Administered Medications Ordered in Other Visits   Medication Dose Route Frequency Provider Last Rate Last Admin    0.9%  NaCl infusion   Intravenous Once Oren Verdin MD           Review of Systems   Constitutional:  Negative for chills, fatigue, fever and unexpected weight change.   Respiratory:  Negative for cough and shortness of breath.    Cardiovascular:  Negative for chest pain and palpitations.   Gastrointestinal:  Negative for abdominal pain, constipation, diarrhea, nausea and vomiting.   Musculoskeletal:         Right shoulder pain   Skin:  Negative for rash.   Neurological:  Negative for headaches.   Hematological:  Negative for adenopathy. Does not bruise/bleed easily.       ECOG Performance Status: 2   Objective:      Vitals:   Vitals:    01/22/24 0951   BP: 130/70   BP Location: Right arm   Patient Position: Sitting   BP Method: Large (Manual)   Pulse: (!) (P) 54   Resp: 20   Temp: 96.8 °F (36 °C)   TempSrc: Temporal   SpO2: 99%   Weight: 91.6 kg (201 lb 15.1 oz)   Height: 5' 4" (1.626 m)               Physical Exam  Constitutional:       General: She is not in acute distress.     Appearance: She is well-developed. She is not diaphoretic.   HENT:      Head: Normocephalic and atraumatic. "   Cardiovascular:      Rate and Rhythm: Normal rate and regular rhythm.      Heart sounds: Normal heart sounds. No murmur heard.     No friction rub. No gallop.   Pulmonary:      Effort: Pulmonary effort is normal. No respiratory distress.      Breath sounds: Normal breath sounds. No wheezing or rales.   Chest:      Chest wall: No tenderness.   Abdominal:      General: Bowel sounds are normal. There is no distension.      Palpations: Abdomen is soft. There is no mass.      Tenderness: There is no abdominal tenderness. There is no guarding or rebound.   Musculoskeletal:      Comments: Pain on palpation over the right trapezius   Lymphadenopathy:      Cervical: No cervical adenopathy.      Upper Body:      Right upper body: No supraclavicular or axillary adenopathy.      Left upper body: No supraclavicular or axillary adenopathy.   Skin:     Findings: No erythema or rash.   Neurological:      Mental Status: She is alert and oriented to person, place, and time.   Psychiatric:         Behavior: Behavior normal.         Laboratory Data:  Lab Visit on 01/22/2024   Component Date Value Ref Range Status    WBC 01/22/2024 4.77  3.90 - 12.70 K/uL Final    RBC 01/22/2024 3.52 (L)  4.00 - 5.40 M/uL Final    Hemoglobin 01/22/2024 10.5 (L)  12.0 - 16.0 g/dL Final    Hematocrit 01/22/2024 32.5 (L)  37.0 - 48.5 % Final    MCV 01/22/2024 92  82 - 98 fL Final    MCH 01/22/2024 29.8  27.0 - 31.0 pg Final    MCHC 01/22/2024 32.3  32.0 - 36.0 g/dL Final    RDW 01/22/2024 16.5 (H)  11.5 - 14.5 % Final    Platelets 01/22/2024 165  150 - 450 K/uL Final    MPV 01/22/2024 9.2  9.2 - 12.9 fL Final    Immature Granulocytes 01/22/2024 0.4  0.0 - 0.5 % Final    Gran # (ANC) 01/22/2024 2.9  1.8 - 7.7 K/uL Final    Immature Grans (Abs) 01/22/2024 0.02  0.00 - 0.04 K/uL Final    Comment: Mild elevation in immature granulocytes is non specific and   can be seen in a variety of conditions including stress response,   acute inflammation, trauma and  pregnancy. Correlation with other   laboratory and clinical findings is essential.      Lymph # 01/22/2024 1.1  1.0 - 4.8 K/uL Final    Mono # 01/22/2024 0.6  0.3 - 1.0 K/uL Final    Eos # 01/22/2024 0.1  0.0 - 0.5 K/uL Final    Baso # 01/22/2024 0.01  0.00 - 0.20 K/uL Final    nRBC 01/22/2024 0  0 /100 WBC Final    Gran % 01/22/2024 61.0  38.0 - 73.0 % Final    Lymph % 01/22/2024 23.7  18.0 - 48.0 % Final    Mono % 01/22/2024 13.4  4.0 - 15.0 % Final    Eosinophil % 01/22/2024 1.3  0.0 - 8.0 % Final    Basophil % 01/22/2024 0.2  0.0 - 1.9 % Final    Differential Method 01/22/2024 Automated   Final    Sodium 01/22/2024 140  136 - 145 mmol/L Final    Potassium 01/22/2024 4.4  3.5 - 5.1 mmol/L Final    Chloride 01/22/2024 110  95 - 110 mmol/L Final    CO2 01/22/2024 21 (L)  23 - 29 mmol/L Final    Glucose 01/22/2024 104  70 - 110 mg/dL Final    BUN 01/22/2024 17  8 - 23 mg/dL Final    Creatinine 01/22/2024 1.1  0.5 - 1.4 mg/dL Final    Calcium 01/22/2024 9.0  8.7 - 10.5 mg/dL Final    Total Protein 01/22/2024 7.0  6.0 - 8.4 g/dL Final    Albumin 01/22/2024 3.5  3.5 - 5.2 g/dL Final    Total Bilirubin 01/22/2024 1.7 (H)  0.1 - 1.0 mg/dL Final    Comment: For infants and newborns, interpretation of results should be based  on gestational age, weight and in agreement with clinical  observations.    Premature Infant recommended reference ranges:  Up to 24 hours.............<8.0 mg/dL  Up to 48 hours............<12.0 mg/dL  3-5 days..................<15.0 mg/dL  6-29 days.................<15.0 mg/dL      Alkaline Phosphatase 01/22/2024 52 (L)  55 - 135 U/L Final    AST 01/22/2024 15  10 - 40 U/L Final    ALT 01/22/2024 14  10 - 44 U/L Final    eGFR 01/22/2024 50.5 (A)  >60 mL/min/1.73 m^2 Final    Anion Gap 01/22/2024 9  8 - 16 mmol/L Final         Imaging:     PET/CT 11/08/23  Neck: There is moderate brown fat uptake at the base of the neck, normal variant. No pathologic activity is present. No cervical adenopathy is  noted.     Chest: There is no significant abnormal activity in the chest. There is fairly extensive postsurgical uptake in the soft tissues adjacent to the right shoulder in this patient status post reverse right shoulder arthroplasty.     There is no mediastinal nor axillary adenopathy. There is dilatation of the main pulmonary artery compared to the aorta, a finding associated with pulmonary arterial hypertension. Calcific plaque is present in the coronary arteries. There are no pleural effusions.     There is a 5 mm nodule in the left apex unchanged series 5, image 117. This is stable compared to CT from 2007. There is also a calcified granuloma in the left upper lobe. Bands of scarring or atelectasis are seen bilaterally. 6 mm nodule anterior inferior right upper lobe unchanged since recent comparison exam series 5, image 159; 17 mm ground-glass opacity lateral right middle lobe unchanged since the 08/25/2023 series 5, image 168. There is a stable 4 mm nodule just above the diaphragm in the right lower lobe series 5, image 205, unchanged since 2007.     Abdomen/pelvis: There is uptake along the midline surgical scar in the anterior abdominal wall. Otherwise, no sites of abnormal activity are present. Specifically, there is no definite abnormal activity associated with the mass at the lower pole of the left kidney. This is noted to have a density consistent with fat on series 3, image 157, and the possibility of an angiomyolipoma is raised. Follow-up with MRI or with a multi phasic CT with and without IV contrast would be helpful for better characterization. The hyperechoic appearance on ultrasound of 08/25/2023 suggests an angiomyolipoma.     Limited noncontrast CT images of the liver, spleen, adrenal glands, pancreas and right kidney are grossly normal. There are gallstones. No abnormal activity is present in the gallbladder. The aorta is normal in caliber with scattered calcific plaque. There is an IVC  filter.     The urinary bladder is collapsed. The uterus is atrophic or absent.     There has been partial colectomy with anastomosis in the right lower quadrant. A small amount of ascites is present. There is a small midline fat-containing ventral hernia. No adenopathy nor peritoneal implant is noted.     Bones: There are extensive degenerative changes in the spine. No findings to indicate metastatic disease to bone.    MRI Abdomen 11/20/23  Unremarkable appearance. No masses     Cholelithiasis with large stone within the gallbladder without findings of acute cholecystitis or biliary duct dilatation     Spleen not enlarged     Adrenal glands unremarkable appearance     Pancreas unremarkable appearance     Abdominal aorta no aneurysm     Artifact in region of IVC suggesting IVC filter.     Postoperative changes anterior abdominal wall and fat containing supraumbilical ventral hernia.     Right kidney; unremarkable appearance of the right kidney. No mass.     Kidney; 2 cm heterogeneous fat containing enhancing mass in the lower pole. Presence of fat strongly suggest angiomyolipoma. Can be seen although rarely with renal cell carcinoma.       Assessment:       1. Malignant neoplasm of splenic flexure    2. Immunodeficiency due to chemotherapy    3. Renal mass, left    4. Pulmonary nodules    5. Thrombophilia    6. Iron deficiency anemia, unspecified iron deficiency anemia type                   Plan:     Colon Cancer - Pt with h/o colon cancer s/p resection in 2007 followed by adjuvant FOLFOX for 12 cycles  -Pt recently with resection of transverse colon and splenic flexure 9/29/23 showing path showing invasive moderately differentiated adenocarcinoma with mucinous features, 33 benign lymph nodes, positive lymphovascular invasion, positive perineural invasion pT3N0  -Tumor shows intact expression of MLH1, PMS2, MSH2, MSH6  -Patient was positive for B Celio V600E mutation  -Pt has high risk stage II colon cancer given  positive LVI and PNI  -PT is a candidate for treatment with 6 months of 5-FU or Capecitabine  -no clear evidence of metastatic disease on PET-CT 11/08/2023   -Will proceed with 6 months of 5 fluorouracil  -Pt to receive cycle 4 this week  -Pharmacogenomic panel on 11/03/23 showed normal DPYD metabolizer but 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  Visit today included increased complexity associated with the care of the episodic problem (chemotherapy) addressed and managing the longitudinal care of the patient due to the serious and/or complex managed problem(s) colon cancer    Immunodeficiency due to chemo - pt at increased risk of infection  -No current signs of infection  -Will monitor    Elevated Bilirubin - Bilirubin 1.7 on 1/22/24  -Could be due to UGT1A1 mutation  -Will monitor    Muscle Strain - right trapezius from recent house work  -Better with tylenol  -Will monitor     Renal Mass - Pt with a mass on the left kidney seen on CT abdomen 8/17/23 and US abdomen 8/25/23  -Pt saw Urology [facility] under the supervision of Dr. 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 Pt cna proceed with treatment as long as CMP is OK.  PT will need a CBC, CMP in 2 weeks with appt with me and treatment.   Follow up with GRETCHEN    Infusion scheduling note    Injection scheduling note    Labs    Imaging    Pharmacy appointment    Other referrals              Treatment Plan Information    OP COLORECTAL FLUOROURACIL LEUCOVORIN Q2W (MOD DE GRAMONT)   Mahesh Cameron MD   Upcoming Treatment Dates - OP COLORECTAL FLUOROURACIL LEUCOVORIN Q2W (MOD DE GRAMONT)    2/4/2024       Chemotherapy       leucovorin calcium 400 mg/m2 = 810 mg in dextrose 5 % (D5W) 250 mL infusion       fluorouraciL injection 810 mg       fluorouracil (ADRUCIL) 2,400 mg/m2 = 4,870 mg in sodium chloride 0.9% 100 mL chemo infusion       Antiemetics       ondansetron injection 8 mg  2/18/2024       Chemotherapy       leucovorin calcium 400 mg/m2 = 810 mg in dextrose 5 % (D5W) 250 mL infusion       fluorouraciL injection 810 mg       fluorouracil (ADRUCIL) 2,400 mg/m2 = 4,870 mg in sodium chloride 0.9% 100 mL chemo infusion       Antiemetics       ondansetron injection 8 mg  3/3/2024       Chemotherapy       leucovorin calcium 400 mg/m2 = 810 mg in dextrose 5 % (D5W) 250 mL infusion       fluorouraciL injection 810 mg       fluorouracil (ADRUCIL) 2,400 mg/m2 = 4,870 mg in sodium chloride 0.9% 100 mL chemo infusion       Antiemetics       ondansetron injection 8 mg  3/17/2024       Chemotherapy       leucovorin calcium 400 mg/m2 = 810 mg in dextrose 5 % (D5W) 250 mL infusion       fluorouraciL injection 810 mg       fluorouracil (ADRUCIL) 2,400 mg/m2 = 4,870 mg in sodium chloride 0.9% 100 mL chemo infusion       Antiemetics       ondansetron injection 8 mg    Supportive Plan Information  OP FERRIC CARBOXYMALTOSE Q2W   Mahesh Cameron MD   Upcoming Treatment Dates - OP FERRIC CARBOXYMALTOSE Q2W    12/22/2023       Supportive Care       ferric carboxymaltose (INJECTAFER) 750 mg in sodium chloride 0.9% 265 mL IVPB (ready to mix)  12/29/2023       Supportive Care       ferric carboxymaltose (INJECTAFER) 750 mg in sodium chloride 0.9% 265 mL IVPB (ready to mix)      Mahesh Cameron MD  [facility]  Hematology and Oncology  [facility] [address] [place] [address]   O: [phone]  F:  [phone]

## 2024-01-22 ENCOUNTER — DOCUMENTATION ONLY (OUTPATIENT)
Dept: INFUSION THERAPY | Facility: HOSPITAL | Age: 84
End: 2024-01-22
Payer: MEDICARE

## 2024-01-22 ENCOUNTER — DOCUMENTATION ONLY (OUTPATIENT)
Dept: INFUSION THERAPY | Facility: HOSPITAL | Age: 84
End: 2024-01-22

## 2024-01-22 ENCOUNTER — OFFICE VISIT (OUTPATIENT)
Dept: HEMATOLOGY/ONCOLOGY | Facility: CLINIC | Age: 84
End: 2024-01-22
Payer: MEDICARE

## 2024-01-22 ENCOUNTER — INFUSION (OUTPATIENT)
Dept: INFUSION THERAPY | Facility: HOSPITAL | Age: 84
End: 2024-01-22
Attending: INTERNAL MEDICINE
Payer: MEDICARE

## 2024-01-22 VITALS
RESPIRATION RATE: 20 BRPM | OXYGEN SATURATION: 99 % | WEIGHT: 201.94 LBS | SYSTOLIC BLOOD PRESSURE: 130 MMHG | TEMPERATURE: 97 F | HEIGHT: 64 IN | BODY MASS INDEX: 34.48 KG/M2 | DIASTOLIC BLOOD PRESSURE: 70 MMHG

## 2024-01-22 VITALS
BODY MASS INDEX: 34.48 KG/M2 | HEART RATE: 58 BPM | TEMPERATURE: 97 F | SYSTOLIC BLOOD PRESSURE: 122 MMHG | HEIGHT: 64 IN | DIASTOLIC BLOOD PRESSURE: 64 MMHG | OXYGEN SATURATION: 100 % | RESPIRATION RATE: 18 BRPM | WEIGHT: 201.94 LBS

## 2024-01-22 DIAGNOSIS — D50.9 IRON DEFICIENCY ANEMIA, UNSPECIFIED IRON DEFICIENCY ANEMIA TYPE: ICD-10-CM

## 2024-01-22 DIAGNOSIS — D68.59 THROMBOPHILIA: ICD-10-CM

## 2024-01-22 DIAGNOSIS — D84.821 IMMUNODEFICIENCY DUE TO CHEMOTHERAPY: ICD-10-CM

## 2024-01-22 DIAGNOSIS — T45.1X5A IMMUNODEFICIENCY DUE TO CHEMOTHERAPY: ICD-10-CM

## 2024-01-22 DIAGNOSIS — C18.5 MALIGNANT NEOPLASM OF SPLENIC FLEXURE: Primary | ICD-10-CM

## 2024-01-22 DIAGNOSIS — R91.8 PULMONARY NODULES: ICD-10-CM

## 2024-01-22 DIAGNOSIS — N28.89 RENAL MASS, LEFT: ICD-10-CM

## 2024-01-22 DIAGNOSIS — Z79.899 IMMUNODEFICIENCY DUE TO CHEMOTHERAPY: ICD-10-CM

## 2024-01-22 PROCEDURE — 96411 CHEMO IV PUSH ADDL DRUG: CPT | Mod: PN

## 2024-01-22 PROCEDURE — 96375 TX/PRO/DX INJ NEW DRUG ADDON: CPT | Mod: PN

## 2024-01-22 PROCEDURE — 96367 TX/PROPH/DG ADDL SEQ IV INF: CPT | Mod: PN

## 2024-01-22 PROCEDURE — 99215 OFFICE O/P EST HI 40 MIN: CPT | Mod: S$GLB,,, | Performed by: INTERNAL MEDICINE

## 2024-01-22 PROCEDURE — G2211 COMPLEX E/M VISIT ADD ON: HCPCS | Mod: S$GLB,,, | Performed by: INTERNAL MEDICINE

## 2024-01-22 PROCEDURE — 99999 PR PBB SHADOW E&M-EST. PATIENT-LVL IV: CPT | Mod: PBBFAC,,, | Performed by: INTERNAL MEDICINE

## 2024-01-22 PROCEDURE — 25000003 PHARM REV CODE 250: Mod: PN | Performed by: INTERNAL MEDICINE

## 2024-01-22 PROCEDURE — 96365 THER/PROPH/DIAG IV INF INIT: CPT | Mod: 59,PN

## 2024-01-22 PROCEDURE — 63600175 PHARM REV CODE 636 W HCPCS: Mod: PN | Performed by: INTERNAL MEDICINE

## 2024-01-22 PROCEDURE — 96416 CHEMO PROLONG INFUSE W/PUMP: CPT | Mod: PN

## 2024-01-22 PROCEDURE — 99499 UNLISTED E&M SERVICE: CPT | Mod: S$GLB,,, | Performed by: INTERNAL MEDICINE

## 2024-01-22 PROCEDURE — 96409 CHEMO IV PUSH SNGL DRUG: CPT | Mod: PN

## 2024-01-22 RX ORDER — SODIUM CHLORIDE 0.9 % (FLUSH) 0.9 %
10 SYRINGE (ML) INJECTION
Status: DISCONTINUED | OUTPATIENT
Start: 2024-01-22 | End: 2024-01-22 | Stop reason: HOSPADM

## 2024-01-22 RX ORDER — PROCHLORPERAZINE EDISYLATE 5 MG/ML
5 INJECTION INTRAMUSCULAR; INTRAVENOUS ONCE AS NEEDED
Status: CANCELLED
Start: 2024-01-22

## 2024-01-22 RX ORDER — ONDANSETRON HYDROCHLORIDE 2 MG/ML
8 INJECTION, SOLUTION INTRAVENOUS
Status: CANCELLED | OUTPATIENT
Start: 2024-01-22

## 2024-01-22 RX ORDER — SODIUM CHLORIDE 0.9 % (FLUSH) 0.9 %
10 SYRINGE (ML) INJECTION
Status: CANCELLED | OUTPATIENT
Start: 2024-01-22

## 2024-01-22 RX ORDER — DIPHENHYDRAMINE HYDROCHLORIDE 50 MG/ML
50 INJECTION INTRAMUSCULAR; INTRAVENOUS ONCE AS NEEDED
Status: DISCONTINUED | OUTPATIENT
Start: 2024-01-22 | End: 2024-01-22 | Stop reason: HOSPADM

## 2024-01-22 RX ORDER — FLUOROURACIL 50 MG/ML
400 INJECTION, SOLUTION INTRAVENOUS
Status: CANCELLED | OUTPATIENT
Start: 2024-01-22

## 2024-01-22 RX ORDER — DIPHENHYDRAMINE HYDROCHLORIDE 50 MG/ML
50 INJECTION INTRAMUSCULAR; INTRAVENOUS ONCE AS NEEDED
Status: CANCELLED | OUTPATIENT
Start: 2024-01-22

## 2024-01-22 RX ORDER — HEPARIN 100 UNIT/ML
500 SYRINGE INTRAVENOUS
Status: CANCELLED | OUTPATIENT
Start: 2024-01-22

## 2024-01-22 RX ORDER — HEPARIN 100 UNIT/ML
500 SYRINGE INTRAVENOUS
Status: DISCONTINUED | OUTPATIENT
Start: 2024-01-22 | End: 2024-01-22 | Stop reason: HOSPADM

## 2024-01-22 RX ORDER — EPINEPHRINE 0.3 MG/.3ML
0.3 INJECTION SUBCUTANEOUS ONCE AS NEEDED
Status: DISCONTINUED | OUTPATIENT
Start: 2024-01-22 | End: 2024-01-22 | Stop reason: HOSPADM

## 2024-01-22 RX ORDER — FLUOROURACIL 50 MG/ML
400 INJECTION, SOLUTION INTRAVENOUS
Status: COMPLETED | OUTPATIENT
Start: 2024-01-22 | End: 2024-01-22

## 2024-01-22 RX ORDER — SODIUM CHLORIDE 0.9 % (FLUSH) 0.9 %
10 SYRINGE (ML) INJECTION
Status: CANCELLED | OUTPATIENT
Start: 2024-01-23

## 2024-01-22 RX ORDER — EPINEPHRINE 0.3 MG/.3ML
0.3 INJECTION SUBCUTANEOUS ONCE AS NEEDED
Status: CANCELLED | OUTPATIENT
Start: 2024-01-22

## 2024-01-22 RX ORDER — HEPARIN 100 UNIT/ML
500 SYRINGE INTRAVENOUS
Status: CANCELLED | OUTPATIENT
Start: 2024-01-23

## 2024-01-22 RX ORDER — ONDANSETRON HYDROCHLORIDE 2 MG/ML
8 INJECTION, SOLUTION INTRAVENOUS
Status: COMPLETED | OUTPATIENT
Start: 2024-01-22 | End: 2024-01-22

## 2024-01-22 RX ORDER — PROCHLORPERAZINE EDISYLATE 5 MG/ML
5 INJECTION INTRAMUSCULAR; INTRAVENOUS ONCE AS NEEDED
Status: DISCONTINUED | OUTPATIENT
Start: 2024-01-22 | End: 2024-01-22 | Stop reason: HOSPADM

## 2024-01-22 RX ADMIN — FLUOROURACIL 810 MG: 50 INJECTION, SOLUTION INTRAVENOUS at 12:01

## 2024-01-22 RX ADMIN — ONDANSETRON 8 MG: 2 INJECTION INTRAMUSCULAR; INTRAVENOUS at 11:01

## 2024-01-22 RX ADMIN — SODIUM CHLORIDE: 9 INJECTION, SOLUTION INTRAVENOUS at 11:01

## 2024-01-22 RX ADMIN — FLUOROURACIL 4870 MG: 50 INJECTION, SOLUTION INTRAVENOUS at 12:01

## 2024-01-22 RX ADMIN — LEUCOVORIN CALCIUM 810 MG: 350 INJECTION, POWDER, LYOPHILIZED, FOR SOLUTION INTRAMUSCULAR; INTRAVENOUS at 11:01

## 2024-01-22 NOTE — PROGRESS NOTES
Oncology Nutrition       Weight Check   Chart reviewed. Weight check completed on pt.     CBW:201#  Weight at initiation of tx:204#    Wt Readings from Last 10 Encounters:   01/22/24 91.6 kg (201 lb 15.1 oz)   01/22/24 91.6 kg (201 lb 15.1 oz)   01/09/24 90 kg (198 lb 6.6 oz)   01/03/24 91.4 kg (201 lb 8 oz)   01/03/24 91.4 kg (201 lb 8 oz)   12/20/23 91.9 kg (202 lb 9.6 oz)   12/20/23 91.9 kg (202 lb 9.6 oz)   12/06/23 92.2 kg (203 lb 5.6 oz)   12/06/23 93.8 kg (206 lb 12.7 oz)   12/05/23 92.9 kg (204 lb 12.9 oz)          RD plan of care: Weight is currently 201#. No significant change at this time. Per nursing nutrition risk report, pt denies any nutritional concerns or challenges at this time. RD to continue to monitor; no nutritional interventions are needed at this time.     Ana Cristina Coles, MS, RD, LDN  01/22/2024  2:11 PM

## 2024-01-22 NOTE — PROGRESS NOTES
SW met with pt at chairside. Pt said things are going well. She did say her shoulder is sore today from work she did in her home over the weekend.  Pt reports overall she is doing well. NO needs noted for SW at this time       Amparo Lyle, MEGW

## 2024-01-22 NOTE — PLAN OF CARE
Problem: Adult Inpatient Plan of Care  Goal: Plan of Care Review  Outcome: Ongoing, Progressing  Flowsheets (Taken 1/22/2024 1356)  Plan of Care Reviewed With:   patient   family  Goal: Patient-Specific Goal (Individualized)  Outcome: Ongoing, Progressing  Flowsheets (Taken 1/22/2024 1356)  Anxieties, Fears or Concerns: none  Individualized Care Needs: recliner, warm blanket, snacks     Problem: Fatigue  Goal: Improved Activity Tolerance  Outcome: Ongoing, Progressing  Intervention: Promote Improved Energy  Flowsheets (Taken 1/22/2024 1356)  Fatigue Management: paced activity encouraged  Sleep/Rest Enhancement:   natural light exposure provided   noise level reduced  Activity Management:   Ambulated -L4   Ambulated to bathroom - L4   Ambulated in jenkins - L4   Pt tolerated leucovorin infusion and 5fu push well.   No adverse reaction noted.  PAC flushed with NS and connected to pump per protocol.   5FU Pump is infusing. Pt aware of pump procedures.  Pt left clinic in no acute distress.

## 2024-01-23 ENCOUNTER — TELEPHONE (OUTPATIENT)
Dept: HEMATOLOGY/ONCOLOGY | Facility: CLINIC | Age: 84
End: 2024-01-23
Payer: MEDICARE

## 2024-01-24 ENCOUNTER — INFUSION (OUTPATIENT)
Dept: INFUSION THERAPY | Facility: HOSPITAL | Age: 84
End: 2024-01-24
Attending: INTERNAL MEDICINE
Payer: MEDICARE

## 2024-01-24 VITALS
RESPIRATION RATE: 18 BRPM | HEART RATE: 60 BPM | SYSTOLIC BLOOD PRESSURE: 131 MMHG | BODY MASS INDEX: 34.59 KG/M2 | DIASTOLIC BLOOD PRESSURE: 76 MMHG | WEIGHT: 201.5 LBS | TEMPERATURE: 98 F

## 2024-01-24 DIAGNOSIS — D50.9 IRON DEFICIENCY ANEMIA, UNSPECIFIED IRON DEFICIENCY ANEMIA TYPE: ICD-10-CM

## 2024-01-24 DIAGNOSIS — C18.5 MALIGNANT NEOPLASM OF SPLENIC FLEXURE: Primary | ICD-10-CM

## 2024-01-24 PROCEDURE — 25000003 PHARM REV CODE 250: Mod: PN | Performed by: INTERNAL MEDICINE

## 2024-01-24 PROCEDURE — 96365 THER/PROPH/DIAG IV INF INIT: CPT | Mod: PN

## 2024-01-24 PROCEDURE — 63600175 PHARM REV CODE 636 W HCPCS: Mod: JZ,JG,PN | Performed by: INTERNAL MEDICINE

## 2024-01-24 PROCEDURE — A4216 STERILE WATER/SALINE, 10 ML: HCPCS | Mod: PN | Performed by: INTERNAL MEDICINE

## 2024-01-24 RX ORDER — HEPARIN 100 UNIT/ML
500 SYRINGE INTRAVENOUS
Status: DISCONTINUED | OUTPATIENT
Start: 2024-01-24 | End: 2024-01-24 | Stop reason: HOSPADM

## 2024-01-24 RX ORDER — HEPARIN 100 UNIT/ML
500 SYRINGE INTRAVENOUS
OUTPATIENT
Start: 2024-01-25

## 2024-01-24 RX ORDER — DIPHENHYDRAMINE HYDROCHLORIDE 50 MG/ML
50 INJECTION INTRAMUSCULAR; INTRAVENOUS ONCE AS NEEDED
Status: CANCELLED | OUTPATIENT
Start: 2024-01-24

## 2024-01-24 RX ORDER — EPINEPHRINE 0.3 MG/.3ML
0.3 INJECTION SUBCUTANEOUS ONCE AS NEEDED
OUTPATIENT
Start: 2024-01-25

## 2024-01-24 RX ORDER — SODIUM CHLORIDE 0.9 % (FLUSH) 0.9 %
10 SYRINGE (ML) INJECTION
OUTPATIENT
Start: 2024-01-25

## 2024-01-24 RX ORDER — SODIUM CHLORIDE 0.9 % (FLUSH) 0.9 %
10 SYRINGE (ML) INJECTION
Status: CANCELLED | OUTPATIENT
Start: 2024-01-24

## 2024-01-24 RX ORDER — SODIUM CHLORIDE 0.9 % (FLUSH) 0.9 %
10 SYRINGE (ML) INJECTION
Status: DISCONTINUED | OUTPATIENT
Start: 2024-01-24 | End: 2024-01-24 | Stop reason: HOSPADM

## 2024-01-24 RX ORDER — EPINEPHRINE 0.3 MG/.3ML
0.3 INJECTION SUBCUTANEOUS ONCE AS NEEDED
Status: CANCELLED | OUTPATIENT
Start: 2024-01-24

## 2024-01-24 RX ORDER — DIPHENHYDRAMINE HYDROCHLORIDE 50 MG/ML
50 INJECTION INTRAMUSCULAR; INTRAVENOUS ONCE AS NEEDED
OUTPATIENT
Start: 2024-01-25

## 2024-01-24 RX ORDER — HEPARIN 100 UNIT/ML
500 SYRINGE INTRAVENOUS
Status: CANCELLED | OUTPATIENT
Start: 2024-01-24

## 2024-01-24 RX ADMIN — Medication 500 UNITS: at 03:01

## 2024-01-24 RX ADMIN — Medication 10 ML: at 01:01

## 2024-01-24 RX ADMIN — FERRIC CARBOXYMALTOSE INJECTION 750 MG: 50 INJECTION, SOLUTION INTRAVENOUS at 01:01

## 2024-01-24 RX ADMIN — SODIUM CHLORIDE: 9 INJECTION, SOLUTION INTRAVENOUS at 01:01

## 2024-02-05 ENCOUNTER — INFUSION (OUTPATIENT)
Dept: INFUSION THERAPY | Facility: HOSPITAL | Age: 84
End: 2024-02-05
Attending: INTERNAL MEDICINE
Payer: MEDICARE

## 2024-02-05 ENCOUNTER — TELEPHONE (OUTPATIENT)
Dept: HEMATOLOGY/ONCOLOGY | Facility: CLINIC | Age: 84
End: 2024-02-05
Payer: MEDICARE

## 2024-02-05 ENCOUNTER — OFFICE VISIT (OUTPATIENT)
Dept: HEMATOLOGY/ONCOLOGY | Facility: CLINIC | Age: 84
End: 2024-02-05
Payer: MEDICARE

## 2024-02-05 VITALS
RESPIRATION RATE: 18 BRPM | HEIGHT: 64 IN | WEIGHT: 202.38 LBS | TEMPERATURE: 97 F | DIASTOLIC BLOOD PRESSURE: 63 MMHG | BODY MASS INDEX: 34.55 KG/M2 | HEART RATE: 47 BPM | SYSTOLIC BLOOD PRESSURE: 140 MMHG | OXYGEN SATURATION: 97 %

## 2024-02-05 VITALS
BODY MASS INDEX: 34.55 KG/M2 | SYSTOLIC BLOOD PRESSURE: 136 MMHG | HEIGHT: 64 IN | DIASTOLIC BLOOD PRESSURE: 70 MMHG | HEART RATE: 57 BPM | RESPIRATION RATE: 21 BRPM | OXYGEN SATURATION: 97 % | TEMPERATURE: 97 F | WEIGHT: 202.38 LBS

## 2024-02-05 DIAGNOSIS — N28.89 RENAL MASS, LEFT: ICD-10-CM

## 2024-02-05 DIAGNOSIS — Z79.899 IMMUNODEFICIENCY DUE TO CHEMOTHERAPY: ICD-10-CM

## 2024-02-05 DIAGNOSIS — D68.59 THROMBOPHILIA: ICD-10-CM

## 2024-02-05 DIAGNOSIS — C18.5 MALIGNANT NEOPLASM OF SPLENIC FLEXURE: Primary | ICD-10-CM

## 2024-02-05 DIAGNOSIS — R91.8 PULMONARY NODULES: ICD-10-CM

## 2024-02-05 DIAGNOSIS — D84.821 IMMUNODEFICIENCY DUE TO CHEMOTHERAPY: ICD-10-CM

## 2024-02-05 DIAGNOSIS — T45.1X5A IMMUNODEFICIENCY DUE TO CHEMOTHERAPY: ICD-10-CM

## 2024-02-05 DIAGNOSIS — D50.9 IRON DEFICIENCY ANEMIA, UNSPECIFIED IRON DEFICIENCY ANEMIA TYPE: ICD-10-CM

## 2024-02-05 PROCEDURE — G2211 COMPLEX E/M VISIT ADD ON: HCPCS | Mod: S$GLB,,, | Performed by: INTERNAL MEDICINE

## 2024-02-05 PROCEDURE — 96409 CHEMO IV PUSH SNGL DRUG: CPT | Mod: PN

## 2024-02-05 PROCEDURE — 96367 TX/PROPH/DG ADDL SEQ IV INF: CPT | Mod: PN

## 2024-02-05 PROCEDURE — 96375 TX/PRO/DX INJ NEW DRUG ADDON: CPT | Mod: PN

## 2024-02-05 PROCEDURE — 25000003 PHARM REV CODE 250: Mod: PN | Performed by: INTERNAL MEDICINE

## 2024-02-05 PROCEDURE — 96416 CHEMO PROLONG INFUSE W/PUMP: CPT | Mod: PN

## 2024-02-05 PROCEDURE — 63600175 PHARM REV CODE 636 W HCPCS: Mod: PN | Performed by: INTERNAL MEDICINE

## 2024-02-05 PROCEDURE — 99215 OFFICE O/P EST HI 40 MIN: CPT | Mod: S$GLB,,, | Performed by: INTERNAL MEDICINE

## 2024-02-05 PROCEDURE — 99999 PR PBB SHADOW E&M-EST. PATIENT-LVL IV: CPT | Mod: PBBFAC,,, | Performed by: INTERNAL MEDICINE

## 2024-02-05 RX ORDER — EPINEPHRINE 0.3 MG/.3ML
0.3 INJECTION SUBCUTANEOUS ONCE AS NEEDED
Status: CANCELLED | OUTPATIENT
Start: 2024-02-05

## 2024-02-05 RX ORDER — SODIUM CHLORIDE 0.9 % (FLUSH) 0.9 %
10 SYRINGE (ML) INJECTION
Status: CANCELLED | OUTPATIENT
Start: 2024-02-06

## 2024-02-05 RX ORDER — SODIUM CHLORIDE 0.9 % (FLUSH) 0.9 %
10 SYRINGE (ML) INJECTION
Status: DISCONTINUED | OUTPATIENT
Start: 2024-02-05 | End: 2024-02-05 | Stop reason: HOSPADM

## 2024-02-05 RX ORDER — PROCHLORPERAZINE EDISYLATE 5 MG/ML
5 INJECTION INTRAMUSCULAR; INTRAVENOUS ONCE AS NEEDED
Status: CANCELLED
Start: 2024-02-05

## 2024-02-05 RX ORDER — ONDANSETRON HYDROCHLORIDE 2 MG/ML
8 INJECTION, SOLUTION INTRAVENOUS
Status: COMPLETED | OUTPATIENT
Start: 2024-02-05 | End: 2024-02-05

## 2024-02-05 RX ORDER — FLUOROURACIL 50 MG/ML
400 INJECTION, SOLUTION INTRAVENOUS
Status: COMPLETED | OUTPATIENT
Start: 2024-02-05 | End: 2024-02-05

## 2024-02-05 RX ORDER — SODIUM CHLORIDE 0.9 % (FLUSH) 0.9 %
10 SYRINGE (ML) INJECTION
Status: CANCELLED | OUTPATIENT
Start: 2024-02-05

## 2024-02-05 RX ORDER — EPINEPHRINE 0.3 MG/.3ML
0.3 INJECTION SUBCUTANEOUS ONCE AS NEEDED
Status: DISCONTINUED | OUTPATIENT
Start: 2024-02-05 | End: 2024-02-05 | Stop reason: HOSPADM

## 2024-02-05 RX ORDER — ONDANSETRON HYDROCHLORIDE 2 MG/ML
8 INJECTION, SOLUTION INTRAVENOUS
Status: CANCELLED | OUTPATIENT
Start: 2024-02-05

## 2024-02-05 RX ORDER — FLUOROURACIL 50 MG/ML
400 INJECTION, SOLUTION INTRAVENOUS
Status: CANCELLED | OUTPATIENT
Start: 2024-02-05

## 2024-02-05 RX ORDER — DIPHENHYDRAMINE HYDROCHLORIDE 50 MG/ML
50 INJECTION INTRAMUSCULAR; INTRAVENOUS ONCE AS NEEDED
Status: CANCELLED | OUTPATIENT
Start: 2024-02-05

## 2024-02-05 RX ORDER — DIPHENHYDRAMINE HYDROCHLORIDE 50 MG/ML
50 INJECTION INTRAMUSCULAR; INTRAVENOUS ONCE AS NEEDED
Status: DISCONTINUED | OUTPATIENT
Start: 2024-02-05 | End: 2024-02-05 | Stop reason: HOSPADM

## 2024-02-05 RX ORDER — HEPARIN 100 UNIT/ML
500 SYRINGE INTRAVENOUS
Status: CANCELLED | OUTPATIENT
Start: 2024-02-06

## 2024-02-05 RX ORDER — HEPARIN 100 UNIT/ML
500 SYRINGE INTRAVENOUS
Status: CANCELLED | OUTPATIENT
Start: 2024-02-05

## 2024-02-05 RX ADMIN — LEUCOVORIN CALCIUM 815 MG: 350 INJECTION, POWDER, LYOPHILIZED, FOR SOLUTION INTRAMUSCULAR; INTRAVENOUS at 09:02

## 2024-02-05 RX ADMIN — SODIUM CHLORIDE: 9 INJECTION, SOLUTION INTRAVENOUS at 09:02

## 2024-02-05 RX ADMIN — ONDANSETRON 8 MG: 2 INJECTION INTRAMUSCULAR; INTRAVENOUS at 09:02

## 2024-02-05 RX ADMIN — FLUOROURACIL 4895 MG: 50 INJECTION, SOLUTION INTRAVENOUS at 10:02

## 2024-02-05 RX ADMIN — FLUOROURACIL 815 MG: 50 INJECTION, SOLUTION INTRAVENOUS at 10:02

## 2024-02-05 NOTE — PROGRESS NOTES
PATIENT: Danna Sorensen  MRN: [id number]  DATE: 2/5/2024      Diagnosis:   1. Malignant neoplasm of splenic flexure    2. Immunodeficiency due to chemotherapy    3. Renal mass, left    4. Pulmonary nodules    5. Thrombophilia    6. Iron deficiency anemia, unspecified iron deficiency anemia type                  Chief Complaint: Malignant neoplasm of splenic flexure (2 week follow up)      Oncologic History:      Oncologic History     Oncologic Treatment     Pathology           Subjective:    Interval History: Ms. Sorensen is a 83 y.o. female with Pulmonary embolism, carpal tunnel, CAD, HTN, hypothyroidism, osteoporosis, osteoarthritis, who presents for colon cancer.  Since the last clinic visit the patient states she feels well.  The patient denies CP, cough, SOB, abdominal pain, nausea, vomiting, constipation, diarrhea.  The patient denies fever, chills, night sweats, weight loss, new lumps or bumps, easy bruising or bleeding.    Prior History:  Pt was previously diagnosed with Stage III adenocarcinoma of the colon in 2007 and underwent 12 cycles of FOLFOX.  Pt underwent colonoscopy on 8/08/23 showing 1 7 mm polyp in the rectum; altered vascular, congested, eroded, friable and ulcerated mucosa in the transverse colon which was biopsied; patent and to side ileocolonic anastomosis.  Pathology showed moderately differentiated adenocarcinoma. CT abdomen and pelvis 8/17/23 showing irregular appearance of the gallbladder; heterogeneously enhancing lesion in the inferior pole of the left kidney measuring 1.8 cm; short segment of concentric bowel wall thickening of the colon at the splenic flexure with surrounding mesenteric fat stranding with nodular thickness of 2.2 cm.  The patient underwent colonoscopy on 08/22/2023 showing nonbleeding internal hemorrhoids, partially obstructing tumor in the transverse colon which was tattooed.  Pt saw Urology 8/23/23 with plans for re-imaging the renal lesion in 3 months with an MRI.  US  abdomen 8/25/23 showed a 2.5 cm solid mass at the lower pole of the left kidney suspicious for neoplasm.  CT chest 8/25/23 showed 4 mm left upper lobe pulmonary nodule, 4 mm calcified granuloma left upper lobe, 2 mm pulmonary nodule in the left upper lobe, 9 x 9 mm triangular nodule along the juxtapleural right middle lobe, 2 mm pulmonary nodule in the right middle lobe, and a partially imaged masslike density in the splenic flexure of the colon. Pt then underwent resection of the transverse colon and splenic flexure with path showing invasive moderately differentiated adenocarcinoma with mucinous features, 33 benign lymph nodes, positive lymphovascular invasion, positive perineural invasion pT3N0.  Tumor shows intact expression of MLH1, PMS2, MSH2, MSH6.  Patient was positive for B Celio V600E mutation.   The patient was discussed at tumor Board on 11/07/2023 with recommendation for PET-CT with biopsy of lung nodules if positive.  PET-CT on 11/08/2023 showed evidence of pulmonary hypertension; stable 5 mm nodule left lung apex; calcified granuloma left upper lobe; stable 6 mm inferior right upper lobe nodule; stable 17 mm ground-glass opacity lateral right middle lobe; stable 4 mm nodule in the right lower lobe of the diaphragm; no activity in the lesion in the left kidney.  MRI abdomen 11/20/2023 showed heterogeneous fat containing enhancing mass in the lower pole of the left kidney suggestive of an angio myelolipoma.    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    Strabismus     Trouble in sleeping     Wears dentures     Uppers       Past Surgical HIstory:   Past Surgical History:   Procedure Laterality Date    CARDIAC SURGERY      cardiac cath    COLON SURGERY      colon resection    COLONOSCOPY N/A 10/18/2016    Procedure: COLONOSCOPY;  Surgeon: Rell Cordova MD;  Location: [facility] ENDO;  Service: Endoscopy;  Laterality: N/A;    COLONOSCOPY N/A 8/8/2023    Procedure: COLONOSCOPY;  Surgeon: Kaushik Pinon MD;  Location: [facility];  Service: Endoscopy;  Laterality: N/A;    COLONOSCOPY N/A 8/22/2023    Procedure: COLONOSCOPY (tattoo of colon ca);  Surgeon: Kaushik Pinon MD;  Location: [facility];  Service: Endoscopy;  Laterality: N/A;    ESOPHAGOGASTRODUODENOSCOPY N/A 8/3/2023    Procedure: EGD (ESOPHAGOGASTRODUODENOSCOPY);  Surgeon: Kaushik Pinon MD;  Location: [facility];  Service: Endoscopy;  Laterality: N/A;    HAND TENDON SURGERY Left     HEMICOLECTOMY      right    INJECTION OF ANESTHETIC AGENT AROUND MEDIAL BRANCH NERVES INNERVATING LUMBAR FACET JOINT Right 07/10/2018    Procedure: Block-nerve-medial branch-lumbar;  Surgeon: Parish Gaitan MD;  Location: [facility] OR;  Service: Pain Management;  Laterality: Right;  L3, 4, 5    INSERTION OF INFERIOR VENA CAVAL FILTER N/A 9/13/2023    Procedure: Insertion, Filter, Inferior Vena Cava;  Surgeon: Oren Verdin MD;  Location: [facility] CATH/EP LAB;  Service: General;  Laterality: N/A;    INSERTION OF TUNNELED CENTRAL VENOUS CATHETER (CVC) WITH SUBCUTANEOUS PORT Right 11/15/2023    Procedure: [id number]-CATH;  Surgeon: Jim Chavez MD;  Location: [facility] OR;  Service: General;  Laterality: Right;    JOINT REPLACEMENT      bilateral    RADIOFREQUENCY ABLATION OF LUMBAR MEDIAL BRANCH NERVE AT SINGLE LEVEL Right 07/24/2018    Procedure: RADIOFREQUENCY ABLATION, NERVE, MEDIAL BRANCH,  "LUMBAR, 1 LEVEL;  Surgeon: Parish Gaitan MD;  Location: [facility] OR;  Service: Pain Management;  Laterality: Right;  L3,4,5 - Burned at 80 degrees C. for 75 seconds x 2 each site    ROBOT-ASSISTED COLECTOMY N/A 9/29/2023    Procedure: ROBOTIC COLECTOMY;  Surgeon: Jim Chavez MD;  Location: [facility] OR;  Service: General;  Laterality: N/A;    SHOULDER ARTHROSCOPY Right 01/12/2017    Reverse     TONSILLECTOMY      TONSILLECTOMY, ADENOIDECTOMY, BILATERAL MYRINGOTOMY AND TUBES      TOTAL KNEE ARTHROPLASTY Bilateral 08/1998    total replacements    TUMOR REMOVAL Left     left foot as a child       Family History:   Family History   Problem Relation Age of Onset    Hypertension Mother     Kidney disease Mother     Cataracts Father     Cataracts Sister     Cancer Sister         breast    No Known Problems Brother     Cancer Sister         breast    Arthritis Sister     Glaucoma Neg Hx     Amblyopia Neg Hx     Blindness Neg Hx     Macular degeneration Neg Hx     Retinal detachment Neg Hx     Strabismus Neg Hx     Stroke Neg Hx     Thyroid disease Neg Hx        Social History:  reports that she quit smoking about 63 years ago. Her smoking use included cigarettes. She started smoking about 63 years ago. She has a 0.5 pack-year smoking history. She has never used smokeless tobacco. She reports that she does not drink alcohol and does not use drugs.    Allergies:  Review of patient's allergies indicates:   Allergen Reactions    Aspirin      Other reaction(s): Stomach upset    Aspirin Other (See Comments)     Other reaction(s): Stomach upset    Gabapentin Other (See Comments)     Pt states she felt "funny" when she took the medication. Especially at the higher dose.    Mobic [meloxicam] Swelling     Edema and elevated blood pressure     Oxaliplatin Other (See Comments)     Other reaction(s): Joint pain  Other reaction(s): Itching  Other reaction(s): Hives  Other reaction(s): Joint pain  Other reaction(s): Itching  Other reaction(s): " Hives    Pregabalin Other (See Comments)     Side effects  Side effects       Medications:  Current Outpatient Medications   Medication Sig Dispense Refill    acetaminophen (TYLENOL) 650 MG TbSR Take 650 mg by mouth every 8 (eight) hours.      alendronate (FOSAMAX) 70 MG tablet Take 1 tablet (70 mg total) by mouth every 7 days. 12 tablet 3    apixaban (ELIQUIS) 5 mg Tab Take 1 tablet (5 mg total) by mouth 2 (two) times daily. 180 tablet 3    cyclobenzaprine (FLEXERIL) 5 MG tablet Take 1 tablet (5 mg total) by mouth 3 (three) times daily as needed for Muscle spasms. 90 tablet 0    ergocalciferol, vitamin D2, (VITAMIN D ORAL) Take 2,000 mg by mouth once daily.      furosemide (LASIX) 20 MG tablet Take 1 tablet (20 mg total) by mouth daily as needed (edema). 90 tablet 3    HYDROcodone-acetaminophen (NORCO) 5-325 mg per tablet Take 1 tablet by mouth every 6 (six) hours as needed for Pain. 20 tablet 0    hydrOXYzine HCL (ATARAX) 25 MG tablet Take 1 tablet (25 mg total) by mouth 3 (three) times daily as needed for Anxiety (insomnia). 30 tablet PRN    levocetirizine (XYZAL) 5 MG tablet Take 1 tablet (5 mg total) by mouth nightly as needed for Allergies (allergies). 90 tablet PRN    levothyroxine (SYNTHROID) 75 MCG tablet Take 1 tablet (75 mcg total) by mouth once daily. 90 tablet 3    LIDOcaine-prilocaine (EMLA) cream Apply topically as needed (Chemo). 30 g 0    lisinopriL 10 MG tablet Take 1 tablet (10 mg total) by mouth once daily. 90 tablet 3    multivitamin capsule Take 1 capsule by mouth once daily.      omeprazole (PRILOSEC) 40 MG capsule Take 1 capsule (40 mg total) by mouth 2 (two) times daily before meals. 180 capsule PRN    ondansetron (ZOFRAN-ODT) 8 MG TbDL Take 1 tablet (8 mg total) by mouth every 8 (eight) hours as needed (nausea). 40 tablet 3    promethazine (PHENERGAN) 12.5 MG Tab (Take 1-2 tabs every 6 hours as needed for nausea persistent despite zofran) 40 tablet 3    traMADoL (ULTRAM) 50 mg tablet Take 1  tablet (50 mg total) by mouth every 8 (eight) hours as needed for Pain. 21 each 0    traZODone (DESYREL) 50 MG tablet Take 1 tablet (50 mg total) by mouth nightly. 90 tablet 0    triamcinolone acetonide 0.1% (KENALOG) 0.1 % cream APPLY TOPICALLY TO THE AFFECTED AREA TWICE DAILY 60 g 0     No current facility-administered medications for this visit.     Facility-Administered Medications Ordered in Other Visits   Medication Dose Route Frequency Provider Last Rate Last Admin    0.9%  NaCl infusion   Intravenous Once Oren Verdin MD        diphenhydrAMINE injection 50 mg  50 mg Intravenous Once PRN Mahesh Cameron MD        EPINEPHrine (EPIPEN) 0.3 mg/0.3 mL pen injection 0.3 mg  0.3 mg Intramuscular Once PRN Mahesh Cameron MD        fluorouracil (ADRUCIL) 2,400 mg/m2 = 4,895 mg in sodium chloride 0.9% 100 mL chemo infusion  2,400 mg/m2 Intravenous over 46 hr Mahesh Cameron MD        fluorouraciL injection 815 mg  400 mg/m2 Intravenous 1 time in Clinic/HOD Mahesh Cameron MD        hydrocortisone sodium succinate injection 100 mg  100 mg Intravenous Once PRN Mahesh Cameron MD        leucovorin calcium 400 mg/m2 = 815 mg in dextrose 5 % (D5W) 325.75 mL infusion  400 mg/m2 Intravenous 1 time in Clinic/HOD Mahesh Cameron .5 mL/hr at 02/05/24 0944 815 mg at 02/05/24 0944    sodium chloride 0.9% flush 10 mL  10 mL Intravenous PRN Mahesh Cameron MD           Review of Systems   Constitutional:  Negative for chills, fatigue, fever and unexpected weight change.   Respiratory:  Negative for cough and shortness of breath.    Cardiovascular:  Negative for chest pain and palpitations.   Gastrointestinal:  Negative for abdominal pain, constipation, diarrhea, nausea and vomiting.   Skin:  Negative for rash.   Neurological:  Negative for headaches.   Hematological:  Negative for adenopathy. Does not bruise/bleed easily.       ECOG Performance Status: 2   Objective:      Vitals:   Vitals:    02/05/24 0829  "  BP: 136/70   BP Location: Left arm   Patient Position: Sitting   BP Method: Large (Manual)   Pulse: (!) 57   Resp: (!) 21   Temp: 96.8 °F (36 °C)   TempSrc: Temporal   SpO2: 97%   Weight: 91.8 kg (202 lb 6.1 oz)   Height: 5' 4" (1.626 m)                 Physical Exam  Constitutional:       General: She is not in acute distress.     Appearance: She is well-developed. She is not diaphoretic.   HENT:      Head: Normocephalic and atraumatic.   Cardiovascular:      Rate and Rhythm: Normal rate and regular rhythm.      Heart sounds: Normal heart sounds. No murmur heard.     No friction rub. No gallop.   Pulmonary:      Effort: Pulmonary effort is normal. No respiratory distress.      Breath sounds: Normal breath sounds. No wheezing or rales.   Chest:      Chest wall: No tenderness.   Abdominal:      General: Bowel sounds are normal. There is no distension.      Palpations: Abdomen is soft. There is no mass.      Tenderness: There is no abdominal tenderness. There is no guarding or rebound.   Lymphadenopathy:      Cervical: No cervical adenopathy.      Upper Body:      Right upper body: No supraclavicular or axillary adenopathy.      Left upper body: No supraclavicular or axillary adenopathy.   Skin:     Findings: No erythema or rash.   Neurological:      Mental Status: She is alert and oriented to person, place, and time.   Psychiatric:         Behavior: Behavior normal.         Laboratory Data:  Lab Visit on 02/05/2024   Component Date Value Ref Range Status    WBC 02/05/2024 4.76  3.90 - 12.70 K/uL Final    RBC 02/05/2024 3.59 (L)  4.00 - 5.40 M/uL Final    Hemoglobin 02/05/2024 10.9 (L)  12.0 - 16.0 g/dL Final    Hematocrit 02/05/2024 34.4 (L)  37.0 - 48.5 % Final    MCV 02/05/2024 96  82 - 98 fL Final    MCH 02/05/2024 30.4  27.0 - 31.0 pg Final    MCHC 02/05/2024 31.7 (L)  32.0 - 36.0 g/dL Final    RDW 02/05/2024 17.8 (H)  11.5 - 14.5 % Final    Platelets 02/05/2024 185  150 - 450 K/uL Final    MPV 02/05/2024 9.0 " (L)  9.2 - 12.9 fL Final    Immature Granulocytes 02/05/2024 0.6 (H)  0.0 - 0.5 % Final    Gran # (ANC) 02/05/2024 2.9  1.8 - 7.7 K/uL Final    Immature Grans (Abs) 02/05/2024 0.03  0.00 - 0.04 K/uL Final    Comment: Mild elevation in immature granulocytes is non specific and   can be seen in a variety of conditions including stress response,   acute inflammation, trauma and pregnancy. Correlation with other   laboratory and clinical findings is essential.      Lymph # 02/05/2024 1.2  1.0 - 4.8 K/uL Final    Mono # 02/05/2024 0.6  0.3 - 1.0 K/uL Final    Eos # 02/05/2024 0.1  0.0 - 0.5 K/uL Final    Baso # 02/05/2024 0.02  0.00 - 0.20 K/uL Final    nRBC 02/05/2024 0  0 /100 WBC Final    Gran % 02/05/2024 60.3  38.0 - 73.0 % Final    Lymph % 02/05/2024 24.6  18.0 - 48.0 % Final    Mono % 02/05/2024 12.2  4.0 - 15.0 % Final    Eosinophil % 02/05/2024 1.9  0.0 - 8.0 % Final    Basophil % 02/05/2024 0.4  0.0 - 1.9 % Final    Differential Method 02/05/2024 Automated   Final    Sodium 02/05/2024 140  136 - 145 mmol/L Final    Potassium 02/05/2024 4.3  3.5 - 5.1 mmol/L Final    Chloride 02/05/2024 110  95 - 110 mmol/L Final    CO2 02/05/2024 22 (L)  23 - 29 mmol/L Final    Glucose 02/05/2024 79  70 - 110 mg/dL Final    BUN 02/05/2024 17  8 - 23 mg/dL Final    Creatinine 02/05/2024 1.0  0.5 - 1.4 mg/dL Final    Calcium 02/05/2024 8.7  8.7 - 10.5 mg/dL Final    Total Protein 02/05/2024 6.7  6.0 - 8.4 g/dL Final    Albumin 02/05/2024 3.6  3.5 - 5.2 g/dL Final    Total Bilirubin 02/05/2024 1.0  0.1 - 1.0 mg/dL Final    Comment: For infants and newborns, interpretation of results should be based  on gestational age, weight and in agreement with clinical  observations.    Premature Infant recommended reference ranges:  Up to 24 hours.............<8.0 mg/dL  Up to 48 hours............<12.0 mg/dL  3-5 days..................<15.0 mg/dL  6-29 days.................<15.0 mg/dL      Alkaline Phosphatase 02/05/2024 51 (L)  55 - 135 U/L  Final    AST 02/05/2024 12  10 - 40 U/L Final    ALT 02/05/2024 9 (L)  10 - 44 U/L Final    eGFR 02/05/2024 56.6 (A)  >60 mL/min/1.73 m^2 Final    Anion Gap 02/05/2024 8  8 - 16 mmol/L Final         Imaging:     PET/CT 11/08/23  Neck: There is moderate brown fat uptake at the base of the neck, normal variant. No pathologic activity is present. No cervical adenopathy is noted.     Chest: There is no significant abnormal activity in the chest. There is fairly extensive postsurgical uptake in the soft tissues adjacent to the right shoulder in this patient status post reverse right shoulder arthroplasty.     There is no mediastinal nor axillary adenopathy. There is dilatation of the main pulmonary artery compared to the aorta, a finding associated with pulmonary arterial hypertension. Calcific plaque is present in the coronary arteries. There are no pleural effusions.     There is a 5 mm nodule in the left apex unchanged series 5, image 117. This is stable compared to CT from 2007. There is also a calcified granuloma in the left upper lobe. Bands of scarring or atelectasis are seen bilaterally. 6 mm nodule anterior inferior right upper lobe unchanged since recent comparison exam series 5, image 159; 17 mm ground-glass opacity lateral right middle lobe unchanged since the 08/25/2023 series 5, image 168. There is a stable 4 mm nodule just above the diaphragm in the right lower lobe series 5, image 205, unchanged since 2007.     Abdomen/pelvis: There is uptake along the midline surgical scar in the anterior abdominal wall. Otherwise, no sites of abnormal activity are present. Specifically, there is no definite abnormal activity associated with the mass at the lower pole of the left kidney. This is noted to have a density consistent with fat on series 3, image 157, and the possibility of an angiomyolipoma is raised. Follow-up with MRI or with a multi phasic CT with and without IV contrast would be helpful for better  characterization. The hyperechoic appearance on ultrasound of 08/25/2023 suggests an angiomyolipoma.     Limited noncontrast CT images of the liver, spleen, adrenal glands, pancreas and right kidney are grossly normal. There are gallstones. No abnormal activity is present in the gallbladder. The aorta is normal in caliber with scattered calcific plaque. There is an IVC filter.     The urinary bladder is collapsed. The uterus is atrophic or absent.     There has been partial colectomy with anastomosis in the right lower quadrant. A small amount of ascites is present. There is a small midline fat-containing ventral hernia. No adenopathy nor peritoneal implant is noted.     Bones: There are extensive degenerative changes in the spine. No findings to indicate metastatic disease to bone.    MRI Abdomen 11/20/23  Unremarkable appearance. No masses     Cholelithiasis with large stone within the gallbladder without findings of acute cholecystitis or biliary duct dilatation     Spleen not enlarged     Adrenal glands unremarkable appearance     Pancreas unremarkable appearance     Abdominal aorta no aneurysm     Artifact in region of IVC suggesting IVC filter.     Postoperative changes anterior abdominal wall and fat containing supraumbilical ventral hernia.     Right kidney; unremarkable appearance of the right kidney. No mass.     Kidney; 2 cm heterogeneous fat containing enhancing mass in the lower pole. Presence of fat strongly suggest angiomyolipoma. Can be seen although rarely with renal cell carcinoma.       Assessment:       1. Malignant neoplasm of splenic flexure    2. Immunodeficiency due to chemotherapy    3. Renal mass, left    4. Pulmonary nodules    5. Thrombophilia    6. Iron deficiency anemia, unspecified iron deficiency anemia type                     Plan:     Colon Cancer - Pt with h/o colon cancer s/p resection in 2007 followed by adjuvant FOLFOX for 12 cycles  -Pt recently with resection of transverse  colon and splenic flexure 9/29/23 showing path showing invasive moderately differentiated adenocarcinoma with mucinous features, 33 benign lymph nodes, positive lymphovascular invasion, positive perineural invasion pT3N0  -Tumor shows intact expression of MLH1, PMS2, MSH2, MSH6  -Patient was positive for B Celio V600E mutation  -Pt has high risk stage II colon cancer given positive LVI and PNI  -PT is a candidate for treatment with 6 months of 5-FU or Capecitabine  -no clear evidence of metastatic disease on PET-CT 11/08/2023   -Will proceed with 6 months of 5 fluorouracil  -Pt to receive cycle 5 this week  -Pharmacogenomic panel on 11/03/23 showed normal DPYD metabolizer but 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 The patient can proceed with treatment.  She will need a CBC, CMP, CEA anc CT C/A/P on 2/15/24 early in the morning with an appt with me on 2/16/24 with treatment on 2/19/24.   Follow up with GRETCHEN    Infusion scheduling note    Injection scheduling note    Labs    Imaging    Pharmacy appointment    Other  referrals              Treatment Plan Information   OP COLORECTAL FLUOROURACIL LEUCOVORIN Q2W (MOD DE GRAMONT)   Mahesh Cameron MD   Upcoming Treatment Dates - OP COLORECTAL FLUOROURACIL LEUCOVORIN Q2W (MOD DE GRAMONT)    2/18/2024       Chemotherapy       leucovorin calcium in dextrose 5 % (D5W) 250 mL infusion       fluorouraciL injection       fluorouracil chemo infusion       Antiemetics       ondansetron injection 8 mg  3/3/2024       Chemotherapy       leucovorin calcium in dextrose 5 % (D5W) 250 mL infusion       fluorouraciL injection       fluorouracil chemo infusion       Antiemetics       ondansetron injection 8 mg  3/17/2024       Chemotherapy       leucovorin calcium in dextrose 5 % (D5W) 250 mL infusion       fluorouraciL injection       fluorouracil chemo infusion       Antiemetics       ondansetron injection 8 mg  3/31/2024       Chemotherapy       leucovorin calcium in dextrose 5 % (D5W) 250 mL infusion       fluorouraciL injection       fluorouracil chemo infusion       Antiemetics       ondansetron injection 8 mg    Supportive Plan Information  OP FERRIC CARBOXYMALTOSE Q2W   Mahesh Cameron MD   Upcoming Treatment Dates - OP FERRIC CARBOXYMALTOSE Q2W    1/25/2024       Supportive Care       ferric carboxymaltose (INJECTAFER) 750 mg in sodium chloride 0.9% 265 mL IVPB (ready to mix)      Mahesh Cameron MD  [facility]  Hematology and Oncology  [facility] [address] [place] [address]   O: [phone]  F: [phone]

## 2024-02-05 NOTE — PLAN OF CARE
Problem: Adult Inpatient Plan of Care  Goal: Plan of Care Review  Outcome: Ongoing, Progressing  Flowsheets (Taken 2/5/2024 1048)  Plan of Care Reviewed With:   patient   sibling   Pt tolerated leucovorin and 5FU push well. No adverse reaction noted. Pt education reinforced on chemo regimen, side effects, what to expect, and when to call physician. Pt verbalized understanding. I reviewed pt calendar w/ pt and understanding verbalized. PAC remains accessed with 5FU infusing at 2.2cc/hr over 46 hours. Patent upon discharge in NAD.

## 2024-02-05 NOTE — PLAN OF CARE
Problem: Adult Inpatient Plan of Care  Goal: Patient-Specific Goal (Individualized)  Outcome: Ongoing, Progressing  Flowsheets (Taken 2/5/2024 0956)  Anxieties, Fears or Concerns: none  Individualized Care Needs: recliner, warm blanket, family chairside     Problem: Fatigue  Goal: Improved Activity Tolerance  Outcome: Ongoing, Progressing  Intervention: Promote Improved Energy  Flowsheets (Taken 2/5/2024 0956)  Fatigue Management: frequent rest breaks encouraged  Sleep/Rest Enhancement: regular sleep/rest pattern promoted  Activity Management: Ambulated -L4

## 2024-02-07 ENCOUNTER — INFUSION (OUTPATIENT)
Dept: INFUSION THERAPY | Facility: HOSPITAL | Age: 84
End: 2024-02-07
Attending: INTERNAL MEDICINE
Payer: MEDICARE

## 2024-02-07 VITALS
RESPIRATION RATE: 18 BRPM | TEMPERATURE: 97 F | HEART RATE: 70 BPM | DIASTOLIC BLOOD PRESSURE: 75 MMHG | SYSTOLIC BLOOD PRESSURE: 144 MMHG

## 2024-02-07 DIAGNOSIS — C18.5 MALIGNANT NEOPLASM OF SPLENIC FLEXURE: Primary | ICD-10-CM

## 2024-02-07 PROCEDURE — 63600175 PHARM REV CODE 636 W HCPCS: Mod: PN | Performed by: INTERNAL MEDICINE

## 2024-02-07 PROCEDURE — A4216 STERILE WATER/SALINE, 10 ML: HCPCS | Mod: PN | Performed by: INTERNAL MEDICINE

## 2024-02-07 PROCEDURE — 25000003 PHARM REV CODE 250: Mod: PN | Performed by: INTERNAL MEDICINE

## 2024-02-07 RX ORDER — HEPARIN 100 UNIT/ML
500 SYRINGE INTRAVENOUS
Status: DISCONTINUED | OUTPATIENT
Start: 2024-02-07 | End: 2024-02-07 | Stop reason: HOSPADM

## 2024-02-07 RX ORDER — SODIUM CHLORIDE 0.9 % (FLUSH) 0.9 %
10 SYRINGE (ML) INJECTION
Status: DISCONTINUED | OUTPATIENT
Start: 2024-02-07 | End: 2024-02-07 | Stop reason: HOSPADM

## 2024-02-07 RX ADMIN — Medication 500 UNITS: at 11:02

## 2024-02-07 RX ADMIN — Medication 10 ML: at 11:02

## 2024-02-07 NOTE — PLAN OF CARE
Problem: Adult Inpatient Plan of Care  Goal: Plan of Care Review  Outcome: Ongoing, Progressing  Goal: Patient-Specific Goal (Individualized)  Outcome: Ongoing, Progressing     Problem: Fatigue  Goal: Improved Activity Tolerance  Outcome: Ongoing, Progressing   Pt tolerated pump d/c well.   No adverse reaction noted.  PAC flushed with Heparin and de-accessed per protocol.   Pt left clinic in no acute distress.

## 2024-02-17 NOTE — PROGRESS NOTES
PATIENT: Danna Sorensen  MRN: [id number]  DATE: 2/19/2024      Diagnosis:   1. Malignant neoplasm of splenic flexure    2. Bacterial sinusitis    3. Cough, unspecified type    4. Immunodeficiency due to chemotherapy    5. Renal mass, left    6. Pulmonary nodules    7. Thrombophilia    8. Iron deficiency anemia, unspecified iron deficiency anemia type                    Chief Complaint: Malignant neoplasm of splenic flexure (2 week follow up )      Oncologic History:      Oncologic History     Oncologic Treatment     Pathology           Subjective:    Interval History: Ms. Sorensen is a 83 y.o. female with Pulmonary embolism, carpal tunnel, CAD, HTN, hypothyroidism, osteoporosis, osteoarthritis, who presents for colon cancer.  Since the last clinic visit the patient states she has had a URI for 2 weeks without improvement.  She was prescribed Nystatin and promethazine by primary care.  Pt states she has a constant cough productive of mucous.  She denies fever, CP, SOB.  The patient denies CP, cough, SOB, abdominal pain, nausea, vomiting, constipation, diarrhea.    Prior History:  Pt was previously diagnosed with Stage III adenocarcinoma of the colon in 2007 and underwent 12 cycles of FOLFOX.  Pt underwent colonoscopy on 8/08/23 showing 1 7 mm polyp in the rectum; altered vascular, congested, eroded, friable and ulcerated mucosa in the transverse colon which was biopsied; patent and to side ileocolonic anastomosis.  Pathology showed moderately differentiated adenocarcinoma. CT abdomen and pelvis 8/17/23 showing irregular appearance of the gallbladder; heterogeneously enhancing lesion in the inferior pole of the left kidney measuring 1.8 cm; short segment of concentric bowel wall thickening of the colon at the splenic flexure with surrounding mesenteric fat stranding with nodular thickness of 2.2 cm.  The patient underwent colonoscopy on 08/22/2023 showing nonbleeding internal hemorrhoids, partially obstructing tumor in the  transverse colon which was tattooed.  Pt saw Urology 8/23/23 with plans for re-imaging the renal lesion in 3 months with an MRI.  US abdomen 8/25/23 showed a 2.5 cm solid mass at the lower pole of the left kidney suspicious for neoplasm.  CT chest 8/25/23 showed 4 mm left upper lobe pulmonary nodule, 4 mm calcified granuloma left upper lobe, 2 mm pulmonary nodule in the left upper lobe, 9 x 9 mm triangular nodule along the juxtapleural right middle lobe, 2 mm pulmonary nodule in the right middle lobe, and a partially imaged masslike density in the splenic flexure of the colon. Pt then underwent resection of the transverse colon and splenic flexure with path showing invasive moderately differentiated adenocarcinoma with mucinous features, 33 benign lymph nodes, positive lymphovascular invasion, positive perineural invasion pT3N0.  Tumor shows intact expression of MLH1, PMS2, MSH2, MSH6.  Patient was positive for B Celio V600E mutation.   The patient was discussed at tumor Board on 11/07/2023 with recommendation for PET-CT with biopsy of lung nodules if positive.  PET-CT on 11/08/2023 showed evidence of pulmonary hypertension; stable 5 mm nodule left lung apex; calcified granuloma left upper lobe; stable 6 mm inferior right upper lobe nodule; stable 17 mm ground-glass opacity lateral right middle lobe; stable 4 mm nodule in the right lower lobe of the diaphragm; no activity in the lesion in the left kidney.  MRI abdomen 11/20/2023 showed heterogeneous fat containing enhancing mass in the lower pole of the left kidney suggestive of an angio myelolipoma.    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    Strabismus     Trouble in sleeping     Wears dentures     Uppers       Past Surgical HIstory:   Past Surgical History:   Procedure Laterality Date    CARDIAC SURGERY      cardiac cath    COLON SURGERY      colon resection    COLONOSCOPY N/A 10/18/2016    Procedure: COLONOSCOPY;  Surgeon: Rell Cordova MD;  Location: [facility] END[facility];  Service: Endoscopy;  Laterality: N/A;    COLONOSCOPY N/A 8/8/2023    Procedure: COLONOSCOPY;  Surgeon: Kaushik Pinon MD;  Location: [facility];  Service: Endoscopy;  Laterality: N/A;    COLONOSCOPY N/A 8/22/2023    Procedure: COLONOSCOPY (tattoo of colon ca);  Surgeon: Kaushik Pinon MD;  Location: [facility];  Service: Endoscopy;  Laterality: N/A;    ESOPHAGOGASTRODUODENOSCOPY N/A 8/3/2023    Procedure: EGD (ESOPHAGOGASTRODUODENOSCOPY);  Surgeon: Kaushik Pinon MD;  Location: [facility];  Service: Endoscopy;  Laterality: N/A;    HAND TENDON SURGERY Left     HEMICOLECTOMY      right    INJECTION OF ANESTHETIC AGENT AROUND MEDIAL BRANCH NERVES INNERVATING LUMBAR FACET JOINT Right 07/10/2018    Procedure: Block-nerve-medial branch-lumbar;  Surgeon: Parish Gaitan MD;  Location: [facility] OR;  Service: Pain Management;  Laterality: Right;  L3, 4, 5    INSERTION OF INFERIOR VENA CAVAL FILTER N/A 9/13/2023    Procedure: Insertion, Filter, Inferior Vena Cava;  Surgeon: Oren Verdin MD;  Location: [facility] CATH/EP LAB;  Service: General;  Laterality: N/A;    INSERTION OF TUNNELED CENTRAL VENOUS CATHETER (CVC) WITH SUBCUTANEOUS PORT Right 11/15/2023    Procedure: [id number]-N-CATH;  Surgeon: Jim Chavez MD;  Location: [facility] OR;  Service: General;  Laterality: Right;    JOINT REPLACEMENT      bilateral    RADIOFREQUENCY  "ABLATION OF LUMBAR MEDIAL BRANCH NERVE AT SINGLE LEVEL Right 07/24/2018    Procedure: RADIOFREQUENCY ABLATION, NERVE, MEDIAL BRANCH, LUMBAR, 1 LEVEL;  Surgeon: Parish Gaitan MD;  Location: [facility] OR;  Service: Pain Management;  Laterality: Right;  L3,4,5 - Burned at 80 degrees C. for 75 seconds x 2 each site    ROBOT-ASSISTED COLECTOMY N/A 9/29/2023    Procedure: ROBOTIC COLECTOMY;  Surgeon: Jim Chavez MD;  Location: [facility] OR;  Service: General;  Laterality: N/A;    SHOULDER ARTHROSCOPY Right 01/12/2017    Reverse     TONSILLECTOMY      TONSILLECTOMY, ADENOIDECTOMY, BILATERAL MYRINGOTOMY AND TUBES      TOTAL KNEE ARTHROPLASTY Bilateral 08/1998    total replacements    TUMOR REMOVAL Left     left foot as a child       Family History:   Family History   Problem Relation Age of Onset    Hypertension Mother     Kidney disease Mother     Cataracts Father     Cataracts Sister     Cancer Sister         breast    No Known Problems Brother     Cancer Sister         breast    Arthritis Sister     Glaucoma Neg Hx     Amblyopia Neg Hx     Blindness Neg Hx     Macular degeneration Neg Hx     Retinal detachment Neg Hx     Strabismus Neg Hx     Stroke Neg Hx     Thyroid disease Neg Hx        Social History:  reports that she quit smoking about 63 years ago. Her smoking use included cigarettes. She started smoking about 63 years ago. She has a 0.5 pack-year smoking history. She has never used smokeless tobacco. She reports that she does not drink alcohol and does not use drugs.    Allergies:  Review of patient's allergies indicates:   Allergen Reactions    Aspirin      Other reaction(s): Stomach upset    Aspirin Other (See Comments)     Other reaction(s): Stomach upset    Gabapentin Other (See Comments)     Pt states she felt "funny" when she took the medication. Especially at the higher dose.    Mobic [meloxicam] Swelling     Edema and elevated blood pressure     Oxaliplatin Other (See Comments)     Other reaction(s): Joint " pain  Other reaction(s): Itching  Other reaction(s): Hives  Other reaction(s): Joint pain  Other reaction(s): Itching  Other reaction(s): Hives    Pregabalin Other (See Comments)     Side effects  Side effects       Medications:  Current Outpatient Medications   Medication Sig Dispense Refill    acetaminophen (TYLENOL) 650 MG TbSR Take 650 mg by mouth every 8 (eight) hours.      alendronate (FOSAMAX) 70 MG tablet Take 1 tablet (70 mg total) by mouth every 7 days. 12 tablet 3    apixaban (ELIQUIS) 5 mg Tab Take 1 tablet (5 mg total) by mouth 2 (two) times daily. 180 tablet 3    cyclobenzaprine (FLEXERIL) 5 MG tablet Take 1 tablet (5 mg total) by mouth 3 (three) times daily as needed for Muscle spasms. 90 tablet 0    ergocalciferol, vitamin D2, (VITAMIN D ORAL) Take 2,000 mg by mouth once daily.      furosemide (LASIX) 20 MG tablet Take 1 tablet (20 mg total) by mouth daily as needed (edema). 90 tablet 3    HYDROcodone-acetaminophen (NORCO) 5-325 mg per tablet Take 1 tablet by mouth every 6 (six) hours as needed for Pain. 20 tablet 0    hydrOXYzine HCL (ATARAX) 25 MG tablet Take 1 tablet (25 mg total) by mouth 3 (three) times daily as needed for Anxiety (insomnia). 30 tablet PRN    levocetirizine (XYZAL) 5 MG tablet Take 1 tablet (5 mg total) by mouth nightly as needed for Allergies (allergies). 90 tablet PRN    levothyroxine (SYNTHROID) 75 MCG tablet Take 1 tablet (75 mcg total) by mouth once daily. 90 tablet 3    LIDOcaine-prilocaine (EMLA) cream Apply topically as needed (Chemo). 30 g 0    lisinopriL 10 MG tablet Take 1 tablet (10 mg total) by mouth once daily. 90 tablet 3    multivitamin capsule Take 1 capsule by mouth once daily.      nystatin (MYCOSTATIN) 100,000 unit/mL suspension Take 4 mLs by mouth 4 (four) times daily with meals and nightly.      omeprazole (PRILOSEC) 40 MG capsule Take 1 capsule (40 mg total) by mouth 2 (two) times daily before meals. 180 capsule PRN    ondansetron (ZOFRAN-ODT) 8 MG TbDL  Take 1 tablet (8 mg total) by mouth every 8 (eight) hours as needed (nausea). 40 tablet 3    promethazine (PHENERGAN) 12.5 MG Tab (Take 1-2 tabs every 6 hours as needed for nausea persistent despite zofran) 40 tablet 3    promethazine-dextromethorphan (PROMETHAZINE-DM) 6.25-15 mg/5 mL Syrp Take 5 mLs by mouth as needed.      traMADoL (ULTRAM) 50 mg tablet Take 1 tablet (50 mg total) by mouth every 8 (eight) hours as needed for Pain. 21 each 0    traZODone (DESYREL) 50 MG tablet Take 1 tablet (50 mg total) by mouth nightly. 90 tablet 0    triamcinolone acetonide 0.1% (KENALOG) 0.1 % cream APPLY TOPICALLY TO THE AFFECTED AREA TWICE DAILY 60 g 0    amoxicillin-clavulanate 875-125mg (AUGMENTIN) 875-125 mg per tablet Take 1 tablet by mouth 2 (two) times daily. for 7 days 14 tablet 0    guaiFENesin (MUCINEX) 600 mg 12 hr tablet Take 2 tablets (1,200 mg total) by mouth 2 (two) times daily. for 7 days 28 tablet 0     No current facility-administered medications for this visit.     Facility-Administered Medications Ordered in Other Visits   Medication Dose Route Frequency Provider Last Rate Last Admin    0.9%  NaCl infusion   Intravenous Once Oren Verdin MD           Review of Systems   Constitutional:  Negative for chills, fatigue, fever and unexpected weight change.   HENT:  Positive for congestion.    Respiratory:  Positive for cough (productive). Negative for shortness of breath.    Cardiovascular:  Negative for chest pain and palpitations.   Gastrointestinal:  Negative for abdominal pain, constipation, diarrhea, nausea and vomiting.   Skin:  Negative for rash.   Neurological:  Negative for headaches.   Hematological:  Negative for adenopathy. Does not bruise/bleed easily.       ECOG Performance Status: 2   Objective:      Vitals:   Vitals:    02/19/24 0827   BP: 116/62   BP Location: Left arm   Patient Position: Sitting   BP Method: Large (Manual)   Pulse: 89   Resp: (!) 22   Temp: 96.8 °F (36 °C)   TempSrc:  "Temporal   SpO2: 97%   Weight: 90 kg (198 lb 6.6 oz)   Height: 5' 4" (1.626 m)                   Physical Exam  Constitutional:       General: She is not in acute distress.     Appearance: She is well-developed. She is not diaphoretic.   HENT:      Head: Normocephalic and atraumatic.   Cardiovascular:      Rate and Rhythm: Normal rate and regular rhythm.      Heart sounds: Normal heart sounds. No murmur heard.     No friction rub. No gallop.   Pulmonary:      Effort: Pulmonary effort is normal. No respiratory distress.      Breath sounds: Wheezing present. No rales.   Chest:      Chest wall: No tenderness.   Abdominal:      General: Bowel sounds are normal. There is no distension.      Palpations: Abdomen is soft. There is no mass.      Tenderness: There is no abdominal tenderness. There is no guarding or rebound.   Lymphadenopathy:      Cervical: No cervical adenopathy.      Upper Body:      Right upper body: No supraclavicular or axillary adenopathy.      Left upper body: No supraclavicular or axillary adenopathy.   Skin:     Findings: No erythema or rash.   Neurological:      Mental Status: She is alert and oriented to person, place, and time.   Psychiatric:         Behavior: Behavior normal.         Laboratory Data:  Lab Visit on 02/19/2024   Component Date Value Ref Range Status    WBC 02/19/2024 9.55  3.90 - 12.70 K/uL Final    RBC 02/19/2024 3.62 (L)  4.00 - 5.40 M/uL Final    Hemoglobin 02/19/2024 11.2 (L)  12.0 - 16.0 g/dL Final    Hematocrit 02/19/2024 33.6 (L)  37.0 - 48.5 % Final    MCV 02/19/2024 93  82 - 98 fL Final    MCH 02/19/2024 30.9  27.0 - 31.0 pg Final    MCHC 02/19/2024 33.3  32.0 - 36.0 g/dL Final    RDW 02/19/2024 17.1 (H)  11.5 - 14.5 % Final    Platelets 02/19/2024 161  150 - 450 K/uL Final    MPV 02/19/2024 9.7  9.2 - 12.9 fL Final    Immature Granulocytes 02/19/2024 1.3 (H)  0.0 - 0.5 % Final    Gran # (ANC) 02/19/2024 6.9  1.8 - 7.7 K/uL Final    Immature Grans (Abs) 02/19/2024 0.12 (H) "  0.00 - 0.04 K/uL Final    Comment: Mild elevation in immature granulocytes is non specific and   can be seen in a variety of conditions including stress response,   acute inflammation, trauma and pregnancy. Correlation with other   laboratory and clinical findings is essential.      Lymph # 02/19/2024 1.1  1.0 - 4.8 K/uL Final    Mono # 02/19/2024 1.4 (H)  0.3 - 1.0 K/uL Final    Eos # 02/19/2024 0.1  0.0 - 0.5 K/uL Final    Baso # 02/19/2024 0.02  0.00 - 0.20 K/uL Final    nRBC 02/19/2024 0  0 /100 WBC Final    Gran % 02/19/2024 72.5  38.0 - 73.0 % Final    Lymph % 02/19/2024 11.2 (L)  18.0 - 48.0 % Final    Mono % 02/19/2024 14.2  4.0 - 15.0 % Final    Eosinophil % 02/19/2024 0.6  0.0 - 8.0 % Final    Basophil % 02/19/2024 0.2  0.0 - 1.9 % Final    Differential Method 02/19/2024 Automated   Final    Sodium 02/19/2024 137  136 - 145 mmol/L Final    Potassium 02/19/2024 4.4  3.5 - 5.1 mmol/L Final    Chloride 02/19/2024 106  95 - 110 mmol/L Final    CO2 02/19/2024 21 (L)  23 - 29 mmol/L Final    Glucose 02/19/2024 121 (H)  70 - 110 mg/dL Final    BUN 02/19/2024 15  8 - 23 mg/dL Final    Creatinine 02/19/2024 1.1  0.5 - 1.4 mg/dL Final    Calcium 02/19/2024 9.0  8.7 - 10.5 mg/dL Final    Total Protein 02/19/2024 6.9  6.0 - 8.4 g/dL Final    Albumin 02/19/2024 3.4 (L)  3.5 - 5.2 g/dL Final    Total Bilirubin 02/19/2024 1.9 (H)  0.1 - 1.0 mg/dL Final    Comment: For infants and newborns, interpretation of results should be based  on gestational age, weight and in agreement with clinical  observations.    Premature Infant recommended reference ranges:  Up to 24 hours.............<8.0 mg/dL  Up to 48 hours............<12.0 mg/dL  3-5 days..................<15.0 mg/dL  6-29 days.................<15.0 mg/dL      Alkaline Phosphatase 02/19/2024 66  55 - 135 U/L Final    AST 02/19/2024 13  10 - 40 U/L Final    ALT 02/19/2024 13  10 - 44 U/L Final    eGFR 02/19/2024 50.5 (A)  >60 mL/min/1.73 m^2 Final    Anion Gap 02/19/2024  10  8 - 16 mmol/L Final         Imaging:     PET/CT 11/08/23  Neck: There is moderate brown fat uptake at the base of the neck, normal variant. No pathologic activity is present. No cervical adenopathy is noted.     Chest: There is no significant abnormal activity in the chest. There is fairly extensive postsurgical uptake in the soft tissues adjacent to the right shoulder in this patient status post reverse right shoulder arthroplasty.     There is no mediastinal nor axillary adenopathy. There is dilatation of the main pulmonary artery compared to the aorta, a finding associated with pulmonary arterial hypertension. Calcific plaque is present in the coronary arteries. There are no pleural effusions.     There is a 5 mm nodule in the left apex unchanged series 5, image 117. This is stable compared to CT from 2007. There is also a calcified granuloma in the left upper lobe. Bands of scarring or atelectasis are seen bilaterally. 6 mm nodule anterior inferior right upper lobe unchanged since recent comparison exam series 5, image 159; 17 mm ground-glass opacity lateral right middle lobe unchanged since the 08/25/2023 series 5, image 168. There is a stable 4 mm nodule just above the diaphragm in the right lower lobe series 5, image 205, unchanged since 2007.     Abdomen/pelvis: There is uptake along the midline surgical scar in the anterior abdominal wall. Otherwise, no sites of abnormal activity are present. Specifically, there is no definite abnormal activity associated with the mass at the lower pole of the left kidney. This is noted to have a density consistent with fat on series 3, image 157, and the possibility of an angiomyolipoma is raised. Follow-up with MRI or with a multi phasic CT with and without IV contrast would be helpful for better characterization. The hyperechoic appearance on ultrasound of 08/25/2023 suggests an angiomyolipoma.     Limited noncontrast CT images of the liver, spleen, adrenal glands,  pancreas and right kidney are grossly normal. There are gallstones. No abnormal activity is present in the gallbladder. The aorta is normal in caliber with scattered calcific plaque. There is an IVC filter.     The urinary bladder is collapsed. The uterus is atrophic or absent.     There has been partial colectomy with anastomosis in the right lower quadrant. A small amount of ascites is present. There is a small midline fat-containing ventral hernia. No adenopathy nor peritoneal implant is noted.     Bones: There are extensive degenerative changes in the spine. No findings to indicate metastatic disease to bone.    MRI Abdomen 11/20/23  Unremarkable appearance. No masses     Cholelithiasis with large stone within the gallbladder without findings of acute cholecystitis or biliary duct dilatation     Spleen not enlarged     Adrenal glands unremarkable appearance     Pancreas unremarkable appearance     Abdominal aorta no aneurysm     Artifact in region of IVC suggesting IVC filter.     Postoperative changes anterior abdominal wall and fat containing supraumbilical ventral hernia.     Right kidney; unremarkable appearance of the right kidney. No mass.     Kidney; 2 cm heterogeneous fat containing enhancing mass in the lower pole. Presence of fat strongly suggest angiomyolipoma. Can be seen although rarely with renal cell carcinoma.       Assessment:       1. Malignant neoplasm of splenic flexure    2. Bacterial sinusitis    3. Cough, unspecified type    4. Immunodeficiency due to chemotherapy    5. Renal mass, left    6. Pulmonary nodules    7. Thrombophilia    8. Iron deficiency anemia, unspecified iron deficiency anemia type                       Plan:     Colon Cancer - Pt with h/o colon cancer s/p resection in 2007 followed by adjuvant FOLFOX for 12 cycles  -Pt recently with resection of transverse colon and splenic flexure 9/29/23 showing path showing invasive moderately differentiated adenocarcinoma with  mucinous features, 33 benign lymph nodes, positive lymphovascular invasion, positive perineural invasion pT3N0  -Tumor shows intact expression of MLH1, PMS2, MSH2, MSH6  -Patient was positive for B Celio V600E mutation  -Pt has high risk stage II colon cancer given positive LVI and PNI  -PT is a candidate for treatment with 6 months of 5-FU or Capecitabine  -no clear evidence of metastatic disease on PET-CT 11/08/2023   -Will proceed with 6 months of 5 fluorouracil  -Pt s/p cycle 5  -Pharmacogenomic panel on 11/03/23 showed normal DPYD metabolizer but 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    Immunodeficiency due to chemo - pt at increased risk of infection  -concern for infection  -Will monitor    URI - pt two weeks with congestion and cough with wheezing on exam  -Pt out of window for treatment of COVID or Flu  -Centor criteria score -1 indicating unlikely to be strep  -Will treat with Agumentin BID for 1 week    Renal Mass - Pt with a mass on the left kidney seen on CT abdomen 8/17/23 and US abdomen 8/25/23  -Pt saw Urology [facility] under the supervision of Dr. 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 Pt will not get treatment today due to infection.  Pt needs a CXR today.  PT needs a CBC, CMP, CEa in 1 week with appt with me and treatment.   Follow up with GRETCHEN    Infusion scheduling note    Injection scheduling note    Labs    Imaging    Pharmacy appointment    Other referrals              Treatment Plan Information   OP COLORECTAL FLUOROURACIL LEUCOVORIN Q2W (MOD DE GRAMONT)   Mahesh Cameron MD   Upcoming Treatment Dates - OP COLORECTAL FLUOROURACIL LEUCOVORIN Q2W (MOD DE GRAMONT)    2/18/2024       Chemotherapy       leucovorin calcium in dextrose 5 % (D5W) 250 mL infusion       fluorouraciL injection       fluorouracil chemo infusion       Antiemetics       ondansetron injection 8 mg  3/3/2024       Chemotherapy       leucovorin calcium in dextrose 5 % (D5W) 250 mL infusion       fluorouraciL injection       fluorouracil chemo infusion       Antiemetics       ondansetron injection 8 mg  3/17/2024       Chemotherapy       leucovorin calcium in dextrose 5 % (D5W) 250 mL infusion       fluorouraciL injection       fluorouracil chemo infusion       Antiemetics       ondansetron injection 8 mg  3/31/2024       Chemotherapy       leucovorin calcium in dextrose 5 % (D5W) 250 mL infusion       fluorouraciL injection       fluorouracil chemo infusion       Antiemetics       ondansetron injection 8 mg    Supportive Plan Information  OP FERRIC CARBOXYMALTOSE Q2W   Mahesh Cameron MD   Upcoming Treatment Dates - OP FERRIC CARBOXYMALTOSE Q2W    1/25/2024       Supportive Care       ferric carboxymaltose (INJECTAFER) 750 mg in sodium chloride 0.9% 265 mL IVPB (ready to mix)      Mahesh Cameron MD  [facility]  Hematology and Oncology  [facility] [address] [place] [address]   O: [phone]  F: [phone]         Bcc Histology Text: There were numerous aggregates of basaloid cells.

## 2024-02-19 ENCOUNTER — HOSPITAL ENCOUNTER (OUTPATIENT)
Dept: RADIOLOGY | Facility: HOSPITAL | Age: 84
Discharge: HOME | End: 2024-02-19
Attending: INTERNAL MEDICINE
Payer: MEDICARE

## 2024-02-19 ENCOUNTER — OFFICE VISIT (OUTPATIENT)
Dept: HEMATOLOGY/ONCOLOGY | Facility: CLINIC | Age: 84
End: 2024-02-19
Payer: MEDICARE

## 2024-02-19 VITALS
SYSTOLIC BLOOD PRESSURE: 116 MMHG | HEIGHT: 64 IN | DIASTOLIC BLOOD PRESSURE: 62 MMHG | BODY MASS INDEX: 33.88 KG/M2 | WEIGHT: 198.44 LBS | RESPIRATION RATE: 22 BRPM | OXYGEN SATURATION: 97 % | HEART RATE: 89 BPM | TEMPERATURE: 97 F

## 2024-02-19 DIAGNOSIS — T45.1X5A IMMUNODEFICIENCY DUE TO CHEMOTHERAPY: ICD-10-CM

## 2024-02-19 DIAGNOSIS — C18.5 MALIGNANT NEOPLASM OF SPLENIC FLEXURE: Primary | ICD-10-CM

## 2024-02-19 DIAGNOSIS — N28.89 RENAL MASS, LEFT: ICD-10-CM

## 2024-02-19 DIAGNOSIS — D50.9 IRON DEFICIENCY ANEMIA, UNSPECIFIED IRON DEFICIENCY ANEMIA TYPE: ICD-10-CM

## 2024-02-19 DIAGNOSIS — D68.59 THROMBOPHILIA: ICD-10-CM

## 2024-02-19 DIAGNOSIS — B96.89 BACTERIAL SINUSITIS: ICD-10-CM

## 2024-02-19 DIAGNOSIS — R05.9 COUGH, UNSPECIFIED TYPE: ICD-10-CM

## 2024-02-19 DIAGNOSIS — Z79.899 IMMUNODEFICIENCY DUE TO CHEMOTHERAPY: ICD-10-CM

## 2024-02-19 DIAGNOSIS — R91.8 PULMONARY NODULES: ICD-10-CM

## 2024-02-19 DIAGNOSIS — D84.821 IMMUNODEFICIENCY DUE TO CHEMOTHERAPY: ICD-10-CM

## 2024-02-19 DIAGNOSIS — J32.9 BACTERIAL SINUSITIS: ICD-10-CM

## 2024-02-19 PROCEDURE — 71046 X-RAY EXAM CHEST 2 VIEWS: CPT | Mod: TC,FY,PO

## 2024-02-19 PROCEDURE — 71046 X-RAY EXAM CHEST 2 VIEWS: CPT | Mod: 26,,, | Performed by: RADIOLOGY

## 2024-02-19 PROCEDURE — 99215 OFFICE O/P EST HI 40 MIN: CPT | Mod: S$GLB,,, | Performed by: INTERNAL MEDICINE

## 2024-02-19 PROCEDURE — 99999 PR PBB SHADOW E&M-EST. PATIENT-LVL IV: CPT | Mod: PBBFAC,,, | Performed by: INTERNAL MEDICINE

## 2024-02-19 PROCEDURE — G2211 COMPLEX E/M VISIT ADD ON: HCPCS | Mod: S$GLB,,, | Performed by: INTERNAL MEDICINE

## 2024-02-19 RX ORDER — NYSTATIN 100000 [USP'U]/ML
4 SUSPENSION ORAL
COMMUNITY

## 2024-02-19 RX ORDER — PROMETHAZINE HYDROCHLORIDE AND DEXTROMETHORPHAN HYDROBROMIDE 6.25; 15 MG/5ML; MG/5ML
5 SYRUP ORAL
COMMUNITY
End: 2024-05-20 | Stop reason: SDUPTHER

## 2024-02-19 RX ORDER — AMOXICILLIN AND CLAVULANATE POTASSIUM 875; 125 MG/1; MG/1
1 TABLET, FILM COATED ORAL 2 TIMES DAILY
Qty: 14 TABLET | Refills: 0 | Status: SHIPPED | OUTPATIENT
Start: 2024-02-19 | End: 2024-02-26 | Stop reason: ALTCHOICE

## 2024-02-19 RX ORDER — GUAIFENESIN 600 MG/1
1200 TABLET, EXTENDED RELEASE ORAL 2 TIMES DAILY
Qty: 28 TABLET | Refills: 0 | Status: SHIPPED | OUTPATIENT
Start: 2024-02-19 | End: 2024-02-26

## 2024-02-23 ENCOUNTER — HOSPITAL ENCOUNTER (OUTPATIENT)
Dept: RADIOLOGY | Facility: HOSPITAL | Age: 84
Discharge: HOME | End: 2024-02-23
Attending: INTERNAL MEDICINE
Payer: MEDICARE

## 2024-02-23 DIAGNOSIS — C18.5 MALIGNANT NEOPLASM OF SPLENIC FLEXURE: ICD-10-CM

## 2024-02-23 PROCEDURE — 74177 CT ABD & PELVIS W/CONTRAST: CPT | Mod: TC,PO

## 2024-02-23 PROCEDURE — 74177 CT ABD & PELVIS W/CONTRAST: CPT | Mod: 26,,, | Performed by: RADIOLOGY

## 2024-02-23 PROCEDURE — 25500020 PHARM REV CODE 255: Mod: PO | Performed by: INTERNAL MEDICINE

## 2024-02-23 PROCEDURE — 71260 CT THORAX DX C+: CPT | Mod: 26,,, | Performed by: RADIOLOGY

## 2024-02-23 PROCEDURE — A9698 NON-RAD CONTRAST MATERIALNOC: HCPCS | Mod: PO | Performed by: INTERNAL MEDICINE

## 2024-02-23 RX ADMIN — IOHEXOL 100 ML: 350 INJECTION, SOLUTION INTRAVENOUS at 09:02

## 2024-02-23 RX ADMIN — IOHEXOL 1000 ML: 9 SOLUTION ORAL at 09:02

## 2024-02-25 NOTE — PROGRESS NOTES
PATIENT: Danna Sorensen  MRN: [id number]  DATE: 2/26/2024      Diagnosis:   1. Malignant neoplasm of splenic flexure    2. Pneumonia of left lower lobe due to infectious organism    3. Immunodeficiency due to chemotherapy    4. Renal mass, left    5. Pulmonary nodules    6. Thrombophilia    7. Iron deficiency anemia, unspecified iron deficiency anemia type                      Chief Complaint: Malignant neoplasm of splenic flexure (1 week f/u)      Oncologic History:      Oncologic History     Oncologic Treatment     Pathology           Subjective:    Interval History: Ms. Sorensen is a 83 y.o. female with Pulmonary embolism, carpal tunnel, CAD, HTN, hypothyroidism, osteoporosis, osteoarthritis, who presents for colon cancer.  Since the last clinic visit the patient underwent a chest x-ray on 02/19/2024 showing no sign of pneumonia.  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  The patient states she is feeling much better.  She denies fever.  She endorses mild congestion and cough.  The patient denies CP, SOB, abdominal pain, nausea, vomiting, constipation, diarrhea.  The patient denies fever, chills, night sweats, weight loss, new lumps or bumps, easy bruising or bleeding.    Prior History:  Pt was previously diagnosed with Stage III adenocarcinoma of the colon in 2007 and underwent 12 cycles of FOLFOX.  Pt underwent colonoscopy on 8/08/23 showing 1 7 mm polyp in the rectum; altered vascular, congested, eroded, friable and ulcerated mucosa in the transverse colon which was biopsied; patent and to side ileocolonic anastomosis.  Pathology showed moderately differentiated adenocarcinoma. CT abdomen and pelvis 8/17/23 showing irregular  appearance of the gallbladder; heterogeneously enhancing lesion in the inferior pole of the left kidney measuring 1.8 cm; short segment of concentric bowel wall thickening of the colon at the splenic flexure with surrounding mesenteric fat stranding with nodular thickness of 2.2 cm.  The patient underwent colonoscopy on 08/22/2023 showing nonbleeding internal hemorrhoids, partially obstructing tumor in the transverse colon which was tattooed.  Pt saw Urology 8/23/23 with plans for re-imaging the renal lesion in 3 months with an MRI.  US abdomen 8/25/23 showed a 2.5 cm solid mass at the lower pole of the left kidney suspicious for neoplasm.  CT chest 8/25/23 showed 4 mm left upper lobe pulmonary nodule, 4 mm calcified granuloma left upper lobe, 2 mm pulmonary nodule in the left upper lobe, 9 x 9 mm triangular nodule along the juxtapleural right middle lobe, 2 mm pulmonary nodule in the right middle lobe, and a partially imaged masslike density in the splenic flexure of the colon. Pt then underwent resection of the transverse colon and splenic flexure with path showing invasive moderately differentiated adenocarcinoma with mucinous features, 33 benign lymph nodes, positive lymphovascular invasion, positive perineural invasion pT3N0.  Tumor shows intact expression of MLH1, PMS2, MSH2, MSH6.  Patient was positive for B Celio V600E mutation.   The patient was discussed at tumor Board on 11/07/2023 with recommendation for PET-CT with biopsy of lung nodules if positive.  PET-CT on 11/08/2023 showed evidence of pulmonary hypertension; stable 5 mm nodule left lung apex; calcified granuloma left upper lobe; stable 6 mm inferior right upper lobe nodule; stable 17 mm ground-glass opacity lateral right middle lobe; stable 4 mm nodule in the right lower lobe of the diaphragm; no activity in the lesion in the left kidney.  MRI abdomen 11/20/2023 showed heterogeneous fat containing enhancing mass in the lower pole of the left kidney  suggestive of an angio myelolipoma.    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    Strabismus     Trouble in sleeping     Wears dentures     Uppers       Past Surgical HIstory:   Past Surgical History:   Procedure Laterality Date    CARDIAC SURGERY      cardiac cath    COLON SURGERY      colon resection    COLONOSCOPY N/A 10/18/2016    Procedure: COLONOSCOPY;  Surgeon: Rell Cordova MD;  Location: [facility];  Service: Endoscopy;  Laterality: N/A;    COLONOSCOPY N/A 8/8/2023    Procedure: COLONOSCOPY;  Surgeon: Kaushik Pinon MD;  Location: [facility];  Service: Endoscopy;  Laterality: N/A;    COLONOSCOPY N/A 8/22/2023    Procedure: COLONOSCOPY (tattoo of colon ca);  Surgeon: Kaushik Pinon MD;  Location: [facility];  Service: Endoscopy;  Laterality: N/A;    ESOPHAGOGASTRODUODENOSCOPY N/A 8/3/2023    Procedure: EGD (ESOPHAGOGASTRODUODENOSCOPY);  Surgeon: Kaushik Pinon MD;  Location: [facility];  Service: Endoscopy;  Laterality: N/A;    HAND TENDON SURGERY Left     HEMICOLECTOMY      right    INJECTION OF ANESTHETIC AGENT AROUND MEDIAL BRANCH NERVES INNERVATING LUMBAR FACET JOINT Right 07/10/2018    Procedure: Block-nerve-medial branch-lumbar;  Surgeon: Parish Gaitan MD;  Location: [facility];  Service: Pain Management;  Laterality: Right;  L3, 4, 5    INSERTION OF INFERIOR VENA CAVAL FILTER N/A 9/13/2023    Procedure:  Insertion, Filter, Inferior Vena Cava;  Surgeon: Oren Verdin MD;  Location: [facility] CATH/EP LAB;  Service: General;  Laterality: N/A;    INSERTION OF TUNNELED CENTRAL VENOUS CATHETER (CVC) WITH SUBCUTANEOUS PORT Right 11/15/2023    Procedure: [id number];  Surgeon: Jim Chavez MD;  Location: [facility] OR;  Service: General;  Laterality: Right;    JOINT REPLACEMENT      bilateral    RADIOFREQUENCY ABLATION OF LUMBAR MEDIAL BRANCH NERVE AT SINGLE LEVEL Right 07/24/2018    Procedure: RADIOFREQUENCY ABLATION, NERVE, MEDIAL BRANCH, LUMBAR, 1 LEVEL;  Surgeon: Parish Gaitan MD;  Location: [facility] OR;  Service: Pain Management;  Laterality: Right;  L3,4,5 - Burned at 80 degrees C. for 75 seconds x 2 each site    ROBOT-ASSISTED COLECTOMY N/A 9/29/2023    Procedure: ROBOTIC COLECTOMY;  Surgeon: Jim Chavez MD;  Location: [facility] OR;  Service: General;  Laterality: N/A;    SHOULDER ARTHROSCOPY Right 01/12/2017    Reverse     TONSILLECTOMY      TONSILLECTOMY, ADENOIDECTOMY, BILATERAL MYRINGOTOMY AND TUBES      TOTAL KNEE ARTHROPLASTY Bilateral 08/1998    total replacements    TUMOR REMOVAL Left     left foot as a child       Family History:   Family History   Problem Relation Age of Onset    Hypertension Mother     Kidney disease Mother     Cataracts Father     Cataracts Sister     Cancer Sister         breast    No Known Problems Brother     Cancer Sister         breast    Arthritis Sister     Glaucoma Neg Hx     Amblyopia Neg Hx     Blindness Neg Hx     Macular degeneration Neg Hx     Retinal detachment Neg Hx     Strabismus Neg Hx     Stroke Neg Hx     Thyroid disease Neg Hx        Social History:  reports that she quit smoking about 63 years ago. Her smoking use included cigarettes. She started smoking about 63 years ago. She has a 0.5 pack-year smoking history. She has never used smokeless tobacco. She reports that she does not drink alcohol and does not use drugs.    Allergies:  Review of patient's allergies  "indicates:   Allergen Reactions    Aspirin      Other reaction(s): Stomach upset    Aspirin Other (See Comments)     Other reaction(s): Stomach upset    Gabapentin Other (See Comments)     Pt states she felt "funny" when she took the medication. Especially at the higher dose.    Mobic [meloxicam] Swelling     Edema and elevated blood pressure     Oxaliplatin Other (See Comments)     Other reaction(s): Joint pain  Other reaction(s): Itching  Other reaction(s): Hives  Other reaction(s): Joint pain  Other reaction(s): Itching  Other reaction(s): Hives    Pregabalin Other (See Comments)     Side effects  Side effects       Medications:  Current Outpatient Medications   Medication Sig Dispense Refill    acetaminophen (TYLENOL) 650 MG TbSR Take 650 mg by mouth every 8 (eight) hours.      alendronate (FOSAMAX) 70 MG tablet Take 1 tablet (70 mg total) by mouth every 7 days. 12 tablet 3    apixaban (ELIQUIS) 5 mg Tab Take 1 tablet (5 mg total) by mouth 2 (two) times daily. 180 tablet 3    cyclobenzaprine (FLEXERIL) 5 MG tablet Take 1 tablet (5 mg total) by mouth 3 (three) times daily as needed for Muscle spasms. 90 tablet 0    ergocalciferol, vitamin D2, (VITAMIN D ORAL) Take 2,000 mg by mouth once daily.      furosemide (LASIX) 20 MG tablet Take 1 tablet (20 mg total) by mouth daily as needed (edema). 90 tablet 3    guaiFENesin (MUCINEX) 600 mg 12 hr tablet Take 2 tablets (1,200 mg total) by mouth 2 (two) times daily. for 7 days 28 tablet 0    HYDROcodone-acetaminophen (NORCO) 5-325 mg per tablet Take 1 tablet by mouth every 6 (six) hours as needed for Pain. 20 tablet 0    hydrOXYzine HCL (ATARAX) 25 MG tablet Take 1 tablet (25 mg total) by mouth 3 (three) times daily as needed for Anxiety (insomnia). 30 tablet PRN    levocetirizine (XYZAL) 5 MG tablet Take 1 tablet (5 mg total) by mouth nightly as needed for Allergies (allergies). 90 tablet PRN    levothyroxine (SYNTHROID) 75 MCG tablet Take 1 tablet (75 mcg total) by " mouth once daily. 90 tablet 3    LIDOcaine-prilocaine (EMLA) cream Apply topically as needed (Chemo). 30 g 0    lisinopriL 10 MG tablet Take 1 tablet (10 mg total) by mouth once daily. 90 tablet 3    multivitamin capsule Take 1 capsule by mouth once daily.      nystatin (MYCOSTATIN) 100,000 unit/mL suspension Take 4 mLs by mouth 4 (four) times daily with meals and nightly.      omeprazole (PRILOSEC) 40 MG capsule Take 1 capsule (40 mg total) by mouth 2 (two) times daily before meals. 180 capsule PRN    ondansetron (ZOFRAN-ODT) 8 MG TbDL Take 1 tablet (8 mg total) by mouth every 8 (eight) hours as needed (nausea). 40 tablet 3    promethazine (PHENERGAN) 12.5 MG Tab (Take 1-2 tabs every 6 hours as needed for nausea persistent despite zofran) 40 tablet 3    promethazine-dextromethorphan (PROMETHAZINE-DM) 6.25-15 mg/5 mL Syrp Take 5 mLs by mouth as needed.      traMADoL (ULTRAM) 50 mg tablet Take 1 tablet (50 mg total) by mouth every 8 (eight) hours as needed for Pain. 21 each 0    traZODone (DESYREL) 50 MG tablet Take 1 tablet (50 mg total) by mouth nightly. 90 tablet 0    triamcinolone acetonide 0.1% (KENALOG) 0.1 % cream APPLY TOPICALLY TO THE AFFECTED AREA TWICE DAILY 60 g 0     No current facility-administered medications for this visit.     Facility-Administered Medications Ordered in Other Visits   Medication Dose Route Frequency Provider Last Rate Last Admin    0.9%  NaCl infusion   Intravenous Once Oren Verdin MD        diphenhydrAMINE injection 50 mg  50 mg Intravenous Once PRN Mahesh Cameron MD        EPINEPHrine (EPIPEN) 0.3 mg/0.3 mL pen injection 0.3 mg  0.3 mg Intramuscular Once PRN Mahesh Cameron MD        fluorouracil (ADRUCIL) 2,400 mg/m2 = 4,920 mg in sodium chloride 0.9% 100 mL chemo infusion  2,400 mg/m2 Intravenous over 46 hr Mahesh Cameron MD   4,920 mg at 02/26/24 1158    hydrocortisone sodium succinate injection 100 mg  100 mg Intravenous Once PRN Mahesh Cameron MD         "prochlorperazine injection Soln 5 mg  5 mg Intravenous Once PRN Mahesh Cameron MD        sodium chloride 0.9% 100 mL flush bag   Intravenous 1 time in Clinic/HOD Mahesh Cameron MD        sodium chloride 0.9% flush 10 mL  10 mL Intravenous PRN Mahesh Cameron MD           Review of Systems   Constitutional:  Negative for chills, fatigue, fever and unexpected weight change.   HENT:  Positive for congestion.    Respiratory:  Positive for cough. Negative for shortness of breath.    Cardiovascular:  Negative for chest pain and palpitations.   Gastrointestinal:  Negative for abdominal pain, constipation, diarrhea, nausea and vomiting.   Skin:  Negative for rash.   Neurological:  Negative for headaches.   Hematological:  Negative for adenopathy. Does not bruise/bleed easily.       ECOG Performance Status: 2   Objective:      Vitals:   Vitals:    02/26/24 0853   BP: 118/70   BP Location: Right arm   Patient Position: Sitting   BP Method: Large (Manual)   Pulse: 66   Resp: 20   Temp: 96.8 °F (36 °C)   TempSrc: Temporal   SpO2: 98%   Weight: 92.6 kg (204 lb 2.3 oz)   Height: 5' 4" (1.626 m)                     Physical Exam  Constitutional:       General: She is not in acute distress.     Appearance: She is well-developed. She is not diaphoretic.   HENT:      Head: Normocephalic and atraumatic.   Cardiovascular:      Rate and Rhythm: Normal rate and regular rhythm.      Heart sounds: Normal heart sounds. No murmur heard.     No friction rub. No gallop.   Pulmonary:      Effort: Pulmonary effort is normal. No respiratory distress.      Breath sounds: No wheezing or rales.   Chest:      Chest wall: No tenderness.   Abdominal:      General: Bowel sounds are normal. There is no distension.      Palpations: Abdomen is soft. There is no mass.      Tenderness: There is no abdominal tenderness. There is no guarding or rebound.   Lymphadenopathy:      Cervical: No cervical adenopathy.      Upper Body:      Right upper body: No " supraclavicular or axillary adenopathy.      Left upper body: No supraclavicular or axillary adenopathy.   Skin:     Findings: No erythema or rash.   Neurological:      Mental Status: She is alert and oriented to person, place, and time.   Psychiatric:         Behavior: Behavior normal.         Laboratory Data:  Lab Visit on 02/23/2024   Component Date Value Ref Range Status    WBC 02/23/2024 7.30  3.90 - 12.70 K/uL Final    RBC 02/23/2024 3.79 (L)  4.00 - 5.40 M/uL Final    Hemoglobin 02/23/2024 11.8 (L)  12.0 - 16.0 g/dL Final    Hematocrit 02/23/2024 35.6 (L)  37.0 - 48.5 % Final    MCV 02/23/2024 94  82 - 98 fL Final    MCH 02/23/2024 31.1 (H)  27.0 - 31.0 pg Final    MCHC 02/23/2024 33.1  32.0 - 36.0 g/dL Final    RDW 02/23/2024 17.1 (H)  11.5 - 14.5 % Final    Platelets 02/23/2024 173  150 - 450 K/uL Final    MPV 02/23/2024 10.2  9.2 - 12.9 fL Final    Immature Granulocytes 02/23/2024 3.7 (H)  0.0 - 0.5 % Final    Gran # (ANC) 02/23/2024 5.1  1.8 - 7.7 K/uL Final    Immature Grans (Abs) 02/23/2024 0.27 (H)  0.00 - 0.04 K/uL Final    Comment: Mild elevation in immature granulocytes is non specific and   can be seen in a variety of conditions including stress response,   acute inflammation, trauma and pregnancy. Correlation with other   laboratory and clinical findings is essential.      Lymph # 02/23/2024 1.2  1.0 - 4.8 K/uL Final    Mono # 02/23/2024 0.6  0.3 - 1.0 K/uL Final    Eos # 02/23/2024 0.1  0.0 - 0.5 K/uL Final    Baso # 02/23/2024 0.04  0.00 - 0.20 K/uL Final    nRBC 02/23/2024 0  0 /100 WBC Final    Gran % 02/23/2024 70.4  38.0 - 73.0 % Final    Lymph % 02/23/2024 16.0 (L)  18.0 - 48.0 % Final    Mono % 02/23/2024 7.9  4.0 - 15.0 % Final    Eosinophil % 02/23/2024 1.5  0.0 - 8.0 % Final    Basophil % 02/23/2024 0.5  0.0 - 1.9 % Final    Differential Method 02/23/2024 Automated   Final    Sodium 02/23/2024 136  136 - 145 mmol/L Final    Potassium 02/23/2024 4.8  3.5 - 5.1 mmol/L Final    Chloride  02/23/2024 105  95 - 110 mmol/L Final    CO2 02/23/2024 20 (L)  23 - 29 mmol/L Final    Glucose 02/23/2024 94  70 - 110 mg/dL Final    BUN 02/23/2024 13  8 - 23 mg/dL Final    Creatinine 02/23/2024 0.9  0.5 - 1.4 mg/dL Final    Calcium 02/23/2024 9.5  8.7 - 10.5 mg/dL Final    Total Protein 02/23/2024 7.2  6.0 - 8.4 g/dL Final    Albumin 02/23/2024 3.5  3.5 - 5.2 g/dL Final    Total Bilirubin 02/23/2024 0.6  0.1 - 1.0 mg/dL Final    Comment: For infants and newborns, interpretation of results should be based  on gestational age, weight and in agreement with clinical  observations.    Premature Infant recommended reference ranges:  Up to 24 hours.............<8.0 mg/dL  Up to 48 hours............<12.0 mg/dL  3-5 days..................<15.0 mg/dL  6-29 days.................<15.0 mg/dL      Alkaline Phosphatase 02/23/2024 68  55 - 135 U/L Final    AST 02/23/2024 13  10 - 40 U/L Final    ALT 02/23/2024 12  10 - 44 U/L Final    eGFR 02/23/2024 >60.0  >60 mL/min/1.73 m^2 Final    Anion Gap 02/23/2024 11  8 - 16 mmol/L Final    CEA 02/23/2024 1.7  0.0 - 5.0 ng/mL Final    Comment: CEA Normal Range:  Non-Smokers: 0-3.0 ng/mL  Smokers:     0-5.0 ng/mL    The testing method is a chemiluminescent microparticle immunoassay   manufactured by [employer] and performed on the [employer]   or   Peela system. Values obtained with different assay manufacturers   for   methods may be different and cannot be used interchangeably.           Imaging:     CT C/A/P 2/23/24    Chest:     There is a new nodule or lymph node in the left pulmonary hilum extending into the left lower lobe best visualized on series 2, image 49. The nodule measures 1.2 x 1.0 cm and is associated with an infiltrate extending into the superior segment of the left lower lobe. A noncalcified 5 mm nodule is present in the left upper lobe which is unchanged when compared to prior CTs dating back to 2007. A 5 mm calcified granuloma is also present in the  left upper lobe. No pleural or pericardial effusion are present. Heart size is normal. The thoracic inlet and axillary regions are unremarkable. A vascular access catheter enters from a right subclavian approach with the tip in the superior vena cava.     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 The kidneys are otherwise unremarkable. The liver, spleen, pancreas, and adrenal glands are normal in size and appearance. A large solitary gallstone is present within the gallbladder and there are no signs of cholecystitis. The bile ducts are not dilated. An inferior vena cava filter is in place with the cephalic tip below the level of the renal veins. No lymph node enlargement, ascites, or inflammatory changes are evident in the abdomen or pelvis. The aorta tapers normally.       Assessment:       1. Malignant neoplasm of splenic flexure    2. Pneumonia of left lower lobe due to infectious organism    3. Immunodeficiency due to chemotherapy    4. Renal mass, left    5. Pulmonary nodules    6. Thrombophilia    7. Iron deficiency anemia, unspecified iron deficiency anemia type         Plan:     Colon Cancer - Pt with h/o colon cancer s/p resection in 2007 followed by adjuvant FOLFOX for 12 cycles  -Pt recently with resection of transverse colon and splenic flexure 9/29/23 showing path showing invasive moderately differentiated adenocarcinoma with mucinous features, 33 benign lymph nodes, positive lymphovascular invasion, positive perineural invasion pT3N0  -Tumor shows intact expression of MLH1, PMS2, MSH2, MSH6  -Patient was positive for B Celoi V600E mutation  -Pt has high risk stage II colon cancer given positive LVI and PNI  -PT is a candidate for treatment with 6 months of 5-FU or Capecitabine  -no clear evidence of metastatic disease on PET-CT 11/08/2023   -Will proceed with 6 months of 5 fluorouracil  -Pt s/p cycle 5  -Pharmacogenomic panel on 11/03/23 showed  normal DPYD metabolizer but 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    Immunodeficiency due to chemo - pt at increased risk of infection  -Pt with recent PNA in LLL  -Will monitor    PNA - no pneumonia seen on chest x-ray 02/19/2024; however, a left lower lobe pneumonia was seen on chest CT 02/23/2024  Patient's symptoms improving status post antibiotics  Will monitor    Renal Mass - Pt with a mass on the left kidney seen on CT abdomen 8/17/23 and US abdomen 8/25/23  -Pt saw Urology [facility] under the supervision of Dr. 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 The patient can proceed with treatment.  Pt needs a CBC and CMP with appt with NP in 2 weeks anthony day before treatment.  Pt needs a CBC, CMP and appt with me in 4 weeks the day before treatment.   Follow up with GRETCHEN 2 weeks.   Infusion scheduling note    Injection scheduling note    Labs    Imaging    Pharmacy appointment    Other referrals              Treatment Plan Information    OP COLORECTAL FLUOROURACIL LEUCOVORIN Q2W (MOD DE GRAMONT)   Mahesh Cameron MD   Upcoming Treatment Dates - OP COLORECTAL FLUOROURACIL LEUCOVORIN Q2W (MOD DE GRAMONT)    3/11/2024       Chemotherapy       leucovorin calcium in dextrose 5 % (D5W) 250 mL infusion       fluorouraciL injection       fluorouracil chemo infusion       Antiemetics       ondansetron injection 8 mg  3/25/2024       Chemotherapy       leucovorin calcium in dextrose 5 % (D5W) 250 mL infusion       fluorouraciL injection       fluorouracil chemo infusion       Antiemetics       ondansetron injection 8 mg  4/8/2024       Chemotherapy       leucovorin calcium in dextrose 5 % (D5W) 250 mL infusion       fluorouraciL injection       fluorouracil chemo infusion       Antiemetics       ondansetron injection 8 mg  4/22/2024       Chemotherapy       leucovorin calcium in dextrose 5 % (D5W) 250 mL infusion       fluorouraciL injection       fluorouracil chemo infusion       Antiemetics       ondansetron injection 8 mg    Supportive Plan Information  OP FERRIC CARBOXYMALTOSE Q2W   Mahesh Cameron MD   Upcoming Treatment Dates - OP FERRIC CARBOXYMALTOSE Q2W    1/25/2024       Supportive Care       ferric carboxymaltose (INJECTAFER) 750 mg in sodium chloride 0.9% 265 mL IVPB (ready to mix)      Mahesh Cameron MD  [facility]  Hematology and Oncology  [facility] [address] [place] [address]   O: [phone]  F: [phone]

## 2024-02-26 ENCOUNTER — OFFICE VISIT (OUTPATIENT)
Dept: HEMATOLOGY/ONCOLOGY | Facility: CLINIC | Age: 84
End: 2024-02-26
Payer: MEDICARE

## 2024-02-26 ENCOUNTER — INFUSION (OUTPATIENT)
Dept: INFUSION THERAPY | Facility: HOSPITAL | Age: 84
End: 2024-02-26
Attending: INTERNAL MEDICINE
Payer: MEDICARE

## 2024-02-26 VITALS
SYSTOLIC BLOOD PRESSURE: 120 MMHG | HEART RATE: 53 BPM | WEIGHT: 204.13 LBS | RESPIRATION RATE: 20 BRPM | BODY MASS INDEX: 34.85 KG/M2 | TEMPERATURE: 97 F | OXYGEN SATURATION: 98 % | HEIGHT: 64 IN | DIASTOLIC BLOOD PRESSURE: 66 MMHG

## 2024-02-26 VITALS
TEMPERATURE: 97 F | OXYGEN SATURATION: 98 % | RESPIRATION RATE: 20 BRPM | BODY MASS INDEX: 34.85 KG/M2 | WEIGHT: 204.13 LBS | HEIGHT: 64 IN | DIASTOLIC BLOOD PRESSURE: 70 MMHG | SYSTOLIC BLOOD PRESSURE: 118 MMHG | HEART RATE: 66 BPM

## 2024-02-26 DIAGNOSIS — N28.89 RENAL MASS, LEFT: ICD-10-CM

## 2024-02-26 DIAGNOSIS — T45.1X5A IMMUNODEFICIENCY DUE TO CHEMOTHERAPY: ICD-10-CM

## 2024-02-26 DIAGNOSIS — D50.9 IRON DEFICIENCY ANEMIA, UNSPECIFIED IRON DEFICIENCY ANEMIA TYPE: ICD-10-CM

## 2024-02-26 DIAGNOSIS — C18.5 MALIGNANT NEOPLASM OF SPLENIC FLEXURE: Primary | ICD-10-CM

## 2024-02-26 DIAGNOSIS — R91.8 PULMONARY NODULES: ICD-10-CM

## 2024-02-26 DIAGNOSIS — D84.821 IMMUNODEFICIENCY DUE TO CHEMOTHERAPY: ICD-10-CM

## 2024-02-26 DIAGNOSIS — D68.59 THROMBOPHILIA: ICD-10-CM

## 2024-02-26 DIAGNOSIS — J18.9 PNEUMONIA OF LEFT LOWER LOBE DUE TO INFECTIOUS ORGANISM: ICD-10-CM

## 2024-02-26 DIAGNOSIS — Z79.899 IMMUNODEFICIENCY DUE TO CHEMOTHERAPY: ICD-10-CM

## 2024-02-26 PROCEDURE — 96409 CHEMO IV PUSH SNGL DRUG: CPT | Mod: PN

## 2024-02-26 PROCEDURE — G2211 COMPLEX E/M VISIT ADD ON: HCPCS | Mod: S$GLB,,, | Performed by: INTERNAL MEDICINE

## 2024-02-26 PROCEDURE — 96375 TX/PRO/DX INJ NEW DRUG ADDON: CPT | Mod: PN

## 2024-02-26 PROCEDURE — 96367 TX/PROPH/DG ADDL SEQ IV INF: CPT | Mod: PN

## 2024-02-26 PROCEDURE — 25000003 PHARM REV CODE 250: Mod: PN | Performed by: INTERNAL MEDICINE

## 2024-02-26 PROCEDURE — 99215 OFFICE O/P EST HI 40 MIN: CPT | Mod: S$GLB,,, | Performed by: INTERNAL MEDICINE

## 2024-02-26 PROCEDURE — 63600175 PHARM REV CODE 636 W HCPCS: Mod: PN | Performed by: INTERNAL MEDICINE

## 2024-02-26 PROCEDURE — 96416 CHEMO PROLONG INFUSE W/PUMP: CPT | Mod: PN

## 2024-02-26 PROCEDURE — 99999 PR PBB SHADOW E&M-EST. PATIENT-LVL IV: CPT | Mod: PBBFAC,,, | Performed by: INTERNAL MEDICINE

## 2024-02-26 RX ORDER — SODIUM CHLORIDE 0.9 % (FLUSH) 0.9 %
10 SYRINGE (ML) INJECTION
Status: CANCELLED | OUTPATIENT
Start: 2024-02-26

## 2024-02-26 RX ORDER — EPINEPHRINE 0.3 MG/.3ML
0.3 INJECTION SUBCUTANEOUS ONCE AS NEEDED
Status: CANCELLED | OUTPATIENT
Start: 2024-02-26

## 2024-02-26 RX ORDER — ONDANSETRON HYDROCHLORIDE 2 MG/ML
8 INJECTION, SOLUTION INTRAVENOUS
Status: COMPLETED | OUTPATIENT
Start: 2024-02-26 | End: 2024-02-26

## 2024-02-26 RX ORDER — PROCHLORPERAZINE EDISYLATE 5 MG/ML
5 INJECTION INTRAMUSCULAR; INTRAVENOUS ONCE AS NEEDED
Status: CANCELLED
Start: 2024-02-26

## 2024-02-26 RX ORDER — HEPARIN 100 UNIT/ML
500 SYRINGE INTRAVENOUS
Status: CANCELLED | OUTPATIENT
Start: 2024-02-28

## 2024-02-26 RX ORDER — SODIUM CHLORIDE 0.9 % (FLUSH) 0.9 %
10 SYRINGE (ML) INJECTION
Status: CANCELLED | OUTPATIENT
Start: 2024-02-28

## 2024-02-26 RX ORDER — FLUOROURACIL 50 MG/ML
400 INJECTION, SOLUTION INTRAVENOUS
Status: CANCELLED | OUTPATIENT
Start: 2024-02-26

## 2024-02-26 RX ORDER — EPINEPHRINE 0.3 MG/.3ML
0.3 INJECTION SUBCUTANEOUS ONCE AS NEEDED
Status: DISCONTINUED | OUTPATIENT
Start: 2024-02-26 | End: 2024-02-26 | Stop reason: HOSPADM

## 2024-02-26 RX ORDER — PROCHLORPERAZINE EDISYLATE 5 MG/ML
5 INJECTION INTRAMUSCULAR; INTRAVENOUS ONCE AS NEEDED
Status: DISCONTINUED | OUTPATIENT
Start: 2024-02-26 | End: 2024-02-26 | Stop reason: HOSPADM

## 2024-02-26 RX ORDER — SODIUM CHLORIDE 0.9 % (FLUSH) 0.9 %
10 SYRINGE (ML) INJECTION
Status: DISCONTINUED | OUTPATIENT
Start: 2024-02-26 | End: 2024-02-26 | Stop reason: HOSPADM

## 2024-02-26 RX ORDER — HEPARIN 100 UNIT/ML
500 SYRINGE INTRAVENOUS
Status: CANCELLED | OUTPATIENT
Start: 2024-02-26

## 2024-02-26 RX ORDER — DIPHENHYDRAMINE HYDROCHLORIDE 50 MG/ML
50 INJECTION INTRAMUSCULAR; INTRAVENOUS ONCE AS NEEDED
Status: CANCELLED | OUTPATIENT
Start: 2024-02-26

## 2024-02-26 RX ORDER — ONDANSETRON HYDROCHLORIDE 2 MG/ML
8 INJECTION, SOLUTION INTRAVENOUS
Status: CANCELLED | OUTPATIENT
Start: 2024-02-26

## 2024-02-26 RX ORDER — FLUOROURACIL 50 MG/ML
400 INJECTION, SOLUTION INTRAVENOUS
Status: COMPLETED | OUTPATIENT
Start: 2024-02-26 | End: 2024-02-26

## 2024-02-26 RX ORDER — DIPHENHYDRAMINE HYDROCHLORIDE 50 MG/ML
50 INJECTION INTRAMUSCULAR; INTRAVENOUS ONCE AS NEEDED
Status: DISCONTINUED | OUTPATIENT
Start: 2024-02-26 | End: 2024-02-26 | Stop reason: HOSPADM

## 2024-02-26 RX ADMIN — FLUOROURACIL 4920 MG: 50 INJECTION, SOLUTION INTRAVENOUS at 11:02

## 2024-02-26 RX ADMIN — LEUCOVORIN CALCIUM 820 MG: 350 INJECTION, POWDER, LYOPHILIZED, FOR SOLUTION INTRAMUSCULAR; INTRAVENOUS at 11:02

## 2024-02-26 RX ADMIN — ONDANSETRON 8 MG: 2 INJECTION INTRAMUSCULAR; INTRAVENOUS at 10:02

## 2024-02-26 RX ADMIN — FLUOROURACIL 820 MG: 50 INJECTION, SOLUTION INTRAVENOUS at 11:02

## 2024-02-26 RX ADMIN — DEXTROSE MONOHYDRATE: 5 INJECTION, SOLUTION INTRAVENOUS at 09:02

## 2024-02-26 NOTE — PLAN OF CARE
Problem: Adult Inpatient Plan of Care  Goal: Plan of Care Review  Outcome: Ongoing, Progressing  Flowsheets (Taken 2/26/2024 1037)  Plan of Care Reviewed With:   patient   family  Goal: Patient-Specific Goal (Individualized)  Outcome: Ongoing, Progressing  Flowsheets (Taken 2/26/2024 1037)  Anxieties, Fears or Concerns: none  Individualized Care Needs: recliner/warm blanket/family at chairside     Problem: Fatigue  Goal: Improved Activity Tolerance  Outcome: Ongoing, Progressing  Intervention: Promote Improved Energy  Flowsheets (Taken 2/26/2024 1037)  Fatigue Management:   activity schedule adjusted   activity assistance provided   fatigue-related activity identified   frequent rest breaks encouraged   paced activity encouraged  Sleep/Rest Enhancement:   noise level reduced   reading promoted   regular sleep/rest pattern promoted   relaxation techniques promoted   room darkened   family presence promoted  Activity Management:   Ambulated -L4   Ambulated to bathroom - L4   Ambulated in jenkins - L4   Up in stretcher chair - L1   Blood return noted prior to pump connection, all connections secured with coban, pt has spill kit and tip sheet at home from previous infusions. Verified pump infusing prior to d/c.  Pt tolerated Leucovorin/5FU well today. NAD/no concerns voiced. Reviewed follow-up appointments. All questions were answered, ambulated independently at d/c with cane/niece.

## 2024-02-28 ENCOUNTER — INFUSION (OUTPATIENT)
Dept: INFUSION THERAPY | Facility: HOSPITAL | Age: 84
End: 2024-02-28
Attending: INTERNAL MEDICINE
Payer: MEDICARE

## 2024-02-28 VITALS
DIASTOLIC BLOOD PRESSURE: 76 MMHG | HEART RATE: 61 BPM | TEMPERATURE: 98 F | SYSTOLIC BLOOD PRESSURE: 138 MMHG | OXYGEN SATURATION: 97 % | BODY MASS INDEX: 35.16 KG/M2 | WEIGHT: 204.81 LBS | RESPIRATION RATE: 20 BRPM

## 2024-02-28 DIAGNOSIS — C18.5 MALIGNANT NEOPLASM OF SPLENIC FLEXURE: Primary | ICD-10-CM

## 2024-02-28 PROCEDURE — 63600175 PHARM REV CODE 636 W HCPCS: Mod: PN | Performed by: INTERNAL MEDICINE

## 2024-02-28 RX ORDER — HEPARIN 100 UNIT/ML
500 SYRINGE INTRAVENOUS
Status: DISCONTINUED | OUTPATIENT
Start: 2024-02-28 | End: 2024-02-28 | Stop reason: HOSPADM

## 2024-02-28 RX ADMIN — Medication 500 UNITS: at 11:02

## 2024-02-28 NOTE — PLAN OF CARE
[facility]  Reviewed appointments. Ambulated independently with cane at discharge.

## 2024-02-29 ENCOUNTER — OFFICE VISIT (OUTPATIENT)
Dept: OPTOMETRY | Facility: CLINIC | Age: 84
End: 2024-02-29
Payer: MEDICARE

## 2024-02-29 DIAGNOSIS — H52.203 HYPEROPIA OF BOTH EYES WITH ASTIGMATISM AND PRESBYOPIA: ICD-10-CM

## 2024-02-29 DIAGNOSIS — H52.03 HYPEROPIA OF BOTH EYES WITH ASTIGMATISM AND PRESBYOPIA: ICD-10-CM

## 2024-02-29 DIAGNOSIS — H52.4 HYPEROPIA OF BOTH EYES WITH ASTIGMATISM AND PRESBYOPIA: ICD-10-CM

## 2024-02-29 DIAGNOSIS — H25.13 NUCLEAR SCLEROSIS OF BOTH EYES: Primary | ICD-10-CM

## 2024-02-29 PROCEDURE — 92015 DETERMINE REFRACTIVE STATE: CPT | Mod: S$GLB,,, | Performed by: OPTOMETRIST

## 2024-02-29 PROCEDURE — 99999 PR PBB SHADOW E&M-EST. PATIENT-LVL II: CPT | Mod: PBBFAC,,, | Performed by: OPTOMETRIST

## 2024-02-29 PROCEDURE — 92014 COMPRE OPH EXAM EST PT 1/>: CPT | Mod: S$GLB,,, | Performed by: OPTOMETRIST

## 2024-02-29 NOTE — PROGRESS NOTES
HPI    Pt c/o decreased NVA x 3 weeks    DVA stable. Pt sts she has to renew license in a few weeks and wants to   have vision checked. Pt chemo treatment started in December and will last   through May, unsure if this has affected eyes as well.   Last edited by Page Arenas on 2/29/2024  1:06 PM.            Assessment /Plan     For exam results, see Encounter Report.    Nuclear sclerosis of both eyes    Hyperopia of both eyes with astigmatism and presbyopia      Refer to Dr. Chapa for cataract evaluation and possible removal.   2. Hold off on spec rx until after cataract surgery

## 2024-03-01 ENCOUNTER — TELEPHONE (OUTPATIENT)
Dept: OPHTHALMOLOGY | Facility: CLINIC | Age: 84
End: 2024-03-01
Payer: MEDICARE

## 2024-03-01 NOTE — TELEPHONE ENCOUNTER
Spoke to patient's caregiver, Barbie, regarding scheduling a cataract eval with Dr. Chapa in [place]. Caregiver states patient was told to wait 3 months after finishing chemo in May before being seen for a cataract eval. Advised caregiver to call back in April to schedule appointment when July book is opened. Caregiver verbalized understanding.

## 2024-03-01 NOTE — TELEPHONE ENCOUNTER
----- Message from Heidi Patton sent at 3/1/2024 12:58 PM CST -----  Regarding: schedule surgery  Contact: Barbie (neice / caregiver) @ [phone]  Returning Lucrecia's call to schedule surgery.  Pls call.

## 2024-03-01 NOTE — TELEPHONE ENCOUNTER
Left message requesting patient call back to schedule cataract eval with Dr. Chapa per Dr. Berrios.

## 2024-03-01 NOTE — TELEPHONE ENCOUNTER
----- Message from Merline Garnett sent at 3/1/2024 11:07 AM CST -----  Regarding: Patient Returning Missed Call  Patient is returning missed call .     Pts call back-  [phone]

## 2024-03-01 NOTE — TELEPHONE ENCOUNTER
----- Message from Page Arenas sent at 2/29/2024  1:39 PM CST -----  Please call to schedule cat eval. Thank you!  ----- Message -----  From: Thien Berrios OD  Sent: 2/29/2024   1:36 PM CST  To: Page Arenas    Pt rachanas Katie schumachert for cataract eval    Thank you

## 2024-03-07 ENCOUNTER — LAB VISIT (OUTPATIENT)
Dept: LAB | Facility: HOSPITAL | Age: 84
End: 2024-03-07
Attending: FAMILY MEDICINE
Payer: MEDICARE

## 2024-03-07 DIAGNOSIS — D50.9 IRON DEFICIENCY ANEMIA, UNSPECIFIED IRON DEFICIENCY ANEMIA TYPE: ICD-10-CM

## 2024-03-07 DIAGNOSIS — R05.9 COUGH, UNSPECIFIED TYPE: ICD-10-CM

## 2024-03-07 DIAGNOSIS — E03.4 HYPOTHYROIDISM DUE TO ACQUIRED ATROPHY OF THYROID: ICD-10-CM

## 2024-03-07 LAB
ALBUMIN SERPL BCP-MCNC: 3.6 G/DL (ref 3.5–5.2)
ALP SERPL-CCNC: 57 U/L (ref 55–135)
ALT SERPL W/O P-5'-P-CCNC: 9 U/L (ref 10–44)
ANION GAP SERPL CALC-SCNC: 9 MMOL/L (ref 8–16)
ANION GAP SERPL CALC-SCNC: 9 MMOL/L (ref 8–16)
AST SERPL-CCNC: 13 U/L (ref 10–40)
BASOPHILS # BLD AUTO: 0.03 K/UL (ref 0–0.2)
BASOPHILS NFR BLD: 0.5 % (ref 0–1.9)
BILIRUB SERPL-MCNC: 0.9 MG/DL (ref 0.1–1)
BUN SERPL-MCNC: 21 MG/DL (ref 8–23)
BUN SERPL-MCNC: 21 MG/DL (ref 8–23)
CALCIUM SERPL-MCNC: 10 MG/DL (ref 8.7–10.5)
CALCIUM SERPL-MCNC: 10 MG/DL (ref 8.7–10.5)
CHLORIDE SERPL-SCNC: 109 MMOL/L (ref 95–110)
CHLORIDE SERPL-SCNC: 109 MMOL/L (ref 95–110)
CO2 SERPL-SCNC: 24 MMOL/L (ref 23–29)
CO2 SERPL-SCNC: 24 MMOL/L (ref 23–29)
CREAT SERPL-MCNC: 1 MG/DL (ref 0.5–1.4)
CREAT SERPL-MCNC: 1 MG/DL (ref 0.5–1.4)
DIFFERENTIAL METHOD BLD: ABNORMAL
EOSINOPHIL # BLD AUTO: 0.1 K/UL (ref 0–0.5)
EOSINOPHIL NFR BLD: 2 % (ref 0–8)
ERYTHROCYTE [DISTWIDTH] IN BLOOD BY AUTOMATED COUNT: 17.6 % (ref 11.5–14.5)
EST. GFR  (NO RACE VARIABLE): 56.6 ML/MIN/1.73 M^2
EST. GFR  (NO RACE VARIABLE): 56.6 ML/MIN/1.73 M^2
FERRITIN SERPL-MCNC: 292 NG/ML (ref 20–300)
GLUCOSE SERPL-MCNC: 100 MG/DL (ref 70–110)
GLUCOSE SERPL-MCNC: 100 MG/DL (ref 70–110)
HCT VFR BLD AUTO: 34.9 % (ref 37–48.5)
HGB BLD-MCNC: 11.2 G/DL (ref 12–16)
IMM GRANULOCYTES # BLD AUTO: 0.05 K/UL (ref 0–0.04)
IMM GRANULOCYTES NFR BLD AUTO: 0.9 % (ref 0–0.5)
IRON SERPL-MCNC: 95 UG/DL (ref 30–160)
LYMPHOCYTES # BLD AUTO: 1.4 K/UL (ref 1–4.8)
LYMPHOCYTES NFR BLD: 24.5 % (ref 18–48)
MCH RBC QN AUTO: 31.9 PG (ref 27–31)
MCHC RBC AUTO-ENTMCNC: 32.1 G/DL (ref 32–36)
MCV RBC AUTO: 99 FL (ref 82–98)
MONOCYTES # BLD AUTO: 0.6 K/UL (ref 0.3–1)
MONOCYTES NFR BLD: 10.6 % (ref 4–15)
NEUTROPHILS # BLD AUTO: 3.5 K/UL (ref 1.8–7.7)
NEUTROPHILS NFR BLD: 61.5 % (ref 38–73)
NRBC BLD-RTO: 0 /100 WBC
PLATELET # BLD AUTO: 245 K/UL (ref 150–450)
PMV BLD AUTO: 9.7 FL (ref 9.2–12.9)
POTASSIUM SERPL-SCNC: 4.8 MMOL/L (ref 3.5–5.1)
POTASSIUM SERPL-SCNC: 4.8 MMOL/L (ref 3.5–5.1)
PROT SERPL-MCNC: 7 G/DL (ref 6–8.4)
RBC # BLD AUTO: 3.51 M/UL (ref 4–5.4)
SATURATED IRON: 27 % (ref 20–50)
SODIUM SERPL-SCNC: 142 MMOL/L (ref 136–145)
SODIUM SERPL-SCNC: 142 MMOL/L (ref 136–145)
T4 FREE SERPL-MCNC: 0.88 NG/DL (ref 0.71–1.51)
TOTAL IRON BINDING CAPACITY: 358 UG/DL (ref 250–450)
TRANSFERRIN SERPL-MCNC: 242 MG/DL (ref 200–375)
TSH SERPL DL<=0.005 MIU/L-ACNC: 7.07 UIU/ML (ref 0.4–4)
WBC # BLD AUTO: 5.64 K/UL (ref 3.9–12.7)

## 2024-03-07 PROCEDURE — 36415 COLL VENOUS BLD VENIPUNCTURE: CPT | Mod: PO | Performed by: FAMILY MEDICINE

## 2024-03-07 PROCEDURE — 80053 COMPREHEN METABOLIC PANEL: CPT | Performed by: FAMILY MEDICINE

## 2024-03-07 PROCEDURE — 85025 COMPLETE CBC W/AUTO DIFF WBC: CPT | Performed by: FAMILY MEDICINE

## 2024-03-07 PROCEDURE — 84443 ASSAY THYROID STIM HORMONE: CPT | Performed by: FAMILY MEDICINE

## 2024-03-07 PROCEDURE — 82728 ASSAY OF FERRITIN: CPT | Performed by: FAMILY MEDICINE

## 2024-03-07 PROCEDURE — 83540 ASSAY OF IRON: CPT | Performed by: FAMILY MEDICINE

## 2024-03-07 PROCEDURE — 84439 ASSAY OF FREE THYROXINE: CPT | Performed by: FAMILY MEDICINE

## 2024-03-11 ENCOUNTER — OFFICE VISIT (OUTPATIENT)
Dept: HEMATOLOGY/ONCOLOGY | Facility: CLINIC | Age: 84
End: 2024-03-11
Payer: MEDICARE

## 2024-03-11 ENCOUNTER — INFUSION (OUTPATIENT)
Dept: INFUSION THERAPY | Facility: HOSPITAL | Age: 84
End: 2024-03-11
Attending: INTERNAL MEDICINE
Payer: MEDICARE

## 2024-03-11 VITALS
TEMPERATURE: 96 F | SYSTOLIC BLOOD PRESSURE: 130 MMHG | HEART RATE: 59 BPM | RESPIRATION RATE: 18 BRPM | WEIGHT: 206.13 LBS | DIASTOLIC BLOOD PRESSURE: 78 MMHG | OXYGEN SATURATION: 99 % | BODY MASS INDEX: 35.19 KG/M2 | HEIGHT: 64 IN

## 2024-03-11 VITALS
OXYGEN SATURATION: 99 % | SYSTOLIC BLOOD PRESSURE: 127 MMHG | TEMPERATURE: 96 F | WEIGHT: 206.13 LBS | BODY MASS INDEX: 35.19 KG/M2 | DIASTOLIC BLOOD PRESSURE: 71 MMHG | HEART RATE: 54 BPM | HEIGHT: 64 IN | RESPIRATION RATE: 17 BRPM

## 2024-03-11 DIAGNOSIS — C18.5 MALIGNANT NEOPLASM OF SPLENIC FLEXURE: Primary | ICD-10-CM

## 2024-03-11 DIAGNOSIS — D50.9 IRON DEFICIENCY ANEMIA, UNSPECIFIED IRON DEFICIENCY ANEMIA TYPE: ICD-10-CM

## 2024-03-11 PROCEDURE — 96367 TX/PROPH/DG ADDL SEQ IV INF: CPT | Mod: PN

## 2024-03-11 PROCEDURE — 96375 TX/PRO/DX INJ NEW DRUG ADDON: CPT | Mod: PN

## 2024-03-11 PROCEDURE — 25000003 PHARM REV CODE 250: Mod: PN | Performed by: NURSE PRACTITIONER

## 2024-03-11 PROCEDURE — 96416 CHEMO PROLONG INFUSE W/PUMP: CPT | Mod: PN

## 2024-03-11 PROCEDURE — 63600175 PHARM REV CODE 636 W HCPCS: Mod: PN | Performed by: NURSE PRACTITIONER

## 2024-03-11 PROCEDURE — 99999 PR PBB SHADOW E&M-EST. PATIENT-LVL IV: CPT | Mod: PBBFAC,,, | Performed by: NURSE PRACTITIONER

## 2024-03-11 PROCEDURE — 99213 OFFICE O/P EST LOW 20 MIN: CPT | Mod: S$GLB,,, | Performed by: NURSE PRACTITIONER

## 2024-03-11 PROCEDURE — 96409 CHEMO IV PUSH SNGL DRUG: CPT | Mod: PN

## 2024-03-11 RX ORDER — ONDANSETRON HYDROCHLORIDE 2 MG/ML
8 INJECTION, SOLUTION INTRAVENOUS
Status: COMPLETED | OUTPATIENT
Start: 2024-03-11 | End: 2024-03-11

## 2024-03-11 RX ORDER — SODIUM CHLORIDE 0.9 % (FLUSH) 0.9 %
10 SYRINGE (ML) INJECTION
Status: CANCELLED | OUTPATIENT
Start: 2024-03-13

## 2024-03-11 RX ORDER — SODIUM CHLORIDE 0.9 % (FLUSH) 0.9 %
10 SYRINGE (ML) INJECTION
Status: DISCONTINUED | OUTPATIENT
Start: 2024-03-11 | End: 2024-03-11 | Stop reason: HOSPADM

## 2024-03-11 RX ORDER — EPINEPHRINE 0.3 MG/.3ML
0.3 INJECTION SUBCUTANEOUS ONCE AS NEEDED
Status: CANCELLED | OUTPATIENT
Start: 2024-03-11

## 2024-03-11 RX ORDER — DIPHENHYDRAMINE HYDROCHLORIDE 50 MG/ML
50 INJECTION INTRAMUSCULAR; INTRAVENOUS ONCE AS NEEDED
Status: DISCONTINUED | OUTPATIENT
Start: 2024-03-11 | End: 2024-03-11 | Stop reason: HOSPADM

## 2024-03-11 RX ORDER — HEPARIN 100 UNIT/ML
500 SYRINGE INTRAVENOUS
Status: CANCELLED | OUTPATIENT
Start: 2024-03-13

## 2024-03-11 RX ORDER — SODIUM CHLORIDE 0.9 % (FLUSH) 0.9 %
10 SYRINGE (ML) INJECTION
Status: CANCELLED | OUTPATIENT
Start: 2024-03-11

## 2024-03-11 RX ORDER — HEPARIN 100 UNIT/ML
500 SYRINGE INTRAVENOUS
Status: CANCELLED | OUTPATIENT
Start: 2024-03-11

## 2024-03-11 RX ORDER — LISINOPRIL 30 MG/1
30 TABLET ORAL
COMMUNITY
Start: 2024-03-04

## 2024-03-11 RX ORDER — ONDANSETRON HYDROCHLORIDE 2 MG/ML
8 INJECTION, SOLUTION INTRAVENOUS
Status: CANCELLED | OUTPATIENT
Start: 2024-03-11

## 2024-03-11 RX ORDER — EPINEPHRINE 0.3 MG/.3ML
0.3 INJECTION SUBCUTANEOUS ONCE AS NEEDED
Status: DISCONTINUED | OUTPATIENT
Start: 2024-03-11 | End: 2024-03-11 | Stop reason: HOSPADM

## 2024-03-11 RX ORDER — FLUOROURACIL 50 MG/ML
400 INJECTION, SOLUTION INTRAVENOUS
Status: COMPLETED | OUTPATIENT
Start: 2024-03-11 | End: 2024-03-11

## 2024-03-11 RX ORDER — PROCHLORPERAZINE EDISYLATE 5 MG/ML
5 INJECTION INTRAMUSCULAR; INTRAVENOUS ONCE AS NEEDED
Status: DISCONTINUED | OUTPATIENT
Start: 2024-03-11 | End: 2024-03-11 | Stop reason: HOSPADM

## 2024-03-11 RX ORDER — FLUOROURACIL 50 MG/ML
400 INJECTION, SOLUTION INTRAVENOUS
Status: CANCELLED | OUTPATIENT
Start: 2024-03-11

## 2024-03-11 RX ORDER — PROCHLORPERAZINE EDISYLATE 5 MG/ML
5 INJECTION INTRAMUSCULAR; INTRAVENOUS ONCE AS NEEDED
Status: CANCELLED
Start: 2024-03-11

## 2024-03-11 RX ORDER — DIPHENHYDRAMINE HYDROCHLORIDE 50 MG/ML
50 INJECTION INTRAMUSCULAR; INTRAVENOUS ONCE AS NEEDED
Status: CANCELLED | OUTPATIENT
Start: 2024-03-11

## 2024-03-11 RX ADMIN — ONDANSETRON 8 MG: 2 INJECTION INTRAMUSCULAR; INTRAVENOUS at 11:03

## 2024-03-11 RX ADMIN — SODIUM CHLORIDE: 9 INJECTION, SOLUTION INTRAVENOUS at 11:03

## 2024-03-11 RX ADMIN — LEUCOVORIN CALCIUM 825 MG: 350 INJECTION, POWDER, LYOPHILIZED, FOR SOLUTION INTRAMUSCULAR; INTRAVENOUS at 12:03

## 2024-03-11 RX ADMIN — FLUOROURACIL 4945 MG: 50 INJECTION, SOLUTION INTRAVENOUS at 12:03

## 2024-03-11 RX ADMIN — FLUOROURACIL 825 MG: 50 INJECTION, SOLUTION INTRAVENOUS at 12:03

## 2024-03-11 NOTE — PROGRESS NOTES
PATIENT: Danna Sorensen  MRN: [id number]  DATE: 3/11/2024      Diagnosis:   1. Malignant neoplasm of splenic flexure    2. Iron deficiency anemia, unspecified iron deficiency anemia type        Chief Complaint: Malignant neoplasm of splenic flexure ( 2 wk md f/u appt w/labs/tx cl decoupled/)      Oncologic History:     Oncology History   Malignant neoplasm of splenic flexure   11/3/2023 Initial Diagnosis    Malignant neoplasm of splenic flexure     11/3/2023 Cancer Staged    Staging form: Colon and Rectum, AJCC 8th Edition  - Clinical: Stage IIA (cT3, cN0, cM0)     12/6/2023 -  Chemotherapy    Treatment Summary   Plan Name: OP COLORECTAL FLUOROURACIL LEUCOVORIN Q2W (MOD DE [facility])  Treatment Goal: Curative  Status: Active  Start Date: 12/6/2023  End Date: 12/4/2024 (Planned)  Provider: Mahesh Cameron MD  Chemotherapy: fluorouraciL injection 825 mg, 400 mg/m2 = 825 mg, Intravenous, Clinic/HOD 1 time, 6 of 26 cycles  Administration: 825 mg (12/6/2023), 815 mg (12/20/2023), 810 mg (1/3/2024), 810 mg (1/22/2024), 815 mg (2/5/2024), 820 mg (2/26/2024)              Subjective:    Interval History: Ms. Sorensen is a 83 y.o. female with Pulmonary embolism, carpal tunnel, CAD, HTN, hypothyroidism, osteoporosis, osteoarthritis, who presents for colon cancer.  Since the last clinic visit, the patient has been in good health.  Pneumonia has cleared.  She endorses SOB that has been consistent throughout treatment. She reports compliance with oral iron daily.  No difficulties with abdominal discomfort or constipation. The patient denies CP,  abdominal pain, nausea, vomiting, diarrhea.  The patient denies fever, chills, night sweats, weight loss, new lumps or bumps, easy bruising or bleeding.    Prior History:  Pt was previously diagnosed with Stage III adenocarcinoma of the colon in 2007 and underwent 12 cycles of FOLFOX.  Pt underwent colonoscopy on 8/08/23 showing 1 7 mm polyp in the rectum; altered vascular, congested, eroded,  friable and ulcerated mucosa in the transverse colon which was biopsied; patent and to side ileocolonic anastomosis.  Pathology showed moderately differentiated adenocarcinoma. CT abdomen and pelvis 8/17/23 showing irregular appearance of the gallbladder; heterogeneously enhancing lesion in the inferior pole of the left kidney measuring 1.8 cm; short segment of concentric bowel wall thickening of the colon at the splenic flexure with surrounding mesenteric fat stranding with nodular thickness of 2.2 cm.  The patient underwent colonoscopy on 08/22/2023 showing nonbleeding internal hemorrhoids, partially obstructing tumor in the transverse colon which was tattooed.  Pt saw Urology 8/23/23 with plans for re-imaging the renal lesion in 3 months with an MRI.  US abdomen 8/25/23 showed a 2.5 cm solid mass at the lower pole of the left kidney suspicious for neoplasm.  CT chest 8/25/23 showed 4 mm left upper lobe pulmonary nodule, 4 mm calcified granuloma left upper lobe, 2 mm pulmonary nodule in the left upper lobe, 9 x 9 mm triangular nodule along the juxtapleural right middle lobe, 2 mm pulmonary nodule in the right middle lobe, and a partially imaged masslike density in the splenic flexure of the colon. Pt then underwent resection of the transverse colon and splenic flexure with path showing invasive moderately differentiated adenocarcinoma with mucinous features, 33 benign lymph nodes, positive lymphovascular invasion, positive perineural invasion pT3N0.  Tumor shows intact expression of MLH1, PMS2, MSH2, MSH6.  Patient was positive for B Celio V600E mutation.   The patient was discussed at tumor Board on 11/07/2023 with recommendation for PET-CT with biopsy of lung nodules if positive.  PET-CT on 11/08/2023 showed evidence of pulmonary hypertension; stable 5 mm nodule left lung apex; calcified granuloma left upper lobe; stable 6 mm inferior right upper lobe nodule; stable 17 mm ground-glass opacity lateral right middle  lobe; stable 4 mm nodule in the right lower lobe of the diaphragm; no activity in the lesion in the left kidney.  MRI abdomen 11/20/2023 showed heterogeneous fat containing enhancing mass in the lower pole of the left kidney suggestive of an angio myelolipoma.    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    Strabismus     Trouble in sleeping     Wears dentures     Uppers       Past Surgical HIstory:   Past Surgical History:   Procedure Laterality Date    CARDIAC SURGERY      cardiac cath    COLON SURGERY      colon resection    COLONOSCOPY N/A 10/18/2016    Procedure: COLONOSCOPY;  Surgeon: Rell Cordova MD;  Location: [facility];  Service: Endoscopy;  Laterality: N/A;    COLONOSCOPY N/A 8/8/2023    Procedure: COLONOSCOPY;  Surgeon: Kaushik Pinon MD;  Location: [facility];  Service: Endoscopy;  Laterality: N/A;    COLONOSCOPY N/A 8/22/2023    Procedure: COLONOSCOPY (tattoo of colon ca);  Surgeon: Kaushik Pinon MD;  Location: [facility];  Service: Endoscopy;  Laterality: N/A;    ESOPHAGOGASTRODUODENOSCOPY N/A 8/3/2023    Procedure: EGD (ESOPHAGOGASTRODUODENOSCOPY);  Surgeon: Kaushik Pinon MD;  Location: [facility];  Service: Endoscopy;  Laterality: N/A;    HAND TENDON SURGERY Left     HEMICOLECTOMY      right    INJECTION OF ANESTHETIC AGENT AROUND MEDIAL BRANCH NERVES INNERVATING LUMBAR FACET JOINT Right 07/10/2018     Procedure: Block-nerve-medial branch-lumbar;  Surgeon: Parish Gaitan MD;  Location: [facility] OR;  Service: Pain Management;  Laterality: Right;  L3, 4, 5    INSERTION OF INFERIOR VENA CAVAL FILTER N/A 9/13/2023    Procedure: Insertion, Filter, Inferior Vena Cava;  Surgeon: Oren Verdin MD;  Location: [facility] CATH/EP LAB;  Service: General;  Laterality: N/A;    INSERTION OF TUNNELED CENTRAL VENOUS CATHETER (CVC) WITH SUBCUTANEOUS PORT Right 11/15/2023    Procedure: PYUNALSNL-BIPN-B-CATH;  Surgeon: Jim Chavez MD;  Location: [facility] OR;  Service: General;  Laterality: Right;    JOINT REPLACEMENT      bilateral    RADIOFREQUENCY ABLATION OF LUMBAR MEDIAL BRANCH NERVE AT SINGLE LEVEL Right 07/24/2018    Procedure: RADIOFREQUENCY ABLATION, NERVE, MEDIAL BRANCH, LUMBAR, 1 LEVEL;  Surgeon: Parish Gaiatn MD;  Location: [facility] OR;  Service: Pain Management;  Laterality: Right;  L3,4,5 - Burned at 80 degrees C. for 75 seconds x 2 each site    ROBOT-ASSISTED COLECTOMY N/A 9/29/2023    Procedure: ROBOTIC COLECTOMY;  Surgeon: Jim Chavez MD;  Location: [facility] OR;  Service: General;  Laterality: N/A;    SHOULDER ARTHROSCOPY Right 01/12/2017    Reverse     TONSILLECTOMY      TONSILLECTOMY, ADENOIDECTOMY, BILATERAL MYRINGOTOMY AND TUBES      TOTAL KNEE ARTHROPLASTY Bilateral 08/1998    total replacements    TUMOR REMOVAL Left     left foot as a child       Family History:   Family History   Problem Relation Age of Onset    Hypertension Mother     Kidney disease Mother     Cataracts Father     Cataracts Sister     Cancer Sister         breast    No Known Problems Brother     Cancer Sister         breast    Arthritis Sister     Glaucoma Neg Hx     Amblyopia Neg Hx     Blindness Neg Hx     Macular degeneration Neg Hx     Retinal detachment Neg Hx     Strabismus Neg Hx     Stroke Neg Hx     Thyroid disease Neg Hx        Social History:  reports that she quit smoking about 63 years ago. Her smoking use included cigarettes. She started  "smoking about 63 years ago. She has a 0.5 pack-year smoking history. She has never used smokeless tobacco. She reports that she does not drink alcohol and does not use drugs.    Allergies:  Review of patient's allergies indicates:   Allergen Reactions    Aspirin      Other reaction(s): Stomach upset    Aspirin Other (See Comments)     Other reaction(s): Stomach upset    Gabapentin Other (See Comments)     Pt states she felt "funny" when she took the medication. Especially at the higher dose.    Mobic [meloxicam] Swelling     Edema and elevated blood pressure     Oxaliplatin Other (See Comments)     Other reaction(s): Joint pain  Other reaction(s): Itching  Other reaction(s): Hives  Other reaction(s): Joint pain  Other reaction(s): Itching  Other reaction(s): Hives    Pregabalin Other (See Comments)     Side effects  Side effects       Medications:  Current Outpatient Medications   Medication Sig Dispense Refill    acetaminophen (TYLENOL) 650 MG TbSR Take 650 mg by mouth every 8 (eight) hours.      alendronate (FOSAMAX) 70 MG tablet Take 1 tablet (70 mg total) by mouth every 7 days. 12 tablet 3    apixaban (ELIQUIS) 5 mg Tab Take 1 tablet (5 mg total) by mouth 2 (two) times daily. 180 tablet 3    cyclobenzaprine (FLEXERIL) 5 MG tablet Take 1 tablet (5 mg total) by mouth 3 (three) times daily as needed for Muscle spasms. 90 tablet 0    ergocalciferol, vitamin D2, (VITAMIN D ORAL) Take 2,000 mg by mouth once daily.      furosemide (LASIX) 20 MG tablet Take 1 tablet (20 mg total) by mouth daily as needed (edema). 90 tablet 3    HYDROcodone-acetaminophen (NORCO) 5-325 mg per tablet Take 1 tablet by mouth every 6 (six) hours as needed for Pain. 20 tablet 0    hydrOXYzine HCL (ATARAX) 25 MG tablet Take 1 tablet (25 mg total) by mouth 3 (three) times daily as needed for Anxiety (insomnia). 30 tablet PRN    levocetirizine (XYZAL) 5 MG tablet Take 1 tablet (5 mg total) by mouth nightly as needed for Allergies (allergies). 90 " tablet PRN    levothyroxine (SYNTHROID) 75 MCG tablet Take 1 tablet (75 mcg total) by mouth once daily. 90 tablet 3    LIDOcaine-prilocaine (EMLA) cream Apply topically as needed (Chemo). 30 g 0    lisinopriL (PRINIVIL,ZESTRIL) 30 MG tablet Take 30 mg by mouth.      multivitamin capsule Take 1 capsule by mouth once daily.      nystatin (MYCOSTATIN) 100,000 unit/mL suspension Take 4 mLs by mouth 4 (four) times daily with meals and nightly.      omeprazole (PRILOSEC) 40 MG capsule Take 1 capsule (40 mg total) by mouth 2 (two) times daily before meals. 180 capsule PRN    ondansetron (ZOFRAN-ODT) 8 MG TbDL Take 1 tablet (8 mg total) by mouth every 8 (eight) hours as needed (nausea). 40 tablet 3    promethazine (PHENERGAN) 12.5 MG Tab (Take 1-2 tabs every 6 hours as needed for nausea persistent despite zofran) 40 tablet 3    traMADoL (ULTRAM) 50 mg tablet Take 1 tablet (50 mg total) by mouth every 8 (eight) hours as needed for Pain. 21 each 0    traZODone (DESYREL) 50 MG tablet Take 1 tablet (50 mg total) by mouth nightly. 90 tablet 0    triamcinolone acetonide 0.1% (KENALOG) 0.1 % cream APPLY TOPICALLY TO THE AFFECTED AREA TWICE DAILY 60 g 0    promethazine-dextromethorphan (PROMETHAZINE-DM) 6.25-15 mg/5 mL Syrp Take 5 mLs by mouth as needed.       No current facility-administered medications for this visit.     Facility-Administered Medications Ordered in Other Visits   Medication Dose Route Frequency Provider Last Rate Last Admin    0.9%  NaCl infusion   Intravenous Once Oren Verdin MD           Review of Systems   Constitutional:  Negative for chills, fatigue, fever and unexpected weight change.   HENT:  Negative for congestion.    Respiratory:  Positive for shortness of breath. Negative for cough.    Cardiovascular:  Negative for chest pain and palpitations.   Gastrointestinal:  Negative for abdominal pain, constipation, diarrhea, nausea and vomiting.   Skin:  Negative for rash.   Neurological:  Negative for  "headaches.   Hematological:  Negative for adenopathy. Does not bruise/bleed easily.       ECOG Performance Status: 2   Objective:      Vitals:   Vitals:    03/11/24 1021   BP: 130/78   BP Location: Left arm   Patient Position: Sitting   BP Method: Medium (Automatic)   Pulse: (!) 59   Resp: 18   Temp: 96.3 °F (35.7 °C)   TempSrc: Temporal   SpO2: 99%   Weight: 93.5 kg (206 lb 2.1 oz)   Height: 5' 4" (1.626 m)       Physical Exam  Vitals reviewed.   Constitutional:       General: She is not in acute distress.     Appearance: She is well-developed. She is not diaphoretic.   HENT:      Head: Normocephalic and atraumatic.      Mouth/Throat:      Pharynx: Oropharynx is clear.   Cardiovascular:      Rate and Rhythm: Regular rhythm. Bradycardia present.      Heart sounds: Normal heart sounds. No murmur heard.     No friction rub. No gallop.   Pulmonary:      Effort: Pulmonary effort is normal. No respiratory distress.      Breath sounds: No wheezing or rales.   Chest:      Chest wall: No tenderness.   Abdominal:      General: Bowel sounds are normal. There is no distension.      Palpations: Abdomen is soft. There is no mass.      Tenderness: There is no abdominal tenderness. There is no guarding or rebound.   Musculoskeletal:      Cervical back: Neck supple.      Right lower leg: No edema.      Left lower leg: No edema.   Lymphadenopathy:      Cervical: No cervical adenopathy.      Upper Body:      Right upper body: No supraclavicular or axillary adenopathy.      Left upper body: No supraclavicular or axillary adenopathy.   Skin:     General: Skin is warm and dry.      Findings: No erythema or rash.      Comments: Port a cath to [facility] without erythema.   Neurological:      Mental Status: She is alert and oriented to person, place, and time.   Psychiatric:         Behavior: Behavior normal.         Thought Content: Thought content normal.         Judgment: Judgment normal.         Laboratory Data:  Lab Visit on 03/11/2024 "   Component Date Value Ref Range Status    WBC 03/11/2024 4.97  3.90 - 12.70 K/uL Final    RBC 03/11/2024 3.51 (L)  4.00 - 5.40 M/uL Final    Hemoglobin 03/11/2024 11.2 (L)  12.0 - 16.0 g/dL Final    Hematocrit 03/11/2024 34.2 (L)  37.0 - 48.5 % Final    MCV 03/11/2024 97  82 - 98 fL Final    MCH 03/11/2024 31.9 (H)  27.0 - 31.0 pg Final    MCHC 03/11/2024 32.7  32.0 - 36.0 g/dL Final    RDW 03/11/2024 17.0 (H)  11.5 - 14.5 % Final    Platelets 03/11/2024 184  150 - 450 K/uL Final    MPV 03/11/2024 9.2  9.2 - 12.9 fL Final    Immature Granulocytes 03/11/2024 0.6 (H)  0.0 - 0.5 % Final    Gran # (ANC) 03/11/2024 3.0  1.8 - 7.7 K/uL Final    Immature Grans (Abs) 03/11/2024 0.03  0.00 - 0.04 K/uL Final    Comment: Mild elevation in immature granulocytes is non specific and   can be seen in a variety of conditions including stress response,   acute inflammation, trauma and pregnancy. Correlation with other   laboratory and clinical findings is essential.      Lymph # 03/11/2024 1.2  1.0 - 4.8 K/uL Final    Mono # 03/11/2024 0.7  0.3 - 1.0 K/uL Final    Eos # 03/11/2024 0.1  0.0 - 0.5 K/uL Final    Baso # 03/11/2024 0.02  0.00 - 0.20 K/uL Final    nRBC 03/11/2024 0  0 /100 WBC Final    Gran % 03/11/2024 60.0  38.0 - 73.0 % Final    Lymph % 03/11/2024 23.3  18.0 - 48.0 % Final    Mono % 03/11/2024 13.5  4.0 - 15.0 % Final    Eosinophil % 03/11/2024 2.2  0.0 - 8.0 % Final    Basophil % 03/11/2024 0.4  0.0 - 1.9 % Final    Differential Method 03/11/2024 Automated   Final    Sodium 03/11/2024 138  136 - 145 mmol/L Final    Potassium 03/11/2024 4.6  3.5 - 5.1 mmol/L Final    Chloride 03/11/2024 107  95 - 110 mmol/L Final    CO2 03/11/2024 24  23 - 29 mmol/L Final    Glucose 03/11/2024 88  70 - 110 mg/dL Final    BUN 03/11/2024 18  8 - 23 mg/dL Final    Creatinine 03/11/2024 1.0  0.5 - 1.4 mg/dL Final    Calcium 03/11/2024 9.6  8.7 - 10.5 mg/dL Final    Total Protein 03/11/2024 6.9  6.0 - 8.4 g/dL Final    Albumin 03/11/2024  3.6  3.5 - 5.2 g/dL Final    Total Bilirubin 03/11/2024 1.0  0.1 - 1.0 mg/dL Final    Comment: For infants and newborns, interpretation of results should be based  on gestational age, weight and in agreement with clinical  observations.    Premature Infant recommended reference ranges:  Up to 24 hours.............<8.0 mg/dL  Up to 48 hours............<12.0 mg/dL  3-5 days..................<15.0 mg/dL  6-29 days.................<15.0 mg/dL      Alkaline Phosphatase 03/11/2024 57  55 - 135 U/L Final    AST 03/11/2024 12  10 - 40 U/L Final    ALT 03/11/2024 10  10 - 44 U/L Final    eGFR 03/11/2024 56.6 (A)  >60 mL/min/1.73 m^2 Final    Anion Gap 03/11/2024 7 (L)  8 - 16 mmol/L Final   Lab Visit on 03/07/2024   Component Date Value Ref Range Status    Sodium 03/07/2024 142  136 - 145 mmol/L Final    Potassium 03/07/2024 4.8  3.5 - 5.1 mmol/L Final    Chloride 03/07/2024 109  95 - 110 mmol/L Final    CO2 03/07/2024 24  23 - 29 mmol/L Final    Glucose 03/07/2024 100  70 - 110 mg/dL Final    BUN 03/07/2024 21  8 - 23 mg/dL Final    Creatinine 03/07/2024 1.0  0.5 - 1.4 mg/dL Final    Calcium 03/07/2024 10.0  8.7 - 10.5 mg/dL Final    Anion Gap 03/07/2024 9  8 - 16 mmol/L Final    eGFR 03/07/2024 56.6 (A)  >60 mL/min/1.73 m^2 Final    WBC 03/07/2024 5.64  3.90 - 12.70 K/uL Final    RBC 03/07/2024 3.51 (L)  4.00 - 5.40 M/uL Final    Hemoglobin 03/07/2024 11.2 (L)  12.0 - 16.0 g/dL Final    Hematocrit 03/07/2024 34.9 (L)  37.0 - 48.5 % Final    MCV 03/07/2024 99 (H)  82 - 98 fL Final    MCH 03/07/2024 31.9 (H)  27.0 - 31.0 pg Final    MCHC 03/07/2024 32.1  32.0 - 36.0 g/dL Final    RDW 03/07/2024 17.6 (H)  11.5 - 14.5 % Final    Platelets 03/07/2024 245  150 - 450 K/uL Final    MPV 03/07/2024 9.7  9.2 - 12.9 fL Final    Immature Granulocytes 03/07/2024 0.9 (H)  0.0 - 0.5 % Final    Gran # (ANC) 03/07/2024 3.5  1.8 - 7.7 K/uL Final    Immature Grans (Abs) 03/07/2024 0.05 (H)  0.00 - 0.04 K/uL Final    Comment: Mild elevation in  immature granulocytes is non specific and   can be seen in a variety of conditions including stress response,   acute inflammation, trauma and pregnancy. Correlation with other   laboratory and clinical findings is essential.      Lymph # 03/07/2024 1.4  1.0 - 4.8 K/uL Final    Mono # 03/07/2024 0.6  0.3 - 1.0 K/uL Final    Eos # 03/07/2024 0.1  0.0 - 0.5 K/uL Final    Baso # 03/07/2024 0.03  0.00 - 0.20 K/uL Final    nRBC 03/07/2024 0  0 /100 WBC Final    Gran % 03/07/2024 61.5  38.0 - 73.0 % Final    Lymph % 03/07/2024 24.5  18.0 - 48.0 % Final    Mono % 03/07/2024 10.6  4.0 - 15.0 % Final    Eosinophil % 03/07/2024 2.0  0.0 - 8.0 % Final    Basophil % 03/07/2024 0.5  0.0 - 1.9 % Final    Differential Method 03/07/2024 Automated   Final    Sodium 03/07/2024 142  136 - 145 mmol/L Final    Potassium 03/07/2024 4.8  3.5 - 5.1 mmol/L Final    Chloride 03/07/2024 109  95 - 110 mmol/L Final    CO2 03/07/2024 24  23 - 29 mmol/L Final    Glucose 03/07/2024 100  70 - 110 mg/dL Final    BUN 03/07/2024 21  8 - 23 mg/dL Final    Creatinine 03/07/2024 1.0  0.5 - 1.4 mg/dL Final    Calcium 03/07/2024 10.0  8.7 - 10.5 mg/dL Final    Total Protein 03/07/2024 7.0  6.0 - 8.4 g/dL Final    Albumin 03/07/2024 3.6  3.5 - 5.2 g/dL Final    Total Bilirubin 03/07/2024 0.9  0.1 - 1.0 mg/dL Final    Comment: For infants and newborns, interpretation of results should be based  on gestational age, weight and in agreement with clinical  observations.    Premature Infant recommended reference ranges:  Up to 24 hours.............<8.0 mg/dL  Up to 48 hours............<12.0 mg/dL  3-5 days..................<15.0 mg/dL  6-29 days.................<15.0 mg/dL      Alkaline Phosphatase 03/07/2024 57  55 - 135 U/L Final    AST 03/07/2024 13  10 - 40 U/L Final    ALT 03/07/2024 9 (L)  10 - 44 U/L Final    eGFR 03/07/2024 56.6 (A)  >60 mL/min/1.73 m^2 Final    Anion Gap 03/07/2024 9  8 - 16 mmol/L Final    TSH 03/07/2024 7.067 (H)  0.400 - 4.000 uIU/mL  Final    Free T4 03/07/2024 0.88  0.71 - 1.51 ng/dL Final    Ferritin 03/07/2024 292  20.0 - 300.0 ng/mL Final    Iron 03/07/2024 95  30 - 160 ug/dL Final    Transferrin 03/07/2024 242  200 - 375 mg/dL Final    TIBC 03/07/2024 358  250 - 450 ug/dL Final    Saturated Iron 03/07/2024 27  20 - 50 % Final     Imaging:   CT C/A/P 2/23/24  Chest:     There is a new nodule or lymph node in the left pulmonary hilum extending into the left lower lobe best visualized on series 2, image 49. The nodule measures 1.2 x 1.0 cm and is associated with an infiltrate extending into the superior segment of the left lower lobe. A noncalcified 5 mm nodule is present in the left upper lobe which is unchanged when compared to prior CTs dating back to 2007. A 5 mm calcified granuloma is also present in the left upper lobe. No pleural or pericardial effusion are present. Heart size is normal. The thoracic inlet and axillary regions are unremarkable. A vascular access catheter enters from a right subclavian approach with the tip in the superior vena cava.     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 The kidneys are otherwise unremarkable. The liver, spleen, pancreas, and adrenal glands are normal in size and appearance. A large solitary gallstone is present within the gallbladder and there are no signs of cholecystitis. The bile ducts are not dilated. An inferior vena cava filter is in place with the cephalic tip below the level of the renal veins. No lymph node enlargement, ascites, or inflammatory changes are evident in the abdomen or pelvis. The aorta tapers normally.       Assessment:       1. Malignant neoplasm of splenic flexure    2. Iron deficiency anemia, unspecified iron deficiency anemia type         Plan:     Colon Cancer - Pt with h/o colon cancer s/p resection in 2007 followed by adjuvant FOLFOX for 12 cycles  -Pt recently with resection of transverse colon and splenic  flexure 9/29/23 showing path showing invasive moderately differentiated adenocarcinoma with mucinous features, 33 benign lymph nodes, positive lymphovascular invasion, positive perineural invasion pT3N0  -Tumor shows intact expression of MLH1, PMS2, MSH2, MSH6  -Patient was positive for B Celio V600E mutation  -Pt has high risk stage II colon cancer given positive LVI and PNI  -PT is a candidate for treatment with 6 months of 5-FU or Capecitabine  -no clear evidence of metastatic disease on PET-CT 11/08/2023   -Will proceed with 6 months of 5 fluorouracil  -Pt s/p cycle 5  -Pharmacogenomic panel on 11/03/23 showed normal DPYD metabolizer but 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  03/11/24:  Proceed with Cycle 7 5FU/Leucovorin; f/u with Dr. Cameron as scheduled on 03/25 with labs prior.    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  Benji Cali NP  [facility]  Route Chart for Scheduling    Med Onc Chart Routing      Follow up with physician . F/u as scheduled with Dr. Cameron on 03/25 with labs prior   Follow up with GRETCHEN    Infusion scheduling note   Proceed with C7 5fu/Leucovorin   Injection scheduling note    Labs    Imaging    Pharmacy appointment    Other referrals              Treatment Plan Information   OP COLORECTAL FLUOROURACIL LEUCOVORIN Q2W (MOD DE GRAMONT)   Mahesh Caemron MD   Upcoming Treatment Dates - OP COLORECTAL FLUOROURACIL LEUCOVORIN Q2W (MOD DE GRAMONT)    3/11/2024       Chemotherapy       leucovorin calcium in dextrose 5 % (D5W) 250 mL infusion       fluorouraciL injection       fluorouracil chemo infusion       Antiemetics       ondansetron injection 8 mg  3/25/2024       Chemotherapy       leucovorin calcium in dextrose 5 % (D5W) 250 mL infusion       fluorouraciL injection       fluorouracil chemo infusion       Antiemetics       ondansetron injection 8 mg  4/8/2024       Chemotherapy       leucovorin calcium in dextrose 5 % (D5W) 250 mL infusion       fluorouraciL injection       fluorouracil chemo infusion       Antiemetics       ondansetron injection 8 mg  4/22/2024       Chemotherapy       leucovorin calcium in dextrose 5 % (D5W) 250 mL infusion       fluorouraciL injection       fluorouracil chemo infusion       Antiemetics       ondansetron injection 8 mg    Supportive Plan Information  OP FERRIC CARBOXYMALTOSE Q2W   Mahesh Cameron MD   Upcoming Treatment Dates - OP FERRIC  CARBOXYMALTOSE Q2W    1/25/2024       Supportive Care       ferric carboxymaltose (INJECTAFER) 750 mg in sodium chloride 0.9% 265 mL IVPB (ready to mix)

## 2024-03-11 NOTE — PLAN OF CARE
Problem: Adult Inpatient Plan of Care  Goal: Patient-Specific Goal (Individualized)  Outcome: Ongoing, Progressing  Flowsheets (Taken 3/11/2024 1135)  Anxieties, Fears or Concerns: none expressed at the moment  Individualized Care Needs:   recliner, blanket, pillow   family at chairside     Problem: Fatigue  Goal: Improved Activity Tolerance  Intervention: Promote Improved Energy  Flowsheets (Taken 3/11/2024 1135)  Fatigue Management: frequent rest breaks encouraged  Sleep/Rest Enhancement:   natural light exposure provided   noise level reduced   room darkened  Activity Management:   Ambulated -L4   Ambulated in jenkins - L4   Walk with assistive devise and /or staff member - L3

## 2024-03-11 NOTE — PLAN OF CARE
Problem: Adult Inpatient Plan of Care  Goal: Plan of Care Review  Outcome: Ongoing, Progressing  Flowsheets (Taken 3/11/2024 1500)  Plan of Care Reviewed With:   patient   daughter     Patient tolerated treatment. VSS. Port flushed with blood return. 5FU pump initiated at 2.2 ml/hr for the next 46 hours. Connections tightened and secured with coban. Biopatch and central line dressing in place to port. Patient instructed not to shower while port is accessed.   Patient will return on 3/13/24 for pump d/c.  AVS provided and reviewed. Patient ambulated per self/walker upon discharge from infusion center.

## 2024-03-13 ENCOUNTER — INFUSION (OUTPATIENT)
Dept: INFUSION THERAPY | Facility: HOSPITAL | Age: 84
End: 2024-03-13
Attending: INTERNAL MEDICINE
Payer: MEDICARE

## 2024-03-13 VITALS
WEIGHT: 206.13 LBS | TEMPERATURE: 98 F | BODY MASS INDEX: 35.19 KG/M2 | RESPIRATION RATE: 17 BRPM | SYSTOLIC BLOOD PRESSURE: 114 MMHG | HEART RATE: 68 BPM | DIASTOLIC BLOOD PRESSURE: 71 MMHG | HEIGHT: 64 IN

## 2024-03-13 DIAGNOSIS — C18.5 MALIGNANT NEOPLASM OF SPLENIC FLEXURE: Primary | ICD-10-CM

## 2024-03-13 PROCEDURE — A4216 STERILE WATER/SALINE, 10 ML: HCPCS | Mod: PN | Performed by: NURSE PRACTITIONER

## 2024-03-13 PROCEDURE — 25000003 PHARM REV CODE 250: Mod: PN | Performed by: NURSE PRACTITIONER

## 2024-03-13 PROCEDURE — 63600175 PHARM REV CODE 636 W HCPCS: Mod: PN | Performed by: NURSE PRACTITIONER

## 2024-03-13 RX ORDER — HEPARIN 100 UNIT/ML
500 SYRINGE INTRAVENOUS
Status: DISCONTINUED | OUTPATIENT
Start: 2024-03-13 | End: 2024-03-13 | Stop reason: HOSPADM

## 2024-03-13 RX ORDER — SODIUM CHLORIDE 0.9 % (FLUSH) 0.9 %
10 SYRINGE (ML) INJECTION
Status: DISCONTINUED | OUTPATIENT
Start: 2024-03-13 | End: 2024-03-13 | Stop reason: HOSPADM

## 2024-03-13 RX ADMIN — Medication 10 ML: at 12:03

## 2024-03-13 RX ADMIN — Medication 500 UNITS: at 12:03

## 2024-03-13 NOTE — PLAN OF CARE
Problem: Fatigue  Goal: Improved Activity Tolerance  Outcome: Ongoing, Progressing     Problem: Adult Inpatient Plan of Care  Goal: Plan of Care Review  Outcome: Met   Tolerated port flush WITH PUMP D/C well today Pt d/c to home with instructions  To private vehicle without difficulty  NAD

## 2024-03-14 ENCOUNTER — OFFICE VISIT (OUTPATIENT)
Dept: FAMILY MEDICINE | Facility: CLINIC | Age: 84
End: 2024-03-14
Payer: MEDICARE

## 2024-03-14 VITALS
HEART RATE: 66 BPM | OXYGEN SATURATION: 96 % | BODY MASS INDEX: 35.23 KG/M2 | HEIGHT: 64 IN | SYSTOLIC BLOOD PRESSURE: 120 MMHG | WEIGHT: 206.38 LBS | TEMPERATURE: 98 F | RESPIRATION RATE: 16 BRPM | DIASTOLIC BLOOD PRESSURE: 60 MMHG

## 2024-03-14 DIAGNOSIS — J84.10 CALCIFIED GRANULOMA OF LUNG: ICD-10-CM

## 2024-03-14 DIAGNOSIS — D50.9 IRON DEFICIENCY ANEMIA, UNSPECIFIED IRON DEFICIENCY ANEMIA TYPE: ICD-10-CM

## 2024-03-14 DIAGNOSIS — C18.9 MALIGNANT NEOPLASM OF COLON, UNSPECIFIED PART OF COLON: ICD-10-CM

## 2024-03-14 DIAGNOSIS — E03.4 HYPOTHYROIDISM DUE TO ACQUIRED ATROPHY OF THYROID: ICD-10-CM

## 2024-03-14 DIAGNOSIS — R94.6 BORDERLINE ABNORMAL TFTS: Primary | ICD-10-CM

## 2024-03-14 DIAGNOSIS — G89.29 CHRONIC RIGHT-SIDED LOW BACK PAIN WITHOUT SCIATICA: ICD-10-CM

## 2024-03-14 DIAGNOSIS — I10 ESSENTIAL HYPERTENSION: ICD-10-CM

## 2024-03-14 DIAGNOSIS — K21.9 GASTROESOPHAGEAL REFLUX DISEASE, UNSPECIFIED WHETHER ESOPHAGITIS PRESENT: ICD-10-CM

## 2024-03-14 DIAGNOSIS — M54.50 CHRONIC RIGHT-SIDED LOW BACK PAIN WITHOUT SCIATICA: ICD-10-CM

## 2024-03-14 DIAGNOSIS — Z95.828 PORT-A-CATH IN PLACE: ICD-10-CM

## 2024-03-14 DIAGNOSIS — E66.01 MORBID OBESITY WITH BMI OF 40.0-44.9, ADULT: ICD-10-CM

## 2024-03-14 PROCEDURE — G2211 COMPLEX E/M VISIT ADD ON: HCPCS | Mod: S$GLB,,, | Performed by: FAMILY MEDICINE

## 2024-03-14 PROCEDURE — 99214 OFFICE O/P EST MOD 30 MIN: CPT | Mod: S$GLB,,, | Performed by: FAMILY MEDICINE

## 2024-03-14 PROCEDURE — 99999 PR PBB SHADOW E&M-EST. PATIENT-LVL III: CPT | Mod: PBBFAC,,, | Performed by: FAMILY MEDICINE

## 2024-03-14 NOTE — PROGRESS NOTES
Subjective:       Patient ID: Danna Sorensen is a 83 y.o. female.    Chief Complaint: Follow-up (3mth f/u)    HPI  Review of Systems   Constitutional:  Negative for fatigue and unexpected weight change.   Respiratory:  Negative for chest tightness and shortness of breath.    Cardiovascular:  Negative for chest pain, palpitations and leg swelling.   Gastrointestinal:  Negative for abdominal pain.   Musculoskeletal:  Negative for arthralgias.   Neurological:  Negative for dizziness, syncope, light-headedness and headaches.       Patient Active Problem List   Diagnosis    Hypothyroid    Nuclear sclerosis    Hyperopia with astigmatism and presbyopia    DDD (degenerative disc disease), lumbar    Morbid obesity with BMI of 40.0-44.9, adult    Calcified granuloma of lung    History of colon cancer    Lumbar radiculitis    Other spondylosis, lumbar region    Chronic right-sided low back pain without sciatica    Vitamin D deficiency    Essential hypertension    Malignant neoplasm of colon    Decreased functional mobility    Muscle tightness    Decreased strength    Decreased range of motion    Venous lake of lip    GERD (gastroesophageal reflux disease)    Adenocarcinoma    DVT (deep vein thrombosis) in pregnancy    History of pulmonary embolus (PE)    Malignant neoplasm of splenic flexure    Insomnia    Other fatigue    Other low back pain    S/P IVC filter    Left renal mass    Port-A-Cath in place    Iron deficiency anemia    Immunodeficiency due to chemotherapy     Patient is here for a chronic conditions follow up.    Reviewed labs 11/2023   Ferritin low 16, anemia  TSH high, t4 normal    D dimer and bnp elevated 11/23.  Likely elevated chronically with no sign of worsening SOB over baseline    Dexa osteoporosis 5/22  on fosamax since 11/2023. Repeat 11/24     C/o trouble sleeping. Trazodone  mg no help. Told to try melatonin 5 mg      Urology NP O'Kristine left renal mass suspected malignancy. Mri showed benign  findings     GI Surg Dr. Chavez and Surgery Dr. Evans colon cancer Heme/onc NP Viraj following for colon cancer s/p resection and chemo 2007 with recent recurrence s/p extended robotic colectomy of the  transverse colon and splenic flexure with ileo colic anastomosis.  This was performed on September 29th, 2023 She did however have perineural and lymphovascular invasion.    Also of note she does have a known suspected malignancy of the left kidney.    port-a-cath placement 11/15/23.  chemo started 12/23  PET 11/2023 Patient is status post resection of colon carcinoma with no findings to indicate metastatic or recurrent disease.  Lung nodules, some of which are unchanged since 2007, benign, and others of which are new since 2007 but stable compared to 08/25/2023.  Continued CT follow-up is recommended.  Mass at the lower pole of the left kidney for which follow-up with MRI or multiphasic CT is recommended, please see comments above.        Vasc surg Dr. Verdin h/o DVT s/p IVC filter 9/2023 on eliquis        Lives with karinaalejandro Barbie-lots of support     GI Dr. Pinon planning egd 8/3/23 and colonoscopy 8/8/23 . Having breakthrough heartburn sx even after starting omeprazole 40mg a day. Increased to bid 4/2023-has helped a lot        Seen by Dr. Romel MAY . Had side effects lyrica- dry mouth, swollen arms, stomach pain, nausea. Stopped it. Did not do well on gabapentin either. Rx for tramadol given     mild normocytic anemia-slightly improved. Iron, folate and b12 normal 5/22  Dexa 5/22 osteoporosis on fosomax 5/22     ENT Dr. Tolentino venous lake of lip     F/u LBP. Started on lyrica 50mg 2 bid-caused side effects-stopped. PT completed 2/22 not much help. Flexeril prn . Tramadol 50mg #60 lasts 2 months or more for low back pain-helps but still waking up from sleep with pain.  Tylenol also helps Sees chiropractor Dr. Stearns. Has gradually worsening of lower back pain. Ache that radiates to right hip. No paresthesias.  No B/B incontinence.  Gabapentin- nausea.  Tried PT, KHARI and radiofrequency ablation without relief.       Eye [facility] Dr. Berrios treating cataracts        Had mri brast 2018- high risk.  3/22 mri breast negative. Cannot tolerate mammo.   Objective:      Physical Exam  Vitals and nursing note reviewed.   Constitutional:       Appearance: She is well-developed.   Cardiovascular:      Rate and Rhythm: Normal rate and regular rhythm.      Heart sounds: Normal heart sounds.   Pulmonary:      Effort: Pulmonary effort is normal.      Breath sounds: Normal breath sounds.   Skin:     General: Skin is warm and dry.   Neurological:      Mental Status: She is alert and oriented to person, place, and time.         Assessment:       1. Borderline abnormal TFTs    2. Port-A-Cath in place    3. Malignant neoplasm of colon, unspecified part of colon    4. Iron deficiency anemia, unspecified iron deficiency anemia type    5. Essential hypertension    6. Chronic right-sided low back pain without sciatica    7. Gastroesophageal reflux disease, unspecified whether esophagitis present    8. Hypothyroidism due to acquired atrophy of thyroid    9. Calcified granuloma of lung    10. Morbid obesity with BMI of 40.0-44.9, adult        Plan:         1. Borderline abnormal TFTs  Your thyroid function tests are borderline abnormal but these are close to normal.  I would like to monitor it and recheck it in 3 -6 months.  We will adjust your doses of thyroid hormone if the abnormality persists or worsens.       - Comprehensive Metabolic Panel; Future  - TSH; Future  - T4, Free; Future    2. Port-A-Cath in place  Cont port flushes as appropriate    3. Malignant neoplasm of colon, unspecified part of colon  Cont chemo cycles and onc mgmt    4. Iron deficiency anemia, unspecified iron deficiency anemia type  Normal. Cont monitoring    5. Essential hypertension  Controlled on current medications.  Continue current medications.      6. Chronic  "right-sided low back pain without sciatica  Significant improvement with weight loss    7. Gastroesophageal reflux disease, unspecified whether esophagitis present  Controlled on current medications.  Continue current medications.      8. Hypothyroidism due to acquired atrophy of thyroid  See above    9. Calcified granuloma of lung  stable    10. Morbid obesity with BMI of 40.0-44.9, adult  BMI now 35. Counseled patient on his ideal body weight, health consequences of being obese and current recommendations including weekly exercise and a heart healthy diet.  Current BMI is:Estimated body mass index is 35.42 kg/m² as calculated from the following:    Height as of this encounter: 5' 4" (1.626 m).    Weight as of this encounter: 93.6 kg (206 lb 5.6 oz)..  Patient is aware that ideal BMI < 25 or Weight in (lb) to have BMI = 25: 145.3.        Time spent with patient: 20 minutes    Patient with be reevaluated in 4 months or sooner prn    Greater than 50% of this visit was spent counseling as described in above documentation:Yes  "

## 2024-03-22 ENCOUNTER — LAB VISIT (OUTPATIENT)
Dept: LAB | Facility: HOSPITAL | Age: 84
End: 2024-03-22
Attending: INTERNAL MEDICINE
Payer: MEDICARE

## 2024-03-22 DIAGNOSIS — C18.5 MALIGNANT NEOPLASM OF SPLENIC FLEXURE: ICD-10-CM

## 2024-03-22 LAB
ALBUMIN SERPL BCP-MCNC: 3.7 G/DL (ref 3.5–5.2)
ALP SERPL-CCNC: 52 U/L (ref 55–135)
ALT SERPL W/O P-5'-P-CCNC: 10 U/L (ref 10–44)
ANION GAP SERPL CALC-SCNC: 9 MMOL/L (ref 8–16)
AST SERPL-CCNC: 13 U/L (ref 10–40)
BASOPHILS # BLD AUTO: 0.03 K/UL (ref 0–0.2)
BASOPHILS NFR BLD: 0.6 % (ref 0–1.9)
BILIRUB SERPL-MCNC: 0.9 MG/DL (ref 0.1–1)
BUN SERPL-MCNC: 21 MG/DL (ref 8–23)
CALCIUM SERPL-MCNC: 9.2 MG/DL (ref 8.7–10.5)
CHLORIDE SERPL-SCNC: 111 MMOL/L (ref 95–110)
CO2 SERPL-SCNC: 23 MMOL/L (ref 23–29)
CREAT SERPL-MCNC: 1.1 MG/DL (ref 0.5–1.4)
DIFFERENTIAL METHOD BLD: ABNORMAL
EOSINOPHIL # BLD AUTO: 0.1 K/UL (ref 0–0.5)
EOSINOPHIL NFR BLD: 1.7 % (ref 0–8)
ERYTHROCYTE [DISTWIDTH] IN BLOOD BY AUTOMATED COUNT: 16.5 % (ref 11.5–14.5)
EST. GFR  (NO RACE VARIABLE): 50.2 ML/MIN/1.73 M^2
GLUCOSE SERPL-MCNC: 89 MG/DL (ref 70–110)
HCT VFR BLD AUTO: 33.2 % (ref 37–48.5)
HGB BLD-MCNC: 10.9 G/DL (ref 12–16)
IMM GRANULOCYTES # BLD AUTO: 0.05 K/UL (ref 0–0.04)
IMM GRANULOCYTES NFR BLD AUTO: 1.1 % (ref 0–0.5)
LYMPHOCYTES # BLD AUTO: 1.1 K/UL (ref 1–4.8)
LYMPHOCYTES NFR BLD: 24.2 % (ref 18–48)
MCH RBC QN AUTO: 32.8 PG (ref 27–31)
MCHC RBC AUTO-ENTMCNC: 32.8 G/DL (ref 32–36)
MCV RBC AUTO: 100 FL (ref 82–98)
MONOCYTES # BLD AUTO: 0.6 K/UL (ref 0.3–1)
MONOCYTES NFR BLD: 13.5 % (ref 4–15)
NEUTROPHILS # BLD AUTO: 2.8 K/UL (ref 1.8–7.7)
NEUTROPHILS NFR BLD: 58.9 % (ref 38–73)
NRBC BLD-RTO: 0 /100 WBC
PLATELET # BLD AUTO: 191 K/UL (ref 150–450)
PMV BLD AUTO: 8.8 FL (ref 9.2–12.9)
POTASSIUM SERPL-SCNC: 5.2 MMOL/L (ref 3.5–5.1)
PROT SERPL-MCNC: 6.8 G/DL (ref 6–8.4)
RBC # BLD AUTO: 3.32 M/UL (ref 4–5.4)
SODIUM SERPL-SCNC: 143 MMOL/L (ref 136–145)
WBC # BLD AUTO: 4.67 K/UL (ref 3.9–12.7)

## 2024-03-22 PROCEDURE — 85025 COMPLETE CBC W/AUTO DIFF WBC: CPT | Mod: PN | Performed by: INTERNAL MEDICINE

## 2024-03-22 PROCEDURE — 80053 COMPREHEN METABOLIC PANEL: CPT | Mod: PN | Performed by: INTERNAL MEDICINE

## 2024-03-22 PROCEDURE — 36415 COLL VENOUS BLD VENIPUNCTURE: CPT | Mod: PN | Performed by: INTERNAL MEDICINE

## 2024-03-25 ENCOUNTER — OFFICE VISIT (OUTPATIENT)
Dept: HEMATOLOGY/ONCOLOGY | Facility: CLINIC | Age: 84
End: 2024-03-25
Payer: MEDICARE

## 2024-03-25 ENCOUNTER — INFUSION (OUTPATIENT)
Dept: INFUSION THERAPY | Facility: HOSPITAL | Age: 84
End: 2024-03-25
Attending: INTERNAL MEDICINE
Payer: MEDICARE

## 2024-03-25 ENCOUNTER — DOCUMENTATION ONLY (OUTPATIENT)
Dept: INFUSION THERAPY | Facility: HOSPITAL | Age: 84
End: 2024-03-25
Payer: MEDICARE

## 2024-03-25 VITALS
DIASTOLIC BLOOD PRESSURE: 60 MMHG | OXYGEN SATURATION: 98 % | HEIGHT: 64 IN | WEIGHT: 209.69 LBS | TEMPERATURE: 97 F | SYSTOLIC BLOOD PRESSURE: 128 MMHG | RESPIRATION RATE: 20 BRPM | BODY MASS INDEX: 35.8 KG/M2 | HEART RATE: 53 BPM

## 2024-03-25 VITALS
WEIGHT: 209.69 LBS | OXYGEN SATURATION: 98 % | SYSTOLIC BLOOD PRESSURE: 111 MMHG | TEMPERATURE: 97 F | HEIGHT: 64 IN | DIASTOLIC BLOOD PRESSURE: 64 MMHG | RESPIRATION RATE: 20 BRPM | HEART RATE: 44 BPM | BODY MASS INDEX: 35.8 KG/M2

## 2024-03-25 DIAGNOSIS — C18.5 MALIGNANT NEOPLASM OF SPLENIC FLEXURE: Primary | ICD-10-CM

## 2024-03-25 DIAGNOSIS — Z79.899 IMMUNODEFICIENCY DUE TO CHEMOTHERAPY: ICD-10-CM

## 2024-03-25 DIAGNOSIS — D84.821 IMMUNODEFICIENCY DUE TO CHEMOTHERAPY: ICD-10-CM

## 2024-03-25 DIAGNOSIS — R91.8 PULMONARY NODULES: ICD-10-CM

## 2024-03-25 DIAGNOSIS — D50.9 IRON DEFICIENCY ANEMIA, UNSPECIFIED IRON DEFICIENCY ANEMIA TYPE: ICD-10-CM

## 2024-03-25 DIAGNOSIS — N28.89 RENAL MASS, LEFT: ICD-10-CM

## 2024-03-25 DIAGNOSIS — D68.59 THROMBOPHILIA: ICD-10-CM

## 2024-03-25 DIAGNOSIS — T45.1X5A IMMUNODEFICIENCY DUE TO CHEMOTHERAPY: ICD-10-CM

## 2024-03-25 PROCEDURE — 99215 OFFICE O/P EST HI 40 MIN: CPT | Mod: S$GLB,,, | Performed by: INTERNAL MEDICINE

## 2024-03-25 PROCEDURE — A4216 STERILE WATER/SALINE, 10 ML: HCPCS | Mod: PN | Performed by: INTERNAL MEDICINE

## 2024-03-25 PROCEDURE — G2211 COMPLEX E/M VISIT ADD ON: HCPCS | Mod: S$GLB,,, | Performed by: INTERNAL MEDICINE

## 2024-03-25 PROCEDURE — 96375 TX/PRO/DX INJ NEW DRUG ADDON: CPT | Mod: PN

## 2024-03-25 PROCEDURE — 96367 TX/PROPH/DG ADDL SEQ IV INF: CPT | Mod: PN

## 2024-03-25 PROCEDURE — 25000003 PHARM REV CODE 250: Mod: PN | Performed by: INTERNAL MEDICINE

## 2024-03-25 PROCEDURE — 99999 PR PBB SHADOW E&M-EST. PATIENT-LVL IV: CPT | Mod: PBBFAC,,, | Performed by: INTERNAL MEDICINE

## 2024-03-25 PROCEDURE — 96409 CHEMO IV PUSH SNGL DRUG: CPT | Mod: PN

## 2024-03-25 PROCEDURE — 63600175 PHARM REV CODE 636 W HCPCS: Mod: PN | Performed by: INTERNAL MEDICINE

## 2024-03-25 PROCEDURE — 96416 CHEMO PROLONG INFUSE W/PUMP: CPT | Mod: PN

## 2024-03-25 RX ORDER — FLUOROURACIL 50 MG/ML
400 INJECTION, SOLUTION INTRAVENOUS
Status: CANCELLED | OUTPATIENT
Start: 2024-03-25

## 2024-03-25 RX ORDER — FLUOROURACIL 50 MG/ML
400 INJECTION, SOLUTION INTRAVENOUS
Status: COMPLETED | OUTPATIENT
Start: 2024-03-25 | End: 2024-03-25

## 2024-03-25 RX ORDER — SODIUM CHLORIDE 0.9 % (FLUSH) 0.9 %
10 SYRINGE (ML) INJECTION
Status: CANCELLED | OUTPATIENT
Start: 2024-03-25

## 2024-03-25 RX ORDER — PROCHLORPERAZINE EDISYLATE 5 MG/ML
5 INJECTION INTRAMUSCULAR; INTRAVENOUS ONCE AS NEEDED
Status: CANCELLED
Start: 2024-03-25

## 2024-03-25 RX ORDER — ONDANSETRON HYDROCHLORIDE 2 MG/ML
8 INJECTION, SOLUTION INTRAVENOUS
Status: CANCELLED | OUTPATIENT
Start: 2024-03-25

## 2024-03-25 RX ORDER — PROCHLORPERAZINE EDISYLATE 5 MG/ML
5 INJECTION INTRAMUSCULAR; INTRAVENOUS ONCE AS NEEDED
Status: DISCONTINUED | OUTPATIENT
Start: 2024-03-25 | End: 2024-03-25 | Stop reason: HOSPADM

## 2024-03-25 RX ORDER — SODIUM CHLORIDE 0.9 % (FLUSH) 0.9 %
10 SYRINGE (ML) INJECTION
Status: CANCELLED | OUTPATIENT
Start: 2024-03-27

## 2024-03-25 RX ORDER — ONDANSETRON HYDROCHLORIDE 2 MG/ML
8 INJECTION, SOLUTION INTRAVENOUS
Status: COMPLETED | OUTPATIENT
Start: 2024-03-25 | End: 2024-03-25

## 2024-03-25 RX ORDER — SODIUM CHLORIDE 0.9 % (FLUSH) 0.9 %
10 SYRINGE (ML) INJECTION
Status: DISCONTINUED | OUTPATIENT
Start: 2024-03-25 | End: 2024-03-25 | Stop reason: HOSPADM

## 2024-03-25 RX ORDER — EPINEPHRINE 0.3 MG/.3ML
0.3 INJECTION SUBCUTANEOUS ONCE AS NEEDED
Status: CANCELLED | OUTPATIENT
Start: 2024-03-25

## 2024-03-25 RX ORDER — DIPHENHYDRAMINE HYDROCHLORIDE 50 MG/ML
50 INJECTION INTRAMUSCULAR; INTRAVENOUS ONCE AS NEEDED
Status: CANCELLED | OUTPATIENT
Start: 2024-03-25

## 2024-03-25 RX ORDER — HEPARIN 100 UNIT/ML
500 SYRINGE INTRAVENOUS
Status: CANCELLED | OUTPATIENT
Start: 2024-03-27

## 2024-03-25 RX ORDER — HEPARIN 100 UNIT/ML
500 SYRINGE INTRAVENOUS
Status: CANCELLED | OUTPATIENT
Start: 2024-03-25

## 2024-03-25 RX ORDER — DIPHENHYDRAMINE HYDROCHLORIDE 50 MG/ML
50 INJECTION INTRAMUSCULAR; INTRAVENOUS ONCE AS NEEDED
Status: DISCONTINUED | OUTPATIENT
Start: 2024-03-25 | End: 2024-03-25 | Stop reason: HOSPADM

## 2024-03-25 RX ADMIN — FLUOROURACIL 830 MG: 50 INJECTION, SOLUTION INTRAVENOUS at 12:03

## 2024-03-25 RX ADMIN — FLUOROURACIL 4970 MG: 50 INJECTION, SOLUTION INTRAVENOUS at 12:03

## 2024-03-25 RX ADMIN — ONDANSETRON 8 MG: 2 INJECTION INTRAMUSCULAR; INTRAVENOUS at 12:03

## 2024-03-25 RX ADMIN — LEUCOVORIN CALCIUM 830 MG: 350 INJECTION, POWDER, LYOPHILIZED, FOR SOLUTION INTRAMUSCULAR; INTRAVENOUS at 12:03

## 2024-03-25 RX ADMIN — Medication 10 ML: at 12:03

## 2024-03-25 RX ADMIN — SODIUM CHLORIDE: 9 INJECTION, SOLUTION INTRAVENOUS at 11:03

## 2024-03-25 NOTE — PROGRESS NOTES
Oncology Nutrition   Chemotherapy Infusion Visit    Nutrition Follow Up   RD met with patient at chairside during infusion tx. Pt reports continues to do well nutritionally- eating without difficulty, maintaining weight, and denies nutrition related side effects.      Wt Readings from Last 10 Encounters:   03/25/24 95.1 kg (209 lb 10.5 oz)   03/25/24 95.1 kg (209 lb 10.5 oz)   03/14/24 93.6 kg (206 lb 5.6 oz)   03/13/24 93.5 kg (206 lb 2.1 oz)   03/11/24 93.5 kg (206 lb 2.1 oz)   03/11/24 93.5 kg (206 lb 2.1 oz)   02/28/24 92.9 kg (204 lb 12.9 oz)   02/26/24 92.6 kg (204 lb 2.3 oz)   02/26/24 92.6 kg (204 lb 2.3 oz)   02/19/24 90 kg (198 lb 6.6 oz)       All other nutrition questions/concerns addressed as appropriate. Will continue to follow and monitor throughout treatment PRN.     Ana Cristina Coles, MS, RD, LDN  03/25/2024  12:17 PM

## 2024-03-25 NOTE — PLAN OF CARE
Problem: Adult Inpatient Plan of Care  Goal: Plan of Care Review  3/25/2024 1611 by Desiree Angeles RN  Outcome: Ongoing, Progressing  Flowsheets (Taken 3/25/2024 1310)  Plan of Care Reviewed With:   patient   family   Pt tolerated her 5FU/Leucovorin infusions well, NAD. Pt discharge with her home CADD pump infusing 5FU as ordered, pt is familiar with it's use. Pt given a schedule and reviewed, pt verbalized understanding. Pt ambulated out of the clinic without difficulty accompanied by her niece.

## 2024-03-27 ENCOUNTER — INFUSION (OUTPATIENT)
Dept: INFUSION THERAPY | Facility: HOSPITAL | Age: 84
End: 2024-03-27
Attending: INTERNAL MEDICINE
Payer: MEDICARE

## 2024-03-27 VITALS
WEIGHT: 209.69 LBS | OXYGEN SATURATION: 100 % | SYSTOLIC BLOOD PRESSURE: 128 MMHG | DIASTOLIC BLOOD PRESSURE: 68 MMHG | HEART RATE: 58 BPM | TEMPERATURE: 98 F | RESPIRATION RATE: 22 BRPM | HEIGHT: 64 IN | BODY MASS INDEX: 35.8 KG/M2

## 2024-03-27 DIAGNOSIS — C18.5 MALIGNANT NEOPLASM OF SPLENIC FLEXURE: Primary | ICD-10-CM

## 2024-03-27 PROCEDURE — 63600175 PHARM REV CODE 636 W HCPCS: Mod: PN | Performed by: INTERNAL MEDICINE

## 2024-03-27 PROCEDURE — A4216 STERILE WATER/SALINE, 10 ML: HCPCS | Mod: PN | Performed by: INTERNAL MEDICINE

## 2024-03-27 PROCEDURE — 25000003 PHARM REV CODE 250: Mod: PN | Performed by: INTERNAL MEDICINE

## 2024-03-27 RX ORDER — SODIUM CHLORIDE 0.9 % (FLUSH) 0.9 %
10 SYRINGE (ML) INJECTION
Status: DISCONTINUED | OUTPATIENT
Start: 2024-03-27 | End: 2024-03-27 | Stop reason: HOSPADM

## 2024-03-27 RX ORDER — HEPARIN 100 UNIT/ML
500 SYRINGE INTRAVENOUS
Status: DISCONTINUED | OUTPATIENT
Start: 2024-03-27 | End: 2024-03-27 | Stop reason: HOSPADM

## 2024-03-27 RX ADMIN — Medication 500 UNITS: at 12:03

## 2024-03-27 RX ADMIN — Medication 10 ML: at 12:03

## 2024-03-27 NOTE — PLAN OF CARE
Pt came in for pump D/C. Pt tolerated home 5FU well and had no complaints. PAC flushed w/ NS and heparin and deaccessed. Pt ambulated from clinic in [facility] using cane.

## 2024-04-01 NOTE — TELEPHONE ENCOUNTER
". [facility]  160/01  PROGRESS NOTE   Patient: Jackie Person  Today's Date: 2024    YOB: 1936  Admission Date: 3/24/2024    MRN: [id number]  Inpatient LOS: 8    Attending: Ab Price MD  Hospital Day: Hospital Day: 9    Subjective   HISTORY AND SUBJECTIVE COMPLAINTS     Chief Complaint:  C. difficile diarrhea, acute kidney injury, sepsis. Interval History / Subjective:   He complains of lower quadrant abdominal pain. No fever or chest pain. No shortness of breath. He has not interested in discussing hospice or palliative care today and was somewhat upset when I tried to bring it up. States that pain got worse after he probably ate too much. Hospital Course:  Jackie Person is a 82year old male who presented on 3/24/2024 with complaints of Weakness and Fall  . ROS:  Pertinent systems negative except as above.     Objective   PHYSICAL EXAMINATION     Vital 24 Hour Range Most Recent Value   Temperature Temp  Min: 98 Â°F (36.7 Â°C)  Max: 99.1 Â°F (37.3 Â°C) 98 Â°F (36.7 Â°C)   Pulse Pulse  Min: 75  Max: 85 85   Respiratory Resp  Min: 18  Max: 22 18   Blood Pressure BP  Min: 105/63  Max: 126/66 105/63   Pulse Oximetry SpO2  Min: 90 %  Max: 95 % 93 %   Arterial BP No data recorded     O2 O2 Flow Rate (L/min)  Av L/min  Min: 2 L/min   Min taken time: 24 1125  Max: 2 L/min   Max taken time: 24 1125       Recorded Intake and Output:  Intake/Output Summary (Last 24 hours) at 2024 1140  Last data filed at 2024 0536  Gross per 24 hour   Intake 460 ml   Output 1350 ml   Net -890 ml      Recorded Last Stool Occurrence:       Vital Most Recent Value First Value   Weight 81.3 kg (179 lb 3.7 oz) Weight: 69.9 kg (154 lb 1.6 oz)   Height 6' 1"" (185.4 cm) Height: 6' (182.9 cm)   BMI 23.65 N/A     General: Chronically ill looking, in no distress   CV: regular rate and rhythm and no murmurs, rubs, or thrills  Resp: clear to auscultation bilaterally  Abd:  Right " Spoke with pt. Scheduled 1 year f/u per pt request. Pt verbalized understanding.    lower quadrant colostomy with liquid stools. Ext: no pedal oedema  Skin:  Stage II sacral decubitus ulcers  Neuro: CN 2-12 grossly intact and no focal deficits noted  Psych: normal judgement and insight and oriented to time, place and person    TEST RESULTS     Labs: The Laboratory values listed below have been reviewed and pertinent findings discussed in the Assessment and Plan. Laboratory values:   Recent Labs   Lab 04/01/24  0454 03/31/24  2147 03/31/24  1345 03/31/24  0501 03/30/24  1500 03/30/24  0451   SODIUM 140  --   --  141  --  144   POTASSIUM 4.0 4.2 3.5 3.5   < > 3.5  3.2*   CHLORIDE 109  --   --  109  --  110   CO2 23  --   --  23  --  22   CALCIUM 7.6*  --   --  7.6*  --  7.7*   GLUCOSE 130*  --   --  127*  --  98   BUN 6  --   --  10  --  15   CREATININE 0.73  --   --  0.73  --  0.76    < > = values in this interval not displayed. Recent Labs   Lab 03/31/24  0501 03/30/24  0451 03/29/24  0442   ALBUMIN 1.5* 1.5* 1.5*   AST 21 21 20   GPT 8 9 8   BILIRUBIN 0.4 0.5 0.5     Recent Labs   Lab 03/28/24  0501 03/26/24  0449 03/25/24  1629   PCT 1.66*  --   --    LACTA  --  2.0 2.1*     No results available in last 24 hours      Radiology: Imaging studies have been reviewed and pertinent findings discussed in the Assessment and Plan. Results for orders placed or performed during the hospital encounter of 03/24/24 (from the past 48 hour(s))   XR CHEST AP OR PA    Impression    IMPRESSION:  Overall, stable findings in the chest. Persistent airspace opacities,  particularly in the inferior medial aspect of the right lung base.       Electronically Signed by: Srinivas Fileds MD  Signed on: 3/30/2024 12:05 PM  Created on Workstation ID: [id number]  Signed on Workstation ID: [id number]        ANCILLARY ORDERS     Diet:  Fiber Restricted Diet  Telemetry: On  Consults:    IP CONSULT TO ONCOLOGY  IP CONSULT TO PALLIATIVE CARE  IP CONSULT TO INFECTIOUS DISEASES  IP CONSULT TO GENERAL SURGERY  IP CONSULT TO NUTRITION SERVICES  IP CONSULT TO WOUND CARE MEDICAL PROVIDER  Therapy Orders:   PT and OT Orders Placed this Encounter   Procedures   â¢ Occupational Therapy Evaluation   â¢ Occupational Therapy Treatment   â¢ Physical Therapy Evaluation   â¢ Physical Therapy Treatment       ADVANCED DIRECTIVES     Code Status: Do Not Resuscitate         ASSESSMENT AND PLAN     61-year-old male with a significant medical history, including advanced rectal cancer and primary lung cancer, was admitted on March 24th with severe sepsis secondary to Clostridium difficile colitis and Escherichia coli UTI. The patient is also noted to have SBO, which is being actively managed by the General Surgery team with the placement of a nasogastric (NG) tube on low intermittent suction (LIS). He is currently on full liquid diet and has not been receptive to palliative care consultation. Escherichia coli UTI   C. difficile colitis  Leukocytosis   Severe sepsis - resolved  Will continue IV cefepime and metronidazole. Vancomycin enemas has been changed to p.o. WBC continues to trend up although there is no fever or any other acute signs of infection. will discuss with ID. Suspected small bowel obstruction vs ileus  Initial CT of the abdomen was suspicious for partial small-bowel obstruction. NG was applied to low intermittent suction and was discontinued yesterday. He is tolerating full liquid diet. 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 He has not been receptive to palliative care. Will continue pain control with Norco and IV morphine. Microcytic anemia with thrombocytosis   Completed IV iron infusion. Hemoglobin is stable. Stage II sacral decubitus ulcers-this has been followed by wound care. Patient is on IV antibiotics.   Will be contributing to elevated white cell count. Smoking status:      Nutrition status: Does Not Meet Criteria for Malnutrition   Body mass index is 23.65 kg/mÂ². - Patient has an appropriate weight with BMI 18.5-24  DVT Prophylaxis: SCDs       DISCHARGE PLANNING     The patient's treatment plans were discussed with patient and RN.     Discharge Planning    Barriers to discharge: Patient is not medically ready and needs to remain in the hospital today due to colitis  Anticipated discharge destination: Assisted living  Expected Discharge Date: GERA Meyer MDHospitalist  4/1/2024  11:40 AM

## 2024-04-05 ENCOUNTER — LAB VISIT (OUTPATIENT)
Dept: LAB | Facility: HOSPITAL | Age: 84
End: 2024-04-05
Attending: INTERNAL MEDICINE
Payer: MEDICARE

## 2024-04-05 DIAGNOSIS — C18.5 MALIGNANT NEOPLASM OF SPLENIC FLEXURE: ICD-10-CM

## 2024-04-05 LAB
ALBUMIN SERPL BCP-MCNC: 3.7 G/DL (ref 3.5–5.2)
ALP SERPL-CCNC: 60 U/L (ref 55–135)
ALT SERPL W/O P-5'-P-CCNC: 11 U/L (ref 10–44)
ANION GAP SERPL CALC-SCNC: 9 MMOL/L (ref 8–16)
AST SERPL-CCNC: 13 U/L (ref 10–40)
BASOPHILS # BLD AUTO: 0.03 K/UL (ref 0–0.2)
BASOPHILS NFR BLD: 0.6 % (ref 0–1.9)
BILIRUB SERPL-MCNC: 1 MG/DL (ref 0.1–1)
BUN SERPL-MCNC: 18 MG/DL (ref 8–23)
CALCIUM SERPL-MCNC: 9.4 MG/DL (ref 8.7–10.5)
CHLORIDE SERPL-SCNC: 110 MMOL/L (ref 95–110)
CO2 SERPL-SCNC: 22 MMOL/L (ref 23–29)
CREAT SERPL-MCNC: 1 MG/DL (ref 0.5–1.4)
DIFFERENTIAL METHOD BLD: ABNORMAL
EOSINOPHIL # BLD AUTO: 0.1 K/UL (ref 0–0.5)
EOSINOPHIL NFR BLD: 2.3 % (ref 0–8)
ERYTHROCYTE [DISTWIDTH] IN BLOOD BY AUTOMATED COUNT: 16 % (ref 11.5–14.5)
EST. GFR  (NO RACE VARIABLE): 56.2 ML/MIN/1.73 M^2
GLUCOSE SERPL-MCNC: 97 MG/DL (ref 70–110)
HCT VFR BLD AUTO: 30.5 % (ref 37–48.5)
HGB BLD-MCNC: 10.7 G/DL (ref 12–16)
IMM GRANULOCYTES # BLD AUTO: 0.06 K/UL (ref 0–0.04)
IMM GRANULOCYTES NFR BLD AUTO: 1.3 % (ref 0–0.5)
LYMPHOCYTES # BLD AUTO: 1 K/UL (ref 1–4.8)
LYMPHOCYTES NFR BLD: 21.7 % (ref 18–48)
MCH RBC QN AUTO: 35 PG (ref 27–31)
MCHC RBC AUTO-ENTMCNC: 35.1 G/DL (ref 32–36)
MCV RBC AUTO: 100 FL (ref 82–98)
MONOCYTES # BLD AUTO: 0.6 K/UL (ref 0.3–1)
MONOCYTES NFR BLD: 13.2 % (ref 4–15)
NEUTROPHILS # BLD AUTO: 2.9 K/UL (ref 1.8–7.7)
NEUTROPHILS NFR BLD: 60.9 % (ref 38–73)
NRBC BLD-RTO: 0 /100 WBC
PLATELET # BLD AUTO: 163 K/UL (ref 150–450)
PMV BLD AUTO: 8.6 FL (ref 9.2–12.9)
POTASSIUM SERPL-SCNC: 4.8 MMOL/L (ref 3.5–5.1)
PROT SERPL-MCNC: 6.6 G/DL (ref 6–8.4)
RBC # BLD AUTO: 3.06 M/UL (ref 4–5.4)
SODIUM SERPL-SCNC: 141 MMOL/L (ref 136–145)
WBC # BLD AUTO: 4.7 K/UL (ref 3.9–12.7)

## 2024-04-05 PROCEDURE — 80053 COMPREHEN METABOLIC PANEL: CPT | Mod: PN | Performed by: INTERNAL MEDICINE

## 2024-04-05 PROCEDURE — 36415 COLL VENOUS BLD VENIPUNCTURE: CPT | Mod: PN | Performed by: INTERNAL MEDICINE

## 2024-04-05 PROCEDURE — 85025 COMPLETE CBC W/AUTO DIFF WBC: CPT | Mod: PN | Performed by: INTERNAL MEDICINE

## 2024-04-07 DIAGNOSIS — J30.1 SEASONAL ALLERGIC RHINITIS DUE TO POLLEN: ICD-10-CM

## 2024-04-07 NOTE — TELEPHONE ENCOUNTER
Care Due:                  Date            Visit Type   Department     Provider  --------------------------------------------------------------------------------                                EP -                              PRIMARY      [facility]  Last Visit: 03-      CARE ([facility])   LISSA Kim                              EP -                              PRIMARY      [facility]  Next Visit: 07-      CARE ([facility])   MEDICINE       Claudia Kim                                                            Last  Test          Frequency    Reason                     Performed    Due Date  --------------------------------------------------------------------------------    Vitamin D...  12 months..  alendronate..............  01- 01-    [facility] Embedded Care Due Messages. Reference number: [id number].   4/07/2024 11:55:10 AM CDT

## 2024-04-08 ENCOUNTER — OFFICE VISIT (OUTPATIENT)
Dept: HEMATOLOGY/ONCOLOGY | Facility: CLINIC | Age: 84
End: 2024-04-08
Payer: MEDICARE

## 2024-04-08 ENCOUNTER — INFUSION (OUTPATIENT)
Dept: INFUSION THERAPY | Facility: HOSPITAL | Age: 84
End: 2024-04-08
Attending: INTERNAL MEDICINE
Payer: MEDICARE

## 2024-04-08 VITALS
DIASTOLIC BLOOD PRESSURE: 65 MMHG | WEIGHT: 212.75 LBS | SYSTOLIC BLOOD PRESSURE: 132 MMHG | HEIGHT: 64 IN | HEART RATE: 68 BPM | BODY MASS INDEX: 36.32 KG/M2 | OXYGEN SATURATION: 99 % | TEMPERATURE: 98 F | RESPIRATION RATE: 16 BRPM

## 2024-04-08 VITALS
RESPIRATION RATE: 16 BRPM | SYSTOLIC BLOOD PRESSURE: 114 MMHG | BODY MASS INDEX: 36.32 KG/M2 | OXYGEN SATURATION: 99 % | HEIGHT: 64 IN | DIASTOLIC BLOOD PRESSURE: 68 MMHG | HEART RATE: 55 BPM | TEMPERATURE: 98 F | WEIGHT: 212.75 LBS

## 2024-04-08 DIAGNOSIS — R91.8 PULMONARY NODULES: ICD-10-CM

## 2024-04-08 DIAGNOSIS — C18.5 MALIGNANT NEOPLASM OF SPLENIC FLEXURE: Primary | ICD-10-CM

## 2024-04-08 DIAGNOSIS — T45.1X5A IMMUNODEFICIENCY DUE TO CHEMOTHERAPY: ICD-10-CM

## 2024-04-08 DIAGNOSIS — G47.00 INSOMNIA, UNSPECIFIED TYPE: Primary | ICD-10-CM

## 2024-04-08 DIAGNOSIS — D68.59 THROMBOPHILIA: ICD-10-CM

## 2024-04-08 DIAGNOSIS — D50.9 IRON DEFICIENCY ANEMIA, UNSPECIFIED IRON DEFICIENCY ANEMIA TYPE: ICD-10-CM

## 2024-04-08 DIAGNOSIS — C18.5 MALIGNANT NEOPLASM OF SPLENIC FLEXURE: ICD-10-CM

## 2024-04-08 DIAGNOSIS — T45.1X5A CHEMOTHERAPY-INDUCED FATIGUE: ICD-10-CM

## 2024-04-08 DIAGNOSIS — D84.821 IMMUNODEFICIENCY DUE TO CHEMOTHERAPY: ICD-10-CM

## 2024-04-08 DIAGNOSIS — R53.83 CHEMOTHERAPY-INDUCED FATIGUE: ICD-10-CM

## 2024-04-08 DIAGNOSIS — Z79.899 IMMUNODEFICIENCY DUE TO CHEMOTHERAPY: ICD-10-CM

## 2024-04-08 DIAGNOSIS — N28.89 RENAL MASS, LEFT: ICD-10-CM

## 2024-04-08 PROCEDURE — 25000003 PHARM REV CODE 250: Mod: PN | Performed by: INTERNAL MEDICINE

## 2024-04-08 PROCEDURE — 1159F MED LIST DOCD IN RCRD: CPT | Mod: CPTII,S$GLB,, | Performed by: NURSE PRACTITIONER

## 2024-04-08 PROCEDURE — 3288F FALL RISK ASSESSMENT DOCD: CPT | Mod: CPTII,S$GLB,,

## 2024-04-08 PROCEDURE — 99213 OFFICE O/P EST LOW 20 MIN: CPT | Mod: S$GLB,,,

## 2024-04-08 PROCEDURE — 1125F AMNT PAIN NOTED PAIN PRSNT: CPT | Mod: CPTII,S$GLB,,

## 2024-04-08 PROCEDURE — 99999 PR PBB SHADOW E&M-EST. PATIENT-LVL I: CPT | Mod: PBBFAC,,, | Performed by: NURSE PRACTITIONER

## 2024-04-08 PROCEDURE — 63600175 PHARM REV CODE 636 W HCPCS: Mod: PN | Performed by: INTERNAL MEDICINE

## 2024-04-08 PROCEDURE — 3078F DIAST BP <80 MM HG: CPT | Mod: CPTII,S$GLB,,

## 2024-04-08 PROCEDURE — 1159F MED LIST DOCD IN RCRD: CPT | Mod: CPTII,S$GLB,,

## 2024-04-08 PROCEDURE — G2211 COMPLEX E/M VISIT ADD ON: HCPCS | Mod: S$GLB,,,

## 2024-04-08 PROCEDURE — 1160F RVW MEDS BY RX/DR IN RCRD: CPT | Mod: CPTII,S$GLB,, | Performed by: NURSE PRACTITIONER

## 2024-04-08 PROCEDURE — 96409 CHEMO IV PUSH SNGL DRUG: CPT | Mod: PN

## 2024-04-08 PROCEDURE — 96367 TX/PROPH/DG ADDL SEQ IV INF: CPT | Mod: PN

## 2024-04-08 PROCEDURE — 99215 OFFICE O/P EST HI 40 MIN: CPT | Mod: S$GLB,,, | Performed by: NURSE PRACTITIONER

## 2024-04-08 PROCEDURE — 3075F SYST BP GE 130 - 139MM HG: CPT | Mod: CPTII,S$GLB,,

## 2024-04-08 PROCEDURE — 96416 CHEMO PROLONG INFUSE W/PUMP: CPT | Mod: PN

## 2024-04-08 PROCEDURE — 1101F PT FALLS ASSESS-DOCD LE1/YR: CPT | Mod: CPTII,S$GLB,,

## 2024-04-08 PROCEDURE — 96375 TX/PRO/DX INJ NEW DRUG ADDON: CPT | Mod: PN

## 2024-04-08 PROCEDURE — 1160F RVW MEDS BY RX/DR IN RCRD: CPT | Mod: CPTII,S$GLB,,

## 2024-04-08 PROCEDURE — 99999 PR PBB SHADOW E&M-EST. PATIENT-LVL IV: CPT | Mod: PBBFAC,,,

## 2024-04-08 RX ORDER — HEPARIN 100 UNIT/ML
500 SYRINGE INTRAVENOUS
Status: CANCELLED | OUTPATIENT
Start: 2024-04-10

## 2024-04-08 RX ORDER — ONDANSETRON HYDROCHLORIDE 2 MG/ML
8 INJECTION, SOLUTION INTRAVENOUS
Status: CANCELLED | OUTPATIENT
Start: 2024-04-08

## 2024-04-08 RX ORDER — DIPHENHYDRAMINE HYDROCHLORIDE 50 MG/ML
50 INJECTION INTRAMUSCULAR; INTRAVENOUS ONCE AS NEEDED
Status: DISCONTINUED | OUTPATIENT
Start: 2024-04-08 | End: 2024-04-08 | Stop reason: HOSPADM

## 2024-04-08 RX ORDER — ONDANSETRON HYDROCHLORIDE 2 MG/ML
8 INJECTION, SOLUTION INTRAVENOUS
Status: COMPLETED | OUTPATIENT
Start: 2024-04-08 | End: 2024-04-08

## 2024-04-08 RX ORDER — SODIUM CHLORIDE 0.9 % (FLUSH) 0.9 %
10 SYRINGE (ML) INJECTION
Status: CANCELLED | OUTPATIENT
Start: 2024-04-08

## 2024-04-08 RX ORDER — SODIUM CHLORIDE 0.9 % (FLUSH) 0.9 %
10 SYRINGE (ML) INJECTION
Status: CANCELLED | OUTPATIENT
Start: 2024-04-10

## 2024-04-08 RX ORDER — HEPARIN 100 UNIT/ML
500 SYRINGE INTRAVENOUS
Status: CANCELLED | OUTPATIENT
Start: 2024-04-08

## 2024-04-08 RX ORDER — DIPHENHYDRAMINE HYDROCHLORIDE 50 MG/ML
50 INJECTION INTRAMUSCULAR; INTRAVENOUS ONCE AS NEEDED
Status: CANCELLED | OUTPATIENT
Start: 2024-04-08

## 2024-04-08 RX ORDER — EPINEPHRINE 0.3 MG/.3ML
0.3 INJECTION SUBCUTANEOUS ONCE AS NEEDED
Status: CANCELLED | OUTPATIENT
Start: 2024-04-08

## 2024-04-08 RX ORDER — FLUOROURACIL 50 MG/ML
400 INJECTION, SOLUTION INTRAVENOUS
Status: CANCELLED | OUTPATIENT
Start: 2024-04-08

## 2024-04-08 RX ORDER — EPINEPHRINE 0.3 MG/.3ML
0.3 INJECTION SUBCUTANEOUS ONCE AS NEEDED
Status: DISCONTINUED | OUTPATIENT
Start: 2024-04-08 | End: 2024-04-08 | Stop reason: HOSPADM

## 2024-04-08 RX ORDER — FLUOROURACIL 50 MG/ML
400 INJECTION, SOLUTION INTRAVENOUS
Status: COMPLETED | OUTPATIENT
Start: 2024-04-08 | End: 2024-04-08

## 2024-04-08 RX ORDER — PROCHLORPERAZINE EDISYLATE 5 MG/ML
5 INJECTION INTRAMUSCULAR; INTRAVENOUS ONCE AS NEEDED
Status: CANCELLED
Start: 2024-04-08

## 2024-04-08 RX ADMIN — FLUOROURACIL 835 MG: 50 INJECTION, SOLUTION INTRAVENOUS at 03:04

## 2024-04-08 RX ADMIN — LEUCOVORIN CALCIUM 835 MG: 350 INJECTION, POWDER, LYOPHILIZED, FOR SOLUTION INTRAMUSCULAR; INTRAVENOUS at 03:04

## 2024-04-08 RX ADMIN — ONDANSETRON 8 MG: 2 INJECTION INTRAMUSCULAR; INTRAVENOUS at 02:04

## 2024-04-08 RX ADMIN — SODIUM CHLORIDE: 9 INJECTION, SOLUTION INTRAVENOUS at 02:04

## 2024-04-08 RX ADMIN — FLUOROURACIL 5000 MG: 50 INJECTION, SOLUTION INTRAVENOUS at 03:04

## 2024-04-08 NOTE — PROGRESS NOTES
Danna Sorensen  84 y.o. is here to seek an integrative approach to discuss side effects related to colon cancer treatment.     HPI  Mrs. Sorensen is here today with her niece Barbie. She had treatment in [id number] for colon cancer. She states she has been cancer free until now when she was diagnosed again with colon cancer. She reports she does not sleep well. She will sleep 2 for 2 hours and then wake up for 2 hours and she does this through the night. Her poor sleep is not new as she has not slept well previously. She has a good appetite and eats a lot of vegetables. She states she eats more vegetables than meat. She states she does not feel stressed, but she does worry. She states her activity level is low since her  . She was diagnosed with arthritis when she was 21 years old so she has a long history of chronic low back pain and joint pain.    She is a , her   in April. She lives alone and her niece Barbie lives with her. She has a good support system     1/3/2024  Mrs. Clay is here today getting treatment. She is not sleeping well, trazodone not helping. She sleeps 2-3 hours and then wakes up and gets up for an hour and then goes back to sleep. She states this is not a new habit and has occurred for years.   She has nausea but uses soft peppermint candies and gets relief. She reports low stress. She reports a good appetite and is eating healthy. She is now eating more vegetables and less meat. She has been walking more recently, especially shopping with her niece.   She states she is doing well overall. She states this chemo treatment is not as bad as the last time she had treatment.     Today's Visit  Mrs. Clay is here today getting treatment with Barbie at her chairside. She reports  increased fatigue, but takes breaks as needed. She continues to not sleep well, but the sleep has improved to 3-4 hours of sleep and then she wakes up and goes back to sleep. She has a good appetite, denies  nausea. She is eating healthy and eats a lot of fruit. She believes she is getting enough protein with her protein bars, eggs, chicken, and meat. She reports she is walking 3-4 times a week.  She tries to stay active at the house. She did get home health PT which was helpful. She reports she is doing well overall.     Pillars Assessment    Sleep  How many hours of sleep per night? 7 hours of broken sleep  Do you have trouble falling asleep, staying asleep or waking up earlier than you need to? no  Do you have daytime fatigue? yes  Do you need medication for sleep? no  Do you use any supplements or other interventions for sleep? Melatonin    Resilience  Rate your current level of stress- low    Nutrition   Food allergies or sensitivities: no  Do you adhere to a particular type of diet? no  Do you have any concerns with your eating habits? no    Exercise  How would you describe your physical activity level? 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    Strabismus     Trouble in sleeping     Wears dentures     Uppers      Past Surgical History   Past Surgical History:   Procedure Laterality Date    CARDIAC SURGERY      cardiac cath    COLON SURGERY      colon resection    COLONOSCOPY N/A 10/18/2016    Procedure: COLONOSCOPY;  Surgeon: Rell Cordova MD;  Location: [facility];  Service: Endoscopy;  Laterality: N/A;    COLONOSCOPY  N/A 8/8/2023    Procedure: COLONOSCOPY;  Surgeon: Kaushik Pinon MD;  Location: [facility] ENDO;  Service: Endoscopy;  Laterality: N/A;    COLONOSCOPY N/A 8/22/2023    Procedure: COLONOSCOPY (tattoo of colon ca);  Surgeon: Kaushik Pinon MD;  Location: [facility] ENDO;  Service: Endoscopy;  Laterality: N/A;    ESOPHAGOGASTRODUODENOSCOPY N/A 8/3/2023    Procedure: EGD (ESOPHAGOGASTRODUODENOSCOPY);  Surgeon: Kaushik Pinon MD;  Location: [facility] ENDO;  Service: Endoscopy;  Laterality: N/A;    HAND TENDON SURGERY Left     HEMICOLECTOMY      right    INJECTION OF ANESTHETIC AGENT AROUND MEDIAL BRANCH NERVES INNERVATING LUMBAR FACET JOINT Right 07/10/2018    Procedure: Block-nerve-medial branch-lumbar;  Surgeon: Parish Gaitan MD;  Location: [facility];  Service: Pain Management;  Laterality: Right;  L3, 4, 5    INSERTION OF INFERIOR VENA CAVAL FILTER N/A 9/13/2023    Procedure: Insertion, Filter, Inferior Vena Cava;  Surgeon: Oren Verdin MD;  Location: [facility] CATH/EP LAB;  Service: General;  Laterality: N/A;    INSERTION OF TUNNELED CENTRAL VENOUS CATHETER (CVC) WITH SUBCUTANEOUS PORT Right 11/15/2023    Procedure: [id number];  Surgeon: Jim Chavez MD;  Location: [facility];  Service: General;  Laterality: Right;    JOINT REPLACEMENT      bilateral    RADIOFREQUENCY ABLATION OF LUMBAR MEDIAL BRANCH NERVE AT SINGLE LEVEL Right 07/24/2018    Procedure: RADIOFREQUENCY ABLATION, NERVE, MEDIAL BRANCH, LUMBAR, 1 LEVEL;  Surgeon: Parish Gaitan MD;  Location: [facility] OR;  Service: Pain Management;  Laterality: Right;  L3,4,5 - Burned at 80 degrees C. for 75 seconds x 2 each site    ROBOT-ASSISTED COLECTOMY N/A 9/29/2023    Procedure: ROBOTIC COLECTOMY;  Surgeon: Jim Chavez MD;  Location: [facility];  Service: General;  Laterality: N/A;    SHOULDER ARTHROSCOPY Right 01/12/2017    Reverse     TONSILLECTOMY      TONSILLECTOMY, ADENOIDECTOMY, BILATERAL MYRINGOTOMY AND TUBES      TOTAL KNEE ARTHROPLASTY Bilateral 08/1998     "total replacements    TUMOR REMOVAL Left     left foot as a child      Family History   Family History   Problem Relation Age of Onset    Hypertension Mother     Kidney disease Mother     Cataracts Father     Cataracts Sister     Cancer Sister         breast    No Known Problems Brother     Cancer Sister         breast    Arthritis Sister     Glaucoma Neg Hx     Amblyopia Neg Hx     Blindness Neg Hx     Macular degeneration Neg Hx     Retinal detachment Neg Hx     Strabismus Neg Hx     Stroke Neg Hx     Thyroid disease Neg Hx       Allergies  Review of patient's allergies indicates:   Allergen Reactions    Aspirin      Other reaction(s): Stomach upset    Aspirin Other (See Comments)     Other reaction(s): Stomach upset    Gabapentin Other (See Comments)     Pt states she felt "funny" when she took the medication. Especially at the higher dose.    Mobic [meloxicam] Swelling     Edema and elevated blood pressure     Oxaliplatin Other (See Comments)     Other reaction(s): Joint pain  Other reaction(s): Itching  Other reaction(s): Hives  Other reaction(s): Joint pain  Other reaction(s): Itching  Other reaction(s): Hives    Pregabalin Other (See Comments)     Side effects  Side effects      Current Medications:    Current Outpatient Medications:     acetaminophen (TYLENOL) 650 MG TbSR, Take 650 mg by mouth every 8 (eight) hours., Disp: , Rfl:     alendronate (FOSAMAX) 70 MG tablet, Take 1 tablet (70 mg total) by mouth every 7 days., Disp: 12 tablet, Rfl: 3    apixaban (ELIQUIS) 5 mg Tab, Take 1 tablet (5 mg total) by mouth 2 (two) times daily., Disp: 180 tablet, Rfl: 3    cyclobenzaprine (FLEXERIL) 5 MG tablet, Take 1 tablet (5 mg total) by mouth 3 (three) times daily as needed for Muscle spasms., Disp: 90 tablet, Rfl: 0    ergocalciferol, vitamin D2, (VITAMIN D ORAL), Take 2,000 mg by mouth once daily., Disp: , Rfl:     furosemide (LASIX) 20 MG tablet, Take 1 tablet (20 mg total) by mouth daily as needed (edema)., Disp: " 90 tablet, Rfl: 3    HYDROcodone-acetaminophen (NORCO) 5-325 mg per tablet, Take 1 tablet by mouth every 6 (six) hours as needed for Pain., Disp: 20 tablet, Rfl: 0    hydrOXYzine HCL (ATARAX) 25 MG tablet, Take 1 tablet (25 mg total) by mouth 3 (three) times daily as needed for Anxiety (insomnia)., Disp: 30 tablet, Rfl: PRN    levocetirizine (XYZAL) 5 MG tablet, Take 1 tablet (5 mg total) by mouth nightly as needed for Allergies (allergies)., Disp: 90 tablet, Rfl: PRN    levothyroxine (SYNTHROID) 75 MCG tablet, Take 1 tablet (75 mcg total) by mouth once daily., Disp: 90 tablet, Rfl: 3    LIDOcaine-prilocaine (EMLA) cream, Apply topically as needed (Chemo)., Disp: 30 g, Rfl: 0    lisinopriL (PRINIVIL,ZESTRIL) 30 MG tablet, Take 30 mg by mouth., Disp: , Rfl:     multivitamin capsule, Take 1 capsule by mouth once daily., Disp: , Rfl:     nystatin (MYCOSTATIN) 100,000 unit/mL suspension, Take 4 mLs by mouth 4 (four) times daily with meals and nightly., Disp: , Rfl:     omeprazole (PRILOSEC) 40 MG capsule, Take 1 capsule (40 mg total) by mouth 2 (two) times daily before meals., Disp: 180 capsule, Rfl: PRN    ondansetron (ZOFRAN-ODT) 8 MG TbDL, Take 1 tablet (8 mg total) by mouth every 8 (eight) hours as needed (nausea)., Disp: 40 tablet, Rfl: 3    promethazine (PHENERGAN) 12.5 MG Tab, (Take 1-2 tabs every 6 hours as needed for nausea persistent despite zofran), Disp: 40 tablet, Rfl: 3    promethazine-dextromethorphan (PROMETHAZINE-DM) 6.25-15 mg/5 mL Syrp, Take 5 mLs by mouth as needed., Disp: , Rfl:     traMADoL (ULTRAM) 50 mg tablet, Take 1 tablet (50 mg total) by mouth every 8 (eight) hours as needed for Pain., Disp: 21 each, Rfl: 0    traZODone (DESYREL) 50 MG tablet, Take 1 tablet (50 mg total) by mouth nightly., Disp: 90 tablet, Rfl: 0    triamcinolone acetonide 0.1% (KENALOG) 0.1 % cream, APPLY TOPICALLY TO THE AFFECTED AREA TWICE DAILY, Disp: 60 g, Rfl: 0  No current facility-administered medications for this  visit.    Facility-Administered Medications Ordered in Other Visits:     0.9%  NaCl infusion, , Intravenous, Once, Oren Verdin MD     Review of Systems  Review of Systems   Constitutional:  Positive for malaise/fatigue.   HENT: Negative.     Eyes: Negative.    Respiratory: Negative.     Cardiovascular: Negative.    Gastrointestinal: Negative.    Genitourinary: Negative.    Musculoskeletal:  Positive for back pain and joint pain.   Skin: Negative.    Neurological: Negative.    Endo/Heme/Allergies: Negative.    Psychiatric/Behavioral:  The patient has insomnia.       Physical Exam      /65  HR 68  TEMP 97.6  RR 16      Physical Exam  Vitals reviewed.   Constitutional:       Appearance: Normal appearance.   Neurological:      Mental Status: She is alert.   Psychiatric:         Mood and Affect: Mood normal.         Behavior: Behavior normal.        ASSESSMENT :  1. Insomnia, unspecified type    2. Chemotherapy-induced fatigue    3. Malignant neoplasm of splenic flexure      PLAN:  Reviewed all information discussed at today's visit and all questions were answered.    Counseled on healthy lifestyle and behavior modifications   Continue Acupuncture if desired    See nutrition during treatment as needed  Continue Magnesium Glycinate 400mg nightly  Continue Melatonin 3-5 mg nightly as needed  I discussed and recommended the following support services:  Jack Chi and Yoga I suggested Jack Chi and/or Yoga as these practices reduce stress, increases flexibility and muscle strength, improves balance and promotes serenity in the power of movement to help fight disease and boost your immune system.   Music and relaxation therapy and Meditation which can decrease stress by lowering blood pressure, slowing breathing, and helping you be more present in the moment. It improves sleep by relaxing the body and mind at the end of the day.Meditation also trains you how to focus on one thing at a time, improving concentration. It  also promotes emotional well-being by decreasing depression and anxiety, and helping create a more positive outlook on life.    Follow up with [facility] in 6 months    I spent a total of 42 minutes on the day of the visit.This includes face to face time and non-face to face time preparing to see the patient (eg, review of tests), obtaining and/or reviewing separately obtained history, documenting clinical information in the electronic or other health record, independently interpreting results and communicating results to the patient/family/caregiver, or care coordinator.

## 2024-04-08 NOTE — TELEPHONE ENCOUNTER
Refill Routing Note   Medication(s) are not appropriate for processing by [facility] for the following reason(s):        No active prescription written by provider    [facility] action(s):  Defer     Requires labs : Yes             Appointments  past 12m or future 3m with PCP    Date Provider   Last Visit   3/14/2024 Claudia Kim MD   Next Visit   7/17/2024 Claudia Kim MD   ED visits in past 90 days: 0        Note composed:9:47 AM 04/08/2024

## 2024-04-08 NOTE — PLAN OF CARE
Pt arrived to clinic for Leucovorin and 5FU treatment and tolerated well with no changes throughout therapy. Pt educated on line care at home and aware of number to call for any pump issues. Pt with chemo spill kit and aware of how to use if needed. Pt aware of side effects of meds and aware of f/u appts and discharged to home in NAD with 5FU pump infusing with ease.

## 2024-04-08 NOTE — PROGRESS NOTES
PATIENT: Danna Sorensen  MRN: [id number]  DATE: 4/8/2024      Diagnosis:   1. Malignant neoplasm of splenic flexure    2. Iron deficiency anemia, unspecified iron deficiency anemia type    3. Renal mass, left    4. Immunodeficiency due to chemotherapy    5. Pulmonary nodules    6. Thrombophilia        Chief Complaint: Malignant neoplasm of splenic flexure      Subjective:    Interval History: Ms. Sorensen is a 84 y.o. female who returns for follow up for lab review and treatment clearance for colorectal fluorouracil leucovorin. Denies fever, chills, sob, cp, palpitations, swelling, fatigue, numbness, tingling, n/v, diarrhea, constipation, abdominal pain, new bumps, lumps, bleeding, bruising.     Oncologic History:   Pt was previously diagnosed with Stage III adenocarcinoma of the colon in 2007 and underwent 12 cycles of FOLFOX.  Pt underwent colonoscopy on 8/08/23 showing 1 7 mm polyp in the rectum; altered vascular, congested, eroded, friable and ulcerated mucosa in the transverse colon which was biopsied; patent and to side ileocolonic anastomosis.  Pathology showed moderately differentiated adenocarcinoma. CT abdomen and pelvis 8/17/23 showing irregular appearance of the gallbladder; heterogeneously enhancing lesion in the inferior pole of the left kidney measuring 1.8 cm; short segment of concentric bowel wall thickening of the colon at the splenic flexure with surrounding mesenteric fat stranding with nodular thickness of 2.2 cm.  The patient underwent colonoscopy on 08/22/2023 showing nonbleeding internal hemorrhoids, partially obstructing tumor in the transverse colon which was tattooed.  Pt saw Urology 8/23/23 with plans for re-imaging the renal lesion in 3 months with an MRI.  US abdomen 8/25/23 showed a 2.5 cm solid mass at the lower pole of the left kidney suspicious for neoplasm.  CT chest 8/25/23 showed 4 mm left upper lobe pulmonary nodule, 4 mm calcified granuloma left upper lobe, 2 mm pulmonary nodule in  the left upper lobe, 9 x 9 mm triangular nodule along the juxtapleural right middle lobe, 2 mm pulmonary nodule in the right middle lobe, and a partially imaged masslike density in the splenic flexure of the colon. Pt then underwent resection of the transverse colon and splenic flexure with path showing invasive moderately differentiated adenocarcinoma with mucinous features, 33 benign lymph nodes, positive lymphovascular invasion, positive perineural invasion pT3N0.  Tumor shows intact expression of MLH1, PMS2, MSH2, MSH6.  Patient was positive for B Celio V600E mutation.              The patient was discussed at tumor Board on 11/07/2023 with recommendation for PET-CT with biopsy of lung nodules if positive.  PET-CT on 11/08/2023 showed evidence of pulmonary hypertension; stable 5 mm nodule left lung apex; calcified granuloma left upper lobe; stable 6 mm inferior right upper lobe nodule; stable 17 mm ground-glass opacity lateral right middle lobe; stable 4 mm nodule in the right lower lobe of the diaphragm; no activity in the lesion in the left kidney.  MRI abdomen 11/20/2023 showed heterogeneous fat containing enhancing mass in the lower pole of the left kidney suggestive of an angio myelolipoma.              The patient underwent a chest x-ray on 02/19/2024 showing no sign of pneumonia.  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  Oncology History   Malignant neoplasm of splenic flexure   11/3/2023 Initial Diagnosis    Malignant neoplasm of splenic flexure     11/3/2023 Cancer Staged    Staging form: Colon and Rectum, AJCC 8th Edition  - Clinical: Stage IIA (cT3, cN0, cM0)     12/6/2023 -  Chemotherapy    Treatment Summary    Plan Name: OP COLORECTAL FLUOROURACIL LEUCOVORIN Q2W (MOD HEATHER BAUGH)  Treatment Goal: Curative  Status: Active  Start Date: 12/6/2023  End Date: 12/4/2024 (Planned)  Provider: Mahesh Cameron MD  Chemotherapy: fluorouraciL injection 825 mg, 400 mg/m2 = 825 mg, Intravenous, Clinic/HOD 1 time, 8 of 26 cycles  Administration: 825 mg (12/6/2023), 815 mg (12/20/2023), 810 mg (1/3/2024), 810 mg (1/22/2024), 815 mg (2/5/2024), 820 mg (2/26/2024), 825 mg (3/11/2024), 830 mg (3/25/2024)  fluorouracil (ADRUCIL) 2,400 mg/m2 = 4,945 mg in sodium chloride 0.9% 100 mL chemo infusion, 2,400 mg/m2 = 4,945 mg, Intravenous, Over 46 hours, 8 of 26 cycles  Administration: 4,945 mg (12/6/2023), 4,895 mg (12/20/2023), 4,870 mg (1/3/2024), 4,870 mg (1/22/2024), 4,895 mg (2/5/2024), 4,920 mg (2/26/2024), 4,945 mg (3/11/2024), 4,970 mg (3/25/2024)         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    Strabismus     Trouble in sleeping     Wears dentures     Uppers       Past Surgical HIstory:   Past Surgical History:   Procedure Laterality Date    CARDIAC SURGERY      cardiac cath    COLON SURGERY      colon resection    COLONOSCOPY N/A 10/18/2016    Procedure: COLONOSCOPY;  Surgeon: Rell Cordova MD;  Location: [facility];  Service: Endoscopy;  Laterality: N/A;    COLONOSCOPY N/A 8/8/2023    Procedure: COLONOSCOPY;  Surgeon: Kaushik Pinon MD;   Location: [facility];  Service: Endoscopy;  Laterality: N/A;    COLONOSCOPY N/A 8/22/2023    Procedure: COLONOSCOPY (tattoo of colon ca);  Surgeon: Kaushik Pinon MD;  Location: [facility];  Service: Endoscopy;  Laterality: N/A;    ESOPHAGOGASTRODUODENOSCOPY N/A 8/3/2023    Procedure: EGD (ESOPHAGOGASTRODUODENOSCOPY);  Surgeon: Kaushik Pinon MD;  Location: [facility];  Service: Endoscopy;  Laterality: N/A;    HAND TENDON SURGERY Left     HEMICOLECTOMY      right    INJECTION OF ANESTHETIC AGENT AROUND MEDIAL BRANCH NERVES INNERVATING LUMBAR FACET JOINT Right 07/10/2018    Procedure: Block-nerve-medial branch-lumbar;  Surgeon: Parish Gaitan MD;  Location: [facility];  Service: Pain Management;  Laterality: Right;  L3, 4, 5    INSERTION OF INFERIOR VENA CAVAL FILTER N/A 9/13/2023    Procedure: Insertion, Filter, Inferior Vena Cava;  Surgeon: Oren Verdin MD;  Location: [facility] CATH/EP LAB;  Service: General;  Laterality: N/A;    INSERTION OF TUNNELED CENTRAL VENOUS CATHETER (CVC) WITH SUBCUTANEOUS PORT Right 11/15/2023    Procedure: [id number];  Surgeon: Jim Chavez MD;  Location: [facility];  Service: General;  Laterality: Right;    JOINT REPLACEMENT      bilateral    RADIOFREQUENCY ABLATION OF LUMBAR MEDIAL BRANCH NERVE AT SINGLE LEVEL Right 07/24/2018    Procedure: RADIOFREQUENCY ABLATION, NERVE, MEDIAL BRANCH, LUMBAR, 1 LEVEL;  Surgeon: Parish Gaitan MD;  Location: [facility];  Service: Pain Management;  Laterality: Right;  L3,4,5 - Burned at 80 degrees C. for 75 seconds x 2 each site    ROBOT-ASSISTED COLECTOMY N/A 9/29/2023    Procedure: ROBOTIC COLECTOMY;  Surgeon: Jim Chavez MD;  Location: [facility];  Service: General;  Laterality: N/A;    SHOULDER ARTHROSCOPY Right 01/12/2017    Reverse     TONSILLECTOMY      TONSILLECTOMY, ADENOIDECTOMY, BILATERAL MYRINGOTOMY AND TUBES      TOTAL KNEE ARTHROPLASTY Bilateral 08/1998    total replacements    TUMOR REMOVAL Left     left foot as a child  "      Family History:   Family History   Problem Relation Age of Onset    Hypertension Mother     Kidney disease Mother     Cataracts Father     Cataracts Sister     Cancer Sister         breast    No Known Problems Brother     Cancer Sister         breast    Arthritis Sister     Glaucoma Neg Hx     Amblyopia Neg Hx     Blindness Neg Hx     Macular degeneration Neg Hx     Retinal detachment Neg Hx     Strabismus Neg Hx     Stroke Neg Hx     Thyroid disease Neg Hx        Social History:  reports that she quit smoking about 63 years ago. Her smoking use included cigarettes. She started smoking about 63 years ago. She has a 0.5 pack-year smoking history. She has never used smokeless tobacco. She reports that she does not drink alcohol and does not use drugs.    Allergies:  Review of patient's allergies indicates:   Allergen Reactions    Aspirin      Other reaction(s): Stomach upset    Aspirin Other (See Comments)     Other reaction(s): Stomach upset    Gabapentin Other (See Comments)     Pt states she felt "funny" when she took the medication. Especially at the higher dose.    Mobic [meloxicam] Swelling     Edema and elevated blood pressure     Oxaliplatin Other (See Comments)     Other reaction(s): Joint pain  Other reaction(s): Itching  Other reaction(s): Hives  Other reaction(s): Joint pain  Other reaction(s): Itching  Other reaction(s): Hives    Pregabalin Other (See Comments)     Side effects  Side effects       Medications:  Current Outpatient Medications   Medication Sig Dispense Refill    acetaminophen (TYLENOL) 650 MG TbSR Take 650 mg by mouth every 8 (eight) hours.      alendronate (FOSAMAX) 70 MG tablet Take 1 tablet (70 mg total) by mouth every 7 days. 12 tablet 3    apixaban (ELIQUIS) 5 mg Tab Take 1 tablet (5 mg total) by mouth 2 (two) times daily. 180 tablet 3    cyclobenzaprine (FLEXERIL) 5 MG tablet Take 1 tablet (5 mg total) by mouth 3 (three) times daily as needed for Muscle spasms. 90 tablet 0    " ergocalciferol, vitamin D2, (VITAMIN D ORAL) Take 2,000 mg by mouth once daily.      furosemide (LASIX) 20 MG tablet Take 1 tablet (20 mg total) by mouth daily as needed (edema). 90 tablet 3    HYDROcodone-acetaminophen (NORCO) 5-325 mg per tablet Take 1 tablet by mouth every 6 (six) hours as needed for Pain. 20 tablet 0    hydrOXYzine HCL (ATARAX) 25 MG tablet Take 1 tablet (25 mg total) by mouth 3 (three) times daily as needed for Anxiety (insomnia). 30 tablet PRN    levothyroxine (SYNTHROID) 75 MCG tablet Take 1 tablet (75 mcg total) by mouth once daily. 90 tablet 3    LIDOcaine-prilocaine (EMLA) cream Apply topically as needed (Chemo). 30 g 0    lisinopriL (PRINIVIL,ZESTRIL) 30 MG tablet Take 30 mg by mouth.      multivitamin capsule Take 1 capsule by mouth once daily.      nystatin (MYCOSTATIN) 100,000 unit/mL suspension Take 4 mLs by mouth 4 (four) times daily with meals and nightly.      omeprazole (PRILOSEC) 40 MG capsule Take 1 capsule (40 mg total) by mouth 2 (two) times daily before meals. 180 capsule PRN    ondansetron (ZOFRAN-ODT) 8 MG TbDL Take 1 tablet (8 mg total) by mouth every 8 (eight) hours as needed (nausea). 40 tablet 3    promethazine (PHENERGAN) 12.5 MG Tab (Take 1-2 tabs every 6 hours as needed for nausea persistent despite zofran) 40 tablet 3    promethazine-dextromethorphan (PROMETHAZINE-DM) 6.25-15 mg/5 mL Syrp Take 5 mLs by mouth as needed.      traMADoL (ULTRAM) 50 mg tablet Take 1 tablet (50 mg total) by mouth every 8 (eight) hours as needed for Pain. 21 each 0    traZODone (DESYREL) 50 MG tablet Take 1 tablet (50 mg total) by mouth nightly. 90 tablet 0    triamcinolone acetonide 0.1% (KENALOG) 0.1 % cream APPLY TOPICALLY TO THE AFFECTED AREA TWICE DAILY 60 g 0    levocetirizine (XYZAL) 5 MG tablet Take 1 tablet (5 mg total) by mouth nightly as needed for Allergies (allergies). 90 tablet PRN     No current facility-administered medications for this visit.     Facility-Administered  "Medications Ordered in Other Visits   Medication Dose Route Frequency Provider Last Rate Last Admin    0.9%  NaCl infusion   Intravenous Once Oren Verdin MD           Review of Systems   Constitutional:  Negative for chills, fatigue and fever.   HENT:  Positive for mouth sores.    Eyes: Negative.    Respiratory:  Positive for shortness of breath (Chronic on exertion). Negative for cough and chest tightness.    Cardiovascular:  Negative for chest pain, palpitations and leg swelling.   Gastrointestinal:  Negative for abdominal pain, constipation, diarrhea, nausea and vomiting.   Genitourinary: Negative.    Musculoskeletal: Negative.    Skin: Negative.    Neurological:  Positive for numbness (occasional in fingers). Negative for dizziness and headaches.   Hematological:  Negative for adenopathy. Does not bruise/bleed easily.   Psychiatric/Behavioral: Negative.         Objective:      Vitals:   Vitals:    04/08/24 1250   BP: 132/65   Pulse: 68   Resp: 16   Temp: 97.6 °F (36.4 °C)   TempSrc: Temporal   SpO2: 99%   Weight: 96.5 kg (212 lb 11.9 oz)   Height: 5' 4" (1.626 m)     BMI: Body mass index is 36.52 kg/m².    Physical Exam  HENT:      Head: Normocephalic.      Nose: Nose normal.      Mouth/Throat:      Mouth: Mucous membranes are moist.      Pharynx: Oropharynx is clear.   Eyes:      Pupils: Pupils are equal, round, and reactive to light.   Cardiovascular:      Rate and Rhythm: Normal rate and regular rhythm.      Heart sounds: Normal heart sounds.   Pulmonary:      Effort: Pulmonary effort is normal.      Breath sounds: Normal breath sounds.   Abdominal:      General: Bowel sounds are normal.   Musculoskeletal:         General: Normal range of motion.      Cervical back: Normal range of motion.   Skin:     General: Skin is warm.   Neurological:      Mental Status: She is alert and oriented to person, place, and time.   Psychiatric:         Mood and Affect: Mood normal.         Behavior: Behavior normal. "         Laboratory Data:  Lab Visit on 04/05/2024   Component Date Value Ref Range Status    WBC 04/05/2024 4.70  3.90 - 12.70 K/uL Final    RBC 04/05/2024 3.06 (L)  4.00 - 5.40 M/uL Final    Hemoglobin 04/05/2024 10.7 (L)  12.0 - 16.0 g/dL Final    Hematocrit 04/05/2024 30.5 (L)  37.0 - 48.5 % Final    MCV 04/05/2024 100 (H)  82 - 98 fL Final    MCH 04/05/2024 35.0 (H)  27.0 - 31.0 pg Final    MCHC 04/05/2024 35.1  32.0 - 36.0 g/dL Final    RDW 04/05/2024 16.0 (H)  11.5 - 14.5 % Final    Platelets 04/05/2024 163  150 - 450 K/uL Final    MPV 04/05/2024 8.6 (L)  9.2 - 12.9 fL Final    Immature Granulocytes 04/05/2024 1.3 (H)  0.0 - 0.5 % Final    Gran # (ANC) 04/05/2024 2.9  1.8 - 7.7 K/uL Final    Immature Grans (Abs) 04/05/2024 0.06 (H)  0.00 - 0.04 K/uL Final    Lymph # 04/05/2024 1.0  1.0 - 4.8 K/uL Final    Mono # 04/05/2024 0.6  0.3 - 1.0 K/uL Final    Eos # 04/05/2024 0.1  0.0 - 0.5 K/uL Final    Baso # 04/05/2024 0.03  0.00 - 0.20 K/uL Final    nRBC 04/05/2024 0  0 /100 WBC Final    Gran % 04/05/2024 60.9  38.0 - 73.0 % Final    Lymph % 04/05/2024 21.7  18.0 - 48.0 % Final    Mono % 04/05/2024 13.2  4.0 - 15.0 % Final    Eosinophil % 04/05/2024 2.3  0.0 - 8.0 % Final    Basophil % 04/05/2024 0.6  0.0 - 1.9 % Final    Differential Method 04/05/2024 Automated   Final    Sodium 04/05/2024 141  136 - 145 mmol/L Final    Potassium 04/05/2024 4.8  3.5 - 5.1 mmol/L Final    Chloride 04/05/2024 110  95 - 110 mmol/L Final    CO2 04/05/2024 22 (L)  23 - 29 mmol/L Final    Glucose 04/05/2024 97  70 - 110 mg/dL Final    BUN 04/05/2024 18  8 - 23 mg/dL Final    Creatinine 04/05/2024 1.0  0.5 - 1.4 mg/dL Final    Calcium 04/05/2024 9.4  8.7 - 10.5 mg/dL Final    Total Protein 04/05/2024 6.6  6.0 - 8.4 g/dL Final    Albumin 04/05/2024 3.7  3.5 - 5.2 g/dL Final    Total Bilirubin 04/05/2024 1.0  0.1 - 1.0 mg/dL Final    Alkaline Phosphatase 04/05/2024 60  55 - 135 U/L Final    AST 04/05/2024 13  10 - 40 U/L Final    ALT  04/05/2024 11  10 - 44 U/L Final    eGFR 04/05/2024 56.2 (A)  >60 mL/min/1.73 m^2 Final    Anion Gap 04/05/2024 9  8 - 16 mmol/L Final          Imaging:   CT C/A/P 2/23/24     Chest:     There is a new nodule or lymph node in the left pulmonary hilum extending into the left lower lobe best visualized on series 2, image 49. The nodule measures 1.2 x 1.0 cm and is associated with an infiltrate extending into the superior segment of the left lower lobe. A noncalcified 5 mm nodule is present in the left upper lobe which is unchanged when compared to prior CTs dating back to 2007. A 5 mm calcified granuloma is also present in the left upper lobe. No pleural or pericardial effusion are present. Heart size is normal. The thoracic inlet and axillary regions are unremarkable. A vascular access catheter enters from a right subclavian approach with the tip in the superior vena cava.     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 The kidneys are otherwise unremarkable. The liver, spleen, pancreas, and adrenal glands are normal in size and appearance. A large solitary gallstone is present within the gallbladder and there are no signs of cholecystitis. The bile ducts are not dilated. An inferior vena cava filter is in place with the cephalic tip below the level of the renal veins. No lymph node enlargement, ascites, or inflammatory changes are evident in the abdomen or pelvis. The aorta tapers normally.   Assessment:       1. Malignant neoplasm of splenic flexure    2. Iron deficiency anemia, unspecified iron deficiency anemia type    3. Renal mass, left    4. Immunodeficiency due to chemotherapy    5. Pulmonary nodules    6. Thrombophilia           Plan:   Colon Cancer   - Pt with h/o colon cancer s/p resection in 2007 followed by adjuvant FOLFOX for 12 cycles  -Pt recently with resection of transverse colon and splenic flexure 9/29/23 showing path showing invasive  moderately differentiated adenocarcinoma with mucinous features, 33 benign lymph nodes, positive lymphovascular invasion, positive perineural invasion pT3N0  -Tumor shows intact expression of MLH1, PMS2, MSH2, MSH6  -Patient was positive for B Celio V600E mutation  -Pt has high risk stage II colon cancer given positive LVI and PNI  -PT is a candidate for treatment with 6 months of 5-FU or Capecitabine  -no clear evidence of metastatic disease on PET-CT 11/08/2023   -Will proceed with 6 months of 5 fluorouracil  -Pt to proceed with cycle 8  -Pharmacogenomic panel on 11/03/23 showed normal DPYD metabolizer but 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     Immunodeficiency due to chemo   - pt at increased risk of infection  -No current signs of infection  -Will monitor     Renal Mass   - Pt with a mass on the left kidney seen on CT abdomen 8/17/23 and US abdomen 8/25/23  -Pt saw Urology [facility] under the supervision of Dr. 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 4/22 with Dr Cameron for treatment with labs prior  as scheduled   Follow up with [facility]    Infusion scheduling note   Ok to proceed with treatment   Injection scheduling note    Labs    Imaging    Pharmacy appointment    Other referrals                  Plan was discussed with the patient at length, and she verbalized understanding. Danna was given an opportunity to ask questions that were answered to her satisfaction, and she was advised to call in the interval if any problems or questions arise.    Assessment/Plan reviewed and approved by Dr Cameron    20 minutes were spent in coordination of patient's care, record review and counseling.    JULIO Bernal, FNP-C  Hematology & Oncology

## 2024-04-10 ENCOUNTER — INFUSION (OUTPATIENT)
Dept: INFUSION THERAPY | Facility: HOSPITAL | Age: 84
End: 2024-04-10
Attending: INTERNAL MEDICINE
Payer: MEDICARE

## 2024-04-10 VITALS
SYSTOLIC BLOOD PRESSURE: 133 MMHG | RESPIRATION RATE: 16 BRPM | DIASTOLIC BLOOD PRESSURE: 71 MMHG | TEMPERATURE: 98 F | HEART RATE: 77 BPM

## 2024-04-10 DIAGNOSIS — C18.5 MALIGNANT NEOPLASM OF SPLENIC FLEXURE: Primary | ICD-10-CM

## 2024-04-10 PROCEDURE — A4216 STERILE WATER/SALINE, 10 ML: HCPCS | Mod: PN | Performed by: INTERNAL MEDICINE

## 2024-04-10 PROCEDURE — 25000003 PHARM REV CODE 250: Mod: PN | Performed by: INTERNAL MEDICINE

## 2024-04-10 PROCEDURE — 63600175 PHARM REV CODE 636 W HCPCS: Mod: PN | Performed by: INTERNAL MEDICINE

## 2024-04-10 RX ORDER — HEPARIN 100 UNIT/ML
500 SYRINGE INTRAVENOUS
Status: DISCONTINUED | OUTPATIENT
Start: 2024-04-10 | End: 2024-04-10 | Stop reason: HOSPADM

## 2024-04-10 RX ORDER — SODIUM CHLORIDE 0.9 % (FLUSH) 0.9 %
10 SYRINGE (ML) INJECTION
Status: DISCONTINUED | OUTPATIENT
Start: 2024-04-10 | End: 2024-04-10 | Stop reason: HOSPADM

## 2024-04-10 RX ADMIN — Medication 10 ML: at 03:04

## 2024-04-10 RX ADMIN — Medication 500 UNITS: at 03:04

## 2024-04-11 RX ORDER — LEVOCETIRIZINE DIHYDROCHLORIDE 5 MG/1
TABLET, FILM COATED ORAL
Qty: 90 TABLET | Refills: 3 | Status: SHIPPED | OUTPATIENT
Start: 2024-04-11

## 2024-04-17 DIAGNOSIS — E03.9 HYPOTHYROIDISM: ICD-10-CM

## 2024-04-17 RX ORDER — LEVOTHYROXINE SODIUM 75 UG/1
75 TABLET ORAL DAILY
Qty: 90 TABLET | Refills: 3 | Status: SHIPPED | OUTPATIENT
Start: 2024-04-17

## 2024-04-17 NOTE — TELEPHONE ENCOUNTER
No care due was identified.  Health [facility] Embedded Care Due Messages. Reference number: [id number].   4/17/2024 9:12:32 AM CDT

## 2024-04-17 NOTE — TELEPHONE ENCOUNTER
----- Message from Rosaura Diamond sent at 4/17/2024  9:03 AM CDT -----  Contact: pt  Type:  RX Refill Request    PT IS OUT OF MEDS    Who Called: pt    Refill or New Rx: refill    RX Name and Strength: levothyroxine (SYNTHROID) 75 MCG tablet    How is the patient currently taking it? (ex. 1XDay):as directed    Is this a 30 day or 90 day RX:90    Preferred Pharmacy with phone number:  [id number] - [place] - [address]  [address]  [place] [address]  Phone: [phone] Fax: [phone]    Local or Mail Order:local    Ordering Provider: Judy    Would the patient rather a call back or a response via [facility]? Call back    Best Call Back Number:[phone]    Additional Information: pt put order in last week with pharmacy and they still have not heard back.    Please refill script    Thanks

## 2024-04-19 ENCOUNTER — LAB VISIT (OUTPATIENT)
Dept: LAB | Facility: HOSPITAL | Age: 84
End: 2024-04-19
Attending: FAMILY MEDICINE
Payer: MEDICARE

## 2024-04-19 DIAGNOSIS — C18.5 MALIGNANT NEOPLASM OF SPLENIC FLEXURE: ICD-10-CM

## 2024-04-19 LAB
ALBUMIN SERPL BCP-MCNC: 3.8 G/DL (ref 3.5–5.2)
ALP SERPL-CCNC: 61 U/L (ref 55–135)
ALT SERPL W/O P-5'-P-CCNC: 13 U/L (ref 10–44)
ANION GAP SERPL CALC-SCNC: 11 MMOL/L (ref 8–16)
AST SERPL-CCNC: 14 U/L (ref 10–40)
BASOPHILS # BLD AUTO: 0.03 K/UL (ref 0–0.2)
BASOPHILS NFR BLD: 0.6 % (ref 0–1.9)
BILIRUB SERPL-MCNC: 1 MG/DL (ref 0.1–1)
BUN SERPL-MCNC: 16 MG/DL (ref 8–23)
CALCIUM SERPL-MCNC: 9.5 MG/DL (ref 8.7–10.5)
CHLORIDE SERPL-SCNC: 111 MMOL/L (ref 95–110)
CO2 SERPL-SCNC: 18 MMOL/L (ref 23–29)
CREAT SERPL-MCNC: 1.1 MG/DL (ref 0.5–1.4)
DIFFERENTIAL METHOD BLD: ABNORMAL
EOSINOPHIL # BLD AUTO: 0.1 K/UL (ref 0–0.5)
EOSINOPHIL NFR BLD: 1.8 % (ref 0–8)
ERYTHROCYTE [DISTWIDTH] IN BLOOD BY AUTOMATED COUNT: 15.1 % (ref 11.5–14.5)
EST. GFR  (NO RACE VARIABLE): 50.2 ML/MIN/1.73 M^2
GLUCOSE SERPL-MCNC: 99 MG/DL (ref 70–110)
HCT VFR BLD AUTO: 33.2 % (ref 37–48.5)
HGB BLD-MCNC: 11.3 G/DL (ref 12–16)
IMM GRANULOCYTES # BLD AUTO: 0.12 K/UL (ref 0–0.04)
IMM GRANULOCYTES NFR BLD AUTO: 2.4 % (ref 0–0.5)
LYMPHOCYTES # BLD AUTO: 1.1 K/UL (ref 1–4.8)
LYMPHOCYTES NFR BLD: 22.3 % (ref 18–48)
MCH RBC QN AUTO: 34.8 PG (ref 27–31)
MCHC RBC AUTO-ENTMCNC: 34 G/DL (ref 32–36)
MCV RBC AUTO: 102 FL (ref 82–98)
MONOCYTES # BLD AUTO: 0.6 K/UL (ref 0.3–1)
MONOCYTES NFR BLD: 12.4 % (ref 4–15)
NEUTROPHILS # BLD AUTO: 3 K/UL (ref 1.8–7.7)
NEUTROPHILS NFR BLD: 60.5 % (ref 38–73)
NRBC BLD-RTO: 0 /100 WBC
PLATELET # BLD AUTO: 180 K/UL (ref 150–450)
PMV BLD AUTO: 9.3 FL (ref 9.2–12.9)
POTASSIUM SERPL-SCNC: 4.5 MMOL/L (ref 3.5–5.1)
PROT SERPL-MCNC: 6.7 G/DL (ref 6–8.4)
RBC # BLD AUTO: 3.25 M/UL (ref 4–5.4)
SODIUM SERPL-SCNC: 140 MMOL/L (ref 136–145)
WBC # BLD AUTO: 4.93 K/UL (ref 3.9–12.7)

## 2024-04-19 PROCEDURE — 80053 COMPREHEN METABOLIC PANEL: CPT | Mod: PN | Performed by: INTERNAL MEDICINE

## 2024-04-19 PROCEDURE — 85025 COMPLETE CBC W/AUTO DIFF WBC: CPT | Mod: PN | Performed by: INTERNAL MEDICINE

## 2024-04-19 PROCEDURE — 36415 COLL VENOUS BLD VENIPUNCTURE: CPT | Mod: PN | Performed by: INTERNAL MEDICINE

## 2024-04-21 NOTE — PROGRESS NOTES
PATIENT: Danna Sorensen  MRN: [id number]  DATE: 4/22/2024      Diagnosis:   1. Malignant neoplasm of splenic flexure    2. Iron deficiency anemia, unspecified iron deficiency anemia type    3. Renal mass, left    4. Immunodeficiency due to chemotherapy    5. Pulmonary nodules    6. Thrombophilia    7. Weight gain          Chief Complaint: Malignant neoplasm of splenic flexure      Oncologic History:      Oncologic History     Oncologic Treatment     Pathology           Subjective:    Interval History: Ms. Sorensen is a 84 y.o. female with Pulmonary embolism, carpal tunnel, CAD, HTN, hypothyroidism, osteoporosis, osteoarthritis, who presents for colon cancer.  Since the last clinic visit the patient endorses weight gain of which she is concerned.  The patient denies CP, cough, SOB, abdominal pain, nausea, vomiting, constipation, diarrhea.  The patient denies fever, chills, night sweats, weight loss, new lumps or bumps, easy bruising or bleeding.    Prior History:  Pt was previously diagnosed with Stage III adenocarcinoma of the colon in 2007 and underwent 12 cycles of FOLFOX.  Pt underwent colonoscopy on 8/08/23 showing 1 7 mm polyp in the rectum; altered vascular, congested, eroded, friable and ulcerated mucosa in the transverse colon which was biopsied; patent and to side ileocolonic anastomosis.  Pathology showed moderately differentiated adenocarcinoma. CT abdomen and pelvis 8/17/23 showing irregular appearance of the gallbladder; heterogeneously enhancing lesion in the inferior pole of the left kidney measuring 1.8 cm; short segment of concentric bowel wall thickening of the colon at the splenic flexure with surrounding mesenteric fat stranding with nodular thickness of 2.2 cm.  The patient underwent colonoscopy on 08/22/2023 showing nonbleeding internal hemorrhoids, partially obstructing tumor in the transverse colon which was tattooed.  Pt saw Urology 8/23/23 with plans for re-imaging the renal lesion in 3 months  with an MRI.  US abdomen 8/25/23 showed a 2.5 cm solid mass at the lower pole of the left kidney suspicious for neoplasm.  CT chest 8/25/23 showed 4 mm left upper lobe pulmonary nodule, 4 mm calcified granuloma left upper lobe, 2 mm pulmonary nodule in the left upper lobe, 9 x 9 mm triangular nodule along the juxtapleural right middle lobe, 2 mm pulmonary nodule in the right middle lobe, and a partially imaged masslike density in the splenic flexure of the colon. Pt then underwent resection of the transverse colon and splenic flexure with path showing invasive moderately differentiated adenocarcinoma with mucinous features, 33 benign lymph nodes, positive lymphovascular invasion, positive perineural invasion pT3N0.  Tumor shows intact expression of MLH1, PMS2, MSH2, MSH6.  Patient was positive for B Celio V600E mutation.   The patient was discussed at tumor Board on 11/07/2023 with recommendation for PET-CT with biopsy of lung nodules if positive.  PET-CT on 11/08/2023 showed evidence of pulmonary hypertension; stable 5 mm nodule left lung apex; calcified granuloma left upper lobe; stable 6 mm inferior right upper lobe nodule; stable 17 mm ground-glass opacity lateral right middle lobe; stable 4 mm nodule in the right lower lobe of the diaphragm; no activity in the lesion in the left kidney.  MRI abdomen 11/20/2023 showed heterogeneous fat containing enhancing mass in the lower pole of the left kidney suggestive of an angio myelolipoma.   The patient underwent a chest x-ray on 02/19/2024 showing no sign of pneumonia.  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    Strabismus     Trouble in sleeping     Wears dentures     Uppers       Past Surgical HIstory:   Past Surgical History:   Procedure Laterality Date    CARDIAC SURGERY      cardiac cath    COLON SURGERY      colon resection    COLONOSCOPY N/A 10/18/2016    Procedure: COLONOSCOPY;  Surgeon: Rell Cordova MD;  Location: [facility];  Service: Endoscopy;  Laterality: N/A;    COLONOSCOPY N/A 8/8/2023    Procedure: COLONOSCOPY;  Surgeon: Kaushik Pinon MD;  Location: [facility];  Service: Endoscopy;  Laterality: N/A;    COLONOSCOPY N/A 8/22/2023    Procedure: COLONOSCOPY (tattoo of colon ca);  Surgeon: Kaushik Pinon MD;  Location: [facility];  Service: Endoscopy;  Laterality: N/A;    ESOPHAGOGASTRODUODENOSCOPY N/A 8/3/2023    Procedure: EGD (ESOPHAGOGASTRODUODENOSCOPY);  Surgeon: Kaushik Pinon MD;  Location: [facility];  Service: Endoscopy;  Laterality: N/A;    HAND TENDON SURGERY Left     HEMICOLECTOMY      right    INJECTION OF ANESTHETIC AGENT AROUND MEDIAL BRANCH NERVES INNERVATING LUMBAR FACET JOINT Right 07/10/2018    Procedure: Block-nerve-medial branch-lumbar;  Surgeon: Parish Gaitan MD;  Location: [facility] OR;  Service: Pain Management;  Laterality: Right;  L3, 4, 5    INSERTION OF INFERIOR VENA CAVAL FILTER N/A 9/13/2023    Procedure: Insertion, Filter, Inferior Vena Cava;  Surgeon: Oren Verdin MD;  Location: [facility] CATH/EP LAB;  Service:  General;  Laterality: N/A;    INSERTION OF TUNNELED CENTRAL VENOUS CATHETER (CVC) WITH SUBCUTANEOUS PORT Right 11/15/2023    Procedure: [id number]-CATH;  Surgeon: Jim Chavez MD;  Location: [facility] OR;  Service: General;  Laterality: Right;    JOINT REPLACEMENT      bilateral    RADIOFREQUENCY ABLATION OF LUMBAR MEDIAL BRANCH NERVE AT SINGLE LEVEL Right 07/24/2018    Procedure: RADIOFREQUENCY ABLATION, NERVE, MEDIAL BRANCH, LUMBAR, 1 LEVEL;  Surgeon: Parish Gaitan MD;  Location: [facility] OR;  Service: Pain Management;  Laterality: Right;  L3,4,5 - Burned at 80 degrees C. for 75 seconds x 2 each site    ROBOT-ASSISTED COLECTOMY N/A 9/29/2023    Procedure: ROBOTIC COLECTOMY;  Surgeon: Jim Chavez MD;  Location: [facility] OR;  Service: General;  Laterality: N/A;    SHOULDER ARTHROSCOPY Right 01/12/2017    Reverse     TONSILLECTOMY      TONSILLECTOMY, ADENOIDECTOMY, BILATERAL MYRINGOTOMY AND TUBES      TOTAL KNEE ARTHROPLASTY Bilateral 08/1998    total replacements    TUMOR REMOVAL Left     left foot as a child       Family History:   Family History   Problem Relation Name Age of Onset    Hypertension Mother      Kidney disease Mother      Cataracts Father      Cataracts Sister      Cancer Sister          breast    No Known Problems Brother      Cancer Sister          breast    Arthritis Sister      Glaucoma Neg Hx      Amblyopia Neg Hx      Blindness Neg Hx      Macular degeneration Neg Hx      Retinal detachment Neg Hx      Strabismus Neg Hx      Stroke Neg Hx      Thyroid disease Neg Hx         Social History:  reports that she quit smoking about 63 years ago. Her smoking use included cigarettes. She started smoking about 63 years ago. She has a 0.5 pack-year smoking history. She has never used smokeless tobacco. She reports that she does not drink alcohol and does not use drugs.    Allergies:  Review of patient's allergies indicates:   Allergen Reactions    Aspirin      Other reaction(s): Stomach upset     "Aspirin Other (See Comments)     Other reaction(s): Stomach upset    Gabapentin Other (See Comments)     Pt states she felt "funny" when she took the medication. Especially at the higher dose.    Mobic [meloxicam] Swelling     Edema and elevated blood pressure     Oxaliplatin Other (See Comments)     Other reaction(s): Joint pain  Other reaction(s): Itching  Other reaction(s): Hives  Other reaction(s): Joint pain  Other reaction(s): Itching  Other reaction(s): Hives    Pregabalin Other (See Comments)     Side effects  Side effects       Medications:  Current Outpatient Medications   Medication Sig Dispense Refill    acetaminophen (TYLENOL) 650 MG TbSR Take 650 mg by mouth every 8 (eight) hours.      alendronate (FOSAMAX) 70 MG tablet Take 1 tablet (70 mg total) by mouth every 7 days. 12 tablet 3    apixaban (ELIQUIS) 5 mg Tab Take 1 tablet (5 mg total) by mouth 2 (two) times daily. 180 tablet 3    cyclobenzaprine (FLEXERIL) 5 MG tablet Take 1 tablet (5 mg total) by mouth 3 (three) times daily as needed for Muscle spasms. 90 tablet 0    ergocalciferol, vitamin D2, (VITAMIN D ORAL) Take 2,000 mg by mouth once daily.      furosemide (LASIX) 20 MG tablet Take 1 tablet (20 mg total) by mouth daily as needed (edema). 90 tablet 3    HYDROcodone-acetaminophen (NORCO) 5-325 mg per tablet Take 1 tablet by mouth every 6 (six) hours as needed for Pain. 20 tablet 0    hydrOXYzine HCL (ATARAX) 25 MG tablet Take 1 tablet (25 mg total) by mouth 3 (three) times daily as needed for Anxiety (insomnia). 30 tablet PRN    levocetirizine (XYZAL) 5 MG tablet TAKE 1 TABLET(5 MG) BY MOUTH EVERY NIGHT AS NEEDED FOR ALLERGIES 90 tablet 3    levothyroxine (SYNTHROID) 75 MCG tablet Take 1 tablet (75 mcg total) by mouth once daily. 90 tablet 3    LIDOcaine-prilocaine (EMLA) cream Apply topically as needed (Chemo). 30 g 0    lisinopriL (PRINIVIL,ZESTRIL) 30 MG tablet Take 30 mg by mouth.      multivitamin capsule Take 1 capsule by mouth once " daily.      nystatin (MYCOSTATIN) 100,000 unit/mL suspension Take 4 mLs by mouth 4 (four) times daily with meals and nightly.      omeprazole (PRILOSEC) 40 MG capsule Take 1 capsule (40 mg total) by mouth 2 (two) times daily before meals. 180 capsule PRN    ondansetron (ZOFRAN-ODT) 8 MG TbDL Take 1 tablet (8 mg total) by mouth every 8 (eight) hours as needed (nausea). 40 tablet 3    promethazine (PHENERGAN) 12.5 MG Tab (Take 1-2 tabs every 6 hours as needed for nausea persistent despite zofran) 40 tablet 3    promethazine-dextromethorphan (PROMETHAZINE-DM) 6.25-15 mg/5 mL Syrp Take 5 mLs by mouth as needed.      traMADoL (ULTRAM) 50 mg tablet Take 1 tablet (50 mg total) by mouth every 8 (eight) hours as needed for Pain. 21 each 0    triamcinolone acetonide 0.1% (KENALOG) 0.1 % cream APPLY TOPICALLY TO THE AFFECTED AREA TWICE DAILY 60 g 0     No current facility-administered medications for this visit.     Facility-Administered Medications Ordered in Other Visits   Medication Dose Route Frequency Provider Last Rate Last Admin    0.9%  NaCl infusion   Intravenous Once Oren Verdin MD        alteplase injection 2 mg  2 mg Intra-Catheter PRN Mahesh Cameron MD        fluorouracil (Adrucil) 5,000 mg in 100 mL chemo infusion  5,000 mg Intravenous over 46 hr Mahesh Cameron MD   5,000 mg at 04/22/24 1322    heparin, porcine (PF) 100 unit/mL injection flush 500 Units  500 Units Intravenous PRN Mahesh Cameron MD           Review of Systems   Constitutional:  Positive for unexpected weight change (gain). Negative for appetite change, chills, fatigue and fever.   Respiratory:  Negative for cough and shortness of breath.    Cardiovascular:  Negative for chest pain and palpitations.   Gastrointestinal:  Negative for abdominal pain, constipation, diarrhea, nausea and vomiting.   Skin:  Negative for rash.   Neurological:  Negative for headaches.   Hematological:  Negative for adenopathy. Does not bruise/bleed easily.  "      ECOG Performance Status: 2   Objective:      Vitals:   Vitals:    04/22/24 1023   BP: 110/68   BP Location: Left arm   Patient Position: Sitting   BP Method: Large (Manual)   Pulse: (!) 53   Resp: 20   Temp: 96.4 °F (35.8 °C)   TempSrc: Temporal   SpO2: 100%   Weight: 97.3 kg (214 lb 8.1 oz)   Height: 5' 4" (1.626 m)       Physical Exam  Constitutional:       General: She is not in acute distress.     Appearance: She is well-developed. She is not diaphoretic.   HENT:      Head: Normocephalic and atraumatic.   Cardiovascular:      Rate and Rhythm: Normal rate and regular rhythm.      Heart sounds: Normal heart sounds. No murmur heard.     No friction rub. No gallop.   Pulmonary:      Effort: Pulmonary effort is normal. No respiratory distress.      Breath sounds: No wheezing or rales.   Chest:      Chest wall: No tenderness.   Abdominal:      General: Bowel sounds are normal. There is no distension.      Palpations: Abdomen is soft. There is no mass.      Tenderness: There is no abdominal tenderness. There is no guarding or rebound.   Lymphadenopathy:      Cervical: No cervical adenopathy.      Upper Body:      Right upper body: No supraclavicular or axillary adenopathy.      Left upper body: No supraclavicular or axillary adenopathy.   Skin:     Findings: No erythema or rash.   Neurological:      Mental Status: She is alert and oriented to person, place, and time.   Psychiatric:         Behavior: Behavior normal.         Laboratory Data:  Lab Visit on 04/19/2024   Component Date Value Ref Range Status    WBC 04/19/2024 4.93  3.90 - 12.70 K/uL Final    RBC 04/19/2024 3.25 (L)  4.00 - 5.40 M/uL Final    Hemoglobin 04/19/2024 11.3 (L)  12.0 - 16.0 g/dL Final    Hematocrit 04/19/2024 33.2 (L)  37.0 - 48.5 % Final    MCV 04/19/2024 102 (H)  82 - 98 fL Final    MCH 04/19/2024 34.8 (H)  27.0 - 31.0 pg Final    MCHC 04/19/2024 34.0  32.0 - 36.0 g/dL Final    RDW 04/19/2024 15.1 (H)  11.5 - 14.5 % Final    Platelets " 04/19/2024 180  150 - 450 K/uL Final    MPV 04/19/2024 9.3  9.2 - 12.9 fL Final    Immature Granulocytes 04/19/2024 2.4 (H)  0.0 - 0.5 % Final    Gran # (ANC) 04/19/2024 3.0  1.8 - 7.7 K/uL Final    Immature Grans (Abs) 04/19/2024 0.12 (H)  0.00 - 0.04 K/uL Final    Comment: Mild elevation in immature granulocytes is non specific and   can be seen in a variety of conditions including stress response,   acute inflammation, trauma and pregnancy. Correlation with other   laboratory and clinical findings is essential.      Lymph # 04/19/2024 1.1  1.0 - 4.8 K/uL Final    Mono # 04/19/2024 0.6  0.3 - 1.0 K/uL Final    Eos # 04/19/2024 0.1  0.0 - 0.5 K/uL Final    Baso # 04/19/2024 0.03  0.00 - 0.20 K/uL Final    nRBC 04/19/2024 0  0 /100 WBC Final    Gran % 04/19/2024 60.5  38.0 - 73.0 % Final    Lymph % 04/19/2024 22.3  18.0 - 48.0 % Final    Mono % 04/19/2024 12.4  4.0 - 15.0 % Final    Eosinophil % 04/19/2024 1.8  0.0 - 8.0 % Final    Basophil % 04/19/2024 0.6  0.0 - 1.9 % Final    Differential Method 04/19/2024 Automated   Final    Sodium 04/19/2024 140  136 - 145 mmol/L Final    Potassium 04/19/2024 4.5  3.5 - 5.1 mmol/L Final    Chloride 04/19/2024 111 (H)  95 - 110 mmol/L Final    CO2 04/19/2024 18 (L)  23 - 29 mmol/L Final    Glucose 04/19/2024 99  70 - 110 mg/dL Final    BUN 04/19/2024 16  8 - 23 mg/dL Final    Creatinine 04/19/2024 1.1  0.5 - 1.4 mg/dL Final    Calcium 04/19/2024 9.5  8.7 - 10.5 mg/dL Final    Total Protein 04/19/2024 6.7  6.0 - 8.4 g/dL Final    Albumin 04/19/2024 3.8  3.5 - 5.2 g/dL Final    Total Bilirubin 04/19/2024 1.0  0.1 - 1.0 mg/dL Final    Comment: For infants and newborns, interpretation of results should be based  on gestational age, weight and in agreement with clinical  observations.    Premature Infant recommended reference ranges:  Up to 24 hours.............<8.0 mg/dL  Up to 48 hours............<12.0 mg/dL  3-5 days..................<15.0 mg/dL  6-29 days.................<15.0  mg/dL      Alkaline Phosphatase 04/19/2024 61  55 - 135 U/L Final    AST 04/19/2024 14  10 - 40 U/L Final    ALT 04/19/2024 13  10 - 44 U/L Final    eGFR 04/19/2024 50.2 (A)  >60 mL/min/1.73 m^2 Final    Anion Gap 04/19/2024 11  8 - 16 mmol/L Final         Imaging:     CT C/A/P 2/23/24    Chest:     There is a new nodule or lymph node in the left pulmonary hilum extending into the left lower lobe best visualized on series 2, image 49. The nodule measures 1.2 x 1.0 cm and is associated with an infiltrate extending into the superior segment of the left lower lobe. A noncalcified 5 mm nodule is present in the left upper lobe which is unchanged when compared to prior CTs dating back to 2007. A 5 mm calcified granuloma is also present in the left upper lobe. No pleural or pericardial effusion are present. Heart size is normal. The thoracic inlet and axillary regions are unremarkable. A vascular access catheter enters from a right subclavian approach with the tip in the superior vena cava.     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 The kidneys are otherwise unremarkable. The liver, spleen, pancreas, and adrenal glands are normal in size and appearance. A large solitary gallstone is present within the gallbladder and there are no signs of cholecystitis. The bile ducts are not dilated. An inferior vena cava filter is in place with the cephalic tip below the level of the renal veins. No lymph node enlargement, ascites, or inflammatory changes are evident in the abdomen or pelvis. The aorta tapers normally.       Assessment:       1. Malignant neoplasm of splenic flexure    2. Iron deficiency anemia, unspecified iron deficiency anemia type    3. Renal mass, left    4. Immunodeficiency due to chemotherapy    5. Pulmonary nodules    6. Thrombophilia    7. Weight gain             Plan:     Colon Cancer - Pt with h/o colon cancer s/p resection in 2007 followed by  adjuvant FOLFOX for 12 cycles  -Pt recently with resection of transverse colon and splenic flexure 9/29/23 showing path showing invasive moderately differentiated adenocarcinoma with mucinous features, 33 benign lymph nodes, positive lymphovascular invasion, positive perineural invasion pT3N0  -Tumor shows intact expression of MLH1, PMS2, MSH2, MSH6  -Patient was positive for B Celio V600E mutation  -Pt has high risk stage II colon cancer given positive LVI and PNI  -PT is a candidate for treatment with 6 months of 5-FU or Capecitabine  -no clear evidence of metastatic disease on PET-CT 11/08/2023   -Will proceed with 6 months of 5 fluorouracil  -Pt to proceed with cycle 10  -Pharmacogenomic panel on 11/03/23 showed normal DPYD metabolizer but 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    Immunodeficiency due to chemo - pt at increased risk of infection  -No current signs of infection  -Will monitor    Renal Mass - Pt with a mass on the left kidney seen on CT abdomen 8/17/23 and US abdomen 8/25/23  -Pt saw Urology [facility] under the supervision of Dr. 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 Pt can proceed with treatment.  Pt needs a CBC adn CMP 5/03/24 with appt with NP and treatment on 5/06/24.  Pt needs a CBC and CMP on 5/17/24 with an appt with me and treatment on 5/20/24.   Follow up with GRETCHEN 2 weeks.   Infusion scheduling note    Injection scheduling note    Labs    Imaging    Pharmacy appointment    Other referrals              Treatment Plan Information   OP COLORECTAL FLUOROURACIL LEUCOVORIN Q2W (MOD DE GRAMONT)   Mahesh Cameron MD   Upcoming Treatment Dates - OP COLORECTAL FLUOROURACIL LEUCOVORIN Q2W (MOD DE GRAMONT)    5/6/2024       Chemotherapy       leucovorin calcium in dextrose 5 % (D5W) 250 mL infusion       fluorouraciL injection       fluorouracil chemo infusion       Antiemetics       ondansetron injection 8 mg  5/20/2024       Chemotherapy       leucovorin calcium in dextrose 5 % (D5W) 250 mL infusion       fluorouraciL injection       fluorouracil chemo infusion       Antiemetics       ondansetron injection 8 mg  6/3/2024       Chemotherapy       leucovorin calcium in dextrose 5 % (D5W) 250 mL infusion       fluorouraciL injection       fluorouracil chemo infusion       Antiemetics       ondansetron injection 8 mg  6/17/2024       Chemotherapy       leucovorin calcium in dextrose 5 % (D5W) 250 mL infusion       fluorouraciL injection       fluorouracil chemo infusion       Antiemetics       ondansetron injection 8 mg    Supportive Plan Information  OP FERRIC CARBOXYMALTOSE Q2W   Mahesh Cameron MD   Upcoming Treatment Dates - OP FERRIC CARBOXYMALTOSE Q2W    1/25/2024       Supportive Care       ferric carboxymaltose (INJECTAFER) 750 mg in sodium chloride 0.9% 265 mL IVPB (ready to mix)      Mahesh Cameron MD  [facility]  Hematology and Oncology  [facility] [address]    [place] [id number]   O: [phone]  F: [phone]

## 2024-04-22 ENCOUNTER — INFUSION (OUTPATIENT)
Dept: INFUSION THERAPY | Facility: HOSPITAL | Age: 84
End: 2024-04-22
Attending: INTERNAL MEDICINE
Payer: MEDICARE

## 2024-04-22 ENCOUNTER — OFFICE VISIT (OUTPATIENT)
Dept: HEMATOLOGY/ONCOLOGY | Facility: CLINIC | Age: 84
End: 2024-04-22
Payer: MEDICARE

## 2024-04-22 ENCOUNTER — DOCUMENTATION ONLY (OUTPATIENT)
Dept: INFUSION THERAPY | Facility: HOSPITAL | Age: 84
End: 2024-04-22

## 2024-04-22 VITALS
HEART RATE: 53 BPM | OXYGEN SATURATION: 100 % | SYSTOLIC BLOOD PRESSURE: 110 MMHG | HEIGHT: 64 IN | TEMPERATURE: 96 F | RESPIRATION RATE: 20 BRPM | WEIGHT: 214.5 LBS | BODY MASS INDEX: 36.62 KG/M2 | DIASTOLIC BLOOD PRESSURE: 68 MMHG

## 2024-04-22 VITALS
HEIGHT: 64 IN | BODY MASS INDEX: 36.62 KG/M2 | RESPIRATION RATE: 20 BRPM | HEART RATE: 53 BPM | OXYGEN SATURATION: 100 % | SYSTOLIC BLOOD PRESSURE: 110 MMHG | TEMPERATURE: 96 F | DIASTOLIC BLOOD PRESSURE: 68 MMHG | WEIGHT: 214.5 LBS

## 2024-04-22 DIAGNOSIS — C18.5 MALIGNANT NEOPLASM OF SPLENIC FLEXURE: Primary | ICD-10-CM

## 2024-04-22 DIAGNOSIS — D84.821 IMMUNODEFICIENCY DUE TO CHEMOTHERAPY: ICD-10-CM

## 2024-04-22 DIAGNOSIS — R91.8 PULMONARY NODULES: ICD-10-CM

## 2024-04-22 DIAGNOSIS — D68.59 THROMBOPHILIA: ICD-10-CM

## 2024-04-22 DIAGNOSIS — T45.1X5A IMMUNODEFICIENCY DUE TO CHEMOTHERAPY: ICD-10-CM

## 2024-04-22 DIAGNOSIS — D50.9 IRON DEFICIENCY ANEMIA, UNSPECIFIED IRON DEFICIENCY ANEMIA TYPE: ICD-10-CM

## 2024-04-22 DIAGNOSIS — R63.5 WEIGHT GAIN: ICD-10-CM

## 2024-04-22 DIAGNOSIS — N28.89 RENAL MASS, LEFT: ICD-10-CM

## 2024-04-22 DIAGNOSIS — Z79.899 IMMUNODEFICIENCY DUE TO CHEMOTHERAPY: ICD-10-CM

## 2024-04-22 PROCEDURE — 1126F AMNT PAIN NOTED NONE PRSNT: CPT | Mod: CPTII,S$GLB,, | Performed by: INTERNAL MEDICINE

## 2024-04-22 PROCEDURE — 96375 TX/PRO/DX INJ NEW DRUG ADDON: CPT | Mod: PN

## 2024-04-22 PROCEDURE — 96411 CHEMO IV PUSH ADDL DRUG: CPT | Mod: PN

## 2024-04-22 PROCEDURE — G2211 COMPLEX E/M VISIT ADD ON: HCPCS | Mod: S$GLB,,, | Performed by: INTERNAL MEDICINE

## 2024-04-22 PROCEDURE — 3078F DIAST BP <80 MM HG: CPT | Mod: CPTII,S$GLB,, | Performed by: INTERNAL MEDICINE

## 2024-04-22 PROCEDURE — 99999 PR PBB SHADOW E&M-EST. PATIENT-LVL V: CPT | Mod: PBBFAC,,, | Performed by: INTERNAL MEDICINE

## 2024-04-22 PROCEDURE — 3074F SYST BP LT 130 MM HG: CPT | Mod: CPTII,S$GLB,, | Performed by: INTERNAL MEDICINE

## 2024-04-22 PROCEDURE — 63600175 PHARM REV CODE 636 W HCPCS: Mod: PN | Performed by: INTERNAL MEDICINE

## 2024-04-22 PROCEDURE — 1101F PT FALLS ASSESS-DOCD LE1/YR: CPT | Mod: CPTII,S$GLB,, | Performed by: INTERNAL MEDICINE

## 2024-04-22 PROCEDURE — 96409 CHEMO IV PUSH SNGL DRUG: CPT | Mod: PN

## 2024-04-22 PROCEDURE — 96416 CHEMO PROLONG INFUSE W/PUMP: CPT | Mod: PN

## 2024-04-22 PROCEDURE — 25000003 PHARM REV CODE 250: Mod: PN | Performed by: INTERNAL MEDICINE

## 2024-04-22 PROCEDURE — 3288F FALL RISK ASSESSMENT DOCD: CPT | Mod: CPTII,S$GLB,, | Performed by: INTERNAL MEDICINE

## 2024-04-22 PROCEDURE — 99215 OFFICE O/P EST HI 40 MIN: CPT | Mod: S$GLB,,, | Performed by: INTERNAL MEDICINE

## 2024-04-22 PROCEDURE — 96367 TX/PROPH/DG ADDL SEQ IV INF: CPT | Mod: PN

## 2024-04-22 RX ORDER — HEPARIN 100 UNIT/ML
500 SYRINGE INTRAVENOUS
Status: DISCONTINUED | OUTPATIENT
Start: 2024-04-22 | End: 2024-04-22 | Stop reason: HOSPADM

## 2024-04-22 RX ORDER — DIPHENHYDRAMINE HYDROCHLORIDE 50 MG/ML
50 INJECTION INTRAMUSCULAR; INTRAVENOUS ONCE AS NEEDED
Status: CANCELLED | OUTPATIENT
Start: 2024-04-22

## 2024-04-22 RX ORDER — FLUOROURACIL 50 MG/ML
400 INJECTION, SOLUTION INTRAVENOUS
Status: COMPLETED | OUTPATIENT
Start: 2024-04-22 | End: 2024-04-22

## 2024-04-22 RX ORDER — EPINEPHRINE 0.3 MG/.3ML
0.3 INJECTION SUBCUTANEOUS ONCE AS NEEDED
Status: CANCELLED | OUTPATIENT
Start: 2024-04-22

## 2024-04-22 RX ORDER — SODIUM CHLORIDE 0.9 % (FLUSH) 0.9 %
10 SYRINGE (ML) INJECTION
Status: CANCELLED | OUTPATIENT
Start: 2024-04-22

## 2024-04-22 RX ORDER — ONDANSETRON HYDROCHLORIDE 2 MG/ML
8 INJECTION, SOLUTION INTRAVENOUS
Status: COMPLETED | OUTPATIENT
Start: 2024-04-22 | End: 2024-04-22

## 2024-04-22 RX ORDER — SODIUM CHLORIDE 0.9 % (FLUSH) 0.9 %
10 SYRINGE (ML) INJECTION
Status: CANCELLED | OUTPATIENT
Start: 2024-04-24

## 2024-04-22 RX ORDER — HEPARIN 100 UNIT/ML
500 SYRINGE INTRAVENOUS
Status: CANCELLED | OUTPATIENT
Start: 2024-04-24

## 2024-04-22 RX ORDER — FLUOROURACIL 50 MG/ML
400 INJECTION, SOLUTION INTRAVENOUS
Status: CANCELLED | OUTPATIENT
Start: 2024-04-22

## 2024-04-22 RX ORDER — ONDANSETRON HYDROCHLORIDE 2 MG/ML
8 INJECTION, SOLUTION INTRAVENOUS
Status: CANCELLED | OUTPATIENT
Start: 2024-04-22

## 2024-04-22 RX ORDER — HEPARIN 100 UNIT/ML
500 SYRINGE INTRAVENOUS
Status: CANCELLED | OUTPATIENT
Start: 2024-04-22

## 2024-04-22 RX ORDER — PROCHLORPERAZINE EDISYLATE 5 MG/ML
5 INJECTION INTRAMUSCULAR; INTRAVENOUS ONCE AS NEEDED
Status: CANCELLED
Start: 2024-04-22

## 2024-04-22 RX ADMIN — FLUOROURACIL 5000 MG: 50 INJECTION, SOLUTION INTRAVENOUS at 01:04

## 2024-04-22 RX ADMIN — LEUCOVORIN CALCIUM 840 MG: 350 INJECTION, POWDER, LYOPHILIZED, FOR SOLUTION INTRAMUSCULAR; INTRAVENOUS at 12:04

## 2024-04-22 RX ADMIN — FLUOROURACIL 840 MG: 50 INJECTION, SOLUTION INTRAVENOUS at 01:04

## 2024-04-22 RX ADMIN — ONDANSETRON 8 MG: 2 INJECTION INTRAMUSCULAR; INTRAVENOUS at 12:04

## 2024-04-22 RX ADMIN — SODIUM CHLORIDE: 9 INJECTION, SOLUTION INTRAVENOUS at 12:04

## 2024-04-22 NOTE — PROGRESS NOTES
ONCOLOGY NUTRITION   FOLLOW UP VISIT        Danna Sorensen is a 84 y.o. female.  DATE: 04/22/2024        Oncology Diagnosis: Colon Cancer     REFERRAL FROM:  [] Integrative Oncology   [x] Med/Heme Oncology  [] Radiation Oncology  [] Surgical Oncology   [] Infusion Nurse    [x] Routine Nutrition follow up    TREATMENT PLAN:  [] Full treatment plan pending  [x] Chemotherapy  [] Immunotherapy  [] Radiation  [] Concurrent  [] Surgery  [] Treatment complete/post-treatment    ANTHROPOMETRICS:  Wt Readings from Last 10 Encounters:   04/22/24 97.3 kg (214 lb 8.1 oz)   04/22/24 97.3 kg (214 lb 8.1 oz)   04/08/24 96.5 kg (212 lb 11.9 oz)   04/08/24 96.5 kg (212 lb 11.9 oz)   03/27/24 95.1 kg (209 lb 10.5 oz)   03/25/24 95.1 kg (209 lb 10.5 oz)   03/25/24 95.1 kg (209 lb 10.5 oz)   03/14/24 93.6 kg (206 lb 5.6 oz)   03/13/24 93.5 kg (206 lb 2.1 oz)   03/11/24 93.5 kg (206 lb 2.1 oz)      Weight Changes: has increased 10 pounds over last 4 months    PHYSICAL EXAM:  Muscle Wasting Observed:  [x] No Deficit   [] Mild Deficit   [] Moderate   [] Severe    INTAKE:  [x] PO Intake [] TF Intake  Current Diet: regular diet  Dietary Patterns:  Eating meals/snacks as tolerated  [] Oral nutritional supplements: none reported    SYMPTOMS/COMPLAINTS:  [] No nutritional concerns at current  [] Diarrhea                    [] Constipation           [] Nausea                 [] Vomiting                [] Indigestion                [] Reflux              [] Poor Appetite            [] Anorexia                 [] Early Satiety         [] Gas                       [] Bloating                     [] Dry Mouth    [] Mucositis                   [] Mouth Sores           [] Poor Dentition      [] Difficulty chewing  [] Difficulty Swallowing   [] Pain with swallowing [] Change in taste      [] Change in smell   [] Pain (general)       [] Fatigue                      [] Sleep issues    [] Weight loss  [x] other, please specify: Weight gain    Nutrition  Re-Assessment Risk: Low    [x] Labs reviewed   [x] Meds reviewed    Education Provided:  [] No Education Needed at this time  [] Diarrhea                                              [] Constipation                          [] Nausea/Vomiting  [] Mucositis                [] Dry Mouth    [] Dealing with changes in Taste/Smell  [] Dealing with Poor Appetite   [] Soft/moist Diet      [x] Weight Loss/Gain     [] Weight Maintenance                           [] Indigestion/GERD                 [] Gas/Bloating          [] Foods High/ Low in specific nutrients [] Increasing Calories/Protein   [] Milkshake/Smoothies Recipes   [] Nutrition Supplements                        [] Increasing Fluid Intakes         [] Foods that fight cancer    [] Evidence bases resources                 [] Fermented Foods/Probiotics  [] Mediterranean/Plant Based Diet     [] Other, specify                                   [x] Handouts provided: Survivorship      [] Samples provided     RD NOTE:  RD met with patient at chairside during infusion tx d/t referral from Dr. Cameron. Pt states she cannot stop eating and is gaining weight. Pt states her appetite is larger than it has been in a long time.     RD Goals:   [] Weight stable                  [] Weight gain                      [x] Weight Loss                               [] Continue adequate Kcal/protein   [] Increase Kcal/protein      [] Adjust Tube-feeding Rx   [] Tolerate Tube Feedings             [] Increase tube feedings to goal     [] Tolerate Supplements     [] Symptom Improvement   [x] Understand nutrition Education  [] Offer supportive visits   [] other, please specify    RECOMMENDATIONS:  Do not eat 2 hours before bed  When feeling hungry right after a meal or in the night, try drinking a glass of water before eating to see if that cuts the urge  Focus on foods that are more filling and have longer satiety - protein, high fiber, unsaturated fats    Follow up: next infusion or PRN  throughout tx    Ana Cristina Coles, MS, RD, LDN  04/22/2024  2:58 PM

## 2024-04-22 NOTE — NURSING
Pt arrived to infusion clinic with friend for 5FU. Tolerated well. Pump applied and instructions given to pt and what to do if any leaking occurs.  Pt verbalized understanding.   Pt left using cane with friend in NAD.

## 2024-04-24 ENCOUNTER — INFUSION (OUTPATIENT)
Dept: INFUSION THERAPY | Facility: HOSPITAL | Age: 84
End: 2024-04-24
Attending: INTERNAL MEDICINE
Payer: MEDICARE

## 2024-04-24 VITALS
RESPIRATION RATE: 18 BRPM | DIASTOLIC BLOOD PRESSURE: 68 MMHG | SYSTOLIC BLOOD PRESSURE: 110 MMHG | HEART RATE: 53 BPM | TEMPERATURE: 98 F

## 2024-04-24 DIAGNOSIS — C18.5 MALIGNANT NEOPLASM OF SPLENIC FLEXURE: Primary | ICD-10-CM

## 2024-04-24 PROCEDURE — 96523 IRRIG DRUG DELIVERY DEVICE: CPT | Mod: PN

## 2024-04-24 PROCEDURE — 63600175 PHARM REV CODE 636 W HCPCS: Mod: PN | Performed by: INTERNAL MEDICINE

## 2024-04-24 PROCEDURE — 25000003 PHARM REV CODE 250: Mod: PN | Performed by: INTERNAL MEDICINE

## 2024-04-24 PROCEDURE — A4216 STERILE WATER/SALINE, 10 ML: HCPCS | Mod: PN | Performed by: INTERNAL MEDICINE

## 2024-04-24 RX ORDER — SODIUM CHLORIDE 0.9 % (FLUSH) 0.9 %
10 SYRINGE (ML) INJECTION
Status: DISCONTINUED | OUTPATIENT
Start: 2024-04-24 | End: 2024-04-24 | Stop reason: HOSPADM

## 2024-04-24 RX ORDER — HEPARIN 100 UNIT/ML
500 SYRINGE INTRAVENOUS
Status: DISCONTINUED | OUTPATIENT
Start: 2024-04-24 | End: 2024-04-24 | Stop reason: HOSPADM

## 2024-04-24 RX ADMIN — Medication 500 UNITS: at 12:04

## 2024-04-24 RX ADMIN — Medication 10 ML: at 12:04

## 2024-05-03 ENCOUNTER — LAB VISIT (OUTPATIENT)
Dept: LAB | Facility: HOSPITAL | Age: 84
End: 2024-05-03
Attending: INTERNAL MEDICINE
Payer: MEDICARE

## 2024-05-03 DIAGNOSIS — C18.5 MALIGNANT NEOPLASM OF SPLENIC FLEXURE: ICD-10-CM

## 2024-05-03 LAB
ALBUMIN SERPL BCP-MCNC: 3.6 G/DL (ref 3.5–5.2)
ALP SERPL-CCNC: 56 U/L (ref 55–135)
ALT SERPL W/O P-5'-P-CCNC: 12 U/L (ref 10–44)
ANION GAP SERPL CALC-SCNC: 9 MMOL/L (ref 8–16)
AST SERPL-CCNC: 12 U/L (ref 10–40)
BASOPHILS # BLD AUTO: 0.02 K/UL (ref 0–0.2)
BASOPHILS NFR BLD: 0.5 % (ref 0–1.9)
BILIRUB SERPL-MCNC: 0.8 MG/DL (ref 0.1–1)
BUN SERPL-MCNC: 13 MG/DL (ref 8–23)
CALCIUM SERPL-MCNC: 8.9 MG/DL (ref 8.7–10.5)
CHLORIDE SERPL-SCNC: 111 MMOL/L (ref 95–110)
CO2 SERPL-SCNC: 21 MMOL/L (ref 23–29)
CREAT SERPL-MCNC: 1.1 MG/DL (ref 0.5–1.4)
DIFFERENTIAL METHOD BLD: ABNORMAL
EOSINOPHIL # BLD AUTO: 0.1 K/UL (ref 0–0.5)
EOSINOPHIL NFR BLD: 3.1 % (ref 0–8)
ERYTHROCYTE [DISTWIDTH] IN BLOOD BY AUTOMATED COUNT: 14.6 % (ref 11.5–14.5)
EST. GFR  (NO RACE VARIABLE): 50.2 ML/MIN/1.73 M^2
GLUCOSE SERPL-MCNC: 103 MG/DL (ref 70–110)
HCT VFR BLD AUTO: 31.4 % (ref 37–48.5)
HGB BLD-MCNC: 10.4 G/DL (ref 12–16)
IMM GRANULOCYTES # BLD AUTO: 0.15 K/UL (ref 0–0.04)
IMM GRANULOCYTES NFR BLD AUTO: 3.9 % (ref 0–0.5)
LYMPHOCYTES # BLD AUTO: 1 K/UL (ref 1–4.8)
LYMPHOCYTES NFR BLD: 26.2 % (ref 18–48)
MCH RBC QN AUTO: 34.4 PG (ref 27–31)
MCHC RBC AUTO-ENTMCNC: 33.1 G/DL (ref 32–36)
MCV RBC AUTO: 104 FL (ref 82–98)
MONOCYTES # BLD AUTO: 0.6 K/UL (ref 0.3–1)
MONOCYTES NFR BLD: 15.5 % (ref 4–15)
NEUTROPHILS # BLD AUTO: 2 K/UL (ref 1.8–7.7)
NEUTROPHILS NFR BLD: 50.8 % (ref 38–73)
NRBC BLD-RTO: 0 /100 WBC
PLATELET # BLD AUTO: 177 K/UL (ref 150–450)
PMV BLD AUTO: 9 FL (ref 9.2–12.9)
POTASSIUM SERPL-SCNC: 4.6 MMOL/L (ref 3.5–5.1)
PROT SERPL-MCNC: 6.2 G/DL (ref 6–8.4)
RBC # BLD AUTO: 3.02 M/UL (ref 4–5.4)
SODIUM SERPL-SCNC: 141 MMOL/L (ref 136–145)
WBC # BLD AUTO: 3.86 K/UL (ref 3.9–12.7)

## 2024-05-03 PROCEDURE — 80053 COMPREHEN METABOLIC PANEL: CPT | Mod: PN | Performed by: INTERNAL MEDICINE

## 2024-05-03 PROCEDURE — 85025 COMPLETE CBC W/AUTO DIFF WBC: CPT | Mod: PN | Performed by: INTERNAL MEDICINE

## 2024-05-03 PROCEDURE — 36415 COLL VENOUS BLD VENIPUNCTURE: CPT | Mod: PN | Performed by: INTERNAL MEDICINE

## 2024-05-06 ENCOUNTER — OFFICE VISIT (OUTPATIENT)
Dept: HEMATOLOGY/ONCOLOGY | Facility: CLINIC | Age: 84
End: 2024-05-06
Payer: MEDICARE

## 2024-05-06 ENCOUNTER — DOCUMENTATION ONLY (OUTPATIENT)
Dept: INFUSION THERAPY | Facility: HOSPITAL | Age: 84
End: 2024-05-06
Payer: MEDICARE

## 2024-05-06 ENCOUNTER — INFUSION (OUTPATIENT)
Dept: INFUSION THERAPY | Facility: HOSPITAL | Age: 84
End: 2024-05-06
Attending: INTERNAL MEDICINE
Payer: MEDICARE

## 2024-05-06 VITALS
HEIGHT: 64 IN | BODY MASS INDEX: 37.3 KG/M2 | OXYGEN SATURATION: 98 % | SYSTOLIC BLOOD PRESSURE: 137 MMHG | HEART RATE: 54 BPM | TEMPERATURE: 97 F | DIASTOLIC BLOOD PRESSURE: 77 MMHG | RESPIRATION RATE: 20 BRPM | WEIGHT: 218.5 LBS

## 2024-05-06 VITALS
HEIGHT: 64 IN | RESPIRATION RATE: 20 BRPM | WEIGHT: 218.5 LBS | DIASTOLIC BLOOD PRESSURE: 73 MMHG | BODY MASS INDEX: 37.3 KG/M2 | TEMPERATURE: 97 F | OXYGEN SATURATION: 98 % | SYSTOLIC BLOOD PRESSURE: 115 MMHG | HEART RATE: 62 BPM

## 2024-05-06 DIAGNOSIS — Z85.038 HISTORY OF COLON CANCER: ICD-10-CM

## 2024-05-06 DIAGNOSIS — L08.9 SKIN INFECTION: ICD-10-CM

## 2024-05-06 DIAGNOSIS — C18.5 MALIGNANT NEOPLASM OF SPLENIC FLEXURE: Primary | ICD-10-CM

## 2024-05-06 PROCEDURE — 3078F DIAST BP <80 MM HG: CPT | Mod: CPTII,S$GLB,, | Performed by: NURSE PRACTITIONER

## 2024-05-06 PROCEDURE — 99999 PR PBB SHADOW E&M-EST. PATIENT-LVL IV: CPT | Mod: PBBFAC,,, | Performed by: NURSE PRACTITIONER

## 2024-05-06 PROCEDURE — 99213 OFFICE O/P EST LOW 20 MIN: CPT | Mod: S$GLB,,, | Performed by: NURSE PRACTITIONER

## 2024-05-06 PROCEDURE — 3074F SYST BP LT 130 MM HG: CPT | Mod: CPTII,S$GLB,, | Performed by: NURSE PRACTITIONER

## 2024-05-06 PROCEDURE — 96416 CHEMO PROLONG INFUSE W/PUMP: CPT | Mod: PN

## 2024-05-06 PROCEDURE — 1101F PT FALLS ASSESS-DOCD LE1/YR: CPT | Mod: CPTII,S$GLB,, | Performed by: NURSE PRACTITIONER

## 2024-05-06 PROCEDURE — 25000003 PHARM REV CODE 250: Mod: PN | Performed by: NURSE PRACTITIONER

## 2024-05-06 PROCEDURE — 96367 TX/PROPH/DG ADDL SEQ IV INF: CPT | Mod: PN

## 2024-05-06 PROCEDURE — 3288F FALL RISK ASSESSMENT DOCD: CPT | Mod: CPTII,S$GLB,, | Performed by: NURSE PRACTITIONER

## 2024-05-06 PROCEDURE — 1126F AMNT PAIN NOTED NONE PRSNT: CPT | Mod: CPTII,S$GLB,, | Performed by: NURSE PRACTITIONER

## 2024-05-06 PROCEDURE — 63600175 PHARM REV CODE 636 W HCPCS: Mod: PN | Performed by: NURSE PRACTITIONER

## 2024-05-06 PROCEDURE — 96375 TX/PRO/DX INJ NEW DRUG ADDON: CPT | Mod: PN

## 2024-05-06 PROCEDURE — 96409 CHEMO IV PUSH SNGL DRUG: CPT | Mod: PN

## 2024-05-06 PROCEDURE — A4216 STERILE WATER/SALINE, 10 ML: HCPCS | Mod: PN | Performed by: NURSE PRACTITIONER

## 2024-05-06 RX ORDER — ONDANSETRON HYDROCHLORIDE 2 MG/ML
8 INJECTION, SOLUTION INTRAVENOUS
Status: CANCELLED | OUTPATIENT
Start: 2024-05-06

## 2024-05-06 RX ORDER — SODIUM CHLORIDE 0.9 % (FLUSH) 0.9 %
10 SYRINGE (ML) INJECTION
Status: CANCELLED | OUTPATIENT
Start: 2024-05-08

## 2024-05-06 RX ORDER — EPINEPHRINE 0.3 MG/.3ML
0.3 INJECTION SUBCUTANEOUS ONCE AS NEEDED
Status: DISCONTINUED | OUTPATIENT
Start: 2024-05-06 | End: 2024-05-06 | Stop reason: HOSPADM

## 2024-05-06 RX ORDER — PROCHLORPERAZINE EDISYLATE 5 MG/ML
5 INJECTION INTRAMUSCULAR; INTRAVENOUS ONCE AS NEEDED
Status: CANCELLED
Start: 2024-05-06

## 2024-05-06 RX ORDER — MUPIROCIN 20 MG/G
OINTMENT TOPICAL 3 TIMES DAILY
Qty: 30 G | Refills: 0 | Status: SHIPPED | OUTPATIENT
Start: 2024-05-06

## 2024-05-06 RX ORDER — PROCHLORPERAZINE EDISYLATE 5 MG/ML
5 INJECTION INTRAMUSCULAR; INTRAVENOUS ONCE AS NEEDED
Status: DISCONTINUED | OUTPATIENT
Start: 2024-05-06 | End: 2024-05-06 | Stop reason: HOSPADM

## 2024-05-06 RX ORDER — HEPARIN 100 UNIT/ML
500 SYRINGE INTRAVENOUS
Status: DISCONTINUED | OUTPATIENT
Start: 2024-05-06 | End: 2024-05-06 | Stop reason: HOSPADM

## 2024-05-06 RX ORDER — FLUOROURACIL 50 MG/ML
5000 INJECTION, SOLUTION INTRAVENOUS
Status: DISCONTINUED | OUTPATIENT
Start: 2024-05-06 | End: 2024-05-06 | Stop reason: HOSPADM

## 2024-05-06 RX ORDER — HEPARIN 100 UNIT/ML
500 SYRINGE INTRAVENOUS
Status: CANCELLED | OUTPATIENT
Start: 2024-05-06

## 2024-05-06 RX ORDER — DIPHENHYDRAMINE HYDROCHLORIDE 50 MG/ML
50 INJECTION INTRAMUSCULAR; INTRAVENOUS ONCE AS NEEDED
Status: DISCONTINUED | OUTPATIENT
Start: 2024-05-06 | End: 2024-05-06 | Stop reason: HOSPADM

## 2024-05-06 RX ORDER — FLUOROURACIL 50 MG/ML
400 INJECTION, SOLUTION INTRAVENOUS
Status: CANCELLED | OUTPATIENT
Start: 2024-05-06

## 2024-05-06 RX ORDER — HEPARIN 100 UNIT/ML
500 SYRINGE INTRAVENOUS
Status: CANCELLED | OUTPATIENT
Start: 2024-05-08

## 2024-05-06 RX ORDER — DIPHENHYDRAMINE HYDROCHLORIDE 50 MG/ML
50 INJECTION INTRAMUSCULAR; INTRAVENOUS ONCE AS NEEDED
Status: CANCELLED | OUTPATIENT
Start: 2024-05-06

## 2024-05-06 RX ORDER — SODIUM CHLORIDE 0.9 % (FLUSH) 0.9 %
10 SYRINGE (ML) INJECTION
Status: DISCONTINUED | OUTPATIENT
Start: 2024-05-06 | End: 2024-05-06 | Stop reason: HOSPADM

## 2024-05-06 RX ORDER — LORAZEPAM 2 MG/ML
1 INJECTION INTRAMUSCULAR ONCE
Status: CANCELLED
Start: 2024-05-06 | End: 2024-05-06

## 2024-05-06 RX ORDER — LORAZEPAM 2 MG/ML
1 INJECTION INTRAMUSCULAR ONCE
Status: COMPLETED | OUTPATIENT
Start: 2024-05-06 | End: 2024-05-06

## 2024-05-06 RX ORDER — SODIUM CHLORIDE 0.9 % (FLUSH) 0.9 %
10 SYRINGE (ML) INJECTION
Status: CANCELLED | OUTPATIENT
Start: 2024-05-06

## 2024-05-06 RX ORDER — FLUOROURACIL 50 MG/ML
400 INJECTION, SOLUTION INTRAVENOUS
Status: COMPLETED | OUTPATIENT
Start: 2024-05-06 | End: 2024-05-06

## 2024-05-06 RX ORDER — EPINEPHRINE 0.3 MG/.3ML
0.3 INJECTION SUBCUTANEOUS ONCE AS NEEDED
Status: CANCELLED | OUTPATIENT
Start: 2024-05-06

## 2024-05-06 RX ORDER — ONDANSETRON HYDROCHLORIDE 2 MG/ML
8 INJECTION, SOLUTION INTRAVENOUS
Status: COMPLETED | OUTPATIENT
Start: 2024-05-06 | End: 2024-05-06

## 2024-05-06 RX ADMIN — FLUOROURACIL 850 MG: 50 INJECTION, SOLUTION INTRAVENOUS at 11:05

## 2024-05-06 RX ADMIN — Medication 10 ML: at 10:05

## 2024-05-06 RX ADMIN — LORAZEPAM 1 MG: 2 INJECTION INTRAMUSCULAR; INTRAVENOUS at 10:05

## 2024-05-06 RX ADMIN — FLUOROURACIL 5000 MG: 50 INJECTION, SOLUTION INTRAVENOUS at 11:05

## 2024-05-06 RX ADMIN — LEUCOVORIN CALCIUM 850 MG: 350 INJECTION, POWDER, LYOPHILIZED, FOR SOLUTION INTRAMUSCULAR; INTRAVENOUS at 10:05

## 2024-05-06 RX ADMIN — ONDANSETRON 8 MG: 2 INJECTION INTRAMUSCULAR; INTRAVENOUS at 10:05

## 2024-05-06 RX ADMIN — SODIUM CHLORIDE: 9 INJECTION, SOLUTION INTRAVENOUS at 10:05

## 2024-05-06 NOTE — PROGRESS NOTES
LCSW met with patient and niece, Barbie at the chairside during infusion Patient reported feeling nauseous but had been given some medication that was making her very sleepy. No emotional or practical needs were reported.  gave contact information to her niece and encouraged her to get in touch if any needs arise.

## 2024-05-06 NOTE — PROGRESS NOTES
PATIENT: Danna Sorensen  MRN: [id number]  DATE: 5/6/2024    Diagnosis:   1. Malignant neoplasm of splenic flexure    2. History of colon cancer      Chief Complaint: 2 week f/u with clearance for C11 5FU    Oncologic History:     Oncology History   Malignant neoplasm of splenic flexure   11/3/2023 Initial Diagnosis    Malignant neoplasm of splenic flexure     11/3/2023 Cancer Staged    Staging form: Colon and Rectum, AJCC 8th Edition  - Clinical: Stage IIA (cT3, cN0, cM0)     12/6/2023 -  Chemotherapy    Treatment Summary   Plan Name: OP COLORECTAL FLUOROURACIL LEUCOVORIN Q2W (MOD [facility])  Treatment Goal: Curative  Status: Active  Start Date: 12/6/2023  End Date: 12/4/2024 (Planned)  Provider: Mahesh Cameron MD  Chemotherapy: fluorouraciL injection 825 mg, 400 mg/m2 = 825 mg, Intravenous, [facility] 1 time, 10 of 26 cycles  Administration: 825 mg (12/6/2023), 815 mg (12/20/2023), 810 mg (1/3/2024), 810 mg (1/22/2024), 815 mg (2/5/2024), 820 mg (2/26/2024), 825 mg (3/11/2024), 830 mg (3/25/2024), 835 mg (4/8/2024), 840 mg (4/22/2024)  fluorouracil (ADRUCIL) 2,400 mg/m2 = 4,945 mg in sodium chloride 0.9% 100 mL chemo infusion, 2,400 mg/m2 = 4,945 mg, Intravenous, Over 46 hours, 10 of 26 cycles  Administration: 4,945 mg (12/6/2023), 4,895 mg (12/20/2023), 4,870 mg (1/3/2024), 4,870 mg (1/22/2024), 4,895 mg (2/5/2024), 4,920 mg (2/26/2024), 4,945 mg (3/11/2024), 4,970 mg (3/25/2024), 5,000 mg (4/8/2024), 5,000 mg (4/22/2024)             Subjective:    Interval History: Ms. Sorensen is a 84 y.o. female with Pulmonary embolism, carpal tunnel, CAD, HTN, hypothyroidism, osteoporosis, osteoarthritis, who presents for colon cancer.  Since the last clinic visit, the patient endorses increased nausea & anxiety with the start of the infusion & hypersensitive to smell. She has nausea for a day or 2 after chemo that resolves with medicine. The patient denies CP, cough, SOB, abdominal pain, vomiting, constipation, diarrhea.  The  patient denies fever, chills, night sweats, weight loss, new lumps or bumps, easy bruising or bleeding.    Prior History:  Pt was previously diagnosed with Stage III adenocarcinoma of the colon in 2007 and underwent 12 cycles of FOLFOX.  Pt underwent colonoscopy on 8/08/23 showing 1 7 mm polyp in the rectum; altered vascular, congested, eroded, friable and ulcerated mucosa in the transverse colon which was biopsied; patent and to side ileocolonic anastomosis.  Pathology showed moderately differentiated adenocarcinoma. CT abdomen and pelvis 8/17/23 showing irregular appearance of the gallbladder; heterogeneously enhancing lesion in the inferior pole of the left kidney measuring 1.8 cm; short segment of concentric bowel wall thickening of the colon at the splenic flexure with surrounding mesenteric fat stranding with nodular thickness of 2.2 cm.  The patient underwent colonoscopy on 08/22/2023 showing nonbleeding internal hemorrhoids, partially obstructing tumor in the transverse colon which was tattooed.  Pt saw Urology 8/23/23 with plans for re-imaging the renal lesion in 3 months with an MRI.  US abdomen 8/25/23 showed a 2.5 cm solid mass at the lower pole of the left kidney suspicious for neoplasm.  CT chest 8/25/23 showed 4 mm left upper lobe pulmonary nodule, 4 mm calcified granuloma left upper lobe, 2 mm pulmonary nodule in the left upper lobe, 9 x 9 mm triangular nodule along the juxtapleural right middle lobe, 2 mm pulmonary nodule in the right middle lobe, and a partially imaged masslike density in the splenic flexure of the colon. Pt then underwent resection of the transverse colon and splenic flexure with path showing invasive moderately differentiated adenocarcinoma with mucinous features, 33 benign lymph nodes, positive lymphovascular invasion, positive perineural invasion pT3N0.  Tumor shows intact expression of MLH1, PMS2, MSH2, MSH6.  Patient was positive for B Celio V600E mutation.   The patient was  discussed at tumor Board on 11/07/2023 with recommendation for PET-CT with biopsy of lung nodules if positive.  PET-CT on 11/08/2023 showed evidence of pulmonary hypertension; stable 5 mm nodule left lung apex; calcified granuloma left upper lobe; stable 6 mm inferior right upper lobe nodule; stable 17 mm ground-glass opacity lateral right middle lobe; stable 4 mm nodule in the right lower lobe of the diaphragm; no activity in the lesion in the left kidney.  MRI abdomen 11/20/2023 showed heterogeneous fat containing enhancing mass in the lower pole of the left kidney suggestive of an angio myelolipoma.   The patient underwent a chest x-ray on 02/19/2024 showing no sign of pneumonia.  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    Strabismus     Trouble in sleeping     Wears dentures     Uppers       Past Surgical HIstory:   Past Surgical History:   Procedure Laterality Date    CARDIAC SURGERY      cardiac cath    COLON SURGERY      colon resection    COLONOSCOPY N/A 10/18/2016    Procedure:  COLONOSCOPY;  Surgeon: Rell Cordova MD;  Location: [facility];  Service: Endoscopy;  Laterality: N/A;    COLONOSCOPY N/A 8/8/2023    Procedure: COLONOSCOPY;  Surgeon: Kaushik Pinon MD;  Location: [facility];  Service: Endoscopy;  Laterality: N/A;    COLONOSCOPY N/A 8/22/2023    Procedure: COLONOSCOPY (tattoo of colon ca);  Surgeon: Kaushik Pinon MD;  Location: [facility];  Service: Endoscopy;  Laterality: N/A;    ESOPHAGOGASTRODUODENOSCOPY N/A 8/3/2023    Procedure: EGD (ESOPHAGOGASTRODUODENOSCOPY);  Surgeon: Kaushik Pinon MD;  Location: [facility];  Service: Endoscopy;  Laterality: N/A;    HAND TENDON SURGERY Left     HEMICOLECTOMY      right    INJECTION OF ANESTHETIC AGENT AROUND MEDIAL BRANCH NERVES INNERVATING LUMBAR FACET JOINT Right 07/10/2018    Procedure: Block-nerve-medial branch-lumbar;  Surgeon: Parish Gaitan MD;  Location: [facility] OR;  Service: Pain Management;  Laterality: Right;  L3, 4, 5    INSERTION OF INFERIOR VENA CAVAL FILTER N/A 9/13/2023    Procedure: Insertion, Filter, Inferior Vena Cava;  Surgeon: Oren Verdin MD;  Location: [facility] CATH/EP LAB;  Service: General;  Laterality: N/A;    INSERTION OF TUNNELED CENTRAL VENOUS CATHETER (CVC) WITH SUBCUTANEOUS PORT Right 11/15/2023    Procedure: [id number]-YNJG-A-CATH;  Surgeon: Jim Chavez MD;  Location: [facility] OR;  Service: General;  Laterality: Right;    JOINT REPLACEMENT      bilateral    RADIOFREQUENCY ABLATION OF LUMBAR MEDIAL BRANCH NERVE AT SINGLE LEVEL Right 07/24/2018    Procedure: RADIOFREQUENCY ABLATION, NERVE, MEDIAL BRANCH, LUMBAR, 1 LEVEL;  Surgeon: aPrish Gaitan MD;  Location: [facility] OR;  Service: Pain Management;  Laterality: Right;  L3,4,5 - Burned at 80 degrees C. for 75 seconds x 2 each site    ROBOT-ASSISTED COLECTOMY N/A 9/29/2023    Procedure: ROBOTIC COLECTOMY;  Surgeon: Jim Chavez MD;  Location: [facility];  Service: General;  Laterality: N/A;    SHOULDER ARTHROSCOPY Right 01/12/2017    Reverse     TONSILLECTOMY   "    TONSILLECTOMY, ADENOIDECTOMY, BILATERAL MYRINGOTOMY AND TUBES      TOTAL KNEE ARTHROPLASTY Bilateral 08/1998    total replacements    TUMOR REMOVAL Left     left foot as a child       Family History:   Family History   Problem Relation Name Age of Onset    Hypertension Mother      Kidney disease Mother      Cataracts Father      Cataracts Sister      Cancer Sister          breast    No Known Problems Brother      Cancer Sister          breast    Arthritis Sister      Glaucoma Neg Hx      Amblyopia Neg Hx      Blindness Neg Hx      Macular degeneration Neg Hx      Retinal detachment Neg Hx      Strabismus Neg Hx      Stroke Neg Hx      Thyroid disease Neg Hx         Social History:  reports that she quit smoking about 63 years ago. Her smoking use included cigarettes. She started smoking about 63 years ago. She has a 0.5 pack-year smoking history. She has never used smokeless tobacco. She reports that she does not drink alcohol and does not use drugs.    Allergies:  Review of patient's allergies indicates:   Allergen Reactions    Aspirin      Other reaction(s): Stomach upset    Aspirin Other (See Comments)     Other reaction(s): Stomach upset    Gabapentin Other (See Comments)     Pt states she felt "funny" when she took the medication. Especially at the higher dose.    Mobic [meloxicam] Swelling     Edema and elevated blood pressure     Oxaliplatin Other (See Comments)     Other reaction(s): Joint pain  Other reaction(s): Itching  Other reaction(s): Hives  Other reaction(s): Joint pain  Other reaction(s): Itching  Other reaction(s): Hives    Pregabalin Other (See Comments)     Side effects  Side effects       Medications:  Current Outpatient Medications   Medication Sig Dispense Refill    acetaminophen (TYLENOL) 650 MG TbSR Take 650 mg by mouth every 8 (eight) hours.      alendronate (FOSAMAX) 70 MG tablet Take 1 tablet (70 mg total) by mouth every 7 days. 12 tablet 3    apixaban (ELIQUIS) 5 mg Tab Take 1 tablet " (5 mg total) by mouth 2 (two) times daily. 180 tablet 3    cyclobenzaprine (FLEXERIL) 5 MG tablet Take 1 tablet (5 mg total) by mouth 3 (three) times daily as needed for Muscle spasms. 90 tablet 0    ergocalciferol, vitamin D2, (VITAMIN D ORAL) Take 2,000 mg by mouth once daily.      furosemide (LASIX) 20 MG tablet Take 1 tablet (20 mg total) by mouth daily as needed (edema). 90 tablet 3    HYDROcodone-acetaminophen (NORCO) 5-325 mg per tablet Take 1 tablet by mouth every 6 (six) hours as needed for Pain. 20 tablet 0    hydrOXYzine HCL (ATARAX) 25 MG tablet Take 1 tablet (25 mg total) by mouth 3 (three) times daily as needed for Anxiety (insomnia). 30 tablet PRN    levocetirizine (XYZAL) 5 MG tablet TAKE 1 TABLET(5 MG) BY MOUTH EVERY NIGHT AS NEEDED FOR ALLERGIES 90 tablet 3    levothyroxine (SYNTHROID) 75 MCG tablet Take 1 tablet (75 mcg total) by mouth once daily. 90 tablet 3    LIDOcaine-prilocaine (EMLA) cream Apply topically as needed (Chemo). 30 g 0    lisinopriL (PRINIVIL,ZESTRIL) 30 MG tablet Take 30 mg by mouth.      multivitamin capsule Take 1 capsule by mouth once daily.      nystatin (MYCOSTATIN) 100,000 unit/mL suspension Take 4 mLs by mouth 4 (four) times daily with meals and nightly.      ondansetron (ZOFRAN-ODT) 8 MG TbDL Take 1 tablet (8 mg total) by mouth every 8 (eight) hours as needed (nausea). 40 tablet 3    promethazine (PHENERGAN) 12.5 MG Tab (Take 1-2 tabs every 6 hours as needed for nausea persistent despite zofran) 40 tablet 3    promethazine-dextromethorphan (PROMETHAZINE-DM) 6.25-15 mg/5 mL Syrp Take 5 mLs by mouth as needed.      traMADoL (ULTRAM) 50 mg tablet Take 1 tablet (50 mg total) by mouth every 8 (eight) hours as needed for Pain. 21 each 0    triamcinolone acetonide 0.1% (KENALOG) 0.1 % cream APPLY TOPICALLY TO THE AFFECTED AREA TWICE DAILY 60 g 0    omeprazole (PRILOSEC) 40 MG capsule Take 1 capsule (40 mg total) by mouth 2 (two) times daily before meals. 180 capsule PRN     No  "current facility-administered medications for this visit.     Facility-Administered Medications Ordered in Other Visits   Medication Dose Route Frequency Provider Last Rate Last Admin    0.9%  NaCl infusion   Intravenous Once Oren Verdin MD           Review of Systems   Constitutional:  Negative for appetite change, chills, fatigue, fever and unexpected weight change.   Respiratory:  Negative for cough and shortness of breath.    Cardiovascular:  Negative for chest pain and palpitations.   Gastrointestinal:  Negative for abdominal pain, constipation, diarrhea, nausea and vomiting.   Skin:  Negative for rash.   Neurological:  Negative for headaches.   Hematological:  Negative for adenopathy. Does not bruise/bleed easily.       ECOG Performance Status: 2   Objective:      Vitals: Weight:  Gain of 3 pounds in 2 weeks  Vitals:    05/06/24 0855   BP: (!) 150/77   BP Location: Left arm   Patient Position: Sitting   BP Method: Medium (Automatic)   Pulse: 62   Resp: 20   Temp: 97.3 °F (36.3 °C)   TempSrc: Temporal   SpO2: 98%   Weight: 99.1 kg (218 lb 7.6 oz)   Height: 5' 4" (1.626 m)       Physical Exam  Constitutional:       General: She is not in acute distress.     Appearance: She is well-developed. She is not diaphoretic.   HENT:      Head: Normocephalic and atraumatic.   Cardiovascular:      Rate and Rhythm: Normal rate and regular rhythm.      Heart sounds: Normal heart sounds. No murmur heard.     No friction rub. No gallop.   Pulmonary:      Effort: Pulmonary effort is normal. No respiratory distress.      Breath sounds: No wheezing or rales.   Chest:      Chest wall: No tenderness.   Abdominal:      General: Bowel sounds are normal. There is no distension.      Palpations: Abdomen is soft. There is no mass.      Tenderness: There is no abdominal tenderness. There is no guarding or rebound.   Lymphadenopathy:      Cervical: No cervical adenopathy.      Upper Body:      Right upper body: No supraclavicular or " axillary adenopathy.      Left upper body: No supraclavicular or axillary adenopathy.   Skin:     Findings: No erythema or rash.          Neurological:      Mental Status: She is alert and oriented to person, place, and time.   Psychiatric:         Behavior: Behavior normal.         Laboratory Data:  Lab Visit on 05/03/2024   Component Date Value Ref Range Status    WBC 05/03/2024 3.86 (L)  3.90 - 12.70 K/uL Final    RBC 05/03/2024 3.02 (L)  4.00 - 5.40 M/uL Final    Hemoglobin 05/03/2024 10.4 (L)  12.0 - 16.0 g/dL Final    Hematocrit 05/03/2024 31.4 (L)  37.0 - 48.5 % Final    MCV 05/03/2024 104 (H)  82 - 98 fL Final    MCH 05/03/2024 34.4 (H)  27.0 - 31.0 pg Final    MCHC 05/03/2024 33.1  32.0 - 36.0 g/dL Final    RDW 05/03/2024 14.6 (H)  11.5 - 14.5 % Final    Platelets 05/03/2024 177  150 - 450 K/uL Final    MPV 05/03/2024 9.0 (L)  9.2 - 12.9 fL Final    Immature Granulocytes 05/03/2024 3.9 (H)  0.0 - 0.5 % Final    Gran # (ANC) 05/03/2024 2.0  1.8 - 7.7 K/uL Final    Immature Grans (Abs) 05/03/2024 0.15 (H)  0.00 - 0.04 K/uL Final    Comment: Mild elevation in immature granulocytes is non specific and   can be seen in a variety of conditions including stress response,   acute inflammation, trauma and pregnancy. Correlation with other   laboratory and clinical findings is essential.      Lymph # 05/03/2024 1.0  1.0 - 4.8 K/uL Final    Mono # 05/03/2024 0.6  0.3 - 1.0 K/uL Final    Eos # 05/03/2024 0.1  0.0 - 0.5 K/uL Final    Baso # 05/03/2024 0.02  0.00 - 0.20 K/uL Final    nRBC 05/03/2024 0  0 /100 WBC Final    Gran % 05/03/2024 50.8  38.0 - 73.0 % Final    Lymph % 05/03/2024 26.2  18.0 - 48.0 % Final    Mono % 05/03/2024 15.5 (H)  4.0 - 15.0 % Final    Eosinophil % 05/03/2024 3.1  0.0 - 8.0 % Final    Basophil % 05/03/2024 0.5  0.0 - 1.9 % Final    Differential Method 05/03/2024 Automated   Final    Sodium 05/03/2024 141  136 - 145 mmol/L Final    Potassium 05/03/2024 4.6  3.5 - 5.1 mmol/L Final    Chloride  05/03/2024 111 (H)  95 - 110 mmol/L Final    CO2 05/03/2024 21 (L)  23 - 29 mmol/L Final    Glucose 05/03/2024 103  70 - 110 mg/dL Final    BUN 05/03/2024 13  8 - 23 mg/dL Final    Creatinine 05/03/2024 1.1  0.5 - 1.4 mg/dL Final    Calcium 05/03/2024 8.9  8.7 - 10.5 mg/dL Final    Total Protein 05/03/2024 6.2  6.0 - 8.4 g/dL Final    Albumin 05/03/2024 3.6  3.5 - 5.2 g/dL Final    Total Bilirubin 05/03/2024 0.8  0.1 - 1.0 mg/dL Final    Comment: For infants and newborns, interpretation of results should be based  on gestational age, weight and in agreement with clinical  observations.    Premature Infant recommended reference ranges:  Up to 24 hours.............<8.0 mg/dL  Up to 48 hours............<12.0 mg/dL  3-5 days..................<15.0 mg/dL  6-29 days.................<15.0 mg/dL      Alkaline Phosphatase 05/03/2024 56  55 - 135 U/L Final    AST 05/03/2024 12  10 - 40 U/L Final    ALT 05/03/2024 12  10 - 44 U/L Final    eGFR 05/03/2024 50.2 (A)  >60 mL/min/1.73 m^2 Final    Anion Gap 05/03/2024 9  8 - 16 mmol/L Final         Imaging:     CT C/A/P 2/23/24    Chest:     There is a new nodule or lymph node in the left pulmonary hilum extending into the left lower lobe best visualized on series 2, image 49. The nodule measures 1.2 x 1.0 cm and is associated with an infiltrate extending into the superior segment of the left lower lobe. A noncalcified 5 mm nodule is present in the left upper lobe which is unchanged when compared to prior CTs dating back to 2007. A 5 mm calcified granuloma is also present in the left upper lobe. No pleural or pericardial effusion are present. Heart size is normal. The thoracic inlet and axillary regions are unremarkable. A vascular access catheter enters from a right subclavian approach with the tip in the superior vena cava.     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 The kidneys are otherwise unremarkable. The liver,  spleen, pancreas, and adrenal glands are normal in size and appearance. A large solitary gallstone is present within the gallbladder and there are no signs of cholecystitis. The bile ducts are not dilated. An inferior vena cava filter is in place with the cephalic tip below the level of the renal veins. No lymph node enlargement, ascites, or inflammatory changes are evident in the abdomen or pelvis. The aorta tapers normally.       Assessment:       1. Malignant neoplasm of splenic flexure    2. History of colon cancer    3. Skin infection         Plan:     Colon Cancer - Pt with h/o colon cancer s/p resection in 2007 followed by adjuvant FOLFOX for 12 cycles  -Pt recently with resection of transverse colon and splenic flexure 9/29/23 showing path showing invasive moderately differentiated adenocarcinoma with mucinous features, 33 benign lymph nodes, positive lymphovascular invasion, positive perineural invasion pT3N0  -Tumor shows intact expression of MLH1, PMS2, MSH2, MSH6  -Patient was positive for B Celio V600E mutation  -Pt has high risk stage II colon cancer given positive LVI and PNI  -PT is a candidate for treatment with 6 months of 5-FU or Capecitabine  -no clear evidence of metastatic disease on PET-CT 11/08/2023   -Will proceed with 6 months of 5 fluorouracil  -Pt to proceed with cycle 10  -Pharmacogenomic panel on 11/03/23 showed normal DPYD metabolizer but 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 Cameron as scheduled on 05/20 with labs on 05/17.  -Visit today included increased complexity associated with the care of the episodic problem side effects of chemo addressed and managing the longitudinal care of the patient due to the serious and/or complex managed problem(s)  colon cancer.    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    Route Chart for Scheduling    Med Onc Chart Routing      Follow up with physician . F/u as scheduled with Dr. Cameron on 05/20 for C12 with lab on 05/17   Follow up with [facility]    Infusion scheduling note   Proceed with C11 FOLFOX today; +'d Ativan to assist with anxiety/nausea   Injection scheduling note    Labs    Imaging    Pharmacy appointment    Other referrals              Treatment Plan Information   OP COLORECTAL FLUOROURACIL LEUCOVORIN Q2W (MOD [facility])   Mahesh Cameron MD   Upcoming Treatment Dates - OP COLORECTAL FLUOROURACIL LEUCOVORIN Q2W ([facility])    5/6/2024       Chemotherapy       leucovorin calcium in dextrose 5 % (D5W) 250 mL infusion       fluorouraciL injection       fluorouracil chemo infusion       Antiemetics       ondansetron injection 8 mg  5/20/2024       Chemotherapy       leucovorin calcium in dextrose 5 % (D5W) 250 mL infusion       fluorouraciL injection       fluorouracil  chemo infusion       Antiemetics       ondansetron injection 8 mg  6/3/2024       Chemotherapy       leucovorin calcium in dextrose 5 % (D5W) 250 mL infusion       fluorouraciL injection       fluorouracil chemo infusion       Antiemetics       ondansetron injection 8 mg  6/17/2024       Chemotherapy       leucovorin calcium in dextrose 5 % (D5W) 250 mL infusion       fluorouraciL injection       fluorouracil chemo infusion       Antiemetics       ondansetron injection 8 mg    Supportive Plan Information  OP FERRIC CARBOXYMALTOSE Q2W   Mahesh Cameron MD   Upcoming Treatment Dates - OP FERRIC CARBOXYMALTOSE Q2W    1/25/2024       Supportive Care       ferric carboxymaltose (INJECTAFER) 750 mg in sodium chloride 0.9% 265 mL IVPB (ready to mix)

## 2024-05-06 NOTE — PLAN OF CARE
Problem: Nausea and Vomiting  Goal: Nausea and Vomiting Relief  Outcome: Progressing   Pt has anticipatory n/v  States dr Maldonado called her in some anti anxiety med in today.  Ativan one mg noted to be ordered for her  Problem: Adult Inpatient Plan of Care  Goal: Plan of Care Review  Outcome: Met   Pt is very groggy after ativan  Wheeled down by me per w/c to car with daughter driving POC given to daughter who verbally acknowledged understanding NAD

## 2024-05-08 ENCOUNTER — INFUSION (OUTPATIENT)
Dept: INFUSION THERAPY | Facility: HOSPITAL | Age: 84
End: 2024-05-08
Attending: INTERNAL MEDICINE
Payer: MEDICARE

## 2024-05-08 VITALS
TEMPERATURE: 98 F | HEART RATE: 80 BPM | DIASTOLIC BLOOD PRESSURE: 76 MMHG | RESPIRATION RATE: 14 BRPM | SYSTOLIC BLOOD PRESSURE: 166 MMHG

## 2024-05-08 DIAGNOSIS — C18.5 MALIGNANT NEOPLASM OF SPLENIC FLEXURE: Primary | ICD-10-CM

## 2024-05-08 PROCEDURE — A4216 STERILE WATER/SALINE, 10 ML: HCPCS | Mod: PN | Performed by: NURSE PRACTITIONER

## 2024-05-08 PROCEDURE — 63600175 PHARM REV CODE 636 W HCPCS: Mod: PN | Performed by: NURSE PRACTITIONER

## 2024-05-08 PROCEDURE — 25000003 PHARM REV CODE 250: Mod: PN | Performed by: NURSE PRACTITIONER

## 2024-05-08 RX ORDER — HEPARIN 100 UNIT/ML
500 SYRINGE INTRAVENOUS
Status: DISCONTINUED | OUTPATIENT
Start: 2024-05-08 | End: 2024-05-08 | Stop reason: HOSPADM

## 2024-05-08 RX ORDER — SODIUM CHLORIDE 0.9 % (FLUSH) 0.9 %
10 SYRINGE (ML) INJECTION
Status: DISCONTINUED | OUTPATIENT
Start: 2024-05-08 | End: 2024-05-08 | Stop reason: HOSPADM

## 2024-05-08 RX ADMIN — Medication 500 UNITS: at 11:05

## 2024-05-08 RX ADMIN — Medication 10 ML: at 11:05

## 2024-05-14 ENCOUNTER — OFFICE VISIT (OUTPATIENT)
Dept: FAMILY MEDICINE | Facility: CLINIC | Age: 84
End: 2024-05-14
Payer: MEDICARE

## 2024-05-14 ENCOUNTER — HOSPITAL ENCOUNTER (OUTPATIENT)
Dept: RADIOLOGY | Facility: CLINIC | Age: 84
Discharge: HOME | End: 2024-05-14
Attending: NURSE PRACTITIONER
Payer: MEDICARE

## 2024-05-14 VITALS
HEIGHT: 64 IN | TEMPERATURE: 98 F | SYSTOLIC BLOOD PRESSURE: 130 MMHG | HEART RATE: 65 BPM | BODY MASS INDEX: 36.35 KG/M2 | DIASTOLIC BLOOD PRESSURE: 70 MMHG | WEIGHT: 212.94 LBS | OXYGEN SATURATION: 98 % | RESPIRATION RATE: 20 BRPM

## 2024-05-14 DIAGNOSIS — J06.9 UPPER RESPIRATORY TRACT INFECTION, UNSPECIFIED TYPE: ICD-10-CM

## 2024-05-14 DIAGNOSIS — R05.9 COUGH, UNSPECIFIED TYPE: Primary | ICD-10-CM

## 2024-05-14 LAB
CTP QC/QA: YES
CTP QC/QA: YES
POC MOLECULAR INFLUENZA A AGN: NEGATIVE
POC MOLECULAR INFLUENZA B AGN: NEGATIVE
SARS-COV-2 RDRP RESP QL NAA+PROBE: NEGATIVE

## 2024-05-14 PROCEDURE — 1101F PT FALLS ASSESS-DOCD LE1/YR: CPT | Mod: CPTII,S$GLB,, | Performed by: NURSE PRACTITIONER

## 2024-05-14 PROCEDURE — 1159F MED LIST DOCD IN RCRD: CPT | Mod: CPTII,S$GLB,, | Performed by: NURSE PRACTITIONER

## 2024-05-14 PROCEDURE — 3078F DIAST BP <80 MM HG: CPT | Mod: CPTII,S$GLB,, | Performed by: NURSE PRACTITIONER

## 2024-05-14 PROCEDURE — 71046 X-RAY EXAM CHEST 2 VIEWS: CPT | Mod: TC,FY,PO

## 2024-05-14 PROCEDURE — 3288F FALL RISK ASSESSMENT DOCD: CPT | Mod: CPTII,S$GLB,, | Performed by: NURSE PRACTITIONER

## 2024-05-14 PROCEDURE — 1160F RVW MEDS BY RX/DR IN RCRD: CPT | Mod: CPTII,S$GLB,, | Performed by: NURSE PRACTITIONER

## 2024-05-14 PROCEDURE — 3075F SYST BP GE 130 - 139MM HG: CPT | Mod: CPTII,S$GLB,, | Performed by: NURSE PRACTITIONER

## 2024-05-14 PROCEDURE — 87502 INFLUENZA DNA AMP PROBE: CPT | Mod: QW,S$GLB,, | Performed by: NURSE PRACTITIONER

## 2024-05-14 PROCEDURE — 99213 OFFICE O/P EST LOW 20 MIN: CPT | Mod: S$GLB,,, | Performed by: NURSE PRACTITIONER

## 2024-05-14 PROCEDURE — 99999 PR PBB SHADOW E&M-EST. PATIENT-LVL III: CPT | Mod: PBBFAC,,, | Performed by: NURSE PRACTITIONER

## 2024-05-14 PROCEDURE — 87635 SARS-COV-2 COVID-19 AMP PRB: CPT | Mod: QW,S$GLB,, | Performed by: NURSE PRACTITIONER

## 2024-05-14 PROCEDURE — 71046 X-RAY EXAM CHEST 2 VIEWS: CPT | Mod: 26,,, | Performed by: RADIOLOGY

## 2024-05-14 PROCEDURE — 1126F AMNT PAIN NOTED NONE PRSNT: CPT | Mod: CPTII,S$GLB,, | Performed by: NURSE PRACTITIONER

## 2024-05-14 RX ORDER — PROMETHAZINE HYDROCHLORIDE AND DEXTROMETHORPHAN HYDROBROMIDE 6.25; 15 MG/5ML; MG/5ML
5 SYRUP ORAL NIGHTLY PRN
Qty: 118 ML | Refills: 0 | Status: SHIPPED | OUTPATIENT
Start: 2024-05-14

## 2024-05-14 RX ORDER — FLUTICASONE PROPIONATE 50 MCG
1 SPRAY, SUSPENSION (ML) NASAL DAILY
Qty: 16 G | Refills: 0 | Status: SHIPPED | OUTPATIENT
Start: 2024-05-14

## 2024-05-14 RX ORDER — BENZONATATE 100 MG/1
100 CAPSULE ORAL 3 TIMES DAILY PRN
Qty: 30 CAPSULE | Refills: 0 | Status: SHIPPED | OUTPATIENT
Start: 2024-05-14 | End: 2024-05-24

## 2024-05-14 RX ORDER — AZITHROMYCIN 250 MG/1
TABLET, FILM COATED ORAL
Qty: 6 TABLET | Refills: 0 | Status: SHIPPED | OUTPATIENT
Start: 2024-05-14 | End: 2024-05-20 | Stop reason: ALTCHOICE

## 2024-05-14 RX ORDER — ALBUTEROL SULFATE 90 UG/1
2 AEROSOL, METERED RESPIRATORY (INHALATION) EVERY 6 HOURS PRN
Qty: 18 G | Refills: 0 | Status: SHIPPED | OUTPATIENT
Start: 2024-05-14

## 2024-05-14 RX ORDER — METHYLPREDNISOLONE 4 MG/1
TABLET ORAL
Qty: 21 TABLET | Refills: 0 | Status: SHIPPED | OUTPATIENT
Start: 2024-05-14 | End: 2024-05-20 | Stop reason: ALTCHOICE

## 2024-05-14 NOTE — PROGRESS NOTES
"Subjective:       Patient ID: Danna Sorensen is a 84 y.o. female.    Chief Complaint: Cough and Nasal Congestion     HPI   83 y/o female patient with medical problems listed below presents for nasal nasal congestion, rhinorrhea with clear mucus, mild sore throat, cough, and sob for 4 days. Denies recent travel or sick contacts. States cough is dry to productive with clear phlegm, but denies chest pain, vomiting, abdominal pain, fever, chills, generalize body ache but endorses nausea. Patient is currently on chemo tx for colon ca. Denies smoking. Denies hx of asthma or copd. She states takes delsym for cough.     Labs reviewed from 5/2024    Patient Active Problem List   Diagnosis    Hypothyroid    Nuclear sclerosis    Hyperopia with astigmatism and presbyopia    DDD (degenerative disc disease), lumbar    Morbid obesity with BMI of 40.0-44.9, adult    Calcified granuloma of lung    History of colon cancer    Lumbar radiculitis    Other spondylosis, lumbar region    Chronic right-sided low back pain without sciatica    Vitamin D deficiency    Essential hypertension    Malignant neoplasm of colon    Decreased functional mobility    Muscle tightness    Decreased strength    Decreased range of motion    Venous lake of lip    GERD (gastroesophageal reflux disease)    Adenocarcinoma    DVT (deep vein thrombosis) in pregnancy    History of pulmonary embolus (PE)    Malignant neoplasm of splenic flexure    Insomnia    Chemotherapy-induced fatigue    Other low back pain    S/P IVC filter    Left renal mass    Port-A-Cath in place    Iron deficiency anemia    Immunodeficiency due to chemotherapy      Review of patient's allergies indicates:   Allergen Reactions    Aspirin      Other reaction(s): Stomach upset    Aspirin Other (See Comments)     Other reaction(s): Stomach upset    Gabapentin Other (See Comments)     Pt states she felt "funny" when she took the medication. Especially at the higher dose.    Mobic [meloxicam] " Swelling     Edema and elevated blood pressure     Oxaliplatin Other (See Comments)     Other reaction(s): Joint pain  Other reaction(s): Itching  Other reaction(s): Hives  Other reaction(s): Joint pain  Other reaction(s): Itching  Other reaction(s): Hives    Pregabalin Other (See Comments)     Side effects  Side effects     Past Surgical History:   Procedure Laterality Date    CARDIAC SURGERY      cardiac cath    COLON SURGERY      colon resection    COLONOSCOPY N/A 10/18/2016    Procedure: COLONOSCOPY;  Surgeon: Rell Cordova MD;  Location: [facility] ENDO;  Service: Endoscopy;  Laterality: N/A;    COLONOSCOPY N/A 8/8/2023    Procedure: COLONOSCOPY;  Surgeon: Kaushik Pinon MD;  Location: [facility];  Service: Endoscopy;  Laterality: N/A;    COLONOSCOPY N/A 8/22/2023    Procedure: COLONOSCOPY (tattoo of colon ca);  Surgeon: Kaushik Pinon MD;  Location: [facility];  Service: Endoscopy;  Laterality: N/A;    ESOPHAGOGASTRODUODENOSCOPY N/A 8/3/2023    Procedure: EGD (ESOPHAGOGASTRODUODENOSCOPY);  Surgeon: Kaushik Pinon MD;  Location: [facility];  Service: Endoscopy;  Laterality: N/A;    HAND TENDON SURGERY Left     HEMICOLECTOMY      right    INJECTION OF ANESTHETIC AGENT AROUND MEDIAL BRANCH NERVES INNERVATING LUMBAR FACET JOINT Right 07/10/2018    Procedure: Block-nerve-medial branch-lumbar;  Surgeon: Parish Gaitan MD;  Location: [facility] OR;  Service: Pain Management;  Laterality: Right;  L3, 4, 5    INSERTION OF INFERIOR VENA CAVAL FILTER N/A 9/13/2023    Procedure: Insertion, Filter, Inferior Vena Cava;  Surgeon: Oren Verdin MD;  Location: [facility] CATH/EP LAB;  Service: General;  Laterality: N/A;    INSERTION OF TUNNELED CENTRAL VENOUS CATHETER (CVC) WITH SUBCUTANEOUS PORT Right 11/15/2023    Procedure: OVBINJYXI-REXE-N-CATH;  Surgeon: Jim Chavez MD;  Location: [facility] OR;  Service: General;  Laterality: Right;    JOINT REPLACEMENT      bilateral    RADIOFREQUENCY ABLATION OF LUMBAR MEDIAL BRANCH NERVE AT  SINGLE LEVEL Right 07/24/2018    Procedure: RADIOFREQUENCY ABLATION, NERVE, MEDIAL BRANCH, LUMBAR, 1 LEVEL;  Surgeon: Parish Gaitan MD;  Location: [facility];  Service: Pain Management;  Laterality: Right;  L3,4,5 - Burned at 80 degrees C. for 75 seconds x 2 each site    ROBOT-ASSISTED COLECTOMY N/A 9/29/2023    Procedure: ROBOTIC COLECTOMY;  Surgeon: Jim Chavez MD;  Location: [facility] OR;  Service: General;  Laterality: N/A;    SHOULDER ARTHROSCOPY Right 01/12/2017    Reverse     TONSILLECTOMY      TONSILLECTOMY, ADENOIDECTOMY, BILATERAL MYRINGOTOMY AND TUBES      TOTAL KNEE ARTHROPLASTY Bilateral 08/1998    total replacements    TUMOR REMOVAL Left     left foot as a child        Current Outpatient Medications:     alendronate (FOSAMAX) 70 MG tablet, Take 1 tablet (70 mg total) by mouth every 7 days., Disp: 12 tablet, Rfl: 3    apixaban (ELIQUIS) 5 mg Tab, Take 1 tablet (5 mg total) by mouth 2 (two) times daily., Disp: 180 tablet, Rfl: 3    cyclobenzaprine (FLEXERIL) 5 MG tablet, Take 1 tablet (5 mg total) by mouth 3 (three) times daily as needed for Muscle spasms., Disp: 90 tablet, Rfl: 0    ergocalciferol, vitamin D2, (VITAMIN D ORAL), Take 2,000 mg by mouth once daily., Disp: , Rfl:     furosemide (LASIX) 20 MG tablet, Take 1 tablet (20 mg total) by mouth daily as needed (edema)., Disp: 90 tablet, Rfl: 3    HYDROcodone-acetaminophen (NORCO) 5-325 mg per tablet, Take 1 tablet by mouth every 6 (six) hours as needed for Pain., Disp: 20 tablet, Rfl: 0    hydrOXYzine HCL (ATARAX) 25 MG tablet, Take 1 tablet (25 mg total) by mouth 3 (three) times daily as needed for Anxiety (insomnia)., Disp: 30 tablet, Rfl: PRN    levocetirizine (XYZAL) 5 MG tablet, TAKE 1 TABLET(5 MG) BY MOUTH EVERY NIGHT AS NEEDED FOR ALLERGIES, Disp: 90 tablet, Rfl: 3    levothyroxine (SYNTHROID) 75 MCG tablet, Take 1 tablet (75 mcg total) by mouth once daily., Disp: 90 tablet, Rfl: 3    lisinopriL (PRINIVIL,ZESTRIL) 30 MG tablet, Take 30 mg by  mouth., Disp: , Rfl:     multivitamin capsule, Take 1 capsule by mouth once daily., Disp: , Rfl:     mupirocin (BACTROBAN) 2 % ointment, Apply topically 3 (three) times daily., Disp: 30 g, Rfl: 0    nystatin (MYCOSTATIN) 100,000 unit/mL suspension, Take 4 mLs by mouth 4 (four) times daily with meals and nightly., Disp: , Rfl:     ondansetron (ZOFRAN-ODT) 8 MG TbDL, Take 1 tablet (8 mg total) by mouth every 8 (eight) hours as needed (nausea)., Disp: 40 tablet, Rfl: 3    promethazine (PHENERGAN) 12.5 MG Tab, (Take 1-2 tabs every 6 hours as needed for nausea persistent despite zofran), Disp: 40 tablet, Rfl: 3    traMADoL (ULTRAM) 50 mg tablet, Take 1 tablet (50 mg total) by mouth every 8 (eight) hours as needed for Pain., Disp: 21 each, Rfl: 0    triamcinolone acetonide 0.1% (KENALOG) 0.1 % cream, APPLY TOPICALLY TO THE AFFECTED AREA TWICE DAILY, Disp: 60 g, Rfl: 0    acetaminophen (TYLENOL) 650 MG TbSR, Take 650 mg by mouth every 8 (eight) hours. (Patient not taking: Reported on 5/14/2024), Disp: , Rfl:     albuterol (PROVENTIL HFA) 90 mcg/actuation inhaler, Inhale 2 puffs into the lungs every 6 (six) hours as needed for Wheezing or Shortness of Breath. Rescue, Disp: 18 g, Rfl: 0    azithromycin (Z-JACQUELIN) 250 MG tablet, Take 2 tablets by mouth on day 1; Take 1 tablet by mouth on days 2-5, Disp: 6 tablet, Rfl: 0    benzonatate (TESSALON) 100 MG capsule, Take 1 capsule (100 mg total) by mouth 3 (three) times daily as needed for Cough., Disp: 30 capsule, Rfl: 0    fluticasone propionate (FLONASE) 50 mcg/actuation nasal spray, 1 spray (50 mcg total) by Each Nostril route once daily., Disp: 16 g, Rfl: 0    LIDOcaine-prilocaine (EMLA) cream, Apply topically as needed (Chemo). (Patient not taking: Reported on 5/14/2024), Disp: 30 g, Rfl: 0    methylPREDNISolone (MEDROL DOSEPACK) 4 mg tablet, follow package directions, Disp: 21 tablet, Rfl: 0    omeprazole (PRILOSEC) 40 MG capsule, Take 1 capsule (40 mg total) by mouth 2 (two)  "times daily before meals., Disp: 180 capsule, Rfl: PRN    promethazine-dextromethorphan (PROMETHAZINE-DM) 6.25-15 mg/5 mL Syrp, Take 5 mLs by mouth as needed. (Patient not taking: Reported on 5/14/2024), Disp: , Rfl:     promethazine-dextromethorphan (PROMETHAZINE-DM) 6.25-15 mg/5 mL Syrp, Take 5 mLs by mouth nightly as needed (cough)., Disp: 118 mL, Rfl: 0  No current facility-administered medications for this visit.    Facility-Administered Medications Ordered in Other Visits:     0.9%  NaCl infusion, , Intravenous, Once, Oren Verdin MD    Review of Systems   Constitutional:  Negative for chills, fever and unexpected weight change.   HENT:  Positive for congestion, rhinorrhea and sore throat.    Respiratory:  Positive for cough and shortness of breath. Negative for chest tightness.    Cardiovascular:  Negative for chest pain and palpitations.   Gastrointestinal:  Negative for abdominal pain.   Neurological:  Negative for dizziness and headaches.       Objective:   /70 (BP Location: Right arm, Patient Position: Sitting, BP Method: Large (Manual))   Pulse 65   Temp 98 °F (36.7 °C) (Oral)   Resp 20   Ht 5' 4" (1.626 m)   Wt 96.6 kg (212 lb 15.4 oz)   SpO2 98%   BMI 36.56 kg/m²         Physical Exam  Constitutional:       General: She is not in acute distress.     Appearance: Normal appearance.   HENT:      Head: Atraumatic.   Cardiovascular:      Rate and Rhythm: Normal rate and regular rhythm.      Pulses: Normal pulses.      Heart sounds: Normal heart sounds.   Pulmonary:      Effort: Pulmonary effort is normal.      Breath sounds: Wheezing present.      Comments: +Mild wheezing in b/l upper lobes   Abdominal:      General: Abdomen is flat. Bowel sounds are normal.      Palpations: Abdomen is soft.   Neurological:      Mental Status: She is oriented to person, place, and time.         Assessment:       1. Cough, unspecified type    2. Upper respiratory tract infection, unspecified type      "   Plan:       1. Cough, unspecified type  - POCT Influenza A/B Molecular: Negative  - POCT COVID-19 Rapid Screening: Negative    2. Upper respiratory tract infection, unspecified type  - X-Ray Chest PA And Lateral; Future  - benzonatate (TESSALON) 100 MG capsule; Take 1 capsule (100 mg total) by mouth 3 (three) times daily as needed for Cough.  Dispense: 30 capsule; Refill: 0  - fluticasone propionate (FLONASE) 50 mcg/actuation nasal spray; 1 spray (50 mcg total) by Each Nostril route once daily.  Dispense: 16 g; Refill: 0  - promethazine-dextromethorphan (PROMETHAZINE-DM) 6.25-15 mg/5 mL Syrp; Take 5 mLs by mouth nightly as needed (cough).  Dispense: 118 mL; Refill: 0  - azithromycin (Z-JACQUELIN) 250 MG tablet; Take 2 tablets by mouth on day 1; Take 1 tablet by mouth on days 2-5  Dispense: 6 tablet; Refill: 0  - methylPREDNISolone (MEDROL DOSEPACK) 4 mg tablet; follow package directions  Dispense: 21 tablet; Refill: 0  - albuterol (PROVENTIL HFA) 90 mcg/actuation inhaler; Inhale 2 puffs into the lungs every 6 (six) hours as needed for Wheezing or Shortness of Breath. Rescue  Dispense: 18 g; Refill: 0     Patient with be reevaluated in  2 weeks  or sooner jazmyn Lockhart NP

## 2024-05-17 ENCOUNTER — LAB VISIT (OUTPATIENT)
Dept: LAB | Facility: HOSPITAL | Age: 84
End: 2024-05-17
Attending: INTERNAL MEDICINE
Payer: MEDICARE

## 2024-05-17 ENCOUNTER — TELEPHONE (OUTPATIENT)
Dept: FAMILY MEDICINE | Facility: CLINIC | Age: 84
End: 2024-05-17
Payer: MEDICARE

## 2024-05-17 DIAGNOSIS — C18.5 MALIGNANT NEOPLASM OF SPLENIC FLEXURE: ICD-10-CM

## 2024-05-17 LAB
ALBUMIN SERPL BCP-MCNC: 3.8 G/DL (ref 3.5–5.2)
ALP SERPL-CCNC: 59 U/L (ref 55–135)
ALT SERPL W/O P-5'-P-CCNC: 16 U/L (ref 10–44)
ANION GAP SERPL CALC-SCNC: 11 MMOL/L (ref 8–16)
AST SERPL-CCNC: 13 U/L (ref 10–40)
BASOPHILS # BLD AUTO: ABNORMAL K/UL (ref 0–0.2)
BASOPHILS NFR BLD: 0 % (ref 0–1.9)
BILIRUB SERPL-MCNC: 1.1 MG/DL (ref 0.1–1)
BUN SERPL-MCNC: 23 MG/DL (ref 8–23)
CALCIUM SERPL-MCNC: 9 MG/DL (ref 8.7–10.5)
CHLORIDE SERPL-SCNC: 111 MMOL/L (ref 95–110)
CO2 SERPL-SCNC: 20 MMOL/L (ref 23–29)
CREAT SERPL-MCNC: 1 MG/DL (ref 0.5–1.4)
DIFFERENTIAL METHOD BLD: ABNORMAL
EOSINOPHIL # BLD AUTO: ABNORMAL K/UL (ref 0–0.5)
EOSINOPHIL NFR BLD: 0 % (ref 0–8)
ERYTHROCYTE [DISTWIDTH] IN BLOOD BY AUTOMATED COUNT: 14.6 % (ref 11.5–14.5)
EST. GFR  (NO RACE VARIABLE): 56.2 ML/MIN/1.73 M^2
GLUCOSE SERPL-MCNC: 101 MG/DL (ref 70–110)
HCT VFR BLD AUTO: 32.6 % (ref 37–48.5)
HGB BLD-MCNC: 11.4 G/DL (ref 12–16)
IMM GRANULOCYTES # BLD AUTO: ABNORMAL K/UL (ref 0–0.04)
IMM GRANULOCYTES NFR BLD AUTO: ABNORMAL % (ref 0–0.5)
LYMPHOCYTES # BLD AUTO: ABNORMAL K/UL (ref 1–4.8)
LYMPHOCYTES NFR BLD: 8 % (ref 18–48)
MCH RBC QN AUTO: 35.6 PG (ref 27–31)
MCHC RBC AUTO-ENTMCNC: 35 G/DL (ref 32–36)
MCV RBC AUTO: 102 FL (ref 82–98)
METAMYELOCYTES NFR BLD MANUAL: 2 %
MONOCYTES # BLD AUTO: ABNORMAL K/UL (ref 0.3–1)
MONOCYTES NFR BLD: 14 % (ref 4–15)
MYELOCYTES NFR BLD MANUAL: 1 %
NEUTROPHILS NFR BLD: 71 % (ref 38–73)
NEUTS BAND NFR BLD MANUAL: 4 %
NRBC BLD-RTO: 0 /100 WBC
PLATELET # BLD AUTO: 220 K/UL (ref 150–450)
PLATELET BLD QL SMEAR: ABNORMAL
PMV BLD AUTO: 9.7 FL (ref 9.2–12.9)
POTASSIUM SERPL-SCNC: 4.2 MMOL/L (ref 3.5–5.1)
PROT SERPL-MCNC: 6.6 G/DL (ref 6–8.4)
RBC # BLD AUTO: 3.2 M/UL (ref 4–5.4)
SODIUM SERPL-SCNC: 142 MMOL/L (ref 136–145)
WBC # BLD AUTO: 10.02 K/UL (ref 3.9–12.7)

## 2024-05-17 PROCEDURE — 85007 BL SMEAR W/DIFF WBC COUNT: CPT | Mod: PN | Performed by: INTERNAL MEDICINE

## 2024-05-17 PROCEDURE — 85027 COMPLETE CBC AUTOMATED: CPT | Mod: PN | Performed by: INTERNAL MEDICINE

## 2024-05-17 PROCEDURE — 36415 COLL VENOUS BLD VENIPUNCTURE: CPT | Mod: PN | Performed by: INTERNAL MEDICINE

## 2024-05-17 PROCEDURE — 80053 COMPREHEN METABOLIC PANEL: CPT | Mod: PN | Performed by: INTERNAL MEDICINE

## 2024-05-17 NOTE — TELEPHONE ENCOUNTER
----- Message from Josie Levy sent at 5/17/2024 10:03 AM CDT -----  Regarding: Calll back  Type:  Patient Returning Call    Who Called:Pt    Who Left Message for Patient:nurse    Does the patient know what this is regarding?:N/a    Would the patient rather a call back or a response via [facility]? Call back    Best Call Back Number:[phone]    Additional Information: Pt missed a call and is requesting a call back. Thank you

## 2024-05-17 NOTE — TELEPHONE ENCOUNTER
----- Message from Sarah House sent at 5/17/2024  1:36 PM CDT -----  Regarding: Calll back  Type:  Patient Returning Call  Who Called:Pt  Who Left Message for Patient:Farooq  Does the patient know what this is regarding?:results   Would the patient rather a call back or a response via [facility]? Call back  Best Call Back Number:[phone]  Additional Information: Pt missed a call and is requesting a call back asap thanks!

## 2024-05-19 NOTE — PROGRESS NOTES
PATIENT: Danna Sorensen  MRN: [id number]  DATE: 5/20/2024      Diagnosis:   1. Malignant neoplasm of splenic flexure    2. Iron deficiency anemia, unspecified iron deficiency anemia type    3. Renal mass, left    4. Immunodeficiency due to chemotherapy    5. Pulmonary nodules    6. Thrombophilia    7. Sinusitis, unspecified chronicity, unspecified location            Chief Complaint: Malignant neoplasm of splenic flexure (2 week follow up)      Oncologic History:      Oncologic History     Oncologic Treatment     Pathology           Subjective:    Interval History: Ms. Sorensen is a 84 y.o. female with Pulmonary embolism, carpal tunnel, CAD, HTN, hypothyroidism, osteoporosis, osteoarthritis, who presents for colon cancer.  Since the last clinic visit the patient was diagnosed with sinusitis 5/14/24 and started on antibiotics, steroids, and inhalers by primary care.  CXR on 5./14/24 showed no evidence of PNA.  Pt states he symptoms are improving.  She endorses continued cough.  The patient denies CP, SOB, abdominal pain, nausea, vomiting, constipation, diarrhea.  The patient denies fever, chills, night sweats, weight loss, new lumps or bumps, easy bruising or bleeding.    Prior History:  Pt was previously diagnosed with Stage III adenocarcinoma of the colon in 2007 and underwent 12 cycles of FOLFOX.  Pt underwent colonoscopy on 8/08/23 showing 1 7 mm polyp in the rectum; altered vascular, congested, eroded, friable and ulcerated mucosa in the transverse colon which was biopsied; patent and to side ileocolonic anastomosis.  Pathology showed moderately differentiated adenocarcinoma. CT abdomen and pelvis 8/17/23 showing irregular appearance of the gallbladder; heterogeneously enhancing lesion in the inferior pole of the left kidney measuring 1.8 cm; short segment of concentric bowel wall thickening of the colon at the splenic flexure with surrounding mesenteric fat stranding with nodular thickness of 2.2 cm.  The patient  underwent colonoscopy on 08/22/2023 showing nonbleeding internal hemorrhoids, partially obstructing tumor in the transverse colon which was tattooed.  Pt saw Urology 8/23/23 with plans for re-imaging the renal lesion in 3 months with an MRI.  US abdomen 8/25/23 showed a 2.5 cm solid mass at the lower pole of the left kidney suspicious for neoplasm.  CT chest 8/25/23 showed 4 mm left upper lobe pulmonary nodule, 4 mm calcified granuloma left upper lobe, 2 mm pulmonary nodule in the left upper lobe, 9 x 9 mm triangular nodule along the juxtapleural right middle lobe, 2 mm pulmonary nodule in the right middle lobe, and a partially imaged masslike density in the splenic flexure of the colon. Pt then underwent resection of the transverse colon and splenic flexure with path showing invasive moderately differentiated adenocarcinoma with mucinous features, 33 benign lymph nodes, positive lymphovascular invasion, positive perineural invasion pT3N0.  Tumor shows intact expression of MLH1, PMS2, MSH2, MSH6.  Patient was positive for B Celio V600E mutation.   The patient was discussed at tumor Board on 11/07/2023 with recommendation for PET-CT with biopsy of lung nodules if positive.  PET-CT on 11/08/2023 showed evidence of pulmonary hypertension; stable 5 mm nodule left lung apex; calcified granuloma left upper lobe; stable 6 mm inferior right upper lobe nodule; stable 17 mm ground-glass opacity lateral right middle lobe; stable 4 mm nodule in the right lower lobe of the diaphragm; no activity in the lesion in the left kidney.  MRI abdomen 11/20/2023 showed heterogeneous fat containing enhancing mass in the lower pole of the left kidney suggestive of an angio myelolipoma.   The patient underwent a chest x-ray on 02/19/2024 showing no sign of pneumonia.  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    Strabismus     Trouble in sleeping     Wears dentures     Uppers       Past Surgical HIstory:   Past Surgical History:   Procedure Laterality Date    CARDIAC SURGERY      cardiac cath    COLON SURGERY      colon resection    COLONOSCOPY N/A 10/18/2016    Procedure: COLONOSCOPY;  Surgeon: Rell Cordova MD;  Location: [facility];  Service: Endoscopy;  Laterality: N/A;    COLONOSCOPY N/A 8/8/2023    Procedure: COLONOSCOPY;  Surgeon: Kaushik Pinon MD;  Location: [facility];  Service: Endoscopy;  Laterality: N/A;    COLONOSCOPY N/A 8/22/2023    Procedure: COLONOSCOPY (tattoo of colon ca);  Surgeon: Kaushik Pinon MD;  Location: [facility];  Service: Endoscopy;  Laterality: N/A;    ESOPHAGOGASTRODUODENOSCOPY N/A 8/3/2023    Procedure: EGD (ESOPHAGOGASTRODUODENOSCOPY);  Surgeon: Kaushik Pinon MD;  Location: [facility];  Service: Endoscopy;  Laterality: N/A;    HAND TENDON SURGERY Left     HEMICOLECTOMY      right    INJECTION OF ANESTHETIC AGENT AROUND MEDIAL BRANCH NERVES INNERVATING LUMBAR FACET JOINT Right 07/10/2018    Procedure: Block-nerve-medial branch-lumbar;  Surgeon: Parish Gaitan MD;  Location: [facility] OR;   Service: Pain Management;  Laterality: Right;  L3, 4, 5    INSERTION OF INFERIOR VENA CAVAL FILTER N/A 9/13/2023    Procedure: Insertion, Filter, Inferior Vena Cava;  Surgeon: Oren Verdin MD;  Location: [facility] CATH/EP LAB;  Service: General;  Laterality: N/A;    INSERTION OF TUNNELED CENTRAL VENOUS CATHETER (CVC) WITH SUBCUTANEOUS PORT Right 11/15/2023    Procedure: [id number];  Surgeon: Jim Chavez MD;  Location: [facility] OR;  Service: General;  Laterality: Right;    JOINT REPLACEMENT      bilateral    RADIOFREQUENCY ABLATION OF LUMBAR MEDIAL BRANCH NERVE AT SINGLE LEVEL Right 07/24/2018    Procedure: RADIOFREQUENCY ABLATION, NERVE, MEDIAL BRANCH, LUMBAR, 1 LEVEL;  Surgeon: Parish Gaitan MD;  Location: [facility] OR;  Service: Pain Management;  Laterality: Right;  L3,4,5 - Burned at 80 degrees C. for 75 seconds x 2 each site    ROBOT-ASSISTED COLECTOMY N/A 9/29/2023    Procedure: ROBOTIC COLECTOMY;  Surgeon: Jim Chavez MD;  Location: [facility] OR;  Service: General;  Laterality: N/A;    SHOULDER ARTHROSCOPY Right 01/12/2017    Reverse     TONSILLECTOMY      TONSILLECTOMY, ADENOIDECTOMY, BILATERAL MYRINGOTOMY AND TUBES      TOTAL KNEE ARTHROPLASTY Bilateral 08/1998    total replacements    TUMOR REMOVAL Left     left foot as a child       Family History:   Family History   Problem Relation Name Age of Onset    Hypertension Mother      Kidney disease Mother      Cataracts Father      Cataracts Sister      Cancer Sister          breast    No Known Problems Brother      Cancer Sister          breast    Arthritis Sister      Glaucoma Neg Hx      Amblyopia Neg Hx      Blindness Neg Hx      Macular degeneration Neg Hx      Retinal detachment Neg Hx      Strabismus Neg Hx      Stroke Neg Hx      Thyroid disease Neg Hx         Social History:  reports that she quit smoking about 63 years ago. Her smoking use included cigarettes. She started smoking about 63 years ago. She has a 0.5 pack-year smoking history. She  "has never used smokeless tobacco. She reports that she does not drink alcohol and does not use drugs.    Allergies:  Review of patient's allergies indicates:   Allergen Reactions    Aspirin      Other reaction(s): Stomach upset    Aspirin Other (See Comments)     Other reaction(s): Stomach upset    Gabapentin Other (See Comments)     Pt states she felt "funny" when she took the medication. Especially at the higher dose.    Mobic [meloxicam] Swelling     Edema and elevated blood pressure     Oxaliplatin Other (See Comments)     Other reaction(s): Joint pain  Other reaction(s): Itching  Other reaction(s): Hives  Other reaction(s): Joint pain  Other reaction(s): Itching  Other reaction(s): Hives    Pregabalin Other (See Comments)     Side effects  Side effects       Medications:  Current Outpatient Medications   Medication Sig Dispense Refill    acetaminophen (TYLENOL) 650 MG TbSR Take 650 mg by mouth every 8 (eight) hours.      albuterol (PROVENTIL HFA) 90 mcg/actuation inhaler Inhale 2 puffs into the lungs every 6 (six) hours as needed for Wheezing or Shortness of Breath. Rescue 18 g 0    alendronate (FOSAMAX) 70 MG tablet Take 1 tablet (70 mg total) by mouth every 7 days. 12 tablet 3    apixaban (ELIQUIS) 5 mg Tab Take 1 tablet (5 mg total) by mouth 2 (two) times daily. 180 tablet 3    benzonatate (TESSALON) 100 MG capsule Take 1 capsule (100 mg total) by mouth 3 (three) times daily as needed for Cough. 30 capsule 0    cyclobenzaprine (FLEXERIL) 5 MG tablet Take 1 tablet (5 mg total) by mouth 3 (three) times daily as needed for Muscle spasms. 90 tablet 0    ergocalciferol, vitamin D2, (VITAMIN D ORAL) Take 2,000 mg by mouth once daily.      fluticasone propionate (FLONASE) 50 mcg/actuation nasal spray 1 spray (50 mcg total) by Each Nostril route once daily. 16 g 0    furosemide (LASIX) 20 MG tablet Take 1 tablet (20 mg total) by mouth daily as needed (edema). 90 tablet 3    HYDROcodone-acetaminophen (NORCO) 5-325 mg " per tablet Take 1 tablet by mouth every 6 (six) hours as needed for Pain. 20 tablet 0    hydrOXYzine HCL (ATARAX) 25 MG tablet Take 1 tablet (25 mg total) by mouth 3 (three) times daily as needed for Anxiety (insomnia). 30 tablet PRN    levocetirizine (XYZAL) 5 MG tablet TAKE 1 TABLET(5 MG) BY MOUTH EVERY NIGHT AS NEEDED FOR ALLERGIES 90 tablet 3    levothyroxine (SYNTHROID) 75 MCG tablet Take 1 tablet (75 mcg total) by mouth once daily. 90 tablet 3    LIDOcaine-prilocaine (EMLA) cream Apply topically as needed (Chemo). 30 g 0    lisinopriL (PRINIVIL,ZESTRIL) 30 MG tablet Take 30 mg by mouth.      multivitamin capsule Take 1 capsule by mouth once daily.      mupirocin (BACTROBAN) 2 % ointment Apply topically 3 (three) times daily. 30 g 0    nystatin (MYCOSTATIN) 100,000 unit/mL suspension Take 4 mLs by mouth 4 (four) times daily with meals and nightly.      ondansetron (ZOFRAN-ODT) 8 MG TbDL Take 1 tablet (8 mg total) by mouth every 8 (eight) hours as needed (nausea). 40 tablet 3    promethazine (PHENERGAN) 12.5 MG Tab (Take 1-2 tabs every 6 hours as needed for nausea persistent despite zofran) 40 tablet 3    promethazine-dextromethorphan (PROMETHAZINE-DM) 6.25-15 mg/5 mL Syrp Take 5 mLs by mouth nightly as needed (cough). 118 mL 0    traMADoL (ULTRAM) 50 mg tablet Take 1 tablet (50 mg total) by mouth every 8 (eight) hours as needed for Pain. 21 each 0    triamcinolone acetonide 0.1% (KENALOG) 0.1 % cream APPLY TOPICALLY TO THE AFFECTED AREA TWICE DAILY 60 g 0    omeprazole (PRILOSEC) 40 MG capsule Take 1 capsule (40 mg total) by mouth 2 (two) times daily before meals. 180 capsule PRN     No current facility-administered medications for this visit.     Facility-Administered Medications Ordered in Other Visits   Medication Dose Route Frequency Provider Last Rate Last Admin    0.9%  NaCl infusion   Intravenous Once Oren Verdin MD           Review of Systems   Constitutional:  Negative for appetite change,  "chills, fatigue, fever and unexpected weight change.   Respiratory:  Positive for cough. Negative for shortness of breath.    Cardiovascular:  Negative for chest pain and palpitations.   Gastrointestinal:  Negative for abdominal pain, constipation, diarrhea, nausea and vomiting.   Skin:  Negative for rash.   Neurological:  Negative for headaches.   Hematological:  Negative for adenopathy. Does not bruise/bleed easily.       ECOG Performance Status: 2   Objective:      Vitals:   Vitals:    05/20/24 0948   BP: 118/70   BP Location: Left arm   Patient Position: Sitting   BP Method: Large (Manual)   Pulse: 69   Resp: (!) 22   Temp: 96.4 °F (35.8 °C)   TempSrc: Temporal   SpO2: 97%   Weight: 96.9 kg (213 lb 10 oz)   Height: 5' 4" (1.626 m)         Physical Exam  Constitutional:       General: She is not in acute distress.     Appearance: She is well-developed. She is not diaphoretic.   HENT:      Head: Normocephalic and atraumatic.   Cardiovascular:      Rate and Rhythm: Normal rate and regular rhythm.      Heart sounds: Normal heart sounds. No murmur heard.     No friction rub. No gallop.   Pulmonary:      Effort: Pulmonary effort is normal. No respiratory distress.      Breath sounds: Wheezing present. No rales.   Chest:      Chest wall: No tenderness.   Abdominal:      General: Bowel sounds are normal. There is no distension.      Palpations: Abdomen is soft. There is no mass.      Tenderness: There is no abdominal tenderness. There is no guarding or rebound.   Lymphadenopathy:      Cervical: No cervical adenopathy.      Upper Body:      Right upper body: No supraclavicular or axillary adenopathy.      Left upper body: No supraclavicular or axillary adenopathy.   Skin:     Findings: No erythema or rash.   Neurological:      Mental Status: She is alert and oriented to person, place, and time.   Psychiatric:         Behavior: Behavior normal.         Laboratory Data:  Lab Visit on 05/17/2024   Component Date Value Ref " Range Status    WBC 05/17/2024 10.02  3.90 - 12.70 K/uL Final    RBC 05/17/2024 3.20 (L)  4.00 - 5.40 M/uL Final    Hemoglobin 05/17/2024 11.4 (L)  12.0 - 16.0 g/dL Final    Hematocrit 05/17/2024 32.6 (L)  37.0 - 48.5 % Final    MCV 05/17/2024 102 (H)  82 - 98 fL Final    MCH 05/17/2024 35.6 (H)  27.0 - 31.0 pg Final    MCHC 05/17/2024 35.0  32.0 - 36.0 g/dL Final    RDW 05/17/2024 14.6 (H)  11.5 - 14.5 % Final    Platelets 05/17/2024 220  150 - 450 K/uL Final    MPV 05/17/2024 9.7  9.2 - 12.9 fL Final    Immature Granulocytes 05/17/2024 CANCELED  0.0 - 0.5 % Final    Result canceled by the ancillary.    Immature Grans (Abs) 05/17/2024 CANCELED  0.00 - 0.04 K/uL Final    Comment: Mild elevation in immature granulocytes is non specific and   can be seen in a variety of conditions including stress response,   acute inflammation, trauma and pregnancy. Correlation with other   laboratory and clinical findings is essential.    Result canceled by the ancillary.      Lymph # 05/17/2024 CANCELED  1.0 - 4.8 K/uL Final    Result canceled by the ancillary.    Mono # 05/17/2024 CANCELED  0.3 - 1.0 K/uL Final    Result canceled by the ancillary.    Eos # 05/17/2024 CANCELED  0.0 - 0.5 K/uL Final    Result canceled by the ancillary.    Baso # 05/17/2024 CANCELED  0.00 - 0.20 K/uL Final    Result canceled by the ancillary.    nRBC 05/17/2024 0  0 /100 WBC Final    Gran % 05/17/2024 71.0  38.0 - 73.0 % Final    Lymph % 05/17/2024 8.0 (L)  18.0 - 48.0 % Final    Mono % 05/17/2024 14.0  4.0 - 15.0 % Final    Eosinophil % 05/17/2024 0.0  0.0 - 8.0 % Final    Basophil % 05/17/2024 0.0  0.0 - 1.9 % Final    Bands 05/17/2024 4.0  % Final    Metamyelocytes 05/17/2024 2.0  % Final    Myelocytes 05/17/2024 1.0  % Final    Platelet Estimate 05/17/2024 Appears normal   Final    Differential Method 05/17/2024 Manual   Final    Sodium 05/17/2024 142  136 - 145 mmol/L Final    Potassium 05/17/2024 4.2  3.5 - 5.1 mmol/L Final    Chloride  05/17/2024 111 (H)  95 - 110 mmol/L Final    CO2 05/17/2024 20 (L)  23 - 29 mmol/L Final    Glucose 05/17/2024 101  70 - 110 mg/dL Final    BUN 05/17/2024 23  8 - 23 mg/dL Final    Creatinine 05/17/2024 1.0  0.5 - 1.4 mg/dL Final    Calcium 05/17/2024 9.0  8.7 - 10.5 mg/dL Final    Total Protein 05/17/2024 6.6  6.0 - 8.4 g/dL Final    Albumin 05/17/2024 3.8  3.5 - 5.2 g/dL Final    Total Bilirubin 05/17/2024 1.1 (H)  0.1 - 1.0 mg/dL Final    Comment: For infants and newborns, interpretation of results should be based  on gestational age, weight and in agreement with clinical  observations.    Premature Infant recommended reference ranges:  Up to 24 hours.............<8.0 mg/dL  Up to 48 hours............<12.0 mg/dL  3-5 days..................<15.0 mg/dL  6-29 days.................<15.0 mg/dL      Alkaline Phosphatase 05/17/2024 59  55 - 135 U/L Final    AST 05/17/2024 13  10 - 40 U/L Final    ALT 05/17/2024 16  10 - 44 U/L Final    eGFR 05/17/2024 56.2 (A)  >60 mL/min/1.73 m^2 Final    Anion Gap 05/17/2024 11  8 - 16 mmol/L Final   Office Visit on 05/14/2024   Component Date Value Ref Range Status    POC Molecular Influenza A Ag 05/14/2024 Negative  Negative Final    POC Molecular Influenza B Ag 05/14/2024 Negative  Negative Final     Acceptable 05/14/2024 Yes   Final    POC Rapid COVID 05/14/2024 Negative  Negative Final     Acceptable 05/14/2024 Yes   Final         Imaging:     CT C/A/P 2/23/24    Chest:     There is a new nodule or lymph node in the left pulmonary hilum extending into the left lower lobe best visualized on series 2, image 49. The nodule measures 1.2 x 1.0 cm and is associated with an infiltrate extending into the superior segment of the left lower lobe. A noncalcified 5 mm nodule is present in the left upper lobe which is unchanged when compared to prior CTs dating back to 2007. A 5 mm calcified granuloma is also present in the left upper lobe. No pleural or  pericardial effusion are present. Heart size is normal. The thoracic inlet and axillary regions are unremarkable. A vascular access catheter enters from a right subclavian approach with the tip in the superior vena cava.     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 The kidneys are otherwise unremarkable. The liver, spleen, pancreas, and adrenal glands are normal in size and appearance. A large solitary gallstone is present within the gallbladder and there are no signs of cholecystitis. The bile ducts are not dilated. An inferior vena cava filter is in place with the cephalic tip below the level of the renal veins. No lymph node enlargement, ascites, or inflammatory changes are evident in the abdomen or pelvis. The aorta tapers normally.       Assessment:       1. Malignant neoplasm of splenic flexure    2. Iron deficiency anemia, unspecified iron deficiency anemia type    3. Renal mass, left    4. Immunodeficiency due to chemotherapy    5. Pulmonary nodules    6. Thrombophilia    7. Sinusitis, unspecified chronicity, unspecified location               Plan:     Colon Cancer - Pt with h/o colon cancer s/p resection in 2007 followed by adjuvant FOLFOX for 12 cycles  -Pt recently with resection of transverse colon and splenic flexure 9/29/23 showing path showing invasive moderately differentiated adenocarcinoma with mucinous features, 33 benign lymph nodes, positive lymphovascular invasion, positive perineural invasion pT3N0  -Tumor shows intact expression of MLH1, PMS2, MSH2, MSH6  -Patient was positive for B Celio V600E mutation  -Pt has high risk stage II colon cancer given positive LVI and PNI  -PT is a candidate for treatment with 6 months of 5-FU or Capecitabine  -no clear evidence of metastatic disease on PET-CT 11/08/2023   -Will proceed with 6 months of 5 fluorouracil  -Pharmacogenomic panel on 11/03/23 showed normal DPYD metabolizer but did show  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    Immunodeficiency due to chemo - pt at increased risk of infection  -No current signs of infection  -Will monitor    Sinusitis - improving   -Will monitor    Renal Mass - Pt with a mass on the left kidney seen on CT abdomen 8/17/23 and US abdomen 8/25/23  -Pt saw Urology [facility] under the supervision of Dr. 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 Pt can proceed with chemo today.  This is her last cycle.  Pt needs Ct C/A/P in the enxt couple of weeks with a return appt with me once completed.   Follow up with GRETCHEN    Infusion scheduling note    Injection scheduling note    Labs    Imaging    Pharmacy appointment    Other referrals              Treatment Plan Information   OP COLORECTAL FLUOROURACIL LEUCOVORIN Q2W (MOD DE GRAMONT)   Mahesh Cameron MD   Upcoming Treatment Dates - OP COLORECTAL FLUOROURACIL LEUCOVORIN Q2W (MOD DE  GRAMONT)    5/20/2024       Chemotherapy       leucovorin calcium in dextrose 5 % (D5W) 250 mL infusion       fluorouraciL injection       fluorouracil chemo infusion       Antiemetics       ondansetron injection 8 mg  6/3/2024       Chemotherapy       leucovorin calcium in dextrose 5 % (D5W) 250 mL infusion       fluorouraciL injection       fluorouracil chemo infusion       Antiemetics       ondansetron injection 8 mg  6/17/2024       Chemotherapy       leucovorin calcium in dextrose 5 % (D5W) 250 mL infusion       fluorouraciL injection       fluorouracil chemo infusion       Antiemetics       ondansetron injection 8 mg  7/1/2024       Chemotherapy       leucovorin calcium in dextrose 5 % (D5W) 250 mL infusion       fluorouraciL injection       fluorouracil chemo infusion       Antiemetics       ondansetron injection 8 mg    Supportive Plan Information  OP FERRIC CARBOXYMALTOSE Q2W   Mahesh Cameron MD   Upcoming Treatment Dates - OP FERRIC CARBOXYMALTOSE Q2W    1/25/2024       Supportive Care       ferric carboxymaltose (INJECTAFER) 750 mg in sodium chloride 0.9% 265 mL IVPB (ready to mix)      Mahesh Cameron MD  [facility]  Hematology and Oncology  [facility]   [address] [place] [address]   O: [phone]  F: [phone]

## 2024-05-20 ENCOUNTER — INFUSION (OUTPATIENT)
Dept: INFUSION THERAPY | Facility: HOSPITAL | Age: 84
End: 2024-05-20
Attending: INTERNAL MEDICINE
Payer: MEDICARE

## 2024-05-20 ENCOUNTER — DOCUMENTATION ONLY (OUTPATIENT)
Dept: INFUSION THERAPY | Facility: HOSPITAL | Age: 84
End: 2024-05-20
Payer: MEDICARE

## 2024-05-20 ENCOUNTER — OFFICE VISIT (OUTPATIENT)
Dept: HEMATOLOGY/ONCOLOGY | Facility: CLINIC | Age: 84
End: 2024-05-20
Payer: MEDICARE

## 2024-05-20 VITALS
HEART RATE: 69 BPM | SYSTOLIC BLOOD PRESSURE: 118 MMHG | BODY MASS INDEX: 36.47 KG/M2 | RESPIRATION RATE: 22 BRPM | OXYGEN SATURATION: 97 % | TEMPERATURE: 96 F | DIASTOLIC BLOOD PRESSURE: 70 MMHG | HEIGHT: 64 IN | WEIGHT: 213.63 LBS

## 2024-05-20 VITALS
WEIGHT: 213.63 LBS | HEIGHT: 64 IN | RESPIRATION RATE: 22 BRPM | TEMPERATURE: 96 F | BODY MASS INDEX: 36.47 KG/M2 | DIASTOLIC BLOOD PRESSURE: 74 MMHG | SYSTOLIC BLOOD PRESSURE: 119 MMHG | OXYGEN SATURATION: 97 % | HEART RATE: 55 BPM

## 2024-05-20 DIAGNOSIS — C18.5 MALIGNANT NEOPLASM OF SPLENIC FLEXURE: Primary | ICD-10-CM

## 2024-05-20 DIAGNOSIS — D50.9 IRON DEFICIENCY ANEMIA, UNSPECIFIED IRON DEFICIENCY ANEMIA TYPE: ICD-10-CM

## 2024-05-20 DIAGNOSIS — D84.821 IMMUNODEFICIENCY DUE TO CHEMOTHERAPY: ICD-10-CM

## 2024-05-20 DIAGNOSIS — R91.8 PULMONARY NODULES: ICD-10-CM

## 2024-05-20 DIAGNOSIS — D68.59 THROMBOPHILIA: ICD-10-CM

## 2024-05-20 DIAGNOSIS — N28.89 RENAL MASS, LEFT: ICD-10-CM

## 2024-05-20 DIAGNOSIS — J32.9 SINUSITIS, UNSPECIFIED CHRONICITY, UNSPECIFIED LOCATION: ICD-10-CM

## 2024-05-20 DIAGNOSIS — T45.1X5A IMMUNODEFICIENCY DUE TO CHEMOTHERAPY: ICD-10-CM

## 2024-05-20 DIAGNOSIS — Z79.899 IMMUNODEFICIENCY DUE TO CHEMOTHERAPY: ICD-10-CM

## 2024-05-20 PROCEDURE — 3288F FALL RISK ASSESSMENT DOCD: CPT | Mod: CPTII,S$GLB,, | Performed by: INTERNAL MEDICINE

## 2024-05-20 PROCEDURE — 99999 PR PBB SHADOW E&M-EST. PATIENT-LVL V: CPT | Mod: PBBFAC,,, | Performed by: INTERNAL MEDICINE

## 2024-05-20 PROCEDURE — 1159F MED LIST DOCD IN RCRD: CPT | Mod: CPTII,S$GLB,, | Performed by: INTERNAL MEDICINE

## 2024-05-20 PROCEDURE — 96367 TX/PROPH/DG ADDL SEQ IV INF: CPT | Mod: PN

## 2024-05-20 PROCEDURE — 1125F AMNT PAIN NOTED PAIN PRSNT: CPT | Mod: CPTII,S$GLB,, | Performed by: INTERNAL MEDICINE

## 2024-05-20 PROCEDURE — 99215 OFFICE O/P EST HI 40 MIN: CPT | Mod: S$GLB,,, | Performed by: INTERNAL MEDICINE

## 2024-05-20 PROCEDURE — 3074F SYST BP LT 130 MM HG: CPT | Mod: CPTII,S$GLB,, | Performed by: INTERNAL MEDICINE

## 2024-05-20 PROCEDURE — 3078F DIAST BP <80 MM HG: CPT | Mod: CPTII,S$GLB,, | Performed by: INTERNAL MEDICINE

## 2024-05-20 PROCEDURE — 96416 CHEMO PROLONG INFUSE W/PUMP: CPT | Mod: PN

## 2024-05-20 PROCEDURE — 96409 CHEMO IV PUSH SNGL DRUG: CPT | Mod: PN

## 2024-05-20 PROCEDURE — G2211 COMPLEX E/M VISIT ADD ON: HCPCS | Mod: S$GLB,,, | Performed by: INTERNAL MEDICINE

## 2024-05-20 PROCEDURE — 63600175 PHARM REV CODE 636 W HCPCS: Mod: PN | Performed by: INTERNAL MEDICINE

## 2024-05-20 PROCEDURE — 25000003 PHARM REV CODE 250: Mod: PN | Performed by: INTERNAL MEDICINE

## 2024-05-20 PROCEDURE — 1101F PT FALLS ASSESS-DOCD LE1/YR: CPT | Mod: CPTII,S$GLB,, | Performed by: INTERNAL MEDICINE

## 2024-05-20 RX ORDER — FLUOROURACIL 50 MG/ML
400 INJECTION, SOLUTION INTRAVENOUS
Status: CANCELLED | OUTPATIENT
Start: 2024-05-20

## 2024-05-20 RX ORDER — DIPHENHYDRAMINE HYDROCHLORIDE 50 MG/ML
50 INJECTION INTRAMUSCULAR; INTRAVENOUS ONCE AS NEEDED
Status: DISCONTINUED | OUTPATIENT
Start: 2024-05-20 | End: 2024-05-20 | Stop reason: HOSPADM

## 2024-05-20 RX ORDER — ONDANSETRON HYDROCHLORIDE 2 MG/ML
8 INJECTION, SOLUTION INTRAVENOUS
Status: DISCONTINUED | OUTPATIENT
Start: 2024-05-20 | End: 2024-05-20 | Stop reason: HOSPADM

## 2024-05-20 RX ORDER — EPINEPHRINE 0.3 MG/.3ML
0.3 INJECTION SUBCUTANEOUS ONCE AS NEEDED
Status: CANCELLED | OUTPATIENT
Start: 2024-05-20

## 2024-05-20 RX ORDER — SODIUM CHLORIDE 0.9 % (FLUSH) 0.9 %
10 SYRINGE (ML) INJECTION
Status: DISCONTINUED | OUTPATIENT
Start: 2024-05-20 | End: 2024-05-20 | Stop reason: HOSPADM

## 2024-05-20 RX ORDER — SODIUM CHLORIDE 0.9 % (FLUSH) 0.9 %
10 SYRINGE (ML) INJECTION
Status: CANCELLED | OUTPATIENT
Start: 2024-05-20

## 2024-05-20 RX ORDER — ONDANSETRON HYDROCHLORIDE 2 MG/ML
8 INJECTION, SOLUTION INTRAVENOUS
Status: CANCELLED | OUTPATIENT
Start: 2024-05-20

## 2024-05-20 RX ORDER — DIPHENHYDRAMINE HYDROCHLORIDE 50 MG/ML
50 INJECTION INTRAMUSCULAR; INTRAVENOUS ONCE AS NEEDED
Status: CANCELLED | OUTPATIENT
Start: 2024-05-20

## 2024-05-20 RX ORDER — PROCHLORPERAZINE EDISYLATE 5 MG/ML
5 INJECTION INTRAMUSCULAR; INTRAVENOUS ONCE AS NEEDED
Status: CANCELLED
Start: 2024-05-20

## 2024-05-20 RX ORDER — SODIUM CHLORIDE 0.9 % (FLUSH) 0.9 %
10 SYRINGE (ML) INJECTION
Status: CANCELLED | OUTPATIENT
Start: 2024-05-22

## 2024-05-20 RX ORDER — PROCHLORPERAZINE EDISYLATE 5 MG/ML
5 INJECTION INTRAMUSCULAR; INTRAVENOUS ONCE AS NEEDED
Status: DISCONTINUED | OUTPATIENT
Start: 2024-05-20 | End: 2024-05-20 | Stop reason: HOSPADM

## 2024-05-20 RX ORDER — HEPARIN 100 UNIT/ML
500 SYRINGE INTRAVENOUS
Status: DISCONTINUED | OUTPATIENT
Start: 2024-05-20 | End: 2024-05-20 | Stop reason: HOSPADM

## 2024-05-20 RX ORDER — HEPARIN 100 UNIT/ML
500 SYRINGE INTRAVENOUS
Status: CANCELLED | OUTPATIENT
Start: 2024-05-20

## 2024-05-20 RX ORDER — HEPARIN 100 UNIT/ML
500 SYRINGE INTRAVENOUS
Status: CANCELLED | OUTPATIENT
Start: 2024-05-22

## 2024-05-20 RX ORDER — FLUOROURACIL 50 MG/ML
400 INJECTION, SOLUTION INTRAVENOUS
Status: COMPLETED | OUTPATIENT
Start: 2024-05-20 | End: 2024-05-20

## 2024-05-20 RX ORDER — EPINEPHRINE 0.3 MG/.3ML
0.3 INJECTION SUBCUTANEOUS ONCE AS NEEDED
Status: DISCONTINUED | OUTPATIENT
Start: 2024-05-20 | End: 2024-05-20 | Stop reason: HOSPADM

## 2024-05-20 RX ADMIN — FLUOROURACIL 835 MG: 50 INJECTION, SOLUTION INTRAVENOUS at 02:05

## 2024-05-20 RX ADMIN — LEUCOVORIN CALCIUM 835 MG: 350 INJECTION, POWDER, LYOPHILIZED, FOR SOLUTION INTRAMUSCULAR; INTRAVENOUS at 11:05

## 2024-05-20 RX ADMIN — FLUOROURACIL 5000 MG: 50 INJECTION, SOLUTION INTRAVENOUS at 02:05

## 2024-05-20 NOTE — PROGRESS NOTES
Oncology Nutrition   Chemotherapy Infusion Visit    Nutrition Follow Up   RD met with patient at chairside during infusion tx. Pt excited that today is her last tx. RD asked pt if she had any f/u questions from nutrition education at last visit and pt denied. Pt reports continues to do well nutritionally- eating without difficulty, maintaining weight, and denies nutrition related side effects.      Wt Readings from Last 10 Encounters:   05/20/24 96.9 kg (213 lb 10 oz)   05/20/24 96.9 kg (213 lb 10 oz)   05/14/24 96.6 kg (212 lb 15.4 oz)   05/06/24 99.1 kg (218 lb 7.6 oz)   05/06/24 99.1 kg (218 lb 7.6 oz)   04/22/24 97.3 kg (214 lb 8.1 oz)   04/22/24 97.3 kg (214 lb 8.1 oz)   04/08/24 96.5 kg (212 lb 11.9 oz)   04/08/24 96.5 kg (212 lb 11.9 oz)   03/27/24 95.1 kg (209 lb 10.5 oz)       All other nutrition questions/concerns addressed as appropriate. Will continue to follow and monitor throughout treatment PRN.     Ana Cristina Coles, MS, RD, LDN  05/20/2024  2:29 PM

## 2024-05-20 NOTE — PLAN OF CARE
Problem: Adult Inpatient Plan of Care  Goal: Patient-Specific Goal (Individualized)  Outcome: Progressing  Flowsheets (Taken 5/20/2024 1100)  Individualized Care Needs: Recliner, warm blanket, pillow, niece at the chairside  Anxieties, Fears or Concerns: None  Patient/Family-Specific Goals (Include Timeframe): Free of S/S of reaction with infusion.     Problem: Fatigue  Goal: Improved Activity Tolerance  Outcome: Progressing  Intervention: Promote Improved Energy  Flowsheets (Taken 5/20/2024 1100)  Fatigue Management:   frequent rest breaks encouraged   paced activity encouraged  Sleep/Rest Enhancement: regular sleep/rest pattern promoted  Activity Management: Walk with assistive devise and /or staff member - L3  Environmental Support: rest periods encouraged      Patient to Infusion for 5FU pump, following an appointment with her provider. Accompanied by her niece. Treatment plan reviewed with patient. VSS. Tolerated infusion. Home infusion pump initiated. Instructions and troubleshooting reviewed with patient. Provided with copy of upcoming appointment schedule. Escorted to the front lobby TO RING THE BELL and for discharge to home.

## 2024-05-20 NOTE — PROGRESS NOTES
JENNIFER met with patient at the chairside. The patient reported feeling better, just struggling a little bit with nausea and sinus problems. She shared that today is her last infusion, and she will be ringing the bell once she finishes. JENNIFER will be present for her bell-ringing later today.

## 2024-05-22 ENCOUNTER — INFUSION (OUTPATIENT)
Dept: INFUSION THERAPY | Facility: HOSPITAL | Age: 84
End: 2024-05-22
Attending: INTERNAL MEDICINE
Payer: MEDICARE

## 2024-05-22 VITALS
DIASTOLIC BLOOD PRESSURE: 72 MMHG | HEART RATE: 66 BPM | TEMPERATURE: 98 F | SYSTOLIC BLOOD PRESSURE: 124 MMHG | RESPIRATION RATE: 20 BRPM

## 2024-05-22 DIAGNOSIS — C18.5 MALIGNANT NEOPLASM OF SPLENIC FLEXURE: Primary | ICD-10-CM

## 2024-05-22 PROCEDURE — 25000003 PHARM REV CODE 250: Mod: PN | Performed by: INTERNAL MEDICINE

## 2024-05-22 PROCEDURE — A4216 STERILE WATER/SALINE, 10 ML: HCPCS | Mod: PN | Performed by: INTERNAL MEDICINE

## 2024-05-22 PROCEDURE — 63600175 PHARM REV CODE 636 W HCPCS: Mod: PN | Performed by: INTERNAL MEDICINE

## 2024-05-22 RX ORDER — SODIUM CHLORIDE 0.9 % (FLUSH) 0.9 %
10 SYRINGE (ML) INJECTION
Status: DISCONTINUED | OUTPATIENT
Start: 2024-05-22 | End: 2024-05-22 | Stop reason: HOSPADM

## 2024-05-22 RX ORDER — HEPARIN 100 UNIT/ML
500 SYRINGE INTRAVENOUS
Status: DISCONTINUED | OUTPATIENT
Start: 2024-05-22 | End: 2024-05-22 | Stop reason: HOSPADM

## 2024-05-22 RX ADMIN — Medication 10 ML: at 01:05

## 2024-05-22 RX ADMIN — Medication 500 UNITS: at 01:05

## 2024-06-12 DIAGNOSIS — C18.5 MALIGNANT NEOPLASM OF SPLENIC FLEXURE: Primary | ICD-10-CM

## 2024-06-17 ENCOUNTER — LAB VISIT (OUTPATIENT)
Dept: LAB | Facility: HOSPITAL | Age: 84
End: 2024-06-17
Attending: INTERNAL MEDICINE
Payer: MEDICARE

## 2024-06-17 ENCOUNTER — HOSPITAL ENCOUNTER (OUTPATIENT)
Dept: RADIOLOGY | Facility: HOSPITAL | Age: 84
Discharge: HOME | End: 2024-06-17
Attending: INTERNAL MEDICINE
Payer: MEDICARE

## 2024-06-17 DIAGNOSIS — C18.5 MALIGNANT NEOPLASM OF SPLENIC FLEXURE: ICD-10-CM

## 2024-06-17 LAB
ALBUMIN SERPL BCP-MCNC: 3.6 G/DL (ref 3.5–5.2)
ALP SERPL-CCNC: 67 U/L (ref 55–135)
ALT SERPL W/O P-5'-P-CCNC: 12 U/L (ref 10–44)
ANION GAP SERPL CALC-SCNC: 11 MMOL/L (ref 8–16)
ANISOCYTOSIS BLD QL SMEAR: SLIGHT
AST SERPL-CCNC: 14 U/L (ref 10–40)
BASOPHILS NFR BLD: 0 % (ref 0–1.9)
BILIRUB SERPL-MCNC: 0.9 MG/DL (ref 0.1–1)
BUN SERPL-MCNC: 14 MG/DL (ref 8–23)
CALCIUM SERPL-MCNC: 9.6 MG/DL (ref 8.7–10.5)
CHLORIDE SERPL-SCNC: 111 MMOL/L (ref 95–110)
CO2 SERPL-SCNC: 19 MMOL/L (ref 23–29)
CREAT SERPL-MCNC: 1 MG/DL (ref 0.5–1.4)
CREAT SERPL-MCNC: 1 MG/DL (ref 0.5–1.4)
DIFFERENTIAL METHOD BLD: ABNORMAL
EOSINOPHIL NFR BLD: 2 % (ref 0–8)
ERYTHROCYTE [DISTWIDTH] IN BLOOD BY AUTOMATED COUNT: 13.3 % (ref 11.5–14.5)
EST. GFR  (NO RACE VARIABLE): 56 ML/MIN/1.73 M^2
EST. GFR  (NO RACE VARIABLE): 56 ML/MIN/1.73 M^2
GLUCOSE SERPL-MCNC: 100 MG/DL (ref 70–110)
HCT VFR BLD AUTO: 32.8 % (ref 37–48.5)
HGB BLD-MCNC: 11.1 G/DL (ref 12–16)
IMM GRANULOCYTES # BLD AUTO: ABNORMAL K/UL (ref 0–0.04)
IMM GRANULOCYTES NFR BLD AUTO: ABNORMAL % (ref 0–0.5)
LYMPHOCYTES NFR BLD: 20 % (ref 18–48)
MCH RBC QN AUTO: 35 PG (ref 27–31)
MCHC RBC AUTO-ENTMCNC: 33.8 G/DL (ref 32–36)
MCV RBC AUTO: 104 FL (ref 82–98)
MONOCYTES NFR BLD: 13 % (ref 4–15)
NEUTROPHILS NFR BLD: 65 % (ref 38–73)
NRBC BLD-RTO: 0 /100 WBC
PLATELET # BLD AUTO: 192 K/UL (ref 150–450)
PLATELET BLD QL SMEAR: ABNORMAL
PMV BLD AUTO: 9.1 FL (ref 9.2–12.9)
POTASSIUM SERPL-SCNC: 5.2 MMOL/L (ref 3.5–5.1)
PROT SERPL-MCNC: 6.6 G/DL (ref 6–8.4)
RBC # BLD AUTO: 3.17 M/UL (ref 4–5.4)
SODIUM SERPL-SCNC: 141 MMOL/L (ref 136–145)
WBC # BLD AUTO: 6.11 K/UL (ref 3.9–12.7)

## 2024-06-17 PROCEDURE — 74177 CT ABD & PELVIS W/CONTRAST: CPT | Mod: TC,PO

## 2024-06-17 PROCEDURE — 82565 ASSAY OF CREATININE: CPT | Mod: PO | Performed by: INTERNAL MEDICINE

## 2024-06-17 PROCEDURE — 71260 CT THORAX DX C+: CPT | Mod: 26,,, | Performed by: RADIOLOGY

## 2024-06-17 PROCEDURE — 71260 CT THORAX DX C+: CPT | Mod: TC,PO

## 2024-06-17 PROCEDURE — 80053 COMPREHEN METABOLIC PANEL: CPT | Mod: PO | Performed by: INTERNAL MEDICINE

## 2024-06-17 PROCEDURE — 85007 BL SMEAR W/DIFF WBC COUNT: CPT | Mod: PO | Performed by: INTERNAL MEDICINE

## 2024-06-17 PROCEDURE — 74177 CT ABD & PELVIS W/CONTRAST: CPT | Mod: 26,,, | Performed by: RADIOLOGY

## 2024-06-17 PROCEDURE — 25500020 PHARM REV CODE 255: Mod: PO | Performed by: INTERNAL MEDICINE

## 2024-06-17 PROCEDURE — 36415 COLL VENOUS BLD VENIPUNCTURE: CPT | Mod: PO | Performed by: INTERNAL MEDICINE

## 2024-06-17 PROCEDURE — 85027 COMPLETE CBC AUTOMATED: CPT | Mod: PO | Performed by: INTERNAL MEDICINE

## 2024-06-17 PROCEDURE — A9698 NON-RAD CONTRAST MATERIALNOC: HCPCS | Mod: PO | Performed by: INTERNAL MEDICINE

## 2024-06-17 RX ADMIN — IOHEXOL 100 ML: 350 INJECTION, SOLUTION INTRAVENOUS at 12:06

## 2024-06-17 RX ADMIN — IOHEXOL 1000 ML: 12 SOLUTION ORAL at 12:06

## 2024-06-21 NOTE — ADDENDUM NOTE
Addended by: DOE MIRELES on: 10/18/2023 01:48 PM     Modules accepted: Orders     Faxed and sent to scanning

## 2024-06-23 NOTE — PROGRESS NOTES
PATIENT: Danna Sorensen  MRN: [id number]  DATE: 6/24/2024      Diagnosis:   1. Malignant neoplasm of splenic flexure    2. Iron deficiency anemia, unspecified iron deficiency anemia type    3. Insomnia, unspecified type    4. Renal mass, left    5. Pulmonary nodules    6. Thrombophilia              Chief Complaint: Follow-up (Colon Cancer)      Oncologic History:      Oncologic History     Oncologic Treatment     Pathology           Subjective:    Interval History: Ms. Sorensen is a 84 y.o. female with Pulmonary embolism, carpal tunnel, CAD, HTN, hypothyroidism, osteoporosis, osteoarthritis, who presents for colon cancer.  Since the last clinic visit the patient underwent a CT of the chest, abdomen, and pelvis on 06/17/2024 showing multiple stable bilateral pulmonary nodules with disappearance of 1.2 cm left perihilar/perifissural nodule and adjacent ground-glass opacification seen on prior scan; stable heterogeneously enhancing 2.1 cm solid lesion with foci of macroscopic fat in the lower pole of the left kidney; and subacute healing left anterolateral 3rd, 4th, and 5th rib fracture.  The patient denies CP, cough, SOB, abdominal pain, nausea, vomiting, constipation, diarrhea.  The patient denies fever, chills, night sweats, weight loss, new lumps or bumps, easy bruising or bleeding.  Pt endorses difficulty sleeping    Prior History:  Pt was previously diagnosed with Stage III adenocarcinoma of the colon in 2007 and underwent 12 cycles of FOLFOX.  Pt underwent colonoscopy on 8/08/23 showing 1 7 mm polyp in the rectum; altered vascular, congested, eroded, friable and ulcerated mucosa in the transverse colon which was biopsied; patent and to side ileocolonic anastomosis.  Pathology showed moderately differentiated adenocarcinoma. CT abdomen and pelvis 8/17/23 showing irregular appearance of the gallbladder; heterogeneously enhancing lesion in the inferior pole of the left kidney measuring 1.8 cm; short segment of concentric  bowel wall thickening of the colon at the splenic flexure with surrounding mesenteric fat stranding with nodular thickness of 2.2 cm.  The patient underwent colonoscopy on 08/22/2023 showing nonbleeding internal hemorrhoids, partially obstructing tumor in the transverse colon which was tattooed.  Pt saw Urology 8/23/23 with plans for re-imaging the renal lesion in 3 months with an MRI.  US abdomen 8/25/23 showed a 2.5 cm solid mass at the lower pole of the left kidney suspicious for neoplasm.  CT chest 8/25/23 showed 4 mm left upper lobe pulmonary nodule, 4 mm calcified granuloma left upper lobe, 2 mm pulmonary nodule in the left upper lobe, 9 x 9 mm triangular nodule along the juxtapleural right middle lobe, 2 mm pulmonary nodule in the right middle lobe, and a partially imaged masslike density in the splenic flexure of the colon. Pt then underwent resection of the transverse colon and splenic flexure with path showing invasive moderately differentiated adenocarcinoma with mucinous features, 33 benign lymph nodes, positive lymphovascular invasion, positive perineural invasion pT3N0.  Tumor shows intact expression of MLH1, PMS2, MSH2, MSH6.  Patient was positive for B Celio V600E mutation.   The patient was discussed at tumor Board on 11/07/2023 with recommendation for PET-CT with biopsy of lung nodules if positive.  PET-CT on 11/08/2023 showed evidence of pulmonary hypertension; stable 5 mm nodule left lung apex; calcified granuloma left upper lobe; stable 6 mm inferior right upper lobe nodule; stable 17 mm ground-glass opacity lateral right middle lobe; stable 4 mm nodule in the right lower lobe of the diaphragm; no activity in the lesion in the left kidney.  MRI abdomen 11/20/2023 showed heterogeneous fat containing enhancing mass in the lower pole of the left kidney suggestive of an angio myelolipoma.   The patient underwent a chest x-ray on 02/19/2024 showing no sign of pneumonia.  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    Strabismus     Trouble in sleeping     Wears dentures     Uppers       Past Surgical HIstory:   Past Surgical History:   Procedure Laterality Date    CARDIAC SURGERY      cardiac cath    COLON SURGERY      colon resection    COLONOSCOPY N/A 10/18/2016    Procedure: COLONOSCOPY;  Surgeon: Rell Cordova MD;  Location: [facility];  Service: Endoscopy;  Laterality: N/A;    COLONOSCOPY N/A 8/8/2023    Procedure: COLONOSCOPY;  Surgeon: Kaushik Pinon MD;  Location: [facility];  Service: Endoscopy;  Laterality: N/A;    COLONOSCOPY N/A 8/22/2023    Procedure: COLONOSCOPY (tattoo of colon ca);  Surgeon: Kaushik Pinon MD;  Location: [facility];  Service: Endoscopy;  Laterality: N/A;    ESOPHAGOGASTRODUODENOSCOPY N/A 8/3/2023    Procedure: EGD (ESOPHAGOGASTRODUODENOSCOPY);  Surgeon: Kaushik Pinon MD;  Location: [facility];  Service: Endoscopy;  Laterality: N/A;    HAND TENDON SURGERY Left     HEMICOLECTOMY      right    INJECTION OF ANESTHETIC AGENT AROUND MEDIAL BRANCH NERVES  INNERVATING LUMBAR FACET JOINT Right 07/10/2018    Procedure: Block-nerve-medial branch-lumbar;  Surgeon: Parish Gaitan MD;  Location: [facility] OR;  Service: Pain Management;  Laterality: Right;  L3, 4, 5    INSERTION OF INFERIOR VENA CAVAL FILTER N/A 9/13/2023    Procedure: Insertion, Filter, Inferior Vena Cava;  Surgeon: Oren Verdin MD;  Location: [facility] CATH/EP LAB;  Service: General;  Laterality: N/A;    INSERTION OF TUNNELED CENTRAL VENOUS CATHETER (CVC) WITH SUBCUTANEOUS PORT Right 11/15/2023    Procedure: [id number];  Surgeon: Jim Chavez MD;  Location: [facility] OR;  Service: General;  Laterality: Right;    JOINT REPLACEMENT      bilateral    RADIOFREQUENCY ABLATION OF LUMBAR MEDIAL BRANCH NERVE AT SINGLE LEVEL Right 07/24/2018    Procedure: RADIOFREQUENCY ABLATION, NERVE, MEDIAL BRANCH, LUMBAR, 1 LEVEL;  Surgeon: Parish Gaitan MD;  Location: [facility] OR;  Service: Pain Management;  Laterality: Right;  L3,4,5 - Burned at 80 degrees C. for 75 seconds x 2 each site    ROBOT-ASSISTED COLECTOMY N/A 9/29/2023    Procedure: ROBOTIC COLECTOMY;  Surgeon: Jim Chavez MD;  Location: [facility] OR;  Service: General;  Laterality: N/A;    SHOULDER ARTHROSCOPY Right 01/12/2017    Reverse     TONSILLECTOMY      TONSILLECTOMY, ADENOIDECTOMY, BILATERAL MYRINGOTOMY AND TUBES      TOTAL KNEE ARTHROPLASTY Bilateral 08/1998    total replacements    TUMOR REMOVAL Left     left foot as a child       Family History:   Family History   Problem Relation Name Age of Onset    Hypertension Mother      Kidney disease Mother      Cataracts Father      Cataracts Sister      Cancer Sister          breast    No Known Problems Brother      Cancer Sister          breast    Arthritis Sister      Glaucoma Neg Hx      Amblyopia Neg Hx      Blindness Neg Hx      Macular degeneration Neg Hx      Retinal detachment Neg Hx      Strabismus Neg Hx      Stroke Neg Hx      Thyroid disease Neg Hx         Social History:  reports that she quit smoking  "about 63 years ago. Her smoking use included cigarettes. She started smoking about 63 years ago. She has a 0.5 pack-year smoking history. She has never used smokeless tobacco. She reports that she does not drink alcohol and does not use drugs.    Allergies:  Review of patient's allergies indicates:   Allergen Reactions    Aspirin      Other reaction(s): Stomach upset    Aspirin Other (See Comments)     Other reaction(s): Stomach upset    Gabapentin Other (See Comments)     Pt states she felt "funny" when she took the medication. Especially at the higher dose.    Mobic [meloxicam] Swelling     Edema and elevated blood pressure     Oxaliplatin Other (See Comments)     Other reaction(s): Joint pain  Other reaction(s): Itching  Other reaction(s): Hives  Other reaction(s): Joint pain  Other reaction(s): Itching  Other reaction(s): Hives    Pregabalin Other (See Comments)     Side effects  Side effects       Medications:  Current Outpatient Medications   Medication Sig Dispense Refill    acetaminophen (TYLENOL) 650 MG TbSR Take 650 mg by mouth every 8 (eight) hours.      albuterol (PROVENTIL HFA) 90 mcg/actuation inhaler Inhale 2 puffs into the lungs every 6 (six) hours as needed for Wheezing or Shortness of Breath. Rescue 18 g 0    alendronate (FOSAMAX) 70 MG tablet Take 1 tablet (70 mg total) by mouth every 7 days. 12 tablet 3    apixaban (ELIQUIS) 5 mg Tab Take 1 tablet (5 mg total) by mouth 2 (two) times daily. 180 tablet 3    cyclobenzaprine (FLEXERIL) 5 MG tablet Take 1 tablet (5 mg total) by mouth 3 (three) times daily as needed for Muscle spasms. 90 tablet 0    ergocalciferol, vitamin D2, (VITAMIN D ORAL) Take 2,000 mg by mouth once daily.      fluticasone propionate (FLONASE) 50 mcg/actuation nasal spray 1 spray (50 mcg total) by Each Nostril route once daily. 16 g 0    furosemide (LASIX) 20 MG tablet Take 1 tablet (20 mg total) by mouth daily as needed (edema). 90 tablet 3    HYDROcodone-acetaminophen (NORCO) " 5-325 mg per tablet Take 1 tablet by mouth every 6 (six) hours as needed for Pain. 20 tablet 0    hydrOXYzine HCL (ATARAX) 25 MG tablet Take 1 tablet (25 mg total) by mouth 3 (three) times daily as needed for Anxiety (insomnia). 30 tablet PRN    levocetirizine (XYZAL) 5 MG tablet TAKE 1 TABLET(5 MG) BY MOUTH EVERY NIGHT AS NEEDED FOR ALLERGIES 90 tablet 3    levothyroxine (SYNTHROID) 75 MCG tablet Take 1 tablet (75 mcg total) by mouth once daily. 90 tablet 3    LIDOcaine-prilocaine (EMLA) cream Apply topically as needed (Chemo). 30 g 0    lisinopriL (PRINIVIL,ZESTRIL) 30 MG tablet Take 30 mg by mouth.      multivitamin capsule Take 1 capsule by mouth once daily.      mupirocin (BACTROBAN) 2 % ointment Apply topically 3 (three) times daily. 30 g 0    nystatin (MYCOSTATIN) 100,000 unit/mL suspension Take 4 mLs by mouth 4 (four) times daily with meals and nightly.      ondansetron (ZOFRAN-ODT) 8 MG TbDL Take 1 tablet (8 mg total) by mouth every 8 (eight) hours as needed (nausea). 40 tablet 3    promethazine (PHENERGAN) 12.5 MG Tab (Take 1-2 tabs every 6 hours as needed for nausea persistent despite zofran) 40 tablet 3    promethazine-dextromethorphan (PROMETHAZINE-DM) 6.25-15 mg/5 mL Syrp Take 5 mLs by mouth nightly as needed (cough). 118 mL 0    traMADoL (ULTRAM) 50 mg tablet Take 1 tablet (50 mg total) by mouth every 8 (eight) hours as needed for Pain. 21 each 0    triamcinolone acetonide 0.1% (KENALOG) 0.1 % cream APPLY TOPICALLY TO THE AFFECTED AREA TWICE DAILY 60 g 0    omeprazole (PRILOSEC) 40 MG capsule Take 1 capsule (40 mg total) by mouth 2 (two) times daily before meals. 180 capsule PRN    ramelteon (ROZEREM) 8 mg tablet Take 1 tablet (8 mg total) by mouth every evening. 30 tablet 1     No current facility-administered medications for this visit.     Facility-Administered Medications Ordered in Other Visits   Medication Dose Route Frequency Provider Last Rate Last Admin    0.9%  NaCl infusion   Intravenous  "Once Oren Verdin MD           Review of Systems   Constitutional:  Negative for chills, fatigue, fever and unexpected weight change.   Respiratory:  Negative for cough and shortness of breath.    Cardiovascular:  Negative for chest pain and palpitations.   Gastrointestinal:  Negative for abdominal pain, constipation, diarrhea, nausea and vomiting.   Skin:  Negative for rash.   Neurological:  Negative for headaches.   Hematological:  Negative for adenopathy. Does not bruise/bleed easily.   Psychiatric/Behavioral:  Positive for sleep disturbance.        ECOG Performance Status: 2   Objective:      Vitals:   Vitals:    06/24/24 0937   BP: 122/60   BP Location: Right arm   Patient Position: Sitting   BP Method: Large (Manual)   Pulse: 67   Resp: (!) 25   Temp: 96.9 °F (36.1 °C)   TempSrc: Temporal   SpO2: 98%   Weight: 101.7 kg (224 lb 3.3 oz)   Height: 5' 4" (1.626 m)           Physical Exam  Constitutional:       General: She is not in acute distress.     Appearance: She is well-developed. She is not diaphoretic.   HENT:      Head: Normocephalic and atraumatic.   Cardiovascular:      Rate and Rhythm: Normal rate and regular rhythm.      Heart sounds: Normal heart sounds. No murmur heard.     No friction rub. No gallop.   Pulmonary:      Effort: Pulmonary effort is normal. No respiratory distress.      Breath sounds: No wheezing or rales.   Chest:      Chest wall: No tenderness.   Abdominal:      General: Bowel sounds are normal. There is no distension.      Palpations: Abdomen is soft. There is no mass.      Tenderness: There is no abdominal tenderness. There is no guarding or rebound.   Lymphadenopathy:      Cervical: No cervical adenopathy.      Upper Body:      Right upper body: No supraclavicular or axillary adenopathy.      Left upper body: No supraclavicular or axillary adenopathy.   Skin:     Findings: No erythema or rash.   Neurological:      Mental Status: She is alert and oriented to person, place, and " time.   Psychiatric:         Behavior: Behavior normal.         Laboratory Data:  No visits with results within 1 Week(s) from this visit.   Latest known visit with results is:   Lab Visit on 06/17/2024   Component Date Value Ref Range Status    WBC 06/17/2024 6.11  3.90 - 12.70 K/uL Final    RBC 06/17/2024 3.17 (L)  4.00 - 5.40 M/uL Final    Hemoglobin 06/17/2024 11.1 (L)  12.0 - 16.0 g/dL Final    Hematocrit 06/17/2024 32.8 (L)  37.0 - 48.5 % Final    MCV 06/17/2024 104 (H)  82 - 98 fL Final    MCH 06/17/2024 35.0 (H)  27.0 - 31.0 pg Final    MCHC 06/17/2024 33.8  32.0 - 36.0 g/dL Final    RDW 06/17/2024 13.3  11.5 - 14.5 % Final    Platelets 06/17/2024 192  150 - 450 K/uL Final    MPV 06/17/2024 9.1 (L)  9.2 - 12.9 fL Final    Immature Granulocytes 06/17/2024 CANCELED  0.0 - 0.5 % Final    Result canceled by the ancillary.    Immature Grans (Abs) 06/17/2024 CANCELED  0.00 - 0.04 K/uL Final    Comment: Mild elevation in immature granulocytes is non specific and   can be seen in a variety of conditions including stress response,   acute inflammation, trauma and pregnancy. Correlation with other   laboratory and clinical findings is essential.    Result canceled by the ancillary.      nRBC 06/17/2024 0  0 /100 WBC Final    Gran % 06/17/2024 65.0  38.0 - 73.0 % Final    Lymph % 06/17/2024 20.0  18.0 - 48.0 % Final    Mono % 06/17/2024 13.0  4.0 - 15.0 % Final    Eosinophil % 06/17/2024 2.0  0.0 - 8.0 % Final    Basophil % 06/17/2024 0.0  0.0 - 1.9 % Final    Platelet Estimate 06/17/2024 Appears normal   Final    Aniso 06/17/2024 Slight   Final    Differential Method 06/17/2024 Manual   Corrected    Comment: CORRECTED RESULT; previously reported as Automated on 06/17/2024 at   10:56.      Sodium 06/17/2024 141  136 - 145 mmol/L Final    Potassium 06/17/2024 5.2 (H)  3.5 - 5.1 mmol/L Final    Chloride 06/17/2024 111 (H)  95 - 110 mmol/L Final    CO2 06/17/2024 19 (L)  23 - 29 mmol/L Final    Glucose 06/17/2024 100  70  - 110 mg/dL Final    BUN 06/17/2024 14  8 - 23 mg/dL Final    Creatinine 06/17/2024 1.0  0.5 - 1.4 mg/dL Final    Calcium 06/17/2024 9.6  8.7 - 10.5 mg/dL Final    Total Protein 06/17/2024 6.6  6.0 - 8.4 g/dL Final    Albumin 06/17/2024 3.6  3.5 - 5.2 g/dL Final    Total Bilirubin 06/17/2024 0.9  0.1 - 1.0 mg/dL Final    Comment: For infants and newborns, interpretation of results should be based  on gestational age, weight and in agreement with clinical  observations.    Premature Infant recommended reference ranges:  Up to 24 hours.............<8.0 mg/dL  Up to 48 hours............<12.0 mg/dL  3-5 days..................<15.0 mg/dL  6-29 days.................<15.0 mg/dL      Alkaline Phosphatase 06/17/2024 67  55 - 135 U/L Final    AST 06/17/2024 14  10 - 40 U/L Final    ALT 06/17/2024 12  10 - 44 U/L Final    eGFR 06/17/2024 56 (A)  >60 mL/min/1.73 m^2 Final    Anion Gap 06/17/2024 11  8 - 16 mmol/L Final    Creatinine 06/17/2024 1.0  0.5 - 1.4 mg/dL Final    eGFR 06/17/2024 56 (A)  >60 mL/min/1.73 m^2 Final         Imaging:     CT C/A/P 6/17/254  Base of neck/thoracic soft tissues: No significant abnormality. Right anterior chest wall Port-A-Cath with tip terminating in the superior vena cava.     Thoracic aorta: Left-sided aortic arch. No significant atherosclerosis or aneurysm.     Heart: Normal in size. No pericardial effusion. Stable fusiform dilatation of the main pulmonary artery, measuring up to 4.3 cm, nonspecific but can be seen in setting of pulmonary arterial hypertension.     Theresa/Mediastinum: No pathologically enlarged lymph nodes.     Lungs/Airways: Airways are patent. Multiple bilateral subcentimeter pulmonary nodules are again demonstrated, the majority of which have not significantly changed in size or appearance as compared to the prior exam on 02/23/2024. The previously demonstrated new 1.2 cm left perihilar/perifissural nodule and adjacent ground-glass opacification in the superior segment the  left lower lobe appears to have resolved in the interim. No discrete new or enlarging pulmonary nodule. The largest nodule in the right lung in the anterior right upper lobe measures approximately 0.6 cm (series 6, image 186). The largest nodule in the left lung at the apex measures 6 mm (series 6, image 76). Stable irregular curvilinear opacity in the lateral segment of the right middle lobe. No new focal consolidation, pleural effusion or pneumothorax.     Esophagus: Small hiatal hernia.     Liver: Normal in size and attenuation. No focal hepatic lesions.     Gallbladder: Gallbladder again appears distended and contains a single large gallstone. No gallbladder wall thickening or pericholecystic inflammatory changes.     Bile Ducts: No evidence of dilated ducts.     Pancreas: No definite mass or peripancreatic fat stranding.     Spleen: Within normal limits.     Adrenals: No significant abnormality.     Retroperitoneum: No significant adenopathy.     Kidneys/ Ureters: Exophytic, heterogeneously enhancing 2.1 cm solid lesion with foci of macroscopic fat in the lower pole of the left kidney, without significant interval change as compared to the prior exam. No hydronephrosis or nephrolithiasis. No ureteral dilatation.     Bladder: No evidence of wall thickening.     Reproductive organs: Unremarkable.     GI tract/Mesentery: Postoperative changes of prior partial colectomy and ileocolic anastomosis again demonstrated. No evidence of bowel obstruction or inflammation. No evidence of appendicitis.     Peritoneal Space: No ascites. No free air. No pathologically enlarged lymph nodes.     Soft tissues: Postsurgical changes of the ventral abdominal wall with supraumbilical and periumbilical fat containing hernias, with slight interval increase in size of the periumbilical hernia now measuring 3.6 x 6.6 cm in AP by TV dimensions. Moderate increase in size of an infraumbilical hernia containing mesenteric fat and nondilated  loops of small bowel, measuring approximately 4.0 x 8.2 cm in maximal AP by TV dimensions.     Vasculature: Mild scattered calcific atherosclerosis. No aortic aneurysm. IVC filter in place.     Bones: Postoperative changes of right shoulder reverse arthroplasty partially imaged with extensive surrounding metallic beam hardening artifact. Marked degenerative changes of the partially imaged left glenohumeral joint with calcified loose body formation. Subacute healing left anterolateral third, 4th, and 5th rib fracture deformities again noted. Moderate to severe multilevel degenerative changes of the spine. No acute fracture or aggressive-appearing lytic or blastic lesion.       Assessment:       1. Malignant neoplasm of splenic flexure    2. Iron deficiency anemia, unspecified iron deficiency anemia type    3. Insomnia, unspecified type    4. Renal mass, left    5. Pulmonary nodules    6. Thrombophilia                 Plan:     Colon Cancer - Pt with h/o colon cancer s/p resection in 2007 followed by adjuvant FOLFOX for 12 cycles  -Pt recently with resection of transverse colon and splenic flexure 9/29/23 showing path showing invasive moderately differentiated adenocarcinoma with mucinous features, 33 benign lymph nodes, positive lymphovascular invasion, positive perineural invasion pT3N0  -Tumor shows intact expression of MLH1, PMS2, MSH2, MSH6  -Patient was positive for B Celio V600E mutation  -Pt has high risk stage II colon cancer given positive LVI and PNI  -PT is a candidate for treatment with 6 months of 5-FU or Capecitabine  -no clear evidence of metastatic disease on PET-CT 11/08/2023   -Will proceed with 6 months of 5 fluorouracil  -Pharmacogenomic panel on 11/03/23 showed normal DPYD metabolizer but 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    Renal Mass - Pt with a mass on the left kidney seen on CT abdomen 8/17/23 and US abdomen 8/25/23  -Pt saw Urology [facility] under the supervision of Dr. 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  -Stable on repeat scans  -Will monitor    Pulmonary Nodules - seen on Ct chest 8/25/23  -no avid nodules on PET/CT 11/08/23  -Stable on repeat CT  -Will monitor    DVT - PT with bilateral nonocclusive DVT demonstrated on the SFV to the popliteal veins seen on US 9/01/23  -Pt on Eliquis  -Will monitor    Iron Deficiency Anemia - ferritin 25ng/mL on 12/20/23  -Hemoglobin 11.1g/dL on 6/17/24  -Pt received injectafer 1/24/24  -Will repeat iron studies with next labs  -Will monitor    Insomnia - will prescribe ramelteon    Route Chart for Scheduling    Med Onc Chart Routing      Follow up with physician 3 months. Pt needs an appt with Dr Jim Chavez for PORT removal and for colonosocpy 9/2024.  Pt needs a CBC, CMP and CEA in 3 months with a return appt.   Follow up with GRETCHEN    Infusion scheduling note    Injection scheduling note    Labs    Imaging    Pharmacy appointment    Other referrals              Treatment Plan Information   OP COLORECTAL FLUOROURACIL LEUCOVORIN Q2W (MOD DE GRAMONT)   Mahesh Cameron MD   Upcoming Treatment Dates - OP COLORECTAL FLUOROURACIL LEUCOVORIN Q2W (MOD DE GRAMONT)    6/3/2024       Chemotherapy       leucovorin calcium in dextrose 5 % (D5W) 250 mL infusion       fluorouraciL injection        fluorouracil chemo infusion       Antiemetics       ondansetron injection 8 mg  6/17/2024       Chemotherapy       leucovorin calcium in dextrose 5 % (D5W) 250 mL infusion       fluorouraciL injection       fluorouracil chemo infusion       Antiemetics       ondansetron injection 8 mg  7/1/2024       Chemotherapy       leucovorin calcium in dextrose 5 % (D5W) 250 mL infusion       fluorouraciL injection       fluorouracil chemo infusion       Antiemetics       ondansetron injection 8 mg  7/15/2024       Chemotherapy       leucovorin calcium in dextrose 5 % (D5W) 250 mL infusion       fluorouraciL injection       fluorouracil chemo infusion       Antiemetics       ondansetron injection 8 mg    Supportive Plan Information  OP FERRIC CARBOXYMALTOSE Q2W   Mahesh Cameron MD   Upcoming Treatment Dates - OP FERRIC CARBOXYMALTOSE Q2W    1/25/2024       Supportive Care       ferric carboxymaltose (INJECTAFER) 750 mg in sodium chloride 0.9% 265 mL IVPB (ready to mix)      Mahesh Cameron MD  [facility]  Hematology and Oncology  [facility]   [address] [place] [address]   O: [phone]  F: [phone]

## 2024-06-24 ENCOUNTER — OFFICE VISIT (OUTPATIENT)
Dept: HEMATOLOGY/ONCOLOGY | Facility: CLINIC | Age: 84
End: 2024-06-24
Payer: MEDICARE

## 2024-06-24 ENCOUNTER — TELEPHONE (OUTPATIENT)
Dept: SURGERY | Facility: CLINIC | Age: 84
End: 2024-06-24
Payer: MEDICARE

## 2024-06-24 VITALS
HEIGHT: 64 IN | SYSTOLIC BLOOD PRESSURE: 122 MMHG | RESPIRATION RATE: 25 BRPM | DIASTOLIC BLOOD PRESSURE: 60 MMHG | BODY MASS INDEX: 38.27 KG/M2 | WEIGHT: 224.19 LBS | HEART RATE: 67 BPM | OXYGEN SATURATION: 98 % | TEMPERATURE: 97 F

## 2024-06-24 DIAGNOSIS — R91.8 PULMONARY NODULES: ICD-10-CM

## 2024-06-24 DIAGNOSIS — N28.89 RENAL MASS, LEFT: ICD-10-CM

## 2024-06-24 DIAGNOSIS — G47.00 INSOMNIA, UNSPECIFIED TYPE: ICD-10-CM

## 2024-06-24 DIAGNOSIS — C18.5 MALIGNANT NEOPLASM OF SPLENIC FLEXURE: Primary | ICD-10-CM

## 2024-06-24 DIAGNOSIS — D50.9 IRON DEFICIENCY ANEMIA, UNSPECIFIED IRON DEFICIENCY ANEMIA TYPE: ICD-10-CM

## 2024-06-24 DIAGNOSIS — D68.59 THROMBOPHILIA: ICD-10-CM

## 2024-06-24 PROCEDURE — 3074F SYST BP LT 130 MM HG: CPT | Mod: CPTII,S$GLB,, | Performed by: INTERNAL MEDICINE

## 2024-06-24 PROCEDURE — 99999 PR PBB SHADOW E&M-EST. PATIENT-LVL IV: CPT | Mod: PBBFAC,,, | Performed by: INTERNAL MEDICINE

## 2024-06-24 PROCEDURE — 3078F DIAST BP <80 MM HG: CPT | Mod: CPTII,S$GLB,, | Performed by: INTERNAL MEDICINE

## 2024-06-24 PROCEDURE — 3288F FALL RISK ASSESSMENT DOCD: CPT | Mod: CPTII,S$GLB,, | Performed by: INTERNAL MEDICINE

## 2024-06-24 PROCEDURE — 1159F MED LIST DOCD IN RCRD: CPT | Mod: CPTII,S$GLB,, | Performed by: INTERNAL MEDICINE

## 2024-06-24 PROCEDURE — 1126F AMNT PAIN NOTED NONE PRSNT: CPT | Mod: CPTII,S$GLB,, | Performed by: INTERNAL MEDICINE

## 2024-06-24 PROCEDURE — 1101F PT FALLS ASSESS-DOCD LE1/YR: CPT | Mod: CPTII,S$GLB,, | Performed by: INTERNAL MEDICINE

## 2024-06-24 PROCEDURE — 99214 OFFICE O/P EST MOD 30 MIN: CPT | Mod: S$GLB,,, | Performed by: INTERNAL MEDICINE

## 2024-06-24 RX ORDER — RAMELTEON 8 MG/1
8 TABLET ORAL NIGHTLY
Qty: 30 TABLET | Refills: 1 | Status: SHIPPED | OUTPATIENT
Start: 2024-06-24

## 2024-06-24 NOTE — TELEPHONE ENCOUNTER
----- Message from Erin Rodríguez sent at 6/24/2024 10:08 AM CDT -----  Contact: self  Type:  Sooner Appointment Request    Caller is requesting a sooner appointment.  Caller declined first available appointment listed below.  Caller will not accept being placed on the waitlist and is requesting a message be sent to doctor.    Name of Caller:  [facility]/Kristina  When is the first available appointment?  Aug  Symptoms:  port removal  Would the patient rather a call back or a response via [facility]? call  Best Call Back Number:  [phone]

## 2024-06-24 NOTE — TELEPHONE ENCOUNTER
Scheduled Port removal  for 7/31 Thor.  Plan colonoscopy 8/12/24 in [place] at [facility] mail the instructions.

## 2024-06-27 ENCOUNTER — PATIENT MESSAGE (OUTPATIENT)
Dept: SURGERY | Facility: CLINIC | Age: 84
End: 2024-06-27
Payer: MEDICARE

## 2024-06-27 DIAGNOSIS — Z85.038 HISTORY OF COLON CANCER: Primary | ICD-10-CM

## 2024-06-27 RX ORDER — SODIUM CHLORIDE, SODIUM LACTATE, POTASSIUM CHLORIDE, CALCIUM CHLORIDE 600; 310; 30; 20 MG/100ML; MG/100ML; MG/100ML; MG/100ML
INJECTION, SOLUTION INTRAVENOUS CONTINUOUS
OUTPATIENT
Start: 2024-06-27

## 2024-06-27 RX ORDER — SODIUM CHLORIDE 0.9 % (FLUSH) 0.9 %
10 SYRINGE (ML) INJECTION
OUTPATIENT
Start: 2024-06-27

## 2024-07-02 DIAGNOSIS — J06.9 UPPER RESPIRATORY TRACT INFECTION, UNSPECIFIED TYPE: ICD-10-CM

## 2024-07-02 DIAGNOSIS — I95.9 HYPOTENSION, UNSPECIFIED HYPOTENSION TYPE: ICD-10-CM

## 2024-07-03 RX ORDER — TRIAMCINOLONE ACETONIDE 1 MG/G
CREAM TOPICAL
Qty: 60 G | Refills: 0 | Status: SHIPPED | OUTPATIENT
Start: 2024-07-03

## 2024-07-03 RX ORDER — LISINOPRIL 10 MG/1
10 TABLET ORAL
Qty: 90 TABLET | Refills: 3 | OUTPATIENT
Start: 2024-07-03

## 2024-07-03 NOTE — TELEPHONE ENCOUNTER
Provider Staff:  Action required for this patient    Requires labs      Please see care gap opportunities below in Care Due Message.    Thanks!  [facility]     Appointments      Date Provider   Last Visit   3/14/2024 Claudia Kim MD   Next Visit   7/17/2024 Claudia Kim MD     Refill Decision Note   Danna Sorensen  is requesting a refill authorization.  Brief Assessment and Rationale for Refill:  Quick Discontinue  Approve     Medication Therapy Plan:  (Lisinopril-the original prescription was discontinued on 3/11/2024 by Elsa Ruvalcaba MA-increased to 30 mg)      Comments:     Note composed:6:53 AM 07/03/2024

## 2024-07-03 NOTE — TELEPHONE ENCOUNTER
Care Due:                  Date            Visit Type   Department     Provider  --------------------------------------------------------------------------------                                EP -                              PRIMARY      [facility]  Last Visit: 03-      CARE ([facility])   MEDICINE       Claudia Kim  Next Visit: None Scheduled  None         None Found                                                            Last  Test          Frequency    Reason                     Performed    Due Date  --------------------------------------------------------------------------------    Mg Level....  12 months..  alendronate..............  08- 08-    Phosphate...  12 months..  alendronate..............  08- 08-    Vitamin D...  12 months..  alendronate..............  01- 01-    Health [facility] Embedded Care Due Messages. Reference number: [id number].   7/02/2024 7:31:00 PM CDT

## 2024-07-08 RX ORDER — ALBUTEROL SULFATE 90 UG/1
2 AEROSOL, METERED RESPIRATORY (INHALATION) EVERY 6 HOURS PRN
Qty: 8.5 G | Refills: 1 | Status: SHIPPED | OUTPATIENT
Start: 2024-07-08

## 2024-07-09 ENCOUNTER — TELEPHONE (OUTPATIENT)
Dept: FAMILY MEDICINE | Facility: CLINIC | Age: 84
End: 2024-07-09
Payer: MEDICARE

## 2024-07-10 NOTE — TELEPHONE ENCOUNTER
----- Message from Rosa Lopes MA sent at 6/27/2024  2:31 PM CDT -----  Regarding: FW: Colonoscopy/Eliquis  Please advise on holding eliquis for colonoscopy.  ----- Message -----  From: Giorgi Lord LPN  Sent: 6/27/2024   2:27 PM CDT  To: Judy CARUSO Staff  Subject: Colonoscopy/Eliquis                              Hello,  Pt is scheduled for a colonoscopy with Dr Chavez on 8/12/24 in [place].  We plan to hold her Eliquis for two days starting on 8/10/24 with Dr. Kim' approval/ recommendation.  Thanks,   Giorgi

## 2024-07-11 DIAGNOSIS — K21.9 GASTROESOPHAGEAL REFLUX DISEASE, UNSPECIFIED WHETHER ESOPHAGITIS PRESENT: ICD-10-CM

## 2024-07-11 NOTE — TELEPHONE ENCOUNTER
Refill Routing Note   Medication(s) are not appropriate for processing by [facility] for the following reason(s):        Outside of protocol    [facility] action(s):  Route        Medication Therapy Plan: Total daily dose exceeds maintenance dosing for PPI      Appointments  past 12m or future 3m with PCP    Date Provider   Last Visit   3/14/2024 Claudia Kim MD   Next Visit   7/17/2024 Claudia Kim MD   ED visits in past 90 days: 0        Note composed:1:20 PM 07/11/2024

## 2024-07-11 NOTE — TELEPHONE ENCOUNTER
No care due was identified.  [facility] Embedded Care Due Messages. Reference number: [id number].   7/11/2024 10:13:58 AM CDT

## 2024-07-15 ENCOUNTER — LAB VISIT (OUTPATIENT)
Dept: LAB | Facility: HOSPITAL | Age: 84
End: 2024-07-15
Attending: FAMILY MEDICINE
Payer: MEDICARE

## 2024-07-15 ENCOUNTER — TELEPHONE (OUTPATIENT)
Dept: SURGERY | Facility: CLINIC | Age: 84
End: 2024-07-15
Payer: MEDICARE

## 2024-07-15 DIAGNOSIS — R94.6 BORDERLINE ABNORMAL TFTS: ICD-10-CM

## 2024-07-15 LAB
ALBUMIN SERPL BCP-MCNC: 3.7 G/DL (ref 3.5–5.2)
ALP SERPL-CCNC: 52 U/L (ref 55–135)
ALT SERPL W/O P-5'-P-CCNC: 9 U/L (ref 10–44)
ANION GAP SERPL CALC-SCNC: 10 MMOL/L (ref 8–16)
AST SERPL-CCNC: 14 U/L (ref 10–40)
BILIRUB SERPL-MCNC: 0.7 MG/DL (ref 0.1–1)
BUN SERPL-MCNC: 14 MG/DL (ref 8–23)
CALCIUM SERPL-MCNC: 9.1 MG/DL (ref 8.7–10.5)
CHLORIDE SERPL-SCNC: 113 MMOL/L (ref 95–110)
CO2 SERPL-SCNC: 17 MMOL/L (ref 23–29)
CREAT SERPL-MCNC: 0.9 MG/DL (ref 0.5–1.4)
EST. GFR  (NO RACE VARIABLE): >60 ML/MIN/1.73 M^2
GLUCOSE SERPL-MCNC: 88 MG/DL (ref 70–110)
POTASSIUM SERPL-SCNC: 4.6 MMOL/L (ref 3.5–5.1)
PROT SERPL-MCNC: 6.6 G/DL (ref 6–8.4)
SODIUM SERPL-SCNC: 140 MMOL/L (ref 136–145)
T4 FREE SERPL-MCNC: 0.98 NG/DL (ref 0.71–1.51)
TSH SERPL DL<=0.005 MIU/L-ACNC: 4.15 UIU/ML (ref 0.4–4)

## 2024-07-15 PROCEDURE — 84443 ASSAY THYROID STIM HORMONE: CPT | Performed by: FAMILY MEDICINE

## 2024-07-15 PROCEDURE — 80053 COMPREHEN METABOLIC PANEL: CPT | Performed by: FAMILY MEDICINE

## 2024-07-15 PROCEDURE — 84439 ASSAY OF FREE THYROXINE: CPT | Performed by: FAMILY MEDICINE

## 2024-07-15 PROCEDURE — 36415 COLL VENOUS BLD VENIPUNCTURE: CPT | Mod: PO | Performed by: FAMILY MEDICINE

## 2024-07-15 RX ORDER — OMEPRAZOLE 40 MG/1
40 CAPSULE, DELAYED RELEASE ORAL
Qty: 180 CAPSULE | Status: SHIPPED | OUTPATIENT
Start: 2024-07-15

## 2024-07-15 NOTE — TELEPHONE ENCOUNTER
----- Message from Nguyen Valderrama sent at 7/15/2024 10:21 AM CDT -----  Type:  Patient Returning Call    Who Called:  pt  Who Left Message for Patient:  CorieelvinGiorgi  Does the patient know what this is regarding?:  no  Best Call Back Number:  [phone]  Additional Information:  Pt is returning a call in regards to a missed call from the office.  Please call back and advise. Thanks!

## 2024-07-17 ENCOUNTER — OFFICE VISIT (OUTPATIENT)
Dept: FAMILY MEDICINE | Facility: CLINIC | Age: 84
End: 2024-07-17
Payer: MEDICARE

## 2024-07-17 VITALS
HEIGHT: 64 IN | OXYGEN SATURATION: 98 % | WEIGHT: 223.13 LBS | HEART RATE: 65 BPM | SYSTOLIC BLOOD PRESSURE: 130 MMHG | TEMPERATURE: 98 F | RESPIRATION RATE: 13 BRPM | DIASTOLIC BLOOD PRESSURE: 70 MMHG | BODY MASS INDEX: 38.09 KG/M2

## 2024-07-17 DIAGNOSIS — E03.9 HYPOTHYROIDISM, UNSPECIFIED TYPE: ICD-10-CM

## 2024-07-17 DIAGNOSIS — D50.9 IRON DEFICIENCY ANEMIA, UNSPECIFIED IRON DEFICIENCY ANEMIA TYPE: ICD-10-CM

## 2024-07-17 DIAGNOSIS — R94.6 BORDERLINE ABNORMAL TFTS: ICD-10-CM

## 2024-07-17 DIAGNOSIS — Z95.828 PORT-A-CATH IN PLACE: ICD-10-CM

## 2024-07-17 DIAGNOSIS — O22.30 DVT (DEEP VEIN THROMBOSIS) IN PREGNANCY: ICD-10-CM

## 2024-07-17 DIAGNOSIS — E66.01 MORBID OBESITY WITH BMI OF 40.0-44.9, ADULT: ICD-10-CM

## 2024-07-17 DIAGNOSIS — R73.9 HYPERGLYCEMIA: ICD-10-CM

## 2024-07-17 DIAGNOSIS — Z95.828 S/P IVC FILTER: ICD-10-CM

## 2024-07-17 DIAGNOSIS — I10 ESSENTIAL HYPERTENSION: ICD-10-CM

## 2024-07-17 DIAGNOSIS — C18.9 MALIGNANT NEOPLASM OF COLON, UNSPECIFIED PART OF COLON: ICD-10-CM

## 2024-07-17 DIAGNOSIS — K21.9 GASTROESOPHAGEAL REFLUX DISEASE, UNSPECIFIED WHETHER ESOPHAGITIS PRESENT: Primary | ICD-10-CM

## 2024-07-17 PROCEDURE — G2211 COMPLEX E/M VISIT ADD ON: HCPCS | Mod: S$GLB,,, | Performed by: FAMILY MEDICINE

## 2024-07-17 PROCEDURE — 3075F SYST BP GE 130 - 139MM HG: CPT | Mod: CPTII,S$GLB,, | Performed by: FAMILY MEDICINE

## 2024-07-17 PROCEDURE — 3078F DIAST BP <80 MM HG: CPT | Mod: CPTII,S$GLB,, | Performed by: FAMILY MEDICINE

## 2024-07-17 PROCEDURE — 1160F RVW MEDS BY RX/DR IN RCRD: CPT | Mod: CPTII,S$GLB,, | Performed by: FAMILY MEDICINE

## 2024-07-17 PROCEDURE — 3288F FALL RISK ASSESSMENT DOCD: CPT | Mod: CPTII,S$GLB,, | Performed by: FAMILY MEDICINE

## 2024-07-17 PROCEDURE — 99999 PR PBB SHADOW E&M-EST. PATIENT-LVL III: CPT | Mod: PBBFAC,,, | Performed by: FAMILY MEDICINE

## 2024-07-17 PROCEDURE — 1101F PT FALLS ASSESS-DOCD LE1/YR: CPT | Mod: CPTII,S$GLB,, | Performed by: FAMILY MEDICINE

## 2024-07-17 PROCEDURE — 1159F MED LIST DOCD IN RCRD: CPT | Mod: CPTII,S$GLB,, | Performed by: FAMILY MEDICINE

## 2024-07-17 PROCEDURE — 1126F AMNT PAIN NOTED NONE PRSNT: CPT | Mod: CPTII,S$GLB,, | Performed by: FAMILY MEDICINE

## 2024-07-17 PROCEDURE — 99214 OFFICE O/P EST MOD 30 MIN: CPT | Mod: S$GLB,,, | Performed by: FAMILY MEDICINE

## 2024-07-17 RX ORDER — SILVER SULFADIAZINE 10 G/1000G
CREAM TOPICAL DAILY
Qty: 85 G | Status: SHIPPED | OUTPATIENT
Start: 2024-07-17

## 2024-07-17 NOTE — PROGRESS NOTES
Subjective:       Patient ID: Danna Sorensen is a 84 y.o. female.    Chief Complaint: Follow-up (4mth f/u)    HPI  Review of Systems   Constitutional:  Negative for fatigue and unexpected weight change.   Respiratory:  Negative for chest tightness and shortness of breath.    Cardiovascular:  Negative for chest pain, palpitations and leg swelling.   Gastrointestinal:  Negative for abdominal pain.   Musculoskeletal:  Negative for arthralgias.   Neurological:  Negative for dizziness, syncope, light-headedness and headaches.       Patient Active Problem List   Diagnosis    Hypothyroid    Nuclear sclerosis    Hyperopia with astigmatism and presbyopia    DDD (degenerative disc disease), lumbar    Morbid obesity with BMI of 40.0-44.9, adult    Calcified granuloma of lung    History of colon cancer    Lumbar radiculitis    Other spondylosis, lumbar region    Chronic right-sided low back pain without sciatica    Vitamin D deficiency    Essential hypertension    Malignant neoplasm of colon    Decreased functional mobility    Muscle tightness    Decreased strength    Decreased range of motion    Venous lake of lip    GERD (gastroesophageal reflux disease)    Adenocarcinoma    DVT (deep vein thrombosis) in pregnancy    History of pulmonary embolus (PE)    Malignant neoplasm of splenic flexure    Insomnia    Chemotherapy-induced fatigue    Other low back pain    S/P IVC filter    Left renal mass    Port-A-Cath in place    Iron deficiency anemia    Immunodeficiency due to chemotherapy     Patient is here for a chronic conditions follow up.  Lives with niece Barbie-lots of support  Reviewed labs  7/24     Psych C/o trouble sleeping. Trazodone  mg no help. Told to try melatonin 5 mg      Urology NP O'Kristine left renal mass suspected malignancy. Mri showed benign findings     GI Surg Dr. Chavez and Surgery Dr. Evans colon cancer Heme/onc NP Viraj following for colon cancer s/p resection and chemo 2007 with recent recurrence  s/p extended robotic colectomy of the  transverse colon and splenic flexure with ileo colic anastomosis.  This was performed on September 29th, 2023 She did however have perineural and lymphovascular invasion.    Also of note she does have a known suspected malignancy of the left kidney.    port-a-cath placement 11/15/23.  chemo started 12/23  PET 11/2023 Patient is status post resection of colon carcinoma with no findings to indicate metastatic or recurrent disease.  Lung nodules, some of which are unchanged since 2007, benign, and others of which are new since 2007 but stable compared to 08/25/2023.  Continued CT follow-up is recommended. Last CT 6/2024  no new mets, stable pulm nodules, stable kidney mass suggestive of angiomyolipoma.   Colonoscopy scheduled 8/12/24. Starting 5FU x 6 months completed 5/2024     Vasc surg Dr. Verdin h/o DVT s/p IVC filter 9/2023 on eliquis. Has chronic d dimer elevation     GI Dr. Pinon planning egd 8/3/23 and colonoscopy 8/8/23 . Having breakthrough heartburn sx even after starting omeprazole 40mg a day. Increased to bid 4/2023-has helped a lot         Heme/onc mild normocytic anemia-slightly improved. Iron, folate and b12 normal 5/22    Rheum Dexa 5/22 osteoporosis on fosomax 5/22. Dexa osteoporosis 5/22  on fosamax since 11/2023. Repeat 11/24     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 Started on lyrica 50mg 2 bid-caused side effects-stopped. PT completed 2/22 not much help. Flexeril prn . Tramadol 50mg #60 lasts 2 months or more for low back pain-helps but still waking up from sleep with pain.  Tylenol also helps Sees chiropractor Dr. Stearns. Has gradually worsening of lower back pain. Ache that radiates to right hip. No paresthesias. No B/B incontinence.  Gabapentin- nausea.  Tried PT, KHARI and radiofrequency ablation without relief.       Eye Optom Dr. Berrios treating cataracts        Breast Had mri brast 2018- high risk.  3/22 mri breast negative. Cannot  tolerate mammo.   Objective:      Physical Exam  Vitals and nursing note reviewed.   Constitutional:       Appearance: She is well-developed.   Cardiovascular:      Rate and Rhythm: Normal rate and regular rhythm.      Heart sounds: Normal heart sounds.   Pulmonary:      Effort: Pulmonary effort is normal.      Breath sounds: Normal breath sounds.   Skin:     General: Skin is warm and dry.   Neurological:      Mental Status: She is alert and oriented to person, place, and time.         Assessment:       1. Gastroesophageal reflux disease, unspecified whether esophagitis present    2. Hypothyroidism, unspecified type    3. Port-A-Cath in place    4. Borderline abnormal TFTs    5. Essential hypertension    6. Iron deficiency anemia, unspecified iron deficiency anemia type    7. Malignant neoplasm of colon, unspecified part of colon    8. Morbid obesity with BMI of 40.0-44.9, adult    9. DVT (deep vein thrombosis) in pregnancy    10. S/P IVC filter    11. Hyperglycemia        Plan:         1. Gastroesophageal reflux disease, unspecified whether esophagitis present  Controlled on current medications.  Continue current medications.      2. Hypothyroidism, unspecified type  Controlled on current medications.  Continue current medications.    - Comprehensive Metabolic Panel; Future  - Lipid Panel; Future  - TSH; Future  - T4, Free; Future    3. Port-A-Cath in place  Cont flushes with plan to remove over summer    4. Borderline abnormal TFTs  Your thyroid function tests are borderline abnormal but these are close to normal.  I would like to monitor it and recheck it in 3 -6 months.  We will adjust your doses of thyroid hormone if the abnormality persists or worsens.         5. Essential hypertension  Controlled on current medications.  Continue current medications.      6. Iron deficiency anemia, unspecified iron deficiency anemia type  Screen and treat as indicated:      7. Malignant neoplasm of colon, unspecified part of  "colon  Cont onc and GI surgery monitoring and care    8. Morbid obesity with BMI of 40.0-44.9, adult  Counseled patient on his ideal body weight, health consequences of being obese and current recommendations including weekly exercise and a heart healthy diet.  Current BMI is:Estimated body mass index is 38.3 kg/m² as calculated from the following:    Height as of this encounter: 5' 4" (1.626 m).    Weight as of this encounter: 101.2 kg (223 lb 1.7 oz)..  Patient is aware that ideal BMI < 25 or Weight in (lb) to have BMI = 25: 145.3.      9. DVT (deep vein thrombosis)   Cont eliquis    10. S/P IVC filter  Cont monitoring    11. Hyperglycemia   Your blood sugar is borderline high.  This means you are at risk for developing type 2 diabetes mellitus.  To lessen your risk you should exercise regularly, avoid excess carbohydrates and work toward a body mass index of less than 25.            Time spent with patient: 20 minutes    Patient with be reevaluated in 6 months or sooner prn    Greater than 50% of this visit was spent counseling as described in above documentation:Yes  "

## 2024-07-22 ENCOUNTER — LAB VISIT (OUTPATIENT)
Dept: LAB | Facility: HOSPITAL | Age: 84
End: 2024-07-22
Attending: NURSE PRACTITIONER
Payer: MEDICARE

## 2024-07-22 DIAGNOSIS — C18.2 MALIGNANT NEOPLASM OF ASCENDING COLON: ICD-10-CM

## 2024-07-22 LAB
ALBUMIN SERPL BCP-MCNC: 3.7 G/DL (ref 3.5–5.2)
ALP SERPL-CCNC: 52 U/L (ref 55–135)
ALT SERPL W/O P-5'-P-CCNC: 9 U/L (ref 10–44)
ANION GAP SERPL CALC-SCNC: 9 MMOL/L (ref 8–16)
AST SERPL-CCNC: 12 U/L (ref 10–40)
BASOPHILS # BLD AUTO: 0.03 K/UL (ref 0–0.2)
BASOPHILS NFR BLD: 0.5 % (ref 0–1.9)
BILIRUB SERPL-MCNC: 0.7 MG/DL (ref 0.1–1)
BUN SERPL-MCNC: 14 MG/DL (ref 8–23)
CALCIUM SERPL-MCNC: 9.1 MG/DL (ref 8.7–10.5)
CHLORIDE SERPL-SCNC: 113 MMOL/L (ref 95–110)
CO2 SERPL-SCNC: 22 MMOL/L (ref 23–29)
CREAT SERPL-MCNC: 1 MG/DL (ref 0.5–1.4)
DIFFERENTIAL METHOD BLD: ABNORMAL
EOSINOPHIL # BLD AUTO: 0.1 K/UL (ref 0–0.5)
EOSINOPHIL NFR BLD: 1.6 % (ref 0–8)
ERYTHROCYTE [DISTWIDTH] IN BLOOD BY AUTOMATED COUNT: 12.1 % (ref 11.5–14.5)
EST. GFR  (NO RACE VARIABLE): 56.2 ML/MIN/1.73 M^2
GLUCOSE SERPL-MCNC: 90 MG/DL (ref 70–110)
HCT VFR BLD AUTO: 34.3 % (ref 37–48.5)
HGB BLD-MCNC: 11.3 G/DL (ref 12–16)
IMM GRANULOCYTES # BLD AUTO: 0.02 K/UL (ref 0–0.04)
IMM GRANULOCYTES NFR BLD AUTO: 0.4 % (ref 0–0.5)
LYMPHOCYTES # BLD AUTO: 1.3 K/UL (ref 1–4.8)
LYMPHOCYTES NFR BLD: 23.8 % (ref 18–48)
MCH RBC QN AUTO: 33.3 PG (ref 27–31)
MCHC RBC AUTO-ENTMCNC: 32.9 G/DL (ref 32–36)
MCV RBC AUTO: 101 FL (ref 82–98)
MONOCYTES # BLD AUTO: 0.5 K/UL (ref 0.3–1)
MONOCYTES NFR BLD: 8.6 % (ref 4–15)
NEUTROPHILS # BLD AUTO: 3.7 K/UL (ref 1.8–7.7)
NEUTROPHILS NFR BLD: 65.1 % (ref 38–73)
NRBC BLD-RTO: 0 /100 WBC
PLATELET # BLD AUTO: 181 K/UL (ref 150–450)
PMV BLD AUTO: 9.8 FL (ref 9.2–12.9)
POTASSIUM SERPL-SCNC: 4.9 MMOL/L (ref 3.5–5.1)
PROT SERPL-MCNC: 6.6 G/DL (ref 6–8.4)
RBC # BLD AUTO: 3.39 M/UL (ref 4–5.4)
SODIUM SERPL-SCNC: 144 MMOL/L (ref 136–145)
WBC # BLD AUTO: 5.6 K/UL (ref 3.9–12.7)

## 2024-07-22 PROCEDURE — 36415 COLL VENOUS BLD VENIPUNCTURE: CPT | Mod: PN | Performed by: NURSE PRACTITIONER

## 2024-07-22 PROCEDURE — 85025 COMPLETE CBC W/AUTO DIFF WBC: CPT | Mod: PN | Performed by: NURSE PRACTITIONER

## 2024-07-22 PROCEDURE — 80053 COMPREHEN METABOLIC PANEL: CPT | Mod: PN | Performed by: NURSE PRACTITIONER

## 2024-07-23 ENCOUNTER — TELEPHONE (OUTPATIENT)
Dept: SURGERY | Facility: CLINIC | Age: 84
End: 2024-07-23
Payer: MEDICARE

## 2024-07-23 NOTE — TELEPHONE ENCOUNTER
I called patient to inform her that due to a change in Dr. Chavez schedule on Wednesday, 7/31/24, I will need to reschedule her 1pm port removal.  Patient understood and I rescheduled her on Thursday, 9/12/24 @ 10am in [place].  Aditya

## 2024-07-28 DIAGNOSIS — M81.0 OSTEOPOROSIS, UNSPECIFIED OSTEOPOROSIS TYPE, UNSPECIFIED PATHOLOGICAL FRACTURE PRESENCE: ICD-10-CM

## 2024-07-28 NOTE — TELEPHONE ENCOUNTER
No care due was identified.  Health [facility] Embedded Care Due Messages. Reference number: [id number].   7/28/2024 1:48:08 PM CDT

## 2024-07-29 RX ORDER — ALENDRONATE SODIUM 70 MG/1
70 TABLET ORAL
Qty: 12 TABLET | Refills: 3 | Status: SHIPPED | OUTPATIENT
Start: 2024-07-29

## 2024-08-05 ENCOUNTER — TELEPHONE (OUTPATIENT)
Dept: SURGERY | Facility: CLINIC | Age: 84
End: 2024-08-05
Payer: MEDICARE

## 2024-08-12 ENCOUNTER — ANESTHESIA (OUTPATIENT)
Dept: ENDOSCOPY | Facility: HOSPITAL | Age: 84
End: 2024-08-12
Payer: MEDICARE

## 2024-08-12 ENCOUNTER — ANESTHESIA EVENT (OUTPATIENT)
Dept: ENDOSCOPY | Facility: HOSPITAL | Age: 84
End: 2024-08-12
Payer: MEDICARE

## 2024-08-12 ENCOUNTER — HOSPITAL ENCOUNTER (OUTPATIENT)
Facility: HOSPITAL | Age: 84
Discharge: HOME | End: 2024-08-12
Attending: SURGERY | Admitting: SURGERY
Payer: MEDICARE

## 2024-08-12 DIAGNOSIS — Z85.038 HISTORY OF COLON CANCER: ICD-10-CM

## 2024-08-12 PROCEDURE — 37000008 HC ANESTHESIA 1ST 15 MINUTES: Performed by: SURGERY

## 2024-08-12 PROCEDURE — 25000003 PHARM REV CODE 250: Performed by: SURGERY

## 2024-08-12 PROCEDURE — 88305 TISSUE EXAM BY PATHOLOGIST: CPT | Mod: TC | Performed by: PATHOLOGY

## 2024-08-12 PROCEDURE — 63600175 PHARM REV CODE 636 W HCPCS: Performed by: NURSE ANESTHETIST, CERTIFIED REGISTERED

## 2024-08-12 PROCEDURE — 27201012 HC FORCEPS, HOT/COLD, DISP: Performed by: SURGERY

## 2024-08-12 PROCEDURE — 45380 COLONOSCOPY AND BIOPSY: CPT | Mod: PT | Performed by: SURGERY

## 2024-08-12 PROCEDURE — 37000009 HC ANESTHESIA EA ADD 15 MINS: Performed by: SURGERY

## 2024-08-12 PROCEDURE — 45380 COLONOSCOPY AND BIOPSY: CPT | Mod: PT,,, | Performed by: SURGERY

## 2024-08-12 PROCEDURE — 25000003 PHARM REV CODE 250: Performed by: NURSE ANESTHETIST, CERTIFIED REGISTERED

## 2024-08-12 RX ORDER — SODIUM CHLORIDE 0.9 % (FLUSH) 0.9 %
10 SYRINGE (ML) INJECTION
Status: DISCONTINUED | OUTPATIENT
Start: 2024-08-12 | End: 2024-08-12 | Stop reason: HOSPADM

## 2024-08-12 RX ORDER — PROPOFOL 10 MG/ML
VIAL (ML) INTRAVENOUS
Status: DISCONTINUED | OUTPATIENT
Start: 2024-08-12 | End: 2024-08-12

## 2024-08-12 RX ORDER — LANOLIN ALCOHOL/MO/W.PET/CERES
1 CREAM (GRAM) TOPICAL
COMMUNITY

## 2024-08-12 RX ORDER — SODIUM CHLORIDE 9 MG/ML
INJECTION, SOLUTION INTRAVENOUS CONTINUOUS
Status: DISCONTINUED | OUTPATIENT
Start: 2024-08-12 | End: 2024-08-12 | Stop reason: HOSPADM

## 2024-08-12 RX ORDER — SODIUM CHLORIDE, SODIUM LACTATE, POTASSIUM CHLORIDE, CALCIUM CHLORIDE 600; 310; 30; 20 MG/100ML; MG/100ML; MG/100ML; MG/100ML
INJECTION, SOLUTION INTRAVENOUS CONTINUOUS
Status: DISCONTINUED | OUTPATIENT
Start: 2024-08-12 | End: 2024-08-12 | Stop reason: ALTCHOICE

## 2024-08-12 RX ORDER — LIDOCAINE HYDROCHLORIDE 20 MG/ML
INJECTION INTRAVENOUS
Status: DISCONTINUED | OUTPATIENT
Start: 2024-08-12 | End: 2024-08-12

## 2024-08-12 RX ADMIN — PROPOFOL 25 MG: 10 INJECTION, EMULSION INTRAVENOUS at 07:08

## 2024-08-12 RX ADMIN — PROPOFOL 50 MG: 10 INJECTION, EMULSION INTRAVENOUS at 07:08

## 2024-08-12 RX ADMIN — LIDOCAINE HYDROCHLORIDE 100 MG: 20 INJECTION, SOLUTION INTRAVENOUS at 07:08

## 2024-08-12 RX ADMIN — SODIUM CHLORIDE: 9 INJECTION, SOLUTION INTRAVENOUS at 07:08

## 2024-08-12 NOTE — ANESTHESIA POSTPROCEDURE EVALUATION
Anesthesia Post Evaluation    Patient: Danna Sorensen    Procedure(s) Performed: Procedure(s) (LRB):  COLONOSCOPY (N/A)    Final Anesthesia Type: general      Patient location during evaluation: PACU  Patient participation: Yes- Able to Participate  Level of consciousness: awake and alert and oriented  Post-procedure vital signs: reviewed and stable  Pain management: adequate  Airway patency: patent    PONV status at discharge: No PONV  Anesthetic complications: no      Cardiovascular status: blood pressure returned to baseline and stable  Respiratory status: unassisted and spontaneous ventilation  Hydration status: euvolemic  Follow-up not needed.              Vitals Value Taken Time   /63 08/12/24 0755   Temp 36.5 °C (97.7 °F) 08/12/24 0755   Pulse 48 08/12/24 0803   Resp 16 08/12/24 0803   SpO2 100 % 08/12/24 0803         Event Time   Out of Recovery 08:10:10         Pain/Ronda Score: Presence of Pain: denies (8/12/2024  7:55 AM)  Ronda Score: 10 (8/12/2024  8:03 AM)

## 2024-08-12 NOTE — H&P
[facility]  History & Physical     Subjective:     Chief Complaint/Reason for Admission: history of colon cancer    History of Present Illness:   Patient 84 y.o. female presents for colonoscopy.  Pt notes that she has history of colon cancer.  She is s/p resection in 9/23.  She denies abd pain or changes in bowel habits.  She has no complaints.     Patient Active Problem List    Diagnosis Date Noted    Immunodeficiency due to chemotherapy 01/03/2024    Iron deficiency anemia 12/20/2023    S/P IVC filter 11/24/2023    Left renal mass 11/24/2023    Port-A-Cath in place 11/24/2023    Insomnia 11/17/2023    Chemotherapy-induced fatigue 11/17/2023    Other low back pain 11/17/2023    Malignant neoplasm of splenic flexure 11/03/2023    History of pulmonary embolus (PE) 09/29/2023    DVT (deep vein thrombosis) in pregnancy 09/13/2023    GERD (gastroesophageal reflux disease) 08/25/2023    Adenocarcinoma 08/25/2023    Venous lake of lip 08/11/2022    Decreased functional mobility 01/24/2022    Muscle tightness 01/24/2022    Decreased strength 01/24/2022    Decreased range of motion 01/24/2022    Malignant neoplasm of colon 08/26/2020    Essential hypertension 08/09/2019    Vitamin D deficiency 03/22/2019    Chronic right-sided low back pain without sciatica 07/16/2018    Other spondylosis, lumbar region 07/10/2018    Lumbar radiculitis 05/22/2018    History of colon cancer 03/19/2018    Calcified granuloma of lung 12/22/2017    Morbid obesity with BMI of 40.0-44.9, adult 04/13/2017    DDD (degenerative disc disease), lumbar 01/05/2017    Nuclear sclerosis 04/14/2014    Hyperopia with astigmatism and presbyopia 04/14/2014    Hypothyroid 06/03/2012        Medications Prior to Admission   Medication Sig Dispense Refill Last Dose    albuterol (PROVENTIL/VENTOLIN HFA) 90 mcg/actuation inhaler INHALE 2 PUFFS INTO THE LUNGS EVERY 6 HOURS AS NEEDED FOR WHEEZING OR SHORTNESS OF BREATH 8.5 g 1 Past Week     alendronate (FOSAMAX) 70 MG tablet TAKE 1 TABLET(70 MG) BY MOUTH EVERY 7 DAYS 12 tablet 3 8/11/2024    apixaban (ELIQUIS) 5 mg Tab Take 1 tablet (5 mg total) by mouth 2 (two) times daily. 180 tablet 3 8/8/2024    cyclobenzaprine (FLEXERIL) 5 MG tablet Take 1 tablet (5 mg total) by mouth 3 (three) times daily as needed for Muscle spasms. 90 tablet 0 Past Week    ergocalciferol, vitamin D2, (VITAMIN D ORAL) Take 2,000 mg by mouth once daily.   8/11/2024    ferrous sulfate (FEOSOL) Tab tablet Take 1 tablet by mouth daily with breakfast.   8/11/2024    fluticasone propionate (FLONASE) 50 mcg/actuation nasal spray 1 spray (50 mcg total) by Each Nostril route once daily. 16 g 0 Past Week    furosemide (LASIX) 20 MG tablet Take 1 tablet (20 mg total) by mouth daily as needed (edema). 90 tablet 3 Past Week    levocetirizine (XYZAL) 5 MG tablet TAKE 1 TABLET(5 MG) BY MOUTH EVERY NIGHT AS NEEDED FOR ALLERGIES 90 tablet 3 Past Week    levothyroxine (SYNTHROID) 75 MCG tablet Take 1 tablet (75 mcg total) by mouth once daily. 90 tablet 3 8/11/2024    lisinopriL (PRINIVIL,ZESTRIL) 30 MG tablet Take 30 mg by mouth.   8/11/2024    multivitamin capsule Take 1 capsule by mouth once daily.   Past Week    omeprazole (PRILOSEC) 40 MG capsule TAKE 1 CAPSULE(40 MG) BY MOUTH TWICE DAILY BEFORE MEALS 180 capsule PRN 8/11/2024    traMADoL (ULTRAM) 50 mg tablet Take 1 tablet (50 mg total) by mouth every 8 (eight) hours as needed for Pain. 21 each 0 Past Week    acetaminophen (TYLENOL) 650 MG TbSR Take 650 mg by mouth every 8 (eight) hours.       HYDROcodone-acetaminophen (NORCO) 5-325 mg per tablet Take 1 tablet by mouth every 6 (six) hours as needed for Pain. 20 tablet 0 More than a month    hydrOXYzine HCL (ATARAX) 25 MG tablet Take 1 tablet (25 mg total) by mouth 3 (three) times daily as needed for Anxiety (insomnia). 30 tablet PRN More than a month    LIDOcaine-prilocaine (EMLA) cream Apply topically as needed (Chemo). 30 g 0     mupirocin  "(BACTROBAN) 2 % ointment Apply topically 3 (three) times daily. 30 g 0     nystatin (MYCOSTATIN) 100,000 unit/mL suspension Take 4 mLs by mouth 4 (four) times daily with meals and nightly.   More than a month    ondansetron (ZOFRAN-ODT) 8 MG TbDL Take 1 tablet (8 mg total) by mouth every 8 (eight) hours as needed (nausea). 40 tablet 3 More than a month    promethazine (PHENERGAN) 12.5 MG Tab (Take 1-2 tabs every 6 hours as needed for nausea persistent despite zofran) 40 tablet 3 More than a month    promethazine-dextromethorphan (PROMETHAZINE-DM) 6.25-15 mg/5 mL Syrp Take 5 mLs by mouth nightly as needed (cough). 118 mL 0 More than a month    ramelteon (ROZEREM) 8 mg tablet Take 1 tablet (8 mg total) by mouth every evening. 30 tablet 1 More than a month    silver sulfADIAZINE 1% (SILVADENE) 1 % cream Apply topically once daily. 85 g PRN     triamcinolone acetonide 0.1% (KENALOG) 0.1 % cream APPLY TOPICALLY TO THE AFFECTED AREA TWICE DAILY 60 g 0      Review of patient's allergies indicates:   Allergen Reactions    Aspirin      Other reaction(s): Stomach upset    Aspirin Other (See Comments)     Other reaction(s): Stomach upset    Gabapentin Other (See Comments)     Pt states she felt "funny" when she took the medication. Especially at the higher dose.    Mobic [meloxicam] Swelling     Edema and elevated blood pressure     Oxaliplatin Other (See Comments)     Other reaction(s): Joint pain  Other reaction(s): Itching  Other reaction(s): Hives  Other reaction(s): Joint pain  Other reaction(s): Itching  Other reaction(s): Hives    Pregabalin Other (See Comments)     Side effects  Side effects        Past Medical History:   Diagnosis Date    Acute pulmonary embolism without acute cor pulmonale 08/25/2023    Back pain     Carpal tunnel syndrome     Cataract     Colon cancer     Colon polyp     Coronary artery disease     Essential hypertension 08/09/2019    History of blood clots     History of squamous cell carcinoma " 07/27/2015    Hx of colon cancer, stage IV 10/18/2016    Hypothyroidism     Obesity (BMI 30-39.9) 03/24/2016    Osteoarthritis     Osteoporosis     Personal history of colon cancer, stage III     Squamous cell carcinoma     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    Strabismus     Trouble in sleeping     Wears dentures     Uppers      Past Surgical History:   Procedure Laterality Date    CARDIAC SURGERY      cardiac cath    COLON SURGERY      colon resection    COLONOSCOPY N/A 10/18/2016    Procedure: COLONOSCOPY;  Surgeon: Rell Cordova MD;  Location: [facility] ENDO;  Service: Endoscopy;  Laterality: N/A;    COLONOSCOPY N/A 8/8/2023    Procedure: COLONOSCOPY;  Surgeon: Kaushik Pinon MD;  [facility];  Service: Endoscopy;  Laterality: N/A;    COLONOSCOPY N/A 8/22/2023    Procedure: COLONOSCOPY (tattoo of colon ca);  Surgeon: Kaushik Pinon MD;  [facility];  Service: Endoscopy;  Laterality: N/A;    ESOPHAGOGASTRODUODENOSCOPY N/A 8/3/2023    Procedure: EGD (ESOPHAGOGASTRODUODENOSCOPY);  Surgeon: Kaushik Pinon MD;  [facility];  Service: Endoscopy;  Laterality: N/A;    HAND TENDON SURGERY Left     HEMICOLECTOMY      right    INJECTION OF ANESTHETIC AGENT AROUND MEDIAL BRANCH NERVES INNERVATING LUMBAR FACET JOINT Right 07/10/2018    Procedure: Block-nerve-medial branch-lumbar;  Surgeon: Parish Gaitan MD;  Location: [facility] OR;  Service: Pain Management;  Laterality: Right;  L3, 4, 5    INSERTION OF INFERIOR VENA CAVAL FILTER N/A 9/13/2023    Procedure: Insertion, Filter, Inferior Vena Cava;  Surgeon: Oren Verdin MD;  Location: [facility] CATH/EP LAB;  Service: General;  Laterality: N/A;    INSERTION OF TUNNELED CENTRAL VENOUS CATHETER (CVC) WITH SUBCUTANEOUS PORT Right 11/15/2023    Procedure: [id number];  Surgeon: Jim Chavez MD;  Location: [facility] OR;  Service: General;  Laterality: Right;    JOINT REPLACEMENT      bilateral    RADIOFREQUENCY  ABLATION OF LUMBAR MEDIAL BRANCH NERVE AT SINGLE LEVEL Right 2018    Procedure: RADIOFREQUENCY ABLATION, NERVE, MEDIAL BRANCH, LUMBAR, 1 LEVEL;  Surgeon: Parish Gaitan MD;  Location: [facility] OR;  Service: Pain Management;  Laterality: Right;  L3,4,5 - Burned at 80 degrees C. for 75 seconds x 2 each site    ROBOT-ASSISTED COLECTOMY N/A 2023    Procedure: ROBOTIC COLECTOMY;  Surgeon: Jim Chavez MD;  Location: [facility] OR;  Service: General;  Laterality: N/A;    SHOULDER ARTHROSCOPY Right 2017    Reverse     TONSILLECTOMY      TONSILLECTOMY, ADENOIDECTOMY, BILATERAL MYRINGOTOMY AND TUBES      TOTAL KNEE ARTHROPLASTY Bilateral 1998    total replacements    TUMOR REMOVAL Left     left foot as a child        Social History     Tobacco Use    Smoking status: Former     Current packs/day: 0.00     Average packs/day: 0.5 packs/day for 1 year (0.5 ttl pk-yrs)     Types: Cigarettes     Start date: 1960     Quit date: 1961     Years since quittin.6    Smokeless tobacco: Never   Substance Use Topics    Alcohol use: No        Review of Systems:  A comprehensive review of systems was negative.    OBJECTIVE:     No data found.  AAOx3  Soft/nd/nt  No resp distress    Data Review:      ASSESSMENT/PLAN:     There are no hospital problems to display for this patient.  Surveillance cscope     Plan:  TO have a  cscope today

## 2024-08-12 NOTE — ANESTHESIA PREPROCEDURE EVALUATION
08/12/2024  Danna Sorensen is a 84 y.o., female.      Pre-op Assessment    I have reviewed the Patient Summary Reports.     I have reviewed the Nursing Notes. I have reviewed the NPO Status.   I have reviewed the Medications.     Review of Systems  Anesthesia Hx:  No problems with previous Anesthesia                Social:  Former Smoker       Hematology/Oncology:                        --  Cancer in past history (colon CA, SCCA):                     Cardiovascular:     Hypertension, well controlled   CAD  asymptomatic                                      Pulmonary:  Pulmonary Normal        PE                 Renal/:  Renal/ Normal                 Hepatic/GI:     GERD, well controlled             Musculoskeletal:  Arthritis               Neurological:    Neuromuscular Disease,                                   Endocrine:   Hypothyroidism        Obesity / BMI > 30, Morbid Obesity / BMI > 40      Physical Exam  General: Well nourished, Cooperative, Alert and Oriented    Airway:  Mallampati: II   Mouth Opening: Normal  TM Distance: Normal  Neck ROM: Normal ROM    Anesthesia Plan  Type of Anesthesia, risks & benefits discussed:    Anesthesia Type: Gen ETT, Gen Supraglottic Airway, Gen Natural Airway, MAC  Intra-op Monitoring Plan: Standard ASA Monitors  Post Op Pain Control Plan: multimodal analgesia  Induction:  IV  Airway Plan: Direct, Video and Fiberoptic, Post-Induction  Informed Consent: Informed consent signed with the Patient and all parties understand the risks and agree with anesthesia plan.  All questions answered.   ASA Score: 3    Ready For Surgery From Anesthesia Perspective.   .

## 2024-08-12 NOTE — TRANSFER OF CARE
Anesthesia Transfer of Care Note    Patient: Danna Sorensen    Procedure(s) Performed: Procedure(s) (LRB):  COLONOSCOPY (N/A)    Patient location: PACU    Anesthesia Type: general    Transport from OR: Transported from OR on room air with adequate spontaneous ventilation    Post pain: adequate analgesia    Post assessment: tolerated procedure well    Post vital signs: stable    Level of consciousness: responds to stimulation    Nausea/Vomiting: no nausea/vomiting    Complications: none    Transfer of care protocol was followed    Last vitals: Visit Vitals  BP (!) 175/74 (BP Location: Left arm, Patient Position: Lying)   Pulse 68   Temp 36.6 °C (97.9 °F) (Skin)   Resp 16   SpO2 99%   Breastfeeding No

## 2024-08-12 NOTE — PROVATION PATIENT INSTRUCTIONS
Discharge Summary/Instructions after an Endoscopic Procedure  Patient Name: Danna Sorensen  Patient MRN: [id number]  Patient YOB: 1940  Monday, August 12, 2024  Jim Chavez MD  Dear patient,  As a result of recent federal legislation (The Federal Cures Act), you may   receive lab or pathology results from your procedure in your [facility]   account before your physician is able to contact you. Your physician or   their representative will relay the results to you with their   recommendations at their soonest availability.  Thank you,  RESTRICTIONS:  During your procedure today, you received medications for sedation.  These   medications may affect your judgment, balance and coordination.  Therefore,   for 24 hours, you have the following restrictions:   - DO NOT drive a car, operate machinery, make legal/financial decisions,   sign important papers or drink alcohol.    ACTIVITY:  Today: no heavy lifting, straining or running due to procedural   sedation/anesthesia.  The following day: return to full activity including work.  DIET:  Eat and drink normally unless instructed otherwise.     TREATMENT FOR COMMON SIDE EFFECTS:  - Mild abdominal pain, nausea, belching, bloating or excessive gas:  rest,   eat lightly and use a heating pad.  - Sore Throat: treat with throat lozenges and/or gargle with warm salt   water.  - Because air was used during the procedure, expelling large amounts of air   from your rectum or belching is normal.  - If a bowel prep was taken, you may not have a bowel movement for 1-3 days.    This is normal.  SYMPTOMS TO WATCH FOR AND REPORT TO YOUR PHYSICIAN:  1. Abdominal pain or bloating, other than gas cramps.  2. Chest pain.  3. Back pain.  4. Signs of infection such as: chills or fever occurring within 24 hours   after the procedure.  5. Rectal bleeding, which would show as bright red, maroon, or black stools.   (A tablespoon of blood from the rectum is not serious, especially if    hemorrhoids are present.)  6. Vomiting.  7. Weakness or dizziness.  GO DIRECTLY TO THE NEAREST EMERGENCY ROOM IF YOU HAVE ANY OF THE FOLLOWING:      Difficulty breathing              Chills and/or fever over 101 F   Persistent vomiting and/or vomiting blood   Severe abdominal pain   Severe chest pain   Black, tarry stools   Bleeding- more than one tablespoon   Any other symptom or condition that you feel may need urgent attention  Your doctor recommends these additional instructions:  If any biopsies were taken, your doctors clinic will contact you in 1 to 2   weeks with any results.  - Discharge patient to home (ambulatory).   - Resume regular diet.   - Continue present medications.   - Await pathology results.   - Repeat colonoscopy in 3 years for surveillance.   - Return to my office PRN.   - Patient has a contact number available for emergencies.  The signs and   symptoms of potential delayed complications were discussed with the   patient.  Return to normal activities tomorrow.  Written discharge   instructions were provided to the patient.  For questions, problems or results please call your physician - Jim Chavez MD at Work:  [phone].  [facility], EMERGENCY ROOM PHONE NUMBER: [phone]  IF A COMPLICATION OR EMERGENCY SITUATION ARISES AND YOU ARE UNABLE TO REACH   YOUR PHYSICIAN - GO DIRECTLY TO THE EMERGENCY ROOM.  Jim Chavez MD  8/12/2024 7:30:58 AM  This report has been verified and signed electronically.  Dear patient,  As a result of recent federal legislation (The Federal Cures Act), you may   receive lab or pathology results from your procedure in your [facility]   account before your physician is able to contact you. Your physician or   their representative will relay the results to you with their   recommendations at their soonest availability.  Thank you,  PROVATION

## 2024-08-13 VITALS
OXYGEN SATURATION: 100 % | TEMPERATURE: 98 F | RESPIRATION RATE: 16 BRPM | SYSTOLIC BLOOD PRESSURE: 133 MMHG | DIASTOLIC BLOOD PRESSURE: 63 MMHG | HEART RATE: 48 BPM

## 2024-08-21 ENCOUNTER — TELEPHONE (OUTPATIENT)
Dept: SURGERY | Facility: CLINIC | Age: 84
End: 2024-08-21
Payer: MEDICARE

## 2024-08-21 NOTE — TELEPHONE ENCOUNTER
Call patient to reschedule her appointment from Thursday, 9/12/24 @ 10am in [place] for a port removal to [place] on a Tuesday afternoon per Dr. Chavez.  Aditya

## 2024-08-21 NOTE — TELEPHONE ENCOUNTER
Called and reviewed pathology with pt.      ANASTOMOSIS, BIOPSY:     --ANASTOMOTIC SITE WITH FEATURES CONSISTENT WITH ULCERATION WITH    REPARATIVE AND REGENERATIVE CHANGES.     --NEGATIVE FOR DYSPLASIA OR MALIGNANCY.     Recommend repeat cscope in 3 years

## 2024-08-21 NOTE — TELEPHONE ENCOUNTER
I called patient to inform her that my next available is on Tuesday, 10/22/24 in [place] to have her port removed.  Patient stated that she'll keep the appointment on Thursday, 9/12/24 @ 10am in [place].  Aditya

## 2024-08-26 ENCOUNTER — PATIENT MESSAGE (OUTPATIENT)
Dept: SURGERY | Facility: CLINIC | Age: 84
End: 2024-08-26
Payer: MEDICARE

## 2024-09-01 NOTE — TELEPHONE ENCOUNTER
Care Due:                  Date            Visit Type   Department     Provider  --------------------------------------------------------------------------------                                EP -                              PRIMARY      [facility]  Last Visit: 07-      CARE ([facility])   LISSA Kim                              EP -                              PRIMARY      [facility]  Next Visit: 01-      CARE ([facility])   MEDICINE       Claudia Kim                                                            Last  Test          Frequency    Reason                     Performed    Due Date  --------------------------------------------------------------------------------    Mg Level....  12 months..  alendronate..............  08- 08-    Phosphate...  12 months..  alendronate..............  08- 08-    [employer] Embedded Care Due Messages. Reference number: [id number].   9/01/2024 11:11:48 AM CDT

## 2024-09-02 NOTE — TELEPHONE ENCOUNTER
Refill Routing Note   Medication(s) are not appropriate for processing by [facility] for the following reason(s):        No active prescription written by provider    [facility] action(s):  Defer   Requires labs : Yes             Appointments  past 12m or future 3m with PCP    Date Provider   Last Visit   7/17/2024 Claudia Kim MD   Next Visit   1/22/2025 Claudia Kim MD   ED visits in past 90 days: 0        Note composed:8:17 PM 09/01/2024

## 2024-09-04 NOTE — TELEPHONE ENCOUNTER
----- Message from Maia Serrano sent at 9/4/2024 11:04 AM CDT -----  Contact: PT  Type:  RX Refill Request    Who Called:  PT  Refill or New Rx: refill  RX Name and Strength:  lisinopriL (PRINIVIL,ZESTRIL) 30 MG tablet  How is the patient currently taking it? Take 30 mg by mouth. - Oral  Is this a 30 day or 90 day RX: 90  Preferred Pharmacy with phone number:    [id number] - [place] - [address] [facility] [address] [facility] [facility] [address]  [address] [address]  Phone: [phone] Fax: [phone]    Best Call Back Number:  [phone]  Additional Information: PT has 5  days worth of meds

## 2024-09-04 NOTE — TELEPHONE ENCOUNTER
No care due was identified.  [facility] Embedded Care Due Messages. Reference number: [id number].   9/04/2024 11:21:29 AM CDT

## 2024-09-05 RX ORDER — LISINOPRIL 30 MG/1
30 TABLET ORAL DAILY
Qty: 90 TABLET | Refills: 3 | Status: SHIPPED | OUTPATIENT
Start: 2024-09-05

## 2024-09-05 RX ORDER — LISINOPRIL 30 MG/1
30 TABLET ORAL
Qty: 90 TABLET | Refills: 3 | Status: SHIPPED | OUTPATIENT
Start: 2024-09-05

## 2024-09-05 NOTE — TELEPHONE ENCOUNTER
----- Message from Ayleen Zelaya sent at 9/5/2024 10:20 AM CDT -----  Type: Needs Medical Advice  Who Called:  pt  Symptoms (please be specific):    How long has patient had these symptoms:    Pharmacy name and phone #:    [id number] - [place] - [address] AT [facility] & [facility] [address]  [address] [address]  Phone: [phone] Fax: [phone]      Best Call Back Number: [phone]    Additional Information: pt need to speak to someone in the office that can help with her getting a refill  Please call to advise

## 2024-09-11 ENCOUNTER — TELEPHONE (OUTPATIENT)
Dept: SURGERY | Facility: CLINIC | Age: 84
End: 2024-09-11
Payer: MEDICARE

## 2024-09-11 NOTE — TELEPHONE ENCOUNTER
I called patient to confirm that she'll be here tomorrow @ 10am to see Dr. Chavez for her port removal.  Aditya

## 2024-09-12 ENCOUNTER — PATIENT OUTREACH (OUTPATIENT)
Dept: ADMINISTRATIVE | Facility: HOSPITAL | Age: 84
End: 2024-09-12
Payer: MEDICARE

## 2024-09-12 ENCOUNTER — OFFICE VISIT (OUTPATIENT)
Dept: SURGERY | Facility: CLINIC | Age: 84
End: 2024-09-12
Payer: MEDICARE

## 2024-09-12 VITALS
WEIGHT: 228.38 LBS | OXYGEN SATURATION: 98 % | TEMPERATURE: 97 F | HEART RATE: 73 BPM | DIASTOLIC BLOOD PRESSURE: 70 MMHG | BODY MASS INDEX: 38.99 KG/M2 | SYSTOLIC BLOOD PRESSURE: 158 MMHG | HEIGHT: 64 IN

## 2024-09-12 DIAGNOSIS — Z95.828 PORT-A-CATH IN PLACE: Primary | ICD-10-CM

## 2024-09-12 PROCEDURE — 99999 PR PBB SHADOW E&M-EST. PATIENT-LVL V: CPT | Mod: PBBFAC,,, | Performed by: SURGERY

## 2024-09-12 NOTE — PROGRESS NOTES
Health Maintenance Due   Topic Date Due    COVID-19 Vaccine (1) Never done    RSV Vaccine (Age 60+ and Pregnant patients) (1 - 1-dose 60+ series) Never done    Shingles Vaccine (1 of 2) 06/08/2017        Chart review done.    updated.   Immunizations reviewed & updated.   Care Everywhere updated.

## 2024-09-12 NOTE — PROGRESS NOTES
Subjective     Patient ID: Danna Sorensen is a 84 y.o. female.    Chief Complaint: Procedure (Port removal-Last dose of Plavix was this morning)    HPI   pleasant 84-year-old female who I am familiar with.  Patient presents to have her port removed.  She does have history of colon cancer.  Colonoscopy was performed last month with no significant lesions.  She desires to proceed with port removal.  Patient does take Eliquis.  She did take it yesterday and this morning.  Discussion with the patient regarding the risks of port removal.  In particular discussion with her regarding the risks of bleeding given that she was on Eliquis.  She acknowledges understanding of this.  She desires to proceed with port removal.      Chest: Port present in R chest    A/P: Port in place    Having received informed consent patient was taken to the procedure room placed in supine position.  Chest was prepped and draped standard fashion.  A time-out procedure was performed.  I inject the area around the port with local achieving adequate block.  Incision was made through her old scar and carried out the deep dermal tissue.  Fibrous capsule around the port was opened and I removed the entirety of the port and the catheter intact.  Hemostasis was achieved.  The wound was then closed in 2 layers with 3-0 Vicryl 4-0 Monocryl.     Patient tolerated the procedure well and will RTC in 2-3 weeks.

## 2024-09-23 ENCOUNTER — LAB VISIT (OUTPATIENT)
Dept: LAB | Facility: HOSPITAL | Age: 84
End: 2024-09-23
Attending: INTERNAL MEDICINE
Payer: MEDICARE

## 2024-09-23 DIAGNOSIS — C18.5 MALIGNANT NEOPLASM OF SPLENIC FLEXURE: ICD-10-CM

## 2024-09-23 LAB
ALBUMIN SERPL BCP-MCNC: 3.6 G/DL (ref 3.5–5.2)
ALP SERPL-CCNC: 49 U/L (ref 55–135)
ALT SERPL W/O P-5'-P-CCNC: 12 U/L (ref 10–44)
ANION GAP SERPL CALC-SCNC: 10 MMOL/L (ref 8–16)
AST SERPL-CCNC: 12 U/L (ref 10–40)
BASOPHILS # BLD AUTO: 0.02 K/UL (ref 0–0.2)
BASOPHILS NFR BLD: 0.4 % (ref 0–1.9)
BILIRUB SERPL-MCNC: 1 MG/DL (ref 0.1–1)
BUN SERPL-MCNC: 15 MG/DL (ref 8–23)
CALCIUM SERPL-MCNC: 9.1 MG/DL (ref 8.7–10.5)
CEA SERPL-MCNC: 1.7 NG/ML (ref 0–5)
CHLORIDE SERPL-SCNC: 112 MMOL/L (ref 95–110)
CO2 SERPL-SCNC: 21 MMOL/L (ref 23–29)
CREAT SERPL-MCNC: 1 MG/DL (ref 0.5–1.4)
DIFFERENTIAL METHOD BLD: ABNORMAL
EOSINOPHIL # BLD AUTO: 0.1 K/UL (ref 0–0.5)
EOSINOPHIL NFR BLD: 1.9 % (ref 0–8)
ERYTHROCYTE [DISTWIDTH] IN BLOOD BY AUTOMATED COUNT: 13 % (ref 11.5–14.5)
EST. GFR  (NO RACE VARIABLE): 56.2 ML/MIN/1.73 M^2
GLUCOSE SERPL-MCNC: 87 MG/DL (ref 70–110)
HCT VFR BLD AUTO: 34.7 % (ref 37–48.5)
HGB BLD-MCNC: 11.6 G/DL (ref 12–16)
IMM GRANULOCYTES # BLD AUTO: 0.05 K/UL (ref 0–0.04)
IMM GRANULOCYTES NFR BLD AUTO: 0.9 % (ref 0–0.5)
LYMPHOCYTES # BLD AUTO: 1.1 K/UL (ref 1–4.8)
LYMPHOCYTES NFR BLD: 19 % (ref 18–48)
MCH RBC QN AUTO: 32.1 PG (ref 27–31)
MCHC RBC AUTO-ENTMCNC: 33.4 G/DL (ref 32–36)
MCV RBC AUTO: 96 FL (ref 82–98)
MONOCYTES # BLD AUTO: 0.6 K/UL (ref 0.3–1)
MONOCYTES NFR BLD: 10.2 % (ref 4–15)
NEUTROPHILS # BLD AUTO: 3.8 K/UL (ref 1.8–7.7)
NEUTROPHILS NFR BLD: 67.6 % (ref 38–73)
NRBC BLD-RTO: 0 /100 WBC
PLATELET # BLD AUTO: 185 K/UL (ref 150–450)
PMV BLD AUTO: 9.4 FL (ref 9.2–12.9)
POTASSIUM SERPL-SCNC: 4.8 MMOL/L (ref 3.5–5.1)
PROT SERPL-MCNC: 6.6 G/DL (ref 6–8.4)
RBC # BLD AUTO: 3.61 M/UL (ref 4–5.4)
SODIUM SERPL-SCNC: 143 MMOL/L (ref 136–145)
WBC # BLD AUTO: 5.68 K/UL (ref 3.9–12.7)

## 2024-09-23 PROCEDURE — 82378 CARCINOEMBRYONIC ANTIGEN: CPT | Performed by: INTERNAL MEDICINE

## 2024-09-23 PROCEDURE — 36415 COLL VENOUS BLD VENIPUNCTURE: CPT | Mod: PN | Performed by: INTERNAL MEDICINE

## 2024-09-23 PROCEDURE — 85025 COMPLETE CBC W/AUTO DIFF WBC: CPT | Mod: PN | Performed by: INTERNAL MEDICINE

## 2024-09-23 PROCEDURE — 80053 COMPREHEN METABOLIC PANEL: CPT | Mod: PN | Performed by: INTERNAL MEDICINE

## 2024-09-24 ENCOUNTER — OFFICE VISIT (OUTPATIENT)
Dept: HEMATOLOGY/ONCOLOGY | Facility: CLINIC | Age: 84
End: 2024-09-24
Payer: MEDICARE

## 2024-09-24 ENCOUNTER — LAB VISIT (OUTPATIENT)
Dept: LAB | Facility: HOSPITAL | Age: 84
End: 2024-09-24
Attending: NURSE PRACTITIONER
Payer: MEDICARE

## 2024-09-24 VITALS
OXYGEN SATURATION: 98 % | WEIGHT: 232.38 LBS | DIASTOLIC BLOOD PRESSURE: 60 MMHG | RESPIRATION RATE: 22 BRPM | HEART RATE: 70 BPM | HEIGHT: 63 IN | TEMPERATURE: 97 F | SYSTOLIC BLOOD PRESSURE: 126 MMHG | BODY MASS INDEX: 41.18 KG/M2

## 2024-09-24 DIAGNOSIS — C18.5 MALIGNANT NEOPLASM OF SPLENIC FLEXURE: ICD-10-CM

## 2024-09-24 DIAGNOSIS — C18.5 MALIGNANT NEOPLASM OF SPLENIC FLEXURE: Primary | ICD-10-CM

## 2024-09-24 DIAGNOSIS — R06.02 SOB (SHORTNESS OF BREATH): ICD-10-CM

## 2024-09-24 DIAGNOSIS — D50.9 IRON DEFICIENCY ANEMIA, UNSPECIFIED IRON DEFICIENCY ANEMIA TYPE: ICD-10-CM

## 2024-09-24 DIAGNOSIS — G47.00 INSOMNIA, UNSPECIFIED TYPE: ICD-10-CM

## 2024-09-24 DIAGNOSIS — D68.59 THROMBOPHILIA: ICD-10-CM

## 2024-09-24 DIAGNOSIS — R91.8 PULMONARY NODULES: ICD-10-CM

## 2024-09-24 DIAGNOSIS — N28.89 RENAL MASS, LEFT: ICD-10-CM

## 2024-09-24 DIAGNOSIS — D53.9 NUTRITIONAL ANEMIA, UNSPECIFIED: ICD-10-CM

## 2024-09-24 LAB
FERRITIN SERPL-MCNC: 163 NG/ML (ref 20–300)
FOLATE SERPL-MCNC: 13.1 NG/ML (ref 4–24)
IRON SERPL-MCNC: 128 UG/DL (ref 30–160)
SATURATED IRON: 40 % (ref 20–50)
TOTAL IRON BINDING CAPACITY: 321 UG/DL (ref 250–450)
TRANSFERRIN SERPL-MCNC: 217 MG/DL (ref 200–375)

## 2024-09-24 PROCEDURE — 1126F AMNT PAIN NOTED NONE PRSNT: CPT | Mod: CPTII,S$GLB,, | Performed by: INTERNAL MEDICINE

## 2024-09-24 PROCEDURE — 36415 COLL VENOUS BLD VENIPUNCTURE: CPT | Mod: PN | Performed by: INTERNAL MEDICINE

## 2024-09-24 PROCEDURE — 83540 ASSAY OF IRON: CPT | Performed by: INTERNAL MEDICINE

## 2024-09-24 PROCEDURE — 3078F DIAST BP <80 MM HG: CPT | Mod: CPTII,S$GLB,, | Performed by: INTERNAL MEDICINE

## 2024-09-24 PROCEDURE — 83921 ORGANIC ACID SINGLE QUANT: CPT | Performed by: INTERNAL MEDICINE

## 2024-09-24 PROCEDURE — 82746 ASSAY OF FOLIC ACID SERUM: CPT | Performed by: INTERNAL MEDICINE

## 2024-09-24 PROCEDURE — 99999 PR PBB SHADOW E&M-EST. PATIENT-LVL III: CPT | Mod: PBBFAC,,, | Performed by: INTERNAL MEDICINE

## 2024-09-24 PROCEDURE — 82728 ASSAY OF FERRITIN: CPT | Performed by: INTERNAL MEDICINE

## 2024-09-24 PROCEDURE — 82607 VITAMIN B-12: CPT | Performed by: INTERNAL MEDICINE

## 2024-09-24 PROCEDURE — 1101F PT FALLS ASSESS-DOCD LE1/YR: CPT | Mod: CPTII,S$GLB,, | Performed by: INTERNAL MEDICINE

## 2024-09-24 PROCEDURE — 99214 OFFICE O/P EST MOD 30 MIN: CPT | Mod: S$GLB,,, | Performed by: INTERNAL MEDICINE

## 2024-09-24 PROCEDURE — 3288F FALL RISK ASSESSMENT DOCD: CPT | Mod: CPTII,S$GLB,, | Performed by: INTERNAL MEDICINE

## 2024-09-24 PROCEDURE — 3074F SYST BP LT 130 MM HG: CPT | Mod: CPTII,S$GLB,, | Performed by: INTERNAL MEDICINE

## 2024-09-24 RX ORDER — TRAMADOL HYDROCHLORIDE 50 MG/1
50 TABLET ORAL EVERY 8 HOURS PRN
Qty: 21 EACH | Refills: 0 | Status: CANCELLED | OUTPATIENT
Start: 2024-09-24

## 2024-09-24 RX ORDER — RAMELTEON 8 MG/1
8 TABLET ORAL NIGHTLY
Qty: 30 TABLET | Refills: 1 | Status: SHIPPED | OUTPATIENT
Start: 2024-09-24

## 2024-09-24 NOTE — PROGRESS NOTES
PATIENT: Danna Sorensen  MRN: [id number]  DATE: 9/24/2024      Diagnosis:   1. Malignant neoplasm of splenic flexure    2. Insomnia, unspecified type    3. Nutritional anemia, unspecified    4. SOB (shortness of breath)    5. Pulmonary nodules    6. Thrombophilia    7. Renal mass, left    8. Iron deficiency anemia, unspecified iron deficiency anemia type                Chief Complaint: Malignant neoplasm of splenic flexure (3 month follow up )      Oncologic History:      Oncologic History     Oncologic Treatment     Pathology           Subjective:    Interval History: Ms. Sorensen is a 84 y.o. female with Pulmonary embolism, carpal tunnel, CAD, HTN, hypothyroidism, osteoporosis, osteoarthritis, who presents for colon cancer.  Since the last clinic visit the patient states she has felt well.  The patient occasionally has difficulty sleeping and states the previously prescribed ramelteon was helpful.  The patient endorses shortness of breath with activity and states this has been chronic.  The patient denies CP, cough,  abdominal pain, nausea, vomiting, constipation, diarrhea.  The patient denies fever, chills, night sweats, weight loss, new lumps or bumps, easy bruising or bleeding.    Prior History:  Pt was previously diagnosed with Stage III adenocarcinoma of the colon in 2007 and underwent 12 cycles of FOLFOX.  Pt underwent colonoscopy on 8/08/23 showing 1 7 mm polyp in the rectum; altered vascular, congested, eroded, friable and ulcerated mucosa in the transverse colon which was biopsied; patent and to side ileocolonic anastomosis.  Pathology showed moderately differentiated adenocarcinoma. CT abdomen and pelvis 8/17/23 showing irregular appearance of the gallbladder; heterogeneously enhancing lesion in the inferior pole of the left kidney measuring 1.8 cm; short segment of concentric bowel wall thickening of the colon at the splenic flexure with surrounding mesenteric fat stranding with nodular thickness of 2.2 cm.   The patient underwent colonoscopy on 08/22/2023 showing nonbleeding internal hemorrhoids, partially obstructing tumor in the transverse colon which was tattooed.  Pt saw Urology 8/23/23 with plans for re-imaging the renal lesion in 3 months with an MRI.  US abdomen 8/25/23 showed a 2.5 cm solid mass at the lower pole of the left kidney suspicious for neoplasm.  CT chest 8/25/23 showed 4 mm left upper lobe pulmonary nodule, 4 mm calcified granuloma left upper lobe, 2 mm pulmonary nodule in the left upper lobe, 9 x 9 mm triangular nodule along the juxtapleural right middle lobe, 2 mm pulmonary nodule in the right middle lobe, and a partially imaged masslike density in the splenic flexure of the colon. Pt then underwent resection of the transverse colon and splenic flexure with path showing invasive moderately differentiated adenocarcinoma with mucinous features, 33 benign lymph nodes, positive lymphovascular invasion, positive perineural invasion pT3N0.  Tumor shows intact expression of MLH1, PMS2, MSH2, MSH6.  Patient was positive for B Celio V600E mutation.   The patient was discussed at tumor Board on 11/07/2023 with recommendation for PET-CT with biopsy of lung nodules if positive.  PET-CT on 11/08/2023 showed evidence of pulmonary hypertension; stable 5 mm nodule left lung apex; calcified granuloma left upper lobe; stable 6 mm inferior right upper lobe nodule; stable 17 mm ground-glass opacity lateral right middle lobe; stable 4 mm nodule in the right lower lobe of the diaphragm; no activity in the lesion in the left kidney.  MRI abdomen 11/20/2023 showed heterogeneous fat containing enhancing mass in the lower pole of the left kidney suggestive of an angio myelolipoma.   The patient underwent a chest x-ray on 02/19/2024 showing no sign of pneumonia.  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   The patient underwent a CT of the chest, abdomen, and pelvis on 06/17/2024 showing multiple stable bilateral pulmonary nodules with disappearance of 1.2 cm left perihilar/perifissural nodule and adjacent ground-glass opacification seen on prior scan; stable heterogeneously enhancing 2.1 cm solid lesion with foci of macroscopic fat in the lower pole of the left kidney; and subacute healing left anterolateral 3rd, 4th, and 5th rib fracture.    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    Strabismus     Trouble in sleeping     Wears dentures     Uppers       Past Surgical HIstory:   Past Surgical History:   Procedure Laterality Date    CARDIAC SURGERY      cardiac cath    COLON SURGERY      colon resection    COLONOSCOPY N/A 10/18/2016    Procedure: COLONOSCOPY;  Surgeon: Rell Cordova MD;  Location: [facility];  Service: Endoscopy;  Laterality: N/A;    COLONOSCOPY N/A 8/8/2023    Procedure: COLONOSCOPY;  Surgeon: Kaushik Pinno MD;  Location: [facility];  Service: Endoscopy;  Laterality: N/A;    COLONOSCOPY N/A 8/22/2023    Procedure: COLONOSCOPY (tattoo of colon ca);  Surgeon: Kaushik Pinon MD;  Location: [facility];  Service: Endoscopy;  Laterality: N/A;     COLONOSCOPY N/A 8/12/2024    Procedure: COLONOSCOPY;  Surgeon: Jim Chavez MD;  Location: [facility] ENDO;  Service: General;  Laterality: N/A;    ESOPHAGOGASTRODUODENOSCOPY N/A 8/3/2023    Procedure: EGD (ESOPHAGOGASTRODUODENOSCOPY);  Surgeon: Kaushik Pinon MD;  Location: [facility] ENDO;  Service: Endoscopy;  Laterality: N/A;    HAND TENDON SURGERY Left     HEMICOLECTOMY      right    INJECTION OF ANESTHETIC AGENT AROUND MEDIAL BRANCH NERVES INNERVATING LUMBAR FACET JOINT Right 07/10/2018    Procedure: Block-nerve-medial branch-lumbar;  Surgeon: Parish Gaitan MD;  Location: [facility] OR;  Service: Pain Management;  Laterality: Right;  L3, 4, 5    INSERTION OF INFERIOR VENA CAVAL FILTER N/A 9/13/2023    Procedure: Insertion, Filter, Inferior Vena Cava;  Surgeon: Oren Verdin MD;  Location: [facility] CATH/EP LAB;  Service: General;  Laterality: N/A;    INSERTION OF TUNNELED CENTRAL VENOUS CATHETER (CVC) WITH SUBCUTANEOUS PORT Right 11/15/2023    Procedure: IYDMBODSI-MIAG-A-CATH;  Surgeon: Jim Chavez MD;  Location: [facility] OR;  Service: General;  Laterality: Right;    JOINT REPLACEMENT      bilateral    RADIOFREQUENCY ABLATION OF LUMBAR MEDIAL BRANCH NERVE AT SINGLE LEVEL Right 07/24/2018    Procedure: RADIOFREQUENCY ABLATION, NERVE, MEDIAL BRANCH, LUMBAR, 1 LEVEL;  Surgeon: Parish Gaitan MD;  Location: [facility] OR;  Service: Pain Management;  Laterality: Right;  L3,4,5 - Burned at 80 degrees C. for 75 seconds x 2 each site    ROBOT-ASSISTED COLECTOMY N/A 9/29/2023    Procedure: ROBOTIC COLECTOMY;  Surgeon: Jim Chavez MD;  Location: [facility];  Service: General;  Laterality: N/A;    SHOULDER ARTHROSCOPY Right 01/12/2017    Reverse     TONSILLECTOMY      TONSILLECTOMY, ADENOIDECTOMY, BILATERAL MYRINGOTOMY AND TUBES      TOTAL KNEE ARTHROPLASTY Bilateral 08/1998    total replacements    TUMOR REMOVAL Left     left foot as a child       Family History:   Family History   Problem Relation Name Age of Onset    Hypertension  "Mother      Kidney disease Mother      Cataracts Father      Cataracts Sister      Cancer Sister          breast    No Known Problems Brother      Cancer Sister          breast    Arthritis Sister      Glaucoma Neg Hx      Amblyopia Neg Hx      Blindness Neg Hx      Macular degeneration Neg Hx      Retinal detachment Neg Hx      Strabismus Neg Hx      Stroke Neg Hx      Thyroid disease Neg Hx         Social History:  reports that she quit smoking about 63 years ago. Her smoking use included cigarettes. She started smoking about 64 years ago. She has a 0.5 pack-year smoking history. She has never used smokeless tobacco. She reports that she does not drink alcohol and does not use drugs.    Allergies:  Review of patient's allergies indicates:   Allergen Reactions    Aspirin      Other reaction(s): Stomach upset    Aspirin Other (See Comments)     Other reaction(s): Stomach upset    Gabapentin Other (See Comments)     Pt states she felt "funny" when she took the medication. Especially at the higher dose.    Mobic [meloxicam] Swelling     Edema and elevated blood pressure     Oxaliplatin Other (See Comments)     Other reaction(s): Joint pain  Other reaction(s): Itching  Other reaction(s): Hives  Other reaction(s): Joint pain  Other reaction(s): Itching  Other reaction(s): Hives    Pregabalin Other (See Comments)     Side effects  Side effects       Medications:  Current Outpatient Medications   Medication Sig Dispense Refill    acetaminophen (TYLENOL) 650 MG TbSR Take 650 mg by mouth every 8 (eight) hours.      albuterol (PROVENTIL/VENTOLIN HFA) 90 mcg/actuation inhaler INHALE 2 PUFFS INTO THE LUNGS EVERY 6 HOURS AS NEEDED FOR WHEEZING OR SHORTNESS OF BREATH 8.5 g 1    alendronate (FOSAMAX) 70 MG tablet TAKE 1 TABLET(70 MG) BY MOUTH EVERY 7 DAYS 12 tablet 3    apixaban (ELIQUIS) 5 mg Tab Take 1 tablet (5 mg total) by mouth 2 (two) times daily. 180 tablet 3    cyclobenzaprine (FLEXERIL) 5 MG tablet Take 1 tablet (5 mg " total) by mouth 3 (three) times daily as needed for Muscle spasms. 90 tablet 0    ergocalciferol, vitamin D2, (VITAMIN D ORAL) Take 2,000 mg by mouth once daily.      ferrous sulfate (FEOSOL) Tab tablet Take 1 tablet by mouth daily with breakfast.      fluticasone propionate (FLONASE) 50 mcg/actuation nasal spray 1 spray (50 mcg total) by Each Nostril route once daily. 16 g 0    furosemide (LASIX) 20 MG tablet Take 1 tablet (20 mg total) by mouth daily as needed (edema). 90 tablet 3    HYDROcodone-acetaminophen (NORCO) 5-325 mg per tablet Take 1 tablet by mouth every 6 (six) hours as needed for Pain. 20 tablet 0    hydrOXYzine HCL (ATARAX) 25 MG tablet Take 1 tablet (25 mg total) by mouth 3 (three) times daily as needed for Anxiety (insomnia). 30 tablet PRN    levocetirizine (XYZAL) 5 MG tablet TAKE 1 TABLET(5 MG) BY MOUTH EVERY NIGHT AS NEEDED FOR ALLERGIES 90 tablet 3    levothyroxine (SYNTHROID) 75 MCG tablet Take 1 tablet (75 mcg total) by mouth once daily. 90 tablet 3    lisinopriL (PRINIVIL,ZESTRIL) 30 MG tablet TAKE 1 TABLET BY MOUTH EVERY DAY 90 tablet 3    lisinopriL (PRINIVIL,ZESTRIL) 30 MG tablet Take 1 tablet (30 mg total) by mouth once daily. 90 tablet 3    multivitamin capsule Take 1 capsule by mouth once daily.      omeprazole (PRILOSEC) 40 MG capsule TAKE 1 CAPSULE(40 MG) BY MOUTH TWICE DAILY BEFORE MEALS 180 capsule PRN    ondansetron (ZOFRAN-ODT) 8 MG TbDL Take 1 tablet (8 mg total) by mouth every 8 (eight) hours as needed (nausea). 40 tablet 3    promethazine (PHENERGAN) 12.5 MG Tab (Take 1-2 tabs every 6 hours as needed for nausea persistent despite zofran) 40 tablet 3    promethazine-dextromethorphan (PROMETHAZINE-DM) 6.25-15 mg/5 mL Syrp Take 5 mLs by mouth nightly as needed (cough). 118 mL 0    silver sulfADIAZINE 1% (SILVADENE) 1 % cream Apply topically once daily. 85 g PRN    traMADoL (ULTRAM) 50 mg tablet Take 1 tablet (50 mg total) by mouth every 8 (eight) hours as needed for Pain. 21 each  "0    triamcinolone acetonide 0.1% (KENALOG) 0.1 % cream APPLY TOPICALLY TO THE AFFECTED AREA TWICE DAILY 60 g 0    mupirocin (BACTROBAN) 2 % ointment Apply topically 3 (three) times daily. (Patient not taking: Reported on 9/12/2024) 30 g 0    nystatin (MYCOSTATIN) 100,000 unit/mL suspension Take 4 mLs by mouth 4 (four) times daily with meals and nightly. (Patient not taking: Reported on 9/12/2024)      ramelteon (ROZEREM) 8 mg tablet Take 1 tablet (8 mg total) by mouth every evening. 30 tablet 1     No current facility-administered medications for this visit.     Facility-Administered Medications Ordered in Other Visits   Medication Dose Route Frequency Provider Last Rate Last Admin    0.9%  NaCl infusion   Intravenous Once Oren Verdin MD           Review of Systems   Constitutional:  Negative for chills, fatigue, fever and unexpected weight change.   Respiratory:  Positive for shortness of breath (with activity). Negative for cough.    Cardiovascular:  Negative for chest pain and palpitations.   Gastrointestinal:  Negative for abdominal pain, constipation, diarrhea, nausea and vomiting.   Skin:  Negative for rash.   Neurological:  Negative for headaches.   Hematological:  Negative for adenopathy. Does not bruise/bleed easily.   Psychiatric/Behavioral:  Positive for sleep disturbance.        ECOG Performance Status: 2   Objective:      Vitals:   Vitals:    09/24/24 1108   BP: 126/60   BP Location: Right arm   Patient Position: Sitting   BP Method: Large (Manual)   Pulse: 70   Resp: (!) 22   Temp: 96.5 °F (35.8 °C)   TempSrc: Temporal   SpO2: 98%   Weight: 105.4 kg (232 lb 5.8 oz)   Height: 5' 2.5" (1.588 m)             Physical Exam  Constitutional:       General: She is not in acute distress.     Appearance: She is well-developed. She is not diaphoretic.   HENT:      Head: Normocephalic and atraumatic.   Cardiovascular:      Rate and Rhythm: Normal rate and regular rhythm.      Heart sounds: Normal heart " sounds. No murmur heard.     No friction rub. No gallop.   Pulmonary:      Effort: Pulmonary effort is normal. No respiratory distress.      Breath sounds: No wheezing or rales.   Chest:      Chest wall: No tenderness.   Abdominal:      General: Bowel sounds are normal. There is no distension.      Palpations: Abdomen is soft. There is no mass.      Tenderness: There is no abdominal tenderness. There is no guarding or rebound.   Lymphadenopathy:      Cervical: No cervical adenopathy.      Upper Body:      Right upper body: No supraclavicular or axillary adenopathy.      Left upper body: No supraclavicular or axillary adenopathy.   Skin:     Findings: No erythema or rash.   Neurological:      Mental Status: She is alert and oriented to person, place, and time.   Psychiatric:         Behavior: Behavior normal.         Laboratory Data:  Lab Visit on 09/23/2024   Component Date Value Ref Range Status    WBC 09/23/2024 5.68  3.90 - 12.70 K/uL Final    RBC 09/23/2024 3.61 (L)  4.00 - 5.40 M/uL Final    Hemoglobin 09/23/2024 11.6 (L)  12.0 - 16.0 g/dL Final    Hematocrit 09/23/2024 34.7 (L)  37.0 - 48.5 % Final    MCV 09/23/2024 96  82 - 98 fL Final    MCH 09/23/2024 32.1 (H)  27.0 - 31.0 pg Final    MCHC 09/23/2024 33.4  32.0 - 36.0 g/dL Final    RDW 09/23/2024 13.0  11.5 - 14.5 % Final    Platelets 09/23/2024 185  150 - 450 K/uL Final    MPV 09/23/2024 9.4  9.2 - 12.9 fL Final    Immature Granulocytes 09/23/2024 0.9 (H)  0.0 - 0.5 % Final    Gran # (ANC) 09/23/2024 3.8  1.8 - 7.7 K/uL Final    Immature Grans (Abs) 09/23/2024 0.05 (H)  0.00 - 0.04 K/uL Final    Comment: Mild elevation in immature granulocytes is non specific and   can be seen in a variety of conditions including stress response,   acute inflammation, trauma and pregnancy. Correlation with other   laboratory and clinical findings is essential.      Lymph # 09/23/2024 1.1  1.0 - 4.8 K/uL Final    Mono # 09/23/2024 0.6  0.3 - 1.0 K/uL Final    Eos #  09/23/2024 0.1  0.0 - 0.5 K/uL Final    Baso # 09/23/2024 0.02  0.00 - 0.20 K/uL Final    nRBC 09/23/2024 0  0 /100 WBC Final    Gran % 09/23/2024 67.6  38.0 - 73.0 % Final    Lymph % 09/23/2024 19.0  18.0 - 48.0 % Final    Mono % 09/23/2024 10.2  4.0 - 15.0 % Final    Eosinophil % 09/23/2024 1.9  0.0 - 8.0 % Final    Basophil % 09/23/2024 0.4  0.0 - 1.9 % Final    Differential Method 09/23/2024 Automated   Final    Sodium 09/23/2024 143  136 - 145 mmol/L Final    Potassium 09/23/2024 4.8  3.5 - 5.1 mmol/L Final    Chloride 09/23/2024 112 (H)  95 - 110 mmol/L Final    CO2 09/23/2024 21 (L)  23 - 29 mmol/L Final    Glucose 09/23/2024 87  70 - 110 mg/dL Final    BUN 09/23/2024 15  8 - 23 mg/dL Final    Creatinine 09/23/2024 1.0  0.5 - 1.4 mg/dL Final    Calcium 09/23/2024 9.1  8.7 - 10.5 mg/dL Final    Total Protein 09/23/2024 6.6  6.0 - 8.4 g/dL Final    Albumin 09/23/2024 3.6  3.5 - 5.2 g/dL Final    Total Bilirubin 09/23/2024 1.0  0.1 - 1.0 mg/dL Final    Comment: For infants and newborns, interpretation of results should be based  on gestational age, weight and in agreement with clinical  observations.    Premature Infant recommended reference ranges:  Up to 24 hours.............<8.0 mg/dL  Up to 48 hours............<12.0 mg/dL  3-5 days..................<15.0 mg/dL  6-29 days.................<15.0 mg/dL      Alkaline Phosphatase 09/23/2024 49 (L)  55 - 135 U/L Final    AST 09/23/2024 12  10 - 40 U/L Final    ALT 09/23/2024 12  10 - 44 U/L Final    eGFR 09/23/2024 56.2 (A)  >60 mL/min/1.73 m^2 Final    Anion Gap 09/23/2024 10  8 - 16 mmol/L Final    CEA 09/23/2024 1.7  0.0 - 5.0 ng/mL Final    Comment: CEA Normal Range:  Non-Smokers: 0-3.0 ng/mL  Smokers:     0-5.0 ng/mL    The testing method is a chemiluminescent microparticle immunoassay   manufactured by [employer] and performed on the    or   [employer] system. Values obtained with different assay manufacturers   for   methods may be  different and cannot be used interchangeably.           Imaging:     CT C/A/P 6/17/254  Base of neck/thoracic soft tissues: No significant abnormality. Right anterior chest wall Port-A-Cath with tip terminating in the superior vena cava.     Thoracic aorta: Left-sided aortic arch. No significant atherosclerosis or aneurysm.     Heart: Normal in size. No pericardial effusion. Stable fusiform dilatation of the main pulmonary artery, measuring up to 4.3 cm, nonspecific but can be seen in setting of pulmonary arterial hypertension.     Theresa/Mediastinum: No pathologically enlarged lymph nodes.     Lungs/Airways: Airways are patent. Multiple bilateral subcentimeter pulmonary nodules are again demonstrated, the majority of which have not significantly changed in size or appearance as compared to the prior exam on 02/23/2024. The previously demonstrated new 1.2 cm left perihilar/perifissural nodule and adjacent ground-glass opacification in the superior segment the left lower lobe appears to have resolved in the interim. No discrete new or enlarging pulmonary nodule. The largest nodule in the right lung in the anterior right upper lobe measures approximately 0.6 cm (series 6, image 186). The largest nodule in the left lung at the apex measures 6 mm (series 6, image 76). Stable irregular curvilinear opacity in the lateral segment of the right middle lobe. No new focal consolidation, pleural effusion or pneumothorax.     Esophagus: Small hiatal hernia.     Liver: Normal in size and attenuation. No focal hepatic lesions.     Gallbladder: Gallbladder again appears distended and contains a single large gallstone. No gallbladder wall thickening or pericholecystic inflammatory changes.     Bile Ducts: No evidence of dilated ducts.     Pancreas: No definite mass or peripancreatic fat stranding.     Spleen: Within normal limits.     Adrenals: No significant abnormality.     Retroperitoneum: No significant adenopathy.     Kidneys/  Ureters: Exophytic, heterogeneously enhancing 2.1 cm solid lesion with foci of macroscopic fat in the lower pole of the left kidney, without significant interval change as compared to the prior exam. No hydronephrosis or nephrolithiasis. No ureteral dilatation.     Bladder: No evidence of wall thickening.     Reproductive organs: Unremarkable.     GI tract/Mesentery: Postoperative changes of prior partial colectomy and ileocolic anastomosis again demonstrated. No evidence of bowel obstruction or inflammation. No evidence of appendicitis.     Peritoneal Space: No ascites. No free air. No pathologically enlarged lymph nodes.     Soft tissues: Postsurgical changes of the ventral abdominal wall with supraumbilical and periumbilical fat containing hernias, with slight interval increase in size of the periumbilical hernia now measuring 3.6 x 6.6 cm in AP by TV dimensions. Moderate increase in size of an infraumbilical hernia containing mesenteric fat and nondilated loops of small bowel, measuring approximately 4.0 x 8.2 cm in maximal AP by TV dimensions.     Vasculature: Mild scattered calcific atherosclerosis. No aortic aneurysm. IVC filter in place.     Bones: Postoperative changes of right shoulder reverse arthroplasty partially imaged with extensive surrounding metallic beam hardening artifact. Marked degenerative changes of the partially imaged left glenohumeral joint with calcified loose body formation. Subacute healing left anterolateral third, 4th, and 5th rib fracture deformities again noted. Moderate to severe multilevel degenerative changes of the spine. No acute fracture or aggressive-appearing lytic or blastic lesion.       Assessment:       1. Malignant neoplasm of splenic flexure    2. Insomnia, unspecified type    3. Nutritional anemia, unspecified    4. SOB (shortness of breath)    5. Pulmonary nodules    6. Thrombophilia    7. Renal mass, left    8. Iron deficiency anemia, unspecified iron deficiency  anemia type                   Plan:     Colon Cancer - Pt with h/o colon cancer s/p resection in 2007 followed by adjuvant FOLFOX for 12 cycles  -Pt recently with resection of transverse colon and splenic flexure 9/29/23 showing path showing invasive moderately differentiated adenocarcinoma with mucinous features, 33 benign lymph nodes, positive lymphovascular invasion, positive perineural invasion pT3N0  -Tumor shows intact expression of MLH1, PMS2, MSH2, MSH6  -Patient was positive for B Celio V600E mutation  -Pt has high risk stage II colon cancer given positive LVI and PNI  -PT is a candidate for treatment with 6 months of 5-FU or Capecitabine  -no clear evidence of metastatic disease on PET-CT 11/08/2023   -Will proceed with 6 months of 5 fluorouracil  -Pharmacogenomic panel on 11/03/23 showed normal DPYD metabolizer but 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  -Stable on repeat scans  -Will monitor in 3 months    Pulmonary Nodules - seen on Ct chest 8/25/23  -no avid nodules on PET/CT 11/08/23  -Stable on repeat CT 6/17/24  -Will monitor in 3 months and have pt see pulmonary    DVT - PT with bilateral nonocclusive DVT demonstrated on the SFV to the popliteal veins seen on US 9/01/23  -Pt on Eliquis  -Will monitor    Iron Deficiency  Anemia - ferritin 25ng/mL on 12/20/23  -Hemoglobin 11.1g/dL on 6/17/24  -Pt received injectafer 1/24/24  -Will repeat iron studies today  -Will also check B12 and folate  -Will monitor    Insomnia - improved with ramelteon prior  -Will refill    Route Chart for Scheduling    Med Onc Chart Routing      Follow up with physician 3 months. Pt needs blood work today with ferritin, iron/TIBC, B12 and folate.  Pt needs appt with pulmonary for SOB and pulmonary nodules.  Pt needs a CBC, CMP, CEA and cT C/A/P in 3 months with a return appt.   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, cN0, cM0 noted on 11/3/2023   Line of treatment: Adjuvant  Treatment Goal: Curative     Upcoming Treatment Dates - OP COLORECTAL FLUOROURACIL LEUCOVORIN Q2W (MOD DE GRAMONT)    6/3/2024       Chemotherapy       leucovorin calcium in dextrose 5 % (D5W) 250 mL infusion       fluorouraciL injection       fluorouracil chemo infusion       Antiemetics       ondansetron injection 8 mg  6/17/2024       Chemotherapy       leucovorin calcium in dextrose 5 % (D5W) 250 mL infusion       fluorouraciL injection       fluorouracil chemo infusion       Antiemetics       ondansetron injection 8 mg  7/1/2024       Chemotherapy       leucovorin calcium in dextrose 5 % (D5W) 250 mL infusion       fluorouraciL injection       fluorouracil chemo infusion       Antiemetics       ondansetron injection 8 mg  7/15/2024       Chemotherapy       leucovorin calcium in dextrose 5 % (D5W) 250 mL infusion       fluorouraciL injection       fluorouracil chemo infusion       Antiemetics       ondansetron injection 8 mg    Supportive Plan Information  OP FERRIC CARBOXYMALTOSE Q2W Mahesh SCHILLING  MD Nisha   Associated Diagnosis: Iron deficiency anemia   noted on 12/20/2023   Line of treatment: Supportive Care   Treatment goal: Supportive     Upcoming Treatment Dates - OP FERRIC CARBOXYMALTOSE Q2W    1/25/2024       Supportive Care       ferric carboxymaltose (INJECTAFER) 750 mg in sodium chloride 0.9% 265 mL IVPB (ready to mix)      Mahesh Cameron MD  [facility]  Hematology and Oncology  [facility] [address] [place] [address]   O: [phone]  F: [phone]

## 2024-09-26 ENCOUNTER — TELEPHONE (OUTPATIENT)
Dept: HEMATOLOGY/ONCOLOGY | Facility: CLINIC | Age: 84
End: 2024-09-26
Payer: MEDICARE

## 2024-09-26 DIAGNOSIS — E53.8 B12 DEFICIENCY: Primary | ICD-10-CM

## 2024-09-26 LAB — VIT B12 SERPL-MCNC: 172 NG/L (ref 180–914)

## 2024-09-26 RX ORDER — MECOBALAMIN 1000 MCG
2000 TABLET,CHEWABLE ORAL DAILY
Qty: 60 TABLET | Refills: 6 | Status: SHIPPED | OUTPATIENT
Start: 2024-09-26

## 2024-09-26 NOTE — TELEPHONE ENCOUNTER
I called the patient and left her a message to call me back at [phone]     Mahesh Cameron MD  [facility]  Hematology and Oncology  [facility]   [address] [place] [address]   O: [phone]  F: [phone]

## 2024-09-28 LAB — METHYLMALONATE SERPL-SCNC: 0.21 NMOL/ML

## 2024-12-09 ENCOUNTER — OFFICE VISIT (OUTPATIENT)
Dept: FAMILY MEDICINE | Facility: CLINIC | Age: 84
End: 2024-12-09
Payer: MEDICARE

## 2024-12-09 VITALS
SYSTOLIC BLOOD PRESSURE: 126 MMHG | TEMPERATURE: 98 F | HEART RATE: 63 BPM | BODY MASS INDEX: 42.81 KG/M2 | OXYGEN SATURATION: 99 % | WEIGHT: 241.63 LBS | DIASTOLIC BLOOD PRESSURE: 74 MMHG | HEIGHT: 63 IN

## 2024-12-09 DIAGNOSIS — J98.4 CALCIFIED GRANULOMA OF LUNG: ICD-10-CM

## 2024-12-09 DIAGNOSIS — G89.29 CHRONIC BILATERAL LOW BACK PAIN WITHOUT SCIATICA: ICD-10-CM

## 2024-12-09 DIAGNOSIS — M54.50 CHRONIC BILATERAL LOW BACK PAIN WITHOUT SCIATICA: ICD-10-CM

## 2024-12-09 DIAGNOSIS — F43.21 GRIEF REACTION: ICD-10-CM

## 2024-12-09 DIAGNOSIS — M48.061 SPINAL STENOSIS OF LUMBAR REGION, UNSPECIFIED WHETHER NEUROGENIC CLAUDICATION PRESENT: ICD-10-CM

## 2024-12-09 DIAGNOSIS — I70.0 ATHEROSCLEROSIS OF AORTA: ICD-10-CM

## 2024-12-09 DIAGNOSIS — Z00.00 ENCOUNTER FOR PREVENTIVE HEALTH EXAMINATION: Primary | ICD-10-CM

## 2024-12-09 DIAGNOSIS — Z74.09 OTHER REDUCED MOBILITY: ICD-10-CM

## 2024-12-09 DIAGNOSIS — E66.813 CLASS 3 SEVERE OBESITY DUE TO EXCESS CALORIES WITH SERIOUS COMORBIDITY AND BODY MASS INDEX (BMI) OF 40.0 TO 44.9 IN ADULT: ICD-10-CM

## 2024-12-09 DIAGNOSIS — Z99.89 DEPENDENCE ON OTHER ENABLING MACHINES AND DEVICES: ICD-10-CM

## 2024-12-09 DIAGNOSIS — E66.01 CLASS 3 SEVERE OBESITY DUE TO EXCESS CALORIES WITH SERIOUS COMORBIDITY AND BODY MASS INDEX (BMI) OF 40.0 TO 44.9 IN ADULT: ICD-10-CM

## 2024-12-09 DIAGNOSIS — M54.50 ACUTE BILATERAL LOW BACK PAIN WITHOUT SCIATICA: ICD-10-CM

## 2024-12-09 PROCEDURE — 1159F MED LIST DOCD IN RCRD: CPT | Mod: CPTII,S$GLB,, | Performed by: NURSE PRACTITIONER

## 2024-12-09 PROCEDURE — 3288F FALL RISK ASSESSMENT DOCD: CPT | Mod: CPTII,S$GLB,, | Performed by: NURSE PRACTITIONER

## 2024-12-09 PROCEDURE — 99999 PR PBB SHADOW E&M-EST. PATIENT-LVL V: CPT | Mod: PBBFAC,,, | Performed by: NURSE PRACTITIONER

## 2024-12-09 PROCEDURE — 1101F PT FALLS ASSESS-DOCD LE1/YR: CPT | Mod: CPTII,S$GLB,, | Performed by: NURSE PRACTITIONER

## 2024-12-09 PROCEDURE — G0439 PPPS, SUBSEQ VISIT: HCPCS | Mod: S$GLB,,, | Performed by: NURSE PRACTITIONER

## 2024-12-09 PROCEDURE — 3078F DIAST BP <80 MM HG: CPT | Mod: CPTII,S$GLB,, | Performed by: NURSE PRACTITIONER

## 2024-12-09 PROCEDURE — 1126F AMNT PAIN NOTED NONE PRSNT: CPT | Mod: CPTII,S$GLB,, | Performed by: NURSE PRACTITIONER

## 2024-12-09 PROCEDURE — 3074F SYST BP LT 130 MM HG: CPT | Mod: CPTII,S$GLB,, | Performed by: NURSE PRACTITIONER

## 2024-12-09 PROCEDURE — 1158F ADVNC CARE PLAN TLK DOCD: CPT | Mod: CPTII,S$GLB,, | Performed by: NURSE PRACTITIONER

## 2024-12-09 RX ORDER — TRAMADOL HYDROCHLORIDE 50 MG/1
50 TABLET ORAL EVERY 8 HOURS PRN
Qty: 21 EACH | Refills: 0 | Status: SHIPPED | OUTPATIENT
Start: 2024-12-09

## 2024-12-09 RX ORDER — CYCLOBENZAPRINE HCL 5 MG
5 TABLET ORAL 3 TIMES DAILY PRN
Qty: 90 TABLET | Refills: 0 | Status: SHIPPED | OUTPATIENT
Start: 2024-12-09

## 2024-12-09 RX ORDER — HYDROXYZINE HYDROCHLORIDE 25 MG/1
25 TABLET, FILM COATED ORAL 3 TIMES DAILY PRN
Qty: 30 TABLET | Status: SHIPPED | OUTPATIENT
Start: 2024-12-09

## 2024-12-09 RX ORDER — FUROSEMIDE 20 MG/1
20 TABLET ORAL DAILY PRN
Qty: 90 TABLET | Refills: 3 | Status: SHIPPED | OUTPATIENT
Start: 2024-12-09

## 2024-12-09 NOTE — PROGRESS NOTES
"  Danna Sorensen presented for a  Medicare AWV and comprehensive Health Risk Assessment today. The following components were reviewed and updated:    Medical history  Family History  Social history  Allergies and Current Medications  Health Risk Assessment  Health Maintenance  Care Team         ** See Completed Assessments for Annual Wellness Visit within the encounter summary.**         The following assessments were completed:  Living Situation  CAGE  Depression Screening  Timed Get Up and Go  Whisper Test  Cognitive Function Screening  Nutrition Screening  ADL Screening  PAQ Screening    Clock in media   Opioid documentation:      Patient does have a current opioid prescription.      Patient accepted further discussion regarding opioid medication use.      Patient is currently taking tramadol narcotic for back and generalized pain.        Pain level today is 0/10.       In addition to narcotic pain medications, patient is also using acetaminophen for pain control.       Patient is not followed by a specialist currently for their pain and will not be referred today.       Patient's opioid risk potential based on ORT-OUD tool:       Saud each box that applies   No   Yes     Family history of substance abuse   Alcohol [x] []   Illegal drugs [x] []   Rx drugs [x] []     Personal history of substance abuse   Alcohol [x] []   Illegal drugs [x] []   Rx drugs [x] []     Age between 16-45 years   [x]   []     Patient with ADD, OCD, Bipolar disorder, schizoprenia   [x]   []     Patient with depression   [x]   []                         Scoring total                                                        0         Non-opioid treatment options have been discussed today and added to the patient's after visit summary.        Vitals:    12/09/24 1347   BP: 126/74   Pulse: 63   Temp: 98.2 °F (36.8 °C)   TempSrc: Oral   SpO2: 99%   Weight: 109.6 kg (241 lb 10 oz)   Height: 5' 2.5" (1.588 m)     Body mass index is 43.49 " "kg/m².  Physical Exam  Constitutional:       Appearance: She is well-developed.   HENT:      Head: Normocephalic and atraumatic.      Nose: Nose normal.   Eyes:      General: Lids are normal.      Conjunctiva/sclera: Conjunctivae normal.   Cardiovascular:      Rate and Rhythm: Normal rate.   Pulmonary:      Effort: Pulmonary effort is normal.   Neurological:      Mental Status: She is alert and oriented to person, place, and time.   Psychiatric:         Speech: Speech normal.         Behavior: Behavior normal.               Diagnoses and health risks identified today and associated recommendations/orders:    1. Encounter for preventive health examination  Discussed health maintenance guidelines appropriate for age.        2. Class 3 severe obesity due to excess calories with serious comorbidity and body mass index (BMI) of 40.0 to 44.9 in adult  Uncontrolled, Counseled patient on his ideal body weight, health consequences of being obese and current recommendations including weekly exercise and a heart healthy diet.  Current BMI is:Estimated body mass index is 43.49 kg/m² as calculated from the following:    Height as of this encounter: 5' 2.5" (1.588 m).    Weight as of this encounter: 109.6 kg (241 lb 10 oz)..  Patient is aware that ideal BMI < 25 or Weight in (lb) to have BMI = 25: 138.6.      3. Calcified granuloma of lung  Stable, continue to montior      4. Atherosclerosis of aorta  Stable, continue to monitor  Followed by pcp     5. Dependence on other enabling machines and devices  Stable continue use as needed     6. Other reduced mobility  Stable, continue to monitor      Provided Danna with a 5-10 year written screening schedule and personal prevention plan. Recommendations were developed using the USPSTF age appropriate recommendations. Education, counseling, and referrals were provided as needed. After Visit Summary printed and given to patient which includes a list of additional screenings\tests " needed.    Follow up for One year for Annual Wellness Visit.    Stefania Castro, NP  I offered to discuss advanced care planning, including how to pick a person who would make decisions for you if you were unable to make them for yourself, called a health care power of , and what kind of decisions you might make such as use of life sustaining treatments such as ventilators and tube feeding when faced with a life limiting illness recorded on a living will that they will need to know. (How you want to be cared for as you near the end of your natural life)     X  Patient has advanced directives written and agrees to provide copies to the institution.

## 2024-12-09 NOTE — TELEPHONE ENCOUNTER
Care Due:                  Date            Visit Type   Department     Provider  --------------------------------------------------------------------------------                                EP -                              PRIMARY      [facility]  Last Visit: 07-      CARE ([facility])   LISSA Kim                              EP -                              PRIMARY      [facility]  Next Visit: 01-      CARE ([facility])   MEDICINE       Claudia Kim                                                            Last  Test          Frequency    Reason                     Performed    Due Date  --------------------------------------------------------------------------------    Mg Level....  12 months..  alendronate..............  08- 08-    Phosphate...  12 months..  alendronate..............  08- 08-    [employer] Embedded Care Due Messages. Reference number: [id number].   12/09/2024 3:47:01 PM CST

## 2024-12-09 NOTE — PATIENT INSTRUCTIONS
Counseling and Referral of Other Preventative  (Italic type indicates deductible and co-insurance are waived)    Patient Name: Danna Sorensen  Today's Date: 12/9/2024    Health Maintenance       Date Due Completion Date    COVID-19 Vaccine (1) Never done ---    RSV Vaccine (Age 60+ and Pregnant patients) (1 - 1-dose 75+ series) Never done ---    Shingles Vaccine (1 of 2) 06/08/2017 4/13/2017    DEXA Scan 05/11/2025 5/11/2022    TETANUS VACCINE 06/20/2026 6/20/2016    Lipid Panel 11/17/2028 11/17/2023        No orders of the defined types were placed in this encounter.    The following information is provided to all patients.  This information is to help you find resources for any of the problems found today that may be affecting your health:                  Living healthy guide: www.[facility].[place].gov      Understanding Diabetes: www.diabetes.org      Eating healthy: www.cdc.gov/healthyweight      CDC home safety checklist: www.cdc.gov/steadi/patient.html      Agency on Aging: www.goea.[place].[facility]      Alcoholics anonymous (AA): www.aa.org      Physical Activity: www.dread.nih.gov/vf0vxtm      Tobacco use: www.quitwithusla.org

## 2024-12-12 PROBLEM — E66.01 CLASS 3 SEVERE OBESITY DUE TO EXCESS CALORIES WITH SERIOUS COMORBIDITY AND BODY MASS INDEX (BMI) OF 40.0 TO 44.9 IN ADULT: Status: ACTIVE | Noted: 2024-12-12

## 2024-12-12 PROBLEM — E66.813 CLASS 3 SEVERE OBESITY DUE TO EXCESS CALORIES WITH SERIOUS COMORBIDITY AND BODY MASS INDEX (BMI) OF 40.0 TO 44.9 IN ADULT: Status: ACTIVE | Noted: 2024-12-12

## 2024-12-27 DIAGNOSIS — I82.4Y3 DEEP VEIN THROMBOSIS (DVT) OF PROXIMAL VEIN OF BOTH LOWER EXTREMITIES, UNSPECIFIED CHRONICITY: ICD-10-CM

## 2025-01-01 RX ORDER — APIXABAN 5 MG/1
5 TABLET, FILM COATED ORAL 2 TIMES DAILY
Qty: 180 TABLET | Refills: 3 | Status: SHIPPED | OUTPATIENT
Start: 2025-01-01

## 2025-01-02 ENCOUNTER — TELEPHONE (OUTPATIENT)
Dept: FAMILY MEDICINE | Facility: CLINIC | Age: 85
End: 2025-01-02
Payer: MEDICARE

## 2025-01-02 NOTE — TELEPHONE ENCOUNTER
Spoke with pt. Informed pt her Eliquis was sent in to pharmacy yesterday 1/1/25. Pt ONI.     Pt stated she wanted a rx for cough medication. Informed pt she needed to be seen by a provider. Pt ONI. Appt scheduled for 1/3/25.

## 2025-01-02 NOTE — TELEPHONE ENCOUNTER
----- Message from Aaron sent at 1/2/2025 11:35 AM CST -----  Contact: Self  Type: Needs Medical Advice  Who Called:  PT    Best Call Back Number: [phone]   Additional Information: PT said she has been calling since Friday regarding cough medicine being sent in for her for congestion.  Also, she said [facility] said they have not received her ELIQUIS 5 mg Tab refill.  Please call to advise.

## 2025-01-03 ENCOUNTER — HOSPITAL ENCOUNTER (OUTPATIENT)
Dept: RADIOLOGY | Facility: HOSPITAL | Age: 85
Discharge: HOME | End: 2025-01-03
Attending: INTERNAL MEDICINE
Payer: MEDICARE

## 2025-01-03 ENCOUNTER — OFFICE VISIT (OUTPATIENT)
Dept: FAMILY MEDICINE | Facility: CLINIC | Age: 85
End: 2025-01-03
Payer: MEDICARE

## 2025-01-03 ENCOUNTER — LAB VISIT (OUTPATIENT)
Dept: LAB | Facility: HOSPITAL | Age: 85
End: 2025-01-03
Payer: MEDICARE

## 2025-01-03 VITALS
TEMPERATURE: 98 F | WEIGHT: 233.44 LBS | DIASTOLIC BLOOD PRESSURE: 84 MMHG | HEART RATE: 80 BPM | OXYGEN SATURATION: 98 % | BODY MASS INDEX: 41.36 KG/M2 | HEIGHT: 63 IN | SYSTOLIC BLOOD PRESSURE: 128 MMHG

## 2025-01-03 DIAGNOSIS — R05.1 ACUTE COUGH: ICD-10-CM

## 2025-01-03 DIAGNOSIS — J06.9 UPPER RESPIRATORY TRACT INFECTION, UNSPECIFIED TYPE: ICD-10-CM

## 2025-01-03 DIAGNOSIS — R06.2 WHEEZING: Primary | ICD-10-CM

## 2025-01-03 DIAGNOSIS — J45.30 MILD PERSISTENT ASTHMA, UNSPECIFIED WHETHER COMPLICATED: ICD-10-CM

## 2025-01-03 DIAGNOSIS — C18.5 MALIGNANT NEOPLASM OF SPLENIC FLEXURE: ICD-10-CM

## 2025-01-03 LAB
ALBUMIN SERPL BCP-MCNC: 3.8 G/DL (ref 3.5–5.2)
ALP SERPL-CCNC: 75 U/L (ref 40–150)
ALT SERPL W/O P-5'-P-CCNC: 14 U/L (ref 10–44)
ANION GAP SERPL CALC-SCNC: 12 MMOL/L (ref 8–16)
AST SERPL-CCNC: 14 U/L (ref 10–40)
BASOPHILS # BLD AUTO: 0.03 K/UL (ref 0–0.2)
BASOPHILS NFR BLD: 0.4 % (ref 0–1.9)
BILIRUB SERPL-MCNC: 0.8 MG/DL (ref 0.1–1)
BUN SERPL-MCNC: 13 MG/DL (ref 8–23)
CALCIUM SERPL-MCNC: 9.6 MG/DL (ref 8.7–10.5)
CEA SERPL-MCNC: 1.7 NG/ML (ref 0–5)
CHLORIDE SERPL-SCNC: 107 MMOL/L (ref 95–110)
CO2 SERPL-SCNC: 22 MMOL/L (ref 23–29)
CREAT SERPL-MCNC: 1.1 MG/DL (ref 0.5–1.4)
CTP QC/QA: YES
CTP QC/QA: YES
DIFFERENTIAL METHOD BLD: ABNORMAL
EOSINOPHIL # BLD AUTO: 0.1 K/UL (ref 0–0.5)
EOSINOPHIL NFR BLD: 1.8 % (ref 0–8)
ERYTHROCYTE [DISTWIDTH] IN BLOOD BY AUTOMATED COUNT: 12.5 % (ref 11.5–14.5)
EST. GFR  (NO RACE VARIABLE): 50 ML/MIN/1.73 M^2
GLUCOSE SERPL-MCNC: 102 MG/DL (ref 70–110)
HCT VFR BLD AUTO: 36.9 % (ref 37–48.5)
HGB BLD-MCNC: 12.4 G/DL (ref 12–16)
IMM GRANULOCYTES # BLD AUTO: 0.08 K/UL (ref 0–0.04)
IMM GRANULOCYTES NFR BLD AUTO: 1.1 % (ref 0–0.5)
LYMPHOCYTES # BLD AUTO: 1.2 K/UL (ref 1–4.8)
LYMPHOCYTES NFR BLD: 16.1 % (ref 18–48)
MCH RBC QN AUTO: 31.6 PG (ref 27–31)
MCHC RBC AUTO-ENTMCNC: 33.6 G/DL (ref 32–36)
MCV RBC AUTO: 94 FL (ref 82–98)
MONOCYTES # BLD AUTO: 0.6 K/UL (ref 0.3–1)
MONOCYTES NFR BLD: 8.5 % (ref 4–15)
NEUTROPHILS # BLD AUTO: 5.2 K/UL (ref 1.8–7.7)
NEUTROPHILS NFR BLD: 72.1 % (ref 38–73)
NRBC BLD-RTO: 0 /100 WBC
PLATELET # BLD AUTO: 227 K/UL (ref 150–450)
PMV BLD AUTO: 9.3 FL (ref 9.2–12.9)
POC MOLECULAR INFLUENZA A AGN: NEGATIVE
POC MOLECULAR INFLUENZA B AGN: NEGATIVE
POTASSIUM SERPL-SCNC: 4.3 MMOL/L (ref 3.5–5.1)
PROT SERPL-MCNC: 7.5 G/DL (ref 6–8.4)
RBC # BLD AUTO: 3.93 M/UL (ref 4–5.4)
SARS-COV-2 RDRP RESP QL NAA+PROBE: NEGATIVE
SODIUM SERPL-SCNC: 141 MMOL/L (ref 136–145)
WBC # BLD AUTO: 7.16 K/UL (ref 3.9–12.7)

## 2025-01-03 PROCEDURE — A9698 NON-RAD CONTRAST MATERIALNOC: HCPCS | Mod: PO | Performed by: INTERNAL MEDICINE

## 2025-01-03 PROCEDURE — 99999 PR PBB SHADOW E&M-EST. PATIENT-LVL V: CPT | Mod: PBBFAC,,,

## 2025-01-03 PROCEDURE — 82378 CARCINOEMBRYONIC ANTIGEN: CPT | Performed by: INTERNAL MEDICINE

## 2025-01-03 PROCEDURE — 74177 CT ABD & PELVIS W/CONTRAST: CPT | Mod: TC,PO

## 2025-01-03 PROCEDURE — 25500020 PHARM REV CODE 255: Mod: PO | Performed by: INTERNAL MEDICINE

## 2025-01-03 PROCEDURE — 36415 COLL VENOUS BLD VENIPUNCTURE: CPT | Mod: PO | Performed by: INTERNAL MEDICINE

## 2025-01-03 PROCEDURE — 74177 CT ABD & PELVIS W/CONTRAST: CPT | Mod: 26,,, | Performed by: STUDENT IN AN ORGANIZED HEALTH CARE EDUCATION/TRAINING PROGRAM

## 2025-01-03 PROCEDURE — 71260 CT THORAX DX C+: CPT | Mod: 26,,, | Performed by: STUDENT IN AN ORGANIZED HEALTH CARE EDUCATION/TRAINING PROGRAM

## 2025-01-03 PROCEDURE — 85025 COMPLETE CBC W/AUTO DIFF WBC: CPT | Mod: PO | Performed by: INTERNAL MEDICINE

## 2025-01-03 PROCEDURE — 80053 COMPREHEN METABOLIC PANEL: CPT | Mod: PO | Performed by: INTERNAL MEDICINE

## 2025-01-03 RX ORDER — IPRATROPIUM BROMIDE AND ALBUTEROL SULFATE 2.5; .5 MG/3ML; MG/3ML
3 SOLUTION RESPIRATORY (INHALATION) EVERY 6 HOURS PRN
Qty: 75 ML | Refills: 0 | Status: SHIPPED | OUTPATIENT
Start: 2025-01-03 | End: 2026-01-03

## 2025-01-03 RX ORDER — AZELASTINE 1 MG/ML
1 SPRAY, METERED NASAL 2 TIMES DAILY
Qty: 30 ML | Refills: 2 | Status: SHIPPED | OUTPATIENT
Start: 2025-01-03 | End: 2026-01-03

## 2025-01-03 RX ORDER — IPRATROPIUM BROMIDE AND ALBUTEROL SULFATE 2.5; .5 MG/3ML; MG/3ML
3 SOLUTION RESPIRATORY (INHALATION)
Status: COMPLETED | OUTPATIENT
Start: 2025-01-03 | End: 2025-01-03

## 2025-01-03 RX ORDER — PROMETHAZINE HYDROCHLORIDE AND DEXTROMETHORPHAN HYDROBROMIDE 6.25; 15 MG/5ML; MG/5ML
5 SYRUP ORAL NIGHTLY PRN
Qty: 118 ML | Refills: 0 | Status: SHIPPED | OUTPATIENT
Start: 2025-01-03

## 2025-01-03 RX ORDER — AZITHROMYCIN 250 MG/1
TABLET, FILM COATED ORAL
Qty: 6 TABLET | Refills: 0 | Status: SHIPPED | OUTPATIENT
Start: 2025-01-03 | End: 2025-01-08

## 2025-01-03 RX ORDER — BENZONATATE 100 MG/1
100 CAPSULE ORAL 3 TIMES DAILY PRN
Qty: 30 CAPSULE | Refills: 0 | Status: SHIPPED | OUTPATIENT
Start: 2025-01-03 | End: 2025-01-13

## 2025-01-03 RX ORDER — PREDNISONE 20 MG/1
TABLET ORAL
Qty: 16 TABLET | Refills: 0 | Status: SHIPPED | OUTPATIENT
Start: 2025-01-03 | End: 2025-01-11

## 2025-01-03 RX ADMIN — BARIUM SULFATE 900 ML: 20 SUSPENSION ORAL at 10:01

## 2025-01-03 RX ADMIN — IOHEXOL 100 ML: 350 INJECTION, SOLUTION INTRAVENOUS at 10:01

## 2025-01-03 RX ADMIN — IPRATROPIUM BROMIDE AND ALBUTEROL SULFATE 3 ML: 2.5; .5 SOLUTION RESPIRATORY (INHALATION) at 04:01

## 2025-01-03 NOTE — PROGRESS NOTES
"  [facility]     Subjective:       Patient ID:  [id number]     Chief Complaint: upper respiratory symptoms    Danna Sorensen is a 84 y.o. female with a past medical history significant for HTN, hypothyroidism, asthma, osteoporosis, history of colon cancer, and KURT who presents to the clinic for upper respiratory symptoms     Patient states that before Abi she started to experience sore throat, cough, congestion, and runny nose. She has been using OTC robitussin which has provided some relief of her cough. She denies any current fever or chills. She is newly diagnosed with asthma and is currently using albuterol and dulera. She states that she uses both inhalers twice a day. She does report some SOB and wheezing since she has been sick. She explains that her cough is productive with green sputum. She denies any current chest pain, nausea, or vomiting.     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 Especially at the higher dose.    Mobic [meloxicam] Swelling     Edema and elevated blood pressure  "    Oxaliplatin Other (See Comments)     Other reaction(s): Joint pain  Other reaction(s): Itching  Other reaction(s): Hives  Other reaction(s): Joint pain  Other reaction(s): Itching  Other reaction(s): Hives    Pregabalin Other (See Comments)     Side effects  Side effects       Lab Results   Component Value Date    WBC 7.16 01/03/2025    HGB 12.4 01/03/2025    HCT 36.9 (L) 01/03/2025     01/03/2025    CHOL 148 11/17/2023    TRIG 71 11/17/2023    HDL 58 11/17/2023    ALT 14 01/03/2025    AST 14 01/03/2025     01/03/2025    K 4.3 01/03/2025     01/03/2025    CREATININE 1.1 01/03/2025    BUN 13 01/03/2025    CO2 22 (L) 01/03/2025    TSH 4.152 (H) 07/15/2024    INR 1.0 09/27/2023    HGBA1C 4.9 07/19/2023       Review of Systems   Constitutional:  Negative for chills and fatigue.   HENT:  Positive for congestion and sore throat. Negative for ear discharge, ear pain, sinus pressure and sinus pain.    Respiratory:  Positive for cough and shortness of breath.    Cardiovascular:  Negative for chest pain.   Gastrointestinal:  Negative for abdominal pain, diarrhea, nausea and vomiting.   Neurological:  Negative for dizziness and headaches.         Objective:      Physical Exam  Constitutional:       General: She is not in acute distress.     Appearance: Normal appearance. She is not toxic-appearing.   HENT:      Head: Normocephalic and atraumatic.      Right Ear: There is impacted cerumen.      Left Ear: Tympanic membrane is not injected, scarred, perforated or erythematous.      Nose: Nose normal.      Right Turbinates: Not enlarged or swollen.      Left Turbinates: Not enlarged or swollen.      Right Sinus: No maxillary sinus tenderness or frontal sinus tenderness.      Left Sinus: No maxillary sinus tenderness or frontal sinus tenderness.      Mouth/Throat:      Pharynx: Postnasal drip present. No posterior oropharyngeal erythema.   Cardiovascular:      Rate and Rhythm: Normal rate and regular rhythm.       Pulses: Normal pulses.      Heart sounds: No murmur heard.     No friction rub.   Pulmonary:      Effort: Pulmonary effort is normal. No respiratory distress.      Breath sounds: Examination of the right-upper field reveals wheezing. Examination of the left-upper field reveals wheezing. Examination of the right-lower field reveals wheezing. Examination of the left-lower field reveals wheezing. Wheezing present.   Musculoskeletal:      Cervical back: Normal range of motion.      Right lower leg: No edema.      Left lower leg: No edema.   Skin:     General: Skin is warm and dry.   Neurological:      General: No focal deficit present.      Mental Status: She is alert and oriented to person, place, and time.   Psychiatric:         Mood and Affect: Mood normal.         Assessment:       1. Wheezing    2. Mild persistent asthma, unspecified whether complicated    3. Upper respiratory tract infection, unspecified type    4. Acute cough          Plan:       Diagnoses and all orders for this visit:    - Assessed symptoms. Likely viral, however given asthma diagnosis will treat for exacerbation. Pulmonary exam consistent with diffuse wheezing specifically in lower and upper lobes.   - Provided nebulizer in clinic given diffuse wheezing and complaints of SOB. Some mild improvement of symptoms and wheezing. Will order nebulizer for patient to have for home use.   - Will obtain CXR to rule out pneumonia.   - POCT flu and COVID negative at this time.   - Will provide nebulizer, antibiotic, antihistamines, steroids, and nasal sprays.   - Follow up in clinic if symptoms are not improved.   - Return precautions discussed with patient.     Wheezing  -     albuterol-ipratropium 2.5 mg-0.5 mg/3 mL nebulizer solution 3 mL  -     NEBULIZER FOR HOME USE  -     NEBULIZER KIT (SUPPLIES) FOR HOME USE  -     albuterol-ipratropium (DUO-NEB) 2.5 mg-0.5 mg/3 mL nebulizer solution; Take 3 mLs by nebulization every 6 (six) hours as needed for  Wheezing. Rescue    Mild persistent asthma, unspecified whether complicated  -     X-Ray Chest PA And Lateral; Future  -     predniSONE (DELTASONE) 20 MG tablet; Take 3 tablets (60 mg total) by mouth once daily for 3 days, THEN 2 tablets (40 mg total) once daily for 2 days, THEN 1 tablet (20 mg total) once daily for 3 days.  -     azithromycin (Z-JACQUELIN) 250 MG tablet; Take 2 tablets by mouth on day 1; Take 1 tablet by mouth on days 2-5    Upper respiratory tract infection, unspecified type  -     X-Ray Chest PA And Lateral; Future  -     azelastine (ASTELIN) 137 mcg (0.1 %) nasal spray; 1 spray (137 mcg total) by Nasal route 2 (two) times daily. Point up and slightly outward toward ear when spraying to avoid irritating nasal septum.  -     predniSONE (DELTASONE) 20 MG tablet; Take 3 tablets (60 mg total) by mouth once daily for 3 days, THEN 2 tablets (40 mg total) once daily for 2 days, THEN 1 tablet (20 mg total) once daily for 3 days.  -     azithromycin (Z-JACQUELIN) 250 MG tablet; Take 2 tablets by mouth on day 1; Take 1 tablet by mouth on days 2-5  -     POCT Influenza A/B Molecular  -     POCT COVID-19 Rapid Screening    Acute cough  -     promethazine-dextromethorphan (PROMETHAZINE-DM) 6.25-15 mg/5 mL Syrp; Take 5 mLs by mouth nightly as needed (cough).  -     Discussed using promethazine DM at night as medication can cause drowsiness.   -     benzonatate (TESSALON) 100 MG capsule; Take 1 capsule (100 mg total) by mouth 3 (three) times daily as needed for Cough.        Follow up for if symptoms are not improved.    Terra Pineda PA-C  Family Medicine Physician Assistant     Future Appointments       Date Provider Specialty Appt Notes    1/6/2025  Radiology x ray    1/9/2025 Mahesh Cameron MD Hematology and Oncology 3 mth f/u with labs and ct    1/15/2025  Lab labs    1/22/2025 Claudia Kim MD Family Medicine 6 mo f/u             All of your core healthy metrics are met.       I spent a total of 30  minutes on the day of the visit.This includes face to face time and non-face to face time preparing to see the patient (eg, review of tests), obtaining and/or reviewing separately obtained history, documenting clinical information in the electronic or other health record, independently interpreting results and communicating results to the patient/family/caregiver, or care coordinator.

## 2025-01-03 NOTE — PROGRESS NOTES
Pt name and  verified. Albuterol-Ipratropium  administered via nebulization. Pt tolerated well, provider will check breath sounds after administration.

## 2025-01-06 ENCOUNTER — HOSPITAL ENCOUNTER (OUTPATIENT)
Dept: RADIOLOGY | Facility: CLINIC | Age: 85
Discharge: HOME | End: 2025-01-06
Payer: MEDICARE

## 2025-01-06 ENCOUNTER — TELEPHONE (OUTPATIENT)
Dept: FAMILY MEDICINE | Facility: CLINIC | Age: 85
End: 2025-01-06
Payer: MEDICARE

## 2025-01-06 DIAGNOSIS — J45.30 MILD PERSISTENT ASTHMA, UNSPECIFIED WHETHER COMPLICATED: ICD-10-CM

## 2025-01-06 DIAGNOSIS — J06.9 UPPER RESPIRATORY TRACT INFECTION, UNSPECIFIED TYPE: ICD-10-CM

## 2025-01-06 PROCEDURE — 71046 X-RAY EXAM CHEST 2 VIEWS: CPT | Mod: 26,,, | Performed by: STUDENT IN AN ORGANIZED HEALTH CARE EDUCATION/TRAINING PROGRAM

## 2025-01-06 PROCEDURE — 71046 X-RAY EXAM CHEST 2 VIEWS: CPT | Mod: TC,FY,PO

## 2025-01-06 NOTE — TELEPHONE ENCOUNTER
----- Message from Terra Pineda PA-C sent at 1/6/2025  2:33 PM CST -----  Hi Ms. Sorensen,     I reviewed your chest x-ray and there is no signs of pneumonia at this time. Continue treatment as discussed in clinic. I hope you are feeling better.     Terra Pineda PA-C

## 2025-01-08 NOTE — PROGRESS NOTES
PATIENT: Danna Sorensen  MRN: [id number]  DATE: 1/9/2025      Diagnosis:   1. Malignant neoplasm of splenic flexure    2. Class 3 severe obesity due to excess calories with serious comorbidity and body mass index (BMI) of 40.0 to 44.9 in adult    3. Renal mass, left    4. Pulmonary nodules    5. Thrombophilia    6. B12 deficiency                  Chief Complaint: Malignant neoplasm of splenic flexure (3 month follow up )      Oncologic History:      Oncologic History     Oncologic Treatment     Pathology           Subjective:    Interval History: Ms. Sorensen is a 84 y.o. female with Pulmonary embolism, carpal tunnel, CAD, HTN, hypothyroidism, osteoporosis, osteoarthritis, who presents for colon cancer.  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  The patient denies CP, cough, SOB, abdominal pain, nausea, vomiting, constipation, diarrhea.  The patient denies fever, chills, night sweats, weight loss, new lumps or bumps, easy bruising or bleeding.    Prior History:  Pt was previously diagnosed with Stage III adenocarcinoma of the colon in 2007 and underwent 12 cycles of FOLFOX.  Pt underwent colonoscopy on 8/08/23 showing 1 7 mm polyp in the rectum; altered vascular, congested, eroded, friable and ulcerated mucosa in the transverse colon which was biopsied; patent and to side ileocolonic anastomosis.  Pathology showed moderately differentiated adenocarcinoma. CT abdomen and pelvis 8/17/23 showing irregular appearance of the gallbladder; heterogeneously enhancing lesion in the inferior pole of the left kidney measuring 1.8 cm; short segment of concentric bowel wall thickening of the colon at the splenic flexure with surrounding mesenteric fat stranding with nodular thickness of 2.2 cm.  The patient underwent colonoscopy on 08/22/2023 showing nonbleeding  internal hemorrhoids, partially obstructing tumor in the transverse colon which was tattooed.  Pt saw Urology 8/23/23 with plans for re-imaging the renal lesion in 3 months with an MRI.  US abdomen 8/25/23 showed a 2.5 cm solid mass at the lower pole of the left kidney suspicious for neoplasm.  CT chest 8/25/23 showed 4 mm left upper lobe pulmonary nodule, 4 mm calcified granuloma left upper lobe, 2 mm pulmonary nodule in the left upper lobe, 9 x 9 mm triangular nodule along the juxtapleural right middle lobe, 2 mm pulmonary nodule in the right middle lobe, and a partially imaged masslike density in the splenic flexure of the colon. Pt then underwent resection of the transverse colon and splenic flexure with path showing invasive moderately differentiated adenocarcinoma with mucinous features, 33 benign lymph nodes, positive lymphovascular invasion, positive perineural invasion pT3N0.  Tumor shows intact expression of MLH1, PMS2, MSH2, MSH6.  Patient was positive for B Celio V600E mutation.   The patient was discussed at tumor Board on 11/07/2023 with recommendation for PET-CT with biopsy of lung nodules if positive.  PET-CT on 11/08/2023 showed evidence of pulmonary hypertension; stable 5 mm nodule left lung apex; calcified granuloma left upper lobe; stable 6 mm inferior right upper lobe nodule; stable 17 mm ground-glass opacity lateral right middle lobe; stable 4 mm nodule in the right lower lobe of the diaphragm; no activity in the lesion in the left kidney.  MRI abdomen 11/20/2023 showed heterogeneous fat containing enhancing mass in the lower pole of the left kidney suggestive of an angio myelolipoma.   The patient underwent a chest x-ray on 02/19/2024 showing no sign of pneumonia.  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   The patient underwent a CT of the chest, abdomen, and pelvis on 06/17/2024 showing multiple stable bilateral pulmonary nodules with disappearance of 1.2 cm left perihilar/perifissural nodule and adjacent ground-glass opacification seen on prior scan; stable heterogeneously enhancing 2.1 cm solid lesion with foci of macroscopic fat in the lower pole of the left kidney; and subacute healing left anterolateral 3rd, 4th, and 5th rib fracture.    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    Strabismus     Trouble in sleeping     Wears dentures     Uppers       Past Surgical HIstory:   Past Surgical History:   Procedure Laterality Date    CARDIAC SURGERY      cardiac cath    COLON SURGERY      colon resection    COLONOSCOPY N/A 10/18/2016    Procedure: COLONOSCOPY;  Surgeon: Rell Cordova MD;  Location: [facility];  Service: Endoscopy;  Laterality: N/A;    COLONOSCOPY N/A 8/8/2023    Procedure: COLONOSCOPY;  Surgeon: Kaushik Pinon MD;  Location: [facility];  Service: Endoscopy;  Laterality: N/A;    COLONOSCOPY N/A 8/22/2023    Procedure: COLONOSCOPY (tattoo of colon ca);  Surgeon: Kaushik Pinon MD;  Location: [facility];  Service: Endoscopy;  Laterality: N/A;    COLONOSCOPY N/A 8/12/2024    Procedure: COLONOSCOPY;  Surgeon:  Jim Chavez MD;  Location: [facility];  Service: General;  Laterality: N/A;    ESOPHAGOGASTRODUODENOSCOPY N/A 8/3/2023    Procedure: EGD (ESOPHAGOGASTRODUODENOSCOPY);  Surgeon: Kaushik Pinon MD;  Location: [facility];  Service: Endoscopy;  Laterality: N/A;    HAND TENDON SURGERY Left     HEMICOLECTOMY      right    INJECTION OF ANESTHETIC AGENT AROUND MEDIAL BRANCH NERVES INNERVATING LUMBAR FACET JOINT Right 07/10/2018    Procedure: Block-nerve-medial branch-lumbar;  Surgeon: Parish Gaitan MD;  Location: [facility] OR;  Service: Pain Management;  Laterality: Right;  L3, 4, 5    INSERTION OF INFERIOR VENA CAVAL FILTER N/A 9/13/2023    Procedure: Insertion, Filter, Inferior Vena Cava;  Surgeon: Oren Verdin MD;  Location: [facility] CATH/EP LAB;  Service: General;  Laterality: N/A;    INSERTION OF TUNNELED CENTRAL VENOUS CATHETER (CVC) WITH SUBCUTANEOUS PORT Right 11/15/2023    Procedure: HEEURHRMD-MGOB-L-CATH;  Surgeon: Jim Chavez MD;  Location: [facility] OR;  Service: General;  Laterality: Right;    JOINT REPLACEMENT      bilateral    RADIOFREQUENCY ABLATION OF LUMBAR MEDIAL BRANCH NERVE AT SINGLE LEVEL Right 07/24/2018    Procedure: RADIOFREQUENCY ABLATION, NERVE, MEDIAL BRANCH, LUMBAR, 1 LEVEL;  Surgeon: Parish Gaitan MD;  Location: [facility] OR;  Service: Pain Management;  Laterality: Right;  L3,4,5 - Burned at 80 degrees C. for 75 seconds x 2 each site    ROBOT-ASSISTED COLECTOMY N/A 9/29/2023    Procedure: ROBOTIC COLECTOMY;  Surgeon: Jim Chavez MD;  Location: [facility];  Service: General;  Laterality: N/A;    SHOULDER ARTHROSCOPY Right 01/12/2017    Reverse     TONSILLECTOMY      TONSILLECTOMY, ADENOIDECTOMY, BILATERAL MYRINGOTOMY AND TUBES      TOTAL KNEE ARTHROPLASTY Bilateral 08/1998    total replacements    TUMOR REMOVAL Left     left foot as a child       Family History:   Family History   Problem Relation Name Age of Onset    Hypertension Mother      Kidney disease Mother      Cataracts Father       "Cataracts Sister      Cancer Sister          breast    No Known Problems Brother      Cancer Sister          breast    Arthritis Sister      Glaucoma Neg Hx      Amblyopia Neg Hx      Blindness Neg Hx      Macular degeneration Neg Hx      Retinal detachment Neg Hx      Strabismus Neg Hx      Stroke Neg Hx      Thyroid disease Neg Hx         Social History:  reports that she quit smoking about 64 years ago. Her smoking use included cigarettes. She started smoking about 64 years ago. She has a 0.5 pack-year smoking history. She has never used smokeless tobacco. She reports that she does not drink alcohol and does not use drugs.    Allergies:  Review of patient's allergies indicates:   Allergen Reactions    Aspirin      Other reaction(s): Stomach upset    Aspirin Other (See Comments)     Other reaction(s): Stomach upset    Gabapentin Other (See Comments)     Pt states she felt "funny" when she took the medication. Especially at the higher dose.    Mobic [meloxicam] Swelling     Edema and elevated blood pressure     Oxaliplatin Other (See Comments)     Other reaction(s): Joint pain  Other reaction(s): Itching  Other reaction(s): Hives  Other reaction(s): Joint pain  Other reaction(s): Itching  Other reaction(s): Hives    Pregabalin Other (See Comments)     Side effects  Side effects       Medications:  Current Outpatient Medications   Medication Sig Dispense Refill    acetaminophen (TYLENOL) 650 MG TbSR Take 650 mg by mouth every 8 (eight) hours.      albuterol (PROVENTIL/VENTOLIN HFA) 90 mcg/actuation inhaler Inhale 2 puffs into the lungs every 6 (six) hours as needed for Wheezing. 8.5 g 3    albuterol-ipratropium (DUO-NEB) 2.5 mg-0.5 mg/3 mL nebulizer solution Take 3 mLs by nebulization every 6 (six) hours as needed for Wheezing. Rescue 75 mL 0    alendronate (FOSAMAX) 70 MG tablet TAKE 1 TABLET(70 MG) BY MOUTH EVERY 7 DAYS 12 tablet 3    azelastine (ASTELIN) 137 mcg (0.1 %) nasal spray 1 spray (137 mcg total) by " Nasal route 2 (two) times daily. Point up and slightly outward toward ear when spraying to avoid irritating nasal septum. 30 mL 2    azithromycin (Z-JACQUELIN) 250 MG tablet Take 2 tablets by mouth on day 1; Take 1 tablet by mouth on days 2-5 6 tablet 0    benzonatate (TESSALON) 100 MG capsule Take 1 capsule (100 mg total) by mouth 3 (three) times daily as needed for Cough. 30 capsule 0    cyclobenzaprine (FLEXERIL) 5 MG tablet Take 1 tablet (5 mg total) by mouth 3 (three) times daily as needed for Muscle spasms. 90 tablet 0    ELIQUIS 5 mg Tab TAKE 1 TABLET(5 MG) BY MOUTH TWICE DAILY 180 tablet 3    ergocalciferol, vitamin D2, (VITAMIN D ORAL) Take 2,000 mg by mouth once daily.      ferrous sulfate (FEOSOL) Tab tablet Take 1 tablet by mouth daily with breakfast.      fluticasone propionate (FLONASE) 50 mcg/actuation nasal spray 1 spray (50 mcg total) by Each Nostril route once daily. 16 g 0    furosemide (LASIX) 20 MG tablet Take 1 tablet (20 mg total) by mouth daily as needed (edema). 90 tablet 3    hydrOXYzine HCL (ATARAX) 25 MG tablet Take 1 tablet (25 mg total) by mouth 3 (three) times daily as needed for Anxiety (insomnia). 30 tablet PRN    levocetirizine (XYZAL) 5 MG tablet TAKE 1 TABLET(5 MG) BY MOUTH EVERY NIGHT AS NEEDED FOR ALLERGIES 90 tablet 3    levothyroxine (SYNTHROID) 75 MCG tablet Take 1 tablet (75 mcg total) by mouth once daily. 90 tablet 3    lisinopriL (PRINIVIL,ZESTRIL) 30 MG tablet TAKE 1 TABLET BY MOUTH EVERY DAY 90 tablet 3    mometasone-formoterol (DULERA) 100-5 mcg/actuation HFAA Inhale 2 puffs into the lungs 2 (two) times a day. Controller 13 g 3    multivitamin capsule Take 1 capsule by mouth once daily.      mupirocin (BACTROBAN) 2 % ointment Apply topically 3 (three) times daily. 30 g 0    nystatin (MYCOSTATIN) 100,000 unit/mL suspension Take 4 mLs by mouth 4 (four) times daily with meals and nightly.      omeprazole (PRILOSEC) 40 MG capsule TAKE 1 CAPSULE(40 MG) BY MOUTH TWICE DAILY BEFORE  MEALS 180 capsule PRN    predniSONE (DELTASONE) 20 MG tablet Take 3 tablets (60 mg total) by mouth once daily for 3 days, THEN 2 tablets (40 mg total) once daily for 2 days, THEN 1 tablet (20 mg total) once daily for 3 days. 16 tablet 0    promethazine (PHENERGAN) 12.5 MG Tab (Take 1-2 tabs every 6 hours as needed for nausea persistent despite zofran) 40 tablet 3    promethazine-dextromethorphan (PROMETHAZINE-DM) 6.25-15 mg/5 mL Syrp Take 5 mLs by mouth nightly as needed (cough). 118 mL 0    ramelteon (ROZEREM) 8 mg tablet Take 1 tablet (8 mg total) by mouth every evening. 30 tablet 1    silver sulfADIAZINE 1% (SILVADENE) 1 % cream Apply topically once daily. 85 g PRN    traMADoL (ULTRAM) 50 mg tablet Take 1 tablet (50 mg total) by mouth every 8 (eight) hours as needed for Pain. 21 each 0    triamcinolone acetonide 0.1% (KENALOG) 0.1 % cream APPLY TOPICALLY TO THE AFFECTED AREA TWICE DAILY 60 g 0     No current facility-administered medications for this visit.     Facility-Administered Medications Ordered in Other Visits   Medication Dose Route Frequency Provider Last Rate Last Admin    0.9%  NaCl infusion   Intravenous Once Oren Verdin MD           Review of Systems   Constitutional:  Negative for chills, fatigue, fever and unexpected weight change.   Respiratory:  Positive for shortness of breath (with activity). Negative for cough.    Cardiovascular:  Negative for chest pain and palpitations.   Gastrointestinal:  Negative for abdominal pain, constipation, diarrhea, nausea and vomiting.   Skin:  Negative for rash.   Neurological:  Negative for headaches.   Hematological:  Negative for adenopathy. Does not bruise/bleed easily.   Psychiatric/Behavioral:  Positive for sleep disturbance.      ECOG Performance Status: 2   Objective:      Vitals:   Vitals:    01/09/25 1346   BP: 138/86   BP Location: Right arm   Patient Position: Sitting   Pulse: 69   Resp: (!) 22   Temp: 98 °F (36.7 °C)   TempSrc: Temporal  "  SpO2: 99%   Weight: 104.6 kg (230 lb 9.6 oz)   Height: 5' 2.5" (1.588 m)       Physical Exam  Constitutional:       General: She is not in acute distress.     Appearance: She is well-developed. She is not diaphoretic.   HENT:      Head: Normocephalic and atraumatic.   Cardiovascular:      Rate and Rhythm: Normal rate and regular rhythm.      Heart sounds: Normal heart sounds. No murmur heard.     No friction rub. No gallop.   Pulmonary:      Effort: Pulmonary effort is normal. No respiratory distress.      Breath sounds: No wheezing or rales.   Chest:      Chest wall: No tenderness.   Abdominal:      General: Bowel sounds are normal. There is no distension.      Palpations: Abdomen is soft. There is no mass.      Tenderness: There is no abdominal tenderness. There is no guarding or rebound.   Lymphadenopathy:      Cervical: No cervical adenopathy.      Upper Body:      Right upper body: No supraclavicular or axillary adenopathy.      Left upper body: No supraclavicular or axillary adenopathy.   Skin:     Findings: No erythema or rash.   Neurological:      Mental Status: She is alert and oriented to person, place, and time.   Psychiatric:         Behavior: Behavior normal.         Laboratory Data:  Office Visit on 01/03/2025   Component Date Value Ref Range Status    POC Molecular Influenza A Ag 01/03/2025 Negative  Negative Final    POC Molecular Influenza B Ag 01/03/2025 Negative  Negative Final     Acceptable 01/03/2025 Yes   Final    POC Rapid COVID 01/03/2025 Negative  Negative Final     Acceptable 01/03/2025 Yes   Final   Lab Visit on 01/03/2025   Component Date Value Ref Range Status    WBC 01/03/2025 7.16  3.90 - 12.70 K/uL Final    RBC 01/03/2025 3.93 (L)  4.00 - 5.40 M/uL Final    Hemoglobin 01/03/2025 12.4  12.0 - 16.0 g/dL Final    Hematocrit 01/03/2025 36.9 (L)  37.0 - 48.5 % Final    MCV 01/03/2025 94  82 - 98 fL Final    MCH 01/03/2025 31.6 (H)  27.0 - 31.0 pg Final    " MCHC 01/03/2025 33.6  32.0 - 36.0 g/dL Final    RDW 01/03/2025 12.5  11.5 - 14.5 % Final    Platelets 01/03/2025 227  150 - 450 K/uL Final    MPV 01/03/2025 9.3  9.2 - 12.9 fL Final    Immature Granulocytes 01/03/2025 1.1 (H)  0.0 - 0.5 % Final    Gran # (ANC) 01/03/2025 5.2  1.8 - 7.7 K/uL Final    Immature Grans (Abs) 01/03/2025 0.08 (H)  0.00 - 0.04 K/uL Final    Comment: Mild elevation in immature granulocytes is non specific and   can be seen in a variety of conditions including stress response,   acute inflammation, trauma and pregnancy. Correlation with other   laboratory and clinical findings is essential.      Lymph # 01/03/2025 1.2  1.0 - 4.8 K/uL Final    Mono # 01/03/2025 0.6  0.3 - 1.0 K/uL Final    Eos # 01/03/2025 0.1  0.0 - 0.5 K/uL Final    Baso # 01/03/2025 0.03  0.00 - 0.20 K/uL Final    nRBC 01/03/2025 0  0 /100 WBC Final    Gran % 01/03/2025 72.1  38.0 - 73.0 % Final    Lymph % 01/03/2025 16.1 (L)  18.0 - 48.0 % Final    Mono % 01/03/2025 8.5  4.0 - 15.0 % Final    Eosinophil % 01/03/2025 1.8  0.0 - 8.0 % Final    Basophil % 01/03/2025 0.4  0.0 - 1.9 % Final    Differential Method 01/03/2025 Automated   Final    CEA 01/03/2025 1.7  0.0 - 5.0 ng/mL Final    Comment: CEA Normal Range:  Non-Smokers: 0-3.0 ng/mL  Smokers:     0-5.0 ng/mL    The testing method is a chemiluminescent microparticle immunoassay   manufactured by [employer] and performed on the [employer] system. Values obtained with different assay manufacturers   for   methods may be different and cannot be used interchangeably.      Sodium 01/03/2025 141  136 - 145 mmol/L Final    Potassium 01/03/2025 4.3  3.5 - 5.1 mmol/L Final    Chloride 01/03/2025 107  95 - 110 mmol/L Final    CO2 01/03/2025 22 (L)  23 - 29 mmol/L Final    Glucose 01/03/2025 102  70 - 110 mg/dL Final    BUN 01/03/2025 13  8 - 23 mg/dL Final    Creatinine 01/03/2025 1.1  0.5 - 1.4 mg/dL Final    Calcium 01/03/2025 9.6  8.7 - 10.5 mg/dL  Final    Total Protein 01/03/2025 7.5  6.0 - 8.4 g/dL Final    Albumin 01/03/2025 3.8  3.5 - 5.2 g/dL Final    Total Bilirubin 01/03/2025 0.8  0.1 - 1.0 mg/dL Final    Comment: For infants and newborns, interpretation of results should be based  on gestational age, weight and in agreement with clinical  observations.    Premature Infant recommended reference ranges:  Up to 24 hours.............<8.0 mg/dL  Up to 48 hours............<12.0 mg/dL  3-5 days..................<15.0 mg/dL  6-29 days.................<15.0 mg/dL      Alkaline Phosphatase 01/03/2025 75  40 - 150 U/L Final    AST 01/03/2025 14  10 - 40 U/L Final    ALT 01/03/2025 14  10 - 44 U/L Final    eGFR 01/03/2025 50 (A)  >60 mL/min/1.73 m^2 Final    Anion Gap 01/03/2025 12  8 - 16 mmol/L Final         Imaging:     CT C/A/P 1/03/25  Atrophic thyroid gland. Thoracic soft tissues are unremarkable.     Thoracic aorta is normal caliber and contains mild atherosclerosis. Heart is normal size. No pericardial effusion. Mild calcification of the coronary arteries. Dilated pulmonary arteries which can be seen with pulmonary arterial hypertension.     No pathologically enlarged thoracic lymph nodes.     Stable bilateral pulmonary nodules. Largest nodule in the left lung measures 6 mm in the upper lobe (series 6, image 86). Largest nodule in the right lung measures 6 mm in the upper lobe (series 6, image 203). No new nodule, consolidation, pleural effusion, or pneumothorax.     No focal hepatic lesion. Multiple gallstones. No biliary ductal dilatation.     Spleen and adrenal glands are unremarkable. Diffuse pancreatic atrophy.     Stable 2.1 cm fat containing lesion in the left inferior renal pole likely representing an angiomyolipoma (series 3, image 71). No hydronephrosis or ureteral dilatation.     No significant abnormality of the bladder, uterus, or ovaries.     Postoperative changes of partial colectomy and ileocolonic anastomosis. No evidence of local disease  recurrence. No bowel obstruction or inflammation.     No free intraperitoneal fluid or air. No pathologically enlarged abdominopelvic lymph nodes.     Atherosclerosis of the abdominal aorta and branch vessels. Infrarenal IVC filter in place.     Ventral abdominal hernia containing fat and bowel without evidence of obstruction. Several additional fat containing ventral abdominal wall hernias.     Degenerative changes of the osseous structures. Several remote left rib fractures. Status post right shoulder arthroplasty. No acute or aggressive bony abnormality.       Assessment:       1. Malignant neoplasm of splenic flexure    2. Class 3 severe obesity due to excess calories with serious comorbidity and body mass index (BMI) of 40.0 to 44.9 in adult    3. Renal mass, left    4. Pulmonary nodules    5. Thrombophilia    6. B12 deficiency                     Plan:     Colon Cancer - Pt with h/o colon cancer s/p resection in 2007 followed by adjuvant FOLFOX for 12 cycles  -Pt recently with resection of transverse colon and splenic flexure 9/29/23 showing path showing invasive moderately differentiated adenocarcinoma with mucinous features, 33 benign lymph nodes, positive lymphovascular invasion, positive perineural invasion pT3N0  -Tumor shows intact expression of MLH1, PMS2, MSH2, MSH6  -Patient was positive for B Celio V600E mutation  -Pt has high risk stage II colon cancer given positive LVI and PNI  -PT is a candidate for treatment with 6 months of 5-FU or Capecitabine  -no clear evidence of metastatic disease on PET-CT 11/08/2023   -Will proceed with 6 months of 5 fluorouracil  -Pharmacogenomic panel on 11/03/23 showed normal DPYD metabolizer but 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  -Scans on 02/23/2024 showed a left lower lobe pneumonia but no clear evidence of metastatic disease  -PT completed 6 months of Fluoruracil treatment 5/20/24  -Repeat scans 6/17/24 showed stable pulmonary nodules and left renal  nodule but no clear signs of disease  -Colonoscopy done 8/12/24 with recommendation to repeat in 3 years  -CEA 1/03/25 normal  -Scans on 1/03/25 showed stable pulmonary nodules and VINNY  -Will monitor with labs in 3 months    Renal Mass - Pt with a mass on the left kidney seen on CT abdomen 8/17/23 and US abdomen 8/25/23  -Pt saw Urology [facility] under the supervision of Dr. 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  -Stable on repeat scans 1/03/25  -Will monitor in 3 months    Pulmonary Nodules - seen on Ct chest 8/25/23  -no avid nodules on PET/CT 11/08/23  -Stable on repeat CT 1/03/25  -Will monitor in 6 months and have pt see pulmonary    DVT - PT with bilateral nonocclusive DVT demonstrated on the SFV to the popliteal veins seen on US 9/01/23  -Pt on Eliquis  -Will monitor    B12 Deficiency - ferritin 25ng/mL on 12/20/23  -Pt received injectafer 1/24/24  -B12 low on 9/24/24  -Pt on B12 with improvement in hemoglobin  -Will monitor    Route Chart for Scheduling    Med Onc Chart Routing      Follow up with physician 3 months. Pt needs a CBC, CMP adn CEA with return appt at least a day after the labs.   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, cN0, cM0 noted on 11/3/2023   Line of treatment: Adjuvant  Treatment Goal: Curative     Upcoming Treatment Dates - OP COLORECTAL FLUOROURACIL LEUCOVORIN Q2W (MOD DE GRAMONT)    6/3/2024       Chemotherapy       leucovorin calcium in dextrose 5 % (D5W) 250 mL infusion       fluorouraciL injection       fluorouracil chemo infusion       Antiemetics       ondansetron injection 8 mg  6/17/2024        Chemotherapy       leucovorin calcium in dextrose 5 % (D5W) 250 mL infusion       fluorouraciL injection       fluorouracil chemo infusion       Antiemetics       ondansetron injection 8 mg  7/1/2024       Chemotherapy       leucovorin calcium in dextrose 5 % (D5W) 250 mL infusion       fluorouraciL injection       fluorouracil chemo infusion       Antiemetics       ondansetron injection 8 mg  7/15/2024       Chemotherapy       leucovorin calcium in dextrose 5 % (D5W) 250 mL infusion       fluorouraciL injection       fluorouracil chemo infusion       Antiemetics       ondansetron injection 8 mg    Supportive Plan Information  OP FERRIC CARBOXYMALTOSE Q2W Mahesh Cameron MD   Associated Diagnosis: Iron deficiency anemia   noted on 12/20/2023   Line of treatment: Supportive Care   Treatment goal: Supportive     Upcoming Treatment Dates - OP FERRIC CARBOXYMALTOSE Q2W    1/25/2024       Supportive Care       ferric carboxymaltose (INJECTAFER) 750 mg in sodium chloride 0.9% 265 mL IVPB (ready to mix)      Mahesh Cameron MD  [facility]  Hematology and Oncology  [facility]   [address] [place] [address]   O: [phone]  F: [phone]

## 2025-01-09 ENCOUNTER — OFFICE VISIT (OUTPATIENT)
Dept: HEMATOLOGY/ONCOLOGY | Facility: CLINIC | Age: 85
End: 2025-01-09
Payer: MEDICARE

## 2025-01-09 VITALS
OXYGEN SATURATION: 99 % | TEMPERATURE: 98 F | RESPIRATION RATE: 22 BRPM | BODY MASS INDEX: 40.86 KG/M2 | HEART RATE: 69 BPM | HEIGHT: 63 IN | WEIGHT: 230.63 LBS | DIASTOLIC BLOOD PRESSURE: 86 MMHG | SYSTOLIC BLOOD PRESSURE: 138 MMHG

## 2025-01-09 DIAGNOSIS — D68.59 THROMBOPHILIA: ICD-10-CM

## 2025-01-09 DIAGNOSIS — E53.8 B12 DEFICIENCY: ICD-10-CM

## 2025-01-09 DIAGNOSIS — C18.5 MALIGNANT NEOPLASM OF SPLENIC FLEXURE: Primary | ICD-10-CM

## 2025-01-09 DIAGNOSIS — E66.01 CLASS 3 SEVERE OBESITY DUE TO EXCESS CALORIES WITH SERIOUS COMORBIDITY AND BODY MASS INDEX (BMI) OF 40.0 TO 44.9 IN ADULT: ICD-10-CM

## 2025-01-09 DIAGNOSIS — E66.813 CLASS 3 SEVERE OBESITY DUE TO EXCESS CALORIES WITH SERIOUS COMORBIDITY AND BODY MASS INDEX (BMI) OF 40.0 TO 44.9 IN ADULT: ICD-10-CM

## 2025-01-09 DIAGNOSIS — R91.8 PULMONARY NODULES: ICD-10-CM

## 2025-01-09 DIAGNOSIS — N28.89 RENAL MASS, LEFT: ICD-10-CM

## 2025-01-09 PROCEDURE — 1126F AMNT PAIN NOTED NONE PRSNT: CPT | Mod: CPTII,S$GLB,, | Performed by: INTERNAL MEDICINE

## 2025-01-09 PROCEDURE — 99999 PR PBB SHADOW E&M-EST. PATIENT-LVL III: CPT | Mod: PBBFAC,,, | Performed by: INTERNAL MEDICINE

## 2025-01-09 PROCEDURE — 3079F DIAST BP 80-89 MM HG: CPT | Mod: CPTII,S$GLB,, | Performed by: INTERNAL MEDICINE

## 2025-01-09 PROCEDURE — 3288F FALL RISK ASSESSMENT DOCD: CPT | Mod: CPTII,S$GLB,, | Performed by: INTERNAL MEDICINE

## 2025-01-09 PROCEDURE — 1159F MED LIST DOCD IN RCRD: CPT | Mod: CPTII,S$GLB,, | Performed by: INTERNAL MEDICINE

## 2025-01-09 PROCEDURE — 3075F SYST BP GE 130 - 139MM HG: CPT | Mod: CPTII,S$GLB,, | Performed by: INTERNAL MEDICINE

## 2025-01-09 PROCEDURE — 1100F PTFALLS ASSESS-DOCD GE2>/YR: CPT | Mod: CPTII,S$GLB,, | Performed by: INTERNAL MEDICINE

## 2025-01-09 PROCEDURE — 99213 OFFICE O/P EST LOW 20 MIN: CPT | Mod: S$GLB,,, | Performed by: INTERNAL MEDICINE

## 2025-01-13 NOTE — NURSING
LVM for patient to return call regarding education   Recall adjusted in health maintenance as advised. Pt called with bx and recall and verbalized understanding

## 2025-01-15 ENCOUNTER — LAB VISIT (OUTPATIENT)
Dept: LAB | Facility: HOSPITAL | Age: 85
End: 2025-01-15
Attending: FAMILY MEDICINE
Payer: MEDICARE

## 2025-01-15 DIAGNOSIS — E03.9 HYPOTHYROIDISM, UNSPECIFIED TYPE: ICD-10-CM

## 2025-01-15 LAB
ALBUMIN SERPL BCP-MCNC: 3.4 G/DL (ref 3.5–5.2)
ALP SERPL-CCNC: 58 U/L (ref 40–150)
ALT SERPL W/O P-5'-P-CCNC: 30 U/L (ref 10–44)
ANION GAP SERPL CALC-SCNC: 7 MMOL/L (ref 8–16)
AST SERPL-CCNC: 17 U/L (ref 10–40)
BILIRUB SERPL-MCNC: 1.1 MG/DL (ref 0.1–1)
BUN SERPL-MCNC: 20 MG/DL (ref 8–23)
CALCIUM SERPL-MCNC: 8.7 MG/DL (ref 8.7–10.5)
CHLORIDE SERPL-SCNC: 111 MMOL/L (ref 95–110)
CHOLEST SERPL-MCNC: 168 MG/DL (ref 120–199)
CHOLEST/HDLC SERPL: 3.4 {RATIO} (ref 2–5)
CO2 SERPL-SCNC: 22 MMOL/L (ref 23–29)
CREAT SERPL-MCNC: 1.3 MG/DL (ref 0.5–1.4)
EST. GFR  (NO RACE VARIABLE): 41.1 ML/MIN/1.73 M^2
GLUCOSE SERPL-MCNC: 85 MG/DL (ref 70–110)
HDLC SERPL-MCNC: 49 MG/DL (ref 40–75)
HDLC SERPL: 29.2 % (ref 20–50)
LDLC SERPL CALC-MCNC: 97.2 MG/DL (ref 63–159)
NONHDLC SERPL-MCNC: 119 MG/DL
POTASSIUM SERPL-SCNC: 4.5 MMOL/L (ref 3.5–5.1)
PROT SERPL-MCNC: 6.4 G/DL (ref 6–8.4)
SODIUM SERPL-SCNC: 140 MMOL/L (ref 136–145)
T4 FREE SERPL-MCNC: 1 NG/DL (ref 0.71–1.51)
TRIGL SERPL-MCNC: 109 MG/DL (ref 30–150)
TSH SERPL DL<=0.005 MIU/L-ACNC: 7.92 UIU/ML (ref 0.4–4)

## 2025-01-15 PROCEDURE — 80053 COMPREHEN METABOLIC PANEL: CPT | Performed by: FAMILY MEDICINE

## 2025-01-15 PROCEDURE — 84439 ASSAY OF FREE THYROXINE: CPT | Performed by: FAMILY MEDICINE

## 2025-01-15 PROCEDURE — 80061 LIPID PANEL: CPT | Performed by: FAMILY MEDICINE

## 2025-01-15 PROCEDURE — 36415 COLL VENOUS BLD VENIPUNCTURE: CPT | Mod: PO | Performed by: FAMILY MEDICINE

## 2025-01-15 PROCEDURE — 84443 ASSAY THYROID STIM HORMONE: CPT | Performed by: FAMILY MEDICINE

## 2025-02-05 ENCOUNTER — OFFICE VISIT (OUTPATIENT)
Dept: FAMILY MEDICINE | Facility: CLINIC | Age: 85
End: 2025-02-05
Payer: MEDICARE

## 2025-02-05 VITALS
BODY MASS INDEX: 41.99 KG/M2 | RESPIRATION RATE: 17 BRPM | OXYGEN SATURATION: 99 % | TEMPERATURE: 98 F | DIASTOLIC BLOOD PRESSURE: 62 MMHG | HEIGHT: 63 IN | HEART RATE: 62 BPM | SYSTOLIC BLOOD PRESSURE: 132 MMHG | WEIGHT: 237 LBS

## 2025-02-05 DIAGNOSIS — J45.30 MILD PERSISTENT ASTHMA WITHOUT COMPLICATION: ICD-10-CM

## 2025-02-05 DIAGNOSIS — N18.32 STAGE 3B CHRONIC KIDNEY DISEASE: ICD-10-CM

## 2025-02-05 DIAGNOSIS — C18.5 MALIGNANT NEOPLASM OF SPLENIC FLEXURE: ICD-10-CM

## 2025-02-05 DIAGNOSIS — D50.9 IRON DEFICIENCY ANEMIA, UNSPECIFIED IRON DEFICIENCY ANEMIA TYPE: ICD-10-CM

## 2025-02-05 DIAGNOSIS — R91.8 PULMONARY NODULES: ICD-10-CM

## 2025-02-05 DIAGNOSIS — E66.01 MORBID OBESITY WITH BMI OF 40.0-44.9, ADULT: ICD-10-CM

## 2025-02-05 DIAGNOSIS — K21.9 GASTROESOPHAGEAL REFLUX DISEASE, UNSPECIFIED WHETHER ESOPHAGITIS PRESENT: ICD-10-CM

## 2025-02-05 DIAGNOSIS — Z95.828 PORT-A-CATH IN PLACE: ICD-10-CM

## 2025-02-05 DIAGNOSIS — Z12.31 ENCOUNTER FOR SCREENING MAMMOGRAM FOR MALIGNANT NEOPLASM OF BREAST: ICD-10-CM

## 2025-02-05 DIAGNOSIS — E03.4 HYPOTHYROIDISM DUE TO ACQUIRED ATROPHY OF THYROID: Primary | ICD-10-CM

## 2025-02-05 DIAGNOSIS — J84.10 PULMONARY FIBROSIS, UNSPECIFIED: ICD-10-CM

## 2025-02-05 DIAGNOSIS — I70.0 ATHEROSCLEROSIS OF AORTA: ICD-10-CM

## 2025-02-05 DIAGNOSIS — R73.9 HYPERGLYCEMIA: ICD-10-CM

## 2025-02-05 DIAGNOSIS — I82.4Y3 DEEP VEIN THROMBOSIS (DVT) OF PROXIMAL VEIN OF BOTH LOWER EXTREMITIES, UNSPECIFIED CHRONICITY: ICD-10-CM

## 2025-02-05 DIAGNOSIS — E03.9 HYPOTHYROIDISM: ICD-10-CM

## 2025-02-05 DIAGNOSIS — I10 ESSENTIAL HYPERTENSION: ICD-10-CM

## 2025-02-05 DIAGNOSIS — Z90.49 S/P PARTIAL COLECTOMY: ICD-10-CM

## 2025-02-05 DIAGNOSIS — N95.9 MENOPAUSAL AND POSTMENOPAUSAL DISORDER: ICD-10-CM

## 2025-02-05 DIAGNOSIS — Z91.89 AT HIGH RISK FOR BREAST CANCER: ICD-10-CM

## 2025-02-05 PROCEDURE — 99999 PR PBB SHADOW E&M-EST. PATIENT-LVL IV: CPT | Mod: PBBFAC,,, | Performed by: FAMILY MEDICINE

## 2025-02-05 PROCEDURE — G2211 COMPLEX E/M VISIT ADD ON: HCPCS | Mod: S$GLB,,, | Performed by: FAMILY MEDICINE

## 2025-02-05 PROCEDURE — 1159F MED LIST DOCD IN RCRD: CPT | Mod: CPTII,S$GLB,, | Performed by: FAMILY MEDICINE

## 2025-02-05 PROCEDURE — 1160F RVW MEDS BY RX/DR IN RCRD: CPT | Mod: CPTII,S$GLB,, | Performed by: FAMILY MEDICINE

## 2025-02-05 PROCEDURE — 99214 OFFICE O/P EST MOD 30 MIN: CPT | Mod: S$GLB,,, | Performed by: FAMILY MEDICINE

## 2025-02-05 PROCEDURE — 3078F DIAST BP <80 MM HG: CPT | Mod: CPTII,S$GLB,, | Performed by: FAMILY MEDICINE

## 2025-02-05 PROCEDURE — 3075F SYST BP GE 130 - 139MM HG: CPT | Mod: CPTII,S$GLB,, | Performed by: FAMILY MEDICINE

## 2025-02-05 PROCEDURE — 1126F AMNT PAIN NOTED NONE PRSNT: CPT | Mod: CPTII,S$GLB,, | Performed by: FAMILY MEDICINE

## 2025-02-05 PROCEDURE — 3288F FALL RISK ASSESSMENT DOCD: CPT | Mod: CPTII,S$GLB,, | Performed by: FAMILY MEDICINE

## 2025-02-05 PROCEDURE — 1100F PTFALLS ASSESS-DOCD GE2>/YR: CPT | Mod: CPTII,S$GLB,, | Performed by: FAMILY MEDICINE

## 2025-02-05 RX ORDER — MOMETASONE FUROATE AND FORMOTEROL FUMARATE DIHYDRATE 100; 5 UG/1; UG/1
2 AEROSOL RESPIRATORY (INHALATION) 2 TIMES DAILY
Qty: 13 G | Refills: 3 | Status: SHIPPED | OUTPATIENT
Start: 2025-02-05

## 2025-02-05 RX ORDER — LEVOTHYROXINE SODIUM 88 UG/1
88 TABLET ORAL DAILY
Qty: 90 TABLET | Refills: 3 | Status: SHIPPED | OUTPATIENT
Start: 2025-02-05

## 2025-02-05 NOTE — PROGRESS NOTES
Subjective:       Patient ID: Danna Sorensen is a 84 y.o. female.    Chief Complaint: Follow-up (6mth f/u)    Patient Active Problem List   Diagnosis    Hypothyroid    Nuclear sclerosis    Hyperopia with astigmatism and presbyopia    DDD (degenerative disc disease), lumbar    Morbid obesity with BMI of 40.0-44.9, adult    Calcified granuloma of lung    History of colon cancer    Lumbar radiculitis    Other spondylosis, lumbar region    Chronic right-sided low back pain without sciatica    Vitamin D deficiency    Essential hypertension    Malignant neoplasm of colon    Decreased functional mobility    Muscle tightness    Decreased strength    Decreased range of motion    Venous lake of lip    GERD (gastroesophageal reflux disease)    Adenocarcinoma    DVT (deep vein thrombosis) in pregnancy    History of pulmonary embolus (PE)    Malignant neoplasm of splenic flexure    Insomnia    Chemotherapy-induced fatigue    Other low back pain    S/P IVC filter    Left renal mass    Port-A-Cath in place    Iron deficiency anemia    Immunodeficiency due to chemotherapy    Atherosclerosis of aorta    Class 3 severe obesity due to excess calories with serious comorbidity and body mass index (BMI) of 40.0 to 44.9 in adult    S/P partial colectomy    Stage 3b chronic kidney disease    Pulmonary fibrosis, unspecified     Patient is here for a chronic conditions follow up.    Reviewed labs 1/2025  History of Present Illness    CHIEF COMPLAINT:  Ms. Sorensen presents today for follow-up and medication refills.    SYNCOPE:  She experienced a recent fainting episode while sitting after dinner. She reports vision darkening before losing consciousness and falling forward, hitting a recliner and cement floor. She did not seek emergency care. The fall resulted in dental issues including shifted teeth, requiring a soft diet for about a week. She reports similar previous episodes and believes they may be related to food becoming lodged, noting an  inability to speak or move before fainting.    CARDIOVASCULAR:  Cardiac evaluation in August last year revealed 40% blockage not requiring intervention. She continues Eliquis which was last refilled in January.    CANCER HISTORY:  Her port was removed before Christmas last year.    BREAST CANCER SCREENING:  Previous breast MRI in January 2022 was negative. She prefers breast MRI over mammogram for screening. All previous ultrasounds have been normal with no history of abnormal mammograms.    DIABETES:  She has diabetes and is interested in weight loss shots for management.    MEDICAL HISTORY:  She has a history of pancreatitis in 2008 due to a gallbladder stone causing pancreatic drainage issues, with no episodes since then.    CURRENT MEDICATIONS:  She takes thyroid medication 75 mcg, Fosamax for bone health, and Tolara for asthma which is effectively managing her symptoms.      ROS:  ROS findings as noted in HPI. Borderline tfts.        Review of Systems   Constitutional:  Negative for fatigue and unexpected weight change.   Respiratory:  Negative for chest tightness and shortness of breath.    Cardiovascular:  Negative for chest pain, palpitations and leg swelling.   Gastrointestinal:  Negative for abdominal pain.   Musculoskeletal:  Negative for arthralgias.   Neurological:  Negative for dizziness, syncope, light-headedness and headaches.      Relevant History:    Lives with niece Barbie-lots of support    Endocrine hypothyroid synthroid 75mcg     Pulm Dr. Romero asthma on dulera      Psych C/o trouble sleeping. Trazodone  mg no help. Told to try melatonin 5 mg      Urology NP O'Kristine left renal mass suspected malignancy. Mri showed benign findings     GI Surg Dr. Chavez and Surgery Dr. Evans colon cancer Heme/onc NP Viraj following for colon cancer s/p resection and chemo 2007 with recent recurrence s/p extended robotic colectomy of the  transverse colon and splenic flexure with ileo colic anastomosis.   This was performed on September 29th, 2023 She did however have perineural and lymphovascular invasion.    Also of note she does have a known suspected malignancy of the left kidney.    port-a-cath placement 11/15/23.  chemo started 12/23  PET 11/2023 Patient is status post resection of colon carcinoma with no findings to indicate metastatic or recurrent disease.  Lung nodules, some of which are unchanged since 2007, benign, and others of which are new since 2007 but stable compared to 08/25/2023.  Continued CT follow-up is recommended. Last CT 6/2024  no new mets, stable pulm nodules, stable kidney mass suggestive of angiomyolipoma.   Colonoscopy  8/12/24 -no masses or polyps repeat 3 years.  5FU x 6 months completed 5/2024.  CT /PET 9/2024  Postoperative changes of partial colectomy.  No evidence of local disease recurrence or new metastasis.  2. Stable bilateral subcentimeter pulmonary nodules.  3. 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  4. Multiple ventral abdominal wall hernias with one containing bowel.  5. Cholelithiasis.  6. Additional findings as above.  Port a cath has been removed     Vasc surg Dr. Verdin h/o DVT s/p IVC filter 9/2023 on eliquis. Has chronic d dimer elevation     GI Dr. Pinon planning egd 8/3/23 and colonoscopy 8/8/23 . Having breakthrough heartburn sx even after starting omeprazole 40mg a day. Increased to bid 4/2023-has helped a lot. Colon cancer 2023 s/p partial colectomy         Heme/onc mild normocytic anemia-slightly improved. Iron, folate and b12 normal 5/22. Colon cancer 2023-s/p partial colectomy     Rheum Dexa 5/22 osteoporosis on fosomax 5/22. Dexa osteoporosis 5/22  on fosamax since 11/2023.     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 Started on lyrica 50mg 2 bid-caused side effects-stopped. PT completed 2/22 not much help. Flexeril prn . Tramadol 50mg #60 lasts 2 months or more for low back pain-helps but still waking  up from sleep with pain.  Tylenol also helps Sees chiropractor Dr. Stearns. Has gradually worsening of lower back pain. Ache that radiates to right hip. No paresthesias. No B/B incontinence.  Gabapentin- nausea.  Tried PT, KHARI and radiofrequency ablation without relief.       Eye Opt[facility] Dr. Berrios treating cataracts        Breast Had mri brast 2018- high risk.  3/22 mri breast negative. Cannot tolerate mammo.   Objective:      Physical Exam  Vitals and nursing note reviewed.   Constitutional:       Appearance: She is well-developed.   Cardiovascular:      Rate and Rhythm: Normal rate and regular rhythm.      Heart sounds: Normal heart sounds.   Pulmonary:      Effort: Pulmonary effort is normal.      Breath sounds: Normal breath sounds.   Skin:     General: Skin is warm and dry.   Neurological:      Mental Status: She is alert and oriented to person, place, and time.         Assessment:       ICD-10-CM ICD-9-CM    1. Hypothyroidism due to acquired atrophy of thyroid  E03.4 244.8 CBC Auto Differential     246.8 Comprehensive Metabolic Panel      TSH      T4, Free      2. Encounter for screening mammogram for malignant neoplasm of breast  Z12.31 V76.12       3. Menopausal and postmenopausal disorder  N95.9 627.9 DXA Bone Density Axial Skeleton 1 or more sites      4. Iron deficiency anemia, unspecified iron deficiency anemia type  D50.9 280.9 Ferritin      Iron and TIBC      5. Morbid obesity with BMI of 40.0-44.9, adult  E66.01 278.01     Z68.41 V85.41       6. Essential hypertension  I10 401.9       7. Malignant neoplasm of splenic flexure  C18.5 153.7       8. S/P partial colectomy  Z90.49 V45.89       9. Gastroesophageal reflux disease, unspecified whether esophagitis present  K21.9 530.81       10. Port-A-Cath in place  Z95.828 V45.89       11. Atherosclerosis of aorta  I70.0 440.0       12. Stage 3b chronic kidney disease  N18.32 585.3       13. Pulmonary fibrosis, unspecified  J84.10 515       14. Hyperglycemia   "R73.9 790.29       15. Hypothyroidism  E03.9 244.9 levothyroxine (SYNTHROID) 88 MCG tablet      16. Deep vein thrombosis (DVT) of proximal vein of both lower extremities, unspecified chronicity  I82.4Y3 453.41 apixaban (ELIQUIS) 5 mg Tab      17. Pulmonary nodules  R91.8 793.19 mometasone-formoterol (DULERA) 100-5 mcg/actuation HFAA      18. Mild persistent asthma without complication  J45.30 493.90 mometasone-formoterol (DULERA) 100-5 mcg/actuation HFAA      19. At high risk for breast cancer  Z91.89 V49.89 MRI Breast w/wo Contrast, w/CAD, Bilateral         Plan:   1. Hypothyroidism due to acquired atrophy of thyroid (Primary)  Persistent tsh elevation. Increase synthroid to 88 mcg and monitor  - CBC Auto Differential; Future  - Comprehensive Metabolic Panel; Future  - TSH; Future  - T4, Free; Future    2. Encounter for screening mammogram for malignant neoplasm of breast  Intolerant of mammo.  Willing to do mri    3. Menopausal and postmenopausal disorder  Screen and treat as indicated:    - DXA Bone Density Axial Skeleton 1 or more sites; Future    4. Iron deficiency anemia, unspecified iron deficiency anemia type  Cont supplement and monitor  - Ferritin; Future  - Iron and TIBC; Future    5. Morbid obesity with BMI of 40.0-44.9, adult  Counseled patient on his ideal body weight, health consequences of being obese and current recommendations including weekly exercise and a heart healthy diet.  Current BMI is:Estimated body mass index is 42.66 kg/m² as calculated from the following:    Height as of this encounter: 5' 2.5" (1.588 m).    Weight as of this encounter: 107.5 kg (236 lb 15.9 oz)..  Patient is aware that ideal BMI < 25 or Weight in (lb) to have BMI = 25: 138.6.      6. Essential hypertension  Controlled on current medications.  Continue current medications.      7. Malignant neoplasm of splenic flexure  S/p treatment. No evidence of recurrence.  Cont onc monitoring    8. S/P partial colectomy  Healed " well. Cont gi surg monitoring    9. Gastroesophageal reflux disease, unspecified whether esophagitis present  Controlled on current medications.  Continue current medications.  Prilosec 40mg bid    10. Port-A-Cath in place  removed    11. Atherosclerosis of aorta  Cont to lower risk factors    12. Stage 3b chronic kidney disease  Stable and chronic.  Will continue to monitor q3-6 months and control chronic conditions as optimally as possible to preserve function.      13. Pulmonary fibrosis, unspecified  Cont monitoring and treat as indicated    14. Hyperglycemia   Your blood sugar is borderline high.  This means you are at risk for developing type 2 diabetes mellitus.  To lessen your risk you should exercise regularly, avoid excess carbohydrates and work toward a body mass index of less than 25.        15. Hypothyroidism  increase  - levothyroxine (SYNTHROID) 88 MCG tablet; Take 1 tablet (88 mcg total) by mouth once daily.  Dispense: 90 tablet; Refill: 3    16. Deep vein thrombosis (DVT) of proximal vein of both lower extremities, unspecified chronicity  continue  - apixaban (ELIQUIS) 5 mg Tab; Take 1 tablet (5 mg total) by mouth 2 (two) times daily.  Dispense: 180 tablet; Refill: 3    17. Pulmonary nodules  Stable. Cont monitoring  - mometasone-formoterol (DULERA) 100-5 mcg/actuation HFAA; Inhale 2 puffs into the lungs 2 (two) times daily.  Dispense: 13 g; Refill: 3    18. Mild persistent asthma without complication  Controlled on current medications.  Continue current medications.    - mometasone-formoterol (DULERA) 100-5 mcg/actuation HFAA; Inhale 2 puffs into the lungs 2 (two) times daily.  Dispense: 13 g; Refill: 3    19. At high risk for breast cancer  Screen and treat as indicated:    - MRI Breast w/wo Contrast, w/CAD, Bilateral; Future  Assessment & Plan    - Explained rationale for thyroid hormone dosage adjustments and inability to split pills due to microdosing.  - Discussed relative contraindication of  GLP-1 agonists in patients with history of pancreatitis; advised on potential increased risk and symptoms to monitor.  - Monitor for upper abdominal pain if starting GLP-1 agonist for diabetes/weight management.  - Increased levothyroxine from 75 mcg to 88 mcg daily; patient to finish current supply before starting new dose.  - Continued Eliquis.  - Continued Fosamax.  - Continued To Fely (inhaler).  - MRI of breast ordered for cancer screening.  - Bone density scan (x-ray of hip and spine) ordered.  - Follow up with Nurse Practitioner Darlene this week to investigate cause of fainting episode.  - Follow up in 6 months.         Time spent with patient: 20 minutes  Patient with be reevaluated in 6 months or sooner prn  Greater than 50% of this visit was spent counseling as described in above documentation:Yes  This note was generated with the assistance of ambient listening technology. Verbal consent was obtained by the patient and accompanying visitor(s) for the recording of patient appointment to facilitate this note. I attest to having reviewed and edited the generated note for accuracy, though some syntax or spelling errors may persist. Please contact the author of this note for any clarification.

## 2025-02-10 NOTE — TELEPHONE ENCOUNTER
----- Message from Pebbles Wells sent at 11/20/2023 10:45 AM CST -----  Type:  Patient Returning Call    Who Called:  pt   Who Left Message for Patient:  nurse   Does the patient know what this is regarding?:  no  Best Call Back Number:  [phone]    Additional Information:  please advise        Time: 3:19 PM EST  Date of encounter:  2/10/2025  Independent Historian/Clinical History and Information was obtained by:   Patient and Family  Chief Complaint: Constipation    History is limited by: N/A    History of Present Illness:  Patient is a 60 y.o. year old female who presents to the emergency department for evaluation of constipation.  Patient states she has not had a bowel movement in more than 10 days.  Patient denies any vomiting.  Patient does admit to some abdominal pain but states it feels like it is for chronic cancer pain and definitely not any kind new pain.  Patient has tried multiple over-the-counter medications including MiraLAX, fleets enema, and prunes.  Patient has had no vomiting with this and thus presents to the ER for evaluation at this time.    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    Pacemaker     Pneumonia Jan 2023    TMJ dysfunction Madina     Past Surgical History:   Procedure Laterality Date    BREAST BIOPSY      CARDIAC ELECTROPHYSIOLOGY PROCEDURE N/A 01/25/2023    Procedure: Device Implant;  Surgeon: VINCE Alejandre MD;  Location: [facility] INVASIVE LOCATION;  Service: Cardiology;  Laterality: N/A;    CARDIAC SURGERY      heart surgery as an infant    COLONOSCOPY  August 2017    PACEMAKER IMPLANTATION       Family History   Problem Relation Age of Onset    Cancer Mother     Thyroid disease Mother     Heart disease Father     Asthma Daughter        Home Medications:  Prior to Admission medications    Medication Sig Start Date End Date Taking? Authorizing Provider   amLODIPine (NORVASC) 2.5 MG tablet Take 1 tablet by mouth once daily 4/15/24   VINCE Alejandre MD   aspirin 81 MG chewable tablet Chew 1 tablet Daily.  Patient  "not taking: Reported on 1/28/2025    ProviderJohnathon MD EQ Allergy Relief, Cetirizine, 10 MG tablet Take 1 tablet by mouth once daily 1/23/25   Kady Vernon APRN   fluticasone (FLONASE) 50 MCG/ACT nasal spray 2 sprays into the nostril(s) as directed by provider Daily. 3/25/24   Kady Vernon APRN   lidocaine-prilocaine (EMLA) 2.5-2.5 % cream Apply 1 Application topically to the appropriate area as directed Take As Directed. Apply to port site 30 minutes prior to use 1/28/25   Waylon Arndt MD   metoprolol succinate XL (TOPROL-XL) 25 MG 24 hr tablet Take 1 tablet by mouth Daily. 3/6/24   Johnathon Cope MD   montelukast (SINGULAIR) 10 MG tablet TAKE 1 TABLET BY MOUTH ONCE DAILY AT NIGHT 12/13/24   Kady Vernon APRN   OLANZapine (zyPREXA) 5 MG tablet Take 1 tablet by mouth Every Night. 1/28/25   Waylon Arndt MD        Social History:   Social History     Tobacco Use    Smoking status: Never     Passive exposure: Never    Smokeless tobacco: Never   Vaping Use    Vaping status: Never Used   Substance Use Topics    Alcohol use: Never    Drug use: Never         Review of Systems:  Review of Systems   Constitutional:  Negative for chills and fever.   HENT:  Negative for congestion, ear pain and sore throat.    Eyes:  Negative for pain.   Respiratory:  Negative for cough, chest tightness and shortness of breath.    Cardiovascular:  Negative for chest pain.   Gastrointestinal:  Positive for constipation. Negative for abdominal pain (Chronic), diarrhea, nausea and vomiting.   Genitourinary:  Negative for flank pain and hematuria.   Musculoskeletal:  Negative for joint swelling.   Skin:  Negative for pallor.   Neurological:  Negative for seizures and headaches.   All other systems reviewed and are negative.       Physical Exam:  /83   Pulse 98   Temp 97.9 °F (36.6 °C) (Oral)   Resp 19   Ht 160 cm (62.99\")   Wt 60.3 kg (132 lb 15 oz)   SpO2 97%   BMI 23.55 kg/m²     Physical " Exam    Vital signs were reviewed under triage note.  General appearance - Patient appears well-developed and well-nourished.  Patient is in no acute distress.  Head - Normocephalic, atraumatic.  Pupils - Equal, round, reactive to light.  Extraocular muscles are intact.  Conjunctiva is clear.  Nasal - Normal inspection.  No evidence of trauma or epistaxis.  Tympanic membranes - Gray, intact without erythema or retractions.  Oral mucosa - Pink and moist without lesions or erythema.  Uvula is midline.  Chest wall - Atraumatic.  Chest wall is nontender.  There are no vesicular rashes noted.  Neck - Supple.  Trachea was midline.  There is no palpable lymphadenopathy or thyromegaly.  There are no meningeal signs  Lungs - Clear to auscultation and percussion bilaterally.  Heart - Regular rate and rhythm without any murmurs, clicks, or gallops.  Abdomen - Soft.  Bowel sounds are present.  There is moderate mid to lower abdominal palpable tenderness.  Patient reports this is no different than her chronic abdominal pain.  There is no rebound, guarding, or rigidity.  There are no palpable masses.  There are no pulsatile masses.  Rectal - There were no external lesions noted.  Patient normal sphincter tone.  Rectal vault had a large amount of firm brown stool.  Back - Spine is straight and midline.  There is no CVA tenderness.  Extremities - Intact x4 with full range of motion.  There is no palpable edema.  Pulses are intact x4 and equal.  Neurologic - Patient is awake, alert, and oriented x3.  Cranial nerves II through XII are grossly intact.  Motor and sensory functions grossly intact.  Cerebellar function was normal.  Integument - There are no rashes.  There are no petechia or purpura lesions noted.  There are no vesicular lesions noted.           Procedures:  Procedures      Medical Decision Making:      Comorbidities that affect care:    Hypertension, hyperlipidemia, ovarian cancer    External Notes reviewed:    Previous  Clinic Note: Office visit with Dr. Arndt on 1/28/2025 was reviewed by me.      The following orders were placed and all results were independently analyzed by me:  Orders Placed This Encounter   Procedures    Soap suds enema       Medications Given in the Emergency Department:  Medications - No data to display     ED Course:     The patient was seen and evaluated in the ED by me.  The above history and physical examination was performed as documented.  Patient was treated with a soapsuds enema.  Patient reports she had a very large bowel movement.  Patient reports she is feeling much better.  At this time, patient is stable for discharge home.    Labs:    Lab Results (last 24 hours)       ** No results found for the last 24 hours. **             Imaging:    No Radiology Exams Resulted Within Past 24 Hours      Differential Diagnosis and Discussion:    Abdominal Pain: Based on the patient's signs and symptoms, I considered abdominal aortic aneurysm, small bowel obstruction, pancreatitis, acute cholecystitis, acute appendecitis, peptic ulcer disease, gastritis, colitis, endocrine disorders, irritable bowel syndrome and other differential diagnosis an etiology of the patient's abdominal pain.        [facility]           Patient Care Considerations:    LABS: I considered ordering labs, however laboratory data would not alter the ED treatment course or plan.      Consultants/Shared Management Plan:    None    Social Determinants of Health:    Patient is independent, reliable, and has access to care.       Disposition and Care Coordination:    Discharged: The patient is suitable and stable for discharge with no need for consideration of admission.    I have explained the patient´s condition, diagnoses and treatment plan based on the information available to me at this time. I have answered questions and addressed any concerns. The patient has a good  understanding of the patient´s diagnosis, condition, and treatment plan as can  be expected at this point. The vital signs have been stable. The patient´s condition is stable and appropriate for discharge from the emergency department.      The patient will pursue further outpatient evaluation with the primary care physician or other designated or consulting physician as outlined in the discharge instructions. They are agreeable to this plan of care and follow-up instructions have been explained in detail. The patient has received these instructions in written format and has expressed an understanding of the discharge instructions. The patient is aware that any significant change in condition or worsening of symptoms should prompt an immediate return to this or the closest emergency department or call to 911.    Final diagnoses:   Constipation, unspecified constipation type        ED Disposition       ED Disposition   Discharge    Condition   Stable    Comment   --               This medical record created using voice recognition software.             Jignesh dEward DO  02/12/25 1400

## 2025-02-17 ENCOUNTER — OFFICE VISIT (OUTPATIENT)
Dept: FAMILY MEDICINE | Facility: CLINIC | Age: 85
End: 2025-02-17
Payer: MEDICARE

## 2025-02-17 VITALS
OXYGEN SATURATION: 97 % | DIASTOLIC BLOOD PRESSURE: 62 MMHG | WEIGHT: 227.5 LBS | HEART RATE: 90 BPM | TEMPERATURE: 98 F | BODY MASS INDEX: 40.31 KG/M2 | SYSTOLIC BLOOD PRESSURE: 110 MMHG | HEIGHT: 63 IN

## 2025-02-17 DIAGNOSIS — J40 BRONCHITIS: ICD-10-CM

## 2025-02-17 DIAGNOSIS — J10.1 INFLUENZA A: ICD-10-CM

## 2025-02-17 DIAGNOSIS — R05.9 COUGH, UNSPECIFIED TYPE: Primary | ICD-10-CM

## 2025-02-17 LAB
CTP QC/QA: YES
CTP QC/QA: YES
POC MOLECULAR INFLUENZA A AGN: POSITIVE
POC MOLECULAR INFLUENZA B AGN: NEGATIVE
SARS-COV-2 RDRP RESP QL NAA+PROBE: NEGATIVE

## 2025-02-17 RX ORDER — AZITHROMYCIN 250 MG/1
TABLET, FILM COATED ORAL
Qty: 6 TABLET | Refills: 0 | Status: SHIPPED | OUTPATIENT
Start: 2025-02-17 | End: 2025-02-22

## 2025-02-17 RX ORDER — PREDNISONE 20 MG/1
20 TABLET ORAL 2 TIMES DAILY
Qty: 10 TABLET | Refills: 0 | Status: ON HOLD | OUTPATIENT
Start: 2025-02-17 | End: 2025-02-20 | Stop reason: HOSPADM

## 2025-02-17 RX ORDER — IPRATROPIUM BROMIDE AND ALBUTEROL SULFATE 2.5; .5 MG/3ML; MG/3ML
3 SOLUTION RESPIRATORY (INHALATION) EVERY 6 HOURS PRN
Qty: 75 ML | Refills: 0 | Status: SHIPPED | OUTPATIENT
Start: 2025-02-17 | End: 2026-02-17

## 2025-02-17 RX ORDER — PROMETHAZINE HYDROCHLORIDE AND DEXTROMETHORPHAN HYDROBROMIDE 6.25; 15 MG/5ML; MG/5ML
5 SYRUP ORAL EVERY 8 HOURS PRN
Qty: 118 ML | Refills: 0 | Status: SHIPPED | OUTPATIENT
Start: 2025-02-17

## 2025-02-17 RX ORDER — OSELTAMIVIR PHOSPHATE 30 MG/1
30 CAPSULE ORAL 2 TIMES DAILY
Qty: 8 CAPSULE | Refills: 0 | Status: ON HOLD | OUTPATIENT
Start: 2025-02-17 | End: 2025-02-20

## 2025-02-17 RX ORDER — BENZONATATE 100 MG/1
100 CAPSULE ORAL 3 TIMES DAILY PRN
Qty: 30 CAPSULE | Refills: 0 | Status: SHIPPED | OUTPATIENT
Start: 2025-02-17 | End: 2025-02-27

## 2025-02-17 RX ORDER — OSELTAMIVIR PHOSPHATE 75 MG/1
75 CAPSULE ORAL ONCE
Qty: 1 CAPSULE | Refills: 0 | Status: SHIPPED | OUTPATIENT
Start: 2025-02-17 | End: 2025-02-20 | Stop reason: HOSPADM

## 2025-02-17 RX ORDER — ALBUTEROL SULFATE 90 UG/1
2 INHALANT RESPIRATORY (INHALATION) EVERY 6 HOURS PRN
Qty: 18 G | Refills: 0 | Status: CANCELLED | OUTPATIENT
Start: 2025-02-17

## 2025-02-17 NOTE — PROGRESS NOTES
"Subjective:       Patient ID: Danna Sorensen is a 84 y.o. female.    Chief Complaint: Cough, Shortness of Breath, Sore Throat, and Fatigue     HPI   83 y/o female patient with medical problems listed below presents for fatigue, rhinorrhea with yellowish mucus, cough, sob, losing voice, wheezing, body ache, fever, chills for a week. Denies recent travel but states exposed to flu from grand grandson. States cough is productive with yellowish greenish phlegm. She currently takes nyquil and tylenol. Patient states has asthma.     Problem List[1]     Review of patient's allergies indicates:   Allergen Reactions    Aspirin      Other reaction(s): Stomach upset    Aspirin Other (See Comments)     Other reaction(s): Stomach upset    Gabapentin Other (See Comments)     Pt states she felt "funny" when she took the medication. Especially at the higher dose.    Mobic [meloxicam] Swelling     Edema and elevated blood pressure     Oxaliplatin Other (See Comments)     Other reaction(s): Joint pain  Other reaction(s): Itching  Other reaction(s): Hives  Other reaction(s): Joint pain  Other reaction(s): Itching  Other reaction(s): Hives    Pregabalin Other (See Comments)     Side effects  Side effects     Past Surgical History:   Procedure Laterality Date    CARDIAC SURGERY      cardiac cath    COLON SURGERY      colon resection    COLONOSCOPY N/A 10/18/2016    Procedure: COLONOSCOPY;  Surgeon: Rell Cordova MD;  Location: [facility];  Service: Endoscopy;  Laterality: N/A;    COLONOSCOPY N/A 8/8/2023    Procedure: COLONOSCOPY;  Surgeon: Kaushik Pinon MD;  Location: [facility];  Service: Endoscopy;  Laterality: N/A;    COLONOSCOPY N/A 8/22/2023    Procedure: COLONOSCOPY (tattoo of colon ca);  Surgeon: Kaushik Pinon MD;  Location: [facility];  Service: Endoscopy;  Laterality: N/A;    COLONOSCOPY N/A 8/12/2024    Procedure: COLONOSCOPY;  Surgeon: Jim Chavez MD;  Location: [facility];  Service: General;  Laterality: N/A;    " ESOPHAGOGASTRODUODENOSCOPY N/A 8/3/2023    Procedure: EGD (ESOPHAGOGASTRODUODENOSCOPY);  Surgeon: Kaushik Pinon MD;  Location: [facility];  Service: Endoscopy;  Laterality: N/A;    HAND TENDON SURGERY Left     HEMICOLECTOMY      right    INJECTION OF ANESTHETIC AGENT AROUND MEDIAL BRANCH NERVES INNERVATING LUMBAR FACET JOINT Right 07/10/2018    Procedure: Block-nerve-medial branch-lumbar;  Surgeon: Parish Gaitan MD;  Location: [facility] OR;  Service: Pain Management;  Laterality: Right;  L3, 4, 5    INSERTION OF INFERIOR VENA CAVAL FILTER N/A 9/13/2023    Procedure: Insertion, Filter, Inferior Vena Cava;  Surgeon: Oren Verdin MD;  Location: [facility] CATH/EP LAB;  Service: General;  Laterality: N/A;    INSERTION OF TUNNELED CENTRAL VENOUS CATHETER (CVC) WITH SUBCUTANEOUS PORT Right 11/15/2023    Procedure: ISQAOHNIF-MOAB-R-CATH;  Surgeon: Jim Chavez MD;  Location: [facility] OR;  Service: General;  Laterality: Right;    JOINT REPLACEMENT      bilateral    RADIOFREQUENCY ABLATION OF LUMBAR MEDIAL BRANCH NERVE AT SINGLE LEVEL Right 07/24/2018    Procedure: RADIOFREQUENCY ABLATION, NERVE, MEDIAL BRANCH, LUMBAR, 1 LEVEL;  Surgeon: Parish Gaitan MD;  Location: [facility] OR;  Service: Pain Management;  Laterality: Right;  L3,4,5 - Burned at 80 degrees C. for 75 seconds x 2 each site    ROBOT-ASSISTED COLECTOMY N/A 9/29/2023    Procedure: ROBOTIC COLECTOMY;  Surgeon: Jim Chavez MD;  Location: [facility];  Service: General;  Laterality: N/A;    SHOULDER ARTHROSCOPY Right 01/12/2017    Reverse     TONSILLECTOMY      TONSILLECTOMY, ADENOIDECTOMY, BILATERAL MYRINGOTOMY AND TUBES      TOTAL KNEE ARTHROPLASTY Bilateral 08/1998    total replacements    TUMOR REMOVAL Left     left foot as a child      Current Medications[2]    Review of Systems   Constitutional:  Positive for chills, fatigue and fever.   HENT:  Positive for rhinorrhea.    Respiratory:  Positive for cough, shortness of breath and wheezing.    Cardiovascular:  Negative  "for chest pain and palpitations.   Gastrointestinal:  Negative for abdominal pain.   Neurological:  Negative for dizziness and headaches.       Objective:   /62 (BP Location: Left arm, Patient Position: Sitting)   Pulse 90   Temp 97.8 °F (36.6 °C) (Oral)   Ht 5' 2.5" (1.588 m)   Wt 103.2 kg (227 lb 8.2 oz)   SpO2 97%   BMI 40.95 kg/m²         Physical Exam  Constitutional:       Appearance: Normal appearance. She is ill-appearing.   HENT:      Head: Atraumatic.   Cardiovascular:      Rate and Rhythm: Normal rate and regular rhythm.      Pulses: Normal pulses.      Heart sounds: Normal heart sounds.   Pulmonary:      Effort: Pulmonary effort is normal.      Breath sounds: Wheezing and rhonchi present.   Abdominal:      General: Abdomen is flat. Bowel sounds are normal.      Palpations: Abdomen is soft.   Neurological:      Mental Status: She is oriented to person, place, and time.         Assessment:       1. Cough, unspecified type    2. Bronchitis    3. Influenza A        Plan:       1. Cough, unspecified type (Primary)  - POCT Influenza A/B Molecular  - POCT COVID-19 Rapid Screening    2. Bronchitis  - benzonatate (TESSALON) 100 MG capsule; Take 1 capsule (100 mg total) by mouth 3 (three) times daily as needed for Cough.  Dispense: 30 capsule; Refill: 0  - promethazine-dextromethorphan (PROMETHAZINE-DM) 6.25-15 mg/5 mL Syrp; Take 5 mLs by mouth every 8 (eight) hours as needed (cough).  Dispense: 118 mL; Refill: 0  - azithromycin (Z-JACQUELIN) 250 MG tablet; Take 2 tablets by mouth on day 1; Take 1 tablet by mouth on days 2-5  Dispense: 6 tablet; Refill: 0  - predniSONE (DELTASONE) 20 MG tablet; Take 1 tablet (20 mg total) by mouth 2 (two) times daily. for 5 days  Dispense: 10 tablet; Refill: 0  - X-Ray Chest PA And Lateral; Future  - albuterol-ipratropium (DUO-NEB) 2.5 mg-0.5 mg/3 mL nebulizer solution; Take 3 mLs by nebulization every 6 (six) hours as needed for Wheezing. Rescue  Dispense: 75 mL; Refill: " 0    3. Influenza A   - Will treat with renal dose of tamiflu since patient is elderly, ill appearing and has asthma though symptom onset >48 hours   - oseltamivir (TAMIFLU) 75 MG capsule; Take 1 capsule (75 mg total) by mouth once. for 1 dose  Dispense: 1 capsule; Refill: 0  - oseltamivir (TAMIFLU) 30 MG capsule; Take 1 capsule (30 mg total) by mouth 2 (two) times daily. Take 75 mg  1 dose, then 30 mg twice daily for 4 days  Dispense: 8 capsule; Refill: 0     Patient with be reevaluated in  2 weeks  or sooner prn  Chantelle NP       [1]   Patient Active Problem List  Diagnosis    Hypothyroid    Nuclear sclerosis    Hyperopia with astigmatism and presbyopia    DDD (degenerative disc disease), lumbar    Morbid obesity with BMI of 40.0-44.9, adult    Calcified granuloma of lung    History of colon cancer    Lumbar radiculitis    Other spondylosis, lumbar region    Chronic right-sided low back pain without sciatica    Vitamin D deficiency    Essential hypertension    Malignant neoplasm of colon    Decreased functional mobility    Muscle tightness    Decreased strength    Decreased range of motion    Venous lake of lip    GERD (gastroesophageal reflux disease)    Adenocarcinoma    DVT (deep vein thrombosis) in pregnancy    History of pulmonary embolus (PE)    Malignant neoplasm of splenic flexure    Insomnia    Chemotherapy-induced fatigue    Other low back pain    S/P IVC filter    Left renal mass    Port-A-Cath in place    Iron deficiency anemia    Immunodeficiency due to chemotherapy    Atherosclerosis of aorta    Class 3 severe obesity due to excess calories with serious comorbidity and body mass index (BMI) of 40.0 to 44.9 in adult    S/P partial colectomy    Stage 3b chronic kidney disease    Pulmonary fibrosis, unspecified   [2]   Current Outpatient Medications:     acetaminophen (TYLENOL) 650 MG TbSR, Take 650 mg by mouth every 8 (eight) hours., Disp: , Rfl:     albuterol (PROVENTIL/VENTOLIN HFA) 90 mcg/actuation  inhaler, Inhale 2 puffs into the lungs every 6 (six) hours as needed for Wheezing., Disp: 8.5 g, Rfl: 3    alendronate (FOSAMAX) 70 MG tablet, TAKE 1 TABLET(70 MG) BY MOUTH EVERY 7 DAYS, Disp: 12 tablet, Rfl: 3    apixaban (ELIQUIS) 5 mg Tab, Take 1 tablet (5 mg total) by mouth 2 (two) times daily., Disp: 180 tablet, Rfl: 3    azelastine (ASTELIN) 137 mcg (0.1 %) nasal spray, 1 spray (137 mcg total) by Nasal route 2 (two) times daily. Point up and slightly outward toward ear when spraying to avoid irritating nasal septum., Disp: 30 mL, Rfl: 2    cyclobenzaprine (FLEXERIL) 5 MG tablet, Take 1 tablet (5 mg total) by mouth 3 (three) times daily as needed for Muscle spasms., Disp: 90 tablet, Rfl: 0    ergocalciferol, vitamin D2, (VITAMIN D ORAL), Take 2,000 mg by mouth once daily., Disp: , Rfl:     ferrous sulfate (FEOSOL) Tab tablet, Take 1 tablet by mouth daily with breakfast., Disp: , Rfl:     fluticasone propionate (FLONASE) 50 mcg/actuation nasal spray, 1 spray (50 mcg total) by Each Nostril route once daily., Disp: 16 g, Rfl: 0    furosemide (LASIX) 20 MG tablet, Take 1 tablet (20 mg total) by mouth daily as needed (edema)., Disp: 90 tablet, Rfl: 3    hydrOXYzine HCL (ATARAX) 25 MG tablet, Take 1 tablet (25 mg total) by mouth 3 (three) times daily as needed for Anxiety (insomnia)., Disp: 30 tablet, Rfl: PRN    levocetirizine (XYZAL) 5 MG tablet, TAKE 1 TABLET(5 MG) BY MOUTH EVERY NIGHT AS NEEDED FOR ALLERGIES, Disp: 90 tablet, Rfl: 3    levothyroxine (SYNTHROID) 88 MCG tablet, Take 1 tablet (88 mcg total) by mouth once daily., Disp: 90 tablet, Rfl: 3    lisinopriL (PRINIVIL,ZESTRIL) 30 MG tablet, TAKE 1 TABLET BY MOUTH EVERY DAY, Disp: 90 tablet, Rfl: 3    mometasone-formoterol (DULERA) 100-5 mcg/actuation HFAA, Inhale 2 puffs into the lungs 2 (two) times daily., Disp: 13 g, Rfl: 3    multivitamin capsule, Take 1 capsule by mouth once daily., Disp: , Rfl:     mupirocin (BACTROBAN) 2 % ointment, Apply topically 3  (three) times daily., Disp: 30 g, Rfl: 0    nystatin (MYCOSTATIN) 100,000 unit/mL suspension, Take 4 mLs by mouth 4 (four) times daily with meals and nightly., Disp: , Rfl:     omeprazole (PRILOSEC) 40 MG capsule, TAKE 1 CAPSULE(40 MG) BY MOUTH TWICE DAILY BEFORE MEALS, Disp: 180 capsule, Rfl: PRN    promethazine (PHENERGAN) 12.5 MG Tab, (Take 1-2 tabs every 6 hours as needed for nausea persistent despite zofran), Disp: 40 tablet, Rfl: 3    promethazine-dextromethorphan (PROMETHAZINE-DM) 6.25-15 mg/5 mL Syrp, Take 5 mLs by mouth nightly as needed (cough)., Disp: 118 mL, Rfl: 0    ramelteon (ROZEREM) 8 mg tablet, Take 1 tablet (8 mg total) by mouth every evening., Disp: 30 tablet, Rfl: 1    silver sulfADIAZINE 1% (SILVADENE) 1 % cream, Apply topically once daily., Disp: 85 g, Rfl: PRN    traMADoL (ULTRAM) 50 mg tablet, Take 1 tablet (50 mg total) by mouth every 8 (eight) hours as needed for Pain., Disp: 21 each, Rfl: 0    triamcinolone acetonide 0.1% (KENALOG) 0.1 % cream, APPLY TOPICALLY TO THE AFFECTED AREA TWICE DAILY, Disp: 60 g, Rfl: 0    albuterol-ipratropium (DUO-NEB) 2.5 mg-0.5 mg/3 mL nebulizer solution, Take 3 mLs by nebulization every 6 (six) hours as needed for Wheezing. Rescue, Disp: 75 mL, Rfl: 0    azithromycin (Z-JACQUELIN) 250 MG tablet, Take 2 tablets by mouth on day 1; Take 1 tablet by mouth on days 2-5, Disp: 6 tablet, Rfl: 0    benzonatate (TESSALON) 100 MG capsule, Take 1 capsule (100 mg total) by mouth 3 (three) times daily as needed for Cough., Disp: 30 capsule, Rfl: 0    oseltamivir (TAMIFLU) 30 MG capsule, Take 1 capsule (30 mg total) by mouth 2 (two) times daily. Take 75 mg  1 dose, then 30 mg twice daily for 4 days, Disp: 8 capsule, Rfl: 0    oseltamivir (TAMIFLU) 75 MG capsule, Take 1 capsule (75 mg total) by mouth once. for 1 dose, Disp: 1 capsule, Rfl: 0    predniSONE (DELTASONE) 20 MG tablet, Take 1 tablet (20 mg total) by mouth 2 (two) times daily. for 5 days, Disp: 10 tablet, Rfl: 0     promethazine-dextromethorphan (PROMETHAZINE-DM) 6.25-15 mg/5 mL Syrp, Take 5 mLs by mouth every 8 (eight) hours as needed (cough)., Disp: 118 mL, Rfl: 0  No current facility-administered medications for this visit.    Facility-Administered Medications Ordered in Other Visits:     0.9%  NaCl infusion, , Intravenous, Once, Oren Verdin MD

## 2025-02-18 ENCOUNTER — HOSPITAL ENCOUNTER (INPATIENT)
Facility: HOSPITAL | Age: 85
LOS: 2 days | Discharge: HOME | DRG: 194 | End: 2025-02-20
Attending: STUDENT IN AN ORGANIZED HEALTH CARE EDUCATION/TRAINING PROGRAM | Admitting: STUDENT IN AN ORGANIZED HEALTH CARE EDUCATION/TRAINING PROGRAM
Payer: MEDICARE

## 2025-02-18 ENCOUNTER — RESULTS FOLLOW-UP (OUTPATIENT)
Dept: FAMILY MEDICINE | Facility: CLINIC | Age: 85
End: 2025-02-18

## 2025-02-18 ENCOUNTER — TELEPHONE (OUTPATIENT)
Dept: FAMILY MEDICINE | Facility: CLINIC | Age: 85
End: 2025-02-18
Payer: MEDICARE

## 2025-02-18 ENCOUNTER — HOSPITAL ENCOUNTER (OUTPATIENT)
Dept: RADIOLOGY | Facility: CLINIC | Age: 85
Discharge: HOME | End: 2025-02-18
Attending: NURSE PRACTITIONER
Payer: MEDICARE

## 2025-02-18 DIAGNOSIS — J18.9 MULTIFOCAL PNEUMONIA: Primary | ICD-10-CM

## 2025-02-18 DIAGNOSIS — R06.03 RESPIRATORY DISTRESS: ICD-10-CM

## 2025-02-18 DIAGNOSIS — J10.1 INFLUENZA A: ICD-10-CM

## 2025-02-18 DIAGNOSIS — N39.0 URINARY TRACT INFECTION WITHOUT HEMATURIA, SITE UNSPECIFIED: ICD-10-CM

## 2025-02-18 DIAGNOSIS — J40 BRONCHITIS: ICD-10-CM

## 2025-02-18 DIAGNOSIS — Z13.6 SCREENING FOR CARDIOVASCULAR CONDITION: ICD-10-CM

## 2025-02-18 DIAGNOSIS — J84.10 PULMONARY FIBROSIS, UNSPECIFIED: ICD-10-CM

## 2025-02-18 PROBLEM — N30.00 ACUTE CYSTITIS WITHOUT HEMATURIA: Status: ACTIVE | Noted: 2025-02-18

## 2025-02-18 LAB
ALBUMIN SERPL BCP-MCNC: 3.2 G/DL (ref 3.5–5.2)
ALLENS TEST: NORMAL
ALP SERPL-CCNC: 61 U/L (ref 40–150)
ALT SERPL W/O P-5'-P-CCNC: 19 U/L (ref 10–44)
ANION GAP SERPL CALC-SCNC: 12 MMOL/L (ref 8–16)
AST SERPL-CCNC: 12 U/L (ref 10–40)
BACTERIA #/AREA URNS HPF: ABNORMAL /HPF
BASOPHILS # BLD AUTO: ABNORMAL K/UL (ref 0–0.2)
BASOPHILS NFR BLD: 0 % (ref 0–1.9)
BILIRUB SERPL-MCNC: 1.1 MG/DL (ref 0.1–1)
BILIRUB UR QL STRIP: NEGATIVE
BNP SERPL-MCNC: 37 PG/ML (ref 0–99)
BUN SERPL-MCNC: 17 MG/DL (ref 8–23)
CALCIUM SERPL-MCNC: 8.5 MG/DL (ref 8.7–10.5)
CHLORIDE SERPL-SCNC: 110 MMOL/L (ref 95–110)
CK SERPL-CCNC: 36 U/L (ref 20–180)
CLARITY UR: ABNORMAL
CO2 SERPL-SCNC: 18 MMOL/L (ref 23–29)
COLOR UR: YELLOW
CREAT SERPL-MCNC: 0.9 MG/DL (ref 0.5–1.4)
DELSYS: NORMAL
DIFFERENTIAL METHOD BLD: ABNORMAL
EOSINOPHIL # BLD AUTO: ABNORMAL K/UL (ref 0–0.5)
EOSINOPHIL NFR BLD: 0 % (ref 0–8)
ERYTHROCYTE [DISTWIDTH] IN BLOOD BY AUTOMATED COUNT: 13.8 % (ref 11.5–14.5)
EST. GFR  (NO RACE VARIABLE): >60 ML/MIN/1.73 M^2
GLUCOSE SERPL-MCNC: 102 MG/DL (ref 70–110)
GLUCOSE UR QL STRIP: NEGATIVE
HCT VFR BLD AUTO: 33.9 % (ref 37–48.5)
HCV AB SERPL QL IA: NEGATIVE
HGB BLD-MCNC: 11.1 G/DL (ref 12–16)
HGB UR QL STRIP: ABNORMAL
HIV 1+2 AB+HIV1 P24 AG SERPL QL IA: NEGATIVE
HYALINE CASTS #/AREA URNS LPF: 1 /LPF
IMM GRANULOCYTES # BLD AUTO: ABNORMAL K/UL (ref 0–0.04)
IMM GRANULOCYTES NFR BLD AUTO: ABNORMAL % (ref 0–0.5)
KETONES UR QL STRIP: NEGATIVE
LACTATE SERPL-SCNC: 1.2 MMOL/L (ref 0.5–2.2)
LDH SERPL L TO P-CCNC: 1.41 MMOL/L (ref 0.5–2.2)
LEUKOCYTE ESTERASE UR QL STRIP: ABNORMAL
LYMPHOCYTES # BLD AUTO: ABNORMAL K/UL (ref 1–4.8)
LYMPHOCYTES NFR BLD: 5 % (ref 18–48)
MAGNESIUM SERPL-MCNC: 1.9 MG/DL (ref 1.6–2.6)
MCH RBC QN AUTO: 30.9 PG (ref 27–31)
MCHC RBC AUTO-ENTMCNC: 32.7 G/DL (ref 32–36)
MCV RBC AUTO: 94 FL (ref 82–98)
MICROSCOPIC COMMENT: ABNORMAL
MODE: NORMAL
MONOCYTES # BLD AUTO: ABNORMAL K/UL (ref 0.3–1)
MONOCYTES NFR BLD: 7 % (ref 4–15)
NEUTROPHILS NFR BLD: 83 % (ref 38–73)
NEUTS BAND NFR BLD MANUAL: 5 %
NITRITE UR QL STRIP: POSITIVE
NRBC BLD-RTO: 0 /100 WBC
PH UR STRIP: 6 [PH] (ref 5–8)
PLATELET # BLD AUTO: 168 K/UL (ref 150–450)
PLATELET BLD QL SMEAR: ABNORMAL
PMV BLD AUTO: 10.5 FL (ref 9.2–12.9)
POTASSIUM SERPL-SCNC: 3.3 MMOL/L (ref 3.5–5.1)
PROT SERPL-MCNC: 7 G/DL (ref 6–8.4)
PROT UR QL STRIP: ABNORMAL
RBC # BLD AUTO: 3.59 M/UL (ref 4–5.4)
RBC #/AREA URNS HPF: 2 /HPF (ref 0–4)
SAMPLE: NORMAL
SITE: NORMAL
SODIUM SERPL-SCNC: 140 MMOL/L (ref 136–145)
SP GR UR STRIP: 1.02 (ref 1–1.03)
SQUAMOUS #/AREA URNS HPF: 1 /HPF
TROPONIN I SERPL DL<=0.01 NG/ML-MCNC: <0.006 NG/ML (ref 0–0.03)
URN SPEC COLLECT METH UR: ABNORMAL
UROBILINOGEN UR STRIP-ACNC: NEGATIVE EU/DL
WBC # BLD AUTO: 14.25 K/UL (ref 3.9–12.7)
WBC #/AREA URNS HPF: 53 /HPF (ref 0–5)

## 2025-02-18 PROCEDURE — 63600175 PHARM REV CODE 636 W HCPCS: Performed by: NURSE PRACTITIONER

## 2025-02-18 PROCEDURE — 94640 AIRWAY INHALATION TREATMENT: CPT

## 2025-02-18 PROCEDURE — 93010 ELECTROCARDIOGRAM REPORT: CPT | Mod: ,,, | Performed by: GENERAL PRACTICE

## 2025-02-18 PROCEDURE — 83735 ASSAY OF MAGNESIUM: CPT | Performed by: STUDENT IN AN ORGANIZED HEALTH CARE EDUCATION/TRAINING PROGRAM

## 2025-02-18 PROCEDURE — 25000003 PHARM REV CODE 250: Performed by: STUDENT IN AN ORGANIZED HEALTH CARE EDUCATION/TRAINING PROGRAM

## 2025-02-18 PROCEDURE — 81000 URINALYSIS NONAUTO W/SCOPE: CPT | Performed by: STUDENT IN AN ORGANIZED HEALTH CARE EDUCATION/TRAINING PROGRAM

## 2025-02-18 PROCEDURE — 99900035 HC TECH TIME PER 15 MIN (STAT)

## 2025-02-18 PROCEDURE — 36415 COLL VENOUS BLD VENIPUNCTURE: CPT | Performed by: STUDENT IN AN ORGANIZED HEALTH CARE EDUCATION/TRAINING PROGRAM

## 2025-02-18 PROCEDURE — 36415 COLL VENOUS BLD VENIPUNCTURE: CPT | Performed by: EMERGENCY MEDICINE

## 2025-02-18 PROCEDURE — 94799 UNLISTED PULMONARY SVC/PX: CPT

## 2025-02-18 PROCEDURE — 85007 BL SMEAR W/DIFF WBC COUNT: CPT | Performed by: STUDENT IN AN ORGANIZED HEALTH CARE EDUCATION/TRAINING PROGRAM

## 2025-02-18 PROCEDURE — 99285 EMERGENCY DEPT VISIT HI MDM: CPT | Mod: 25

## 2025-02-18 PROCEDURE — 71046 X-RAY EXAM CHEST 2 VIEWS: CPT | Mod: TC,FY,PO

## 2025-02-18 PROCEDURE — 83880 ASSAY OF NATRIURETIC PEPTIDE: CPT | Performed by: STUDENT IN AN ORGANIZED HEALTH CARE EDUCATION/TRAINING PROGRAM

## 2025-02-18 PROCEDURE — 11000001 HC ACUTE MED/SURG PRIVATE ROOM

## 2025-02-18 PROCEDURE — 87186 SC STD MICRODIL/AGAR DIL: CPT | Performed by: STUDENT IN AN ORGANIZED HEALTH CARE EDUCATION/TRAINING PROGRAM

## 2025-02-18 PROCEDURE — 86803 HEPATITIS C AB TEST: CPT | Performed by: EMERGENCY MEDICINE

## 2025-02-18 PROCEDURE — 25000003 PHARM REV CODE 250: Performed by: NURSE PRACTITIONER

## 2025-02-18 PROCEDURE — 87040 BLOOD CULTURE FOR BACTERIA: CPT | Mod: 59 | Performed by: STUDENT IN AN ORGANIZED HEALTH CARE EDUCATION/TRAINING PROGRAM

## 2025-02-18 PROCEDURE — 83605 ASSAY OF LACTIC ACID: CPT

## 2025-02-18 PROCEDURE — 80053 COMPREHEN METABOLIC PANEL: CPT | Performed by: STUDENT IN AN ORGANIZED HEALTH CARE EDUCATION/TRAINING PROGRAM

## 2025-02-18 PROCEDURE — 82550 ASSAY OF CK (CPK): CPT | Performed by: STUDENT IN AN ORGANIZED HEALTH CARE EDUCATION/TRAINING PROGRAM

## 2025-02-18 PROCEDURE — 96374 THER/PROPH/DIAG INJ IV PUSH: CPT

## 2025-02-18 PROCEDURE — 94760 N-INVAS EAR/PLS OXIMETRY 1: CPT

## 2025-02-18 PROCEDURE — 25000242 PHARM REV CODE 250 ALT 637 W/ HCPCS: Performed by: NURSE PRACTITIONER

## 2025-02-18 PROCEDURE — 63600175 PHARM REV CODE 636 W HCPCS: Performed by: STUDENT IN AN ORGANIZED HEALTH CARE EDUCATION/TRAINING PROGRAM

## 2025-02-18 PROCEDURE — 87389 HIV-1 AG W/HIV-1&-2 AB AG IA: CPT | Performed by: EMERGENCY MEDICINE

## 2025-02-18 PROCEDURE — 85027 COMPLETE CBC AUTOMATED: CPT | Performed by: STUDENT IN AN ORGANIZED HEALTH CARE EDUCATION/TRAINING PROGRAM

## 2025-02-18 PROCEDURE — 87086 URINE CULTURE/COLONY COUNT: CPT | Performed by: STUDENT IN AN ORGANIZED HEALTH CARE EDUCATION/TRAINING PROGRAM

## 2025-02-18 PROCEDURE — 93005 ELECTROCARDIOGRAM TRACING: CPT

## 2025-02-18 PROCEDURE — 83605 ASSAY OF LACTIC ACID: CPT | Performed by: STUDENT IN AN ORGANIZED HEALTH CARE EDUCATION/TRAINING PROGRAM

## 2025-02-18 PROCEDURE — 84484 ASSAY OF TROPONIN QUANT: CPT | Performed by: STUDENT IN AN ORGANIZED HEALTH CARE EDUCATION/TRAINING PROGRAM

## 2025-02-18 RX ORDER — GLUCAGON 1 MG
1 KIT INJECTION
Status: DISCONTINUED | OUTPATIENT
Start: 2025-02-18 | End: 2025-02-20 | Stop reason: HOSPADM

## 2025-02-18 RX ORDER — IBUPROFEN 200 MG
24 TABLET ORAL
Status: DISCONTINUED | OUTPATIENT
Start: 2025-02-18 | End: 2025-02-20 | Stop reason: HOSPADM

## 2025-02-18 RX ORDER — LANOLIN ALCOHOL/MO/W.PET/CERES
800 CREAM (GRAM) TOPICAL
Status: DISCONTINUED | OUTPATIENT
Start: 2025-02-18 | End: 2025-02-20 | Stop reason: HOSPADM

## 2025-02-18 RX ORDER — NALOXONE HCL 0.4 MG/ML
0.02 VIAL (ML) INJECTION
Status: DISCONTINUED | OUTPATIENT
Start: 2025-02-18 | End: 2025-02-20 | Stop reason: HOSPADM

## 2025-02-18 RX ORDER — OSELTAMIVIR PHOSPHATE 75 MG/1
75 CAPSULE ORAL ONCE
Status: COMPLETED | OUTPATIENT
Start: 2025-02-18 | End: 2025-02-18

## 2025-02-18 RX ORDER — TALC
9 POWDER (GRAM) TOPICAL NIGHTLY PRN
Status: DISCONTINUED | OUTPATIENT
Start: 2025-02-18 | End: 2025-02-20 | Stop reason: HOSPADM

## 2025-02-18 RX ORDER — PANTOPRAZOLE SODIUM 40 MG/1
40 TABLET, DELAYED RELEASE ORAL DAILY
Status: DISCONTINUED | OUTPATIENT
Start: 2025-02-19 | End: 2025-02-20 | Stop reason: HOSPADM

## 2025-02-18 RX ORDER — LEVOTHYROXINE SODIUM 88 UG/1
88 TABLET ORAL DAILY
Status: DISCONTINUED | OUTPATIENT
Start: 2025-02-19 | End: 2025-02-20 | Stop reason: HOSPADM

## 2025-02-18 RX ORDER — LISINOPRIL 10 MG/1
30 TABLET ORAL DAILY
Status: DISCONTINUED | OUTPATIENT
Start: 2025-02-19 | End: 2025-02-20 | Stop reason: HOSPADM

## 2025-02-18 RX ORDER — ACETAMINOPHEN 325 MG/1
650 TABLET ORAL EVERY 6 HOURS PRN
Status: DISCONTINUED | OUTPATIENT
Start: 2025-02-18 | End: 2025-02-20 | Stop reason: HOSPADM

## 2025-02-18 RX ORDER — OSELTAMIVIR PHOSPHATE 30 MG/1
30 CAPSULE ORAL 2 TIMES DAILY
Status: DISCONTINUED | OUTPATIENT
Start: 2025-02-19 | End: 2025-02-20 | Stop reason: HOSPADM

## 2025-02-18 RX ORDER — IPRATROPIUM BROMIDE AND ALBUTEROL SULFATE 2.5; .5 MG/3ML; MG/3ML
3 SOLUTION RESPIRATORY (INHALATION)
Status: DISCONTINUED | OUTPATIENT
Start: 2025-02-18 | End: 2025-02-20 | Stop reason: HOSPADM

## 2025-02-18 RX ORDER — SODIUM,POTASSIUM PHOSPHATES 280-250MG
2 POWDER IN PACKET (EA) ORAL
Status: DISCONTINUED | OUTPATIENT
Start: 2025-02-18 | End: 2025-02-20 | Stop reason: HOSPADM

## 2025-02-18 RX ORDER — ACETAMINOPHEN 325 MG/1
650 TABLET ORAL EVERY 4 HOURS PRN
Status: DISCONTINUED | OUTPATIENT
Start: 2025-02-18 | End: 2025-02-20 | Stop reason: HOSPADM

## 2025-02-18 RX ORDER — ONDANSETRON HYDROCHLORIDE 2 MG/ML
4 INJECTION, SOLUTION INTRAVENOUS EVERY 8 HOURS PRN
Status: DISCONTINUED | OUTPATIENT
Start: 2025-02-18 | End: 2025-02-20 | Stop reason: HOSPADM

## 2025-02-18 RX ORDER — CEFEPIME HYDROCHLORIDE 2 G/1
2 INJECTION, POWDER, FOR SOLUTION INTRAVENOUS
Status: COMPLETED | OUTPATIENT
Start: 2025-02-18 | End: 2025-02-18

## 2025-02-18 RX ORDER — OXYCODONE HYDROCHLORIDE 5 MG/1
5 TABLET ORAL EVERY 6 HOURS PRN
Refills: 0 | Status: DISCONTINUED | OUTPATIENT
Start: 2025-02-18 | End: 2025-02-20 | Stop reason: HOSPADM

## 2025-02-18 RX ORDER — SODIUM CHLORIDE 0.9 % (FLUSH) 0.9 %
10 SYRINGE (ML) INJECTION EVERY 8 HOURS PRN
Status: DISCONTINUED | OUTPATIENT
Start: 2025-02-18 | End: 2025-02-20 | Stop reason: HOSPADM

## 2025-02-18 RX ORDER — IBUPROFEN 200 MG
16 TABLET ORAL
Status: DISCONTINUED | OUTPATIENT
Start: 2025-02-18 | End: 2025-02-20 | Stop reason: HOSPADM

## 2025-02-18 RX ORDER — ALUMINUM HYDROXIDE, MAGNESIUM HYDROXIDE, AND SIMETHICONE 1200; 120; 1200 MG/30ML; MG/30ML; MG/30ML
30 SUSPENSION ORAL 4 TIMES DAILY PRN
Status: DISCONTINUED | OUTPATIENT
Start: 2025-02-18 | End: 2025-02-20 | Stop reason: HOSPADM

## 2025-02-18 RX ORDER — CEFEPIME HYDROCHLORIDE 1 G/1
1 INJECTION, POWDER, FOR SOLUTION INTRAMUSCULAR; INTRAVENOUS
Status: DISCONTINUED | OUTPATIENT
Start: 2025-02-18 | End: 2025-02-20

## 2025-02-18 RX ORDER — SIMETHICONE 80 MG
1 TABLET,CHEWABLE ORAL 4 TIMES DAILY PRN
Status: DISCONTINUED | OUTPATIENT
Start: 2025-02-18 | End: 2025-02-20 | Stop reason: HOSPADM

## 2025-02-18 RX ORDER — AZITHROMYCIN 250 MG/1
250 TABLET, FILM COATED ORAL DAILY
Status: DISCONTINUED | OUTPATIENT
Start: 2025-02-19 | End: 2025-02-20 | Stop reason: HOSPADM

## 2025-02-18 RX ADMIN — CEFEPIME 2 G: 2 INJECTION, POWDER, FOR SOLUTION INTRAVENOUS at 03:02

## 2025-02-18 RX ADMIN — IPRATROPIUM BROMIDE AND ALBUTEROL SULFATE 3 ML: .5; 3 SOLUTION RESPIRATORY (INHALATION) at 08:02

## 2025-02-18 RX ADMIN — AZITHROMYCIN MONOHYDRATE 500 MG: 500 INJECTION, POWDER, LYOPHILIZED, FOR SOLUTION INTRAVENOUS at 05:02

## 2025-02-18 RX ADMIN — APIXABAN 5 MG: 2.5 TABLET, FILM COATED ORAL at 09:02

## 2025-02-18 RX ADMIN — OSELTAMIVIR PHOSPHATE 75 MG: 75 CAPSULE ORAL at 09:02

## 2025-02-18 RX ADMIN — CEFEPIME 1 G: 1 INJECTION, POWDER, FOR SOLUTION INTRAMUSCULAR; INTRAVENOUS at 09:02

## 2025-02-18 RX ADMIN — POTASSIUM BICARBONATE 50 MEQ: 977.5 TABLET, EFFERVESCENT ORAL at 05:02

## 2025-02-18 NOTE — PHARMACY MED REC
"Admission Medication History     The home medication history was taken by Sean Ahuja.    You may go to "Admission" then "Reconcile Home Medications" tabs to review and/or act upon these items.     The home medication list has been updated by the Pharmacy department.   Please read ALL comments highlighted in yellow.   Please address this information as you see fit.    Feel free to contact us if you have any questions or require assistance.      The medications listed below were removed from the home medication list. Please reorder if appropriate:  Patient reports no longer taking the following medication(s):  Mupirocin Oint  Nystatin Leann  Triamcinolone Cream    Medications listed below were obtained from: Patient/family and Analytic software- [employer]  Current Facility-Administered Medications on File Prior to Encounter   Medication Dose Route Frequency Provider Last Rate Last Admin    0.9%  NaCl infusion   Intravenous Once Oren Verdin MD         Current Outpatient Medications on File Prior to Encounter   Medication Sig Dispense Refill    acetaminophen (TYLENOL) 650 MG TbSR Take 650 mg by mouth every 8 (eight) hours.      albuterol (PROVENTIL/VENTOLIN HFA) 90 mcg/actuation inhaler Inhale 2 puffs into the lungs every 6 (six) hours as needed for Wheezing. 8.5 g 3    albuterol-ipratropium (DUO-NEB) 2.5 mg-0.5 mg/3 mL nebulizer solution Take 3 mLs by nebulization every 6 (six) hours as needed for Wheezing. Rescue 75 mL 0    alendronate (FOSAMAX) 70 MG tablet TAKE 1 TABLET(70 MG) BY MOUTH EVERY 7 DAYS (Patient taking differently: Take 70 mg by mouth every 7 days. Sundays) 12 tablet 3    apixaban (ELIQUIS) 5 mg Tab Take 1 tablet (5 mg total) by mouth 2 (two) times daily. 180 tablet 3    azelastine (ASTELIN) 137 mcg (0.1 %) nasal spray 1 spray (137 mcg total) by Nasal route 2 (two) times daily. Point up and slightly outward toward ear when spraying to avoid irritating nasal septum. (Patient taking differently: 1 " spray by Nasal route daily as needed for Rhinitis. Point up and slightly outward toward ear when spraying to avoid irritating nasal septum.) 30 mL 2    ergocalciferol, vitamin D2, (VITAMIN D ORAL) Take 2,000 mg by mouth once daily.      ferrous sulfate (FEOSOL) Tab tablet Take 1 tablet by mouth daily with breakfast.      fluticasone propionate (FLONASE) 50 mcg/actuation nasal spray 1 spray (50 mcg total) by Each Nostril route once daily. (Patient taking differently: 1 spray by Each Nostril route daily as needed for Allergies.) 16 g 0    furosemide (LASIX) 20 MG tablet Take 1 tablet (20 mg total) by mouth daily as needed (edema). 90 tablet 3    levocetirizine (XYZAL) 5 MG tablet TAKE 1 TABLET(5 MG) BY MOUTH EVERY NIGHT AS NEEDED FOR ALLERGIES (Patient taking differently: Take 5 mg by mouth nightly as needed for Allergies.) 90 tablet 3    levothyroxine (SYNTHROID) 88 MCG tablet Take 1 tablet (88 mcg total) by mouth once daily. 90 tablet 3    lisinopriL (PRINIVIL,ZESTRIL) 30 MG tablet TAKE 1 TABLET BY MOUTH EVERY DAY (Patient taking differently: Take 30 mg by mouth once daily.) 90 tablet 3    mometasone-formoterol (DULERA) 100-5 mcg/actuation HFAA Inhale 2 puffs into the lungs 2 (two) times daily. 13 g 3    multivitamin capsule Take 1 capsule by mouth once daily.      omeprazole (PRILOSEC) 40 MG capsule TAKE 1 CAPSULE(40 MG) BY MOUTH TWICE DAILY BEFORE MEALS 180 capsule PRN    traMADoL (ULTRAM) 50 mg tablet Take 1 tablet (50 mg total) by mouth every 8 (eight) hours as needed for Pain. (Patient taking differently: Take 50 mg by mouth daily as needed for Pain.) 21 each 0    azithromycin (Z-JACQUELIN) 250 MG tablet Take 2 tablets by mouth on day 1; Take 1 tablet by mouth on days 2-5 6 tablet 0    benzonatate (TESSALON) 100 MG capsule Take 1 capsule (100 mg total) by mouth 3 (three) times daily as needed for Cough. 30 capsule 0    cyclobenzaprine (FLEXERIL) 5 MG tablet Take 1 tablet (5 mg total) by mouth 3 (three) times daily as  needed for Muscle spasms. (Patient not taking: Reported on 2025) 90 tablet 0    hydrOXYzine HCL (ATARAX) 25 MG tablet Take 1 tablet (25 mg total) by mouth 3 (three) times daily as needed for Anxiety (insomnia). (Patient not taking: Reported on 2025) 30 tablet PRN    oseltamivir (TAMIFLU) 30 MG capsule Take 1 capsule (30 mg total) by mouth 2 (two) times daily. Take 75 mg  1 dose, then 30 mg twice daily for 4 days 8 capsule 0    [] oseltamivir (TAMIFLU) 75 MG capsule Take 1 capsule (75 mg total) by mouth once. for 1 dose 1 capsule 0    predniSONE (DELTASONE) 20 MG tablet Take 1 tablet (20 mg total) by mouth 2 (two) times daily. for 5 days 10 tablet 0    promethazine (PHENERGAN) 12.5 MG Tab (Take 1-2 tabs every 6 hours as needed for nausea persistent despite zofran) 40 tablet 3    promethazine-dextromethorphan (PROMETHAZINE-DM) 6.25-15 mg/5 mL Syrp Take 5 mLs by mouth nightly as needed (cough). 118 mL 0    promethazine-dextromethorphan (PROMETHAZINE-DM) 6.25-15 mg/5 mL Syrp Take 5 mLs by mouth every 8 (eight) hours as needed (cough). 118 mL 0    ramelteon (ROZEREM) 8 mg tablet Take 1 tablet (8 mg total) by mouth every evening. (Patient not taking: Reported on 2025) 30 tablet 1    silver sulfADIAZINE 1% (SILVADENE) 1 % cream Apply topically once daily. (Patient not taking: Reported on 2025) 85 g PRN    [DISCONTINUED] mupirocin (BACTROBAN) 2 % ointment Apply topically 3 (three) times daily. (Patient not taking: Reported on 2025) 30 g 0    [DISCONTINUED] nystatin (MYCOSTATIN) 100,000 unit/mL suspension Take 4 mLs by mouth 4 (four) times daily with meals and nightly.      [DISCONTINUED] triamcinolone acetonide 0.1% (KENALOG) 0.1 % cream APPLY TOPICALLY TO THE AFFECTED AREA TWICE DAILY 60 g 0           Sean Ahuja  EXT 1919                .

## 2025-02-18 NOTE — TELEPHONE ENCOUNTER
Spoke to patient niece (Ms. Plummer) regarding chest x-ray result. It shows multifocal pneumonia or aspiration. Patient reports progressive worsening symptoms of cough. Advised to go to ED for further evaluation.

## 2025-02-18 NOTE — ED NOTES
Pt ambulatory with assistance of cane, pt oxygen saturation 100% on room air while seated, pt oxygen saturation dropped to 93% on room air while ambulating. ED MD aware.

## 2025-02-18 NOTE — ED PROVIDER NOTES
"Encounter Date: 2/18/2025       History     Chief Complaint   Patient presents with    Influenza    Pneumonia     +for Flu and Pneumonia at PCP.     84-year-old female presents with for evaluation of exertional shortness a breath.  Patient diagnosed with multifocal pneumonia yesterday however has not started antibiotics.  Relative at bedside.  Preceding viral URI    The history is provided by the patient and a relative.     Review of patient's allergies indicates:   Allergen Reactions    Aspirin      Other reaction(s): Stomach upset    Aspirin Other (See Comments)     Other reaction(s): Stomach upset    Gabapentin Other (See Comments)     Pt states she felt "funny" when she took the medication. Especially at the higher dose.    Mobic [meloxicam] Swelling     Edema and elevated blood pressure     Oxaliplatin Other (See Comments)     Other reaction(s): Joint pain  Other reaction(s): Itching  Other reaction(s): Hives  Other reaction(s): Joint pain  Other reaction(s): Itching  Other reaction(s): Hives    Pregabalin Other (See Comments)     Side effects  Side effects     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    Strabismus     Trouble in sleeping     Wears dentures     Uppers     Past Surgical History:   Procedure Laterality Date    CARDIAC SURGERY      cardiac cath    COLON SURGERY      colon resection    COLONOSCOPY N/A 10/18/2016    Procedure: " COLONOSCOPY;  Surgeon: Rell Cordova MD;  Location: [facility];  Service: Endoscopy;  Laterality: N/A;    COLONOSCOPY N/A 8/8/2023    Procedure: COLONOSCOPY;  Surgeon: Kaushik Pinon MD;  Location: [facility];  Service: Endoscopy;  Laterality: N/A;    COLONOSCOPY N/A 8/22/2023    Procedure: COLONOSCOPY (tattoo of colon ca);  Surgeon: Kaushik Pinon MD;  Location: [facility];  Service: Endoscopy;  Laterality: N/A;    COLONOSCOPY N/A 8/12/2024    Procedure: COLONOSCOPY;  Surgeon: Jim Chavez MD;  Location: [facility];  Service: General;  Laterality: N/A;    ESOPHAGOGASTRODUODENOSCOPY N/A 8/3/2023    Procedure: EGD (ESOPHAGOGASTRODUODENOSCOPY);  Surgeon: Kaushik Pinon MD;  Location: [facility];  Service: Endoscopy;  Laterality: N/A;    HAND TENDON SURGERY Left     HEMICOLECTOMY      right    INJECTION OF ANESTHETIC AGENT AROUND MEDIAL BRANCH NERVES INNERVATING LUMBAR FACET JOINT Right 07/10/2018    Procedure: Block-nerve-medial branch-lumbar;  Surgeon: Parish Gaitan MD;  Location: [facility] OR;  Service: Pain Management;  Laterality: Right;  L3, 4, 5    INSERTION OF INFERIOR VENA CAVAL FILTER N/A 9/13/2023    Procedure: Insertion, Filter, Inferior Vena Cava;  Surgeon: Oren Verdin MD;  Location: [facility] CATH/EP LAB;  Service: General;  Laterality: N/A;    INSERTION OF TUNNELED CENTRAL VENOUS CATHETER (CVC) WITH SUBCUTANEOUS PORT Right 11/15/2023    Procedure: [id number]-A-CATH;  Surgeon: Jim Chavez MD;  Location: [facility] OR;  Service: General;  Laterality: Right;    JOINT REPLACEMENT      bilateral    RADIOFREQUENCY ABLATION OF LUMBAR MEDIAL BRANCH NERVE AT SINGLE LEVEL Right 07/24/2018    Procedure: RADIOFREQUENCY ABLATION, NERVE, MEDIAL BRANCH, LUMBAR, 1 LEVEL;  Surgeon: Parish Gaitan MD;  Location: [facility] OR;  Service: Pain Management;  Laterality: Right;  L3,4,5 - Burned at 80 degrees C. for 75 seconds x 2 each site    ROBOT-ASSISTED COLECTOMY N/A 9/29/2023    Procedure: ROBOTIC COLECTOMY;  Surgeon:  Jim Chavez MD;  Location: [facility] OR;  Service: General;  Laterality: N/A;    SHOULDER ARTHROSCOPY Right 01/12/2017    Reverse     TONSILLECTOMY      TONSILLECTOMY, ADENOIDECTOMY, BILATERAL MYRINGOTOMY AND TUBES      TOTAL KNEE ARTHROPLASTY Bilateral 08/1998    total replacements    TUMOR REMOVAL Left     left foot as a child     Family History   Problem Relation Name Age of Onset    Hypertension Mother      Kidney disease Mother      Cataracts Father      Cataracts Sister      Cancer Sister          breast    No Known Problems Brother      Cancer Sister          breast    Arthritis Sister      Glaucoma Neg Hx      Amblyopia Neg Hx      Blindness Neg Hx      Macular degeneration Neg Hx      Retinal detachment Neg Hx      Strabismus Neg Hx      Stroke Neg Hx      Thyroid disease Neg Hx       Social History[1]  Review of Systems   All other systems reviewed and are negative.      Physical Exam     Initial Vitals [02/18/25 1440]   BP Pulse Resp Temp SpO2   (!) 151/67 76 18 98.1 °F (36.7 °C) 98 %      MAP       --         Physical Exam    Nursing note and vitals reviewed.  Constitutional: No distress.   HENT:   Head: Normocephalic.   Eyes: No scleral icterus.   Cardiovascular:  Normal rate.           Pulmonary/Chest: Effort normal. No stridor.   Oxygen 93%   Abdominal: There is no guarding.     Neurological: She is alert.   Skin: No rash noted. No erythema.         ED Course   Procedures  Labs Reviewed   CBC W/ AUTO DIFFERENTIAL - Abnormal       Result Value    WBC 14.25 (*)     RBC 3.59 (*)     Hemoglobin 11.1 (*)     Hematocrit 33.9 (*)     MCV 94      MCH 30.9      MCHC 32.7      RDW 13.8      Platelets 168      MPV 10.5      Immature Granulocytes CANCELED      Immature Grans (Abs) CANCELED      Lymph # CANCELED      Mono # CANCELED      Eos # CANCELED      Baso # CANCELED      nRBC 0      Gran % 83.0 (*)     Lymph % 5.0 (*)     Mono % 7.0      Eosinophil % 0.0      Basophil % 0.0      Bands 5.0      Platelet  Estimate Appears normal      Differential Method Manual     COMPREHENSIVE METABOLIC PANEL - Abnormal    Sodium 140      Potassium 3.3 (*)     Chloride 110      CO2 18 (*)     Glucose 102      BUN 17      Creatinine 0.9      Calcium 8.5 (*)     Total Protein 7.0      Albumin 3.2 (*)     Total Bilirubin 1.1 (*)     Alkaline Phosphatase 61      AST 12      ALT 19      eGFR >60      Anion Gap 12     URINALYSIS, REFLEX TO URINE CULTURE - Abnormal    Specimen UA Urine, Clean Catch      Color, UA Yellow      Appearance, UA Hazy (*)     pH, UA 6.0      Specific Gravity, UA 1.020      Protein, UA 1+ (*)     Glucose, UA Negative      Ketones, UA Negative      Bilirubin (UA) Negative      Occult Blood UA 1+ (*)     Nitrite, UA Positive (*)     Urobilinogen, UA Negative      Leukocytes, UA 3+ (*)     Narrative:     Specimen Source->Urine   URINALYSIS MICROSCOPIC - Abnormal    RBC, UA 2      WBC, UA 53 (*)     Bacteria Many (*)     Squam Epithel, UA 1      Hyaline Casts, UA 1      Microscopic Comment SEE COMMENT      Narrative:     Specimen Source->Urine   CULTURE, BLOOD   CULTURE, BLOOD   CULTURE, URINE   HEPATITIS C ANTIBODY    Hepatitis C Ab Negative      Narrative:     Release to patient->Immediate   HIV 1 / 2 ANTIBODY    HIV 1/2 Ag/Ab Negative      Narrative:     Release to patient->Immediate   MAGNESIUM    Magnesium 1.9     B-TYPE NATRIURETIC PEPTIDE    BNP 37     TROPONIN I    Troponin I <0.006     CK    CPK 36     LACTIC ACID, PLASMA   ISTAT LACTATE    POC Lactate 1.41      Sample VENOUS      Site Other      Allens Test N/A      DelSys Room Air      Mode SPONT            Imaging Results              X-Ray Chest AP Portable (Final result)  Result time 02/18/25 16:43:55      Final result by Oren Maria Jr., MD (02/18/25 16:43:55)                   Impression:      There is a continued soft tissue density in the right upper lobe consistent with probable pneumonia given the speed of its development between the chest  x-ray of January 6, 2025 and February 18, 2025.  Right hilar adenopathy is noted.  There is decreased nodular density in the left upper lobe.  Mild cardiomegaly is present      Electronically signed by: Oren Maria MD  Date:    02/18/2025  Time:    16:43               Narrative:    EXAMINATION:  XR CHEST AP PORTABLE    CLINICAL HISTORY:  Sepsis;    TECHNIQUE:  Single frontal view of the chest was performed.    COMPARISON:  Chest x-ray of February 18 December 2025 and chest x-ray of January 6, 2025 as well as the CT chest of January 3, 2025.    FINDINGS:  There is mild cardiomegaly.  The mediastinal with is within normal limits.  There is a 5.9 cm soft tissue density in the right upper lobe and enlargement of the right hilum consistent with adenopathy.  Small nodular density in the left upper lobe is less prominent than seen on the prior study.  No pneumothorax or pleural effusion is noted.                                       Medications   azithromycin (ZITHROMAX) 500 mg in 0.9% NaCl 250 mL IVPB (admixture device) (has no administration in time range)   potassium bicarbonate disintegrating tablet 50 mEq (has no administration in time range)   ceFEPIme injection 2 g (2 g Intravenous Given 2/18/25 1547)     Medical Decision Making  84-year-old female presents for evaluation of multifocal pneumonia, preceding influenza greater than 48 hours ago.  She had the flu last week then developed worsening respiratory symptoms.  Oxygen 93%.  Workup initiated confirms multifocal pneumonia, leukocytosis, normal magnesium and low potassium and was repleted orally.  Communicated with hospitalist team who will admit for further management evaluation.  Patient and family updated and agree with plan    Amount and/or Complexity of Data Reviewed  Labs: ordered. Decision-making details documented in ED Course.  Radiology: ordered and independent interpretation performed.     Details: Chest x-ray concerning for multifocal  pneumonia  ECG/medicine tests: ordered and independent interpretation performed. Decision-making details documented in ED Course.  Discussion of management or test interpretation with external provider(s): Spoke with Elvin Anderson NP with hospitalist team will admit for further management and evaluation      Risk  Prescription drug management.  Decision regarding hospitalization.               ED Course as of 25   Tue 2025   1620 WBC(!): 14.25 [KB]   1620 Hemoglobin(!): 11.1 [KB]   1620 Hematocrit(!): 33.9 [KB]   1631 EKG rate 72, QRS 98, , no STEMI [KB]   1641 WBC(!): 14.25 [KB]   1641 Hemoglobin(!): 11.1 [KB]   1641 Hematocrit(!): 33.9 [KB]   1641 BNP: 37 [KB]   1641 RBC, UA: 2 [KB]   1641 WBC, UA(!): 53 [KB]   1641 Leukocyte Esterase, UA(!): 3+ [KB]   1641 NITRITE UA(!): Positive [KB]   1642 Sodium: 140 [KB]   1642 Potassium(!): 3.3 [KB]   1642 CO2(!): 18 [KB]   1642 Calcium(!): 8.5 [KB]   1642 Albumin(!): 3.2 [KB]      ED Course User Index  [KB] Jamal Storey Jr., DO                           Clinical Impression:  Final diagnoses:  [Z13.6] Screening for cardiovascular condition  [J18.9] Multifocal pneumonia (Primary)  [N39.0] Urinary tract infection without hematuria, site unspecified  [R06.03] Respiratory distress          ED Disposition Condition    Admit Stable                  [1]   Social History  Tobacco Use    Smoking status: Former     Current packs/day: 0.00     Average packs/day: 0.5 packs/day for 1 year (0.5 ttl pk-yrs)     Types: Cigarettes     Start date: 1960     Quit date: 1961     Years since quittin.1    Smokeless tobacco: Never   Substance Use Topics    Alcohol use: No    Drug use: No        Jamal Storey Jr., DO  25

## 2025-02-18 NOTE — PROGRESS NOTES
Pharmacist Renal Dose Adjustment Note    Danna Sorensen is a 84 y.o. female being treated with the medication tamiflu    Patient Data:    Vital Signs (Most Recent):  Temp: 98.1 °F (36.7 °C) (02/18/25 1440)  Pulse: 65 (02/18/25 1704)  Resp: 20 (02/18/25 1704)  BP: 133/79 (02/18/25 1704)  SpO2: 98 % (02/18/25 1704) Vital Signs (72h Range):  Temp:  [97.8 °F (36.6 °C)-98.1 °F (36.7 °C)]   Pulse:  [65-90]   Resp:  [18-20]   BP: (110-151)/(62-81)   SpO2:  [96 %-98 %]      Recent Labs   Lab 02/18/25  1537   CREATININE 0.9     Serum creatinine: 0.9 mg/dL 02/18/25 1537  Estimated creatinine clearance: 54.8 mL/min    tamiflu 75 mg twice daily will be changed to tamiflu 75 mg x1, then tamiflu 30 mg twice daily for total 5 days  Due to CrCl < 60    Pharmacist's Name: Maye Rojas  Pharmacist's Extension: [phone]

## 2025-02-19 LAB
ALBUMIN SERPL BCP-MCNC: 2.8 G/DL (ref 3.5–5.2)
ALLENS TEST: ABNORMAL
ALLENS TEST: ABNORMAL
ALP SERPL-CCNC: 72 U/L (ref 40–150)
ALT SERPL W/O P-5'-P-CCNC: 17 U/L (ref 10–44)
ANION GAP SERPL CALC-SCNC: 8 MMOL/L (ref 8–16)
AST SERPL-CCNC: 17 U/L (ref 10–40)
BASOPHILS # BLD AUTO: 0.04 K/UL (ref 0–0.2)
BASOPHILS NFR BLD: 0.3 % (ref 0–1.9)
BILIRUB SERPL-MCNC: 0.8 MG/DL (ref 0.1–1)
BUN SERPL-MCNC: 17 MG/DL (ref 8–23)
CALCIUM SERPL-MCNC: 8.3 MG/DL (ref 8.7–10.5)
CHLORIDE SERPL-SCNC: 113 MMOL/L (ref 95–110)
CO2 SERPL-SCNC: 18 MMOL/L (ref 23–29)
CREAT SERPL-MCNC: 0.9 MG/DL (ref 0.5–1.4)
DELSYS: ABNORMAL
DELSYS: ABNORMAL
DIFFERENTIAL METHOD BLD: ABNORMAL
EOSINOPHIL # BLD AUTO: 0.1 K/UL (ref 0–0.5)
EOSINOPHIL NFR BLD: 0.4 % (ref 0–8)
ERYTHROCYTE [DISTWIDTH] IN BLOOD BY AUTOMATED COUNT: 14 % (ref 11.5–14.5)
EST. GFR  (NO RACE VARIABLE): >60 ML/MIN/1.73 M^2
GLUCOSE SERPL-MCNC: 99 MG/DL (ref 70–110)
HCO3 UR-SCNC: 16.8 MMOL/L (ref 24–28)
HCO3 UR-SCNC: 16.8 MMOL/L (ref 24–28)
HCT VFR BLD AUTO: 32.1 % (ref 37–48.5)
HGB BLD-MCNC: 10.5 G/DL (ref 12–16)
IMM GRANULOCYTES # BLD AUTO: 0.21 K/UL (ref 0–0.04)
IMM GRANULOCYTES NFR BLD AUTO: 1.8 % (ref 0–0.5)
LYMPHOCYTES # BLD AUTO: 0.8 K/UL (ref 1–4.8)
LYMPHOCYTES NFR BLD: 7.1 % (ref 18–48)
MAGNESIUM SERPL-MCNC: 1.9 MG/DL (ref 1.6–2.6)
MCH RBC QN AUTO: 30.9 PG (ref 27–31)
MCHC RBC AUTO-ENTMCNC: 32.7 G/DL (ref 32–36)
MCV RBC AUTO: 94 FL (ref 82–98)
MODE: ABNORMAL
MODE: ABNORMAL
MONOCYTES # BLD AUTO: 0.8 K/UL (ref 0.3–1)
MONOCYTES NFR BLD: 7.1 % (ref 4–15)
NEUTROPHILS # BLD AUTO: 9.6 K/UL (ref 1.8–7.7)
NEUTROPHILS NFR BLD: 83.3 % (ref 38–73)
NRBC BLD-RTO: 0 /100 WBC
PCO2 BLDA: 26.7 MMHG (ref 35–45)
PCO2 BLDA: 26.7 MMHG (ref 35–45)
PH SMN: 7.41 [PH] (ref 7.35–7.45)
PH SMN: 7.41 [PH] (ref 7.35–7.45)
PHOSPHATE SERPL-MCNC: 1.8 MG/DL (ref 2.7–4.5)
PLATELET # BLD AUTO: 185 K/UL (ref 150–450)
PMV BLD AUTO: 10.4 FL (ref 9.2–12.9)
PO2 BLDA: 79 MMHG (ref 80–100)
PO2 BLDA: 79 MMHG (ref 80–100)
POC BE: -8 MMOL/L
POC BE: -8 MMOL/L
POC SATURATED O2: 96 % (ref 95–100)
POC SATURATED O2: 96 % (ref 95–100)
POC TCO2: 18 MMOL/L (ref 23–27)
POC TCO2: 18 MMOL/L (ref 23–27)
POCT GLUCOSE: 103 MG/DL (ref 70–110)
POCT GLUCOSE: 106 MG/DL (ref 70–110)
POTASSIUM SERPL-SCNC: 3.5 MMOL/L (ref 3.5–5.1)
PROT SERPL-MCNC: 6.5 G/DL (ref 6–8.4)
RBC # BLD AUTO: 3.4 M/UL (ref 4–5.4)
SAMPLE: ABNORMAL
SAMPLE: ABNORMAL
SITE: ABNORMAL
SITE: ABNORMAL
SODIUM SERPL-SCNC: 139 MMOL/L (ref 136–145)
SP02: 98
SP02: 98
WBC # BLD AUTO: 11.48 K/UL (ref 3.9–12.7)

## 2025-02-19 PROCEDURE — 25000003 PHARM REV CODE 250: Performed by: NURSE PRACTITIONER

## 2025-02-19 PROCEDURE — 84100 ASSAY OF PHOSPHORUS: CPT | Performed by: NURSE PRACTITIONER

## 2025-02-19 PROCEDURE — 63600175 PHARM REV CODE 636 W HCPCS: Performed by: NURSE PRACTITIONER

## 2025-02-19 PROCEDURE — 36600 WITHDRAWAL OF ARTERIAL BLOOD: CPT

## 2025-02-19 PROCEDURE — 27000207 HC ISOLATION

## 2025-02-19 PROCEDURE — 25000242 PHARM REV CODE 250 ALT 637 W/ HCPCS: Performed by: NURSE PRACTITIONER

## 2025-02-19 PROCEDURE — 80053 COMPREHEN METABOLIC PANEL: CPT | Performed by: NURSE PRACTITIONER

## 2025-02-19 PROCEDURE — 36415 COLL VENOUS BLD VENIPUNCTURE: CPT | Performed by: NURSE PRACTITIONER

## 2025-02-19 PROCEDURE — 85025 COMPLETE CBC W/AUTO DIFF WBC: CPT | Performed by: NURSE PRACTITIONER

## 2025-02-19 PROCEDURE — 25000003 PHARM REV CODE 250: Performed by: STUDENT IN AN ORGANIZED HEALTH CARE EDUCATION/TRAINING PROGRAM

## 2025-02-19 PROCEDURE — 82803 BLOOD GASES ANY COMBINATION: CPT

## 2025-02-19 PROCEDURE — 94640 AIRWAY INHALATION TREATMENT: CPT

## 2025-02-19 PROCEDURE — 63700000 PHARM REV CODE 250 ALT 637 W/O HCPCS: Performed by: NURSE PRACTITIONER

## 2025-02-19 PROCEDURE — 99900035 HC TECH TIME PER 15 MIN (STAT)

## 2025-02-19 PROCEDURE — 83735 ASSAY OF MAGNESIUM: CPT | Performed by: NURSE PRACTITIONER

## 2025-02-19 PROCEDURE — 11000001 HC ACUTE MED/SURG PRIVATE ROOM

## 2025-02-19 PROCEDURE — 94761 N-INVAS EAR/PLS OXIMETRY MLT: CPT

## 2025-02-19 RX ORDER — SODIUM BICARBONATE 650 MG/1
650 TABLET ORAL 2 TIMES DAILY
Status: DISCONTINUED | OUTPATIENT
Start: 2025-02-19 | End: 2025-02-20 | Stop reason: HOSPADM

## 2025-02-19 RX ORDER — BENZONATATE 100 MG/1
200 CAPSULE ORAL 3 TIMES DAILY PRN
Status: DISCONTINUED | OUTPATIENT
Start: 2025-02-20 | End: 2025-02-20 | Stop reason: HOSPADM

## 2025-02-19 RX ORDER — GUAIFENESIN AND DEXTROMETHORPHAN HYDROBROMIDE 10; 100 MG/5ML; MG/5ML
10 SYRUP ORAL EVERY 4 HOURS PRN
Status: DISCONTINUED | OUTPATIENT
Start: 2025-02-20 | End: 2025-02-20 | Stop reason: HOSPADM

## 2025-02-19 RX ADMIN — SODIUM BICARBONATE 650 MG TABLET 650 MG: at 08:02

## 2025-02-19 RX ADMIN — LISINOPRIL 30 MG: 10 TABLET ORAL at 10:02

## 2025-02-19 RX ADMIN — GUAIFENESIN AND DEXTROMETHORPHAN 10 ML: 100; 10 SYRUP ORAL at 11:02

## 2025-02-19 RX ADMIN — IPRATROPIUM BROMIDE AND ALBUTEROL SULFATE 3 ML: .5; 3 SOLUTION RESPIRATORY (INHALATION) at 07:02

## 2025-02-19 RX ADMIN — CEFEPIME 1 G: 1 INJECTION, POWDER, FOR SOLUTION INTRAMUSCULAR; INTRAVENOUS at 11:02

## 2025-02-19 RX ADMIN — APIXABAN 5 MG: 2.5 TABLET, FILM COATED ORAL at 10:02

## 2025-02-19 RX ADMIN — ACETAMINOPHEN 650 MG: 325 TABLET ORAL at 08:02

## 2025-02-19 RX ADMIN — LEVOTHYROXINE SODIUM 88 MCG: 0.09 TABLET ORAL at 11:02

## 2025-02-19 RX ADMIN — CEFEPIME 1 G: 1 INJECTION, POWDER, FOR SOLUTION INTRAMUSCULAR; INTRAVENOUS at 05:02

## 2025-02-19 RX ADMIN — AZITHROMYCIN DIHYDRATE 250 MG: 250 TABLET ORAL at 10:02

## 2025-02-19 RX ADMIN — PANTOPRAZOLE SODIUM 40 MG: 40 TABLET, DELAYED RELEASE ORAL at 10:02

## 2025-02-19 RX ADMIN — IPRATROPIUM BROMIDE AND ALBUTEROL SULFATE 3 ML: .5; 3 SOLUTION RESPIRATORY (INHALATION) at 01:02

## 2025-02-19 RX ADMIN — OSELTAMIVIR PHOSPHATE 30 MG: 30 CAPSULE ORAL at 08:02

## 2025-02-19 RX ADMIN — OSELTAMIVIR PHOSPHATE 30 MG: 30 CAPSULE ORAL at 10:02

## 2025-02-19 RX ADMIN — SODIUM BICARBONATE 650 MG TABLET 650 MG: at 10:02

## 2025-02-19 RX ADMIN — APIXABAN 5 MG: 2.5 TABLET, FILM COATED ORAL at 08:02

## 2025-02-19 NOTE — ASSESSMENT & PLAN NOTE
Patient has a diagnosis of pneumonia. The cause of the pneumonia is suspected to be viral in etiology but organism is not known. The pneumonia is worsening due to shortness for breath and cough . The patient has the following signs/symptoms of pneumonia: cough, sputum production, and shortness of breath. The patient does have a current oxygen requirement and the patient does not have a home oxygen requirement. I have reviewed the pertinent imaging. The following cultures have been collected: Blood cultures The culture results are listed below.     Current antimicrobial regimen consists of the antibiotics listed below. Will monitor patient closely and continue current treatment plan unchanged.    Tamiflu 75 mg p.o. b.i.d. for 5 days    Antibiotics (From admission, onward)      Start     Stop Route Frequency Ordered    02/19/25 0900  azithromycin tablet 250 mg         02/23/25 0859 Oral Daily 02/18/25 1731    02/18/25 2330  ceFEPIme injection 1 g         -- IV Every 8 hours (non-standard times) 02/18/25 1731    02/18/25 1715  azithromycin (ZITHROMAX) 500 mg in 0.9% NaCl 250 mL IVPB (admixture device)         02/19/25 0514 IV ED 1 Time 02/18/25 1711            Microbiology Results (last 7 days)       Procedure Component Value Units Date/Time    Urine culture [[id number]] Collected: 02/18/25 1558    Order Status: No result Specimen: Urine Updated: 02/18/25 1630    Blood culture x two cultures. Draw prior to antibiotics. [[id number]] Collected: 02/18/25 1544    Order Status: Sent Specimen: Blood from Peripheral, Hand, Left     Blood culture x two cultures. Draw prior to antibiotics. [[id number]] Collected: 02/18/25 1537    Order Status: Sent Specimen: Blood from Peripheral, Hand, Right

## 2025-02-19 NOTE — H&P
[facility]  History & Physical    Patient Name: Danna Sorensen  MRN: [id number]  Patient Class: IP- Inpatient  Admission Date: 2/18/2025  Attending Physician: Quinn Agarwal MD   Primary Care Provider: Claudia Kim MD         Patient information was obtained from patient, relative(s), past medical records, and ER records.     Subjective:     Principal Problem:<principal problem not specified>    Chief Complaint:   Chief Complaint   Patient presents with    Influenza    Pneumonia     +for Flu and Pneumonia at PCP.        HPI: Danna Sorensen is an 84 year female who presents emergency room for evaluation of exertional dyspnea.  She was seen in clinic yesterday for similar symptoms where she was prescribed Tamiflu and antibiotics for influenza and pneumonia.  She did not start the meds.  She presents today with worsening of symptoms.  Denies fever or chills.  No known sick contacts or travel.  Previous medical history includes hypothyroidism, obesity, colon cancer, hypertension, PE, IVC filter placement, CKD, and pulmonary fibrosis.  ER workup:  CBC with leukocytosis of 14,000 and mild anemia.  CMP with potassium of 3.3, bicarb 18, bilirubin 1.1 (0.8  1 month ago).  UA is nitrite positive with many bacteria and white blood cells.  Urine culture pending.  Blood cultures pending.  Chest x-ray demonstrates a right soft tissue density in the right upper lobe consistent with pneumonia.  Patient was started on cefepime and Zithromax.  Admitted to Hospital Medicine for treatment and management.  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    Strabismus     Trouble in sleeping     Wears dentures     Uppers       Past Surgical History:   Procedure Laterality Date    CARDIAC SURGERY      cardiac cath    COLON SURGERY      colon resection    COLONOSCOPY N/A 10/18/2016    Procedure: COLONOSCOPY;  Surgeon: Rell Cordova MD;  Location: [facility];  Service: Endoscopy;  Laterality: N/A;    COLONOSCOPY N/A 8/8/2023    Procedure: COLONOSCOPY;  Surgeon: Kaushik Pinon MD;  Location: [facility];  Service: Endoscopy;  Laterality: N/A;    COLONOSCOPY N/A 8/22/2023    Procedure: COLONOSCOPY (tattoo of colon ca);  Surgeon: Kaushik Pinon MD;  Location: [facility];  Service: Endoscopy;  Laterality: N/A;    COLONOSCOPY N/A 8/12/2024    Procedure: COLONOSCOPY;  Surgeon: Jim Chavez MD;  Location: [facility];  Service: General;  Laterality: N/A;    ESOPHAGOGASTRODUODENOSCOPY N/A 8/3/2023    Procedure: EGD (ESOPHAGOGASTRODUODENOSCOPY);  Surgeon: Kaushik Pinon MD;  Location: [facility];  Service: Endoscopy;  Laterality: N/A;    HAND TENDON SURGERY Left     HEMICOLECTOMY      right    INJECTION OF ANESTHETIC AGENT AROUND MEDIAL BRANCH NERVES INNERVATING LUMBAR FACET JOINT Right 07/10/2018    Procedure: Block-nerve-medial branch-lumbar;  Surgeon: Parish Gaitan MD;  Location: [facility] OR;  Service: Pain Management;  Laterality: Right;  L3, 4, 5    INSERTION OF INFERIOR VENA CAVAL FILTER N/A 9/13/2023    Procedure: Insertion, Filter, Inferior Vena Cava;  Surgeon: Oren Verdin MD;  Location: [facility] CATH/EP LAB;  Service: General;  Laterality: N/A;    INSERTION OF TUNNELED CENTRAL VENOUS CATHETER (CVC) WITH SUBCUTANEOUS PORT Right 11/15/2023    Procedure: [id number]-CATH;  Surgeon: Jim Chavez  "MD;  Location: [facility] OR;  Service: General;  Laterality: Right;    JOINT REPLACEMENT      bilateral    RADIOFREQUENCY ABLATION OF LUMBAR MEDIAL BRANCH NERVE AT SINGLE LEVEL Right 07/24/2018    Procedure: RADIOFREQUENCY ABLATION, NERVE, MEDIAL BRANCH, LUMBAR, 1 LEVEL;  Surgeon: Parish Gaitan MD;  Location: [facility] OR;  Service: Pain Management;  Laterality: Right;  L3,4,5 - Burned at 80 degrees C. for 75 seconds x 2 each site    ROBOT-ASSISTED COLECTOMY N/A 9/29/2023    Procedure: ROBOTIC COLECTOMY;  Surgeon: Jim Chavez MD;  Location: [facility] OR;  Service: General;  Laterality: N/A;    SHOULDER ARTHROSCOPY Right 01/12/2017    Reverse     TONSILLECTOMY      TONSILLECTOMY, ADENOIDECTOMY, BILATERAL MYRINGOTOMY AND TUBES      TOTAL KNEE ARTHROPLASTY Bilateral 08/1998    total replacements    TUMOR REMOVAL Left     left foot as a child       Review of patient's allergies indicates:   Allergen Reactions    Aspirin      Other reaction(s): Stomach upset    Aspirin Other (See Comments)     Other reaction(s): Stomach upset    Gabapentin Other (See Comments)     Pt states she felt "funny" when she took the medication. Especially at the higher dose.    Mobic [meloxicam] Swelling     Edema and elevated blood pressure     Oxaliplatin Other (See Comments)     Other reaction(s): Joint pain  Other reaction(s): Itching  Other reaction(s): Hives  Other reaction(s): Joint pain  Other reaction(s): Itching  Other reaction(s): Hives    Pregabalin Other (See Comments)     Side effects  Side effects       Current Facility-Administered Medications on File Prior to Encounter   Medication    0.9%  NaCl infusion     Current Outpatient Medications on File Prior to Encounter   Medication Sig    acetaminophen (TYLENOL) 650 MG TbSR Take 650 mg by mouth every 8 (eight) hours.    albuterol (PROVENTIL/VENTOLIN HFA) 90 mcg/actuation inhaler Inhale 2 puffs into the lungs every 6 (six) hours as needed for Wheezing.    albuterol-ipratropium (DUO-NEB) 2.5 " mg-0.5 mg/3 mL nebulizer solution Take 3 mLs by nebulization every 6 (six) hours as needed for Wheezing. Rescue    alendronate (FOSAMAX) 70 MG tablet TAKE 1 TABLET(70 MG) BY MOUTH EVERY 7 DAYS (Patient taking differently: Take 70 mg by mouth every 7 days. Sundays)    apixaban (ELIQUIS) 5 mg Tab Take 1 tablet (5 mg total) by mouth 2 (two) times daily.    azelastine (ASTELIN) 137 mcg (0.1 %) nasal spray 1 spray (137 mcg total) by Nasal route 2 (two) times daily. Point up and slightly outward toward ear when spraying to avoid irritating nasal septum. (Patient taking differently: 1 spray by Nasal route daily as needed for Rhinitis. Point up and slightly outward toward ear when spraying to avoid irritating nasal septum.)    ergocalciferol, vitamin D2, (VITAMIN D ORAL) Take 2,000 mg by mouth once daily.    ferrous sulfate (FEOSOL) Tab tablet Take 1 tablet by mouth daily with breakfast.    fluticasone propionate (FLONASE) 50 mcg/actuation nasal spray 1 spray (50 mcg total) by Each Nostril route once daily. (Patient taking differently: 1 spray by Each Nostril route daily as needed for Allergies.)    furosemide (LASIX) 20 MG tablet Take 1 tablet (20 mg total) by mouth daily as needed (edema).    levocetirizine (XYZAL) 5 MG tablet TAKE 1 TABLET(5 MG) BY MOUTH EVERY NIGHT AS NEEDED FOR ALLERGIES (Patient taking differently: Take 5 mg by mouth nightly as needed for Allergies.)    levothyroxine (SYNTHROID) 88 MCG tablet Take 1 tablet (88 mcg total) by mouth once daily.    lisinopriL (PRINIVIL,ZESTRIL) 30 MG tablet TAKE 1 TABLET BY MOUTH EVERY DAY (Patient taking differently: Take 30 mg by mouth once daily.)    mometasone-formoterol (DULERA) 100-5 mcg/actuation HFAA Inhale 2 puffs into the lungs 2 (two) times daily.    multivitamin capsule Take 1 capsule by mouth once daily.    omeprazole (PRILOSEC) 40 MG capsule TAKE 1 CAPSULE(40 MG) BY MOUTH TWICE DAILY BEFORE MEALS    traMADoL (ULTRAM) 50 mg tablet Take 1 tablet (50 mg total)  by mouth every 8 (eight) hours as needed for Pain. (Patient taking differently: Take 50 mg by mouth daily as needed for Pain.)    azithromycin (Z-JACQUELIN) 250 MG tablet Take 2 tablets by mouth on day 1; Take 1 tablet by mouth on days 2-5    benzonatate (TESSALON) 100 MG capsule Take 1 capsule (100 mg total) by mouth 3 (three) times daily as needed for Cough.    cyclobenzaprine (FLEXERIL) 5 MG tablet Take 1 tablet (5 mg total) by mouth 3 (three) times daily as needed for Muscle spasms. (Patient not taking: Reported on 2025)    hydrOXYzine HCL (ATARAX) 25 MG tablet Take 1 tablet (25 mg total) by mouth 3 (three) times daily as needed for Anxiety (insomnia). (Patient not taking: Reported on 2025)    oseltamivir (TAMIFLU) 30 MG capsule Take 1 capsule (30 mg total) by mouth 2 (two) times daily. Take 75 mg  1 dose, then 30 mg twice daily for 4 days    [] oseltamivir (TAMIFLU) 75 MG capsule Take 1 capsule (75 mg total) by mouth once. for 1 dose    predniSONE (DELTASONE) 20 MG tablet Take 1 tablet (20 mg total) by mouth 2 (two) times daily. for 5 days    promethazine (PHENERGAN) 12.5 MG Tab (Take 1-2 tabs every 6 hours as needed for nausea persistent despite zofran)    promethazine-dextromethorphan (PROMETHAZINE-DM) 6.25-15 mg/5 mL Syrp Take 5 mLs by mouth nightly as needed (cough).    promethazine-dextromethorphan (PROMETHAZINE-DM) 6.25-15 mg/5 mL Syrp Take 5 mLs by mouth every 8 (eight) hours as needed (cough).    ramelteon (ROZEREM) 8 mg tablet Take 1 tablet (8 mg total) by mouth every evening. (Patient not taking: Reported on 2025)    silver sulfADIAZINE 1% (SILVADENE) 1 % cream Apply topically once daily. (Patient not taking: Reported on 2025)    [DISCONTINUED] mupirocin (BACTROBAN) 2 % ointment Apply topically 3 (three) times daily. (Patient not taking: Reported on 2025)    [DISCONTINUED] nystatin (MYCOSTATIN) 100,000 unit/mL suspension Take 4 mLs by mouth 4 (four) times daily with meals and  nightly.    [DISCONTINUED] triamcinolone acetonide 0.1% (KENALOG) 0.1 % cream APPLY TOPICALLY TO THE AFFECTED AREA TWICE DAILY     Family History       Problem Relation (Age of Onset)    Arthritis Sister    Cancer Sister, Sister    Cataracts Father, Sister    Hypertension Mother    Kidney disease Mother    No Known Problems Brother          Tobacco Use    Smoking status: Former     Current packs/day: 0.00     Average packs/day: 0.5 packs/day for 1 year (0.5 ttl pk-yrs)     Types: Cigarettes     Start date: 1960     Quit date: 1961     Years since quittin.1    Smokeless tobacco: Never   Substance and Sexual Activity    Alcohol use: No    Drug use: No    Sexual activity: Never     Review of Systems   Constitutional:  Positive for activity change. Negative for chills, diaphoresis and fever.   HENT:  Negative for congestion, nosebleeds and tinnitus.    Eyes:  Negative for photophobia and visual disturbance.   Respiratory:  Positive for cough, shortness of breath and wheezing. Negative for chest tightness.    Cardiovascular:  Negative for chest pain, palpitations and leg swelling.   Gastrointestinal:  Negative for abdominal distention, abdominal pain, constipation, diarrhea, nausea and vomiting.   Endocrine: Negative for cold intolerance and heat intolerance.   Genitourinary:  Negative for difficulty urinating, dysuria, frequency, hematuria and urgency.   Musculoskeletal:  Negative for arthralgias, back pain and myalgias.   Skin:  Negative for pallor, rash and wound.   Allergic/Immunologic: Negative for immunocompromised state.   Neurological:  Negative for dizziness, tremors, facial asymmetry, speech difficulty and weakness.   Hematological:  Negative for adenopathy. Does not bruise/bleed easily.   Psychiatric/Behavioral:  Negative for confusion and sleep disturbance. The patient is not nervous/anxious.      Objective:     Vital Signs (Most Recent):  Temp: 98.1 °F (36.7 °C) (25 1440)  Pulse: 73  (02/18/25 1803)  Resp: 20 (02/18/25 1704)  BP: (!) 149/76 (02/18/25 1803)  SpO2: 98 % (02/18/25 1803) Vital Signs (24h Range):  Temp:  [98.1 °F (36.7 °C)] 98.1 °F (36.7 °C)  Pulse:  [64-76] 73  Resp:  [18-20] 20  SpO2:  [95 %-98 %] 98 %  BP: (121-151)/(67-81) 149/76     Weight: 103 kg (227 lb)  Body mass index is 40.86 kg/m².     Physical Exam  Vitals and nursing note reviewed.   Constitutional:       General: She is not in acute distress.     Appearance: She is well-developed. She is ill-appearing. She is not diaphoretic.   HENT:      Head: Normocephalic.      Nose: Congestion and rhinorrhea present.      Mouth/Throat:      Mouth: Mucous membranes are moist.      Pharynx: Oropharynx is clear.   Eyes:      General: No scleral icterus.     Conjunctiva/sclera: Conjunctivae normal.      Pupils: Pupils are equal, round, and reactive to light.   Neck:      Vascular: No JVD.   Cardiovascular:      Rate and Rhythm: Normal rate and regular rhythm.      Heart sounds: Normal heart sounds. No murmur heard.     No friction rub. No gallop.   Pulmonary:      Effort: Pulmonary effort is normal. No respiratory distress.      Breath sounds: Wheezing and rhonchi present. No rales.   Abdominal:      General: Bowel sounds are normal. There is no distension.      Palpations: Abdomen is soft.      Tenderness: There is no abdominal tenderness. There is no guarding or rebound.   Musculoskeletal:         General: No tenderness. Normal range of motion.      Cervical back: Normal range of motion and neck supple.   Lymphadenopathy:      Cervical: No cervical adenopathy.   Skin:     General: Skin is warm and dry.      Capillary Refill: Capillary refill takes less than 2 seconds.      Coloration: Skin is not pale.      Findings: No erythema or rash.   Neurological:      Mental Status: She is alert and oriented to person, place, and time.      Cranial Nerves: No cranial nerve deficit.      Sensory: No sensory deficit.      Coordination:  "Coordination normal.      Deep Tendon Reflexes: Reflexes normal.   Psychiatric:         Behavior: Behavior normal.         Thought Content: Thought content normal.         Judgment: Judgment normal.              CRANIAL NERVES     CN III, IV, VI   Pupils are equal, round, and reactive to light.       Significant Labs: All pertinent labs within the past 24 hours have been reviewed.  Blood Culture: No results for input(s): "LABBLOO" in the last 48 hours.  CBC:   Recent Labs   Lab 02/18/25  1537   WBC 14.25*   HGB 11.1*   HCT 33.9*        CMP:   Recent Labs   Lab 02/18/25  1537      K 3.3*      CO2 18*      BUN 17   CREATININE 0.9   CALCIUM 8.5*   PROT 7.0   ALBUMIN 3.2*   BILITOT 1.1*   ALKPHOS 61   AST 12   ALT 19   ANIONGAP 12     Cardiac Markers:   Recent Labs   Lab 02/18/25  1544   BNP 37     Urine Studies:   Recent Labs   Lab 02/18/25  1558   COLORU Yellow   APPEARANCEUA Hazy*   PHUR 6.0   SPECGRAV 1.020   PROTEINUA 1+*   GLUCUA Negative   KETONESU Negative   BILIRUBINUA Negative   OCCULTUA 1+*   NITRITE Positive*   UROBILINOGEN Negative   LEUKOCYTESUR 3+*   RBCUA 2   WBCUA 53*   BACTERIA Many*   SQUAMEPITHEL 1   HYALINECASTS 1       Significant Imaging: I have reviewed all pertinent imaging results/findings within the past 24 hours.    CXR    FINDINGS:  There is mild cardiomegaly.  The mediastinal with is within normal limits.  There is a 5.9 cm soft tissue density in the right upper lobe and enlargement of the right hilum consistent with adenopathy.  Small nodular density in the left upper lobe is less prominent than seen on the prior study.  No pneumothorax or pleural effusion is noted.     Impression:     There is a continued soft tissue density in the right upper lobe consistent with probable pneumonia given the speed of its development between the chest x-ray of January 6, 2025 and February 18, 2025.  Right hilar adenopathy is noted.  There is decreased nodular density in the left " upper lobe.  Mild cardiomegaly is present     Assessment/Plan:     Acute cystitis without hematuria  Acute problem   Urine culture pending   Cefepime      Influenza A  Acute problem   Tamiflu 75 mg p.o. b.i.d. for 5 days      Multifocal pneumonia  Patient has a diagnosis of pneumonia. The cause of the pneumonia is suspected to be viral in etiology but organism is not known. The pneumonia is worsening due to shortness for breath and cough . The patient has the following signs/symptoms of pneumonia: cough, sputum production, and shortness of breath. The patient does have a current oxygen requirement and the patient does not have a home oxygen requirement. I have reviewed the pertinent imaging. The following cultures have been collected: Blood cultures The culture results are listed below.     Current antimicrobial regimen consists of the antibiotics listed below. Will monitor patient closely and continue current treatment plan unchanged.    Tamiflu 75 mg p.o. b.i.d. for 5 days    Antibiotics (From admission, onward)      Start     Stop Route Frequency Ordered    02/19/25 0900  azithromycin tablet 250 mg         02/23/25 0859 Oral Daily 02/18/25 1731 02/18/25 2330  ceFEPIme injection 1 g         -- IV Every 8 hours (non-standard times) 02/18/25 1731    02/18/25 1715  azithromycin (ZITHROMAX) 500 mg in 0.9% NaCl 250 mL IVPB (admixture device)         02/19/25 0514 IV ED 1 Time 02/18/25 1711            Microbiology Results (last 7 days)       Procedure Component Value Units Date/Time    Urine culture [[id number]] Collected: 02/18/25 1558    Order Status: No result Specimen: Urine Updated: 02/18/25 1630    Blood culture x two cultures. Draw prior to antibiotics. [[id number]] Collected: 02/18/25 1544    Order Status: Sent Specimen: Blood from Peripheral, Hand, Left     Blood culture x two cultures. Draw prior to antibiotics. [[id number]] Collected: 02/18/25 1537    Order Status: Sent Specimen: Blood from Peripheral,  Hand, Right             History of pulmonary embolus (PE)  Chronic problem   History noted   IVC filter in place   Continue Eliquis      Essential hypertension  Patient's blood pressure range in the last 24 hours was: BP  Min: 121/68  Max: 151/67.The patient's inpatient anti-hypertensive regimen is listed below:  Current Antihypertensives       Plan  - BP is controlled, no changes needed to their regimen  -     Morbid obesity with BMI of 40.0-44.9, adult  Body mass index is 40.86 kg/m². Morbid obesity complicates all aspects of disease management from diagnostic modalities to treatment. Weight loss encouraged and health benefits explained to patient.           VTE Risk Mitigation (From admission, onward)           Ordered     Place FANY hose  Until discontinued         02/18/25 1731     IP VTE HIGH RISK PATIENT  Once         02/18/25 1731     Place sequential compression device  Until discontinued         02/18/25 1731                               Pharmacist Renal Dose Adjustment Note    Danna Sorensen is a 84 y.o. female being treated with the medication tamiflu    Patient Data:    Vital Signs (Most Recent):  Temp: 98.1 °F (36.7 °C) (02/18/25 1440)  Pulse: 65 (02/18/25 1704)  Resp: 20 (02/18/25 1704)  BP: 133/79 (02/18/25 1704)  SpO2: 98 % (02/18/25 1704) Vital Signs (72h Range):  Temp:  [97.8 °F (36.6 °C)-98.1 °F (36.7 °C)]   Pulse:  [65-90]   Resp:  [18-20]   BP: (110-151)/(62-81)   SpO2:  [96 %-98 %]      Recent Labs   Lab 02/18/25  1537   CREATININE 0.9     Serum creatinine: 0.9 mg/dL 02/18/25 1537  Estimated creatinine clearance: 54.8 mL/min    tamiflu 75 mg twice daily will be changed to tamiflu 75 mg x1, then tamiflu 30 mg twice daily for total 5 days  Due to CrCl < 60    Pharmacist's Name: Maye Rojas  Pharmacist's Extension: [phone]      Elvin Anderson NP  [facility]  [facility]

## 2025-02-19 NOTE — ED NOTES
Pt given warm blanket and pillow. Bed plugged in and pt shown how to operate it to move hob and fob up and down for comfort. Pt verbalized understanding. Light turned off for pt comfort. Pt denies further needs. Call light within reach. NAD noted.

## 2025-02-19 NOTE — ASSESSMENT & PLAN NOTE
Body mass index is 40.86 kg/m². Morbid obesity complicates all aspects of disease management from diagnostic modalities to treatment. Weight loss encouraged and health benefits explained to patient.

## 2025-02-19 NOTE — PLAN OF CARE
[facility] - ED  Initial Discharge Assessment       Primary Care Provider: Claudia Kim MD    Admission Diagnosis: Multifocal pneumonia [J18.9]    Admission Date: 2/18/2025  Expected Discharge Date: 2/23/2025    Transition of Care Barriers: None    Payor: [employer] [facility] / Plan: [employer] / Product Type: Medicare Advantage /     Extended Emergency Contact Information  Primary Emergency Contact: Barbie Plummer  Mobile Phone: [phone]  Relation: Relative  Preferred language: English   needed? No  Secondary Emergency Contact: Lizbeth Crowley  Mobile Phone: [phone]  Relation: Sister  Preferred language: English   needed? No    Discharge Plan A: Home with family  Discharge Plan B: Home      [id number] - [place] - [address] AT [facility] [address]  [address]  [place] [address]  Phone: [phone] Fax: [phone]    Discharge assessment  completed with pt at bedside.  Verified facesheet information.  States lives at listed address with niece and her spouse, is independent with activities and drives self to apts. Denies HD, HH, recent hospital stay, opt services.  States uses RW and cane.  Discharge plan is home.  Family to provide transportation home at discharge.    Initial Assessment (most recent)       Adult Discharge Assessment - 02/19/25 1707          Discharge Assessment    Assessment Type Discharge Planning Assessment     Confirmed/corrected address, phone number and insurance Yes     Confirmed Demographics Correct on Facesheet     Source of Information patient     People in Home other relative(s)     Facility Arrived From: home     Do you expect to return to your current living situation? Yes     Do you have help at home or someone to help you manage your care at home? No     Prior to hospitilization cognitive status: Alert/Oriented     Current cognitive status: Alert/Oriented     Walking or Climbing  Stairs Difficulty no     Dressing/Bathing Difficulty no     Equipment Currently Used at Home cane, straight;wheelchair     Readmission within 30 days? No     Patient currently being followed by outpatient case management? No     Do you currently have service(s) that help you manage your care at home? No     Do you take prescription medications? Yes     Do you have prescription coverage? Yes     Do you have any problems affording any of your prescribed medications? No     Is the patient taking medications as prescribed? yes     Who is going to help you get home at discharge? neice     How do you get to doctors appointments? car, drives self     Are you on dialysis? No     Do you take coumadin? No     Discharge Plan A Home with family     Discharge Plan B Home     DME Needed Upon Discharge  none     Discharge Plan discussed with: Patient     Transition of Care Barriers None

## 2025-02-19 NOTE — SUBJECTIVE & OBJECTIVE
Interval History:  Patient was seen and examined at bedside this morning.  She remains on room air, but states she is still having shortness of breath.  White count has improved.  Bicarb 18.  Urinalysis positive, the patient has no urinary symptoms.  Lactic acid normal.  Remains on Tamiflu, cefepime, and Zithromax.  ABG pending.    Review of Systems   Constitutional:  Positive for activity change. Negative for chills, diaphoresis and fever.   HENT:  Negative for congestion, nosebleeds and tinnitus.    Eyes:  Negative for photophobia and visual disturbance.   Respiratory:  Positive for cough, shortness of breath and wheezing. Negative for chest tightness.    Cardiovascular:  Negative for chest pain, palpitations and leg swelling.   Gastrointestinal:  Negative for abdominal distention, abdominal pain, constipation, diarrhea, nausea and vomiting.   Endocrine: Negative for cold intolerance and heat intolerance.   Genitourinary:  Negative for difficulty urinating, dysuria, frequency, hematuria and urgency.   Musculoskeletal:  Negative for arthralgias, back pain and myalgias.   Skin:  Negative for pallor, rash and wound.   Allergic/Immunologic: Negative for immunocompromised state.   Neurological:  Negative for dizziness, tremors, facial asymmetry, speech difficulty and weakness.   Hematological:  Negative for adenopathy. Does not bruise/bleed easily.   Psychiatric/Behavioral:  Negative for confusion and sleep disturbance. The patient is not nervous/anxious.      Objective:     Vital Signs (Most Recent):  Temp: 98.1 °F (36.7 °C) (02/18/25 1440)  Pulse: 77 (02/19/25 0947)  Resp: 18 (02/19/25 0745)  BP: 127/67 (02/19/25 0947)  SpO2: 98 % (02/19/25 0947) Vital Signs (24h Range):  Temp:  [98.1 °F (36.7 °C)] 98.1 °F (36.7 °C)  Pulse:  [64-81] 77  Resp:  [18-22] 18  SpO2:  [95 %-100 %] 98 %  BP: (121-154)/(63-82) 127/67     Weight: 103 kg (227 lb)  Body mass index is 40.86 kg/m².    Intake/Output Summary (Last 24 hours) at  2/19/2025 1251  Last data filed at 2/18/2025 1856  Gross per 24 hour   Intake 250 ml   Output --   Net 250 ml         Physical Exam  Vitals and nursing note reviewed.   Constitutional:       General: She is not in acute distress.     Appearance: She is well-developed. She is ill-appearing. She is not diaphoretic.   HENT:      Head: Normocephalic.      Nose: Congestion and rhinorrhea present.      Mouth/Throat:      Mouth: Mucous membranes are moist.      Pharynx: Oropharynx is clear.   Eyes:      General: No scleral icterus.     Conjunctiva/sclera: Conjunctivae normal.      Pupils: Pupils are equal, round, and reactive to light.   Neck:      Vascular: No JVD.   Cardiovascular:      Rate and Rhythm: Normal rate and regular rhythm.      Heart sounds: Normal heart sounds. No murmur heard.     No friction rub. No gallop.   Pulmonary:      Effort: Pulmonary effort is normal. No respiratory distress.      Breath sounds: Wheezing and rhonchi present. No rales.   Abdominal:      General: Bowel sounds are normal. There is no distension.      Palpations: Abdomen is soft.      Tenderness: There is no abdominal tenderness. There is no guarding or rebound.   Musculoskeletal:         General: No tenderness. Normal range of motion.      Cervical back: Normal range of motion and neck supple.   Lymphadenopathy:      Cervical: No cervical adenopathy.   Skin:     General: Skin is warm and dry.      Capillary Refill: Capillary refill takes less than 2 seconds.      Coloration: Skin is not pale.      Findings: No erythema or rash.   Neurological:      Mental Status: She is alert and oriented to person, place, and time.      Cranial Nerves: No cranial nerve deficit.      Sensory: No sensory deficit.      Coordination: Coordination normal.      Deep Tendon Reflexes: Reflexes normal.   Psychiatric:         Behavior: Behavior normal.         Thought Content: Thought content normal.         Judgment: Judgment normal.             Significant  Labs: All pertinent labs within the past 24 hours have been reviewed.  CBC:   Recent Labs   Lab 02/18/25  1537 02/19/25  0441   WBC 14.25* 11.48   HGB 11.1* 10.5*   HCT 33.9* 32.1*    185     CMP:   Recent Labs   Lab 02/18/25  1537 02/19/25  0441    139   K 3.3* 3.5    113*   CO2 18* 18*    99   BUN 17 17   CREATININE 0.9 0.9   CALCIUM 8.5* 8.3*   PROT 7.0 6.5   ALBUMIN 3.2* 2.8*   BILITOT 1.1* 0.8   ALKPHOS 61 72   AST 12 17   ALT 19 17   ANIONGAP 12 8       Significant Imaging: I have reviewed all pertinent imaging results/findings within the past 24 hours.  Imaging Results              X-Ray Chest AP Portable (Final result)  Result time 02/18/25 16:43:55      Final result by Oren Maria Jr., MD (02/18/25 16:43:55)                   Impression:      There is a continued soft tissue density in the right upper lobe consistent with probable pneumonia given the speed of its development between the chest x-ray of January 6, 2025 and February 18, 2025.  Right hilar adenopathy is noted.  There is decreased nodular density in the left upper lobe.  Mild cardiomegaly is present      Electronically signed by: Oren Maria MD  Date:    02/18/2025  Time:    16:43               Narrative:    EXAMINATION:  XR CHEST AP PORTABLE    CLINICAL HISTORY:  Sepsis;    TECHNIQUE:  Single frontal view of the chest was performed.    COMPARISON:  Chest x-ray of February 18 December 2025 and chest x-ray of January 6, 2025 as well as the CT chest of January 3, 2025.    FINDINGS:  There is mild cardiomegaly.  The mediastinal with is within normal limits.  There is a 5.9 cm soft tissue density in the right upper lobe and enlargement of the right hilum consistent with adenopathy.  Small nodular density in the left upper lobe is less prominent than seen on the prior study.  No pneumothorax or pleural effusion is noted.

## 2025-02-19 NOTE — SUBJECTIVE & OBJECTIVE
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    Strabismus     Trouble in sleeping     Wears dentures     Uppers       Past Surgical History:   Procedure Laterality Date    CARDIAC SURGERY      cardiac cath    COLON SURGERY      colon resection    COLONOSCOPY N/A 10/18/2016    Procedure: COLONOSCOPY;  Surgeon: Rell Cordova MD;  Location: [facility];  Service: Endoscopy;  Laterality: N/A;    COLONOSCOPY N/A 8/8/2023    Procedure: COLONOSCOPY;  Surgeon: Kaushik Pinon MD;  Location: [facility];  Service: Endoscopy;  Laterality: N/A;    COLONOSCOPY N/A 8/22/2023    Procedure: COLONOSCOPY (tattoo of colon ca);  Surgeon: Kaushik Pinon MD;  Location: [facility];  Service: Endoscopy;  Laterality: N/A;    COLONOSCOPY N/A 8/12/2024    Procedure: COLONOSCOPY;  Surgeon: Jim Chavez MD;  Location: [facility];  Service: General;  Laterality: N/A;    ESOPHAGOGASTRODUODENOSCOPY N/A 8/3/2023    Procedure: EGD (ESOPHAGOGASTRODUODENOSCOPY);  Surgeon: Kaushik Pinon MD;  Location: [facility];  Service: Endoscopy;  Laterality: N/A;    HAND TENDON SURGERY Left     HEMICOLECTOMY      right    INJECTION OF ANESTHETIC AGENT AROUND MEDIAL BRANCH NERVES INNERVATING LUMBAR FACET JOINT Right 07/10/2018    Procedure: Block-nerve-medial branch-lumbar;  Surgeon: Parish Gaitan MD;  Location: [facility] OR;  Service: Pain Management;  Laterality: Right;  L3, 4, 5    INSERTION OF  "INFERIOR VENA CAVAL FILTER N/A 9/13/2023    Procedure: Insertion, Filter, Inferior Vena Cava;  Surgeon: Oren Verdin MD;  Location: [facility] CATH/EP LAB;  Service: General;  Laterality: N/A;    INSERTION OF TUNNELED CENTRAL VENOUS CATHETER (CVC) WITH SUBCUTANEOUS PORT Right 11/15/2023    Procedure: [id number];  Surgeon: Jim Chavez MD;  Location: [facility] OR;  Service: General;  Laterality: Right;    JOINT REPLACEMENT      bilateral    RADIOFREQUENCY ABLATION OF LUMBAR MEDIAL BRANCH NERVE AT SINGLE LEVEL Right 07/24/2018    Procedure: RADIOFREQUENCY ABLATION, NERVE, MEDIAL BRANCH, LUMBAR, 1 LEVEL;  Surgeon: Parish Gaitan MD;  [facility];  Service: Pain Management;  Laterality: Right;  L3,4,5 - Burned at 80 degrees C. for 75 seconds x 2 each site    ROBOT-ASSISTED COLECTOMY N/A 9/29/2023    Procedure: ROBOTIC COLECTOMY;  Surgeon: Jim Chavez MD;  Location: [facility] OR;  Service: General;  Laterality: N/A;    SHOULDER ARTHROSCOPY Right 01/12/2017    Reverse     TONSILLECTOMY      TONSILLECTOMY, ADENOIDECTOMY, BILATERAL MYRINGOTOMY AND TUBES      TOTAL KNEE ARTHROPLASTY Bilateral 08/1998    total replacements    TUMOR REMOVAL Left     left foot as a child       Review of patient's allergies indicates:   Allergen Reactions    Aspirin      Other reaction(s): Stomach upset    Aspirin Other (See Comments)     Other reaction(s): Stomach upset    Gabapentin Other (See Comments)     Pt states she felt "funny" when she took the medication. Especially at the higher dose.    Mobic [meloxicam] Swelling     Edema and elevated blood pressure     Oxaliplatin Other (See Comments)     Other reaction(s): Joint pain  Other reaction(s): Itching  Other reaction(s): Hives  Other reaction(s): Joint pain  Other reaction(s): Itching  Other reaction(s): Hives    Pregabalin Other (See Comments)     Side effects  Side effects       Current Facility-Administered Medications on File Prior to Encounter   Medication    0.9%  " NaCl infusion     Current Outpatient Medications on File Prior to Encounter   Medication Sig    acetaminophen (TYLENOL) 650 MG TbSR Take 650 mg by mouth every 8 (eight) hours.    albuterol (PROVENTIL/VENTOLIN HFA) 90 mcg/actuation inhaler Inhale 2 puffs into the lungs every 6 (six) hours as needed for Wheezing.    albuterol-ipratropium (DUO-NEB) 2.5 mg-0.5 mg/3 mL nebulizer solution Take 3 mLs by nebulization every 6 (six) hours as needed for Wheezing. Rescue    alendronate (FOSAMAX) 70 MG tablet TAKE 1 TABLET(70 MG) BY MOUTH EVERY 7 DAYS (Patient taking differently: Take 70 mg by mouth every 7 days. Sundays)    apixaban (ELIQUIS) 5 mg Tab Take 1 tablet (5 mg total) by mouth 2 (two) times daily.    azelastine (ASTELIN) 137 mcg (0.1 %) nasal spray 1 spray (137 mcg total) by Nasal route 2 (two) times daily. Point up and slightly outward toward ear when spraying to avoid irritating nasal septum. (Patient taking differently: 1 spray by Nasal route daily as needed for Rhinitis. Point up and slightly outward toward ear when spraying to avoid irritating nasal septum.)    ergocalciferol, vitamin D2, (VITAMIN D ORAL) Take 2,000 mg by mouth once daily.    ferrous sulfate (FEOSOL) Tab tablet Take 1 tablet by mouth daily with breakfast.    fluticasone propionate (FLONASE) 50 mcg/actuation nasal spray 1 spray (50 mcg total) by Each Nostril route once daily. (Patient taking differently: 1 spray by Each Nostril route daily as needed for Allergies.)    furosemide (LASIX) 20 MG tablet Take 1 tablet (20 mg total) by mouth daily as needed (edema).    levocetirizine (XYZAL) 5 MG tablet TAKE 1 TABLET(5 MG) BY MOUTH EVERY NIGHT AS NEEDED FOR ALLERGIES (Patient taking differently: Take 5 mg by mouth nightly as needed for Allergies.)    levothyroxine (SYNTHROID) 88 MCG tablet Take 1 tablet (88 mcg total) by mouth once daily.    lisinopriL (PRINIVIL,ZESTRIL) 30 MG tablet TAKE 1 TABLET BY MOUTH EVERY DAY (Patient taking differently: Take 30  mg by mouth once daily.)    mometasone-formoterol (DULERA) 100-5 mcg/actuation HFAA Inhale 2 puffs into the lungs 2 (two) times daily.    multivitamin capsule Take 1 capsule by mouth once daily.    omeprazole (PRILOSEC) 40 MG capsule TAKE 1 CAPSULE(40 MG) BY MOUTH TWICE DAILY BEFORE MEALS    traMADoL (ULTRAM) 50 mg tablet Take 1 tablet (50 mg total) by mouth every 8 (eight) hours as needed for Pain. (Patient taking differently: Take 50 mg by mouth daily as needed for Pain.)    azithromycin (Z-JACQUELIN) 250 MG tablet Take 2 tablets by mouth on day 1; Take 1 tablet by mouth on days 2-5    benzonatate (TESSALON) 100 MG capsule Take 1 capsule (100 mg total) by mouth 3 (three) times daily as needed for Cough.    cyclobenzaprine (FLEXERIL) 5 MG tablet Take 1 tablet (5 mg total) by mouth 3 (three) times daily as needed for Muscle spasms. (Patient not taking: Reported on 2025)    hydrOXYzine HCL (ATARAX) 25 MG tablet Take 1 tablet (25 mg total) by mouth 3 (three) times daily as needed for Anxiety (insomnia). (Patient not taking: Reported on 2025)    oseltamivir (TAMIFLU) 30 MG capsule Take 1 capsule (30 mg total) by mouth 2 (two) times daily. Take 75 mg  1 dose, then 30 mg twice daily for 4 days    [] oseltamivir (TAMIFLU) 75 MG capsule Take 1 capsule (75 mg total) by mouth once. for 1 dose    predniSONE (DELTASONE) 20 MG tablet Take 1 tablet (20 mg total) by mouth 2 (two) times daily. for 5 days    promethazine (PHENERGAN) 12.5 MG Tab (Take 1-2 tabs every 6 hours as needed for nausea persistent despite zofran)    promethazine-dextromethorphan (PROMETHAZINE-DM) 6.25-15 mg/5 mL Syrp Take 5 mLs by mouth nightly as needed (cough).    promethazine-dextromethorphan (PROMETHAZINE-DM) 6.25-15 mg/5 mL Syrp Take 5 mLs by mouth every 8 (eight) hours as needed (cough).    ramelteon (ROZEREM) 8 mg tablet Take 1 tablet (8 mg total) by mouth every evening. (Patient not taking: Reported on 2025)    silver sulfADIAZINE 1%  (SILVADENE) 1 % cream Apply topically once daily. (Patient not taking: Reported on 2025)    [DISCONTINUED] mupirocin (BACTROBAN) 2 % ointment Apply topically 3 (three) times daily. (Patient not taking: Reported on 2025)    [DISCONTINUED] nystatin (MYCOSTATIN) 100,000 unit/mL suspension Take 4 mLs by mouth 4 (four) times daily with meals and nightly.    [DISCONTINUED] triamcinolone acetonide 0.1% (KENALOG) 0.1 % cream APPLY TOPICALLY TO THE AFFECTED AREA TWICE DAILY     Family History       Problem Relation (Age of Onset)    Arthritis Sister    Cancer Sister, Sister    Cataracts Father, Sister    Hypertension Mother    Kidney disease Mother    No Known Problems Brother          Tobacco Use    Smoking status: Former     Current packs/day: 0.00     Average packs/day: 0.5 packs/day for 1 year (0.5 ttl pk-yrs)     Types: Cigarettes     Start date: 1960     Quit date: 1961     Years since quittin.1    Smokeless tobacco: Never   Substance and Sexual Activity    Alcohol use: No    Drug use: No    Sexual activity: Never     Review of Systems   Constitutional:  Positive for activity change. Negative for chills, diaphoresis and fever.   HENT:  Negative for congestion, nosebleeds and tinnitus.    Eyes:  Negative for photophobia and visual disturbance.   Respiratory:  Positive for cough, shortness of breath and wheezing. Negative for chest tightness.    Cardiovascular:  Negative for chest pain, palpitations and leg swelling.   Gastrointestinal:  Negative for abdominal distention, abdominal pain, constipation, diarrhea, nausea and vomiting.   Endocrine: Negative for cold intolerance and heat intolerance.   Genitourinary:  Negative for difficulty urinating, dysuria, frequency, hematuria and urgency.   Musculoskeletal:  Negative for arthralgias, back pain and myalgias.   Skin:  Negative for pallor, rash and wound.   Allergic/Immunologic: Negative for immunocompromised state.   Neurological:  Negative for  dizziness, tremors, facial asymmetry, speech difficulty and weakness.   Hematological:  Negative for adenopathy. Does not bruise/bleed easily.   Psychiatric/Behavioral:  Negative for confusion and sleep disturbance. The patient is not nervous/anxious.      Objective:     Vital Signs (Most Recent):  Temp: 98.1 °F (36.7 °C) (02/18/25 1440)  Pulse: 73 (02/18/25 1803)  Resp: 20 (02/18/25 1704)  BP: (!) 149/76 (02/18/25 1803)  SpO2: 98 % (02/18/25 1803) Vital Signs (24h Range):  Temp:  [98.1 °F (36.7 °C)] 98.1 °F (36.7 °C)  Pulse:  [64-76] 73  Resp:  [18-20] 20  SpO2:  [95 %-98 %] 98 %  BP: (121-151)/(67-81) 149/76     Weight: 103 kg (227 lb)  Body mass index is 40.86 kg/m².     Physical Exam  Vitals and nursing note reviewed.   Constitutional:       General: She is not in acute distress.     Appearance: She is well-developed. She is ill-appearing. She is not diaphoretic.   HENT:      Head: Normocephalic.      Nose: Congestion and rhinorrhea present.      Mouth/Throat:      Mouth: Mucous membranes are moist.      Pharynx: Oropharynx is clear.   Eyes:      General: No scleral icterus.     Conjunctiva/sclera: Conjunctivae normal.      Pupils: Pupils are equal, round, and reactive to light.   Neck:      Vascular: No JVD.   Cardiovascular:      Rate and Rhythm: Normal rate and regular rhythm.      Heart sounds: Normal heart sounds. No murmur heard.     No friction rub. No gallop.   Pulmonary:      Effort: Pulmonary effort is normal. No respiratory distress.      Breath sounds: Wheezing and rhonchi present. No rales.   Abdominal:      General: Bowel sounds are normal. There is no distension.      Palpations: Abdomen is soft.      Tenderness: There is no abdominal tenderness. There is no guarding or rebound.   Musculoskeletal:         General: No tenderness. Normal range of motion.      Cervical back: Normal range of motion and neck supple.   Lymphadenopathy:      Cervical: No cervical adenopathy.   Skin:     General: Skin is  "warm and dry.      Capillary Refill: Capillary refill takes less than 2 seconds.      Coloration: Skin is not pale.      Findings: No erythema or rash.   Neurological:      Mental Status: She is alert and oriented to person, place, and time.      Cranial Nerves: No cranial nerve deficit.      Sensory: No sensory deficit.      Coordination: Coordination normal.      Deep Tendon Reflexes: Reflexes normal.   Psychiatric:         Behavior: Behavior normal.         Thought Content: Thought content normal.         Judgment: Judgment normal.              CRANIAL NERVES     CN III, IV, VI   Pupils are equal, round, and reactive to light.       Significant Labs: All pertinent labs within the past 24 hours have been reviewed.  Blood Culture: No results for input(s): "LABBLOO" in the last 48 hours.  CBC:   Recent Labs   Lab 02/18/25  1537   WBC 14.25*   HGB 11.1*   HCT 33.9*        CMP:   Recent Labs   Lab 02/18/25  1537      K 3.3*      CO2 18*      BUN 17   CREATININE 0.9   CALCIUM 8.5*   PROT 7.0   ALBUMIN 3.2*   BILITOT 1.1*   ALKPHOS 61   AST 12   ALT 19   ANIONGAP 12     Cardiac Markers:   Recent Labs   Lab 02/18/25  1544   BNP 37     Urine Studies:   Recent Labs   Lab 02/18/25  1558   COLORU Yellow   APPEARANCEUA Hazy*   PHUR 6.0   SPECGRAV 1.020   PROTEINUA 1+*   GLUCUA Negative   KETONESU Negative   BILIRUBINUA Negative   OCCULTUA 1+*   NITRITE Positive*   UROBILINOGEN Negative   LEUKOCYTESUR 3+*   RBCUA 2   WBCUA 53*   BACTERIA Many*   SQUAMEPITHEL 1   HYALINECASTS 1       Significant Imaging: I have reviewed all pertinent imaging results/findings within the past 24 hours.    CXR    FINDINGS:  There is mild cardiomegaly.  The mediastinal with is within normal limits.  There is a 5.9 cm soft tissue density in the right upper lobe and enlargement of the right hilum consistent with adenopathy.  Small nodular density in the left upper lobe is less prominent than seen on the prior study.  No " pneumothorax or pleural effusion is noted.     Impression:     There is a continued soft tissue density in the right upper lobe consistent with probable pneumonia given the speed of its development between the chest x-ray of January 6, 2025 and February 18, 2025.  Right hilar adenopathy is noted.  There is decreased nodular density in the left upper lobe.  Mild cardiomegaly is present

## 2025-02-19 NOTE — ASSESSMENT & PLAN NOTE
Patient's blood pressure range in the last 24 hours was: BP  Min: 121/68  Max: 151/67.The patient's inpatient anti-hypertensive regimen is listed below:  Current Antihypertensives       Plan  - BP is controlled, no changes needed to their regimen  -

## 2025-02-19 NOTE — ED NOTES
Pt self assist onto bedside commode. Pt back in bed safely. Pt has no other needs or questions at this time.

## 2025-02-19 NOTE — PROGRESS NOTES
Formerly [facility], [facility] Medicine  Progress Note    Patient Name: Danna Sorensen  MRN: [id number]  Patient Class: IP- Inpatient   Admission Date: 2/18/2025  Length of Stay: 1 days  Attending Physician: Jeffery Dubois DO  Primary Care Provider: Claudia Kim MD        Subjective     Principal Problem:<principal problem not specified>        HPI:  Danna Sorensen is an 84 year female who presents emergency room for evaluation of exertional dyspnea.  She was seen in clinic yesterday for similar symptoms where she was prescribed Tamiflu and antibiotics for influenza and pneumonia.  She did not start the meds.  She presents today with worsening of symptoms.  Denies fever or chills.  No known sick contacts or travel.  Previous medical history includes hypothyroidism, obesity, colon cancer, hypertension, PE, IVC filter placement, CKD, and pulmonary fibrosis.  ER workup:  CBC with leukocytosis of 14,000 and mild anemia.  CMP with potassium of 3.3, bicarb 18, bilirubin 1.1 (0.8  1 month ago).  UA is nitrite positive with many bacteria and white blood cells.  Urine culture pending.  Blood cultures pending.  Chest x-ray demonstrates a right soft tissue density in the right upper lobe consistent with pneumonia.  Patient was started on cefepime and Zithromax.  Admitted to Hospital Medicine for treatment and management.  Will start patient on Tamiflu                    Overview/Hospital Course:  No notes on file    Interval History:  Patient was seen and examined at bedside this morning.  She remains on room air, but states she is still having shortness of breath.  White count has improved.  Bicarb 18.  Urinalysis positive, the patient has no urinary symptoms.  Lactic acid normal.  Remains on Tamiflu, cefepime, and Zithromax.  ABG pending.    Review of Systems   Constitutional:  Positive for activity change. Negative for chills, diaphoresis and fever.   HENT:  Negative for congestion, nosebleeds and tinnitus.    Eyes:   Negative for photophobia and visual disturbance.   Respiratory:  Positive for cough, shortness of breath and wheezing. Negative for chest tightness.    Cardiovascular:  Negative for chest pain, palpitations and leg swelling.   Gastrointestinal:  Negative for abdominal distention, abdominal pain, constipation, diarrhea, nausea and vomiting.   Endocrine: Negative for cold intolerance and heat intolerance.   Genitourinary:  Negative for difficulty urinating, dysuria, frequency, hematuria and urgency.   Musculoskeletal:  Negative for arthralgias, back pain and myalgias.   Skin:  Negative for pallor, rash and wound.   Allergic/Immunologic: Negative for immunocompromised state.   Neurological:  Negative for dizziness, tremors, facial asymmetry, speech difficulty and weakness.   Hematological:  Negative for adenopathy. Does not bruise/bleed easily.   Psychiatric/Behavioral:  Negative for confusion and sleep disturbance. The patient is not nervous/anxious.      Objective:     Vital Signs (Most Recent):  Temp: 98.1 °F (36.7 °C) (02/18/25 1440)  Pulse: 77 (02/19/25 0947)  Resp: 18 (02/19/25 0745)  BP: 127/67 (02/19/25 0947)  SpO2: 98 % (02/19/25 0947) Vital Signs (24h Range):  Temp:  [98.1 °F (36.7 °C)] 98.1 °F (36.7 °C)  Pulse:  [64-81] 77  Resp:  [18-22] 18  SpO2:  [95 %-100 %] 98 %  BP: (121-154)/(63-82) 127/67     Weight: 103 kg (227 lb)  Body mass index is 40.86 kg/m².    Intake/Output Summary (Last 24 hours) at 2/19/2025 1251  Last data filed at 2/18/2025 1856  Gross per 24 hour   Intake 250 ml   Output --   Net 250 ml         Physical Exam  Vitals and nursing note reviewed.   Constitutional:       General: She is not in acute distress.     Appearance: She is well-developed. She is ill-appearing. She is not diaphoretic.   HENT:      Head: Normocephalic.      Nose: Congestion and rhinorrhea present.      Mouth/Throat:      Mouth: Mucous membranes are moist.      Pharynx: Oropharynx is clear.   Eyes:      General: No  scleral icterus.     Conjunctiva/sclera: Conjunctivae normal.      Pupils: Pupils are equal, round, and reactive to light.   Neck:      Vascular: No JVD.   Cardiovascular:      Rate and Rhythm: Normal rate and regular rhythm.      Heart sounds: Normal heart sounds. No murmur heard.     No friction rub. No gallop.   Pulmonary:      Effort: Pulmonary effort is normal. No respiratory distress.      Breath sounds: Wheezing and rhonchi present. No rales.   Abdominal:      General: Bowel sounds are normal. There is no distension.      Palpations: Abdomen is soft.      Tenderness: There is no abdominal tenderness. There is no guarding or rebound.   Musculoskeletal:         General: No tenderness. Normal range of motion.      Cervical back: Normal range of motion and neck supple.   Lymphadenopathy:      Cervical: No cervical adenopathy.   Skin:     General: Skin is warm and dry.      Capillary Refill: Capillary refill takes less than 2 seconds.      Coloration: Skin is not pale.      Findings: No erythema or rash.   Neurological:      Mental Status: She is alert and oriented to person, place, and time.      Cranial Nerves: No cranial nerve deficit.      Sensory: No sensory deficit.      Coordination: Coordination normal.      Deep Tendon Reflexes: Reflexes normal.   Psychiatric:         Behavior: Behavior normal.         Thought Content: Thought content normal.         Judgment: Judgment normal.             Significant Labs: All pertinent labs within the past 24 hours have been reviewed.  CBC:   Recent Labs   Lab 02/18/25  1537 02/19/25  0441   WBC 14.25* 11.48   HGB 11.1* 10.5*   HCT 33.9* 32.1*    185     CMP:   Recent Labs   Lab 02/18/25  1537 02/19/25  0441    139   K 3.3* 3.5    113*   CO2 18* 18*    99   BUN 17 17   CREATININE 0.9 0.9   CALCIUM 8.5* 8.3*   PROT 7.0 6.5   ALBUMIN 3.2* 2.8*   BILITOT 1.1* 0.8   ALKPHOS 61 72   AST 12 17   ALT 19 17   ANIONGAP 12 8       Significant Imaging: I  have reviewed all pertinent imaging results/findings within the past 24 hours.  Imaging Results              X-Ray Chest AP Portable (Final result)  Result time 02/18/25 16:43:55      Final result by Oren Maria Jr., MD (02/18/25 16:43:55)                   Impression:      There is a continued soft tissue density in the right upper lobe consistent with probable pneumonia given the speed of its development between the chest x-ray of January 6, 2025 and February 18, 2025.  Right hilar adenopathy is noted.  There is decreased nodular density in the left upper lobe.  Mild cardiomegaly is present      Electronically signed by: Oren Maria MD  Date:    02/18/2025  Time:    16:43               Narrative:    EXAMINATION:  XR CHEST AP PORTABLE    CLINICAL HISTORY:  Sepsis;    TECHNIQUE:  Single frontal view of the chest was performed.    COMPARISON:  Chest x-ray of February 18 December 2025 and chest x-ray of January 6, 2025 as well as the CT chest of January 3, 2025.    FINDINGS:  There is mild cardiomegaly.  The mediastinal with is within normal limits.  There is a 5.9 cm soft tissue density in the right upper lobe and enlargement of the right hilum consistent with adenopathy.  Small nodular density in the left upper lobe is less prominent than seen on the prior study.  No pneumothorax or pleural effusion is noted.                                        Assessment and Plan     Acute cystitis without hematuria  Acute problem   Urine culture pending   Cefepime      Influenza A  Acute problem   Tamiflu 75 mg p.o. b.i.d. for 5 days      Multifocal pneumonia  Patient has a diagnosis of pneumonia. The cause of the pneumonia is suspected to be viral in etiology but organism is not known. The pneumonia is worsening due to shortness for breath and cough . The patient has the following signs/symptoms of pneumonia: cough, sputum production, and shortness of breath. The patient does have a current oxygen requirement and  the patient does not have a home oxygen requirement. I have reviewed the pertinent imaging. The following cultures have been collected: Blood cultures The culture results are listed below.     Current antimicrobial regimen consists of the antibiotics listed below. Will monitor patient closely and continue current treatment plan unchanged.    Tamiflu 75 mg p.o. b.i.d. for 5 days    Antibiotics (From admission, onward)      Start     Stop Route Frequency Ordered    02/19/25 0900  azithromycin tablet 250 mg         02/23/25 0859 Oral Daily 02/18/25 1731 02/18/25 2330  ceFEPIme injection 1 g         -- IV Every 8 hours (non-standard times) 02/18/25 1731    02/18/25 1715  azithromycin (ZITHROMAX) 500 mg in 0.9% NaCl 250 mL IVPB (admixture device)         02/19/25 0514 IV ED 1 Time 02/18/25 1711            Microbiology Results (last 7 days)       Procedure Component Value Units Date/Time    Urine culture [[id number]] Collected: 02/18/25 1558    Order Status: No result Specimen: Urine Updated: 02/18/25 1630    Blood culture x two cultures. Draw prior to antibiotics. [[id number]] Collected: 02/18/25 1544    Order Status: Sent Specimen: Blood from Peripheral, Hand, Left     Blood culture x two cultures. Draw prior to antibiotics. [[id number]] Collected: 02/18/25 1537    Order Status: Sent Specimen: Blood from Peripheral, Hand, Right             History of pulmonary embolus (PE)  Chronic problem   History noted   IVC filter in place   Continue Eliquis      Essential hypertension  Patient's blood pressure range in the last 24 hours was: BP  Min: 121/68  Max: 151/67.The patient's inpatient anti-hypertensive regimen is listed below:  Current Antihypertensives       Plan  - BP is controlled, no changes needed to their regimen  -     Morbid obesity with BMI of 40.0-44.9, adult  Body mass index is 40.86 kg/m². Morbid obesity complicates all aspects of disease management from diagnostic modalities to treatment. Weight loss  encouraged and health benefits explained to patient.           VTE Risk Mitigation (From admission, onward)           Ordered     apixaban tablet 5 mg  2 times daily         02/18/25 1822     Place FANY hose  Until discontinued         02/18/25 1731     IP VTE HIGH RISK PATIENT  Once         02/18/25 1731     Place sequential compression device  Until discontinued         02/18/25 1731                    Discharge Planning   RENETTA: 2/23/2025     Code Status: Full Code   Medical Readiness for Discharge Date:                            Jeffery Dubois DO  Department of Hospital Medicine   [facility]

## 2025-02-20 VITALS
SYSTOLIC BLOOD PRESSURE: 146 MMHG | TEMPERATURE: 98 F | OXYGEN SATURATION: 94 % | RESPIRATION RATE: 18 BRPM | WEIGHT: 230.38 LBS | HEART RATE: 86 BPM | HEIGHT: 63 IN | BODY MASS INDEX: 40.82 KG/M2 | DIASTOLIC BLOOD PRESSURE: 67 MMHG

## 2025-02-20 LAB
ALBUMIN SERPL BCP-MCNC: 2.7 G/DL (ref 3.5–5.2)
ALP SERPL-CCNC: 64 U/L (ref 40–150)
ALT SERPL W/O P-5'-P-CCNC: 16 U/L (ref 10–44)
ANION GAP SERPL CALC-SCNC: 12 MMOL/L (ref 8–16)
AST SERPL-CCNC: 15 U/L (ref 10–40)
BASOPHILS # BLD AUTO: ABNORMAL K/UL (ref 0–0.2)
BASOPHILS NFR BLD: 0 % (ref 0–1.9)
BILIRUB SERPL-MCNC: 1.1 MG/DL (ref 0.1–1)
BUN SERPL-MCNC: 14 MG/DL (ref 8–23)
CALCIUM SERPL-MCNC: 8.3 MG/DL (ref 8.7–10.5)
CHLORIDE SERPL-SCNC: 112 MMOL/L (ref 95–110)
CO2 SERPL-SCNC: 16 MMOL/L (ref 23–29)
CREAT SERPL-MCNC: 0.8 MG/DL (ref 0.5–1.4)
DIFFERENTIAL METHOD BLD: ABNORMAL
EOSINOPHIL # BLD AUTO: ABNORMAL K/UL (ref 0–0.5)
EOSINOPHIL NFR BLD: 0 % (ref 0–8)
ERYTHROCYTE [DISTWIDTH] IN BLOOD BY AUTOMATED COUNT: 13.7 % (ref 11.5–14.5)
EST. GFR  (NO RACE VARIABLE): >60 ML/MIN/1.73 M^2
GLUCOSE SERPL-MCNC: 81 MG/DL (ref 70–110)
HCT VFR BLD AUTO: 32.5 % (ref 37–48.5)
HGB BLD-MCNC: 10.3 G/DL (ref 12–16)
IMM GRANULOCYTES # BLD AUTO: ABNORMAL K/UL (ref 0–0.04)
IMM GRANULOCYTES NFR BLD AUTO: ABNORMAL % (ref 0–0.5)
LYMPHOCYTES # BLD AUTO: ABNORMAL K/UL (ref 1–4.8)
LYMPHOCYTES NFR BLD: 11 % (ref 18–48)
MAGNESIUM SERPL-MCNC: 1.9 MG/DL (ref 1.6–2.6)
MCH RBC QN AUTO: 30 PG (ref 27–31)
MCHC RBC AUTO-ENTMCNC: 31.7 G/DL (ref 32–36)
MCV RBC AUTO: 95 FL (ref 82–98)
MONOCYTES # BLD AUTO: ABNORMAL K/UL (ref 0.3–1)
MONOCYTES NFR BLD: 8 % (ref 4–15)
NEUTROPHILS # BLD AUTO: ABNORMAL K/UL (ref 1.8–7.7)
NEUTROPHILS NFR BLD: 72 % (ref 38–73)
NEUTS BAND NFR BLD MANUAL: 9 %
NRBC BLD-RTO: 0 /100 WBC
OVALOCYTES BLD QL SMEAR: ABNORMAL
PHOSPHATE SERPL-MCNC: 3 MG/DL (ref 2.7–4.5)
PLATELET # BLD AUTO: 204 K/UL (ref 150–450)
PLATELET BLD QL SMEAR: ABNORMAL
PMV BLD AUTO: 10.3 FL (ref 9.2–12.9)
POCT GLUCOSE: 93 MG/DL (ref 70–110)
POIKILOCYTOSIS BLD QL SMEAR: SLIGHT
POTASSIUM SERPL-SCNC: 3.5 MMOL/L (ref 3.5–5.1)
PROT SERPL-MCNC: 6.6 G/DL (ref 6–8.4)
RBC # BLD AUTO: 3.43 M/UL (ref 4–5.4)
SODIUM SERPL-SCNC: 140 MMOL/L (ref 136–145)
WBC # BLD AUTO: 8.08 K/UL (ref 3.9–12.7)

## 2025-02-20 PROCEDURE — 25000003 PHARM REV CODE 250: Performed by: NURSE PRACTITIONER

## 2025-02-20 PROCEDURE — 84100 ASSAY OF PHOSPHORUS: CPT | Performed by: NURSE PRACTITIONER

## 2025-02-20 PROCEDURE — 36415 COLL VENOUS BLD VENIPUNCTURE: CPT | Performed by: NURSE PRACTITIONER

## 2025-02-20 PROCEDURE — 85007 BL SMEAR W/DIFF WBC COUNT: CPT | Performed by: NURSE PRACTITIONER

## 2025-02-20 PROCEDURE — 25000003 PHARM REV CODE 250: Performed by: STUDENT IN AN ORGANIZED HEALTH CARE EDUCATION/TRAINING PROGRAM

## 2025-02-20 PROCEDURE — 63600175 PHARM REV CODE 636 W HCPCS: Performed by: NURSE PRACTITIONER

## 2025-02-20 PROCEDURE — 94640 AIRWAY INHALATION TREATMENT: CPT

## 2025-02-20 PROCEDURE — 63700000 PHARM REV CODE 250 ALT 637 W/O HCPCS: Performed by: NURSE PRACTITIONER

## 2025-02-20 PROCEDURE — 83735 ASSAY OF MAGNESIUM: CPT | Performed by: NURSE PRACTITIONER

## 2025-02-20 PROCEDURE — 85027 COMPLETE CBC AUTOMATED: CPT | Performed by: NURSE PRACTITIONER

## 2025-02-20 PROCEDURE — 94761 N-INVAS EAR/PLS OXIMETRY MLT: CPT

## 2025-02-20 PROCEDURE — 92610 EVALUATE SWALLOWING FUNCTION: CPT

## 2025-02-20 PROCEDURE — 25000242 PHARM REV CODE 250 ALT 637 W/ HCPCS: Performed by: NURSE PRACTITIONER

## 2025-02-20 PROCEDURE — 80053 COMPREHEN METABOLIC PANEL: CPT | Performed by: NURSE PRACTITIONER

## 2025-02-20 PROCEDURE — 97535 SELF CARE MNGMENT TRAINING: CPT

## 2025-02-20 RX ORDER — OSELTAMIVIR PHOSPHATE 30 MG/1
30 CAPSULE ORAL 2 TIMES DAILY
Qty: 4 CAPSULE | Refills: 0 | Status: SHIPPED | OUTPATIENT
Start: 2025-02-20 | End: 2025-02-22

## 2025-02-20 RX ORDER — MUPIROCIN 20 MG/G
OINTMENT TOPICAL 2 TIMES DAILY
Status: DISCONTINUED | OUTPATIENT
Start: 2025-02-20 | End: 2025-02-20 | Stop reason: HOSPADM

## 2025-02-20 RX ADMIN — LISINOPRIL 30 MG: 10 TABLET ORAL at 08:02

## 2025-02-20 RX ADMIN — ALUMINUM HYDROXIDE, MAGNESIUM HYDROXIDE, AND SIMETHICONE 30 ML: 1200; 120; 1200 SUSPENSION ORAL at 08:02

## 2025-02-20 RX ADMIN — POTASSIUM BICARBONATE 50 MEQ: 977.5 TABLET, EFFERVESCENT ORAL at 06:02

## 2025-02-20 RX ADMIN — IPRATROPIUM BROMIDE AND ALBUTEROL SULFATE 3 ML: .5; 3 SOLUTION RESPIRATORY (INHALATION) at 07:02

## 2025-02-20 RX ADMIN — APIXABAN 5 MG: 2.5 TABLET, FILM COATED ORAL at 08:02

## 2025-02-20 RX ADMIN — OSELTAMIVIR PHOSPHATE 30 MG: 30 CAPSULE ORAL at 08:02

## 2025-02-20 RX ADMIN — PANTOPRAZOLE SODIUM 40 MG: 40 TABLET, DELAYED RELEASE ORAL at 08:02

## 2025-02-20 RX ADMIN — AZITHROMYCIN DIHYDRATE 250 MG: 250 TABLET ORAL at 08:02

## 2025-02-20 RX ADMIN — SODIUM BICARBONATE 650 MG TABLET 650 MG: at 08:02

## 2025-02-20 RX ADMIN — IPRATROPIUM BROMIDE AND ALBUTEROL SULFATE 3 ML: .5; 3 SOLUTION RESPIRATORY (INHALATION) at 01:02

## 2025-02-20 RX ADMIN — LEVOTHYROXINE SODIUM 88 MCG: 0.09 TABLET ORAL at 08:02

## 2025-02-20 RX ADMIN — CEFEPIME 1 G: 1 INJECTION, POWDER, FOR SOLUTION INTRAMUSCULAR; INTRAVENOUS at 08:02

## 2025-02-20 RX ADMIN — ONDANSETRON 4 MG: 2 INJECTION INTRAMUSCULAR; INTRAVENOUS at 08:02

## 2025-02-20 NOTE — PLAN OF CARE
Pt clear for DC from case management standpoint. Discharging to home.       02/20/25 1452   Final Note   Assessment Type Final Discharge Note   Anticipated Discharge Disposition Home

## 2025-02-20 NOTE — PROGRESS NOTES
AVS virtually reviewed with Danna Sorensen and her niece, Barbie at the bedside, in its entirety with emphasis on diet, medications, follow-up appointments and reasons to return to the ED or contact the [facility] On Call Nurse Care Line. Patient also encouraged to utilize their patient portal. Ease and convenience of use reiterated. Education complete and patient voiced understanding. All questions answered. Discharge teaching complete.

## 2025-02-20 NOTE — PLAN OF CARE
POC reviewed with pt. Pt verbalized understanding.    New meds added to pt med list this shift is cough syrup, tessalon Perles r/t cough.  Speech consult was placed this shift r/t pt having difficulty swallowing pills and stating that she has had difficulty the past couple of days. Pt also started coughing while taking her meds this shift.  Pain mgmt has been controlled with PRN medications as ordered.  Pt is tolerating IV ABX as ordered with no adverse reactions noted.  Pt is able to ambulate independently with standby asst x1 in room and to bathroom.   Pt continues on tele 8678 NSR, O2 RA, 20g LFA saline locked, routine labs as ordered with no adverse reactions noted at this time.     All fall precautions maintained. Frequent checks performed per protocol. Bed in lowest position, call light within reach, slip resistant socks maintained. Bed alarm on/functioning. POC ongoing.         Problem: Adult Inpatient Plan of Care  Goal: Plan of Care Review  Outcome: Progressing  Goal: Patient-Specific Goal (Individualized)  Outcome: Progressing  Goal: Absence of Hospital-Acquired Illness or Injury  Outcome: Progressing  Goal: Optimal Comfort and Wellbeing  Outcome: Progressing  Goal: Readiness for Transition of Care  Outcome: Progressing     Problem: Bariatric Environmental Safety  Goal: Safety Maintained with Care  Outcome: Progressing     Problem: Infection  Goal: Absence of Infection Signs and Symptoms  Outcome: Progressing     Problem: Fall Injury Risk  Goal: Absence of Fall and Fall-Related Injury  Outcome: Progressing     Problem: Pneumonia  Goal: Fluid Balance  Outcome: Progressing  Goal: Resolution of Infection Signs and Symptoms  Outcome: Progressing  Goal: Effective Oxygenation and Ventilation  Outcome: Progressing

## 2025-02-20 NOTE — NURSING
Pt arrived on unit via stretcher bed.   Tele placed 8678 NSR hr 96, safety maintained.   Personal belongings at bedside.  Noted cane, upper dentures, and pt uses glasses.  Assessment initiated, vitals obtained per facility protocol.    POC ongoing.

## 2025-02-20 NOTE — PLAN OF CARE
Problem: Adult Inpatient Plan of Care  Goal: Plan of Care Review  Outcome: Met  Goal: Patient-Specific Goal (Individualized)  Outcome: Met  Goal: Absence of Hospital-Acquired Illness or Injury  Outcome: Met  Goal: Optimal Comfort and Wellbeing  Outcome: Met  Goal: Readiness for Transition of Care  Outcome: Met     Problem: Bariatric Environmental Safety  Goal: Safety Maintained with Care  Outcome: Met     Problem: Infection  Goal: Absence of Infection Signs and Symptoms  Outcome: Met     Problem: Fall Injury Risk  Goal: Absence of Fall and Fall-Related Injury  Outcome: Met     Problem: Pneumonia  Goal: Fluid Balance  Outcome: Met  Goal: Resolution of Infection Signs and Symptoms  Outcome: Met  Goal: Effective Oxygenation and Ventilation  Outcome: Met

## 2025-02-20 NOTE — PT/OT/SLP EVAL
Speech Language Pathology Evaluation  Bedside Swallow    Patient Name:  Danna Sorensen   MRN:  [id number]  Admitting Diagnosis: Multifocal pneumonia    Recommendations:                 General Recommendations:  Follow-up not indicated  Diet recommendations:  Regular Diet - IDDSI Level 7, Thin liquids - IDDSI Level 0   Aspiration Precautions: Standard aspiration precautions   General Precautions: Standard, droplet  Communication strategies:  none    Assessment:     Danna Sorensen is a 84 y.o. female with an admitting diagnosis of Multifocal pneumonia.  Clinical swallowing evaluation completed. All po trials consumed with functional oral phase and no overt s/s airway compromise. REC Regular (IDDSI 7) textures with thin liquids. Diet recs communicated with patient's nurse, Karmen, @ 3488. No f/u indicated ATT. Please reconsult PRN.      History:   PER HPI: Danna Sorensen is an 84 year female who presents emergency room for evaluation of exertional dyspnea.  She was seen in clinic yesterday for similar symptoms where she was prescribed Tamiflu and antibiotics for influenza and pneumonia.  She did not start the meds.  She presents today with worsening of symptoms.  Denies fever or chills.  No known sick contacts or travel.  Previous medical history includes hypothyroidism, obesity, colon cancer, hypertension, PE, IVC filter placement, CKD, and pulmonary fibrosis.  ER workup:  CBC with leukocytosis of 14,000 and mild anemia.  CMP with potassium of 3.3, bicarb 18, bilirubin 1.1 (0.8  1 month ago).  UA is nitrite positive with many bacteria and white blood cells.  Urine culture pending.  Blood cultures pending.  Chest x-ray demonstrates a right soft tissue density in the right upper lobe consistent with pneumonia.  Patient was started on cefepime and Zithromax.  Admitted to Hospital Medicine for treatment and management.  Will start patient on Tamiflu   Past Medical History:   Diagnosis Date    Acute pulmonary embolism without acute  cor pulmonale 08/25/2023    Back pain     Carpal tunnel syndrome     Cataract     Colon cancer     Colon polyp     Coronary artery disease     Essential hypertension 08/09/2019    History of blood clots     History of squamous cell carcinoma 07/27/2015    Hx of colon cancer, stage IV 10/18/2016    Hypothyroidism     Multifocal pneumonia 2/18/2025    Obesity (BMI 30-39.9) 03/24/2016    Osteoarthritis     Osteoporosis     Personal history of colon cancer, stage III     Squamous cell carcinoma     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    Strabismus     Trouble in sleeping     Wears dentures     Uppers       Past Surgical History:   Procedure Laterality Date    CARDIAC SURGERY      cardiac cath    COLON SURGERY      colon resection    COLONOSCOPY N/A 10/18/2016    Procedure: COLONOSCOPY;  Surgeon: Rell Cordova MD;  Location: [facility];  Service: Endoscopy;  Laterality: N/A;    COLONOSCOPY N/A 8/8/2023    Procedure: COLONOSCOPY;  Surgeon: Kaushik Pinon MD;  Location: [facility];  Service: Endoscopy;  Laterality: N/A;    COLONOSCOPY N/A 8/22/2023    Procedure: COLONOSCOPY (tattoo of colon ca);  Surgeon: Kaushik Pinon MD;  Location: [facility];  Service: Endoscopy;  Laterality: N/A;    COLONOSCOPY N/A 8/12/2024    Procedure: COLONOSCOPY;  Surgeon: Jim Chavez MD;  Location: [facility];  Service: General;  Laterality: N/A;    ESOPHAGOGASTRODUODENOSCOPY N/A 8/3/2023    Procedure: EGD (ESOPHAGOGASTRODUODENOSCOPY);  Surgeon: Kaushik Pinon MD;  Location: [facility];  Service: Endoscopy;  Laterality: N/A;    HAND TENDON SURGERY Left     HEMICOLECTOMY      right    INJECTION OF ANESTHETIC AGENT AROUND MEDIAL BRANCH NERVES INNERVATING LUMBAR FACET JOINT Right 07/10/2018    Procedure: Block-nerve-medial branch-lumbar;  Surgeon: Parish Gaitan MD;  Location: [facility];  Service: Pain Management;  Laterality: Right;  L3, 4, 5    INSERTION OF INFERIOR VENA CAVAL FILTER N/A 9/13/2023     Procedure: Insertion, Filter, Inferior Vena Cava;  Surgeon: Oren Verdin MD;  Location: [facility] CATH/EP LAB;  Service: General;  Laterality: N/A;    INSERTION OF TUNNELED CENTRAL VENOUS CATHETER (CVC) WITH SUBCUTANEOUS PORT Right 11/15/2023    Procedure: [id number];  Surgeon: Jim Chavez MD;  Location: [facility] OR;  Service: General;  Laterality: Right;    JOINT REPLACEMENT      bilateral    RADIOFREQUENCY ABLATION OF LUMBAR MEDIAL BRANCH NERVE AT SINGLE LEVEL Right 07/24/2018    Procedure: RADIOFREQUENCY ABLATION, NERVE, MEDIAL BRANCH, LUMBAR, 1 LEVEL;  Surgeon: Parish Gaitan MD;  Location: [facility] OR;  Service: Pain Management;  Laterality: Right;  L3,4,5 - Burned at 80 degrees C. for 75 seconds x 2 each site    ROBOT-ASSISTED COLECTOMY N/A 9/29/2023    Procedure: ROBOTIC COLECTOMY;  Surgeon: Jim Chavez MD;  Location: [facility] OR;  Service: General;  Laterality: N/A;    SHOULDER ARTHROSCOPY Right 01/12/2017    Reverse     TONSILLECTOMY      TONSILLECTOMY, ADENOIDECTOMY, BILATERAL MYRINGOTOMY AND TUBES      TOTAL KNEE ARTHROPLASTY Bilateral 08/1998    total replacements    TUMOR REMOVAL Left     left foot as a child       Social History: Patient lives with niece and spouse (per case management note).    Prior Intubation HX:  n/a    Modified Barium Swallow: none found in epic or reported by pt    Imaging:   Imaging Results              X-Ray Chest AP Portable (Final result)  Result time 02/18/25 16:43:55      Final result by Oren Maria Jr., MD (02/18/25 16:43:55)                   Impression:      There is a continued soft tissue density in the right upper lobe consistent with probable pneumonia given the speed of its development between the chest x-ray of January 6, 2025 and February 18, 2025.  Right hilar adenopathy is noted.  There is decreased nodular density in the left upper lobe.  Mild cardiomegaly is present      Electronically signed by: Oren Maria  MD  Date:    02/18/2025  Time:    16:43               Narrative:    EXAMINATION:  XR CHEST AP PORTABLE    CLINICAL HISTORY:  Sepsis;    TECHNIQUE:  Single frontal view of the chest was performed.    COMPARISON:  Chest x-ray of February 18 December 2025 and chest x-ray of January 6, 2025 as well as the CT chest of January 3, 2025.    FINDINGS:  There is mild cardiomegaly.  The mediastinal with is within normal limits.  There is a 5.9 cm soft tissue density in the right upper lobe and enlargement of the right hilum consistent with adenopathy.  Small nodular density in the left upper lobe is less prominent than seen on the prior study.  No pneumothorax or pleural effusion is noted.                                        Prior diet: regular diet/thin liquids at home and at time of swallow evaluation.    Occupation/hobbies/homemaking: per case management note, report PLOF as assistance with ambulation and ADLS, independent with IADLS. Pt does drive.      Subjective   Pt seen for clinical swallow evaluation. Pt awake and alert in bed. No family at bedside.  Pt cooperative for assessment.      Pain/Comfort:       Respiratory Status: Room air    Objective:   Pt AAOX4. Denies hx of dysphagia. Pt reports hoarse voice beginning about 2 days ago.     Oral Musculature Evaluation  Oral Musculature: WFL  Dentition: upper dentures, other (see comments) (scattered lower dentition)  Secretion Management: adequate  Mucosal Quality: adequate  Mandibular Strength and Mobility: WFL  Oral Labial Strength and Mobility: WFL, other (see comments) (lesion noted on lower lip however no impact on swallowing)  Lingual Strength and Mobility: WFL  Velar Elevation: WFL  Buccal Strength and Mobility: WFL  Volitional Cough: strong  Volitional Swallow: adequate  Voice Prior to PO Intake: clear, hoarse    Bedside Swallow Eval:   Consistencies Assessed:  Thin liquids cup, straw  Puree applesauce   Mixed consistencies fruit cup  Solids deepak cracker        Oral Phase:   WFL    Pharyngeal Phase:   no overt clinical signs/symptoms of aspiration  no overt clinical signs/symptoms of pharyngeal dysphagia    Compensatory Strategies  None    Treatment: Pt educated re purpose of evaluation, role of SLP, results of evaluation, and recs. Increased education provided regarding aspiration precautions.  Pt  expressed understanding of recs. Recs shared w/ DO and nurse.       Goals:   Multidisciplinary Problems       SLP Goals       Not on file                    Plan:     Patient to be seen:      Plan of Care expires:     Plan of Care reviewed with:  patient   SLP Follow-Up:  No       Discharge recommendations:  No Therapy Indicated   Barriers to Discharge:  None    Time Tracking:     SLP Treatment Date:   02/20/25  Speech Start Time:  1021  Speech Stop Time:  1044     Speech Total Time (min):  23 min    Billable Minutes: Eval Swallow and Oral Function 8, Self Care/Home Management Training 15, and Total Time 23    02/20/2025

## 2025-02-20 NOTE — DISCHARGE SUMMARY
[facility]  Discharge Summary      Patient Name: Danna Sorensne  MRN: [id number]  SHAN: [id number]  Patient Class: IP- Inpatient  Admission Date: 2/18/2025  Hospital Length of Stay: 2 days  Discharge Date and Time:  02/20/2025 4:45 PM  Attending Physician: Jeffery Dubois DO   Discharging Provider: Jeffery Dubois DO  Primary Care Provider: Claudia Kim MD    Primary Care Team: Networked reference to record PCT     HPI:   Danna Sorensen is an 84 year female who presents emergency room for evaluation of exertional dyspnea.  She was seen in clinic yesterday for similar symptoms where she was prescribed Tamiflu and antibiotics for influenza and pneumonia.  She did not start the meds.  She presents today with worsening of symptoms.  Denies fever or chills.  No known sick contacts or travel.  Previous medical history includes hypothyroidism, obesity, colon cancer, hypertension, PE, IVC filter placement, CKD, and pulmonary fibrosis.  ER workup:  CBC with leukocytosis of 14,000 and mild anemia.  CMP with potassium of 3.3, bicarb 18, bilirubin 1.1 (0.8  1 month ago).  UA is nitrite positive with many bacteria and white blood cells.  Urine culture pending.  Blood cultures pending.  Chest x-ray demonstrates a right soft tissue density in the right upper lobe consistent with pneumonia.  Patient was started on cefepime and Zithromax.  Admitted to Hospital Medicine for treatment and management.  Will start patient on Tamiflu                    * No surgery found *      Hospital Course:   Patient remained stable throughout her hospitalization.  She never required any additional oxygen.  She was treated with Tamiflu, cefepime, and azithromycin.  Labs have remained unremarkable.  Patient was seen and examined on day of discharge.  She is okay for discharge at this time.  She will continue Tamiflu and azithromycin for 2 more days.  She will follow up outpatient with the primary care  physician.     Goals of Care Treatment Preferences:  Code Status: Full Code      SDOH Screening:  The patient was screened for utility difficulties, food insecurity, transport difficulties, housing insecurity, and interpersonal safety and there were no concerns identified this admission.     Consults:     * Multifocal pneumonia  Patient has a diagnosis of pneumonia. The cause of the pneumonia is suspected to be viral in etiology but organism is not known. The pneumonia is worsening due to shortness for breath and cough . The patient has the following signs/symptoms of pneumonia: cough, sputum production, and shortness of breath. The patient does have a current oxygen requirement and the patient does not have a home oxygen requirement. I have reviewed the pertinent imaging. The following cultures have been collected: Blood cultures The culture results are listed below.     Current antimicrobial regimen consists of the antibiotics listed below. Will monitor patient closely and continue current treatment plan unchanged.    Tamiflu 75 mg p.o. b.i.d. for 5 days    Antibiotics (From admission, onward)      Start     Stop Route Frequency Ordered    02/19/25 0900  azithromycin tablet 250 mg         02/23/25 0859 Oral Daily 02/18/25 1731    02/18/25 2330  ceFEPIme injection 1 g         -- IV Every 8 hours (non-standard times) 02/18/25 1731    02/18/25 1715  azithromycin (ZITHROMAX) 500 mg in 0.9% NaCl 250 mL IVPB (admixture device)         02/19/25 0514 IV ED 1 Time 02/18/25 1711            Microbiology Results (last 7 days)       Procedure Component Value Units Date/Time    Urine culture [[id number]] Collected: 02/18/25 1558    Order Status: No result Specimen: Urine Updated: 02/18/25 1630    Blood culture x two cultures. Draw prior to antibiotics. [[id number]] Collected: 02/18/25 1544    Order Status: Sent Specimen: Blood from Peripheral, Hand, Left     Blood culture x two cultures. Draw prior to antibiotics. [[id number]]  Collected: 02/18/25 1537    Order Status: Sent Specimen: Blood from Peripheral, Hand, Right             Acute cystitis without hematuria  Acute problem   Urine culture pending   Cefepime      Influenza A  Acute problem   Tamiflu 75 mg p.o. b.i.d. for 5 days      History of pulmonary embolus (PE)  Chronic problem   History noted   IVC filter in place   Continue Eliquis      Essential hypertension  Patient's blood pressure range in the last 24 hours was: BP  Min: 121/68  Max: 151/67.The patient's inpatient anti-hypertensive regimen is listed below:  Current Antihypertensives       Plan  - BP is controlled, no changes needed to their regimen  -     Morbid obesity with BMI of 40.0-44.9, adult  Body mass index is 40.86 kg/m². Morbid obesity complicates all aspects of disease management from diagnostic modalities to treatment. Weight loss encouraged and health benefits explained to patient.           Final Active Diagnoses:    Diagnosis Date Noted POA    PRINCIPAL PROBLEM:  Multifocal pneumonia [J18.9] 02/18/2025 Yes    Influenza A [J10.1] 02/18/2025 Yes    Acute cystitis without hematuria [N30.00] 02/18/2025 Yes    History of pulmonary embolus (PE) [Z86.711] 09/29/2023 Yes    Essential hypertension [I10] 08/09/2019 Yes    Morbid obesity with BMI of 40.0-44.9, adult [E66.01, Z68.41] 04/13/2017 Not Applicable      Problems Resolved During this Admission:       Discharged Condition: good    Disposition: Home or Self Care    Follow Up:   Follow-up Information       Cassie Mao NP. Go to.    Specialty: Family Medicine  Why: Hospital follow up scheduled at 2:00 PM.  Contact information:  [address] [facility] [id number] [phone]                           Patient Instructions:      Notify your health care provider if you experience any of the following:  increased confusion or weakness     Notify your health care provider if you experience any of the following:  persistent dizziness, light-headedness, or visual  disturbances     Notify your health care provider if you experience any of the following:  worsening rash     Notify your health care provider if you experience any of the following:  severe persistent headache     Notify your health care provider if you experience any of the following:  difficulty breathing or increased cough     Notify your health care provider if you experience any of the following:  redness, tenderness, or signs of infection (pain, swelling, redness, odor or green/yellow discharge around incision site)     Notify your health care provider if you experience any of the following:  severe uncontrolled pain     Notify your health care provider if you experience any of the following:  persistent nausea and vomiting or diarrhea     Notify your health care provider if you experience any of the following:  temperature >100.4     Activity as tolerated       Significant Diagnostic Studies: Labs: CMP   Recent Labs   Lab 02/19/25  0441 02/20/25 0415    140   K 3.5 3.5   * 112*   CO2 18* 16*   GLU 99 81   BUN 17 14   CREATININE 0.9 0.8   CALCIUM 8.3* 8.3*   PROT 6.5 6.6   ALBUMIN 2.8* 2.7*   BILITOT 0.8 1.1*   ALKPHOS 72 64   AST 17 15   ALT 17 16   ANIONGAP 8 12    and CBC   Recent Labs   Lab 02/19/25  0441 02/20/25  0415   WBC 11.48 8.08   HGB 10.5* 10.3*   HCT 32.1* 32.5*    204       Pending Diagnostic Studies:       None           Imaging Results              X-Ray Chest AP Portable (Final result)  Result time 02/18/25 16:43:55      Final result by Oren Maria Jr., MD (02/18/25 16:43:55)                   Impression:      There is a continued soft tissue density in the right upper lobe consistent with probable pneumonia given the speed of its development between the chest x-ray of January 6, 2025 and February 18, 2025.  Right hilar adenopathy is noted.  There is decreased nodular density in the left upper lobe.  Mild cardiomegaly is present      Electronically signed  by: Oren Maria MD  Date:    02/18/2025  Time:    16:43               Narrative:    EXAMINATION:  XR CHEST AP PORTABLE    CLINICAL HISTORY:  Sepsis;    TECHNIQUE:  Single frontal view of the chest was performed.    COMPARISON:  Chest x-ray of February 18 December 2025 and chest x-ray of January 6, 2025 as well as the CT chest of January 3, 2025.    FINDINGS:  There is mild cardiomegaly.  The mediastinal with is within normal limits.  There is a 5.9 cm soft tissue density in the right upper lobe and enlargement of the right hilum consistent with adenopathy.  Small nodular density in the left upper lobe is less prominent than seen on the prior study.  No pneumothorax or pleural effusion is noted.                                      Medications:  Reconciled Home Medications:      Medication List        CHANGE how you take these medications      levocetirizine 5 MG tablet  Commonly known as: XYZAL  TAKE 1 TABLET(5 MG) BY MOUTH EVERY NIGHT AS NEEDED FOR ALLERGIES  What changed: See the new instructions.     oseltamivir 30 MG capsule  Commonly known as: TAMIFLU  Take 1 capsule (30 mg total) by mouth 2 (two) times daily. Take 75 mg  1 dose, then 30 mg twice daily for 2 days  What changed: Another medication with the same name was removed. Continue taking this medication, and follow the directions you see here.            CONTINUE taking these medications      acetaminophen 650 MG Tbsr  Commonly known as: TYLENOL  Take 650 mg by mouth every 8 (eight) hours.     albuterol 90 mcg/actuation inhaler  Commonly known as: PROVENTIL/VENTOLIN HFA  Inhale 2 puffs into the lungs every 6 (six) hours as needed for Wheezing.     albuterol-ipratropium 2.5 mg-0.5 mg/3 mL nebulizer solution  Commonly known as: DUO-NEB  Take 3 mLs by nebulization every 6 (six) hours as needed for Wheezing. Rescue     alendronate 70 MG tablet  Commonly known as: FOSAMAX  TAKE 1 TABLET(70 MG) BY MOUTH EVERY 7 DAYS     apixaban 5 mg Tab  Commonly known  as: ELIQUIS  Take 1 tablet (5 mg total) by mouth 2 (two) times daily.     azelastine 137 mcg (0.1 %) nasal spray  Commonly known as: ASTELIN  1 spray (137 mcg total) by Nasal route 2 (two) times daily. Point up and slightly outward toward ear when spraying to avoid irritating nasal septum.     azithromycin 250 MG tablet  Commonly known as: Z-JACQUELIN  Take 2 tablets by mouth on day 1; Take 1 tablet by mouth on days 2-5     benzonatate 100 MG capsule  Commonly known as: TESSALON  Take 1 capsule (100 mg total) by mouth 3 (three) times daily as needed for Cough.     DULERA 100-5 mcg/actuation Hfaa  Generic drug: mometasone-formoterol  Inhale 2 puffs into the lungs 2 (two) times daily.     ferrous sulfate Tab tablet  Commonly known as: FEOSOL  Take 1 tablet by mouth daily with breakfast.     fluticasone propionate 50 mcg/actuation nasal spray  Commonly known as: FLONASE  1 spray (50 mcg total) by Each Nostril route once daily.     furosemide 20 MG tablet  Commonly known as: LASIX  Take 1 tablet (20 mg total) by mouth daily as needed (edema).     levothyroxine 88 MCG tablet  Commonly known as: SYNTHROID  Take 1 tablet (88 mcg total) by mouth once daily.     lisinopriL 30 MG tablet  Commonly known as: PRINIVIL,ZESTRIL  TAKE 1 TABLET BY MOUTH EVERY DAY     multivitamin capsule  Take 1 capsule by mouth once daily.     omeprazole 40 MG capsule  Commonly known as: PRILOSEC  TAKE 1 CAPSULE(40 MG) BY MOUTH TWICE DAILY BEFORE MEALS     promethazine 12.5 MG Tab  Commonly known as: PHENERGAN  (Take 1-2 tabs every 6 hours as needed for nausea persistent despite zofran)     * promethazine-dextromethorphan 6.25-15 mg/5 mL Syrp  Commonly known as: PROMETHAZINE-DM  Take 5 mLs by mouth nightly as needed (cough).     * promethazine-dextromethorphan 6.25-15 mg/5 mL Syrp  Commonly known as: PROMETHAZINE-DM  Take 5 mLs by mouth every 8 (eight) hours as needed (cough).     traMADoL 50 mg tablet  Commonly known as: ULTRAM  Take 1 tablet (50 mg  total) by mouth every 8 (eight) hours as needed for Pain.     VITAMIN D ORAL  Take 2,000 mg by mouth once daily.           * This list has 2 medication(s) that are the same as other medications prescribed for you. Read the directions carefully, and ask your doctor or other care provider to review them with you.                STOP taking these medications      cyclobenzaprine 5 MG tablet  Commonly known as: FLEXERIL     hydrOXYzine HCL 25 MG tablet  Commonly known as: ATARAX     predniSONE 20 MG tablet  Commonly known as: DELTASONE            ASK your doctor about these medications      ramelteon 8 mg tablet  Commonly known as: ROZEREM  Take 1 tablet (8 mg total) by mouth every evening.     silver sulfADIAZINE 1% 1 % cream  Commonly known as: SILVADENE  Apply topically once daily.              Indwelling Lines/Drains at time of discharge:   Lines/Drains/Airways       Central Venous Catheter Line  Duration             Port A Cath Single Lumen 11/15/23 1032 Atrial Right 463 days                    Time spent on the discharge of patient: 35 minutes         Jeffery Dubois DO  Department of Hospital Medicine  [facility]/Surg

## 2025-02-20 NOTE — HOSPITAL COURSE
Patient remained stable throughout her hospitalization.  She never required any additional oxygen.  She was treated with Tamiflu, cefepime, and azithromycin.  Labs have remained unremarkable.  Patient was seen and examined on day of discharge.  She is okay for discharge at this time.  She will continue Tamiflu and azithromycin for 2 more days.  She will follow up outpatient with the primary care physician.

## 2025-02-20 NOTE — DISCHARGE INSTRUCTIONS
Our goal at [facility] is to always give you outstanding care and exceptional service. You may receive a survey by mail, text or e-mail in the next 7-10 days from Sandy Phillips and our leadership team asking about the care you received with us. The survey should only take 5-10 minutes to complete and is very important to us.     Your feedback provides us with a way to recognize our staff who work tirelessly to provide the best care! Also, your responses help us learn how to improve when your experience was below our aspiration of excellence. We WILL use your feedback to continue making improvements to help us provide the highest quality care. We keep your personal information and feedback confidential. We appreciate your time completing this survey and can't wait to hear from you!!!     We want you to leave us today feeling like you can DEFINITELY recommend us to others! We look forward to your continued care with us! Thanks so much for choosing [facility] for your healthcare needs!

## 2025-02-21 ENCOUNTER — PATIENT OUTREACH (OUTPATIENT)
Dept: ADMINISTRATIVE | Facility: CLINIC | Age: 85
End: 2025-02-21
Payer: MEDICARE

## 2025-02-21 LAB — BACTERIA UR CULT: ABNORMAL

## 2025-02-23 LAB
BACTERIA BLD CULT: NORMAL
BACTERIA BLD CULT: NORMAL

## 2025-02-24 LAB
OHS QRS DURATION: 98 MS
OHS QTC CALCULATION: 451 MS

## 2025-02-26 ENCOUNTER — HOSPITAL ENCOUNTER (OUTPATIENT)
Dept: RADIOLOGY | Facility: HOSPITAL | Age: 85
Discharge: HOME | End: 2025-02-26
Attending: FAMILY MEDICINE
Payer: MEDICARE

## 2025-02-26 DIAGNOSIS — N95.9 MENOPAUSAL AND POSTMENOPAUSAL DISORDER: ICD-10-CM

## 2025-02-26 DIAGNOSIS — Z91.89 AT HIGH RISK FOR BREAST CANCER: ICD-10-CM

## 2025-02-26 PROCEDURE — 77080 DXA BONE DENSITY AXIAL: CPT | Mod: TC,PO

## 2025-02-26 PROCEDURE — 77080 DXA BONE DENSITY AXIAL: CPT | Mod: 26,,, | Performed by: RADIOLOGY

## 2025-02-27 ENCOUNTER — TELEPHONE (OUTPATIENT)
Dept: FAMILY MEDICINE | Facility: CLINIC | Age: 85
End: 2025-02-27
Payer: MEDICARE

## 2025-02-27 ENCOUNTER — HOSPITAL ENCOUNTER (INPATIENT)
Facility: HOSPITAL | Age: 85
LOS: 3 days | Discharge: HOME | DRG: 194 | End: 2025-03-03
Attending: EMERGENCY MEDICINE | Admitting: INTERNAL MEDICINE
Payer: MEDICARE

## 2025-02-27 DIAGNOSIS — R06.02 SHORTNESS OF BREATH AT REST: ICD-10-CM

## 2025-02-27 DIAGNOSIS — R07.9 CHEST PAIN: ICD-10-CM

## 2025-02-27 DIAGNOSIS — J12.9 VIRAL PNEUMONIA: ICD-10-CM

## 2025-02-27 DIAGNOSIS — J45.901 EXACERBATION OF ASTHMA, UNSPECIFIED ASTHMA SEVERITY, UNSPECIFIED WHETHER PERSISTENT: Primary | ICD-10-CM

## 2025-02-27 DIAGNOSIS — R06.02 SHORTNESS OF BREATH: ICD-10-CM

## 2025-02-27 LAB
ALBUMIN SERPL BCP-MCNC: 3.5 G/DL (ref 3.5–5.2)
ALP SERPL-CCNC: 59 U/L (ref 55–135)
ALT SERPL W/O P-5'-P-CCNC: 35 U/L (ref 10–44)
ANION GAP SERPL CALC-SCNC: 7 MMOL/L (ref 8–16)
AST SERPL-CCNC: 18 U/L (ref 10–40)
BASOPHILS # BLD AUTO: 0.03 K/UL (ref 0–0.2)
BASOPHILS NFR BLD: 0.2 % (ref 0–1.9)
BILIRUB SERPL-MCNC: 1.9 MG/DL (ref 0.1–1)
BNP SERPL-MCNC: 30 PG/ML (ref 0–99)
BUN SERPL-MCNC: 18 MG/DL (ref 8–23)
CALCIUM SERPL-MCNC: 8.5 MG/DL (ref 8.7–10.5)
CHLORIDE SERPL-SCNC: 105 MMOL/L (ref 95–110)
CO2 SERPL-SCNC: 24 MMOL/L (ref 23–29)
CREAT SERPL-MCNC: 0.8 MG/DL (ref 0.5–1.4)
D DIMER PPP IA.FEU-MCNC: 1.63 MG/L FEU (ref 0–0.49)
DIFFERENTIAL METHOD BLD: ABNORMAL
EOSINOPHIL # BLD AUTO: 0 K/UL (ref 0–0.5)
EOSINOPHIL NFR BLD: 0.1 % (ref 0–8)
ERYTHROCYTE [DISTWIDTH] IN BLOOD BY AUTOMATED COUNT: 14.2 % (ref 11.5–14.5)
EST. GFR  (NO RACE VARIABLE): >60 ML/MIN/1.73 M^2
GLUCOSE SERPL-MCNC: 99 MG/DL (ref 70–110)
HCT VFR BLD AUTO: 37 % (ref 37–48.5)
HGB BLD-MCNC: 11.8 G/DL (ref 12–16)
IMM GRANULOCYTES # BLD AUTO: 0.55 K/UL (ref 0–0.04)
IMM GRANULOCYTES NFR BLD AUTO: 3.7 % (ref 0–0.5)
INFLUENZA A, MOLECULAR: NEGATIVE
INFLUENZA B, MOLECULAR: NEGATIVE
LDH SERPL L TO P-CCNC: 0.79 MMOL/L (ref 0.5–2.2)
LYMPHOCYTES # BLD AUTO: 1.1 K/UL (ref 1–4.8)
LYMPHOCYTES NFR BLD: 7 % (ref 18–48)
MCH RBC QN AUTO: 30.1 PG (ref 27–31)
MCHC RBC AUTO-ENTMCNC: 31.9 G/DL (ref 32–36)
MCV RBC AUTO: 94 FL (ref 82–98)
MONOCYTES # BLD AUTO: 1.1 K/UL (ref 0.3–1)
MONOCYTES NFR BLD: 7.6 % (ref 4–15)
NEUTROPHILS # BLD AUTO: 12.3 K/UL (ref 1.8–7.7)
NEUTROPHILS NFR BLD: 81.4 % (ref 38–73)
NRBC BLD-RTO: 0 /100 WBC
PLATELET # BLD AUTO: 293 K/UL (ref 150–450)
PMV BLD AUTO: 9.4 FL (ref 9.2–12.9)
POTASSIUM SERPL-SCNC: 3.7 MMOL/L (ref 3.5–5.1)
PROCALCITONIN SERPL IA-MCNC: 0.25 NG/ML (ref 0–0.5)
PROT SERPL-MCNC: 6.6 G/DL (ref 6–8.4)
RBC # BLD AUTO: 3.92 M/UL (ref 4–5.4)
SAMPLE: NORMAL
SARS-COV-2 RDRP RESP QL NAA+PROBE: NEGATIVE
SODIUM SERPL-SCNC: 136 MMOL/L (ref 136–145)
SPECIMEN SOURCE: NORMAL
TROPONIN I SERPL HS-MCNC: 6.8 PG/ML (ref 0–14.9)
WBC # BLD AUTO: 15.04 K/UL (ref 3.9–12.7)

## 2025-02-27 PROCEDURE — 99900031 HC PATIENT EDUCATION (STAT)

## 2025-02-27 PROCEDURE — 93010 ELECTROCARDIOGRAM REPORT: CPT | Mod: ,,, | Performed by: GENERAL PRACTICE

## 2025-02-27 PROCEDURE — 80053 COMPREHEN METABOLIC PANEL: CPT | Performed by: NURSE PRACTITIONER

## 2025-02-27 PROCEDURE — 87502 INFLUENZA DNA AMP PROBE: CPT

## 2025-02-27 PROCEDURE — 85025 COMPLETE CBC W/AUTO DIFF WBC: CPT | Performed by: NURSE PRACTITIONER

## 2025-02-27 PROCEDURE — 25500020 PHARM REV CODE 255: Performed by: EMERGENCY MEDICINE

## 2025-02-27 PROCEDURE — 94644 CONT INHLJ TX 1ST HOUR: CPT

## 2025-02-27 PROCEDURE — 84145 PROCALCITONIN (PCT): CPT | Performed by: EMERGENCY MEDICINE

## 2025-02-27 PROCEDURE — 93005 ELECTROCARDIOGRAM TRACING: CPT | Performed by: GENERAL PRACTICE

## 2025-02-27 PROCEDURE — 94799 UNLISTED PULMONARY SVC/PX: CPT

## 2025-02-27 PROCEDURE — 87040 BLOOD CULTURE FOR BACTERIA: CPT | Mod: 59 | Performed by: NURSE PRACTITIONER

## 2025-02-27 PROCEDURE — 99285 EMERGENCY DEPT VISIT HI MDM: CPT | Mod: 25

## 2025-02-27 PROCEDURE — 85379 FIBRIN DEGRADATION QUANT: CPT | Performed by: EMERGENCY MEDICINE

## 2025-02-27 PROCEDURE — 25000242 PHARM REV CODE 250 ALT 637 W/ HCPCS: Performed by: EMERGENCY MEDICINE

## 2025-02-27 PROCEDURE — 96375 TX/PRO/DX INJ NEW DRUG ADDON: CPT

## 2025-02-27 PROCEDURE — 83880 ASSAY OF NATRIURETIC PEPTIDE: CPT | Performed by: NURSE PRACTITIONER

## 2025-02-27 PROCEDURE — 36415 COLL VENOUS BLD VENIPUNCTURE: CPT | Performed by: EMERGENCY MEDICINE

## 2025-02-27 PROCEDURE — 63600175 PHARM REV CODE 636 W HCPCS: Mod: JZ,TB | Performed by: EMERGENCY MEDICINE

## 2025-02-27 PROCEDURE — 94761 N-INVAS EAR/PLS OXIMETRY MLT: CPT

## 2025-02-27 PROCEDURE — 96365 THER/PROPH/DIAG IV INF INIT: CPT

## 2025-02-27 PROCEDURE — 25000003 PHARM REV CODE 250: Performed by: EMERGENCY MEDICINE

## 2025-02-27 PROCEDURE — 87635 SARS-COV-2 COVID-19 AMP PRB: CPT | Performed by: NURSE PRACTITIONER

## 2025-02-27 PROCEDURE — 84484 ASSAY OF TROPONIN QUANT: CPT | Performed by: NURSE PRACTITIONER

## 2025-02-27 PROCEDURE — 83735 ASSAY OF MAGNESIUM: CPT | Performed by: NURSE PRACTITIONER

## 2025-02-27 PROCEDURE — 99900035 HC TECH TIME PER 15 MIN (STAT)

## 2025-02-27 RX ORDER — IPRATROPIUM BROMIDE AND ALBUTEROL SULFATE 2.5; .5 MG/3ML; MG/3ML
3 SOLUTION RESPIRATORY (INHALATION)
Status: COMPLETED | OUTPATIENT
Start: 2025-02-27 | End: 2025-02-27

## 2025-02-27 RX ORDER — METHYLPREDNISOLONE SOD SUCC 125 MG
125 VIAL (EA) INJECTION
Status: COMPLETED | OUTPATIENT
Start: 2025-02-27 | End: 2025-02-27

## 2025-02-27 RX ADMIN — METHYLPREDNISOLONE SODIUM SUCCINATE 125 MG: 125 INJECTION, POWDER, FOR SOLUTION INTRAMUSCULAR; INTRAVENOUS at 06:02

## 2025-02-27 RX ADMIN — IPRATROPIUM BROMIDE AND ALBUTEROL SULFATE 3 ML: .5; 3 SOLUTION RESPIRATORY (INHALATION) at 07:02

## 2025-02-27 RX ADMIN — IOHEXOL 100 ML: 350 INJECTION, SOLUTION INTRAVENOUS at 11:02

## 2025-02-27 RX ADMIN — PIPERACILLIN AND TAZOBACTAM 4.5 G: 4; .5 INJECTION, POWDER, LYOPHILIZED, FOR SOLUTION INTRAVENOUS at 06:02

## 2025-02-27 NOTE — FIRST PROVIDER EVALUATION
" Emergency Department TeleTriage Encounter Note      CHIEF COMPLAINT    Chief Complaint   Patient presents with    Shortness of Breath     Pt states she was diagnosed w/ pneumonia 1 week ago and feeling more short of breath now.       VITAL SIGNS   Initial Vitals   BP Pulse Resp Temp SpO2   02/27/25 1709 02/27/25 1709 02/27/25 1710 02/27/25 1709 02/27/25 1709   (!) 162/77 93 18 98.9 °F (37.2 °C) (!) 94 %      MAP       --                   ALLERGIES    Review of patient's allergies indicates:   Allergen Reactions    Aspirin      Other reaction(s): Stomach upset    Aspirin Other (See Comments)     Other reaction(s): Stomach upset    Gabapentin Other (See Comments)     Pt states she felt "funny" when she took the medication. Especially at the higher dose.    Mobic [meloxicam] Swelling     Edema and elevated blood pressure     Oxaliplatin Other (See Comments)     Other reaction(s): Joint pain  Other reaction(s): Itching  Other reaction(s): Hives  Other reaction(s): Joint pain  Other reaction(s): Itching  Other reaction(s): Hives    Pregabalin Other (See Comments)     Side effects  Side effects       PROVIDER TRIAGE NOTE  This is a teletriage evaluation of a 84 y.o. female presenting to the ED complaining of SOB, worsening since Friday. No CP or fever. Recently completed abx for pneumonia. No home O2 use.    Alert, sitting upright.     Initial orders will be placed and care will be transferred to an alternate provider when patient is roomed for a full evaluation. Any additional orders and the final disposition will be determined by that provider.         ORDERS  Labs Reviewed   CULTURE, BLOOD   CULTURE, BLOOD   CBC W/ AUTO DIFFERENTIAL   COMPREHENSIVE METABOLIC PANEL   SARS-COV-2 RNA AMPLIFICATION, QUAL   B-TYPE NATRIURETIC PEPTIDE   TROPONIN I HIGH SENSITIVITY   LACTIC ACID, PLASMA       ED Orders (720h ago, onward)      Start Ordered     Status Ordering Provider    02/27/25 1731 02/27/25 1730  Oxygen Continuous  " Continuous         Ordered SCHOTTELLEONTTE, BOBBY N.    02/27/25 1731 02/27/25 1730  Possible Hospitalization  Once        Comments: For further elaboration on reason for hospitalization.    Ordered AZUCENAOTTELLEONTTE BOBBY N.    02/27/25 1730 02/27/25 1730  CBC Auto Differential  STAT         Ordered SCHELIDIA COSBYSSIKA N.    02/27/25 1730 02/27/25 1730  Comprehensive Metabolic Panel  STAT         Ordered AZUCENADANNI COSBYIKA N.    02/27/25 1730 02/27/25 1730  Pulse Oximetry Continuous  Continuous         Ordered SCHOTTELLEONTTE, BOBBY N.    02/27/25 1730 02/27/25 1730  Cardiac Monitoring - Adult  Continuous         Ordered SCHOTTDANNI HAYWARDIKA N.    02/27/25 1730 02/27/25 1730  EKG 12-lead  Once         Ordered AZUCENADANNI COSBYIKA N.    02/27/25 1730 02/27/25 1730  COVID-19 Rapid Screening  STAT         Ordered AZUCENAHARJEET BOBBY N.    02/27/25 1730 02/27/25 1730  X-Ray Chest 1 View  1 time imaging         Ordered AZUCENADANNI COSBYIKA N.    02/27/25 1730 02/27/25 1730  Brain Natriuretic Peptide  STAT         Ordered AZUCENAOTTDANNI HAYWARDIKA N.    02/27/25 1730 02/27/25 1730  Troponin I High Sensitivity  STAT         Ordered AZUCENADANNI COSBYIKA N.    02/27/25 1730 02/27/25 1730  Blood culture #1  STAT         Ordered SCHOTTTIBURCIOTTEELIDIABOBBY N.    02/27/25 1730 02/27/25 1730  Blood culture #2  STAT         Ordered SCHOTTELLEONTTEELIDAIBOBBY N.    02/27/25 1730 02/27/25 1730  Lactic Acid, Plasma  STAT         Ordered SCHDANNI COSBYIKA N.    02/27/25 1709 02/27/25 1709  EKG 12-lead  Once         Completed by AZEEM LYN on 2/27/2025 at  5:09 PM JONH CHRISTIAN              Virtual Visit Note: The provider triage portion of this emergency department evaluation and documentation was performed via [employer], a HIPAA-compliant telemedicine application, in concert with a tele-presenter in the room. A face to face patient evaluation with one of my colleagues will occur once the patient is  placed in an emergency department room.      DISCLAIMER: This note was prepared with muzu tv voice recognition transcription software. Garbled syntax, mangled pronouns, and other bizarre constructions may be attributed to that software system.

## 2025-02-27 NOTE — TELEPHONE ENCOUNTER
----- Message from Maia sent at 2/27/2025 12:47 PM CST -----  Contact: Barbie  Type:  Sooner Appointment RequestCaller is requesting a sooner appointment.  Caller declined first available appointment listed below.  Caller will not accept being placed on the waitlist and is requesting a message be sent to doctor.Name of Caller:  Barbie/ CaregiverWhen is the first available appointment?  3/5/25Symptoms:  2 week appt sched originally ( now hosp fu from [facility] dx: multifocal pneumonia)Would the patient rather a call back or a response via [employer]? Call [facility] Call Back Number:  [phone] Information:  Would like to see Dr. Kim, only instead of NP  said patient has not gotten any better

## 2025-02-28 PROBLEM — J45.901 EXACERBATION OF ASTHMA: Status: ACTIVE | Noted: 2025-02-28

## 2025-02-28 LAB
ADENOVIRUS: NOT DETECTED
ALBUMIN SERPL BCP-MCNC: 3.4 G/DL (ref 3.5–5.2)
ALP SERPL-CCNC: 56 U/L (ref 55–135)
ALT SERPL W/O P-5'-P-CCNC: 33 U/L (ref 10–44)
ANION GAP SERPL CALC-SCNC: 9 MMOL/L (ref 8–16)
AST SERPL-CCNC: 16 U/L (ref 10–40)
BASOPHILS # BLD AUTO: 0.02 K/UL (ref 0–0.2)
BASOPHILS NFR BLD: 0.2 % (ref 0–1.9)
BILIRUB SERPL-MCNC: 1.5 MG/DL (ref 0.1–1)
BORDETELLA PARAPERTUSSIS (IS1001): NOT DETECTED
BORDETELLA PERTUSSIS (PTXP): NOT DETECTED
BUN SERPL-MCNC: 18 MG/DL (ref 8–23)
CALCIUM SERPL-MCNC: 8.2 MG/DL (ref 8.7–10.5)
CHLAMYDIA PNEUMONIAE: NOT DETECTED
CHLORIDE SERPL-SCNC: 107 MMOL/L (ref 95–110)
CO2 SERPL-SCNC: 21 MMOL/L (ref 23–29)
CORONAVIRUS 229E, COMMON COLD VIRUS: NOT DETECTED
CORONAVIRUS HKU1, COMMON COLD VIRUS: NOT DETECTED
CORONAVIRUS NL63, COMMON COLD VIRUS: NOT DETECTED
CORONAVIRUS OC43, COMMON COLD VIRUS: NOT DETECTED
CREAT SERPL-MCNC: 0.8 MG/DL (ref 0.5–1.4)
DIFFERENTIAL METHOD BLD: ABNORMAL
EOSINOPHIL # BLD AUTO: 0 K/UL (ref 0–0.5)
EOSINOPHIL NFR BLD: 0 % (ref 0–8)
ERYTHROCYTE [DISTWIDTH] IN BLOOD BY AUTOMATED COUNT: 13.9 % (ref 11.5–14.5)
EST. GFR  (NO RACE VARIABLE): >60 ML/MIN/1.73 M^2
FLUBV RNA NPH QL NAA+NON-PROBE: NOT DETECTED
GLUCOSE SERPL-MCNC: 139 MG/DL (ref 70–110)
HCT VFR BLD AUTO: 35 % (ref 37–48.5)
HGB BLD-MCNC: 11.4 G/DL (ref 12–16)
HPIV1 RNA NPH QL NAA+NON-PROBE: NOT DETECTED
HPIV2 RNA NPH QL NAA+NON-PROBE: NOT DETECTED
HPIV3 RNA NPH QL NAA+NON-PROBE: NOT DETECTED
HPIV4 RNA NPH QL NAA+NON-PROBE: NOT DETECTED
HUMAN METAPNEUMOVIRUS: NOT DETECTED
IMM GRANULOCYTES # BLD AUTO: 0.28 K/UL (ref 0–0.04)
IMM GRANULOCYTES NFR BLD AUTO: 2.5 % (ref 0–0.5)
INFLUENZA A: NOT DETECTED
LYMPHOCYTES # BLD AUTO: 0.4 K/UL (ref 1–4.8)
LYMPHOCYTES NFR BLD: 3.9 % (ref 18–48)
MAGNESIUM SERPL-MCNC: 1.7 MG/DL (ref 1.6–2.6)
MAGNESIUM SERPL-MCNC: 2 MG/DL (ref 1.6–2.6)
MCH RBC QN AUTO: 30.6 PG (ref 27–31)
MCHC RBC AUTO-ENTMCNC: 32.6 G/DL (ref 32–36)
MCV RBC AUTO: 94 FL (ref 82–98)
MONOCYTES # BLD AUTO: 0.3 K/UL (ref 0.3–1)
MONOCYTES NFR BLD: 2.9 % (ref 4–15)
MYCOPLASMA PNEUMONIAE: NOT DETECTED
NEUTROPHILS # BLD AUTO: 10 K/UL (ref 1.8–7.7)
NEUTROPHILS NFR BLD: 90.5 % (ref 38–73)
NRBC BLD-RTO: 0 /100 WBC
PHOSPHATE SERPL-MCNC: 3.4 MG/DL (ref 2.7–4.5)
PLATELET # BLD AUTO: 269 K/UL (ref 150–450)
PMV BLD AUTO: 9.9 FL (ref 9.2–12.9)
POTASSIUM SERPL-SCNC: 4 MMOL/L (ref 3.5–5.1)
PROCALCITONIN SERPL IA-MCNC: 0.2 NG/ML (ref 0–0.5)
PROT SERPL-MCNC: 6.4 G/DL (ref 6–8.4)
RBC # BLD AUTO: 3.72 M/UL (ref 4–5.4)
RESPIRATORY INFECTION PANEL SOURCE: NORMAL
RSV RNA NPH QL NAA+NON-PROBE: NOT DETECTED
RV+EV RNA NPH QL NAA+NON-PROBE: NOT DETECTED
SARS-COV-2 RNA RESP QL NAA+PROBE: NOT DETECTED
SODIUM SERPL-SCNC: 137 MMOL/L (ref 136–145)
TROPONIN I SERPL HS-MCNC: 5.8 PG/ML (ref 0–14.9)
WBC # BLD AUTO: 11.03 K/UL (ref 3.9–12.7)

## 2025-02-28 PROCEDURE — 84484 ASSAY OF TROPONIN QUANT: CPT | Performed by: INTERNAL MEDICINE

## 2025-02-28 PROCEDURE — 96366 THER/PROPH/DIAG IV INF ADDON: CPT

## 2025-02-28 PROCEDURE — 87449 NOS EACH ORGANISM AG IA: CPT | Performed by: STUDENT IN AN ORGANIZED HEALTH CARE EDUCATION/TRAINING PROGRAM

## 2025-02-28 PROCEDURE — 94640 AIRWAY INHALATION TREATMENT: CPT

## 2025-02-28 PROCEDURE — 80053 COMPREHEN METABOLIC PANEL: CPT | Performed by: INTERNAL MEDICINE

## 2025-02-28 PROCEDURE — 25000003 PHARM REV CODE 250: Performed by: INTERNAL MEDICINE

## 2025-02-28 PROCEDURE — 0202U NFCT DS 22 TRGT SARS-COV-2: CPT | Performed by: INTERNAL MEDICINE

## 2025-02-28 PROCEDURE — 25000242 PHARM REV CODE 250 ALT 637 W/ HCPCS: Performed by: INTERNAL MEDICINE

## 2025-02-28 PROCEDURE — 25000003 PHARM REV CODE 250: Performed by: STUDENT IN AN ORGANIZED HEALTH CARE EDUCATION/TRAINING PROGRAM

## 2025-02-28 PROCEDURE — 36415 COLL VENOUS BLD VENIPUNCTURE: CPT | Performed by: NURSE PRACTITIONER

## 2025-02-28 PROCEDURE — 94761 N-INVAS EAR/PLS OXIMETRY MLT: CPT

## 2025-02-28 PROCEDURE — 96367 TX/PROPH/DG ADDL SEQ IV INF: CPT

## 2025-02-28 PROCEDURE — 63600175 PHARM REV CODE 636 W HCPCS: Performed by: INTERNAL MEDICINE

## 2025-02-28 PROCEDURE — 63600175 PHARM REV CODE 636 W HCPCS: Performed by: STUDENT IN AN ORGANIZED HEALTH CARE EDUCATION/TRAINING PROGRAM

## 2025-02-28 PROCEDURE — 99900031 HC PATIENT EDUCATION (STAT)

## 2025-02-28 PROCEDURE — 85025 COMPLETE CBC W/AUTO DIFF WBC: CPT | Performed by: INTERNAL MEDICINE

## 2025-02-28 PROCEDURE — 83735 ASSAY OF MAGNESIUM: CPT | Performed by: INTERNAL MEDICINE

## 2025-02-28 PROCEDURE — 84145 PROCALCITONIN (PCT): CPT | Performed by: INTERNAL MEDICINE

## 2025-02-28 PROCEDURE — 96375 TX/PRO/DX INJ NEW DRUG ADDON: CPT

## 2025-02-28 PROCEDURE — 36415 COLL VENOUS BLD VENIPUNCTURE: CPT | Performed by: INTERNAL MEDICINE

## 2025-02-28 PROCEDURE — 84100 ASSAY OF PHOSPHORUS: CPT | Performed by: INTERNAL MEDICINE

## 2025-02-28 PROCEDURE — 86738 MYCOPLASMA ANTIBODY: CPT | Performed by: INTERNAL MEDICINE

## 2025-02-28 PROCEDURE — 12000002 HC ACUTE/MED SURGE SEMI-PRIVATE ROOM

## 2025-02-28 RX ORDER — CEFEPIME HYDROCHLORIDE 2 G/1
2 INJECTION, POWDER, FOR SOLUTION INTRAVENOUS
Status: DISCONTINUED | OUTPATIENT
Start: 2025-02-28 | End: 2025-03-02

## 2025-02-28 RX ORDER — FAMOTIDINE 10 MG/ML
20 INJECTION, SOLUTION INTRAVENOUS 2 TIMES DAILY
Status: DISCONTINUED | OUTPATIENT
Start: 2025-02-28 | End: 2025-03-03 | Stop reason: HOSPADM

## 2025-02-28 RX ORDER — CEFEPIME HYDROCHLORIDE 1 G/1
1 INJECTION, POWDER, FOR SOLUTION INTRAMUSCULAR; INTRAVENOUS
Status: DISCONTINUED | OUTPATIENT
Start: 2025-02-28 | End: 2025-02-28

## 2025-02-28 RX ORDER — IPRATROPIUM BROMIDE AND ALBUTEROL SULFATE 2.5; .5 MG/3ML; MG/3ML
3 SOLUTION RESPIRATORY (INHALATION) 4 TIMES DAILY
Status: DISCONTINUED | OUTPATIENT
Start: 2025-02-28 | End: 2025-03-03 | Stop reason: HOSPADM

## 2025-02-28 RX ORDER — TALC
6 POWDER (GRAM) TOPICAL NIGHTLY PRN
Status: DISCONTINUED | OUTPATIENT
Start: 2025-02-28 | End: 2025-03-03 | Stop reason: HOSPADM

## 2025-02-28 RX ORDER — CEFTRIAXONE 2 G/1
2 INJECTION, POWDER, FOR SOLUTION INTRAMUSCULAR; INTRAVENOUS
Status: DISCONTINUED | OUTPATIENT
Start: 2025-02-28 | End: 2025-02-28

## 2025-02-28 RX ORDER — IBUPROFEN 200 MG
24 TABLET ORAL
Status: DISCONTINUED | OUTPATIENT
Start: 2025-02-28 | End: 2025-03-03 | Stop reason: HOSPADM

## 2025-02-28 RX ORDER — IPRATROPIUM BROMIDE AND ALBUTEROL SULFATE 2.5; .5 MG/3ML; MG/3ML
3 SOLUTION RESPIRATORY (INHALATION) 4 TIMES DAILY
Status: DISCONTINUED | OUTPATIENT
Start: 2025-02-28 | End: 2025-02-28

## 2025-02-28 RX ORDER — BENZONATATE 100 MG/1
100 CAPSULE ORAL 3 TIMES DAILY PRN
Status: DISCONTINUED | OUTPATIENT
Start: 2025-02-28 | End: 2025-03-03 | Stop reason: HOSPADM

## 2025-02-28 RX ORDER — ONDANSETRON HYDROCHLORIDE 2 MG/ML
4 INJECTION, SOLUTION INTRAVENOUS EVERY 6 HOURS PRN
Status: DISCONTINUED | OUTPATIENT
Start: 2025-02-28 | End: 2025-03-03 | Stop reason: HOSPADM

## 2025-02-28 RX ORDER — LANOLIN ALCOHOL/MO/W.PET/CERES
800 CREAM (GRAM) TOPICAL
Status: DISCONTINUED | OUTPATIENT
Start: 2025-02-28 | End: 2025-03-03 | Stop reason: HOSPADM

## 2025-02-28 RX ORDER — AMOXICILLIN 250 MG
1 CAPSULE ORAL 2 TIMES DAILY
Status: DISCONTINUED | OUTPATIENT
Start: 2025-02-28 | End: 2025-03-03 | Stop reason: HOSPADM

## 2025-02-28 RX ORDER — NALOXONE HCL 0.4 MG/ML
0.02 VIAL (ML) INJECTION
Status: DISCONTINUED | OUTPATIENT
Start: 2025-02-28 | End: 2025-03-03 | Stop reason: HOSPADM

## 2025-02-28 RX ORDER — ACETAMINOPHEN 325 MG/1
650 TABLET ORAL EVERY 4 HOURS PRN
Status: DISCONTINUED | OUTPATIENT
Start: 2025-02-28 | End: 2025-03-03 | Stop reason: HOSPADM

## 2025-02-28 RX ORDER — SODIUM,POTASSIUM PHOSPHATES 280-250MG
2 POWDER IN PACKET (EA) ORAL
Status: DISCONTINUED | OUTPATIENT
Start: 2025-02-28 | End: 2025-03-03 | Stop reason: HOSPADM

## 2025-02-28 RX ORDER — ALUMINUM HYDROXIDE, MAGNESIUM HYDROXIDE, AND SIMETHICONE 1200; 120; 1200 MG/30ML; MG/30ML; MG/30ML
30 SUSPENSION ORAL 4 TIMES DAILY PRN
Status: DISCONTINUED | OUTPATIENT
Start: 2025-02-28 | End: 2025-03-03 | Stop reason: HOSPADM

## 2025-02-28 RX ORDER — MUPIROCIN 20 MG/G
OINTMENT TOPICAL 2 TIMES DAILY
Status: DISCONTINUED | OUTPATIENT
Start: 2025-02-28 | End: 2025-03-03 | Stop reason: HOSPADM

## 2025-02-28 RX ORDER — SODIUM CHLORIDE 0.9 % (FLUSH) 0.9 %
3 SYRINGE (ML) INJECTION EVERY 12 HOURS PRN
Status: DISCONTINUED | OUTPATIENT
Start: 2025-02-28 | End: 2025-03-03 | Stop reason: HOSPADM

## 2025-02-28 RX ORDER — DOXYCYCLINE 100 MG/1
100 CAPSULE ORAL EVERY 12 HOURS
Status: DISCONTINUED | OUTPATIENT
Start: 2025-02-28 | End: 2025-03-03 | Stop reason: HOSPADM

## 2025-02-28 RX ORDER — ACETAMINOPHEN 325 MG/1
650 TABLET ORAL EVERY 8 HOURS PRN
Status: DISCONTINUED | OUTPATIENT
Start: 2025-02-28 | End: 2025-02-28

## 2025-02-28 RX ORDER — GLUCAGON 1 MG
1 KIT INJECTION
Status: DISCONTINUED | OUTPATIENT
Start: 2025-02-28 | End: 2025-03-03 | Stop reason: HOSPADM

## 2025-02-28 RX ORDER — IBUPROFEN 200 MG
16 TABLET ORAL
Status: DISCONTINUED | OUTPATIENT
Start: 2025-02-28 | End: 2025-03-03 | Stop reason: HOSPADM

## 2025-02-28 RX ORDER — ENOXAPARIN SODIUM 100 MG/ML
40 INJECTION SUBCUTANEOUS EVERY 12 HOURS
Status: DISCONTINUED | OUTPATIENT
Start: 2025-02-28 | End: 2025-03-01

## 2025-02-28 RX ORDER — GUAIFENESIN 100 MG/5ML
400 LIQUID ORAL 4 TIMES DAILY
Status: DISCONTINUED | OUTPATIENT
Start: 2025-02-28 | End: 2025-03-03 | Stop reason: HOSPADM

## 2025-02-28 RX ORDER — SODIUM CHLORIDE AND POTASSIUM CHLORIDE 150; 900 MG/100ML; MG/100ML
INJECTION, SOLUTION INTRAVENOUS CONTINUOUS
Status: DISCONTINUED | OUTPATIENT
Start: 2025-02-28 | End: 2025-02-28

## 2025-02-28 RX ADMIN — DOXYCYCLINE HYCLATE 100 MG: 100 CAPSULE ORAL at 08:02

## 2025-02-28 RX ADMIN — IPRATROPIUM BROMIDE AND ALBUTEROL SULFATE 3 ML: .5; 3 SOLUTION RESPIRATORY (INHALATION) at 07:02

## 2025-02-28 RX ADMIN — METHYLPREDNISOLONE SODIUM SUCCINATE 40 MG: 40 INJECTION, POWDER, FOR SOLUTION INTRAMUSCULAR; INTRAVENOUS at 09:02

## 2025-02-28 RX ADMIN — IPRATROPIUM BROMIDE AND ALBUTEROL SULFATE 3 ML: .5; 3 SOLUTION RESPIRATORY (INHALATION) at 06:02

## 2025-02-28 RX ADMIN — GUAIFENESIN 400 MG: 200 SOLUTION ORAL at 02:02

## 2025-02-28 RX ADMIN — CEFEPIME 2 G: 2 INJECTION, POWDER, FOR SOLUTION INTRAVENOUS at 11:02

## 2025-02-28 RX ADMIN — MUPIROCIN 1 G: 20 OINTMENT TOPICAL at 08:02

## 2025-02-28 RX ADMIN — IPRATROPIUM BROMIDE AND ALBUTEROL SULFATE 3 ML: .5; 3 SOLUTION RESPIRATORY (INHALATION) at 02:02

## 2025-02-28 RX ADMIN — VANCOMYCIN HYDROCHLORIDE 2000 MG: 500 INJECTION, POWDER, LYOPHILIZED, FOR SOLUTION INTRAVENOUS at 08:02

## 2025-02-28 RX ADMIN — ENOXAPARIN SODIUM 40 MG: 40 INJECTION SUBCUTANEOUS at 05:02

## 2025-02-28 RX ADMIN — FAMOTIDINE 20 MG: 10 INJECTION, SOLUTION INTRAVENOUS at 04:02

## 2025-02-28 RX ADMIN — METHYLPREDNISOLONE SODIUM SUCCINATE 40 MG: 40 INJECTION, POWDER, FOR SOLUTION INTRAMUSCULAR; INTRAVENOUS at 02:02

## 2025-02-28 RX ADMIN — SODIUM CHLORIDE AND POTASSIUM CHLORIDE: .9; .15 SOLUTION INTRAVENOUS at 04:02

## 2025-02-28 RX ADMIN — IPRATROPIUM BROMIDE AND ALBUTEROL SULFATE 3 ML: .5; 3 SOLUTION RESPIRATORY (INHALATION) at 11:02

## 2025-02-28 RX ADMIN — METHYLPREDNISOLONE SODIUM SUCCINATE 40 MG: 40 INJECTION, POWDER, FOR SOLUTION INTRAMUSCULAR; INTRAVENOUS at 08:02

## 2025-02-28 RX ADMIN — GUAIFENESIN 400 MG: 200 SOLUTION ORAL at 08:02

## 2025-02-28 RX ADMIN — CEFEPIME 2 G: 2 INJECTION, POWDER, FOR SOLUTION INTRAVENOUS at 05:02

## 2025-02-28 RX ADMIN — FAMOTIDINE 20 MG: 10 INJECTION, SOLUTION INTRAVENOUS at 08:02

## 2025-02-28 RX ADMIN — POTASSIUM CHLORIDE: 2 INJECTION, SOLUTION, CONCENTRATE INTRAVENOUS at 05:02

## 2025-02-28 RX ADMIN — POTASSIUM CHLORIDE: 2 INJECTION, SOLUTION, CONCENTRATE INTRAVENOUS at 02:02

## 2025-02-28 NOTE — ASSESSMENT & PLAN NOTE
For now BP is good    120s      I would HOLD her home BP meds now while sick     Running some IVFs also

## 2025-02-28 NOTE — CARE UPDATE
Patient seen and examined at bedside, nocturnist note reviewed.  Laboratory and imaging reviewed, CT remarkable for infectious process versus neoplasm changes, patient was with leukocytosis yesterday, that has resolved today.  Follow up with procalcitonin and respiratory cultures, I will empirically cover her with antibiotics for now, consult pulmonology for recommendations.  Kamaljit Lizarraga NP  02/28/2025  3:01 PM

## 2025-02-28 NOTE — PLAN OF CARE
[facility] - Emergency Dept  Initial Discharge Assessment       Primary Care Provider: Claudia Kim MD    Admission Diagnosis: Exacerbation of asthma, unspecified asthma severity, unspecified whether persistent [J45.901]    Admission Date: 2/27/2025  Expected Discharge Date:     Transition of Care Barriers: None     met with Pt at bedside (Barbie present) to complete discharge assessment. Pt AAOx4s. Demographics, PCP, pharmacy, and insurance verified. No home health. No dialysis. Pt takes Eliquis and has expressed difficulty affording medications (pt reports paying $400 for 3 month supply).  Pt reports ability to complete ADLs without assistance, though she does use a cane.  Pt also has a nebulizer; pt has walkers at home but does not use/need them. Pt verbalized plan to discharge home via family transport. Pt has no other needs to be addressed at this time. SW/CM to follow for dc needs. SW/CM to f/u with resources for financial assistance for Eliquis.    [facility] [employer] [facility] / Plan: [employer] CHOICES / Product Type: Medicare Advantage /     Extended Emergency Contact Information  Primary Emergency Contact: Barbie Plummer  Mobile Phone: [phone]  Relation: Relative  Preferred language: English   needed? No  Secondary Emergency Contact: CarlineLizbeth  Mobile Phone: [phone]  Relation: Sister  Preferred language: English   needed? No    Discharge Plan A: Home with family  Discharge Plan B: Home with family      [id number] - [place] - [address] [facility]  [address] [address]  Phone: [phone] Fax: [phone]      Initial Assessment (most recent)       Adult Discharge Assessment - 02/28/25 1555          Discharge Assessment    Assessment Type Discharge Planning Assessment     Confirmed/corrected address, phone number and insurance Yes     Confirmed Demographics Correct on  Facesheet     Source of Information patient;family     When was your last doctors appointment? --   January 2025    Communicated RENETTA with patient/caregiver Date not available/Unable to determine     Reason For Admission exacerbation of asthma     People in Home alone;other relative(s)   niece and niece's     Do you expect to return to your current living situation? Yes     Do you have help at home or someone to help you manage your care at home? Yes     Prior to hospitilization cognitive status: Unable to Assess     Current cognitive status: Alert/Oriented     Walking or Climbing Stairs Difficulty no   uses cane    Dressing/Bathing Difficulty no     Equipment Currently Used at Home cane, straight;walker, rolling;walker, standard   has walkers, does not use/need them    Readmission within 30 days? Yes     Patient currently being followed by outpatient case management? No     Do you currently have service(s) that help you manage your care at home? No     Do you take prescription medications? Yes     Do you have prescription coverage? Yes     Do you have any problems affording any of your prescribed medications? Yes     If yes, what medications? Eliquis     Is the patient taking medications as prescribed? yes     Who is going to help you get home at discharge? Barbie     How do you get to doctors appointments? car, drives self;family or friend will provide     Are you on dialysis? No     Do you take coumadin? No     Discharge Plan A Home with family     Discharge Plan B Home with family     DME Needed Upon Discharge  none     Discharge Plan discussed with: Patient     Transition of Care Barriers None

## 2025-02-28 NOTE — H&P
[facility] - Emergency Dept  Hospital Medicine  History & Physical    Patient Name: Danna Sorensen  MRN: [id number]  Patient Class: IP- Inpatient  Admission Date: 2/27/2025  Attending Physician: Reginald Shepherd MD  Primary Care Provider: Claudia Kim MD         Patient information was obtained from patient and ER records.     Subjective:     Principal Problem:Exacerbation of asthma    Chief Complaint:   Chief Complaint   Patient presents with    Shortness of Breath     Pt states she was diagnosed w/ pneumonia 1 week ago and feeling more short of breath now.        HPI: 84-year-old woman who presents emergency department for gradual worsening shortness of breath since her discharge last Friday from the hospital for treatment of influenza and pneumonia. In the hospital she was treated with Tamiflu, cefepime and azithromycin and was discharged home to continue Tamiflu and azithromycin. She has a past medical history of skin and colon cancer, hypothyroidism, pulmonary embolism on Eliquis with IVC filter in place, self reports that she has asthma. She did not meet oxygen requirement for home. She has been taking breathing treatments every 3 hours at home without improvement. She denies any fever.     Non smoker  Non drinker  Ex smoker    She told me she was feeling better but after few days at home , she finished her prednisone taper and other meds.   She started feeling bad again     Wheezing, dry cough ,  SOB     Then came back     Was NOT needing oxygen tonight in ER     CTA shows no PNA and no PE         Hospital Course:  DC SUMMARY 2/20   Patient remained stable throughout her hospitalization.  She never required any additional oxygen.  She was treated with Tamiflu, cefepime, and azithromycin.  Labs have remained unremarkable.  Patient was seen and examined on day of discharge.  She is okay for discharge at this time.  She will continue Tamiflu and azithromycin for 2 more days.  She will follow up  outpatient with the primary care physician.           WBC was up  But procalcitonin was NORMAL   Chemistry normal labs   Renal function normal     FLU COVID NEG       She was given nebs and IV Steroids    Felt better when I saw  her    Was on room air       We will admit again for the asthma exacerbation     Past Medical History:   Diagnosis Date    Acute pulmonary embolism without acute cor pulmonale 08/25/2023    Back pain     Carpal tunnel syndrome     Cataract     Colon cancer     Colon polyp     Coronary artery disease     Essential hypertension 08/09/2019    Exacerbation of asthma 2/28/2025    History of blood clots     History of squamous cell carcinoma 07/27/2015    Hx of colon cancer, stage IV 10/18/2016    Hypothyroidism     Multifocal pneumonia 2/18/2025    Obesity (BMI 30-39.9) 03/24/2016    Osteoarthritis     Osteoporosis     Personal history of colon cancer, stage III     Squamous cell carcinoma     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    Strabismus     Trouble in sleeping     Wears dentures     Uppers       Past Surgical History:   Procedure Laterality Date    CARDIAC SURGERY      cardiac cath    COLON SURGERY      colon resection    COLONOSCOPY N/A 10/18/2016    Procedure: COLONOSCOPY;  Surgeon: Rell Cordova MD;  Location: [facility];  Service: Endoscopy;  Laterality: N/A;    COLONOSCOPY N/A 8/8/2023    Procedure: COLONOSCOPY;  Surgeon: Kaushik Pinon MD;  Location: [facility];  Service: Endoscopy;  Laterality: N/A;    COLONOSCOPY N/A 8/22/2023    Procedure: COLONOSCOPY (tattoo of colon ca);  Surgeon: Kaushik Pinon MD;  Location: [facility];  Service: Endoscopy;  Laterality: N/A;    COLONOSCOPY N/A 8/12/2024    Procedure: COLONOSCOPY;  Surgeon: Jim Chavez MD;  Location: [facility];  Service: General;  Laterality: N/A;    ESOPHAGOGASTRODUODENOSCOPY N/A 8/3/2023    Procedure: EGD (ESOPHAGOGASTRODUODENOSCOPY);  Surgeon: Kaushik Pinon MD;  Location: [facility]  "ENDO;  Service: Endoscopy;  Laterality: N/A;    HAND TENDON SURGERY Left     HEMICOLECTOMY      right    INJECTION OF ANESTHETIC AGENT AROUND MEDIAL BRANCH NERVES INNERVATING LUMBAR FACET JOINT Right 07/10/2018    Procedure: Block-nerve-medial branch-lumbar;  Surgeon: Parish Gaitan MD;  Location: [facility] OR;  Service: Pain Management;  Laterality: Right;  L3, 4, 5    INSERTION OF INFERIOR VENA CAVAL FILTER N/A 9/13/2023    Procedure: Insertion, Filter, Inferior Vena Cava;  Surgeon: Oren Verdin MD;  Location: [facility] CATH/EP LAB;  Service: General;  Laterality: N/A;    INSERTION OF TUNNELED CENTRAL VENOUS CATHETER (CVC) WITH SUBCUTANEOUS PORT Right 11/15/2023    Procedure: FXHPOFTKH-MEPM-I-CATH;  Surgeon: Jim Chavez MD;  Location: [facility] OR;  Service: General;  Laterality: Right;    JOINT REPLACEMENT      bilateral    RADIOFREQUENCY ABLATION OF LUMBAR MEDIAL BRANCH NERVE AT SINGLE LEVEL Right 07/24/2018    Procedure: RADIOFREQUENCY ABLATION, NERVE, MEDIAL BRANCH, LUMBAR, 1 LEVEL;  Surgeon: Parish Gaitan MD;  Location: [facility] OR;  Service: Pain Management;  Laterality: Right;  L3,4,5 - Burned at 80 degrees C. for 75 seconds x 2 each site    ROBOT-ASSISTED COLECTOMY N/A 9/29/2023    Procedure: ROBOTIC COLECTOMY;  Surgeon: Jim Chavez MD;  Location: [facility];  Service: General;  Laterality: N/A;    SHOULDER ARTHROSCOPY Right 01/12/2017    Reverse     TONSILLECTOMY      TONSILLECTOMY, ADENOIDECTOMY, BILATERAL MYRINGOTOMY AND TUBES      TOTAL KNEE ARTHROPLASTY Bilateral 08/1998    total replacements    TUMOR REMOVAL Left     left foot as a child       Review of patient's allergies indicates:   Allergen Reactions    Aspirin      Other reaction(s): Stomach upset    Aspirin Other (See Comments)     Other reaction(s): Stomach upset    Gabapentin Other (See Comments)     Pt states she felt "funny" when she took the medication. Especially at the higher dose.    Mobic [meloxicam] Swelling     Edema and elevated blood " pressure     Oxaliplatin Other (See Comments)     Other reaction(s): Joint pain  Other reaction(s): Itching  Other reaction(s): Hives  Other reaction(s): Joint pain  Other reaction(s): Itching  Other reaction(s): Hives    Pregabalin Other (See Comments)     Side effects  Side effects       Current Facility-Administered Medications on File Prior to Encounter   Medication    0.9%  NaCl infusion     Current Outpatient Medications on File Prior to Encounter   Medication Sig    acetaminophen (TYLENOL) 650 MG TbSR Take 650 mg by mouth every 8 (eight) hours.    albuterol (PROVENTIL/VENTOLIN HFA) 90 mcg/actuation inhaler Inhale 2 puffs into the lungs every 6 (six) hours as needed for Wheezing.    albuterol-ipratropium (DUO-NEB) 2.5 mg-0.5 mg/3 mL nebulizer solution Take 3 mLs by nebulization every 6 (six) hours as needed for Wheezing. Rescue    alendronate (FOSAMAX) 70 MG tablet TAKE 1 TABLET(70 MG) BY MOUTH EVERY 7 DAYS (Patient taking differently: Take 70 mg by mouth every 7 days. Sundays)    apixaban (ELIQUIS) 5 mg Tab Take 1 tablet (5 mg total) by mouth 2 (two) times daily.    azelastine (ASTELIN) 137 mcg (0.1 %) nasal spray 1 spray (137 mcg total) by Nasal route 2 (two) times daily. Point up and slightly outward toward ear when spraying to avoid irritating nasal septum. (Patient taking differently: 1 spray by Nasal route daily as needed for Rhinitis. Point up and slightly outward toward ear when spraying to avoid irritating nasal septum.)    [] benzonatate (TESSALON) 100 MG capsule Take 1 capsule (100 mg total) by mouth 3 (three) times daily as needed for Cough.    ergocalciferol, vitamin D2, (VITAMIN D ORAL) Take 2,000 mg by mouth once daily.    ferrous sulfate (FEOSOL) Tab tablet Take 1 tablet by mouth daily with breakfast.    furosemide (LASIX) 20 MG tablet Take 1 tablet (20 mg total) by mouth daily as needed (edema).    levocetirizine (XYZAL) 5 MG tablet TAKE 1 TABLET(5 MG) BY MOUTH EVERY NIGHT AS NEEDED FOR  ALLERGIES (Patient taking differently: Take 5 mg by mouth nightly as needed for Allergies.)    levothyroxine (SYNTHROID) 88 MCG tablet Take 1 tablet (88 mcg total) by mouth once daily.    lisinopriL (PRINIVIL,ZESTRIL) 30 MG tablet TAKE 1 TABLET BY MOUTH EVERY DAY (Patient taking differently: Take 30 mg by mouth once daily.)    mometasone-formoterol (DULERA) 100-5 mcg/actuation HFAA Inhale 2 puffs into the lungs 2 (two) times daily.    multivitamin capsule Take 1 capsule by mouth once daily.    omeprazole (PRILOSEC) 40 MG capsule TAKE 1 CAPSULE(40 MG) BY MOUTH TWICE DAILY BEFORE MEALS    traMADoL (ULTRAM) 50 mg tablet Take 1 tablet (50 mg total) by mouth every 8 (eight) hours as needed for Pain. (Patient taking differently: Take 50 mg by mouth daily as needed for Pain.)    fluticasone propionate (FLONASE) 50 mcg/actuation nasal spray 1 spray (50 mcg total) by Each Nostril route once daily. (Patient not taking: Reported on 2025)    silver sulfADIAZINE 1% (SILVADENE) 1 % cream Apply topically once daily. (Patient not taking: Reported on 2025)     Family History       Problem Relation (Age of Onset)    Arthritis Sister    Cancer Sister, Sister    Cataracts Father, Sister    Hypertension Mother    Kidney disease Mother    No Known Problems Brother          Tobacco Use    Smoking status: Former     Current packs/day: 0.00     Average packs/day: 0.5 packs/day for 1 year (0.5 ttl pk-yrs)     Types: Cigarettes     Start date: 1960     Quit date: 1961     Years since quittin.2    Smokeless tobacco: Never   Substance and Sexual Activity    Alcohol use: No    Drug use: No    Sexual activity: Never     Review of Systems   All other systems reviewed and are negative.    Objective:     Vital Signs (Most Recent):  Temp: 97.7 °F (36.5 °C) (25 0655)  Pulse: 61 (25 0645)  Resp: 16 (25 0645)  BP: 129/63 (25 0630)  SpO2: 97 % (2545) Vital Signs (24h Range):  Temp:  [97.7 °F (36.5  "°C)-98.9 °F (37.2 °C)] 97.7 °F (36.5 °C)  Pulse:  [61-98] 61  Resp:  [16-21] 16  SpO2:  [94 %-97 %] 97 %  BP: (126-164)/(58-77) 129/63     Weight: 104.3 kg (230 lb)  Body mass index is 42.07 kg/m².     Physical Exam  Vitals and nursing note reviewed.   Constitutional:       Appearance: Normal appearance.   HENT:      Head: Normocephalic.      Nose: Nose normal.      Mouth/Throat:      Mouth: Mucous membranes are moist.   Eyes:      Extraocular Movements: Extraocular movements intact.      Pupils: Pupils are equal, round, and reactive to light.   Cardiovascular:      Rate and Rhythm: Normal rate and regular rhythm.      Pulses: Normal pulses.      Heart sounds: Normal heart sounds.   Pulmonary:      Effort: Pulmonary effort is normal.      Breath sounds: Rales present.   Abdominal:      General: Abdomen is flat. Bowel sounds are normal.      Palpations: Abdomen is soft.   Musculoskeletal:         General: Normal range of motion.      Cervical back: Normal range of motion.   Skin:     General: Skin is warm.      Capillary Refill: Capillary refill takes less than 2 seconds.   Neurological:      General: No focal deficit present.      Mental Status: She is alert and oriented to person, place, and time.   Psychiatric:         Behavior: Behavior normal.              CRANIAL NERVES     CN III, IV, VI   Pupils are equal, round, and reactive to light.       Significant Labs: All pertinent labs within the past 24 hours have been reviewed.  CBC:   Recent Labs   Lab 02/27/25  1804 02/28/25  0811   WBC 15.04* 11.03   HGB 11.8* 11.4*   HCT 37.0 35.0*    269     CMP:   Recent Labs   Lab 02/27/25  1804      K 3.7      CO2 24   GLU 99   BUN 18   CREATININE 0.8   CALCIUM 8.5*   PROT 6.6   ALBUMIN 3.5   BILITOT 1.9*   ALKPHOS 59   AST 18   ALT 35   ANIONGAP 7*     Cardiac Markers:   Recent Labs   Lab 02/27/25  1804   BNP 30     Coagulation: No results for input(s): "PT", "INR", "APTT" in the last 48 hours.  Lactic " "Acid: No results for input(s): "LACTATE" in the last 48 hours.  Lipase: No results for input(s): "LIPASE" in the last 48 hours.  Lipid Panel: No results for input(s): "CHOL", "HDL", "LDLCALC", "TRIG", "CHOLHDL" in the last 48 hours.  Magnesium:   Recent Labs   Lab 02/27/25  1804   MG 1.7     Respiratory Culture: No results for input(s): "GSRESP", "RESPIRATORYC" in the last 48 hours.  Troponin:   Recent Labs   Lab 02/27/25  1804   TROPONINIHS 6.8     TSH:   Recent Labs   Lab 01/15/25  0705   TSH 7.916*     Urine Studies: No results for input(s): "COLORU", "APPEARANCEUA", "PHUR", "SPECGRAV", "PROTEINUA", "GLUCUA", "KETONESU", "BILIRUBINUA", "OCCULTUA", "NITRITE", "UROBILINOGEN", "LEUKOCYTESUR", "RBCUA", "WBCUA", "BACTERIA", "SQUAMEPITHEL", "HYALINECASTS" in the last 48 hours.    Invalid input(s): "WRIGHTSUR"    Significant Imaging: I have reviewed all pertinent imaging results/findings within the past 24 hours.  I have reviewed and interpreted all pertinent imaging results/findings within the past 24 hours.  Assessment/Plan:     * Exacerbation of asthma    Was just here with this and left about 7 days ago    Was only in the hospital for 2 days     Likely did not get enough time of Treatment , mainly the steroids .  And OR , not enough time tapering down on steroids at home    Does not appear to be a bacterial PNA    FLU NEG  but she had flu last month she said  COVID neg     On ROOM AIR IN ER    CTA negative for pna or PE        PLAN    -  IV steroids   40 mg Q8     - IVFs with KCL     - DuoNebs QID and PRN     - no abx needed  Procalcitonin normal     - pepcid IV BID    -  robitussin 400 mg PO QID    And Tessalon pearls Po PRN cough     Essential hypertension  For now BP is good    120s      I would HOLD her home BP meds now while sick     Running some IVFs also       VTE Risk Mitigation (From admission, onward)           Ordered     enoxaparin injection 40 mg  Every 12 hours         02/28/25 0658     IP VTE HIGH " RISK PATIENT  Once         02/28/25 0658     Place sequential compression device  Until discontinued         02/28/25 0658                               Pharmacist Renal Dose Adjustment Note    Danna Sorensen is a 84 y.o. female being treated with the medication enoxaparin.    Patient Data:    Vital Signs (Most Recent):  Temp: 97.7 °F (36.5 °C) (02/28/25 0655)  Pulse: 61 (02/28/25 0645)  Resp: 16 (02/28/25 0645)  BP: 129/63 (02/28/25 0630)  SpO2: 97 % (02/28/25 0645) Vital Signs (72h Range):  Temp:  [97.7 °F (36.5 °C)-98.9 °F (37.2 °C)]   Pulse:  [61-98]   Resp:  [16-21]   BP: (126-164)/(58-77)   SpO2:  [94 %-97 %]      Recent Labs   Lab 02/27/25  1804   CREATININE 0.8     Serum creatinine: 0.8 mg/dL 02/27/25 1804  Estimated creatinine clearance: 61.5 mL/min    Enoxaparin 40 mg Q24H will be changed to Enoxaparin 40 mg Q12H    Pharmacist's Name: Archie Almendarez  Pharmacist's Extension: [phone]      Reginald Shepherd MD  Department of Hospital Medicine  [facility] - Emergency Dept

## 2025-02-28 NOTE — SUBJECTIVE & OBJECTIVE
Past Medical History:   Diagnosis Date    Acute pulmonary embolism without acute cor pulmonale 08/25/2023    Back pain     Carpal tunnel syndrome     Cataract     Colon cancer     Colon polyp     Coronary artery disease     Essential hypertension 08/09/2019    Exacerbation of asthma 2/28/2025    History of blood clots     History of squamous cell carcinoma 07/27/2015    Hx of colon cancer, stage IV 10/18/2016    Hypothyroidism     Multifocal pneumonia 2/18/2025    Obesity (BMI 30-39.9) 03/24/2016    Osteoarthritis     Osteoporosis     Personal history of colon cancer, stage III     Squamous cell carcinoma     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    Strabismus     Trouble in sleeping     Wears dentures     Uppers       Past Surgical History:   Procedure Laterality Date    CARDIAC SURGERY      cardiac cath    COLON SURGERY      colon resection    COLONOSCOPY N/A 10/18/2016    Procedure: COLONOSCOPY;  Surgeon: Rell Cordova MD;  Location: [facility];  Service: Endoscopy;  Laterality: N/A;    COLONOSCOPY N/A 8/8/2023    Procedure: COLONOSCOPY;  Surgeon: Kaushik Pinon MD;  Location: [facility];  Service: Endoscopy;  Laterality: N/A;    COLONOSCOPY N/A 8/22/2023    Procedure: COLONOSCOPY (tattoo of colon ca);  Surgeon: Kaushik Pinon MD;  Location: [facility];  Service: Endoscopy;  Laterality: N/A;    COLONOSCOPY N/A 8/12/2024    Procedure: COLONOSCOPY;  Surgeon: Jim Chavez MD;  Location: [facility];  Service: General;  Laterality: N/A;    ESOPHAGOGASTRODUODENOSCOPY N/A 8/3/2023    Procedure: EGD (ESOPHAGOGASTRODUODENOSCOPY);  Surgeon: Kaushik Pinon MD;  Location: [facility];  Service: Endoscopy;  Laterality: N/A;    HAND TENDON SURGERY Left     HEMICOLECTOMY      right    INJECTION OF ANESTHETIC AGENT AROUND MEDIAL BRANCH NERVES INNERVATING LUMBAR FACET JOINT Right 07/10/2018    Procedure: Block-nerve-medial branch-lumbar;  Surgeon: Parish Gaitan MD;  Location: [facility] OR;   "Service: Pain Management;  Laterality: Right;  L3, 4, 5    INSERTION OF INFERIOR VENA CAVAL FILTER N/A 9/13/2023    Procedure: Insertion, Filter, Inferior Vena Cava;  Surgeon: Oren Verdin MD;  Location: [facility] CATH/EP LAB;  Service: General;  Laterality: N/A;    INSERTION OF TUNNELED CENTRAL VENOUS CATHETER (CVC) WITH SUBCUTANEOUS PORT Right 11/15/2023    Procedure: [id number];  Surgeon: Jim Chavez MD;  Location: [facility] OR;  Service: General;  Laterality: Right;    JOINT REPLACEMENT      bilateral    RADIOFREQUENCY ABLATION OF LUMBAR MEDIAL BRANCH NERVE AT SINGLE LEVEL Right 07/24/2018    Procedure: RADIOFREQUENCY ABLATION, NERVE, MEDIAL BRANCH, LUMBAR, 1 LEVEL;  Surgeon: Parish Gaitan MD;  Location: [facility] OR;  Service: Pain Management;  Laterality: Right;  L3,4,5 - Burned at 80 degrees C. for 75 seconds x 2 each site    ROBOT-ASSISTED COLECTOMY N/A 9/29/2023    Procedure: ROBOTIC COLECTOMY;  Surgeon: Jim Chavez MD;  Location: [facility] OR;  Service: General;  Laterality: N/A;    SHOULDER ARTHROSCOPY Right 01/12/2017    Reverse     TONSILLECTOMY      TONSILLECTOMY, ADENOIDECTOMY, BILATERAL MYRINGOTOMY AND TUBES      TOTAL KNEE ARTHROPLASTY Bilateral 08/1998    total replacements    TUMOR REMOVAL Left     left foot as a child       Review of patient's allergies indicates:   Allergen Reactions    Aspirin      Other reaction(s): Stomach upset    Aspirin Other (See Comments)     Other reaction(s): Stomach upset    Gabapentin Other (See Comments)     Pt states she felt "funny" when she took the medication. Especially at the higher dose.    Mobic [meloxicam] Swelling     Edema and elevated blood pressure     Oxaliplatin Other (See Comments)     Other reaction(s): Joint pain  Other reaction(s): Itching  Other reaction(s): Hives  Other reaction(s): Joint pain  Other reaction(s): Itching  Other reaction(s): Hives    Pregabalin Other (See Comments)     Side effects  Side effects       Current " Facility-Administered Medications on File Prior to Encounter   Medication    0.9%  NaCl infusion     Current Outpatient Medications on File Prior to Encounter   Medication Sig    acetaminophen (TYLENOL) 650 MG TbSR Take 650 mg by mouth every 8 (eight) hours.    albuterol (PROVENTIL/VENTOLIN HFA) 90 mcg/actuation inhaler Inhale 2 puffs into the lungs every 6 (six) hours as needed for Wheezing.    albuterol-ipratropium (DUO-NEB) 2.5 mg-0.5 mg/3 mL nebulizer solution Take 3 mLs by nebulization every 6 (six) hours as needed for Wheezing. Rescue    alendronate (FOSAMAX) 70 MG tablet TAKE 1 TABLET(70 MG) BY MOUTH EVERY 7 DAYS (Patient taking differently: Take 70 mg by mouth every 7 days. Sundays)    apixaban (ELIQUIS) 5 mg Tab Take 1 tablet (5 mg total) by mouth 2 (two) times daily.    azelastine (ASTELIN) 137 mcg (0.1 %) nasal spray 1 spray (137 mcg total) by Nasal route 2 (two) times daily. Point up and slightly outward toward ear when spraying to avoid irritating nasal septum. (Patient taking differently: 1 spray by Nasal route daily as needed for Rhinitis. Point up and slightly outward toward ear when spraying to avoid irritating nasal septum.)    [] benzonatate (TESSALON) 100 MG capsule Take 1 capsule (100 mg total) by mouth 3 (three) times daily as needed for Cough.    ergocalciferol, vitamin D2, (VITAMIN D ORAL) Take 2,000 mg by mouth once daily.    ferrous sulfate (FEOSOL) Tab tablet Take 1 tablet by mouth daily with breakfast.    furosemide (LASIX) 20 MG tablet Take 1 tablet (20 mg total) by mouth daily as needed (edema).    levocetirizine (XYZAL) 5 MG tablet TAKE 1 TABLET(5 MG) BY MOUTH EVERY NIGHT AS NEEDED FOR ALLERGIES (Patient taking differently: Take 5 mg by mouth nightly as needed for Allergies.)    levothyroxine (SYNTHROID) 88 MCG tablet Take 1 tablet (88 mcg total) by mouth once daily.    lisinopriL (PRINIVIL,ZESTRIL) 30 MG tablet TAKE 1 TABLET BY MOUTH EVERY DAY (Patient taking differently: Take  30 mg by mouth once daily.)    mometasone-formoterol (DULERA) 100-5 mcg/actuation HFAA Inhale 2 puffs into the lungs 2 (two) times daily.    multivitamin capsule Take 1 capsule by mouth once daily.    omeprazole (PRILOSEC) 40 MG capsule TAKE 1 CAPSULE(40 MG) BY MOUTH TWICE DAILY BEFORE MEALS    traMADoL (ULTRAM) 50 mg tablet Take 1 tablet (50 mg total) by mouth every 8 (eight) hours as needed for Pain. (Patient taking differently: Take 50 mg by mouth daily as needed for Pain.)    fluticasone propionate (FLONASE) 50 mcg/actuation nasal spray 1 spray (50 mcg total) by Each Nostril route once daily. (Patient not taking: Reported on 2025)    silver sulfADIAZINE 1% (SILVADENE) 1 % cream Apply topically once daily. (Patient not taking: Reported on 2025)     Family History       Problem Relation (Age of Onset)    Arthritis Sister    Cancer Sister, Sister    Cataracts Father, Sister    Hypertension Mother    Kidney disease Mother    No Known Problems Brother          Tobacco Use    Smoking status: Former     Current packs/day: 0.00     Average packs/day: 0.5 packs/day for 1 year (0.5 ttl pk-yrs)     Types: Cigarettes     Start date: 1960     Quit date: 1961     Years since quittin.2    Smokeless tobacco: Never   Substance and Sexual Activity    Alcohol use: No    Drug use: No    Sexual activity: Never     Review of Systems   All other systems reviewed and are negative.    Objective:     Vital Signs (Most Recent):  Temp: 97.7 °F (36.5 °C) (25 0655)  Pulse: 61 (25 0645)  Resp: 16 (25 0645)  BP: 129/63 (25 0630)  SpO2: 97 % (25 0645) Vital Signs (24h Range):  Temp:  [97.7 °F (36.5 °C)-98.9 °F (37.2 °C)] 97.7 °F (36.5 °C)  Pulse:  [61-98] 61  Resp:  [16-21] 16  SpO2:  [94 %-97 %] 97 %  BP: (126-164)/(58-77) 129/63     Weight: 104.3 kg (230 lb)  Body mass index is 42.07 kg/m².     Physical Exam  Vitals and nursing note reviewed.   Constitutional:       Appearance: Normal  "appearance.   HENT:      Head: Normocephalic.      Nose: Nose normal.      Mouth/Throat:      Mouth: Mucous membranes are moist.   Eyes:      Extraocular Movements: Extraocular movements intact.      Pupils: Pupils are equal, round, and reactive to light.   Cardiovascular:      Rate and Rhythm: Normal rate and regular rhythm.      Pulses: Normal pulses.      Heart sounds: Normal heart sounds.   Pulmonary:      Effort: Pulmonary effort is normal.      Breath sounds: Rales present.   Abdominal:      General: Abdomen is flat. Bowel sounds are normal.      Palpations: Abdomen is soft.   Musculoskeletal:         General: Normal range of motion.      Cervical back: Normal range of motion.   Skin:     General: Skin is warm.      Capillary Refill: Capillary refill takes less than 2 seconds.   Neurological:      General: No focal deficit present.      Mental Status: She is alert and oriented to person, place, and time.   Psychiatric:         Behavior: Behavior normal.              CRANIAL NERVES     CN III, IV, VI   Pupils are equal, round, and reactive to light.       Significant Labs: All pertinent labs within the past 24 hours have been reviewed.  CBC:   Recent Labs   Lab 02/27/25  1804 02/28/25  0811   WBC 15.04* 11.03   HGB 11.8* 11.4*   HCT 37.0 35.0*    269     CMP:   Recent Labs   Lab 02/27/25  1804      K 3.7      CO2 24   GLU 99   BUN 18   CREATININE 0.8   CALCIUM 8.5*   PROT 6.6   ALBUMIN 3.5   BILITOT 1.9*   ALKPHOS 59   AST 18   ALT 35   ANIONGAP 7*     Cardiac Markers:   Recent Labs   Lab 02/27/25  1804   BNP 30     Coagulation: No results for input(s): "PT", "INR", "APTT" in the last 48 hours.  Lactic Acid: No results for input(s): "LACTATE" in the last 48 hours.  Lipase: No results for input(s): "LIPASE" in the last 48 hours.  Lipid Panel: No results for input(s): "CHOL", "HDL", "LDLCALC", "TRIG", "CHOLHDL" in the last 48 hours.  Magnesium:   Recent Labs   Lab 02/27/25  1804   MG 1.7 " "    Respiratory Culture: No results for input(s): "GSRESP", "RESPIRATORYC" in the last 48 hours.  Troponin:   Recent Labs   Lab 02/27/25  1804   TROPONINIHS 6.8     TSH:   Recent Labs   Lab 01/15/25  0705   TSH 7.916*     Urine Studies: No results for input(s): "COLORU", "APPEARANCEUA", "PHUR", "SPECGRAV", "PROTEINUA", "GLUCUA", "KETONESU", "BILIRUBINUA", "OCCULTUA", "NITRITE", "UROBILINOGEN", "LEUKOCYTESUR", "RBCUA", "WBCUA", "BACTERIA", "SQUAMEPITHEL", "HYALINECASTS" in the last 48 hours.    Invalid input(s): "WRIGHTSUR"    Significant Imaging: I have reviewed all pertinent imaging results/findings within the past 24 hours.  I have reviewed and interpreted all pertinent imaging results/findings within the past 24 hours.  "

## 2025-02-28 NOTE — PLAN OF CARE
"Readmission assessment completed w pt at bedside.     02/28/25 1601   Readmission   Was this a planned readmission? No   Why were you hospitalized in the last 30 days? influenza and pnemonia   Why were you readmitted? Alarmed about signs/symptoms   When you left the hospital how did you feel? "fair"   When you left the hospital where did you go? Home with Family   Did patient/caregiver refused recommended DC plan? No   Tell me about what happened between when you left the hospital and the day you returned. increasing shortness of breath   When did you start not feeling well? Friday   Did you try to manage your symptoms your self? Yes   What did you do? over the counter meds, nebulizer   Did you call anyone? No   Why? family present   Did you try to see or did see a doctor or nurse before you came? Yes   Were you seen? Yes   Did you have  a follow-up appointment on discharge? No       "

## 2025-02-28 NOTE — HPI
84-year-old woman who presents emergency department for gradual worsening shortness of breath since her discharge last Friday from the hospital for treatment of influenza and pneumonia. In the hospital she was treated with Tamiflu, cefepime and azithromycin and was discharged home to continue Tamiflu and azithromycin. She has a past medical history of skin and colon cancer, hypothyroidism, pulmonary embolism on Eliquis with IVC filter in place, self reports that she has asthma. She did not meet oxygen requirement for home. She has been taking breathing treatments every 3 hours at home without improvement. She denies any fever.     Non smoker  Non drinker  Ex smoker    She told me she was feeling better but after few days at home , she finished her prednisone taper and other meds.   She started feeling bad again     Wheezing, dry cough ,  SOB     Then came back     Was NOT needing oxygen tonight in ER     CTA shows no PNA and no PE         Hospital Course:  DC SUMMARY 2/20   Patient remained stable throughout her hospitalization.  She never required any additional oxygen.  She was treated with Tamiflu, cefepime, and azithromycin.  Labs have remained unremarkable.  Patient was seen and examined on day of discharge.  She is okay for discharge at this time.  She will continue Tamiflu and azithromycin for 2 more days.  She will follow up outpatient with the primary care physician.           WBC was up  But procalcitonin was NORMAL   Chemistry normal labs   Renal function normal     FLU COVID NEG       She was given nebs and IV Steroids    Felt better when I saw  her    Was on room air       We will admit again for the asthma exacerbation

## 2025-02-28 NOTE — ASSESSMENT & PLAN NOTE
Was just here with this and left about 7 days ago    Was only in the hospital for 2 days     Likely did not get enough time of Treatment , mainly the steroids .  And OR , not enough time tapering down on steroids at home    Does not appear to be a bacterial PNA    FLU NEG  but she had flu last month she said  COVID neg     On ROOM AIR IN ER    CTA negative for pna or PE        PLAN    -  IV steroids   40 mg Q8     - IVFs with KCL     - DuoNebs QID and PRN     - no abx needed  Procalcitonin normal     - pepcid IV BID    -  robitussin 400 mg PO QID    And Tessalon pearls Po PRN cough

## 2025-02-28 NOTE — PROGRESS NOTES
"Pharmacokinetic Initial Assessment: IV Vancomycin    Assessment/Plan:    Initiate intravenous vancomycin with loading dose of 2000 mg once followed by a maintenance dose of vancomycin 1000 mg IV every 12 hours  Desired empiric serum trough concentration is 15 to 20 mcg/mL  Draw vancomycin trough level 60 min prior to fourth dose on 03/02 at approximately 0700  Pharmacy will continue to follow and monitor vancomycin.      Please contact pharmacy at extension [phone] with any questions regarding this assessment.     Thank you for the consult,   Dwight Jones       Patient brief summary:  Danna Sorensen is a 84 y.o. female initiated on antimicrobial therapy with IV Vancomycin for treatment of suspected lower respiratory infection    Drug Allergies:   Review of patient's allergies indicates:   Allergen Reactions    Aspirin      Other reaction(s): Stomach upset    Aspirin Other (See Comments)     Other reaction(s): Stomach upset    Gabapentin Other (See Comments)     Pt states she felt "funny" when she took the medication. Especially at the higher dose.    Mobic [meloxicam] Swelling     Edema and elevated blood pressure     Oxaliplatin Other (See Comments)     Other reaction(s): Joint pain  Other reaction(s): Itching  Other reaction(s): Hives  Other reaction(s): Joint pain  Other reaction(s): Itching  Other reaction(s): Hives    Pregabalin Other (See Comments)     Side effects  Side effects       Actual Body Weight:   104.3 kg    Renal Function:   Estimated Creatinine Clearance: 61.5 mL/min (based on SCr of 0.8 mg/dL).,     CBC (last 72 hours):  Recent Labs   Lab Result Units 02/27/25  1804 02/28/25  0811   WBC K/uL 15.04* 11.03   Hemoglobin g/dL 11.8* 11.4*   Hematocrit % 37.0 35.0*   Platelets K/uL 293 269   Gran % % 81.4* 90.5*   Lymph % % 7.0* 3.9*   Mono % % 7.6 2.9*   Eosinophil % % 0.1 0.0   Basophil % % 0.2 0.2   Differential Method  Automated Automated       Metabolic Panel (last 72 hours):  Recent Labs   Lab Result " "Units 02/27/25  1804 02/28/25  0811   Sodium mmol/L 136 137   Potassium mmol/L 3.7 4.0   Chloride mmol/L 105 107   CO2 mmol/L 24 21*   Glucose mg/dL 99 139*   BUN mg/dL 18 18   Creatinine mg/dL 0.8 0.8   Albumin g/dL 3.5 3.4*   Total Bilirubin mg/dL 1.9* 1.5*   Alkaline Phosphatase U/L 59 56   AST U/L 18 16   ALT U/L 35 33   Magnesium mg/dL 1.7 2.0   Phosphorus mg/dL  --  3.4       Drug levels (last 3 results):  No results for input(s): "VANCOMYCINRA", "VANCORANDOM", "VANCOMYCINPE", "VANCOPEAK", "VANCOMYCINTR", "VANCOTROUGH" in the last 72 hours.    Microbiologic Results:  Microbiology Results (last 7 days)       Procedure Component Value Units Date/Time    Respiratory Infection Panel (PCR), Nasopharyngeal [[id number]] Collected: 02/28/25 0836    Order Status: Completed Specimen: Nasopharyngeal Swab Updated: 02/28/25 1005     Respiratory Infection Panel Source NP swab     Adenovirus Not Detected     Coronavirus 229E, Common Cold Virus Not Detected     Coronavirus HKU1, Common Cold Virus Not Detected     Coronavirus NL63, Common Cold Virus Not Detected     Coronavirus OC43, Common Cold Virus Not Detected     Comment: Coronavirus strains 229E, HKU1, NL63, and OC43 can cause the common   cold   and are not associated with the respiratory disease outbreak caused   by  the COVID-19 (SARS-CoV-2 novel Coronavirus) strain.           SARS-CoV2 (COVID-19) Qualitative PCR Not Detected     Human Metapneumovirus Not Detected     Human Rhinovirus/Enterovirus Not Detected     Influenza A Not Detected     Influenza B Not Detected     Parainfluenza Virus 1 Not Detected     Parainfluenza Virus 2 Not Detected     Parainfluenza Virus 3 Not Detected     Parainfluenza Virus 4 Not Detected     Respiratory Syncytial Virus Not Detected     Bordetella Parapertussis (FK0517) Not Detected     Bordetella pertussis (ptxP) Not Detected     Chlamydia pneumoniae Not Detected     Mycoplasma pneumoniae Not Detected     Comment: Respiratory Infection " Panel testing performed by Multiplex PCR.       Narrative:      Respiratory Infection Panel source->NP Swab    Blood culture #1 [[id number]] Collected: 02/27/25 1826    Order Status: Completed Specimen: Blood Updated: 02/28/25 0117     Blood Culture, Routine No Growth to date    Blood culture #2 [[id number]] Collected: 02/27/25 1833    Order Status: Completed Specimen: Blood Updated: 02/28/25 0117     Blood Culture, Routine No Growth to date

## 2025-02-28 NOTE — PROGRESS NOTES
Pharmacist Renal Dose Adjustment Note    Danna Sorensen is a 84 y.o. female being treated with the medication Cefepime.    Patient Data:    Vital Signs (Most Recent):  Temp: 97.7 °F (36.5 °C) (02/28/25 1130)  Pulse: 69 (02/28/25 1448)  Resp: 16 (02/28/25 1448)  BP: 134/61 (02/28/25 1130)  SpO2: 98 % (02/28/25 1448) Vital Signs (72h Range):  Temp:  [97.7 °F (36.5 °C)-98.9 °F (37.2 °C)]   Pulse:  [60-98]   Resp:  [16-21]   BP: (126-164)/(58-77)   SpO2:  [94 %-100 %]      Recent Labs   Lab 02/27/25  1804 02/28/25  0811   CREATININE 0.8 0.8     Serum creatinine: 0.8 mg/dL 02/28/25 0811  Estimated creatinine clearance: 61.5 mL/min    Cefepime 1 gram IV every 12 hours will be changed to Cefepime 2 grams IV every 8 hours due to CrCl > 60 ml/min.    Pharmacist's Name: Jackie Hooper  Pharmacist's Extension: [phone]

## 2025-02-28 NOTE — ED PROVIDER NOTES
"Encounter Date: 2/27/2025       History     Chief Complaint   Patient presents with    Shortness of Breath     Pt states she was diagnosed w/ pneumonia 1 week ago and feeling more short of breath now.     HPI patient is a 84-year-old woman who presents emergency department for gradual worsening shortness of breath since her discharge last Friday from the hospital for treatment of influenza and pneumonia.  In the hospital she was treated with Tamiflu, cefepime and azithromycin and was discharged home to continue Tamiflu and azithromycin.  She has a past medical history of skin and colon cancer, hypothyroidism, pulmonary embolism on Eliquis with IVC filter in place, self reports that she has asthma.  She did not meet oxygen requirement for home.  She has been taking breathing treatments every 3 hours at home without improvement.  She denies any fever.  Review of patient's allergies indicates:   Allergen Reactions    Aspirin      Other reaction(s): Stomach upset    Aspirin Other (See Comments)     Other reaction(s): Stomach upset    Gabapentin Other (See Comments)     Pt states she felt "funny" when she took the medication. Especially at the higher dose.    Mobic [meloxicam] Swelling     Edema and elevated blood pressure     Oxaliplatin Other (See Comments)     Other reaction(s): Joint pain  Other reaction(s): Itching  Other reaction(s): Hives  Other reaction(s): Joint pain  Other reaction(s): Itching  Other reaction(s): Hives    Pregabalin Other (See Comments)     Side effects  Side effects     Past Medical History:   Diagnosis Date    Acute pulmonary embolism without acute cor pulmonale 08/25/2023    Back pain     Carpal tunnel syndrome     Cataract     Colon cancer     Colon polyp     Coronary artery disease     Essential hypertension 08/09/2019    History of blood clots     History of squamous cell carcinoma 07/27/2015    Hx of colon cancer, stage IV 10/18/2016    Hypothyroidism     Multifocal pneumonia 2/18/2025 "    Obesity (BMI 30-39.9) 03/24/2016    Osteoarthritis     Osteoporosis     Personal history of colon cancer, stage III     Squamous cell carcinoma     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    Strabismus     Trouble in sleeping     Wears dentures     Uppers     Past Surgical History:   Procedure Laterality Date    CARDIAC SURGERY      cardiac cath    COLON SURGERY      colon resection    COLONOSCOPY N/A 10/18/2016    Procedure: COLONOSCOPY;  Surgeon: Rell Cordova MD;  Location: [facility] ENDO;  Service: Endoscopy;  Laterality: N/A;    COLONOSCOPY N/A 8/8/2023    Procedure: COLONOSCOPY;  Surgeon: Kaushik Pinon MD;  Location: [facility];  Service: Endoscopy;  Laterality: N/A;    COLONOSCOPY N/A 8/22/2023    Procedure: COLONOSCOPY (tattoo of colon ca);  Surgeon: Kaushik Pinon MD;  Location: [facility];  Service: Endoscopy;  Laterality: N/A;    COLONOSCOPY N/A 8/12/2024    Procedure: COLONOSCOPY;  Surgeon: Jim Chavez MD;  [facility]: [facility];  Service: General;  Laterality: N/A;    ESOPHAGOGASTRODUODENOSCOPY N/A 8/3/2023    Procedure: EGD (ESOPHAGOGASTRODUODENOSCOPY);  Surgeon: Kaushik Pinon MD;  [facility]: [facility];  Service: Endoscopy;  Laterality: N/A;    HAND TENDON SURGERY Left     HEMICOLECTOMY      right    INJECTION OF ANESTHETIC AGENT AROUND MEDIAL BRANCH NERVES INNERVATING LUMBAR FACET JOINT Right 07/10/2018    Procedure: Block-nerve-medial branch-lumbar;  Surgeon: Parish Gaitan MD;  Location: [facility] OR;  Service: Pain Management;  Laterality: Right;  L3, 4, 5    INSERTION OF INFERIOR VENA CAVAL FILTER N/A 9/13/2023    Procedure: Insertion, Filter, Inferior Vena Cava;  Surgeon: Oren Verdin MD;  Location: [facility] CATH/EP LAB;  Service: General;  Laterality: N/A;    INSERTION OF TUNNELED CENTRAL VENOUS CATHETER (CVC) WITH SUBCUTANEOUS PORT Right 11/15/2023    Procedure: [id number];  Surgeon: Jim Chavez MD;  Location: [facility] OR;  Service: General;   Laterality: Right;    JOINT REPLACEMENT      bilateral    RADIOFREQUENCY ABLATION OF LUMBAR MEDIAL BRANCH NERVE AT SINGLE LEVEL Right 07/24/2018    Procedure: RADIOFREQUENCY ABLATION, NERVE, MEDIAL BRANCH, LUMBAR, 1 LEVEL;  Surgeon: Parish Gaitan MD;  Location: [facility] OR;  Service: Pain Management;  Laterality: Right;  L3,4,5 - Burned at 80 degrees C. for 75 seconds x 2 each site    ROBOT-ASSISTED COLECTOMY N/A 9/29/2023    Procedure: ROBOTIC COLECTOMY;  Surgeon: Jim Chavez MD;  Location: [facility] OR;  Service: General;  Laterality: N/A;    SHOULDER ARTHROSCOPY Right 01/12/2017    Reverse     TONSILLECTOMY      TONSILLECTOMY, ADENOIDECTOMY, BILATERAL MYRINGOTOMY AND TUBES      TOTAL KNEE ARTHROPLASTY Bilateral 08/1998    total replacements    TUMOR REMOVAL Left     left foot as a child     Family History   Problem Relation Name Age of Onset    Hypertension Mother      Kidney disease Mother      Cataracts Father      Cataracts Sister      Cancer Sister          breast    No Known Problems Brother      Cancer Sister          breast    Arthritis Sister      Glaucoma Neg Hx      Amblyopia Neg Hx      Blindness Neg Hx      Macular degeneration Neg Hx      Retinal detachment Neg Hx      Strabismus Neg Hx      Stroke Neg Hx      Thyroid disease Neg Hx       Social History[1]  Review of Systems   Constitutional:  Negative for fever.   HENT:  Negative for sore throat.    Respiratory:  Positive for cough and shortness of breath.    Cardiovascular:  Negative for chest pain.   Gastrointestinal:  Negative for nausea.   Genitourinary:  Negative for dysuria.   Musculoskeletal:  Negative for back pain.   Skin:  Negative for rash.   Neurological:  Negative for weakness.   Hematological:  Does not bruise/bleed easily.       Physical Exam     Initial Vitals   BP Pulse Resp Temp SpO2   02/27/25 1709 02/27/25 1709 02/27/25 1710 02/27/25 1709 02/27/25 1709   (!) 162/77 93 18 98.9 °F (37.2 °C) (!) 94 %      MAP       --                 Physical Exam    Constitutional: Vital signs are normal. She appears well-developed and well-nourished. She is Obese .  Non-toxic appearance. No distress.   HENT:   Head: Normocephalic and atraumatic.   Eyes: EOM are normal. Pupils are equal, round, and reactive to light.   Neck: Neck supple. No JVD present.   Normal range of motion.  Cardiovascular:  Normal rate, regular rhythm, normal heart sounds and intact distal pulses.     Exam reveals no gallop and no friction rub.       No murmur heard.  Pulmonary/Chest: She is in respiratory distress (becomes labored with talking). She has wheezes. She has rhonchi in the right upper field. She has no rales.   Abdominal: Abdomen is soft. Bowel sounds are normal. There is no abdominal tenderness. There is no rebound and no guarding.   Musculoskeletal:         General: Normal range of motion.      Cervical back: Normal range of motion and neck supple. No rigidity.     Neurological: She is alert and oriented to person, place, and time. She has normal strength and normal reflexes. No cranial nerve deficit or sensory deficit. She exhibits normal muscle tone. Coordination normal. GCS eye subscore is 4. GCS verbal subscore is 5. GCS motor subscore is 6.   Skin: Skin is warm and dry.   Psychiatric: She has a normal mood and affect. Her speech is normal and behavior is normal. She is not actively hallucinating.         ED Course   Procedures  Labs Reviewed   CBC W/ AUTO DIFFERENTIAL - Abnormal       Result Value    WBC 15.04 (*)     RBC 3.92 (*)     Hemoglobin 11.8 (*)     Hematocrit 37.0      MCV 94      MCH 30.1      MCHC 31.9 (*)     RDW 14.2      Platelets 293      MPV 9.4      Immature Granulocytes 3.7 (*)     Gran # (ANC) 12.3 (*)     Immature Grans (Abs) 0.55 (*)     Lymph # 1.1      Mono # 1.1 (*)     Eos # 0.0      Baso # 0.03      nRBC 0      Gran % 81.4 (*)     Lymph % 7.0 (*)     Mono % 7.6      Eosinophil % 0.1      Basophil % 0.2      Differential Method Automated      COMPREHENSIVE METABOLIC PANEL - Abnormal    Sodium 136      Potassium 3.7      Chloride 105      CO2 24      Glucose 99      BUN 18      Creatinine 0.8      Calcium 8.5 (*)     Total Protein 6.6      Albumin 3.5      Total Bilirubin 1.9 (*)     Alkaline Phosphatase 59      AST 18      ALT 35      eGFR >60.0      Anion Gap 7 (*)    D DIMER, QUANTITATIVE - Abnormal    D-Dimer 1.63 (*)     Narrative:      DDimer critical result(s) repeated. Called and verbal readback   obtained from Dania Khan Rn by AS2 02/27/2025 21:43   CULTURE, BLOOD    Blood Culture, Routine No Growth to date     CULTURE, BLOOD    Blood Culture, Routine No Growth to date     SARS-COV-2 RNA AMPLIFICATION, QUAL    SARS-CoV-2 RNA, Amplification, Qual Negative     B-TYPE NATRIURETIC PEPTIDE    BNP 30     TROPONIN I HIGH SENSITIVITY    Troponin I High Sensitivity 6.8     INFLUENZA A AND B ANTIGEN    Influenza A, Molecular Negative      Influenza B, Molecular Negative      Flu A & B Source Nasal swab      Narrative:     Specimen Source->Nasopharyngeal Swab   PROCALCITONIN    Procalcitonin 0.249     D DIMER, QUANTITATIVE   ISTAT LACTATE    POC Lactate 0.79      Sample VENOUS            Imaging Results              CTA Chest Non-Coronary (PE Studies) (Final result)  Result time 02/28/25 01:18:57      Final result by Hailey Dumont MD (02/28/25 01:18:57)                   Impression:      Infectious/inflammatory or neoplastic pulmonary findings, as above.  Strongly advise short interval follow-up chest CT to document resolution.      Electronically signed by: Hailey Dumont  Date:    02/28/2025  Time:    01:18               Narrative:    EXAMINATION:  CTA CHEST NON CORONARY (PE STUDIES)    CLINICAL HISTORY:  Pulmonary embolism (PE) suspected, positive D-dimer;    TECHNIQUE:  Low dose axial images, sagittal and coronal reformations were obtained from the thoracic inlet to the lung bases following the IV administration of 100 mL of Omnipaque  350.  Contrast timing was optimized to evaluate the pulmonary arteries.  MIP images were performed.    COMPARISON:  Same day chest radiograph    FINDINGS:  Enlarged main pulmonary artery suggestive of pulmonary arterial hypertension.  No central or segmental pulmonary embolus.    No cardiomegaly.  Moderate coronary artery atherosclerosis    44 mm cavitary lesion right upper lobe with adjacent airspace disease.  Small cavitary lesion left lower and upper lobe.  Right basilar airspace disease and atelectasis.  5 mm noncalcified nodule left upper lobe.  Multiple additional small bilateral multifocal nodular opacities are noted.  Mildly prominent mediastinal nodes.  Enlarged right hilar node.    Cholelithiasis                                       X-Ray Chest 1 View (Final result)  Result time 02/27/25 19:19:17      Final result by Hailey Dumont MD (02/27/25 19:19:17)                   Impression:      As above      Electronically signed by: Hailey Dumont  Date:    02/27/2025  Time:    19:19               Narrative:    EXAMINATION:  XR CHEST 1 VIEW    CLINICAL HISTORY:  shortness of breath;    TECHNIQUE:  Single frontal view of the chest was performed.    COMPARISON:  02/18/2025    FINDINGS:  Similar masslike consolidation right upper lobe and right hilar soft tissue fullness.  Normal heart size.                                       Medications   piperacillin-tazobactam (ZOSYN) 4.5 g in D5W 100 mL IVPB (MB+) (0 g Intravenous Stopped 2/27/25 2008)   albuterol-ipratropium 2.5 mg-0.5 mg/3 mL nebulizer solution 3 mL (3 mLs Nebulization Given 2/27/25 1924)   methylPREDNISolone sodium succinate injection 125 mg (125 mg Intravenous Given 2/27/25 1846)   iohexoL (OMNIPAQUE 350) injection 100 mL (100 mLs Intravenous Given 2/27/25 7398)     Medical Decision Making  84-year-old woman who presents emergency department for gradual worsening shortness of breath since her discharge last Friday from the hospital for  treatment of influenza and pneumonia.  In the hospital she was treated with Tamiflu, cefepime and azithromycin and was discharged home to continue Tamiflu and azithromycin.  She has a past medical history of skin and colon cancer, hypothyroidism, pulmonary embolism on Eliquis with IVC filter in place, self reports that she has asthma.  On examination she does have labored breathing and speaking in short sentences.  She has audible wheezing.  Differential diagnosis includes pneumonia, asthma exacerbation, pulmonary embolism  D-dimer elevated at 1.63.  COVID and flu negative.  White count 15 K.  Patient covered with Zosyn.  Chest x-ray continues to showed pneumonia although I would not expected to have been resolved by now.  CT PE study shows no evidence of PE but does show infectious versus inflammatory versus neoplastic pulmonary findings.  Patient received steroids and breathing treatments with only mild improvement.  She ambulating O2 sats continued to drop down to the 90s and patient became very labored.  Will admit for further evaluation and supportive treatment.  Discussed with Hospital Medicine who will admit the patient.    Amount and/or Complexity of Data Reviewed  Labs:  Decision-making details documented in ED Course.  Radiology: ordered.    Risk  Prescription drug management.  Decision regarding hospitalization.               ED Course as of 02/28/25 0201   u Feb 27, 2025 1859 POC Lactate: 0.79 [AS]   1859 Influenza A, Molecular: Negative [AS]   1859 Influenza B, Molecular: Negative [AS]   1859 SARS-CoV-2 RNA, Amplification, Qual: Negative [AS]   1859 Procalcitonin: 0.249 [AS]   1859 WBC(!): 15.04 [AS]   1859 Hemoglobin(!): 11.8 [AS]   1859 Hematocrit: 37.0 [AS]   1859 Platelet Count: 293 [AS]   1900 Troponin I High Sensitivity: 6.8 [AS]   1900 BNP: 30 [AS]   1900 Creatinine: 0.8 [AS]   1900 BUN: 18 [AS]   1900 Sodium: 136 [AS]   1900 BILIRUBIN TOTAL(!): 1.9 [AS]      ED Course User Index  [AS] Benson  Jey WAGNER MD                           Clinical Impression:  Final diagnoses:  [R06.02] Shortness of breath at rest  [R06.02] Shortness of breath  [J45.901] Exacerbation of asthma, unspecified asthma severity, unspecified whether persistent (Primary)  [J12.9] Viral pneumonia          ED Disposition Condition    Admit Stable                  [1]   Social History  Tobacco Use    Smoking status: Former     Current packs/day: 0.00     Average packs/day: 0.5 packs/day for 1 year (0.5 ttl pk-yrs)     Types: Cigarettes     Start date: 1960     Quit date: 1961     Years since quittin.2    Smokeless tobacco: Never   Substance Use Topics    Alcohol use: No    Drug use: No        Jey Knowles MD  25 [phone]

## 2025-02-28 NOTE — PROGRESS NOTES
Pharmacist Renal Dose Adjustment Note    Danna Sorensen is a 84 y.o. female being treated with the medication enoxaparin.    Patient Data:    Vital Signs (Most Recent):  Temp: 97.7 °F (36.5 °C) (02/28/25 0655)  Pulse: 61 (02/28/25 0645)  Resp: 16 (02/28/25 0645)  BP: 129/63 (02/28/25 0630)  SpO2: 97 % (02/28/25 0645) Vital Signs (72h Range):  Temp:  [97.7 °F (36.5 °C)-98.9 °F (37.2 °C)]   Pulse:  [61-98]   Resp:  [16-21]   BP: (126-164)/(58-77)   SpO2:  [94 %-97 %]      Recent Labs   Lab 02/27/25  1804   CREATININE 0.8     Serum creatinine: 0.8 mg/dL 02/27/25 1804  Estimated creatinine clearance: 61.5 mL/min    Enoxaparin 40 mg Q24H will be changed to Enoxaparin 40 mg Q12H    Pharmacist's Name: Archie Almendarez  Pharmacist's Extension: [phone]

## 2025-03-01 PROBLEM — R93.89 ABNORMAL CT OF THE CHEST: Status: ACTIVE | Noted: 2025-03-01

## 2025-03-01 LAB
ALBUMIN SERPL BCP-MCNC: 3.2 G/DL (ref 3.5–5.2)
ALP SERPL-CCNC: 50 U/L (ref 55–135)
ALT SERPL W/O P-5'-P-CCNC: 31 U/L (ref 10–44)
ANION GAP SERPL CALC-SCNC: 7 MMOL/L (ref 8–16)
AST SERPL-CCNC: 12 U/L (ref 10–40)
BASOPHILS # BLD AUTO: 0.02 K/UL (ref 0–0.2)
BASOPHILS NFR BLD: 0.1 % (ref 0–1.9)
BILIRUB SERPL-MCNC: 0.7 MG/DL (ref 0.1–1)
BUN SERPL-MCNC: 25 MG/DL (ref 8–23)
CALCIUM SERPL-MCNC: 8.3 MG/DL (ref 8.7–10.5)
CHLORIDE SERPL-SCNC: 113 MMOL/L (ref 95–110)
CO2 SERPL-SCNC: 20 MMOL/L (ref 23–29)
CREAT SERPL-MCNC: 0.8 MG/DL (ref 0.5–1.4)
DIFFERENTIAL METHOD BLD: ABNORMAL
EOSINOPHIL # BLD AUTO: 0 K/UL (ref 0–0.5)
EOSINOPHIL NFR BLD: 0 % (ref 0–8)
ERYTHROCYTE [DISTWIDTH] IN BLOOD BY AUTOMATED COUNT: 13.9 % (ref 11.5–14.5)
EST. GFR  (NO RACE VARIABLE): >60 ML/MIN/1.73 M^2
GLUCOSE SERPL-MCNC: 155 MG/DL (ref 70–110)
HCT VFR BLD AUTO: 33.4 % (ref 37–48.5)
HGB BLD-MCNC: 10.9 G/DL (ref 12–16)
IMM GRANULOCYTES # BLD AUTO: 0.29 K/UL (ref 0–0.04)
IMM GRANULOCYTES NFR BLD AUTO: 2.2 % (ref 0–0.5)
LYMPHOCYTES # BLD AUTO: 0.6 K/UL (ref 1–4.8)
LYMPHOCYTES NFR BLD: 4.2 % (ref 18–48)
MAGNESIUM SERPL-MCNC: 2.1 MG/DL (ref 1.6–2.6)
MCH RBC QN AUTO: 31.3 PG (ref 27–31)
MCHC RBC AUTO-ENTMCNC: 32.6 G/DL (ref 32–36)
MCV RBC AUTO: 96 FL (ref 82–98)
MONOCYTES # BLD AUTO: 0.5 K/UL (ref 0.3–1)
MONOCYTES NFR BLD: 3.7 % (ref 4–15)
MRSA SCREEN BY PCR: DETECTED
NEUTROPHILS # BLD AUTO: 12 K/UL (ref 1.8–7.7)
NEUTROPHILS NFR BLD: 89.8 % (ref 38–73)
NRBC BLD-RTO: 0 /100 WBC
OHS QRS DURATION: 96 MS
OHS QTC CALCULATION: 440 MS
PLATELET # BLD AUTO: 258 K/UL (ref 150–450)
PMV BLD AUTO: 9.9 FL (ref 9.2–12.9)
POTASSIUM SERPL-SCNC: 4.6 MMOL/L (ref 3.5–5.1)
PROCALCITONIN SERPL IA-MCNC: 0.12 NG/ML (ref 0–0.5)
PROT SERPL-MCNC: 6.1 G/DL (ref 6–8.4)
RBC # BLD AUTO: 3.48 M/UL (ref 4–5.4)
SODIUM SERPL-SCNC: 140 MMOL/L (ref 136–145)
WBC # BLD AUTO: 13.39 K/UL (ref 3.9–12.7)

## 2025-03-01 PROCEDURE — 80053 COMPREHEN METABOLIC PANEL: CPT | Performed by: INTERNAL MEDICINE

## 2025-03-01 PROCEDURE — 83735 ASSAY OF MAGNESIUM: CPT | Performed by: INTERNAL MEDICINE

## 2025-03-01 PROCEDURE — 27000207 HC ISOLATION

## 2025-03-01 PROCEDURE — 87641 MR-STAPH DNA AMP PROBE: CPT | Performed by: INTERNAL MEDICINE

## 2025-03-01 PROCEDURE — 99223 1ST HOSP IP/OBS HIGH 75: CPT | Mod: ,,, | Performed by: INTERNAL MEDICINE

## 2025-03-01 PROCEDURE — 25000003 PHARM REV CODE 250: Performed by: STUDENT IN AN ORGANIZED HEALTH CARE EDUCATION/TRAINING PROGRAM

## 2025-03-01 PROCEDURE — 36415 COLL VENOUS BLD VENIPUNCTURE: CPT | Performed by: INTERNAL MEDICINE

## 2025-03-01 PROCEDURE — 99900031 HC PATIENT EDUCATION (STAT)

## 2025-03-01 PROCEDURE — 85025 COMPLETE CBC W/AUTO DIFF WBC: CPT | Performed by: INTERNAL MEDICINE

## 2025-03-01 PROCEDURE — 94640 AIRWAY INHALATION TREATMENT: CPT

## 2025-03-01 PROCEDURE — 84145 PROCALCITONIN (PCT): CPT | Performed by: INTERNAL MEDICINE

## 2025-03-01 PROCEDURE — 94761 N-INVAS EAR/PLS OXIMETRY MLT: CPT

## 2025-03-01 PROCEDURE — 63600175 PHARM REV CODE 636 W HCPCS: Performed by: STUDENT IN AN ORGANIZED HEALTH CARE EDUCATION/TRAINING PROGRAM

## 2025-03-01 PROCEDURE — 25000242 PHARM REV CODE 250 ALT 637 W/ HCPCS: Performed by: INTERNAL MEDICINE

## 2025-03-01 PROCEDURE — 12000002 HC ACUTE/MED SURGE SEMI-PRIVATE ROOM

## 2025-03-01 PROCEDURE — 25000003 PHARM REV CODE 250: Performed by: INTERNAL MEDICINE

## 2025-03-01 PROCEDURE — 25000003 PHARM REV CODE 250: Performed by: NURSE PRACTITIONER

## 2025-03-01 PROCEDURE — 63600175 PHARM REV CODE 636 W HCPCS: Mod: JZ,TB | Performed by: INTERNAL MEDICINE

## 2025-03-01 RX ORDER — GUAIFENESIN AND DEXTROMETHORPHAN HYDROBROMIDE 10; 100 MG/5ML; MG/5ML
10 SYRUP ORAL EVERY 4 HOURS PRN
Status: DISCONTINUED | OUTPATIENT
Start: 2025-03-01 | End: 2025-03-01

## 2025-03-01 RX ORDER — LEVOTHYROXINE SODIUM 88 UG/1
88 TABLET ORAL
Status: DISCONTINUED | OUTPATIENT
Start: 2025-03-02 | End: 2025-03-03 | Stop reason: HOSPADM

## 2025-03-01 RX ORDER — METHYLPREDNISOLONE SOD SUCC 125 MG
40 VIAL (EA) INJECTION EVERY 8 HOURS
Status: DISCONTINUED | OUTPATIENT
Start: 2025-03-01 | End: 2025-03-03 | Stop reason: HOSPADM

## 2025-03-01 RX ADMIN — GUAIFENESIN 400 MG: 200 SOLUTION ORAL at 09:03

## 2025-03-01 RX ADMIN — DOXYCYCLINE HYCLATE 100 MG: 100 CAPSULE ORAL at 09:03

## 2025-03-01 RX ADMIN — METHYLPREDNISOLONE SODIUM SUCCINATE 40 MG: 125 INJECTION, POWDER, FOR SOLUTION INTRAMUSCULAR; INTRAVENOUS at 09:03

## 2025-03-01 RX ADMIN — METHYLPREDNISOLONE SODIUM SUCCINATE 40 MG: 40 INJECTION, POWDER, FOR SOLUTION INTRAMUSCULAR; INTRAVENOUS at 03:03

## 2025-03-01 RX ADMIN — ENOXAPARIN SODIUM 40 MG: 40 INJECTION SUBCUTANEOUS at 05:03

## 2025-03-01 RX ADMIN — APIXABAN 5 MG: 5 TABLET, FILM COATED ORAL at 08:03

## 2025-03-01 RX ADMIN — IPRATROPIUM BROMIDE AND ALBUTEROL SULFATE 3 ML: .5; 3 SOLUTION RESPIRATORY (INHALATION) at 07:03

## 2025-03-01 RX ADMIN — IPRATROPIUM BROMIDE AND ALBUTEROL SULFATE 3 ML: .5; 3 SOLUTION RESPIRATORY (INHALATION) at 11:03

## 2025-03-01 RX ADMIN — METHYLPREDNISOLONE SODIUM SUCCINATE 40 MG: 40 INJECTION, POWDER, FOR SOLUTION INTRAMUSCULAR; INTRAVENOUS at 05:03

## 2025-03-01 RX ADMIN — GUAIFENESIN 400 MG: 200 SOLUTION ORAL at 12:03

## 2025-03-01 RX ADMIN — DOXYCYCLINE HYCLATE 100 MG: 100 CAPSULE ORAL at 08:03

## 2025-03-01 RX ADMIN — SODIUM CHLORIDE 1000 MG: 9 INJECTION, SOLUTION INTRAVENOUS at 09:03

## 2025-03-01 RX ADMIN — FAMOTIDINE 20 MG: 10 INJECTION, SOLUTION INTRAVENOUS at 08:03

## 2025-03-01 RX ADMIN — MUPIROCIN 1 G: 20 OINTMENT TOPICAL at 08:03

## 2025-03-01 RX ADMIN — CEFEPIME 2 G: 2 INJECTION, POWDER, FOR SOLUTION INTRAVENOUS at 11:03

## 2025-03-01 RX ADMIN — MUPIROCIN 1 G: 20 OINTMENT TOPICAL at 09:03

## 2025-03-01 RX ADMIN — LISINOPRIL 30 MG: 20 TABLET ORAL at 03:03

## 2025-03-01 RX ADMIN — CEFEPIME 2 G: 2 INJECTION, POWDER, FOR SOLUTION INTRAVENOUS at 08:03

## 2025-03-01 RX ADMIN — IPRATROPIUM BROMIDE AND ALBUTEROL SULFATE 3 ML: .5; 3 SOLUTION RESPIRATORY (INHALATION) at 03:03

## 2025-03-01 RX ADMIN — GUAIFENESIN 400 MG: 200 SOLUTION ORAL at 05:03

## 2025-03-01 RX ADMIN — CEFEPIME 2 G: 2 INJECTION, POWDER, FOR SOLUTION INTRAVENOUS at 05:03

## 2025-03-01 RX ADMIN — GUAIFENESIN 400 MG: 200 SOLUTION ORAL at 08:03

## 2025-03-01 NOTE — SUBJECTIVE & OBJECTIVE
Interval History: NAD noted and VSS. Patient sitting up in chair and SPO2 remains stable on RA.     Review of Systems   Constitutional:  Negative for chills and fever.   HENT:  Negative for congestion and trouble swallowing.    Eyes:  Negative for visual disturbance.   Respiratory:  Positive for cough and shortness of breath (improving).    Cardiovascular:  Negative for chest pain.   Gastrointestinal:  Negative for abdominal pain, nausea and vomiting.   Genitourinary:  Negative for dysuria.   Musculoskeletal:  Negative for myalgias.   Skin:  Negative for color change.   Neurological:  Negative for dizziness, speech difficulty and light-headedness.   Psychiatric/Behavioral:  Negative for agitation and confusion. The patient is not nervous/anxious.      Objective:     Vital Signs (Most Recent):  Temp: 97.6 °F (36.4 °C) (03/01/25 1104)  Pulse: 87 (03/01/25 1133)  Resp: 18 (03/01/25 1133)  BP: (!) 164/85 (03/01/25 1104)  SpO2: 97 % (03/01/25 1133) Vital Signs (24h Range):  Temp:  [97.6 °F (36.4 °C)-98 °F (36.7 °C)] 97.6 °F (36.4 °C)  Pulse:  [65-99] 87  Resp:  [16-20] 18  SpO2:  [93 %-98 %] 97 %  BP: (141-173)/(71-85) 164/85     Weight: 105.2 kg (231 lb 14.8 oz)  Body mass index is 39.81 kg/m².    Intake/Output Summary (Last 24 hours) at 3/1/2025 1358  Last data filed at 3/1/2025 0927  Gross per 24 hour   Intake 773.39 ml   Output 100 ml   Net 673.39 ml         Physical Exam  Constitutional:       General: She is not in acute distress.  HENT:      Head: Normocephalic and atraumatic.      Mouth/Throat:      Mouth: Mucous membranes are moist.   Eyes:      Extraocular Movements: Extraocular movements intact.      Pupils: Pupils are equal, round, and reactive to light.   Cardiovascular:      Rate and Rhythm: Normal rate and regular rhythm.      Pulses: Normal pulses.      Heart sounds: Normal heart sounds.   Pulmonary:      Effort: Pulmonary effort is normal. No respiratory distress.      Breath sounds: Examination of the  right-lower field reveals decreased breath sounds. Examination of the left-lower field reveals decreased breath sounds. Decreased breath sounds present. No wheezing, rhonchi or rales.   Abdominal:      General: Bowel sounds are normal. There is no distension.      Palpations: Abdomen is soft.      Tenderness: There is no abdominal tenderness. There is no guarding or rebound.   Musculoskeletal:      Cervical back: No rigidity.      Right lower leg: No edema.      Left lower leg: No edema.   Skin:     General: Skin is warm.   Neurological:      Mental Status: She is alert and oriented to person, place, and time.   Psychiatric:         Mood and Affect: Mood normal.         Behavior: Behavior normal.         Thought Content: Thought content normal.         Judgment: Judgment normal.             Significant Labs: All pertinent labs within the past 24 hours have been reviewed.    Significant Imaging: I have reviewed all pertinent imaging results/findings within the past 24 hours.

## 2025-03-01 NOTE — RESPIRATORY THERAPY
03/01/25 1545   Patient Assessment/Suction   Level of Consciousness (AVPU) alert   Respiratory Effort Short of breath   Expansion/Accessory Muscles/Retractions expansion symmetric   All Lung Fields Breath Sounds Anterior:;Posterior:   EDI Breath Sounds clear;diminished   LLL Breath Sounds coarse;clear   RUL Breath Sounds coarse   RML Breath Sounds coarse;diminished   RLL Breath Sounds coarse;diminished   Rhythm/Pattern, Respiratory shortness of breath   Cough Frequency infrequent   Cough Type congested;tight   PRE-TX-O2   Device (Oxygen Therapy) room air   SpO2 (!) 91 %  (sat's increased post txt to 98)   Pulse Oximetry Type Intermittent   $ Pulse Oximetry - Multiple Charge Pulse Oximetry - Multiple   Pulse 98   Resp 20   Aerosol Therapy   $ Aerosol Therapy Charges Aerosol Treatment   Daily Review of Necessity (SVN) completed   Respiratory Treatment Status (SVN) given   Treatment Route (SVN) mask;oxygen   Patient Position sitting on edge of bed   Post Treatment Assessment (SVN) increased aeration   Signs of Intolerance (SVN) none   Breath Sounds Post-Respiratory Treatment   Throughout All Fields Post-Treatment All Fields   Throughout All Fields Post-Treatment aeration increased   Post-treatment Heart Rate (beats/min) 68   Post-treatment Resp Rate (breaths/min) 17

## 2025-03-01 NOTE — ASSESSMENT & PLAN NOTE
BP now elevated - will resume her home BP meds  ========================================================================================  For now BP is good    120s      I would HOLD her home BP meds now while sick     Running some IVFs also

## 2025-03-01 NOTE — HOSPITAL COURSE
84 year old female w/ PMHx asthma, HTN, colon cancer, CKD III, and PE s/p IVC on eliquis.  A few days prior to admission she had been discharged after treatment for the flu and pneumonia.  She was re-admitted to the hospital for management of an asthma exacerbation.  CXR obtained showing similar masslike consolidation right upper lobe and right hilar soft tissue fullness.  Normal heart size.  CTA chest PE study showed enlarged main pulmonary artery suggestive of pulmonary arterial hypertension.  No central or segmental pulmonary embolus. No cardiomegaly.  Moderate coronary artery atherosclerosis. 44 mm cavitary lesion right upper lobe with adjacent airspace disease.  Small cavitary lesion left lower and upper lobe.  Right basilar airspace disease and atelectasis.  5 mm noncalcified nodule left upper lobe.  Multiple additional small bilateral multifocal nodular opacities are noted.  Mildly prominent mediastinal nodes.  Enlarged right hilar node. Cholelithiasis. Pulmonology was consulted. Patient was treated with abx, solu-medrol, and scheduled duo-nebs.  MRSA swab positive.  Sputum culture +staph aureus.  Blood cx NGTD.  Her blood pressure has been elevated, but could be due to steroids.  Discussed this with patient, and she is agreeable to check blood pressure twice a day and notify PCP if consistently greater than 150.  Pulmonology cleared for discharge on p.o. prednisone and doxycycline.  She will follow up with pulmonology and PCP outpatient.  Needs repeat CT of chest in 2 months.  Plan of care was discussed with patient she verbalized understanding.  Patient was seen and examined on the day of discharge.

## 2025-03-01 NOTE — ASSESSMENT & PLAN NOTE
Patient has a diagnosis of pneumonia. The cause of the pneumonia is suspected to be bacterial in etiology but organism is not known. The pneumonia is stable. The patient has the following signs/symptoms of pneumonia: cough, sputum production, and shortness of breath. The patient does not have a current oxygen requirement and the patient does not have a home oxygen requirement. I have reviewed the pertinent imaging. The following cultures have been collected: Blood cultures and Sputum culture The culture results are listed below.     Current antimicrobial regimen consists of the antibiotics listed below. Will monitor patient closely and continue current treatment plan unchanged.    Antibiotics (From admission, onward)      Start     Stop Route Frequency Ordered    02/28/25 2100  doxycycline capsule 100 mg         -- Oral Every 12 hours 02/28/25 1500    02/28/25 1600  ceFEPIme injection 2 g         -- IV Every 8 hours (non-standard times) 02/28/25 1518    02/28/25 0930  mupirocin 2 % ointment         03/05/25 0859 Nasl 2 times daily 02/28/25 0819            Microbiology Results (last 7 days)       Procedure Component Value Units Date/Time    MRSA Screen by PCR [[id number]] Collected: 03/01/25 1245    Order Status: Sent Specimen: Nasal Swab Updated: 03/01/25 1255    Blood culture #1 [[id number]] Collected: 02/27/25 1826    Order Status: Completed Specimen: Blood Updated: 02/28/25 2032     Blood Culture, Routine No Growth to date      No Growth to date    Blood culture #2 [[id number]] Collected: 02/27/25 1833    Order Status: Completed Specimen: Blood Updated: 02/28/25 2032     Blood Culture, Routine No Growth to date      No Growth to date    Respiratory Infection Panel (PCR), Nasopharyngeal [[id number]] Collected: 02/28/25 0836    Order Status: Completed Specimen: Nasopharyngeal Swab Updated: 02/28/25 1005     Respiratory Infection Panel Source NP swab     Adenovirus Not Detected     Coronavirus 229E, Common Cold  Virus Not Detected     Coronavirus HKU1, Common Cold Virus Not Detected     Coronavirus NL63, Common Cold Virus Not Detected     Coronavirus OC43, Common Cold Virus Not Detected     Comment: Coronavirus strains 229E, HKU1, NL63, and OC43 can cause the common   cold   and are not associated with the respiratory disease outbreak caused   by  the COVID-19 (SARS-CoV-2 novel Coronavirus) strain.           SARS-CoV2 (COVID-19) Qualitative PCR Not Detected     Human Metapneumovirus Not Detected     Human Rhinovirus/Enterovirus Not Detected     Influenza A Not Detected     Influenza B Not Detected     Parainfluenza Virus 1 Not Detected     Parainfluenza Virus 2 Not Detected     Parainfluenza Virus 3 Not Detected     Parainfluenza Virus 4 Not Detected     Respiratory Syncytial Virus Not Detected     Bordetella Parapertussis (SL4370) Not Detected     Bordetella pertussis (ptxP) Not Detected     Chlamydia pneumoniae Not Detected     Mycoplasma pneumoniae Not Detected     Comment: Respiratory Infection Panel testing performed by Multiplex PCR.       Narrative:      Respiratory Infection Panel source->NP Swab           How Severe Are Your Spot(S)?: mild What Type Of Note Output Would You Prefer (Optional)?: Standard Output What Is The Reason For Today's Visit?: Full Body Skin Examination with No Concerns What Is The Reason For Today's Visit? (Being Monitored For X): concerning skin lesions on an annual basis Handoff

## 2025-03-01 NOTE — ASSESSMENT & PLAN NOTE
44 mm cavitary lesion right upper lobe with adjacent airspace disease.  Small cavitary lesion left lower and upper lobe.  Right basilar airspace disease and atelectasis.  5 mm noncalcified nodule left upper lobe.  Multiple additional small bilateral multifocal nodular opacities are noted.  Mildly prominent mediastinal nodes.  Enlarged right hilar node.   Pulmonology consulted, appreciate recs  Will need f/u CT in a few months

## 2025-03-01 NOTE — PROGRESS NOTES
Therapy with Vancomycin order discontinued by Dr. Ovalle on 03/01/2025 @ 11:45 am.     Pharmacy will sign off, please re-consult as needed.  Thank you for allowing us to participate in this patient's care.  Jackie Hooper 3/1/2025 11:55 AM  Dept of Pharmacy  Ext [phone]

## 2025-03-01 NOTE — CARE UPDATE
02/28/25 1923   Patient Assessment/Suction   Level of Consciousness (AVPU) alert   Respiratory Effort Normal;Unlabored   Expansion/Accessory Muscles/Retractions no use of accessory muscles   All Lung Fields Breath Sounds clear;equal bilaterally   Rhythm/Pattern, Respiratory pattern regular;shortness of breath  (SOB per pt)   Cough Frequency with stimulation   Cough Type good;nonproductive   PRE-TX-O2   Device (Oxygen Therapy) room air   SpO2 (!) 94 %   Pulse Oximetry Type Intermittent   $ Pulse Oximetry - Multiple Charge Pulse Oximetry - Multiple   Pulse 88   Resp 18   Positioning HOB elevated 45 degrees   Positioning   Head of Bed (HOB) Positioning HOB at 45 degrees   Positioning/Transfer Devices pillows;in use   Aerosol Therapy   $ Aerosol Therapy Charges Aerosol Treatment   Daily Review of Necessity (SVN) completed   Respiratory Treatment Status (SVN) given   Treatment Route (SVN) mask;oxygen   Patient Position HOB elevated   Post Treatment Assessment (SVN) patient reports breathing improved   Signs of Intolerance (SVN) none   Education   $ Education Bronchodilator;15 min

## 2025-03-01 NOTE — ASSESSMENT & PLAN NOTE
No wheezes  She did get started on abx - procal WNL x2 but pulm recs to continue for now  Continue steroids and breathing tx  Continuing to do well on RA    ===============================================================================  Was just here with this and left about 7 days ago    Was only in the hospital for 2 days     Likely did not get enough time of Treatment , mainly the steroids .  And OR , not enough time tapering down on steroids at home    Does not appear to be a bacterial PNA    FLU NEG  but she had flu last month she said  COVID neg     On ROOM AIR IN ER    CTA negative for pna or PE        PLAN    -  IV steroids   40 mg Q8     - IVFs with KCL     - DuoNebs QID and PRN     - no abx needed  Procalcitonin normal     - pepcid IV BID    -  robitussin 400 mg PO QID    And Tessalon pearls Po PRN cough

## 2025-03-01 NOTE — CONSULTS
Pulmonary/Critical Care Consult      Patient name: Danna Sorensen  MRN: [id number]  Date: 03/01/2025    Admit Date: 2/27/2025  Consult Requested By: Bebeto vOalle MD    Reason for Consult: pneumonia, abnormal CT chest, asthma    HPI:    3/1/2025 - 85 yo with recent DC from hospital for influenza and pneumonia, after DC she was doing OK but after finishing her prednisone her symptoms worsened and presented to ER with cough (NP), SOB, wheezing and is now admitted.  Had CTA chest which showed some inflammatory appearing changes which are scattered and new from CT done 1/2025.  She is feeling better bt does not feel ready to go home.  ROS as below    Review of Systems    Review of Systems   Constitutional:  Positive for malaise/fatigue. Negative for chills, diaphoresis, fever and weight loss.   HENT:  Negative for congestion.    Eyes:  Negative for pain.   Respiratory:  Positive for cough and shortness of breath. Negative for hemoptysis, sputum production, wheezing and stridor.    Cardiovascular:  Negative for chest pain, palpitations, orthopnea, claudication, leg swelling and PND.   Gastrointestinal:  Negative for abdominal pain, constipation, diarrhea, heartburn, nausea and vomiting.   Genitourinary:  Negative for dysuria, frequency and urgency.   Musculoskeletal:  Negative for falls and myalgias.   Neurological:  Negative for sensory change, focal weakness and weakness.   Psychiatric/Behavioral:  Negative for depression. The patient is not nervous/anxious.        Past Medical History    Past Medical History:   Diagnosis Date    Acute pulmonary embolism without acute cor pulmonale 08/25/2023    Back pain     Carpal tunnel syndrome     Cataract     Colon cancer     Colon polyp     Coronary artery disease     Essential hypertension 08/09/2019    Exacerbation of asthma 2/28/2025    History of blood clots     History of squamous cell carcinoma 07/27/2015    Hx of colon cancer, stage IV 10/18/2016    Hypothyroidism      Multifocal pneumonia 2/18/2025    Obesity (BMI 30-39.9) 03/24/2016    Osteoarthritis     Osteoporosis     Personal history of colon cancer, stage III     Squamous cell carcinoma     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    Strabismus     Trouble in sleeping     Wears dentures     Uppers       Past Surgical History    Past Surgical History:   Procedure Laterality Date    CARDIAC SURGERY      cardiac cath    COLON SURGERY      colon resection    COLONOSCOPY N/A 10/18/2016    Procedure: COLONOSCOPY;  Surgeon: Rell Cordova MD;  Location: [facility];  Service: Endoscopy;  Laterality: N/A;    COLONOSCOPY N/A 8/8/2023    Procedure: COLONOSCOPY;  Surgeon: Kaushik Pinon MD;  Location: [facility];  Service: Endoscopy;  Laterality: N/A;    COLONOSCOPY N/A 8/22/2023    Procedure: COLONOSCOPY (tattoo of colon ca);  Surgeon: Kaushik Pinon MD;  Location: [facility];  Service: Endoscopy;  Laterality: N/A;    COLONOSCOPY N/A 8/12/2024    Procedure: COLONOSCOPY;  Surgeon: Jim Chavez MD;  Location: [facility];  Service: General;  Laterality: N/A;    ESOPHAGOGASTRODUODENOSCOPY N/A 8/3/2023    Procedure: EGD (ESOPHAGOGASTRODUODENOSCOPY);  Surgeon: Kaushik Pinon MD;  Location: [facility];  Service: Endoscopy;  Laterality: N/A;    HAND TENDON SURGERY Left     HEMICOLECTOMY      right    INJECTION OF ANESTHETIC AGENT AROUND MEDIAL BRANCH NERVES INNERVATING LUMBAR FACET JOINT Right 07/10/2018    Procedure: Block-nerve-medial branch-lumbar;  Surgeon: Parish Gaitan MD;  Location: [facility] OR;  Service: Pain Management;  Laterality: Right;  L3, 4, 5    INSERTION OF INFERIOR VENA CAVAL FILTER N/A 9/13/2023    Procedure: Insertion, Filter, Inferior Vena Cava;  Surgeon: Oren Verdin MD;  Location: [facility] CATH/EP LAB;  Service: General;  Laterality: N/A;    INSERTION OF TUNNELED CENTRAL VENOUS CATHETER (CVC) WITH SUBCUTANEOUS PORT Right 11/15/2023    Procedure: [id number]-[id number];  Surgeon: Kathy  Jim HAYES MD;  Location: [facility] OR;  Service: General;  Laterality: Right;    JOINT REPLACEMENT      bilateral    RADIOFREQUENCY ABLATION OF LUMBAR MEDIAL BRANCH NERVE AT SINGLE LEVEL Right 07/24/2018    Procedure: RADIOFREQUENCY ABLATION, NERVE, MEDIAL BRANCH, LUMBAR, 1 LEVEL;  Surgeon: Parish Gaitan MD;  Location: [facility] OR;  Service: Pain Management;  Laterality: Right;  L3,4,5 - Burned at 80 degrees C. for 75 seconds x 2 each site    ROBOT-ASSISTED COLECTOMY N/A 9/29/2023    Procedure: ROBOTIC COLECTOMY;  Surgeon: Jim Chavez MD;  Location: [facility] OR;  Service: General;  Laterality: N/A;    SHOULDER ARTHROSCOPY Right 01/12/2017    Reverse     TONSILLECTOMY      TONSILLECTOMY, ADENOIDECTOMY, BILATERAL MYRINGOTOMY AND TUBES      TOTAL KNEE ARTHROPLASTY Bilateral 08/1998    total replacements    TUMOR REMOVAL Left     left foot as a child       Medications (scheduled):      albuterol-ipratropium  3 mL Nebulization QID    ceFEPime IV (PEDS and ADULTS)  2 g Intravenous Q8H    doxycycline  100 mg Oral Q12H    enoxparin  40 mg Subcutaneous Q12H (treatment, non-standard time)    famotidine (PF)  20 mg Intravenous BID    guaiFENesin 100 mg/5 ml  400 mg Oral QID    methylPREDNISolone injection (PEDS and ADULTS)  40 mg Intravenous Q8H    mupirocin   Nasal BID    pneumoc 20-km conj-dip cr(PF)  0.5 mL Intramuscular 1 time in Clinic/HOD    senna-docusate 8.6-50 mg  1 tablet Oral BID       Medications (infusions):         Medications (prn):       Current Facility-Administered Medications:     acetaminophen, 650 mg, Oral, Q4H PRN    aluminum-magnesium hydroxide-simethicone, 30 mL, Oral, QID PRN    benzonatate, 100 mg, Oral, TID PRN    dextrose 50%, 12.5 g, Intravenous, PRN    dextrose 50%, 25 g, Intravenous, PRN    glucagon (human recombinant), 1 mg, Intramuscular, PRN    glucose, 16 g, Oral, PRN    glucose, 24 g, Oral, PRN    influenza (adjuvanted), 0.5 mL, Intramuscular, Prior to discharge    magnesium oxide, 800 mg, Oral,  "PRN    magnesium oxide, 800 mg, Oral, PRN    melatonin, 6 mg, Oral, Nightly PRN    naloxone, 0.02 mg, Intravenous, PRN    ondansetron, 4 mg, Intravenous, Q6H PRN    potassium bicarbonate, 35 mEq, Oral, PRN    potassium bicarbonate, 50 mEq, Oral, PRN    potassium bicarbonate, 60 mEq, Oral, PRN    potassium, sodium phosphates, 2 packet, Oral, PRN    potassium, sodium phosphates, 2 packet, Oral, PRN    potassium, sodium phosphates, 2 packet, Oral, PRN    sodium chloride 0.9%, 3 mL, Intravenous, Q12H PRN    Family History:   Family History   Problem Relation Name Age of Onset    Hypertension Mother      Kidney disease Mother      Cataracts Father      Cataracts Sister      Cancer Sister          breast    No Known Problems Brother      Cancer Sister          breast    Arthritis Sister      Glaucoma Neg Hx      Amblyopia Neg Hx      Blindness Neg Hx      Macular degeneration Neg Hx      Retinal detachment Neg Hx      Strabismus Neg Hx      Stroke Neg Hx      Thyroid disease Neg Hx         Social History: Tobacco: Tobacco Use History[1]                             EtOH:   Social History     Substance and Sexual Activity   Alcohol Use No                                Drugs:   Social History     Substance and Sexual Activity   Drug Use No       Physical Exam    Vital signs:  Temp:  [97.6 °F (36.4 °C)-98 °F (36.7 °C)]   Pulse:  [65-99]   Resp:  [16-20]   BP: (141-173)/(71-85)   SpO2:  [93 %-98 %]     Intake/Output:   Intake/Output Summary (Last 24 hours) at 3/1/2025 1336  Last data filed at 3/1/2025 0927  Gross per 24 hour   Intake 773.39 ml   Output 100 ml   Net 673.39 ml        BMI: Estimated body mass index is 39.81 kg/m² as calculated from the following:    Height as of this encounter: 5' 4" (1.626 m).    Weight as of this encounter: 105.2 kg (231 lb 14.8 oz).    Physical Exam  Vitals and nursing note reviewed.   Constitutional:       General: She is not in acute distress.     Appearance: Normal appearance. She is not " ill-appearing, toxic-appearing or diaphoretic.   HENT:      Head: Normocephalic and atraumatic.      Right Ear: External ear normal.      Left Ear: External ear normal.      Nose: Nose normal. No congestion or rhinorrhea.      Mouth/Throat:      Mouth: Mucous membranes are moist.      Pharynx: Oropharynx is clear. No oropharyngeal exudate or posterior oropharyngeal erythema.   Eyes:      General: No scleral icterus.        Right eye: No discharge.         Left eye: No discharge.      Extraocular Movements: Extraocular movements intact.      Conjunctiva/sclera: Conjunctivae normal.      Pupils: Pupils are equal, round, and reactive to light.   Neck:      Vascular: No carotid bruit.   Cardiovascular:      Rate and Rhythm: Normal rate and regular rhythm.      Pulses: Normal pulses.      Heart sounds: No murmur heard.     No friction rub. No gallop.   Pulmonary:      Effort: Pulmonary effort is normal. No respiratory distress.      Breath sounds: No stridor. No wheezing, rhonchi or rales.   Chest:      Chest wall: No tenderness.   Abdominal:      General: Bowel sounds are normal. There is no distension.      Tenderness: There is no abdominal tenderness. There is no guarding.   Musculoskeletal:         General: No swelling. Normal range of motion.      Cervical back: Normal range of motion and neck supple. No rigidity or tenderness.      Right lower leg: No edema.      Left lower leg: No edema.   Lymphadenopathy:      Cervical: No cervical adenopathy.   Skin:     General: Skin is warm and dry.      Capillary Refill: Capillary refill takes less than 2 seconds.      Coloration: Skin is not jaundiced.      Findings: No bruising.   Neurological:      General: No focal deficit present.      Mental Status: She is alert and oriented to person, place, and time. Mental status is at baseline.      Cranial Nerves: No cranial nerve deficit.      Sensory: No sensory deficit.      Motor: No weakness.   Psychiatric:         Mood and  "Affect: Mood normal.         Behavior: Behavior normal.         Thought Content: Thought content normal.         Judgment: Judgment normal.         Laboratory    Recent Labs   Lab 03/01/25  0516   WBC 13.39*   RBC 3.48*   HGB 10.9*   HCT 33.4*      MCV 96   MCH 31.3*   MCHC 32.6       Recent Labs   Lab 03/01/25  0516   CALCIUM 8.3*   ALBUMIN 3.2*   PROT 6.1      K 4.6   CO2 20*   *   BUN 25*   CREATININE 0.8   ALKPHOS 50*   ALT 31   AST 12   BILITOT 0.7       No results for input(s): "PT", "INR", "APTT" in the last 24 hours.    No results for input(s): "CPK", "CPKMB", "TROPONINI", "MB" in the last 24 hours.    Additional labs: rviewed    Microbiology:       Microbiology Results (last 7 days)       Procedure Component Value Units Date/Time    MRSA Screen by PCR [[id number]] Collected: 03/01/25 1245    Order Status: Sent Specimen: Nasal Swab Updated: 03/01/25 1255    Blood culture #1 [[id number]] Collected: 02/27/25 1826    Order Status: Completed Specimen: Blood Updated: 02/28/25 2032     Blood Culture, Routine No Growth to date      No Growth to date    Blood culture #2 [[id number]] Collected: 02/27/25 1833    Order Status: Completed Specimen: Blood Updated: 02/28/25 2032     Blood Culture, Routine No Growth to date      No Growth to date    Respiratory Infection Panel (PCR), Nasopharyngeal [[id number]] Collected: 02/28/25 0836    Order Status: Completed Specimen: Nasopharyngeal Swab Updated: 02/28/25 1005     Respiratory Infection Panel Source NP swab     Adenovirus Not Detected     Coronavirus 229E, Common Cold Virus Not Detected     Coronavirus HKU1, Common Cold Virus Not Detected     Coronavirus NL63, Common Cold Virus Not Detected     Coronavirus OC43, Common Cold Virus Not Detected     Comment: Coronavirus strains 229E, HKU1, NL63, and OC43 can cause the common   cold   and are not associated with the respiratory disease outbreak caused   by  the COVID-19 (SARS-CoV-2 novel Coronavirus) " strain.           SARS-CoV2 (COVID-19) Qualitative PCR Not Detected     Human Metapneumovirus Not Detected     Human Rhinovirus/Enterovirus Not Detected     Influenza A Not Detected     Influenza B Not Detected     Parainfluenza Virus 1 Not Detected     Parainfluenza Virus 2 Not Detected     Parainfluenza Virus 3 Not Detected     Parainfluenza Virus 4 Not Detected     Respiratory Syncytial Virus Not Detected     Bordetella Parapertussis ([id number]) Not Detected     Bordetella pertussis (ptxP) Not Detected     Chlamydia pneumoniae Not Detected     Mycoplasma pneumoniae Not Detected     Comment: Respiratory Infection Panel testing performed by Multiplex PCR.       Narrative:      Respiratory Infection Panel source->NP Swab            Radiology    CTA Chest Non-Coronary (PE Studies)  Narrative: EXAMINATION:  CTA CHEST NON CORONARY (PE STUDIES)    CLINICAL HISTORY:  Pulmonary embolism (PE) suspected, positive D-dimer;    TECHNIQUE:  Low dose axial images, sagittal and coronal reformations were obtained from the thoracic inlet to the lung bases following the IV administration of 100 mL of Omnipaque 350.  Contrast timing was optimized to evaluate the pulmonary arteries.  MIP images were performed.    COMPARISON:  Same day chest radiograph    FINDINGS:  Enlarged main pulmonary artery suggestive of pulmonary arterial hypertension.  No central or segmental pulmonary embolus.    No cardiomegaly.  Moderate coronary artery atherosclerosis    44 mm cavitary lesion right upper lobe with adjacent airspace disease.  Small cavitary lesion left lower and upper lobe.  Right basilar airspace disease and atelectasis.  5 mm noncalcified nodule left upper lobe.  Multiple additional small bilateral multifocal nodular opacities are noted.  Mildly prominent mediastinal nodes.  Enlarged right hilar node.    Cholelithiasis  Impression: Infectious/inflammatory or neoplastic pulmonary findings, as above.  Strongly advise short interval follow-up  "chest CT to document resolution.    Electronically signed by: Hailey Dumont  Date:    2025  Time:    01:18        Additional Studies    reviewed    Ventilator Information    Oxygen Concentration (%):  [93] 93             No results for input(s): "PH", "PCO2", "PO2", "HCO3", "POCSATURATED", "BE" in the last 72 hours.      Impression    Active Hospital Problems    Diagnosis  POA    *Exacerbation of asthma [J45.901]  Yes    Abnormal CT of the chest [R93.89]  Unknown    Pneumonia [J18.9]  Yes    Essential hypertension [I10]  Yes      Resolved Hospital Problems   No resolved problems to display.       Plan    Continue respiratory treatments, steroids, and antibiotics  I think the CT findings are most likely inflammatory and not neoplastic (not present 2025) and she will need a follow up in a few months  Increase activity as able  Hopefully ready for DC in the next day or so  She normally sees Dr Romero with [facility]    Thank you for this consult.  I will follow with you while the patient is hospitalized.        Eduard Stauffer MD  [facility]  2025               [1]   Social History  Tobacco Use   Smoking Status Former    Current packs/day: 0.00    Average packs/day: 0.5 packs/day for 1 year (0.5 ttl pk-yrs)    Types: Cigarettes    Start date: 1960    Quit date: 1961    Years since quittin.2   Smokeless Tobacco Never     "

## 2025-03-01 NOTE — RESPIRATORY THERAPY
03/01/25 1133   Patient Assessment/Suction   Level of Consciousness (AVPU) alert   Respiratory Effort Normal;Unlabored   Expansion/Accessory Muscles/Retractions no use of accessory muscles;no retractions;expansion symmetric   All Lung Fields Breath Sounds Anterior:;Posterior:   EDI Breath Sounds clear;diminished   LLL Breath Sounds coarse;wheezes, expiratory   RUL Breath Sounds clear;diminished;wheezes, expiratory   RML Breath Sounds clear;diminished   RLL Breath Sounds wheezes, expiratory;diminished   Rhythm/Pattern, Respiratory unlabored;pattern regular;depth regular;no shortness of breath reported   Cough Frequency infrequent   Cough Type congested   PRE-TX-O2   Device (Oxygen Therapy) room air   SpO2 97 %   Pulse Oximetry Type Intermittent   $ Pulse Oximetry - Multiple Charge Pulse Oximetry - Multiple   Pulse 87   Resp 18   Aerosol Therapy   $ Aerosol Therapy Charges Aerosol Treatment   Daily Review of Necessity (SVN) completed   Respiratory Treatment Status (SVN) given   Treatment Route (SVN) mask;oxygen   Patient Position sitting in chair   Post Treatment Assessment (SVN) increased aeration   Signs of Intolerance (SVN) none   Breath Sounds Post-Respiratory Treatment   Throughout All Fields Post-Treatment All Fields   Throughout All Fields Post-Treatment aeration increased   Post-treatment Heart Rate (beats/min) 89   Post-treatment Resp Rate (breaths/min) 17

## 2025-03-02 LAB
ALBUMIN SERPL BCP-MCNC: 3.3 G/DL (ref 3.5–5.2)
ALP SERPL-CCNC: 46 U/L (ref 55–135)
ALT SERPL W/O P-5'-P-CCNC: 30 U/L (ref 10–44)
ANION GAP SERPL CALC-SCNC: 5 MMOL/L (ref 8–16)
AST SERPL-CCNC: 12 U/L (ref 10–40)
BASOPHILS # BLD AUTO: 0.01 K/UL (ref 0–0.2)
BASOPHILS NFR BLD: 0.1 % (ref 0–1.9)
BILIRUB SERPL-MCNC: 0.8 MG/DL (ref 0.1–1)
BUN SERPL-MCNC: 25 MG/DL (ref 8–23)
CALCIUM SERPL-MCNC: 8.4 MG/DL (ref 8.7–10.5)
CHLORIDE SERPL-SCNC: 112 MMOL/L (ref 95–110)
CO2 SERPL-SCNC: 21 MMOL/L (ref 23–29)
CREAT SERPL-MCNC: 0.8 MG/DL (ref 0.5–1.4)
DIFFERENTIAL METHOD BLD: ABNORMAL
EOSINOPHIL # BLD AUTO: 0 K/UL (ref 0–0.5)
EOSINOPHIL NFR BLD: 0 % (ref 0–8)
ERYTHROCYTE [DISTWIDTH] IN BLOOD BY AUTOMATED COUNT: 13.9 % (ref 11.5–14.5)
EST. GFR  (NO RACE VARIABLE): >60 ML/MIN/1.73 M^2
GLUCOSE SERPL-MCNC: 124 MG/DL (ref 70–110)
HCT VFR BLD AUTO: 33.5 % (ref 37–48.5)
HGB BLD-MCNC: 11 G/DL (ref 12–16)
IMM GRANULOCYTES # BLD AUTO: 0.15 K/UL (ref 0–0.04)
IMM GRANULOCYTES NFR BLD AUTO: 1.6 % (ref 0–0.5)
LYMPHOCYTES # BLD AUTO: 0.5 K/UL (ref 1–4.8)
LYMPHOCYTES NFR BLD: 5 % (ref 18–48)
MAGNESIUM SERPL-MCNC: 2.1 MG/DL (ref 1.6–2.6)
MCH RBC QN AUTO: 31.3 PG (ref 27–31)
MCHC RBC AUTO-ENTMCNC: 32.8 G/DL (ref 32–36)
MCV RBC AUTO: 95 FL (ref 82–98)
MONOCYTES # BLD AUTO: 0.4 K/UL (ref 0.3–1)
MONOCYTES NFR BLD: 3.7 % (ref 4–15)
NEUTROPHILS # BLD AUTO: 8.6 K/UL (ref 1.8–7.7)
NEUTROPHILS NFR BLD: 89.6 % (ref 38–73)
NRBC BLD-RTO: 0 /100 WBC
PLATELET # BLD AUTO: 256 K/UL (ref 150–450)
PMV BLD AUTO: 10 FL (ref 9.2–12.9)
POTASSIUM SERPL-SCNC: 4.4 MMOL/L (ref 3.5–5.1)
PROT SERPL-MCNC: 6.2 G/DL (ref 6–8.4)
RBC # BLD AUTO: 3.52 M/UL (ref 4–5.4)
SODIUM SERPL-SCNC: 138 MMOL/L (ref 136–145)
WBC # BLD AUTO: 9.58 K/UL (ref 3.9–12.7)

## 2025-03-02 PROCEDURE — 87070 CULTURE OTHR SPECIMN AEROBIC: CPT | Performed by: NURSE PRACTITIONER

## 2025-03-02 PROCEDURE — 36415 COLL VENOUS BLD VENIPUNCTURE: CPT | Performed by: INTERNAL MEDICINE

## 2025-03-02 PROCEDURE — 85025 COMPLETE CBC W/AUTO DIFF WBC: CPT | Performed by: INTERNAL MEDICINE

## 2025-03-02 PROCEDURE — 94640 AIRWAY INHALATION TREATMENT: CPT

## 2025-03-02 PROCEDURE — 87186 SC STD MICRODIL/AGAR DIL: CPT | Performed by: NURSE PRACTITIONER

## 2025-03-02 PROCEDURE — 80053 COMPREHEN METABOLIC PANEL: CPT | Performed by: INTERNAL MEDICINE

## 2025-03-02 PROCEDURE — 63600175 PHARM REV CODE 636 W HCPCS: Mod: JZ,TB | Performed by: INTERNAL MEDICINE

## 2025-03-02 PROCEDURE — 27000207 HC ISOLATION

## 2025-03-02 PROCEDURE — 87205 SMEAR GRAM STAIN: CPT | Performed by: NURSE PRACTITIONER

## 2025-03-02 PROCEDURE — 87077 CULTURE AEROBIC IDENTIFY: CPT | Performed by: NURSE PRACTITIONER

## 2025-03-02 PROCEDURE — 99232 SBSQ HOSP IP/OBS MODERATE 35: CPT | Mod: ,,, | Performed by: INTERNAL MEDICINE

## 2025-03-02 PROCEDURE — 63600175 PHARM REV CODE 636 W HCPCS: Performed by: STUDENT IN AN ORGANIZED HEALTH CARE EDUCATION/TRAINING PROGRAM

## 2025-03-02 PROCEDURE — 25000003 PHARM REV CODE 250: Performed by: STUDENT IN AN ORGANIZED HEALTH CARE EDUCATION/TRAINING PROGRAM

## 2025-03-02 PROCEDURE — 25000242 PHARM REV CODE 250 ALT 637 W/ HCPCS: Performed by: INTERNAL MEDICINE

## 2025-03-02 PROCEDURE — 25000003 PHARM REV CODE 250: Performed by: INTERNAL MEDICINE

## 2025-03-02 PROCEDURE — 99900031 HC PATIENT EDUCATION (STAT)

## 2025-03-02 PROCEDURE — 25000003 PHARM REV CODE 250: Performed by: NURSE PRACTITIONER

## 2025-03-02 PROCEDURE — 12000002 HC ACUTE/MED SURGE SEMI-PRIVATE ROOM

## 2025-03-02 PROCEDURE — 87147 CULTURE TYPE IMMUNOLOGIC: CPT | Performed by: NURSE PRACTITIONER

## 2025-03-02 PROCEDURE — 94761 N-INVAS EAR/PLS OXIMETRY MLT: CPT

## 2025-03-02 PROCEDURE — 83735 ASSAY OF MAGNESIUM: CPT | Performed by: INTERNAL MEDICINE

## 2025-03-02 RX ADMIN — IPRATROPIUM BROMIDE AND ALBUTEROL SULFATE 3 ML: .5; 3 SOLUTION RESPIRATORY (INHALATION) at 03:03

## 2025-03-02 RX ADMIN — METHYLPREDNISOLONE SODIUM SUCCINATE 40 MG: 125 INJECTION, POWDER, FOR SOLUTION INTRAMUSCULAR; INTRAVENOUS at 09:03

## 2025-03-02 RX ADMIN — IPRATROPIUM BROMIDE AND ALBUTEROL SULFATE 3 ML: .5; 3 SOLUTION RESPIRATORY (INHALATION) at 07:03

## 2025-03-02 RX ADMIN — APIXABAN 5 MG: 5 TABLET, FILM COATED ORAL at 08:03

## 2025-03-02 RX ADMIN — DOXYCYCLINE HYCLATE 100 MG: 100 CAPSULE ORAL at 08:03

## 2025-03-02 RX ADMIN — IPRATROPIUM BROMIDE AND ALBUTEROL SULFATE 3 ML: .5; 3 SOLUTION RESPIRATORY (INHALATION) at 11:03

## 2025-03-02 RX ADMIN — CEFEPIME 2 G: 2 INJECTION, POWDER, FOR SOLUTION INTRAVENOUS at 08:03

## 2025-03-02 RX ADMIN — GUAIFENESIN 400 MG: 200 SOLUTION ORAL at 08:03

## 2025-03-02 RX ADMIN — GUAIFENESIN 400 MG: 200 SOLUTION ORAL at 02:03

## 2025-03-02 RX ADMIN — METHYLPREDNISOLONE SODIUM SUCCINATE 40 MG: 125 INJECTION, POWDER, FOR SOLUTION INTRAMUSCULAR; INTRAVENOUS at 02:03

## 2025-03-02 RX ADMIN — LISINOPRIL 30 MG: 20 TABLET ORAL at 08:03

## 2025-03-02 RX ADMIN — METHYLPREDNISOLONE SODIUM SUCCINATE 40 MG: 125 INJECTION, POWDER, FOR SOLUTION INTRAMUSCULAR; INTRAVENOUS at 05:03

## 2025-03-02 RX ADMIN — LEVOTHYROXINE SODIUM 88 MCG: 0.09 TABLET ORAL at 05:03

## 2025-03-02 RX ADMIN — MUPIROCIN 1 G: 20 OINTMENT TOPICAL at 08:03

## 2025-03-02 RX ADMIN — FAMOTIDINE 20 MG: 10 INJECTION, SOLUTION INTRAVENOUS at 08:03

## 2025-03-02 RX ADMIN — GUAIFENESIN 400 MG: 200 SOLUTION ORAL at 04:03

## 2025-03-02 NOTE — SUBJECTIVE & OBJECTIVE
Interval History:  Patient evaluated at bedside, appears slightly short of breath and tachypneic, reports had just returned to bed from using bathroom.  Reports some continued EATON.  MRSA nasal swab positive.    Review of Systems   Constitutional:  Negative for chills and fever.   HENT:  Negative for congestion and trouble swallowing.    Eyes:  Negative for visual disturbance.   Respiratory:  Positive for cough and shortness of breath (improving).    Cardiovascular:  Negative for chest pain.   Gastrointestinal:  Negative for abdominal pain, nausea and vomiting.   Genitourinary:  Negative for dysuria.   Musculoskeletal:  Negative for myalgias.   Skin:  Negative for color change.   Neurological:  Negative for dizziness, speech difficulty and light-headedness.   Psychiatric/Behavioral:  Negative for agitation and confusion. The patient is not nervous/anxious.      Objective:     Vital Signs (Most Recent):  Temp: 98.2 °F (36.8 °C) (03/02/25 1120)  Pulse: 65 (03/02/25 1120)  Resp: 17 (03/02/25 1120)  BP: (!) 167/96 (03/02/25 1120)  SpO2: 97 % (03/02/25 1120) Vital Signs (24h Range):  Temp:  [97.6 °F (36.4 °C)-98.2 °F (36.8 °C)] 98.2 °F (36.8 °C)  Pulse:  [58-98] 65  Resp:  [16-20] 17  SpO2:  [91 %-97 %] 97 %  BP: (140-177)/(78-96) 167/96     Weight: 105.2 kg (231 lb 14.8 oz)  Body mass index is 39.81 kg/m².    Intake/Output Summary (Last 24 hours) at 3/2/2025 1232  Last data filed at 3/2/2025 0556  Gross per 24 hour   Intake 720 ml   Output --   Net 720 ml         Physical Exam  Constitutional:       General: She is not in acute distress.  HENT:      Head: Normocephalic and atraumatic.      Mouth/Throat:      Mouth: Mucous membranes are moist.   Eyes:      Extraocular Movements: Extraocular movements intact.      Pupils: Pupils are equal, round, and reactive to light.   Cardiovascular:      Rate and Rhythm: Normal rate and regular rhythm.      Pulses: Normal pulses.      Heart sounds: Normal heart sounds.   Pulmonary:       Effort: Pulmonary effort is normal. Tachypnea present. No respiratory distress.      Breath sounds: Examination of the right-lower field reveals decreased breath sounds and wheezing. Examination of the left-lower field reveals decreased breath sounds. Decreased breath sounds and wheezing present. No rhonchi or rales.   Abdominal:      General: Bowel sounds are normal. There is no distension.      Palpations: Abdomen is soft.      Tenderness: There is no abdominal tenderness. There is no guarding or rebound.   Musculoskeletal:      Cervical back: No rigidity.      Right lower leg: No edema.      Left lower leg: No edema.   Skin:     General: Skin is warm.   Neurological:      Mental Status: She is alert and oriented to person, place, and time.   Psychiatric:         Mood and Affect: Mood normal.         Behavior: Behavior normal.         Thought Content: Thought content normal.         Judgment: Judgment normal.             Significant Labs: All pertinent labs within the past 24 hours have been reviewed.    Significant Imaging: I have reviewed all pertinent imaging results/findings within the past 24 hours.   Abnormal WBC: < 4,000 OR > 12,000

## 2025-03-02 NOTE — ASSESSMENT & PLAN NOTE
Patient has a diagnosis of pneumonia. The cause of the pneumonia is suspected to be bacterial in etiology but organism is not known. The pneumonia is stable. The patient has the following signs/symptoms of pneumonia: cough, sputum production, and shortness of breath. The patient does not have a current oxygen requirement and the patient does not have a home oxygen requirement. I have reviewed the pertinent imaging. The following cultures have been collected: Blood cultures and Sputum culture The culture results are listed below.     Current antimicrobial regimen consists of the antibiotics listed below. Will monitor patient closely and continue current treatment plan unchanged.    Antibiotics (From admission, onward)      Start     Stop Route Frequency Ordered    02/28/25 2100  doxycycline capsule 100 mg         -- Oral Every 12 hours 02/28/25 1500    02/28/25 1600  ceFEPIme injection 2 g         -- IV Every 8 hours (non-standard times) 02/28/25 1518    02/28/25 0930  mupirocin 2 % ointment         03/05/25 0859 Nasl 2 times daily 02/28/25 0819            Microbiology Results (last 7 days)       Procedure Component Value Units Date/Time    Blood culture #1 [[id number]] Collected: 02/27/25 1826    Order Status: Completed Specimen: Blood Updated: 03/01/25 2032     Blood Culture, Routine No Growth to date      No Growth to date      No Growth to date    Blood culture #2 [[id number]] Collected: 02/27/25 1833    Order Status: Completed Specimen: Blood Updated: 03/01/25 2032     Blood Culture, Routine No Growth to date      No Growth to date      No Growth to date    MRSA Screen by PCR [[id number]]  (Abnormal) Collected: 03/01/25 1245    Order Status: Completed Specimen: Nasal Swab Updated: 03/01/25 1441     MRSA SCREEN BY PCR Detected    Narrative:      called/read back to [facility] DIAZ 3/1/25 1440pm jt    Culture, Respiratory with Gram Stain [[id number]]     Order Status: No result Specimen: Respiratory      Respiratory Infection Panel (PCR), Nasopharyngeal [[id number]] Collected: 02/28/25 0836    Order Status: Completed Specimen: Nasopharyngeal Swab Updated: 02/28/25 1005     Respiratory Infection Panel Source NP swab     Adenovirus Not Detected     Coronavirus 229E, Common Cold Virus Not Detected     Coronavirus HKU1, Common Cold Virus Not Detected     Coronavirus NL63, Common Cold Virus Not Detected     Coronavirus OC43, Common Cold Virus Not Detected     Comment: Coronavirus strains 229E, HKU1, NL63, and OC43 can cause the common   cold   and are not associated with the respiratory disease outbreak caused   by  the COVID-19 (SARS-CoV-2 novel Coronavirus) strain.           SARS-CoV2 (COVID-19) Qualitative PCR Not Detected     Human Metapneumovirus Not Detected     Human Rhinovirus/Enterovirus Not Detected     Influenza A Not Detected     Influenza B Not Detected     Parainfluenza Virus 1 Not Detected     Parainfluenza Virus 2 Not Detected     Parainfluenza Virus 3 Not Detected     Parainfluenza Virus 4 Not Detected     Respiratory Syncytial Virus Not Detected     Bordetella Parapertussis (EC7284) Not Detected     Bordetella pertussis (ptxP) Not Detected     Chlamydia pneumoniae Not Detected     Mycoplasma pneumoniae Not Detected     Comment: Respiratory Infection Panel testing performed by Multiplex PCR.       Narrative:      Respiratory Infection Panel source->NP Swab

## 2025-03-02 NOTE — PROGRESS NOTES
Critical access hospital Medicine  Progress Note    Patient Name: Danna Sorensen  MRN: [id number]  Patient Class: IP- Inpatient   Admission Date: 2/27/2025  Length of Stay: 2 days  Attending Physician: Bebeto Ovalle MD  Primary Care Provider: Claudia Kim MD        Subjective     Principal Problem:Exacerbation of asthma        HPI:  84-year-old woman who presents emergency department for gradual worsening shortness of breath since her discharge last Friday from the hospital for treatment of influenza and pneumonia. In the hospital she was treated with Tamiflu, cefepime and azithromycin and was discharged home to continue Tamiflu and azithromycin. She has a past medical history of skin and colon cancer, hypothyroidism, pulmonary embolism on Eliquis with IVC filter in place, self reports that she has asthma. She did not meet oxygen requirement for home. She has been taking breathing treatments every 3 hours at home without improvement. She denies any fever.     Non smoker  Non drinker  Ex smoker    She told me she was feeling better but after few days at home , she finished her prednisone taper and other meds.   She started feeling bad again     Wheezing, dry cough ,  SOB     Then came back     Was NOT needing oxygen tonight in ER     CTA shows no PNA and no PE         Hospital Course:  DC SUMMARY 2/20   Patient remained stable throughout her hospitalization.  She never required any additional oxygen.  She was treated with Tamiflu, cefepime, and azithromycin.  Labs have remained unremarkable.  Patient was seen and examined on day of discharge.  She is okay for discharge at this time.  She will continue Tamiflu and azithromycin for 2 more days.  She will follow up outpatient with the primary care physician.           WBC was up  But procalcitonin was NORMAL   Chemistry normal labs   Renal function normal     FLU COVID NEG       She was given nebs and IV Steroids    Felt better when I saw  her    Was  on room air       We will admit again for the asthma exacerbation     Overview/Hospital Course:  Danna Sorensen is an 84 year old female w/ PMH asthma, HTN, colon cancer, CKD III, and PE s/p IVC on eliquis. A few days prior to admission she had been discharged after treatment for the flu and pneumonia. She was re-admitted to the hospital for management of an asthma exacerbation. CXR obtained showing similar masslike consolidation right upper lobe and right hilar soft tissue fullness.  Normal heart size. CTA chest PE study showed enlarged main pulmonary artery suggestive of pulmonary arterial hypertension.  No central or segmental pulmonary embolus. No cardiomegaly.  Moderate coronary artery atherosclerosis. 44 mm cavitary lesion right upper lobe with adjacent airspace disease.  Small cavitary lesion left lower and upper lobe.  Right basilar airspace disease and atelectasis.  5 mm noncalcified nodule left upper lobe.  Multiple additional small bilateral multifocal nodular opacities are noted.  Mildly prominent mediastinal nodes.  Enlarged right hilar node. Cholelithiasis. Pulmonology consulted. Patient started on abx, solu-medrol, and scheduled duo-nebs.  Some persistent EATON, MRSA nasal swab positive.    Interval History:  Patient evaluated at bedside, appears slightly short of breath and tachypneic, reports had just returned to bed from using bathroom.  Reports some continued EATON.  MRSA nasal swab positive.    Review of Systems   Constitutional:  Negative for chills and fever.   HENT:  Negative for congestion and trouble swallowing.    Eyes:  Negative for visual disturbance.   Respiratory:  Positive for cough and shortness of breath (improving).    Cardiovascular:  Negative for chest pain.   Gastrointestinal:  Negative for abdominal pain, nausea and vomiting.   Genitourinary:  Negative for dysuria.   Musculoskeletal:  Negative for myalgias.   Skin:  Negative for color change.   Neurological:  Negative for dizziness,  speech difficulty and light-headedness.   Psychiatric/Behavioral:  Negative for agitation and confusion. The patient is not nervous/anxious.      Objective:     Vital Signs (Most Recent):  Temp: 98.2 °F (36.8 °C) (03/02/25 1120)  Pulse: 65 (03/02/25 1120)  Resp: 17 (03/02/25 1120)  BP: (!) 167/96 (03/02/25 1120)  SpO2: 97 % (03/02/25 1120) Vital Signs (24h Range):  Temp:  [97.6 °F (36.4 °C)-98.2 °F (36.8 °C)] 98.2 °F (36.8 °C)  Pulse:  [58-98] 65  Resp:  [16-20] 17  SpO2:  [91 %-97 %] 97 %  BP: (140-177)/(78-96) 167/96     Weight: 105.2 kg (231 lb 14.8 oz)  Body mass index is 39.81 kg/m².    Intake/Output Summary (Last 24 hours) at 3/2/2025 1232  Last data filed at 3/2/2025 0556  Gross per 24 hour   Intake 720 ml   Output --   Net 720 ml         Physical Exam  Constitutional:       General: She is not in acute distress.  HENT:      Head: Normocephalic and atraumatic.      Mouth/Throat:      Mouth: Mucous membranes are moist.   Eyes:      Extraocular Movements: Extraocular movements intact.      Pupils: Pupils are equal, round, and reactive to light.   Cardiovascular:      Rate and Rhythm: Normal rate and regular rhythm.      Pulses: Normal pulses.      Heart sounds: Normal heart sounds.   Pulmonary:      Effort: Pulmonary effort is normal. Tachypnea present. No respiratory distress.      Breath sounds: Examination of the right-lower field reveals decreased breath sounds and wheezing. Examination of the left-lower field reveals decreased breath sounds. Decreased breath sounds and wheezing present. No rhonchi or rales.   Abdominal:      General: Bowel sounds are normal. There is no distension.      Palpations: Abdomen is soft.      Tenderness: There is no abdominal tenderness. There is no guarding or rebound.   Musculoskeletal:      Cervical back: No rigidity.      Right lower leg: No edema.      Left lower leg: No edema.   Skin:     General: Skin is warm.   Neurological:      Mental Status: She is alert and oriented  to person, place, and time.   Psychiatric:         Mood and Affect: Mood normal.         Behavior: Behavior normal.         Thought Content: Thought content normal.         Judgment: Judgment normal.             Significant Labs: All pertinent labs within the past 24 hours have been reviewed.    Significant Imaging: I have reviewed all pertinent imaging results/findings within the past 24 hours.    Assessment and Plan     * Exacerbation of asthma  No wheezes  She did get started on abx - procal WNL x2 but pulm recs to continue for now  Continue steroids and breathing tx  Continuing to do well on RA    ===============================================================================  Was just here with this and left about 7 days ago    Was only in the hospital for 2 days     Likely did not get enough time of Treatment , mainly the steroids .  And OR , not enough time tapering down on steroids at home    Does not appear to be a bacterial PNA    FLU NEG  but she had flu last month she said  COVID neg     On ROOM AIR IN ER    CTA negative for pna or PE        PLAN    -  IV steroids   40 mg Q8     - IVFs with KCL     - DuoNebs QID and PRN     - no abx needed  Procalcitonin normal     - pepcid IV BID    -  robitussin 400 mg PO QID    And Tessalon pearls Po PRN cough     Abnormal CT of the chest  44 mm cavitary lesion right upper lobe with adjacent airspace disease.  Small cavitary lesion left lower and upper lobe.  Right basilar airspace disease and atelectasis.  5 mm noncalcified nodule left upper lobe.  Multiple additional small bilateral multifocal nodular opacities are noted.  Mildly prominent mediastinal nodes.  Enlarged right hilar node.   Pulmonology consulted, appreciate recs  Will need f/u CT in a few months     Pneumonia  Patient has a diagnosis of pneumonia. The cause of the pneumonia is suspected to be bacterial in etiology but organism is not known. The pneumonia is stable. The patient has the following  signs/symptoms of pneumonia: cough, sputum production, and shortness of breath. The patient does not have a current oxygen requirement and the patient does not have a home oxygen requirement. I have reviewed the pertinent imaging. The following cultures have been collected: Blood cultures and Sputum culture The culture results are listed below.     Current antimicrobial regimen consists of the antibiotics listed below. Will monitor patient closely and continue current treatment plan unchanged.    Antibiotics (From admission, onward)      Start     Stop Route Frequency Ordered    02/28/25 2100  doxycycline capsule 100 mg         -- Oral Every 12 hours 02/28/25 1500    02/28/25 1600  ceFEPIme injection 2 g         -- IV Every 8 hours (non-standard times) 02/28/25 1518    02/28/25 0930  mupirocin 2 % ointment         03/05/25 0859 Nasl 2 times daily 02/28/25 0819            Microbiology Results (last 7 days)       Procedure Component Value Units Date/Time    Blood culture #1 [[id number]] Collected: 02/27/25 1826    Order Status: Completed Specimen: Blood Updated: 03/01/25 2032     Blood Culture, Routine No Growth to date      No Growth to date      No Growth to date    Blood culture #2 [[id number]] Collected: 02/27/25 1833    Order Status: Completed Specimen: Blood Updated: 03/01/25 2032     Blood Culture, Routine No Growth to date      No Growth to date      No Growth to date    MRSA Screen by PCR [[id number]]  (Abnormal) Collected: 03/01/25 1245    Order Status: Completed Specimen: Nasal Swab Updated: 03/01/25 1441     MRSA SCREEN BY PCR Detected    Narrative:      called/read back to [facility] jovanna DIAZ 3/1/25 1440pm jt    Culture, Respiratory with Gram Stain [[id number]]     Order Status: No result Specimen: Respiratory     Respiratory Infection Panel (PCR), Nasopharyngeal [[id number]] Collected: 02/28/25 0836    Order Status: Completed Specimen: Nasopharyngeal Swab Updated: 02/28/25 1005     Respiratory  Infection Panel Source NP swab     Adenovirus Not Detected     Coronavirus 229E, Common Cold Virus Not Detected     Coronavirus HKU1, Common Cold Virus Not Detected     Coronavirus NL63, Common Cold Virus Not Detected     Coronavirus OC43, Common Cold Virus Not Detected     Comment: Coronavirus strains 229E, HKU1, NL63, and OC43 can cause the common   cold   and are not associated with the respiratory disease outbreak caused   by  the COVID-19 (SARS-CoV-2 novel Coronavirus) strain.           SARS-CoV2 (COVID-19) Qualitative PCR Not Detected     Human Metapneumovirus Not Detected     Human Rhinovirus/Enterovirus Not Detected     Influenza A Not Detected     Influenza B Not Detected     Parainfluenza Virus 1 Not Detected     Parainfluenza Virus 2 Not Detected     Parainfluenza Virus 3 Not Detected     Parainfluenza Virus 4 Not Detected     Respiratory Syncytial Virus Not Detected     Bordetella Parapertussis (AG9344) Not Detected     Bordetella pertussis (ptxP) Not Detected     Chlamydia pneumoniae Not Detected     Mycoplasma pneumoniae Not Detected     Comment: Respiratory Infection Panel testing performed by Multiplex PCR.       Narrative:      Respiratory Infection Panel source->NP Swab            History of pulmonary embolus (PE)  Noted  Chronic problem but no acute PE on CTA  S/p IVC filter  Continue eliquis      Essential hypertension  BP now elevated - will resume her home BP meds  ========================================================================================  For now BP is good    120s      I would HOLD her home BP meds now while sick     Running some IVFs also       VTE Risk Mitigation (From admission, onward)           Ordered     apixaban tablet 5 mg  2 times daily         03/01/25 1407     IP VTE HIGH RISK PATIENT  Once         02/28/25 0658     Place sequential compression device  Until discontinued         02/28/25 0658                    Discharge Planning   RENETTA: 3/3/2025     Code Status: Full  Code   Medical Readiness for Discharge Date:   Discharge Plan A: Home with family                        Moses Lindo NP  Department of Hospital Medicine   [facility]

## 2025-03-02 NOTE — CARE UPDATE
03/01/25 1915   Patient Assessment/Suction   Level of Consciousness (AVPU) alert   Respiratory Effort Normal;Unlabored   Expansion/Accessory Muscles/Retractions no use of accessory muscles   All Lung Fields Breath Sounds diminished   Rhythm/Pattern, Respiratory unlabored;pattern regular   PRE-TX-O2   Device (Oxygen Therapy) room air   SpO2 96 %   Pulse Oximetry Type Intermittent   $ Pulse Oximetry - Multiple Charge Pulse Oximetry - Multiple   Pulse 74   Resp 18   Aerosol Therapy   $ Aerosol Therapy Charges Aerosol Treatment   Daily Review of Necessity (SVN) completed   Respiratory Treatment Status (SVN) given   Treatment Route (SVN) mask;oxygen   Patient Position HOB elevated   Post Treatment Assessment (SVN) increased aeration   Signs of Intolerance (SVN) none   Breath Sounds Post-Respiratory Treatment   Throughout All Fields Post-Treatment All Fields   Throughout All Fields Post-Treatment aeration increased   Post-treatment Heart Rate (beats/min) 74   Post-treatment Resp Rate (breaths/min) 18   Education   $ Education Bronchodilator;15 min

## 2025-03-02 NOTE — CARE UPDATE
03/02/25 0716   Patient Assessment/Suction   Level of Consciousness (AVPU) alert   Respiratory Effort Normal;Unlabored   Expansion/Accessory Muscles/Retractions no use of accessory muscles   All Lung Fields Breath Sounds clear;diminished   LLL Breath Sounds diminished;wheezes, expiratory   Rhythm/Pattern, Respiratory depth regular   PRE-TX-O2   Device (Oxygen Therapy) room air   SpO2 (!) 94 %   Pulse Oximetry Type Intermittent   $ Pulse Oximetry - Multiple Charge Pulse Oximetry - Multiple   Pulse 67   Resp 20   Aerosol Therapy   $ Aerosol Therapy Charges Aerosol Treatment   Daily Review of Necessity (SVN) completed   Respiratory Treatment Status (SVN) given   Treatment Route (SVN) mask;oxygen   Patient Position HOB elevated   Post Treatment Assessment (SVN) increased aeration;vital signs unchanged   Signs of Intolerance (SVN) none

## 2025-03-02 NOTE — PLAN OF CARE
SW met pt at bedside to provide resources for assistance with affording Eliquis.  SW provided an application for free eliquis as well as an application for a $10 copay card.

## 2025-03-02 NOTE — CARE UPDATE
03/02/25 1109   Patient Assessment/Suction   Level of Consciousness (AVPU) alert   Respiratory Effort Unlabored   All Lung Fields Breath Sounds diminished   Rhythm/Pattern, Respiratory pattern regular   PRE-TX-O2   Device (Oxygen Therapy) room air   SpO2 97 %   Pulse 66   Resp 16   Aerosol Therapy   $ Aerosol Therapy Charges Aerosol Treatment   Daily Review of Necessity (SVN) completed   Respiratory Treatment Status (SVN) given   Treatment Route (SVN) mask;oxygen   Patient Position HOB elevated   Post Treatment Assessment (SVN) increased aeration;vital signs unchanged   Signs of Intolerance (SVN) none

## 2025-03-02 NOTE — PROGRESS NOTES
Pulmonary/Critical Care  Progress Note      Patient name: Danna Sorensen  MRN: [id number]  Date: 03/02/2025    Admit Date: 2/27/2025  Consult Requested By: Bebeto Ovalle MD    Reason for Consult: pneumonia, abnormal CT chest, asthma    HPI:    3/1/2025 - 83 yo with recent DC from hospital for influenza and pneumonia, after DC she was doing OK but after finishing her prednisone her symptoms worsened and presented to ER with cough (NP), SOB, wheezing and is now admitted.  Had CTA chest which showed some inflammatory appearing changes which are scattered and new from CT done 1/2025.  She is feeling better bt does not feel ready to go home.  ROS as below    3/2/2025 - She feels better today but not back to her basleine, no new issues reported.  We discussed about the CT scan findings again. MRSA screen +, WBC is better     Review of Systems    Review of Systems   Constitutional:  Positive for malaise/fatigue. Negative for chills, diaphoresis, fever and weight loss.   HENT:  Negative for congestion.    Eyes:  Negative for pain.   Respiratory:  Positive for cough and shortness of breath. Negative for hemoptysis, sputum production, wheezing and stridor.    Cardiovascular:  Negative for chest pain, palpitations, orthopnea, claudication, leg swelling and PND.   Gastrointestinal:  Negative for abdominal pain, constipation, diarrhea, heartburn, nausea and vomiting.   Genitourinary:  Negative for dysuria, frequency and urgency.   Musculoskeletal:  Negative for falls and myalgias.   Neurological:  Negative for sensory change, focal weakness and weakness.   Psychiatric/Behavioral:  Negative for depression. The patient is not nervous/anxious.        Past Medical History    Past Medical History:   Diagnosis Date    Acute pulmonary embolism without acute cor pulmonale 08/25/2023    Back pain     Carpal tunnel syndrome     Cataract     Colon cancer     Colon polyp     Coronary artery disease     Essential hypertension 08/09/2019     Exacerbation of asthma 2/28/2025    History of blood clots     History of squamous cell carcinoma 07/27/2015    Hx of colon cancer, stage IV 10/18/2016    Hypothyroidism     Multifocal pneumonia 2/18/2025    Obesity (BMI 30-39.9) 03/24/2016    Osteoarthritis     Osteoporosis     Personal history of colon cancer, stage III     Squamous cell carcinoma     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;  Service: Endoscopy;  Laterality: N/A;    COLONOSCOPY N/A 8/22/2023    Procedure: COLONOSCOPY (tattoo of colon ca);  Surgeon: Kaushik Pinon MD;  Location: [facility];  Service: Endoscopy;  Laterality: N/A;    COLONOSCOPY N/A 8/12/2024    Procedure: COLONOSCOPY;  Surgeon: Jim Chavez MD;  Location: [facility];  Service: General;  Laterality: N/A;    ESOPHAGOGASTRODUODENOSCOPY N/A 8/3/2023    Procedure: EGD (ESOPHAGOGASTRODUODENOSCOPY);  Surgeon: Kasuhik Pinon MD;  Location: [facility];  Service: Endoscopy;  Laterality: N/A;    HAND TENDON SURGERY Left     HEMICOLECTOMY      right    INJECTION OF ANESTHETIC AGENT AROUND MEDIAL BRANCH NERVES INNERVATING LUMBAR FACET JOINT Right 07/10/2018    Procedure: Block-nerve-medial branch-lumbar;  Surgeon: Parish Gaitan MD;  Location: [facility] OR;  Service: Pain Management;  Laterality: Right;  L3, 4, 5    INSERTION OF INFERIOR VENA CAVAL FILTER N/A 9/13/2023    Procedure: Insertion, Filter, Inferior Vena Cava;  Surgeon: Oren Verdin MD;  Location: [facility] CATH/EP LAB;  Service:  General;  Laterality: N/A;    INSERTION OF TUNNELED CENTRAL VENOUS CATHETER (CVC) WITH SUBCUTANEOUS PORT Right 11/15/2023    Procedure: YLFRLTPAH-[id number]-G-CATH;  Surgeon: Jim Chavez MD;  Location: [facility] OR;  Service: General;  Laterality: Right;    JOINT REPLACEMENT      bilateral    RADIOFREQUENCY ABLATION OF LUMBAR MEDIAL BRANCH NERVE AT SINGLE LEVEL Right 07/24/2018    Procedure: RADIOFREQUENCY ABLATION, NERVE, MEDIAL BRANCH, LUMBAR, 1 LEVEL;  Surgeon: Parish Gaitan MD;  Location: [facility] OR;  Service: Pain Management;  Laterality: Right;  L3,4,5 - Burned at 80 degrees C. for 75 seconds x 2 each site    ROBOT-ASSISTED COLECTOMY N/A 9/29/2023    Procedure: ROBOTIC COLECTOMY;  Surgeon: Jim Chavez MD;  Location: [facility] OR;  Service: General;  Laterality: N/A;    SHOULDER ARTHROSCOPY Right 01/12/2017    Reverse     TONSILLECTOMY      TONSILLECTOMY, ADENOIDECTOMY, BILATERAL MYRINGOTOMY AND TUBES      TOTAL KNEE ARTHROPLASTY Bilateral 08/1998    total replacements    TUMOR REMOVAL Left     left foot as a child       Medications (scheduled):      albuterol-ipratropium  3 mL Nebulization QID    apixaban  5 mg Oral BID    doxycycline  100 mg Oral Q12H    famotidine (PF)  20 mg Intravenous BID    guaiFENesin 100 mg/5 ml  400 mg Oral QID    levothyroxine  88 mcg Oral Before breakfast    lisinopriL  30 mg Oral Daily    methylPREDNISolone injection (PEDS and ADULTS)  40 mg Intravenous Q8H    mupirocin   Nasal BID    pneumoc 20-km conj-dip cr(PF)  0.5 mL Intramuscular 1 time in Clinic/HOD    senna-docusate 8.6-50 mg  1 tablet Oral BID       Medications (infusions):         Medications (prn):       Current Facility-Administered Medications:     acetaminophen, 650 mg, Oral, Q4H PRN    aluminum-magnesium hydroxide-simethicone, 30 mL, Oral, QID PRN    benzonatate, 100 mg, Oral, TID PRN    dextrose 50%, 12.5 g, Intravenous, PRN    dextrose 50%, 25 g, Intravenous, PRN    glucagon (human recombinant), 1 mg, Intramuscular, PRN     "glucose, 16 g, Oral, PRN    glucose, 24 g, Oral, PRN    influenza (adjuvanted), 0.5 mL, Intramuscular, Prior to discharge    magnesium oxide, 800 mg, Oral, PRN    magnesium oxide, 800 mg, Oral, PRN    melatonin, 6 mg, Oral, Nightly PRN    naloxone, 0.02 mg, Intravenous, PRN    ondansetron, 4 mg, Intravenous, Q6H PRN    potassium bicarbonate, 35 mEq, Oral, PRN    potassium bicarbonate, 50 mEq, Oral, PRN    potassium bicarbonate, 60 mEq, Oral, PRN    potassium, sodium phosphates, 2 packet, Oral, PRN    potassium, sodium phosphates, 2 packet, Oral, PRN    potassium, sodium phosphates, 2 packet, Oral, PRN    sodium chloride 0.9%, 3 mL, Intravenous, Q12H PRN    Family History:   Family History   Problem Relation Name Age of Onset    Hypertension Mother      Kidney disease Mother      Cataracts Father      Cataracts Sister      Cancer Sister          breast    No Known Problems Brother      Cancer Sister          breast    Arthritis Sister      Glaucoma Neg Hx      Amblyopia Neg Hx      Blindness Neg Hx      Macular degeneration Neg Hx      Retinal detachment Neg Hx      Strabismus Neg Hx      Stroke Neg Hx      Thyroid disease Neg Hx         Social History: Tobacco: Tobacco Use History[1]                             EtOH:   Social History     Substance and Sexual Activity   Alcohol Use No                                Drugs:   Social History     Substance and Sexual Activity   Drug Use No       Physical Exam    Vital signs:  Temp:  [97.6 °F (36.4 °C)-98.2 °F (36.8 °C)]   Pulse:  [58-98]   Resp:  [16-20]   BP: (140-177)/(78-96)   SpO2:  [91 %-97 %]     Intake/Output:   Intake/Output Summary (Last 24 hours) at 3/2/2025 1411  Last data filed at 3/2/2025 0556  Gross per 24 hour   Intake 720 ml   Output --   Net 720 ml        BMI: Estimated body mass index is 39.81 kg/m² as calculated from the following:    Height as of this encounter: 5' 4" (1.626 m).    Weight as of this encounter: 105.2 kg (231 lb 14.8 oz).    Physical " Exam  Vitals and nursing note reviewed.   Constitutional:       General: She is not in acute distress.     Appearance: Normal appearance. She is not ill-appearing, toxic-appearing or diaphoretic.   HENT:      Head: Normocephalic and atraumatic.      Right Ear: External ear normal.      Left Ear: External ear normal.      Nose: Nose normal. No congestion or rhinorrhea.      Mouth/Throat:      Mouth: Mucous membranes are moist.      Pharynx: Oropharynx is clear. No oropharyngeal exudate or posterior oropharyngeal erythema.   Eyes:      General: No scleral icterus.        Right eye: No discharge.         Left eye: No discharge.      Extraocular Movements: Extraocular movements intact.      Conjunctiva/sclera: Conjunctivae normal.      Pupils: Pupils are equal, round, and reactive to light.   Neck:      Vascular: No carotid bruit.   Cardiovascular:      Rate and Rhythm: Normal rate and regular rhythm.      Pulses: Normal pulses.      Heart sounds: No murmur heard.     No friction rub. No gallop.   Pulmonary:      Effort: Pulmonary effort is normal. No respiratory distress.      Breath sounds: No stridor. No wheezing, rhonchi or rales.   Chest:      Chest wall: No tenderness.   Abdominal:      General: Bowel sounds are normal. There is no distension.      Tenderness: There is no abdominal tenderness. There is no guarding.   Musculoskeletal:         General: No swelling. Normal range of motion.      Cervical back: Normal range of motion and neck supple. No rigidity or tenderness.      Right lower leg: No edema.      Left lower leg: No edema.   Lymphadenopathy:      Cervical: No cervical adenopathy.   Skin:     General: Skin is warm and dry.      Capillary Refill: Capillary refill takes less than 2 seconds.      Coloration: Skin is not jaundiced.      Findings: No bruising.   Neurological:      General: No focal deficit present.      Mental Status: She is alert and oriented to person, place, and time. Mental status is at  "baseline.      Cranial Nerves: No cranial nerve deficit.      Sensory: No sensory deficit.      Motor: No weakness.   Psychiatric:         Mood and Affect: Mood normal.         Behavior: Behavior normal.         Thought Content: Thought content normal.         Judgment: Judgment normal.         Laboratory    Recent Labs   Lab 03/02/25  0546   WBC 9.58   RBC 3.52*   HGB 11.0*   HCT 33.5*      MCV 95   MCH 31.3*   MCHC 32.8       Recent Labs   Lab 03/02/25  0546   CALCIUM 8.4*   ALBUMIN 3.3*   PROT 6.2      K 4.4   CO2 21*   *   BUN 25*   CREATININE 0.8   ALKPHOS 46*   ALT 30   AST 12   BILITOT 0.8       No results for input(s): "PT", "INR", "APTT" in the last 24 hours.    No results for input(s): "CPK", "CPKMB", "TROPONINI", "MB" in the last 24 hours.    Additional labs: rviewed    Microbiology:       Microbiology Results (last 7 days)       Procedure Component Value Units Date/Time    Blood culture #1 [[id number]] Collected: 02/27/25 1826    Order Status: Completed Specimen: Blood Updated: 03/01/25 2032     Blood Culture, Routine No Growth to date      No Growth to date      No Growth to date    Blood culture #2 [[id number]] Collected: 02/27/25 1833    Order Status: Completed Specimen: Blood Updated: 03/01/25 2032     Blood Culture, Routine No Growth to date      No Growth to date      No Growth to date    MRSA Screen by PCR [[id number]]  (Abnormal) Collected: 03/01/25 1245    Order Status: Completed Specimen: Nasal Swab Updated: 03/01/25 1441     MRSA SCREEN BY PCR Detected    Narrative:      called/read back to 1west jovanna DIAZ 3/1/25 1440pm jt    Culture, Respiratory with Gram Stain [[id number]]     Order Status: No result Specimen: Respiratory     Respiratory Infection Panel (PCR), Nasopharyngeal [[id number]] Collected: 02/28/25 0836    Order Status: Completed Specimen: Nasopharyngeal Swab Updated: 02/28/25 1005     Respiratory Infection Panel Source NP swab     Adenovirus Not Detected    "  Coronavirus 229E, Common Cold Virus Not Detected     Coronavirus HKU1, Common Cold Virus Not Detected     Coronavirus NL63, Common Cold Virus Not Detected     Coronavirus OC43, Common Cold Virus Not Detected     Comment: Coronavirus strains 229E, HKU1, NL63, and OC43 can cause the common   cold   and are not associated with the respiratory disease outbreak caused   by  the COVID-19 (SARS-CoV-2 novel Coronavirus) strain.           SARS-CoV2 (COVID-19) Qualitative PCR Not Detected     Human Metapneumovirus Not Detected     Human Rhinovirus/Enterovirus Not Detected     Influenza A Not Detected     Influenza B Not Detected     Parainfluenza Virus 1 Not Detected     Parainfluenza Virus 2 Not Detected     Parainfluenza Virus 3 Not Detected     Parainfluenza Virus 4 Not Detected     Respiratory Syncytial Virus Not Detected     Bordetella Parapertussis (GW2020) Not Detected     Bordetella pertussis (ptxP) Not Detected     Chlamydia pneumoniae Not Detected     Mycoplasma pneumoniae Not Detected     Comment: Respiratory Infection Panel testing performed by Multiplex PCR.       Narrative:      Respiratory Infection Panel source->NP Swab            Radiology    CTA Chest Non-Coronary (PE Studies)  Narrative: EXAMINATION:  CTA CHEST NON CORONARY (PE STUDIES)    CLINICAL HISTORY:  Pulmonary embolism (PE) suspected, positive D-dimer;    TECHNIQUE:  Low dose axial images, sagittal and coronal reformations were obtained from the thoracic inlet to the lung bases following the IV administration of 100 mL of Omnipaque 350.  Contrast timing was optimized to evaluate the pulmonary arteries.  MIP images were performed.    COMPARISON:  Same day chest radiograph    FINDINGS:  Enlarged main pulmonary artery suggestive of pulmonary arterial hypertension.  No central or segmental pulmonary embolus.    No cardiomegaly.  Moderate coronary artery atherosclerosis    44 mm cavitary lesion right upper lobe with adjacent airspace disease.  Small  "cavitary lesion left lower and upper lobe.  Right basilar airspace disease and atelectasis.  5 mm noncalcified nodule left upper lobe.  Multiple additional small bilateral multifocal nodular opacities are noted.  Mildly prominent mediastinal nodes.  Enlarged right hilar node.    Cholelithiasis  Impression: Infectious/inflammatory or neoplastic pulmonary findings, as above.  Strongly advise short interval follow-up chest CT to document resolution.    Electronically signed by: Hailey Dumont  Date:    2025  Time:    01:18        Additional Studies    reviewed    Ventilator Information                  No results for input(s): "PH", "PCO2", "PO2", "HCO3", "POCSATURATED", "BE" in the last 72 hours.      Impression    Active Hospital Problems    Diagnosis  POA    *Exacerbation of asthma [J45.901]  Yes    Abnormal CT of the chest [R93.89]  Yes    Pneumonia [J18.9]  Yes    History of pulmonary embolus (PE) [Z86.711]  Yes    Essential hypertension [I10]  Yes      Resolved Hospital Problems   No resolved problems to display.       Plan    Continue respiratory treatments, steroids, and antibiotics  I think the CT findings are most likely inflammatory and not neoplastic (not present 2025) and she will need a follow up in a few months  Increase activity as able  Hopefully ready for DC in the next day or so  She normally sees Dr Romero with [facility]    Thank you for this consult.  I will follow with you while the patient is hospitalized.        Eduard Stauffer MD  [facility] Pulmonary/Critical Care  2025               [1]   Social History  Tobacco Use   Smoking Status Former    Current packs/day: 0.00    Average packs/day: 0.5 packs/day for 1 year (0.5 ttl pk-yrs)    Types: Cigarettes    Start date: 1960    Quit date: 1961    Years since quittin.2   Smokeless Tobacco Never     "

## 2025-03-03 VITALS
WEIGHT: 231.94 LBS | TEMPERATURE: 99 F | HEIGHT: 64 IN | OXYGEN SATURATION: 95 % | RESPIRATION RATE: 19 BRPM | DIASTOLIC BLOOD PRESSURE: 77 MMHG | BODY MASS INDEX: 39.6 KG/M2 | SYSTOLIC BLOOD PRESSURE: 160 MMHG | HEART RATE: 83 BPM

## 2025-03-03 LAB
ALBUMIN SERPL BCP-MCNC: 3.3 G/DL (ref 3.5–5.2)
ALP SERPL-CCNC: 44 U/L (ref 55–135)
ALT SERPL W/O P-5'-P-CCNC: 35 U/L (ref 10–44)
ANION GAP SERPL CALC-SCNC: 6 MMOL/L (ref 8–16)
AST SERPL-CCNC: 16 U/L (ref 10–40)
BASOPHILS # BLD AUTO: 0.02 K/UL (ref 0–0.2)
BASOPHILS NFR BLD: 0.2 % (ref 0–1.9)
BILIRUB SERPL-MCNC: 1 MG/DL (ref 0.1–1)
BUN SERPL-MCNC: 29 MG/DL (ref 8–23)
CALCIUM SERPL-MCNC: 8.6 MG/DL (ref 8.7–10.5)
CHLORIDE SERPL-SCNC: 110 MMOL/L (ref 95–110)
CO2 SERPL-SCNC: 22 MMOL/L (ref 23–29)
CREAT SERPL-MCNC: 0.8 MG/DL (ref 0.5–1.4)
DIFFERENTIAL METHOD BLD: ABNORMAL
EOSINOPHIL # BLD AUTO: 0 K/UL (ref 0–0.5)
EOSINOPHIL NFR BLD: 0 % (ref 0–8)
ERYTHROCYTE [DISTWIDTH] IN BLOOD BY AUTOMATED COUNT: 13.6 % (ref 11.5–14.5)
EST. GFR  (NO RACE VARIABLE): >60 ML/MIN/1.73 M^2
GLUCOSE SERPL-MCNC: 117 MG/DL (ref 70–110)
HCT VFR BLD AUTO: 34.2 % (ref 37–48.5)
HGB BLD-MCNC: 10.9 G/DL (ref 12–16)
IMM GRANULOCYTES # BLD AUTO: 0.18 K/UL (ref 0–0.04)
IMM GRANULOCYTES NFR BLD AUTO: 2.2 % (ref 0–0.5)
LYMPHOCYTES # BLD AUTO: 0.6 K/UL (ref 1–4.8)
LYMPHOCYTES NFR BLD: 7.4 % (ref 18–48)
MAGNESIUM SERPL-MCNC: 2.1 MG/DL (ref 1.6–2.6)
MCH RBC QN AUTO: 30.1 PG (ref 27–31)
MCHC RBC AUTO-ENTMCNC: 31.9 G/DL (ref 32–36)
MCV RBC AUTO: 95 FL (ref 82–98)
MONOCYTES # BLD AUTO: 0.6 K/UL (ref 0.3–1)
MONOCYTES NFR BLD: 6.9 % (ref 4–15)
NEUTROPHILS # BLD AUTO: 6.7 K/UL (ref 1.8–7.7)
NEUTROPHILS NFR BLD: 83.3 % (ref 38–73)
NRBC BLD-RTO: 0 /100 WBC
PLATELET # BLD AUTO: 275 K/UL (ref 150–450)
PMV BLD AUTO: 9.8 FL (ref 9.2–12.9)
POTASSIUM SERPL-SCNC: 4.1 MMOL/L (ref 3.5–5.1)
PROT SERPL-MCNC: 5.8 G/DL (ref 6–8.4)
RBC # BLD AUTO: 3.62 M/UL (ref 4–5.4)
SODIUM SERPL-SCNC: 138 MMOL/L (ref 136–145)
WBC # BLD AUTO: 8.02 K/UL (ref 3.9–12.7)

## 2025-03-03 PROCEDURE — 63600175 PHARM REV CODE 636 W HCPCS: Mod: JZ,TB | Performed by: INTERNAL MEDICINE

## 2025-03-03 PROCEDURE — 83735 ASSAY OF MAGNESIUM: CPT | Performed by: INTERNAL MEDICINE

## 2025-03-03 PROCEDURE — 99233 SBSQ HOSP IP/OBS HIGH 50: CPT | Mod: ,,, | Performed by: INTERNAL MEDICINE

## 2025-03-03 PROCEDURE — 94640 AIRWAY INHALATION TREATMENT: CPT

## 2025-03-03 PROCEDURE — 99900031 HC PATIENT EDUCATION (STAT)

## 2025-03-03 PROCEDURE — 25000003 PHARM REV CODE 250: Performed by: INTERNAL MEDICINE

## 2025-03-03 PROCEDURE — 36415 COLL VENOUS BLD VENIPUNCTURE: CPT | Performed by: INTERNAL MEDICINE

## 2025-03-03 PROCEDURE — 25000003 PHARM REV CODE 250: Performed by: STUDENT IN AN ORGANIZED HEALTH CARE EDUCATION/TRAINING PROGRAM

## 2025-03-03 PROCEDURE — 27000221 HC OXYGEN, UP TO 24 HOURS

## 2025-03-03 PROCEDURE — 25000003 PHARM REV CODE 250: Performed by: NURSE PRACTITIONER

## 2025-03-03 PROCEDURE — 25000242 PHARM REV CODE 250 ALT 637 W/ HCPCS: Performed by: INTERNAL MEDICINE

## 2025-03-03 PROCEDURE — 85025 COMPLETE CBC W/AUTO DIFF WBC: CPT | Performed by: INTERNAL MEDICINE

## 2025-03-03 PROCEDURE — 80053 COMPREHEN METABOLIC PANEL: CPT | Performed by: INTERNAL MEDICINE

## 2025-03-03 PROCEDURE — 94761 N-INVAS EAR/PLS OXIMETRY MLT: CPT

## 2025-03-03 RX ORDER — DOXYCYCLINE 100 MG/1
100 CAPSULE ORAL EVERY 12 HOURS
Qty: 20 CAPSULE | Refills: 0 | Status: SHIPPED | OUTPATIENT
Start: 2025-03-03 | End: 2025-03-13

## 2025-03-03 RX ORDER — MUPIROCIN 20 MG/G
OINTMENT TOPICAL 2 TIMES DAILY
Qty: 1 G | Refills: 0 | Status: SHIPPED | OUTPATIENT
Start: 2025-03-03 | End: 2025-03-05

## 2025-03-03 RX ORDER — PREDNISONE 10 MG/1
TABLET ORAL
Qty: 30 TABLET | Refills: 0 | Status: SHIPPED | OUTPATIENT
Start: 2025-03-03 | End: 2025-03-18

## 2025-03-03 RX ADMIN — IPRATROPIUM BROMIDE AND ALBUTEROL SULFATE 3 ML: .5; 3 SOLUTION RESPIRATORY (INHALATION) at 06:03

## 2025-03-03 RX ADMIN — FAMOTIDINE 20 MG: 10 INJECTION, SOLUTION INTRAVENOUS at 08:03

## 2025-03-03 RX ADMIN — APIXABAN 5 MG: 5 TABLET, FILM COATED ORAL at 08:03

## 2025-03-03 RX ADMIN — MUPIROCIN 1 G: 20 OINTMENT TOPICAL at 08:03

## 2025-03-03 RX ADMIN — METHYLPREDNISOLONE SODIUM SUCCINATE 40 MG: 125 INJECTION, POWDER, FOR SOLUTION INTRAMUSCULAR; INTRAVENOUS at 05:03

## 2025-03-03 RX ADMIN — LEVOTHYROXINE SODIUM 88 MCG: 0.09 TABLET ORAL at 05:03

## 2025-03-03 RX ADMIN — LISINOPRIL 30 MG: 20 TABLET ORAL at 08:03

## 2025-03-03 RX ADMIN — DOXYCYCLINE HYCLATE 100 MG: 100 CAPSULE ORAL at 08:03

## 2025-03-03 RX ADMIN — IPRATROPIUM BROMIDE AND ALBUTEROL SULFATE 3 ML: .5; 3 SOLUTION RESPIRATORY (INHALATION) at 11:03

## 2025-03-03 RX ADMIN — GUAIFENESIN 400 MG: 200 SOLUTION ORAL at 08:03

## 2025-03-03 NOTE — PROGRESS NOTES
Pulmonary/Critical Care  Progress Note      Patient name: Danna Sorensen  MRN: [id number]  Date: 03/03/2025    Admit Date: 2/27/2025  Consult Requested By: Bebeto Ovalle MD    Reason for Consult: pneumonia, abnormal CT chest, asthma    HPI:    3/1/2025 - 85 yo with recent DC from hospital for influenza and pneumonia, after DC she was doing OK but after finishing her prednisone her symptoms worsened and presented to ER with cough (NP), SOB, wheezing and is now admitted.  Had CTA chest which showed some inflammatory appearing changes which are scattered and new from CT done 1/2025.  She is feeling better bt does not feel ready to go home.  ROS as below    3/2/2025 - She feels better today but not back to her basleine, no new issues reported.  We discussed about the CT scan findings again. MRSA screen +, WBC is better     3/3/2025 - Feels better and good enough to go home, no new issues reported.  SC with [facility]    Review of Systems    Review of Systems   Constitutional:  Positive for malaise/fatigue. Negative for chills, diaphoresis, fever and weight loss.   HENT:  Negative for congestion.    Eyes:  Negative for pain.   Respiratory:  Positive for cough and shortness of breath. Negative for hemoptysis, sputum production, wheezing and stridor.    Cardiovascular:  Negative for chest pain, palpitations, orthopnea, claudication, leg swelling and PND.   Gastrointestinal:  Negative for abdominal pain, constipation, diarrhea, heartburn, nausea and vomiting.   Genitourinary:  Negative for dysuria, frequency and urgency.   Musculoskeletal:  Negative for falls and myalgias.   Neurological:  Negative for sensory change, focal weakness and weakness.   Psychiatric/Behavioral:  Negative for depression. The patient is not nervous/anxious.        Past Medical History    Past Medical History:   Diagnosis Date    Acute pulmonary embolism without acute cor pulmonale 08/25/2023    Back pain     Carpal tunnel syndrome     Cataract      Colon cancer     Colon polyp     Coronary artery disease     Essential hypertension 08/09/2019    Exacerbation of asthma 2/28/2025    History of blood clots     History of squamous cell carcinoma 07/27/2015    Hx of colon cancer, stage IV 10/18/2016    Hypothyroidism     Multifocal pneumonia 2/18/2025    Obesity (BMI 30-39.9) 03/24/2016    Osteoarthritis     Osteoporosis     Personal history of colon cancer, stage III     Squamous cell carcinoma     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    Strabismus     Trouble in sleeping     Wears dentures     Uppers       Past Surgical History    Past Surgical History:   Procedure Laterality Date    CARDIAC SURGERY      cardiac cath    COLON SURGERY      colon resection    COLONOSCOPY N/A 10/18/2016    Procedure: COLONOSCOPY;  Surgeon: Rell Cordova MD;  Location: [facility] ENDO;  Service: Endoscopy;  Laterality: N/A;    COLONOSCOPY N/A 8/8/2023    Procedure: COLONOSCOPY;  Surgeon: Kaushik Pinon MD;  Location: [facility];  Service: Endoscopy;  Laterality: N/A;    COLONOSCOPY N/A 8/22/2023    Procedure: COLONOSCOPY (tattoo of colon ca);  Surgeon: Kaushik Pinon MD;  Location: [facility];  Service: Endoscopy;  Laterality: N/A;    COLONOSCOPY N/A 8/12/2024    Procedure: COLONOSCOPY;  Surgeon: Jim Chavez MD;  Location: [facility];  Service: General;  Laterality: N/A;    ESOPHAGOGASTRODUODENOSCOPY N/A 8/3/2023    Procedure: EGD (ESOPHAGOGASTRODUODENOSCOPY);  Surgeon: Kaushik Pinon MD;  Location: [facility];  Service: Endoscopy;  Laterality: N/A;    HAND TENDON SURGERY Left     HEMICOLECTOMY      right    INJECTION OF ANESTHETIC AGENT AROUND MEDIAL BRANCH NERVES INNERVATING LUMBAR FACET JOINT Right 07/10/2018    Procedure: Block-nerve-medial branch-lumbar;  Surgeon: Parish Gaitan MD;  Location: [facility] OR;  Service: Pain Management;  Laterality: Right;  L3, 4, 5    INSERTION OF INFERIOR VENA CAVAL FILTER N/A 9/13/2023    Procedure: Insertion,  Filter, Inferior Vena Cava;  Surgeon: Oren Verdin MD;  Location: [facility] CATH/EP LAB;  Service: General;  Laterality: N/A;    INSERTION OF TUNNELED CENTRAL VENOUS CATHETER (CVC) WITH SUBCUTANEOUS PORT Right 11/15/2023    Procedure: [id number];  Surgeon: Jim Chavez MD;  Location: [facility] OR;  Service: General;  Laterality: Right;    JOINT REPLACEMENT      bilateral    RADIOFREQUENCY ABLATION OF LUMBAR MEDIAL BRANCH NERVE AT SINGLE LEVEL Right 07/24/2018    Procedure: RADIOFREQUENCY ABLATION, NERVE, MEDIAL BRANCH, LUMBAR, 1 LEVEL;  Surgeon: Parish Gaitan MD;  Location: [facility] OR;  Service: Pain Management;  Laterality: Right;  L3,4,5 - Burned at 80 degrees C. for 75 seconds x 2 each site    ROBOT-ASSISTED COLECTOMY N/A 9/29/2023    Procedure: ROBOTIC COLECTOMY;  Surgeon: Jim Chavez MD;  Location: [facility] OR;  Service: General;  Laterality: N/A;    SHOULDER ARTHROSCOPY Right 01/12/2017    Reverse     TONSILLECTOMY      TONSILLECTOMY, ADENOIDECTOMY, BILATERAL MYRINGOTOMY AND TUBES      TOTAL KNEE ARTHROPLASTY Bilateral 08/1998    total replacements    TUMOR REMOVAL Left     left foot as a child       Medications (scheduled):      albuterol-ipratropium  3 mL Nebulization QID    apixaban  5 mg Oral BID    doxycycline  100 mg Oral Q12H    famotidine (PF)  20 mg Intravenous BID    guaiFENesin 100 mg/5 ml  400 mg Oral QID    levothyroxine  88 mcg Oral Before breakfast    lisinopriL  30 mg Oral Daily    methylPREDNISolone injection (PEDS and ADULTS)  40 mg Intravenous Q8H    mupirocin   Nasal BID    pneumoc 20-km conj-dip cr(PF)  0.5 mL Intramuscular 1 time in Clinic/HOD    senna-docusate 8.6-50 mg  1 tablet Oral BID       Medications (infusions):         Medications (prn):       Current Facility-Administered Medications:     acetaminophen, 650 mg, Oral, Q4H PRN    aluminum-magnesium hydroxide-simethicone, 30 mL, Oral, QID PRN    benzonatate, 100 mg, Oral, TID PRN    dextrose 50%, 12.5 g, Intravenous, PRN     dextrose 50%, 25 g, Intravenous, PRN    glucagon (human recombinant), 1 mg, Intramuscular, PRN    glucose, 16 g, Oral, PRN    glucose, 24 g, Oral, PRN    influenza (adjuvanted), 0.5 mL, Intramuscular, Prior to discharge    magnesium oxide, 800 mg, Oral, PRN    magnesium oxide, 800 mg, Oral, PRN    melatonin, 6 mg, Oral, Nightly PRN    naloxone, 0.02 mg, Intravenous, PRN    ondansetron, 4 mg, Intravenous, Q6H PRN    potassium bicarbonate, 35 mEq, Oral, PRN    potassium bicarbonate, 50 mEq, Oral, PRN    potassium bicarbonate, 60 mEq, Oral, PRN    potassium, sodium phosphates, 2 packet, Oral, PRN    potassium, sodium phosphates, 2 packet, Oral, PRN    potassium, sodium phosphates, 2 packet, Oral, PRN    sodium chloride 0.9%, 3 mL, Intravenous, Q12H PRN    Family History:   Family History   Problem Relation Name Age of Onset    Hypertension Mother      Kidney disease Mother      Cataracts Father      Cataracts Sister      Cancer Sister          breast    No Known Problems Brother      Cancer Sister          breast    Arthritis Sister      Glaucoma Neg Hx      Amblyopia Neg Hx      Blindness Neg Hx      Macular degeneration Neg Hx      Retinal detachment Neg Hx      Strabismus Neg Hx      Stroke Neg Hx      Thyroid disease Neg Hx         Social History: Tobacco: Tobacco Use History[1]                             EtOH:   Social History     Substance and Sexual Activity   Alcohol Use No                                Drugs:   Social History     Substance and Sexual Activity   Drug Use No       Physical Exam    Vital signs:  Temp:  [97.8 °F (36.6 °C)-98.9 °F (37.2 °C)]   Pulse:  [50-83]   Resp:  [16-19]   BP: (160-172)/(72-93)   SpO2:  [91 %-97 %]     Intake/Output:   Intake/Output Summary (Last 24 hours) at 3/3/2025 1152  Last data filed at 3/3/2025 0948  Gross per 24 hour   Intake 600 ml   Output --   Net 600 ml        BMI: Estimated body mass index is 39.81 kg/m² as calculated from the following:    Height as of this  "encounter: 5' 4" (1.626 m).    Weight as of this encounter: 105.2 kg (231 lb 14.8 oz).    Physical Exam  Vitals and nursing note reviewed.   Constitutional:       General: She is not in acute distress.     Appearance: Normal appearance. She is not ill-appearing, toxic-appearing or diaphoretic.   HENT:      Head: Normocephalic and atraumatic.      Right Ear: External ear normal.      Left Ear: External ear normal.      Nose: Nose normal. No congestion or rhinorrhea.      Mouth/Throat:      Mouth: Mucous membranes are moist.      Pharynx: Oropharynx is clear. No oropharyngeal exudate or posterior oropharyngeal erythema.   Eyes:      General: No scleral icterus.        Right eye: No discharge.         Left eye: No discharge.      Extraocular Movements: Extraocular movements intact.      Conjunctiva/sclera: Conjunctivae normal.      Pupils: Pupils are equal, round, and reactive to light.   Neck:      Vascular: No carotid bruit.   Cardiovascular:      Rate and Rhythm: Normal rate and regular rhythm.      Pulses: Normal pulses.      Heart sounds: No murmur heard.     No friction rub. No gallop.   Pulmonary:      Effort: Pulmonary effort is normal. No respiratory distress.      Breath sounds: No stridor. No wheezing, rhonchi or rales.   Chest:      Chest wall: No tenderness.   Abdominal:      General: Bowel sounds are normal. There is no distension.      Tenderness: There is no abdominal tenderness. There is no guarding.   Musculoskeletal:         General: No swelling. Normal range of motion.      Cervical back: Normal range of motion and neck supple. No rigidity or tenderness.      Right lower leg: No edema.      Left lower leg: No edema.   Lymphadenopathy:      Cervical: No cervical adenopathy.   Skin:     General: Skin is warm and dry.      Capillary Refill: Capillary refill takes less than 2 seconds.      Coloration: Skin is not jaundiced.      Findings: No bruising.   Neurological:      General: No focal deficit " "present.      Mental Status: She is alert and oriented to person, place, and time. Mental status is at baseline.      Cranial Nerves: No cranial nerve deficit.      Sensory: No sensory deficit.      Motor: No weakness.   Psychiatric:         Mood and Affect: Mood normal.         Behavior: Behavior normal.         Thought Content: Thought content normal.         Judgment: Judgment normal.         Laboratory    Recent Labs   Lab 03/03/25  0545   WBC 8.02   RBC 3.62*   HGB 10.9*   HCT 34.2*      MCV 95   MCH 30.1   MCHC 31.9*       Recent Labs   Lab 03/03/25  0545   CALCIUM 8.6*   ALBUMIN 3.3*   PROT 5.8*      K 4.1   CO2 22*      BUN 29*   CREATININE 0.8   ALKPHOS 44*   ALT 35   AST 16   BILITOT 1.0       No results for input(s): "PT", "INR", "APTT" in the last 24 hours.    No results for input(s): "CPK", "CPKMB", "TROPONINI", "MB" in the last 24 hours.    Additional labs: rviewed    Microbiology:       Microbiology Results (last 7 days)       Procedure Component Value Units Date/Time    Culture, Respiratory with Gram Stain [[id number]]  (Abnormal) Collected: 03/02/25 1547    Order Status: Completed Specimen: Respiratory from Sputum Updated: 03/03/25 0732     Respiratory Culture STAPHYLOCOCCUS AUREUS  Moderate  Susceptibility pending       Gram Stain (Respiratory) <10 epithelial cells per low power field.     Gram Stain (Respiratory) Few WBC's     Gram Stain (Respiratory) Few Gram positive cocci     Gram Stain (Respiratory) Few yeast    Blood culture #1 [[id number]] Collected: 02/27/25 1826    Order Status: Completed Specimen: Blood Updated: 03/02/25 2032     Blood Culture, Routine No Growth to date      No Growth to date      No Growth to date      No Growth to date    Blood culture #2 [[id number]] Collected: 02/27/25 1833    Order Status: Completed Specimen: Blood Updated: 03/02/25 2032     Blood Culture, Routine No Growth to date      No Growth to date      No Growth to date      No Growth to " date    MRSA Screen by PCR [[id number]]  (Abnormal) Collected: 03/01/25 1245    Order Status: Completed Specimen: Nasal Swab Updated: 03/01/25 1441     MRSA SCREEN BY PCR Detected    Narrative:      called/read back to 1west jovanna DIAZ 3/1/25 1440pm jt    Respiratory Infection Panel (PCR), Nasopharyngeal [[id number]] Collected: 02/28/25 0836    Order Status: Completed Specimen: Nasopharyngeal Swab Updated: 02/28/25 1005     Respiratory Infection Panel Source NP swab     Adenovirus Not Detected     Coronavirus 229E, Common Cold Virus Not Detected     Coronavirus HKU1, Common Cold Virus Not Detected     Coronavirus NL63, Common Cold Virus Not Detected     Coronavirus OC43, Common Cold Virus Not Detected     Comment: Coronavirus strains 229E, HKU1, NL63, and OC43 can cause the common   cold   and are not associated with the respiratory disease outbreak caused   by  the COVID-19 (SARS-CoV-2 novel Coronavirus) strain.           SARS-CoV2 (COVID-19) Qualitative PCR Not Detected     Human Metapneumovirus Not Detected     Human Rhinovirus/Enterovirus Not Detected     Influenza A Not Detected     Influenza B Not Detected     Parainfluenza Virus 1 Not Detected     Parainfluenza Virus 2 Not Detected     Parainfluenza Virus 3 Not Detected     Parainfluenza Virus 4 Not Detected     Respiratory Syncytial Virus Not Detected     Bordetella Parapertussis (QE2071) Not Detected     Bordetella pertussis (ptxP) Not Detected     Chlamydia pneumoniae Not Detected     Mycoplasma pneumoniae Not Detected     Comment: Respiratory Infection Panel testing performed by Multiplex PCR.       Narrative:      Respiratory Infection Panel source->NP Swab            Radiology    CTA Chest Non-Coronary (PE Studies)  Narrative: EXAMINATION:  CTA CHEST NON CORONARY (PE STUDIES)    CLINICAL HISTORY:  Pulmonary embolism (PE) suspected, positive D-dimer;    TECHNIQUE:  Low dose axial images, sagittal and coronal reformations were obtained from the  "thoracic inlet to the lung bases following the IV administration of 100 mL of Omnipaque 350.  Contrast timing was optimized to evaluate the pulmonary arteries.  MIP images were performed.    COMPARISON:  Same day chest radiograph    FINDINGS:  Enlarged main pulmonary artery suggestive of pulmonary arterial hypertension.  No central or segmental pulmonary embolus.    No cardiomegaly.  Moderate coronary artery atherosclerosis    44 mm cavitary lesion right upper lobe with adjacent airspace disease.  Small cavitary lesion left lower and upper lobe.  Right basilar airspace disease and atelectasis.  5 mm noncalcified nodule left upper lobe.  Multiple additional small bilateral multifocal nodular opacities are noted.  Mildly prominent mediastinal nodes.  Enlarged right hilar node.    Cholelithiasis  Impression: Infectious/inflammatory or neoplastic pulmonary findings, as above.  Strongly advise short interval follow-up chest CT to document resolution.    Electronically signed by: Hailey Dumont  Date:    02/28/2025  Time:    01:18        Additional Studies    reviewed    Ventilator Information                  No results for input(s): "PH", "PCO2", "PO2", "HCO3", "POCSATURATED", "BE" in the last 72 hours.      Impression    Active Hospital Problems    Diagnosis  POA    *Exacerbation of asthma [J45.901]  Yes    Abnormal CT of the chest [R93.89]  Yes    Pneumonia [J18.9]  Yes    History of pulmonary embolus (PE) [Z86.711]  Yes    Essential hypertension [I10]  Yes      Resolved Hospital Problems   No resolved problems to display.       Plan    OK to DC from my standpoint on    PO doxycycline for a total of 10 days  Prednisone taper 30 x 5, 20 x 5, 10 x 5  Usual home meds for her asthma  Needs follow up CT in 2 months  She can either see Dr Ulloa (whom she has seen before) or follow up with me - her choice        Thank you for this consult.  I will follow with you while the patient is hospitalized.        Eduard CARUSO" MD [place]  [facility] Pulmonary/Critical Care  2025               [1]   Social History  Tobacco Use   Smoking Status Former    Current packs/day: 0.00    Average packs/day: 0.5 packs/day for 1 year (0.5 ttl pk-yrs)    Types: Cigarettes    Start date: 1960    Quit date: 1961    Years since quittin.2   Smokeless Tobacco Never

## 2025-03-03 NOTE — PLAN OF CARE
Discharge orders and chart reviewed with no further post-acute discharge needs identified at this time.  At this time, patient is cleared for discharge from Case Management standpoint.     SW confirmed with Relative/Barbie via phone, she will provide transportation. Will be at bedside within the next 1-2 hours.       03/03/25 1210   Final Note   Assessment Type Final Discharge Note   Anticipated Discharge Disposition Home   Hospital Resources/Appts/Education Provided Appointments scheduled and added to AVS   Post-Acute Status   Patient choice form signed by patient/caregiver List with quality metrics by geographic area provided   Discharge Delays (!) Personal Transportation

## 2025-03-03 NOTE — PLAN OF CARE
Problem: Adult Inpatient Plan of Care  Goal: Plan of Care Review  Outcome: Met  Goal: Patient-Specific Goal (Individualized)  Outcome: Met  Goal: Absence of Hospital-Acquired Illness or Injury  Outcome: Met  Goal: Optimal Comfort and Wellbeing  Outcome: Met  Goal: Readiness for Transition of Care  Outcome: Met     Problem: Bariatric Environmental Safety  Goal: Safety Maintained with Care  Outcome: Met     Problem: Fall Injury Risk  Goal: Absence of Fall and Fall-Related Injury  Outcome: Met     Problem: Pneumonia  Goal: Fluid Balance  Outcome: Met  Goal: Resolution of Infection Signs and Symptoms  Outcome: Met  Goal: Effective Oxygenation and Ventilation  Outcome: Met     Problem: Infection  Goal: Absence of Infection Signs and Symptoms  Outcome: Met

## 2025-03-03 NOTE — CARE UPDATE
03/02/25 1941   Patient Assessment/Suction   Level of Consciousness (AVPU) alert   Respiratory Effort Normal;Unlabored   Expansion/Accessory Muscles/Retractions no use of accessory muscles   All Lung Fields Breath Sounds diminished   Rhythm/Pattern, Respiratory unlabored;pattern regular   PRE-TX-O2   Device (Oxygen Therapy) room air   SpO2 95 %   Pulse Oximetry Type Intermittent   $ Pulse Oximetry - Multiple Charge Pulse Oximetry - Multiple   Pulse (!) 59   Resp 17   Aerosol Therapy   $ Aerosol Therapy Charges Aerosol Treatment   Daily Review of Necessity (SVN) completed   Respiratory Treatment Status (SVN) given   Treatment Route (SVN) mask;oxygen   Patient Position HOB elevated   Post Treatment Assessment (SVN) increased aeration   Signs of Intolerance (SVN) none   Breath Sounds Post-Respiratory Treatment   Throughout All Fields Post-Treatment All Fields   Throughout All Fields Post-Treatment aeration increased   Post-treatment Heart Rate (beats/min) 64   Post-treatment Resp Rate (breaths/min) 18   Education   $ Education Bronchodilator;15 min

## 2025-03-03 NOTE — DISCHARGE SUMMARY
[facility] Medicine  Discharge Summary      Patient Name: Danna Sorensen  MRN: [id number]  SHAN: [id number]  Patient Class: IP- Inpatient  Admission Date: 2/27/2025  Hospital Length of Stay: 3 days  Discharge Date and Time:  03/03/2025 12:12 PM  Attending Physician: Bebeto Ovalle MD   Discharging Provider: MARTIN Rand  Primary Care Provider: Claudia Kim MD    Primary Care Team: Networked reference to record PCT     HPI:   84-year-old woman who presents emergency department for gradual worsening shortness of breath since her discharge last Friday from the hospital for treatment of influenza and pneumonia. In the hospital she was treated with Tamiflu, cefepime and azithromycin and was discharged home to continue Tamiflu and azithromycin. She has a past medical history of skin and colon cancer, hypothyroidism, pulmonary embolism on Eliquis with IVC filter in place, self reports that she has asthma. She did not meet oxygen requirement for home. She has been taking breathing treatments every 3 hours at home without improvement. She denies any fever.     Non smoker  Non drinker  Ex smoker    She told me she was feeling better but after few days at home , she finished her prednisone taper and other meds.   She started feeling bad again     Wheezing, dry cough ,  SOB     Then came back     Was NOT needing oxygen tonight in ER     CTA shows no PNA and no PE         Hospital Course:  DC SUMMARY 2/20   Patient remained stable throughout her hospitalization.  She never required any additional oxygen.  She was treated with Tamiflu, cefepime, and azithromycin.  Labs have remained unremarkable.  Patient was seen and examined on day of discharge.  She is okay for discharge at this time.  She will continue Tamiflu and azithromycin for 2 more days.  She will follow up outpatient with the primary care physician.           WBC was up  But procalcitonin was NORMAL   Chemistry normal labs    Renal function normal     FLU COVID NEG       She was given nebs and IV Steroids    Felt better when I saw  her    Was on room air       We will admit again for the asthma exacerbation     * No surgery found *      Hospital Course:   84 year old female w/ PMHx asthma, HTN, colon cancer, CKD III, and PE s/p IVC on eliquis.  A few days prior to admission she had been discharged after treatment for the flu and pneumonia.  She was re-admitted to the hospital for management of an asthma exacerbation.  CXR obtained showing similar masslike consolidation right upper lobe and right hilar soft tissue fullness.  Normal heart size.  CTA chest PE study showed enlarged main pulmonary artery suggestive of pulmonary arterial hypertension.  No central or segmental pulmonary embolus. No cardiomegaly.  Moderate coronary artery atherosclerosis. 44 mm cavitary lesion right upper lobe with adjacent airspace disease.  Small cavitary lesion left lower and upper lobe.  Right basilar airspace disease and atelectasis.  5 mm noncalcified nodule left upper lobe.  Multiple additional small bilateral multifocal nodular opacities are noted.  Mildly prominent mediastinal nodes.  Enlarged right hilar node. Cholelithiasis. Pulmonology was consulted. Patient was treated with abx, solu-medrol, and scheduled duo-nebs.  MRSA swab positive.  Sputum culture +staph aureus.  Blood cx NGTD.  Her blood pressure has been elevated, but could be due to steroids.  Discussed this with patient, and she is agreeable to check blood pressure twice a day and notify PCP if consistently greater than 150.  Pulmonology cleared for discharge on p.o. prednisone and doxycycline.  She will follow up with pulmonology and PCP outpatient.  Needs repeat CT of chest in 2 months.  Plan of care was discussed with patient she verbalized understanding.  Patient was seen and examined on the day of discharge.     Goals of Care Treatment Preferences:  Code Status: Full Code      SDOH  Screening:  The patient was screened for utility difficulties, food insecurity, transport difficulties, housing insecurity, and interpersonal safety and there were no concerns identified this admission.     Consults:   Consults (From admission, onward)          Status Ordering Provider     Inpatient consult to Pulmonology  Once        Provider:  Eduard Stauffer MD    Completed JAMESDANNY MARIVEL            * Exacerbation of asthma  No wheezes  She did get started on abx - procal WNL x2 but pulm recs to continue for now  Continue steroids and breathing tx  Continuing to do well on RA    ===============================================================================  Was just here with this and left about 7 days ago    Was only in the hospital for 2 days     Likely did not get enough time of Treatment , mainly the steroids .  And OR , not enough time tapering down on steroids at home    Does not appear to be a bacterial PNA    FLU NEG  but she had flu last month she said  COVID neg     On ROOM AIR IN ER    CTA negative for pna or PE        PLAN    -  IV steroids   40 mg Q8     - IVFs with KCL     - DuoNebs QID and PRN     - no abx needed  Procalcitonin normal     - pepcid IV BID    -  robitussin 400 mg PO QID    And Tessalon pearls Po PRN cough     Abnormal CT of the chest  44 mm cavitary lesion right upper lobe with adjacent airspace disease.  Small cavitary lesion left lower and upper lobe.  Right basilar airspace disease and atelectasis.  5 mm noncalcified nodule left upper lobe.  Multiple additional small bilateral multifocal nodular opacities are noted.  Mildly prominent mediastinal nodes.  Enlarged right hilar node.   Pulmonology consulted, appreciate recs  Will need f/u CT in a few months     Pneumonia  Patient has a diagnosis of pneumonia. The cause of the pneumonia is suspected to be bacterial in etiology but organism is not known. The pneumonia is stable. The patient has the following signs/symptoms of  pneumonia: cough, sputum production, and shortness of breath. The patient does not have a current oxygen requirement and the patient does not have a home oxygen requirement. I have reviewed the pertinent imaging. The following cultures have been collected: Blood cultures and Sputum culture The culture results are listed below.     Current antimicrobial regimen consists of the antibiotics listed below. Will monitor patient closely and continue current treatment plan unchanged.    Antibiotics (From admission, onward)      Start     Stop Route Frequency Ordered    02/28/25 2100  doxycycline capsule 100 mg         -- Oral Every 12 hours 02/28/25 1500    02/28/25 1600  ceFEPIme injection 2 g         -- IV Every 8 hours (non-standard times) 02/28/25 1518    02/28/25 0930  mupirocin 2 % ointment         03/05/25 0859 Nasl 2 times daily 02/28/25 0819            Microbiology Results (last 7 days)       Procedure Component Value Units Date/Time    Blood culture #1 [[id number]] Collected: 02/27/25 1826    Order Status: Completed Specimen: Blood Updated: 03/01/25 2032     Blood Culture, Routine No Growth to date      No Growth to date      No Growth to date    Blood culture #2 [[id number]] Collected: 02/27/25 1833    Order Status: Completed Specimen: Blood Updated: 03/01/25 2032     Blood Culture, Routine No Growth to date      No Growth to date      No Growth to date    MRSA Screen by PCR [[id number]]  (Abnormal) Collected: 03/01/25 1245    Order Status: Completed Specimen: Nasal Swab Updated: 03/01/25 1441     MRSA SCREEN BY PCR Detected    Narrative:      called/read back to 1west jovanna DIAZ 3/1/25 1440pm jt    Culture, Respiratory with Gram Stain [[id number]]     Order Status: No result Specimen: Respiratory     Respiratory Infection Panel (PCR), Nasopharyngeal [[id number]] Collected: 02/28/25 0836    Order Status: Completed Specimen: Nasopharyngeal Swab Updated: 02/28/25 1005     Respiratory Infection Panel Source NP  swab     Adenovirus Not Detected     Coronavirus 229E, Common Cold Virus Not Detected     Coronavirus HKU1, Common Cold Virus Not Detected     Coronavirus NL63, Common Cold Virus Not Detected     Coronavirus OC43, Common Cold Virus Not Detected     Comment: Coronavirus strains 229E, HKU1, NL63, and OC43 can cause the common   cold   and are not associated with the respiratory disease outbreak caused   by  the COVID-19 (SARS-CoV-2 novel Coronavirus) strain.           SARS-CoV2 (COVID-19) Qualitative PCR Not Detected     Human Metapneumovirus Not Detected     Human Rhinovirus/Enterovirus Not Detected     Influenza A Not Detected     Influenza B Not Detected     Parainfluenza Virus 1 Not Detected     Parainfluenza Virus 2 Not Detected     Parainfluenza Virus 3 Not Detected     Parainfluenza Virus 4 Not Detected     Respiratory Syncytial Virus Not Detected     Bordetella Parapertussis (BD8193) Not Detected     Bordetella pertussis (ptxP) Not Detected     Chlamydia pneumoniae Not Detected     Mycoplasma pneumoniae Not Detected     Comment: Respiratory Infection Panel testing performed by Multiplex PCR.       Narrative:      Respiratory Infection Panel source->NP Swab            History of pulmonary embolus (PE)  Noted  Chronic problem but no acute PE on CTA  S/p IVC filter  Continue eliquis      Essential hypertension  BP now elevated - will resume her home BP meds  ========================================================================================  For now BP is good    120s      I would HOLD her home BP meds now while sick     Running some IVFs also       Final Active Diagnoses:    Diagnosis Date Noted POA    PRINCIPAL PROBLEM:  Exacerbation of asthma [J45.901] 02/28/2025 Yes    Abnormal CT of the chest [R93.89] 03/01/2025 Yes    Pneumonia [J18.9] 02/18/2025 Yes    History of pulmonary embolus (PE) [Z86.711] 09/29/2023 Yes    Essential hypertension [I10] 08/09/2019 Yes      Problems Resolved During this Admission:        Discharged Condition: good    Disposition: Home or Self Care    Follow Up:   Follow-up Information       Pulmonary, [place]. Schedule an appointment as soon as possible for a visit in 1 week(s).    Specialty: Pulmonary Disease  Why: Scheduling department will reach out to the patient to schedule.  Contact information:  [address]  [address]  [facility] [address]  [phone]               Claudia Kim MD Follow up.    Specialty: Family Medicine  Why: in basket  Contact information:  [address] [facility] [address]  [phone]                           Patient Instructions:      Diet Cardiac     Notify your health care provider if you experience any of the following:  temperature >100.4     Notify your health care provider if you experience any of the following:  persistent nausea and vomiting or diarrhea     Notify your health care provider if you experience any of the following:  severe uncontrolled pain     Notify your health care provider if you experience any of the following:  difficulty breathing or increased cough     Notify your health care provider if you experience any of the following:  severe persistent headache     Notify your health care provider if you experience any of the following:  persistent dizziness, light-headedness, or visual disturbances     Notify your health care provider if you experience any of the following:  increased confusion or weakness     Notify your health care provider if you experience any of the following:   Order Comments: Chest pain, shortness of breath     Activity as tolerated       Significant Diagnostic Studies: Labs: CMP   Recent Labs   Lab 03/02/25  0546 03/03/25  0545    138   K 4.4 4.1   * 110   CO2 21* 22*   * 117*   BUN 25* 29*   CREATININE 0.8 0.8   CALCIUM 8.4* 8.6*   PROT 6.2 5.8*   ALBUMIN 3.3* 3.3*   BILITOT 0.8 1.0   ALKPHOS 46* 44*   AST 12 16   ALT 30 35   ANIONGAP 5* 6*    and CBC   Recent Labs   Lab 03/02/25  0546  03/03/25  0545   WBC 9.58 8.02   HGB 11.0* 10.9*   HCT 33.5* 34.2*    275       Pending Diagnostic Studies:       None           Medications:  Reconciled Home Medications:      Medication List        START taking these medications      doxycycline 100 MG Cap  Commonly known as: VIBRAMYCIN  Take 1 capsule (100 mg total) by mouth every 12 (twelve) hours. for 10 days     mupirocin 2 % ointment  Commonly known as: BACTROBAN  by Nasal route 2 (two) times daily. for 2 days     predniSONE 10 MG tablet  Commonly known as: DELTASONE  Take 3 tablets (30 mg total) by mouth once daily for 5 days, THEN 2 tablets (20 mg total) once daily for 5 days, THEN 1 tablet (10 mg total) once daily for 5 days.  Start taking on: March 3, 2025            CHANGE how you take these medications      levocetirizine 5 MG tablet  Commonly known as: XYZAL  TAKE 1 TABLET(5 MG) BY MOUTH EVERY NIGHT AS NEEDED FOR ALLERGIES  What changed: See the new instructions.            CONTINUE taking these medications      acetaminophen 650 MG Tbsr  Commonly known as: TYLENOL  Take 650 mg by mouth every 8 (eight) hours.     albuterol 90 mcg/actuation inhaler  Commonly known as: PROVENTIL/VENTOLIN HFA  Inhale 2 puffs into the lungs every 6 (six) hours as needed for Wheezing.     albuterol-ipratropium 2.5 mg-0.5 mg/3 mL nebulizer solution  Commonly known as: DUO-NEB  Take 3 mLs by nebulization every 6 (six) hours as needed for Wheezing. Rescue     alendronate 70 MG tablet  Commonly known as: FOSAMAX  TAKE 1 TABLET(70 MG) BY MOUTH EVERY 7 DAYS     apixaban 5 mg Tab  Commonly known as: ELIQUIS  Take 1 tablet (5 mg total) by mouth 2 (two) times daily.     azelastine 137 mcg (0.1 %) nasal spray  Commonly known as: ASTELIN  1 spray (137 mcg total) by Nasal route 2 (two) times daily. Point up and slightly outward toward ear when spraying to avoid irritating nasal septum.     DULERA 100-5 mcg/actuation Hfaa  Generic drug: mometasone-formoterol  Inhale 2 puffs into  the lungs 2 (two) times daily.     ferrous sulfate Tab tablet  Commonly known as: FEOSOL  Take 1 tablet by mouth daily with breakfast.     furosemide 20 MG tablet  Commonly known as: LASIX  Take 1 tablet (20 mg total) by mouth daily as needed (edema).     levothyroxine 88 MCG tablet  Commonly known as: SYNTHROID  Take 1 tablet (88 mcg total) by mouth once daily.     lisinopriL 30 MG tablet  Commonly known as: PRINIVIL,ZESTRIL  TAKE 1 TABLET BY MOUTH EVERY DAY     multivitamin capsule  Take 1 capsule by mouth once daily.     omeprazole 40 MG capsule  Commonly known as: PRILOSEC  TAKE 1 CAPSULE(40 MG) BY MOUTH TWICE DAILY BEFORE MEALS     traMADoL 50 mg tablet  Commonly known as: ULTRAM  Take 1 tablet (50 mg total) by mouth every 8 (eight) hours as needed for Pain.     VITAMIN D ORAL  Take 2,000 mg by mouth once daily.            STOP taking these medications      benzonatate 100 MG capsule  Commonly known as: TESSALON            ASK your doctor about these medications      fluticasone propionate 50 mcg/actuation nasal spray  Commonly known as: FLONASE  1 spray (50 mcg total) by Each Nostril route once daily.     silver sulfADIAZINE 1% 1 % cream  Commonly known as: SILVADENE  Apply topically once daily.              Indwelling Lines/Drains at time of discharge:   Lines/Drains/Airways       Central Venous Catheter Line  Duration             Port A Cath Single Lumen 11/15/23 1032 Atrial Right 474 days                    Time spent on the discharge of patient: 35 minutes         MARTIN Rand  Department of Hospital Medicine  [facility]

## 2025-03-03 NOTE — DISCHARGE INSTRUCTIONS
Check blood pressure twice a day and keep log, bring to next PCP appointment, notify PCP if SBP consistently greater than 150  Will need repeat CT of chest in 2 months  Follow up with pulmonology

## 2025-03-03 NOTE — CARE UPDATE
03/03/25 0654   Patient Assessment/Suction   Level of Consciousness (AVPU) alert   Respiratory Effort Normal;Unlabored   Expansion/Accessory Muscles/Retractions no use of accessory muscles;no retractions;expansion symmetric   All Lung Fields Breath Sounds diminished   Rhythm/Pattern, Respiratory unlabored;pattern regular;depth regular;chest wiggle adequate;no shortness of breath reported   Cough Frequency infrequent   Cough Type good   PRE-TX-O2   Device (Oxygen Therapy) room air   $ Is the patient on Low Flow Oxygen? Yes   SpO2 97 %   Pulse Oximetry Type Intermittent   $ Pulse Oximetry - Multiple Charge Pulse Oximetry - Multiple   Pulse 60   Resp 19   Aerosol Therapy   $ Aerosol Therapy Charges Aerosol Treatment   Daily Review of Necessity (SVN) completed   Respiratory Treatment Status (SVN) given   Treatment Route (SVN) oxygen;mask   Patient Position HOB elevated   Post Treatment Assessment (SVN) increased aeration   Signs of Intolerance (SVN) none   Education   $ Education Bronchodilator;15 min

## 2025-03-04 LAB
BACTERIA BLD CULT: NORMAL
BACTERIA BLD CULT: NORMAL
BACTERIA SPEC AEROBE CULT: ABNORMAL
GRAM STN SPEC: ABNORMAL

## 2025-03-05 ENCOUNTER — OFFICE VISIT (OUTPATIENT)
Dept: FAMILY MEDICINE | Facility: CLINIC | Age: 85
End: 2025-03-05
Payer: MEDICARE

## 2025-03-05 VITALS
SYSTOLIC BLOOD PRESSURE: 137 MMHG | TEMPERATURE: 98 F | HEIGHT: 64 IN | RESPIRATION RATE: 18 BRPM | BODY MASS INDEX: 38.2 KG/M2 | DIASTOLIC BLOOD PRESSURE: 80 MMHG | WEIGHT: 223.75 LBS | OXYGEN SATURATION: 95 % | HEART RATE: 79 BPM

## 2025-03-05 DIAGNOSIS — J10.1 INFLUENZA A: ICD-10-CM

## 2025-03-05 DIAGNOSIS — J18.9 COMMUNITY ACQUIRED PNEUMONIA, UNSPECIFIED LATERALITY: ICD-10-CM

## 2025-03-05 DIAGNOSIS — J45.901 EXACERBATION OF ASTHMA, UNSPECIFIED ASTHMA SEVERITY, UNSPECIFIED WHETHER PERSISTENT: Primary | ICD-10-CM

## 2025-03-05 DIAGNOSIS — M81.8 OTHER OSTEOPOROSIS WITHOUT CURRENT PATHOLOGICAL FRACTURE: ICD-10-CM

## 2025-03-05 DIAGNOSIS — I10 ESSENTIAL HYPERTENSION: ICD-10-CM

## 2025-03-05 LAB
L PNEUMO1 AG UR QL IA: NEGATIVE
LEGIONELLA SPECIMEN SOURCE: NORMAL

## 2025-03-05 PROCEDURE — 99999 PR PBB SHADOW E&M-EST. PATIENT-LVL III: CPT | Mod: PBBFAC,,, | Performed by: FAMILY MEDICINE

## 2025-03-05 RX ORDER — PROMETHAZINE HYDROCHLORIDE AND DEXTROMETHORPHAN HYDROBROMIDE 6.25; 15 MG/5ML; MG/5ML
5 SYRUP ORAL EVERY 4 HOURS PRN
Qty: 473 ML | Refills: 0 | Status: SHIPPED | OUTPATIENT
Start: 2025-03-05 | End: 2025-03-15

## 2025-03-05 RX ORDER — ALBUTEROL SULFATE 0.83 MG/ML
2.5 SOLUTION RESPIRATORY (INHALATION) EVERY 6 HOURS PRN
Qty: 90 ML | Status: SHIPPED | OUTPATIENT
Start: 2025-03-05 | End: 2026-03-05

## 2025-03-05 NOTE — PROGRESS NOTES
Subjective:       Patient ID: Danna Sorensen is a 84 y.o. female.    Chief Complaint: Transitional Care    Problem List[1]  Patient is here for a chronic conditions follow up.    Reviewed labs 3/2025. Has f/u with me with previsit labs 7/2025  History of Present Illness    CHIEF COMPLAINT:  Ms. Sorensen presents today for follow-up after hospitalization for respiratory illness.    HISTORY OF PRESENT ILLNESS:  She recently experienced respiratory illness beginning with influenza that progressed to right-sided pneumonia, requiring two hospitalizations with IV antibiotics and steroids. She reports symptoms are approximately 50% improved. She was recently diagnosed with asthma following pulmonary function testing, which was initiated after her oncologist noted breathing difficulties.    CURRENT MEDICATIONS:  She continues all four medications prescribed upon hospital discharge and is completing the course as directed by pulmonology. She uses DuoNeb breathing treatments 4 times daily when experiencing shortness of breath.    SELF CARE:  She maintains adequate hydration with 3-4 bottles of water daily, in addition to coffee and tea. She performs breathing exercises before bed. She avoids vapor rub due to associated sinus problems and nausea.      ROS:  ROS findings as noted in HPI. Admitted E for dyspnea, influenza and RUL pneumonia treated cefepime, zithromax and tamiflu.  D/c 2/22/25    Admitted 3/3/25 [facility] for SOB due to asthma exacerbation despite compliance with tamiflu and zithromax and prednisone taper outpatient.  No hypoxia. Treated with nebs, iv steroids and completed course. Responded and d/c'd       Review of Systems   Constitutional:  Negative for fatigue and unexpected weight change.   Respiratory:  Negative for chest tightness and shortness of breath.    Cardiovascular:  Negative for chest pain, palpitations and leg swelling.   Gastrointestinal:  Negative for abdominal pain.   Musculoskeletal:  Negative for  arthralgias.   Neurological:  Negative for dizziness, syncope, light-headedness and headaches.      Relevant History:  Lives with niece Barbie-lots of support     Endocrine hypothyroid synthroid 75mcg      Pulm Dr. Romero asthma on dulera      Psych C/o trouble sleeping. Trazodone  mg no help. Told to try melatonin 5 mg      Urology NP O'Kristine left renal mass suspected malignancy. Mri showed benign findings     GI Surg Dr. Chavez and Surgery Dr. Evans colon cancer Heme/onc NP Viraj following for colon cancer s/p resection and chemo 2007 with recent recurrence s/p extended robotic colectomy of the  transverse colon and splenic flexure with ileo colic anastomosis.  This was performed on September 29th, 2023 She did however have perineural and lymphovascular invasion.    Also of note she does have a known suspected malignancy of the left kidney.    port-a-cath placement 11/15/23.  chemo started 12/23  PET 11/2023 Patient is status post resection of colon carcinoma with no findings to indicate metastatic or recurrent disease.  Lung nodules, some of which are unchanged since 2007, benign, and others of which are new since 2007 but stable compared to 08/25/2023.  Continued CT follow-up is recommended. Last CT 6/2024  no new mets, stable pulm nodules, stable kidney mass suggestive of angiomyolipoma.   Colonoscopy  8/12/24 -no masses or polyps repeat 3 years.  5FU x 6 months completed 5/2024.  CT /PET 9/2024  Postoperative changes of partial colectomy.  No evidence of local disease recurrence or new metastasis.  2. Stable bilateral subcentimeter pulmonary nodules.  3. 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  4. Multiple ventral abdominal wall hernias with one containing bowel.  5. Cholelithiasis.  6. Additional findings as above.  Port a cath has been removed     Vasc surg Dr. Verdin h/o DVT s/p IVC filter 9/2023 on eliquis. Has chronic d dimer elevation     GI Dr. Pinon  planning egd 8/3/23 and colonoscopy 8/8/23 . Having breakthrough heartburn sx even after starting omeprazole 40mg a day. Increased to bid 4/2023-has helped a lot. Colon cancer 2023 s/p partial colectomy         Heme/onc mild normocytic anemia-slightly improved. Iron, folate and b12 normal 5/22. Colon cancer 2023-s/p partial colectomy     Rheum Dexa 5/22 osteoporosis on fosomax 5/22. Dexa osteoporosis 5/22  on fosamax since 11/2023.     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 Started on lyrica 50mg 2 bid-caused side effects-stopped. PT completed 2/22 not much help. Flexeril prn . Tramadol 50mg #60 lasts 2 months or more for low back pain-helps but still waking up from sleep with pain.  Tylenol also helps Sees chiropractor Dr. Stearns. Has gradually worsening of lower back pain. Ache that radiates to right hip. No paresthesias. No B/B incontinence.  Gabapentin- nausea.  Tried PT, KHARI and radiofrequency ablation without relief.       Eye Optom Dr. Berrios treating cataracts        Breast Had mri brast 2018- high risk.  3/22 mri breast negative. Cannot tolerate mammo.  Objective:      Physical Exam  Vitals and nursing note reviewed.   Constitutional:       Appearance: She is well-developed.   Cardiovascular:      Rate and Rhythm: Normal rate and regular rhythm.      Heart sounds: Normal heart sounds.   Pulmonary:      Effort: Pulmonary effort is normal.      Breath sounds: Normal breath sounds.   Skin:     General: Skin is warm and dry.   Neurological:      Mental Status: She is alert and oriented to person, place, and time.         Assessment:       ICD-10-CM ICD-9-CM    1. Exacerbation of asthma, unspecified asthma severity, unspecified whether persistent  J45.901 493.92 albuterol (PROVENTIL) 2.5 mg /3 mL (0.083 %) nebulizer solution      promethazine-dextromethorphan (PROMETHAZINE-DM) 6.25-15 mg/5 mL Syrp      2. Influenza A  J10.1 487.1       3. Community acquired pneumonia, unspecified laterality   J18.9 486       4. Essential hypertension  I10 401.9          Plan:   1. Exacerbation of asthma, unspecified asthma severity, unspecified whether persistent (Primary)  treat  - albuterol (PROVENTIL) 2.5 mg /3 mL (0.083 %) nebulizer solution; Take 3 mLs (2.5 mg total) by nebulization every 6 (six) hours as needed for Wheezing or Shortness of Breath. Rescue  Dispense: 90 mL; Refill: PRN  - promethazine-dextromethorphan (PROMETHAZINE-DM) 6.25-15 mg/5 mL Syrp; Take 5 mLs by mouth every 4 (four) hours as needed (cough). (Patient not taking: Reported on 3/6/2025)  Dispense: 473 mL; Refill: 0    2. Influenza A  resolved    3. Community acquired pneumonia, unspecified laterality  resolving    4. Essential hypertension  Controlled on current medications.  Continue current medications.        Time spent with patient: 20 minutes  Patient with be reevaluated in  as scheduled  or sooner prn  Greater than 50% of this visit was spent counseling as described in above documentation:Yes  This note was generated with the assistance of ambient listening technology. Verbal consent was obtained by the patient and accompanying visitor(s) for the recording of patient appointment to facilitate this note. I attest to having reviewed and edited the generated note for accuracy, though some syntax or spelling errors may persist. Please contact the author of this note for any clarification.            [1]   Patient Active Problem List  Diagnosis    Hypothyroid    Nuclear sclerosis    Hyperopia with astigmatism and presbyopia    DDD (degenerative disc disease), lumbar    Morbid obesity with BMI of 40.0-44.9, adult    Calcified granuloma of lung    History of colon cancer    Lumbar radiculitis    Other spondylosis, lumbar region    Chronic right-sided low back pain without sciatica    Vitamin D deficiency    Essential hypertension    Malignant neoplasm of colon    Decreased functional mobility    Muscle tightness    Decreased strength    Decreased  range of motion    Venous lake of lip    GERD (gastroesophageal reflux disease)    Adenocarcinoma    DVT (deep vein thrombosis) in pregnancy    History of pulmonary embolus (PE)    Malignant neoplasm of splenic flexure    Insomnia    Chemotherapy-induced fatigue    Other low back pain    S/P IVC filter    Left renal mass    Port-A-Cath in place    Iron deficiency anemia    Immunodeficiency due to chemotherapy    Atherosclerosis of aorta    Class 3 severe obesity due to excess calories with serious comorbidity and body mass index (BMI) of 40.0 to 44.9 in adult    S/P partial colectomy    Stage 3b chronic kidney disease    Pulmonary fibrosis, unspecified    Pneumonia    Influenza A    Acute cystitis without hematuria    Exacerbation of asthma    Abnormal CT of the chest

## 2025-03-06 ENCOUNTER — PATIENT OUTREACH (OUTPATIENT)
Dept: ADMINISTRATIVE | Facility: CLINIC | Age: 85
End: 2025-03-06
Payer: MEDICARE

## 2025-03-06 LAB
M PNEUMO IGG SER IA-ACNC: 253 U/ML (ref 0–99)
M PNEUMO IGM SER IA-ACNC: <770 U/ML (ref 0–769)

## 2025-03-06 NOTE — PROGRESS NOTES
C3 nurse spoke with Danna Sorensen for a TCC post hospital discharge follow up call. [facility].

## 2025-03-07 DIAGNOSIS — R91.8 PULMONARY NODULES: ICD-10-CM

## 2025-03-07 DIAGNOSIS — J40 BRONCHITIS: ICD-10-CM

## 2025-03-07 DIAGNOSIS — J45.30 MILD PERSISTENT ASTHMA WITHOUT COMPLICATION: ICD-10-CM

## 2025-03-07 NOTE — TELEPHONE ENCOUNTER
----- Message from Veronica sent at 3/7/2025  9:30 AM CST -----  Contact: Patient  Type:  RX Refill RequestWho Called:   PatientRefill or New Rx:  RefillRX Name and Strength:  mometasone-formoterol (DULERA) 100-5 mcg/actuation HFAAalbuterol-ipratropium (DUO-NEB) 2.5 mg-0.5 mg/3 mL nebulizer solution  Preferred Pharmacy with phone number:   [id number] - [place], [place] - [address] AT [facility] [id number] [facility] [address]Ysyno: [phone] Fax: [phone] or Mail Order:  LocalOrdering Provider:  Dr Ortizould the patient rather a call back or a response via [facility]?   Call [facility] Call Back Number:  [phone] Information:   States she is requesting a refill of these medications - please call - thank you

## 2025-03-07 NOTE — TELEPHONE ENCOUNTER
No care due was identified.  [facility] Embedded Care Due Messages. Reference number: [id number].   3/07/2025 10:27:58 AM CST

## 2025-03-10 RX ORDER — MOMETASONE FUROATE AND FORMOTEROL FUMARATE DIHYDRATE 100; 5 UG/1; UG/1
2 AEROSOL RESPIRATORY (INHALATION) 2 TIMES DAILY
Qty: 13 G | Refills: 3 | Status: SHIPPED | OUTPATIENT
Start: 2025-03-10

## 2025-03-10 RX ORDER — IPRATROPIUM BROMIDE AND ALBUTEROL SULFATE 2.5; .5 MG/3ML; MG/3ML
3 SOLUTION RESPIRATORY (INHALATION) EVERY 6 HOURS PRN
Qty: 75 ML | Refills: 3 | Status: SHIPPED | OUTPATIENT
Start: 2025-03-10 | End: 2026-03-10

## 2025-03-16 ENCOUNTER — RESULTS FOLLOW-UP (OUTPATIENT)
Dept: FAMILY MEDICINE | Facility: CLINIC | Age: 85
End: 2025-03-16

## 2025-03-16 PROBLEM — M81.8 OTHER OSTEOPOROSIS WITHOUT CURRENT PATHOLOGICAL FRACTURE: Status: ACTIVE | Noted: 2025-03-16

## 2025-03-31 ENCOUNTER — INFUSION (OUTPATIENT)
Dept: INFUSION THERAPY | Facility: HOSPITAL | Age: 85
End: 2025-03-31
Attending: FAMILY MEDICINE
Payer: MEDICARE

## 2025-03-31 VITALS
RESPIRATION RATE: 17 BRPM | SYSTOLIC BLOOD PRESSURE: 112 MMHG | TEMPERATURE: 97 F | HEART RATE: 97 BPM | OXYGEN SATURATION: 97 % | DIASTOLIC BLOOD PRESSURE: 57 MMHG | WEIGHT: 231 LBS | BODY MASS INDEX: 39.44 KG/M2 | HEIGHT: 64 IN

## 2025-03-31 DIAGNOSIS — M81.8 OTHER OSTEOPOROSIS WITHOUT CURRENT PATHOLOGICAL FRACTURE: Primary | ICD-10-CM

## 2025-03-31 PROCEDURE — 96372 THER/PROPH/DIAG INJ SC/IM: CPT

## 2025-03-31 PROCEDURE — 63600175 PHARM REV CODE 636 W HCPCS: Mod: JZ,TB | Performed by: FAMILY MEDICINE

## 2025-03-31 RX ADMIN — DENOSUMAB 60 MG: 60 INJECTION SUBCUTANEOUS at 01:03

## 2025-03-31 NOTE — PLAN OF CARE
Problem: Fatigue  Goal: Improved Activity Tolerance  Intervention: Promote Improved Energy  Flowsheets (Taken 3/31/2025 1336)  Fatigue Management: fatigue-related activity identified

## 2025-03-31 NOTE — PLAN OF CARE
Problem: Fatigue  Goal: Improved Activity Tolerance  Intervention: Promote Improved Energy  Flowsheets (Taken 3/31/2025 5731)  Fatigue Management: fatigue-related activity identified  Activity Management: Ambulated -L4

## 2025-04-08 ENCOUNTER — LAB VISIT (OUTPATIENT)
Dept: LAB | Facility: HOSPITAL | Age: 85
End: 2025-04-08
Attending: INTERNAL MEDICINE
Payer: MEDICARE

## 2025-04-08 DIAGNOSIS — C18.5 MALIGNANT NEOPLASM OF SPLENIC FLEXURE: ICD-10-CM

## 2025-04-08 LAB
ABSOLUTE EOSINOPHIL (OHS): 0.05 K/UL
ABSOLUTE MONOCYTE (OHS): 0.63 K/UL (ref 0.3–1)
ABSOLUTE NEUTROPHIL COUNT (OHS): 4.75 K/UL (ref 1.8–7.7)
ALBUMIN SERPL BCP-MCNC: 3.5 G/DL (ref 3.5–5.2)
ALP SERPL-CCNC: 49 UNIT/L (ref 40–150)
ALT SERPL W/O P-5'-P-CCNC: 15 UNIT/L (ref 10–44)
ANION GAP (OHS): 10 MMOL/L (ref 8–16)
AST SERPL-CCNC: 14 UNIT/L (ref 11–45)
BASOPHILS # BLD AUTO: 0.04 K/UL
BASOPHILS NFR BLD AUTO: 0.5 %
BILIRUB SERPL-MCNC: 1.1 MG/DL (ref 0.1–1)
BUN SERPL-MCNC: 11 MG/DL (ref 8–23)
CALCIUM SERPL-MCNC: 7.8 MG/DL (ref 8.7–10.5)
CARCINOEMBRYONIC ANTIGEN (OHS): <1.7 NG/ML
CHLORIDE SERPL-SCNC: 114 MMOL/L (ref 95–110)
CO2 SERPL-SCNC: 20 MMOL/L (ref 23–29)
CREAT SERPL-MCNC: 0.8 MG/DL (ref 0.5–1.4)
ERYTHROCYTE [DISTWIDTH] IN BLOOD BY AUTOMATED COUNT: 15.7 % (ref 11.5–14.5)
GFR SERPLBLD CREATININE-BSD FMLA CKD-EPI: >60 ML/MIN/1.73/M2
GLUCOSE SERPL-MCNC: 122 MG/DL (ref 70–110)
HCT VFR BLD AUTO: 35 % (ref 37–48.5)
HGB BLD-MCNC: 11.7 GM/DL (ref 12–16)
IMM GRANULOCYTES # BLD AUTO: 0.1 K/UL (ref 0–0.04)
IMM GRANULOCYTES NFR BLD AUTO: 1.4 % (ref 0–0.5)
LYMPHOCYTES # BLD AUTO: 1.78 K/UL (ref 1–4.8)
MCH RBC QN AUTO: 31.5 PG (ref 27–31)
MCHC RBC AUTO-ENTMCNC: 33.4 G/DL (ref 32–36)
MCV RBC AUTO: 94 FL (ref 82–98)
NUCLEATED RBC (/100WBC) (OHS): 0 /100 WBC
PLATELET # BLD AUTO: 253 K/UL (ref 150–450)
PMV BLD AUTO: 9.3 FL (ref 9.2–12.9)
POTASSIUM SERPL-SCNC: 3.9 MMOL/L (ref 3.5–5.1)
PROT SERPL-MCNC: 6.4 GM/DL (ref 6–8.4)
RBC # BLD AUTO: 3.72 M/UL (ref 4–5.4)
RELATIVE EOSINOPHIL (OHS): 0.7 %
RELATIVE LYMPHOCYTE (OHS): 24.2 % (ref 18–48)
RELATIVE MONOCYTE (OHS): 8.6 % (ref 4–15)
RELATIVE NEUTROPHIL (OHS): 64.6 % (ref 38–73)
SODIUM SERPL-SCNC: 144 MMOL/L (ref 136–145)
WBC # BLD AUTO: 7.35 K/UL (ref 3.9–12.7)

## 2025-04-08 PROCEDURE — 85025 COMPLETE CBC W/AUTO DIFF WBC: CPT | Mod: PN

## 2025-04-08 PROCEDURE — 36415 COLL VENOUS BLD VENIPUNCTURE: CPT | Mod: PN

## 2025-04-08 PROCEDURE — 84520 ASSAY OF UREA NITROGEN: CPT | Mod: PN

## 2025-04-08 PROCEDURE — 82378 CARCINOEMBRYONIC ANTIGEN: CPT

## 2025-04-08 NOTE — PROGRESS NOTES
PATIENT: Danna Sorensen  MRN: [id number]  DATE: 4/9/2025      Diagnosis:   1. History of colon cancer    2. Vitamin D deficiency    3. Pulmonary nodules    4. Enlarged pulmonary artery    5. Renal mass, left    6. Thrombophilia    7. B12 deficiency                    Chief Complaint: Colon Cancer      Oncologic History:      Oncologic History     Oncologic Treatment     Pathology           Subjective:    Interval History: Ms. Sorensen is a 85 y.o. female with Pulmonary embolism, carpal tunnel, CAD, HTN, hypothyroidism, osteoporosis, osteoarthritis, who presents for colon cancer.  Since the last clinic visit the patient was admitted to the hospital from 02/18/2025 until 02/2025 for influenza and pneumonia.  The patient was then admitted again from 02/27/2025 until 03/03/2025 for asthma exacerbation CTA chest on 02/28/2025 showed a 44 mm cavitary lesion in the right upper lung and a small cavitary lesion in the left lower and upper lobe with mild prominent mediastinal lymph nodes.  Sputum culture showed MRSA.  Patient was discharged on prednisone and doxycycline.  Recommendation was for CT chest 2 months after discharge.    Currently the patient endorses occasional shortness of breath.  The patient denies CP, cough, abdominal pain, nausea, vomiting, constipation, diarrhea.  The patient denies fever, chills, night sweats, weight loss, new lumps or bumps, easy bruising or bleeding.    Prior History:  Pt was previously diagnosed with Stage III adenocarcinoma of the colon in 2007 and underwent 12 cycles of FOLFOX.  Pt underwent colonoscopy on 8/08/23 showing 1 7 mm polyp in the rectum; altered vascular, congested, eroded, friable and ulcerated mucosa in the transverse colon which was biopsied; patent and to side ileocolonic anastomosis.  Pathology showed moderately differentiated adenocarcinoma. CT abdomen and pelvis 8/17/23 showing irregular appearance of the gallbladder; heterogeneously enhancing lesion in the inferior pole of  the left kidney measuring 1.8 cm; short segment of concentric bowel wall thickening of the colon at the splenic flexure with surrounding mesenteric fat stranding with nodular thickness of 2.2 cm.  The patient underwent colonoscopy on 08/22/2023 showing nonbleeding internal hemorrhoids, partially obstructing tumor in the transverse colon which was tattooed.  Pt saw Urology 8/23/23 with plans for re-imaging the renal lesion in 3 months with an MRI.  US abdomen 8/25/23 showed a 2.5 cm solid mass at the lower pole of the left kidney suspicious for neoplasm.  CT chest 8/25/23 showed 4 mm left upper lobe pulmonary nodule, 4 mm calcified granuloma left upper lobe, 2 mm pulmonary nodule in the left upper lobe, 9 x 9 mm triangular nodule along the juxtapleural right middle lobe, 2 mm pulmonary nodule in the right middle lobe, and a partially imaged masslike density in the splenic flexure of the colon. Pt then underwent resection of the transverse colon and splenic flexure with path showing invasive moderately differentiated adenocarcinoma with mucinous features, 33 benign lymph nodes, positive lymphovascular invasion, positive perineural invasion pT3N0.  Tumor shows intact expression of MLH1, PMS2, MSH2, MSH6.  Patient was positive for B Celio V600E mutation.   The patient was discussed at tumor Board on 11/07/2023 with recommendation for PET-CT with biopsy of lung nodules if positive.  PET-CT on 11/08/2023 showed evidence of pulmonary hypertension; stable 5 mm nodule left lung apex; calcified granuloma left upper lobe; stable 6 mm inferior right upper lobe nodule; stable 17 mm ground-glass opacity lateral right middle lobe; stable 4 mm nodule in the right lower lobe of the diaphragm; no activity in the lesion in the left kidney.  MRI abdomen 11/20/2023 showed heterogeneous fat containing enhancing mass in the lower pole of the left kidney suggestive of an angio myelolipoma.   The patient underwent a chest x-ray on 02/19/2024  showing no sign of pneumonia.  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   The patient underwent a CT of the chest, abdomen, and pelvis on 06/17/2024 showing multiple stable bilateral pulmonary nodules with disappearance of 1.2 cm left perihilar/perifissural nodule and adjacent ground-glass opacification seen on prior scan; stable heterogeneously enhancing 2.1 cm solid lesion with foci of macroscopic fat in the lower pole of the left kidney; and subacute healing left anterolateral 3rd, 4th, and 5th rib fracture.   The patient underwent CT C/A/P on 1/03/25 showing stable bilateral pulmonary nodules and a stable fat containing lesion in the left inferior renal pole likely representing an angiomyolipoma.     Past Medical History:   Past Medical History:   Diagnosis Date    Acute pulmonary embolism without acute cor pulmonale 08/25/2023    Back pain     Carpal tunnel syndrome     Cataract     Colon cancer     Colon polyp     Coronary artery disease     Essential hypertension 08/09/2019    Exacerbation of asthma 2/28/2025    History of blood clots     History of squamous cell carcinoma 07/27/2015    Hx of colon cancer, stage IV 10/18/2016    Hypothyroidism     Multifocal pneumonia 2/18/2025    Obesity (BMI 30-39.9) 03/24/2016    Osteoarthritis     Osteoporosis     Personal history of colon cancer, stage III     Squamous cell carcinoma     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    Strabismus     Trouble in sleeping     Wears dentures     Uppers       Past Surgical HIstory:   Past Surgical History:   Procedure Laterality Date    CARDIAC SURGERY      cardiac cath    COLON SURGERY      colon resection     COLONOSCOPY N/A 10/18/2016    Procedure: COLONOSCOPY;  Surgeon: Rell Cordova MD;  Location: [facility] ENDO;  Service: Endoscopy;  Laterality: N/A;    COLONOSCOPY N/A 8/8/2023    Procedure: COLONOSCOPY;  Surgeon: Kaushik Pinon MD;  Location: [facility];  Service: Endoscopy;  Laterality: N/A;    COLONOSCOPY N/A 8/22/2023    Procedure: COLONOSCOPY (tattoo of colon ca);  Surgeon: Kaushik Pinon MD;  Location: [facility];  Service: Endoscopy;  Laterality: N/A;    COLONOSCOPY N/A 8/12/2024    Procedure: COLONOSCOPY;  Surgeon: Jim Chavez MD;  Location: [facility];  Service: General;  Laterality: N/A;    ESOPHAGOGASTRODUODENOSCOPY N/A 8/3/2023    Procedure: EGD (ESOPHAGOGASTRODUODENOSCOPY);  Surgeon: Kaushik Pinon MD;  Location: [facility];  Service: Endoscopy;  Laterality: N/A;    HAND TENDON SURGERY Left     HEMICOLECTOMY      right    INJECTION OF ANESTHETIC AGENT AROUND MEDIAL BRANCH NERVES INNERVATING LUMBAR FACET JOINT Right 07/10/2018    Procedure: Block-nerve-medial branch-lumbar;  Surgeon: Parish Gaitan MD;  Location: [facility];  Service: Pain Management;  Laterality: Right;  L3, 4, 5    INSERTION OF INFERIOR VENA CAVAL FILTER N/A 9/13/2023    Procedure: Insertion, Filter, Inferior Vena Cava;  Surgeon: Oren Verdin MD;  Location: [facility] CATH/EP LAB;  Service: General;  Laterality: N/A;    INSERTION OF TUNNELED CENTRAL VENOUS CATHETER (CVC) WITH SUBCUTANEOUS PORT Right 11/15/2023    Procedure: [id number];  Surgeon: Jim Chavez MD;  Location: [facility] OR;  Service: General;  Laterality: Right;    JOINT REPLACEMENT      bilateral    RADIOFREQUENCY ABLATION OF LUMBAR MEDIAL BRANCH NERVE AT SINGLE LEVEL Right 07/24/2018    Procedure: RADIOFREQUENCY ABLATION, NERVE, MEDIAL BRANCH, LUMBAR, 1 LEVEL;  Surgeon: Parish Gaitan MD;  Location: [facility] OR;  Service: Pain Management;  Laterality: Right;  L3,4,5 - Burned at 80 degrees C. for 75 seconds x 2 each site    ROBOT-ASSISTED COLECTOMY N/A 9/29/2023     "Procedure: ROBOTIC COLECTOMY;  Surgeon: Jim Chavez MD;  Location: [facility];  Service: General;  Laterality: N/A;    SHOULDER ARTHROSCOPY Right 01/12/2017    Reverse     TONSILLECTOMY      TONSILLECTOMY, ADENOIDECTOMY, BILATERAL MYRINGOTOMY AND TUBES      TOTAL KNEE ARTHROPLASTY Bilateral 08/1998    total replacements    TUMOR REMOVAL Left     left foot as a child       Family History:   Family History   Problem Relation Name Age of Onset    Hypertension Mother      Kidney disease Mother      Cataracts Father      Cataracts Sister      Cancer Sister          breast    No Known Problems Brother      Cancer Sister          breast    Arthritis Sister      Glaucoma Neg Hx      Amblyopia Neg Hx      Blindness Neg Hx      Macular degeneration Neg Hx      Retinal detachment Neg Hx      Strabismus Neg Hx      Stroke Neg Hx      Thyroid disease Neg Hx         Social History:  reports that she quit smoking about 64 years ago. Her smoking use included cigarettes. She started smoking about 64 years ago. She has a 0.5 pack-year smoking history. She has never used smokeless tobacco. She reports that she does not drink alcohol and does not use drugs.    Allergies:  Review of patient's allergies indicates:   Allergen Reactions    Aspirin      Other reaction(s): Stomach upset    Aspirin Other (See Comments)     Other reaction(s): Stomach upset    Gabapentin Other (See Comments)     Pt states she felt "funny" when she took the medication. Especially at the higher dose.    Mobic [meloxicam] Swelling     Edema and elevated blood pressure     Oxaliplatin Other (See Comments)     Other reaction(s): Joint pain  Other reaction(s): Itching  Other reaction(s): Hives  Other reaction(s): Joint pain  Other reaction(s): Itching  Other reaction(s): Hives    Pregabalin Other (See Comments)     Side effects  Side effects       Medications:  Current Outpatient Medications   Medication Sig Dispense Refill    acetaminophen (TYLENOL) 650 MG TbSR " Take 650 mg by mouth every 8 (eight) hours. PRN      albuterol (PROVENTIL) 2.5 mg /3 mL (0.083 %) nebulizer solution Take 3 mLs (2.5 mg total) by nebulization every 6 (six) hours as needed for Wheezing or Shortness of Breath. Rescue 90 mL PRN    albuterol (PROVENTIL/VENTOLIN HFA) 90 mcg/actuation inhaler Inhale 2 puffs into the lungs every 6 (six) hours as needed for Wheezing. 8.5 g 3    albuterol-ipratropium (DUO-NEB) 2.5 mg-0.5 mg/3 mL nebulizer solution Take 3 mLs by nebulization every 6 (six) hours as needed for Wheezing. Rescue 75 mL 3    alendronate (FOSAMAX) 70 MG tablet TAKE 1 TABLET(70 MG) BY MOUTH EVERY 7 DAYS (Patient not taking: Reported on 3/6/2025) 12 tablet 3    apixaban (ELIQUIS) 5 mg Tab Take 1 tablet (5 mg total) by mouth 2 (two) times daily. 180 tablet 3    azelastine (ASTELIN) 137 mcg (0.1 %) nasal spray 1 spray (137 mcg total) by Nasal route 2 (two) times daily. Point up and slightly outward toward ear when spraying to avoid irritating nasal septum. (Patient not taking: Reported on 3/6/2025) 30 mL 2    ergocalciferol, vitamin D2, (VITAMIN D ORAL) Take 2,000 mg by mouth once daily.      ferrous sulfate (FEOSOL) Tab tablet Take 1 tablet by mouth daily with breakfast.      fluticasone propionate (FLONASE) 50 mcg/actuation nasal spray 1 spray (50 mcg total) by Each Nostril route once daily. (Patient taking differently: 1 spray by Each Nostril route once daily. PRN) 16 g 0    furosemide (LASIX) 20 MG tablet Take 1 tablet (20 mg total) by mouth daily as needed (edema). 90 tablet 3    levocetirizine (XYZAL) 5 MG tablet TAKE 1 TABLET(5 MG) BY MOUTH EVERY NIGHT AS NEEDED FOR ALLERGIES 90 tablet 3    levothyroxine (SYNTHROID) 88 MCG tablet Take 1 tablet (88 mcg total) by mouth once daily. 90 tablet 3    lisinopriL (PRINIVIL,ZESTRIL) 30 MG tablet TAKE 1 TABLET BY MOUTH EVERY DAY 90 tablet 3    mometasone-formoterol (DULERA) 100-5 mcg/actuation HFAA Inhale 2 puffs into the lungs 2 (two) times daily. 13 g 3     multivitamin capsule Take 1 capsule by mouth once daily.      omeprazole (PRILOSEC) 40 MG capsule TAKE 1 CAPSULE(40 MG) BY MOUTH TWICE DAILY BEFORE MEALS 180 capsule PRN    silver sulfADIAZINE 1% (SILVADENE) 1 % cream Apply topically once daily. (Patient taking differently: Apply topically once daily. PRN) 85 g PRN    traMADoL (ULTRAM) 50 mg tablet Take 1 tablet (50 mg total) by mouth every 8 (eight) hours as needed for Pain. 21 each 0     No current facility-administered medications for this visit.     Facility-Administered Medications Ordered in Other Visits   Medication Dose Route Frequency Provider Last Rate Last Admin    0.9%  NaCl infusion   Intravenous Once Oren Verdin MD           Review of Systems   Constitutional:  Negative for chills, fatigue, fever and unexpected weight change.   Respiratory:  Positive for shortness of breath (with activity). Negative for cough.    Cardiovascular:  Negative for chest pain and palpitations.   Gastrointestinal:  Negative for abdominal pain, constipation, diarrhea, nausea and vomiting.   Skin:  Negative for rash.   Neurological:  Negative for headaches.   Hematological:  Negative for adenopathy. Does not bruise/bleed easily.   Psychiatric/Behavioral:  Negative for sleep disturbance.      ECOG Performance Status: 2   Objective:      Vitals:   Vitals:    04/09/25 1335   BP: 137/77   Pulse: 103   Temp: 97.9 °F (36.6 °C)   TempSrc: Temporal   SpO2: 98%   Weight: 107.1 kg (236 lb 1.8 oz)         Physical Exam  Constitutional:       General: She is not in acute distress.     Appearance: She is well-developed. She is not diaphoretic.   HENT:      Head: Normocephalic and atraumatic.   Cardiovascular:      Rate and Rhythm: Normal rate and regular rhythm.      Heart sounds: Normal heart sounds. No murmur heard.     No friction rub. No gallop.   Pulmonary:      Effort: Pulmonary effort is normal. No respiratory distress.      Breath sounds: No wheezing or rales.   Chest:       Chest wall: No tenderness.   Abdominal:      General: Bowel sounds are normal. There is no distension.      Palpations: Abdomen is soft. There is no mass.      Tenderness: There is no abdominal tenderness. There is no guarding or rebound.   Lymphadenopathy:      Cervical: No cervical adenopathy.      Upper Body:      Right upper body: No supraclavicular or axillary adenopathy.      Left upper body: No supraclavicular or axillary adenopathy.   Skin:     Findings: No erythema or rash.   Neurological:      Mental Status: She is alert and oriented to person, place, and time.   Psychiatric:         Behavior: Behavior normal.         Laboratory Data:  Lab Visit on 04/08/2025   Component Date Value Ref Range Status    Sodium 04/08/2025 144  136 - 145 mmol/L Final    Potassium 04/08/2025 3.9  3.5 - 5.1 mmol/L Final    Chloride 04/08/2025 114 (H)  95 - 110 mmol/L Final    CO2 04/08/2025 20 (L)  23 - 29 mmol/L Final    Glucose 04/08/2025 122 (H)  70 - 110 mg/dL Final    BUN 04/08/2025 11  8 - 23 mg/dL Final    Creatinine 04/08/2025 0.8  0.5 - 1.4 mg/dL Final    Calcium 04/08/2025 7.8 (L)  8.7 - 10.5 mg/dL Final    Protein Total 04/08/2025 6.4  6.0 - 8.4 gm/dL Final    Albumin 04/08/2025 3.5  3.5 - 5.2 g/dL Final    Bilirubin Total 04/08/2025 1.1 (H)  0.1 - 1.0 mg/dL Final    For infants and newborns, interpretation of results should be based   on gestational age, weight and in agreement with clinical   observations.    Premature Infant recommended reference ranges:   0-24 hours:  <8.0 mg/dL   24-48 hours: <12.0 mg/dL   3-5 days:    <15.0 mg/dL   6-29 days:   <15.0 mg/dL    ALP 04/08/2025 49  40 - 150 unit/L Final    AST 04/08/2025 14  11 - 45 unit/L Final    ALT 04/08/2025 15  10 - 44 unit/L Final    Anion Gap 04/08/2025 10  8 - 16 mmol/L Final    eGFR 04/08/2025 >60  >60 mL/min/1.73/m2 Final    Estimated GFR calculated using the CKD-EPI creatinine (2021) equation.    Carcinoembryonic Antigen 04/08/2025 <1.7  <=5.0 ng/mL  Final    CEA Normal Range:  Non-Smokers: 0-3.0 ng/mL  Smokers:     0-5.0 ng/mL  The testing method is a chemiluminescent microparticle immunoassay manufactured by [employer] and performed on the [employer] or [employer] system. Values obtained with different assay manufacturers for methods may be different and cannot be used interchangeably.    WBC 04/08/2025 7.35  3.90 - 12.70 K/uL Final    RBC 04/08/2025 3.72 (L)  4.00 - 5.40 M/uL Final    HGB 04/08/2025 11.7 (L)  12.0 - 16.0 gm/dL Final    HCT 04/08/2025 35.0 (L)  37.0 - 48.5 % Final    MCV 04/08/2025 94  82 - 98 fL Final    MCH 04/08/2025 31.5 (H)  27.0 - 31.0 pg Final    MCHC 04/08/2025 33.4  32.0 - 36.0 g/dL Final    RDW 04/08/2025 15.7 (H)  11.5 - 14.5 % Final    Platelet Count 04/08/2025 253  150 - 450 K/uL Final    MPV 04/08/2025 9.3  9.2 - 12.9 fL Final    Nucleated RBC 04/08/2025 0  <=0 /100 WBC Final    Neut % 04/08/2025 64.6  38 - 73 % Final    Lymph % 04/08/2025 24.2  18 - 48 % Final    Mono % 04/08/2025 8.6  4 - 15 % Final    Eos % 04/08/2025 0.7  <=8 % Final    Basophil % 04/08/2025 0.5  <=1.9 % Final    Imm Grans % 04/08/2025 1.4 (H)  0.0 - 0.5 % Final    Neut # 04/08/2025 4.75  1.8 - 7.7 K/uL Final    Lymph # 04/08/2025 1.78  1 - 4.8 K/uL Final    Mono # 04/08/2025 0.63  0.3 - 1 K/uL Final    Eos # 04/08/2025 0.05  <=0.5 K/uL Final    Baso # 04/08/2025 0.04  <=0.2 K/uL Final    Imm Grans # 04/08/2025 0.10 (H)  0.00 - 0.04 K/uL Final    Mild elevation in immature granulocytes is non specific and can be seen in a variety of conditions including stress response, acute inflammation, trauma and pregnancy. Correlation with other laboratory and clinical findings is essential.         Imaging:     CTA Chest 2/28/25    Enlarged main pulmonary artery suggestive of pulmonary arterial hypertension.  No central or segmental pulmonary embolus.     No cardiomegaly.  Moderate coronary artery atherosclerosis     44 mm cavitary lesion right upper lobe with  adjacent airspace disease.  Small cavitary lesion left lower and upper lobe.  Right basilar airspace disease and atelectasis.  5 mm noncalcified nodule left upper lobe.  Multiple additional small bilateral multifocal nodular opacities are noted.  Mildly prominent mediastinal nodes.  Enlarged right hilar node.       Assessment:       1. History of colon cancer    2. Vitamin D deficiency    3. Pulmonary nodules    4. Enlarged pulmonary artery    5. Renal mass, left    6. Thrombophilia    7. B12 deficiency                       Plan:     Colon Cancer - Pt with h/o colon cancer s/p resection in 2007 followed by adjuvant FOLFOX for 12 cycles  -Pt recently with resection of transverse colon and splenic flexure 9/29/23 showing path showing invasive moderately differentiated adenocarcinoma with mucinous features, 33 benign lymph nodes, positive lymphovascular invasion, positive perineural invasion pT3N0  -Tumor shows intact expression of MLH1, PMS2, MSH2, MSH6  -Patient was positive for B Celio V600E mutation  -Pt has high risk stage II colon cancer given positive LVI and PNI  -PT is a candidate for treatment with 6 months of 5-FU or Capecitabine  -no clear evidence of metastatic disease on PET-CT 11/08/2023   -Will proceed with 6 months of 5 fluorouracil  -Pharmacogenomic panel on 11/03/23 showed normal DPYD metabolizer but 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  -Scans on 02/23/2024 showed a left lower lobe pneumonia but no clear evidence of metastatic disease  -PT completed 6 months of Fluoruracil treatment 5/20/24  -Repeat scans 6/17/24 showed stable pulmonary nodules and left renal nodule but no clear signs of disease  -Colonoscopy done 8/12/24 with recommendation to repeat in 3 years  -CEA 1/03/25 normal  -Scans on 1/03/25 showed stable pulmonary nodules and VINNY  -CEA normal on 4/08/25    Renal Mass - Pt with a mass on the left kidney seen on CT abdomen 8/17/23 and US abdomen 8/25/23  -Pt saw Urology Kacey GALAN  COLIN Mccloud under the supervision of Dr. 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  -Stable on repeat scans 1/03/25  -Will repeat scans    Pulmonary Nodules - seen on Ct chest 8/25/23  -no avid nodules on PET/CT 11/08/23  -Stable on repeat CT 1/03/25  -CTA chest on 2/28/25 showed a RUL cavitary mass in setting of recent PNA and flu  -Will repeat CT chest this month    DVT - PT with bilateral nonocclusive DVT demonstrated on the SFV to the popliteal veins seen on US 9/01/23  -Pt on Eliquis  -Will monitor    B12 Deficiency - ferritin 25ng/mL on 12/20/23  -Pt received injectafer 1/24/24  -B12 low on 9/24/24  -Pt on B12 with improvement in hemoglobin  -Will monitor    Hypocalcemia - will check Vitamin D level  Lab Results   Component Value Date    CALCIUM 7.8 (L) 04/08/2025    PHOS 3.4 02/28/2025     Enlarged Pulmonary Artery - seen on CTA chest   -Will obtain Echo to rule out PHTN    Route Chart for Scheduling    Med Onc Chart Routing      Follow up with physician Other and 4 months. Pt needs an Echo and CT C/A/P at the end of the month.   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, cN0, cM0 noted on 11/3/2023   Line of treatment: Adjuvant  Treatment Goal: Curative     Upcoming Treatment Dates - OP COLORECTAL FLUOROURACIL LEUCOVORIN Q2W (MOD DE GRAMONT)    6/3/2024       Chemotherapy       leucovorin calcium in dextrose 5 % (D5W) 250 mL infusion       fluorouraciL injection       fluorouracil chemo infusion       Antiemetics       ondansetron injection 8 mg  6/17/2024       Chemotherapy       leucovorin calcium in dextrose 5 % (D5W) 250 mL infusion        fluorouraciL injection       fluorouracil chemo infusion       Antiemetics       ondansetron injection 8 mg  7/1/2024       Chemotherapy       leucovorin calcium in dextrose 5 % (D5W) 250 mL infusion       fluorouraciL injection       fluorouracil chemo infusion       Antiemetics       ondansetron injection 8 mg  7/15/2024       Chemotherapy       leucovorin calcium in dextrose 5 % (D5W) 250 mL infusion       fluorouraciL injection       fluorouracil chemo infusion       Antiemetics       ondansetron injection 8 mg    Supportive Plan Information  OP FERRIC CARBOXYMALTOSE Q2W Mahesh Cameron MD   Associated Diagnosis: Iron deficiency anemia   noted on 12/20/2023   Line of treatment: Supportive Care   Treatment goal: Supportive     Upcoming Treatment Dates - OP FERRIC CARBOXYMALTOSE Q2W    1/25/2024       Supportive Care       ferric carboxymaltose (INJECTAFER) 750 mg in sodium chloride 0.9% 265 mL IVPB (ready to mix)    Therapy Plan Information  DENOSUMAB (PROLIA) Q6M for Other osteoporosis without current pathological fracture, noted on 3/16/2025  Medications  denosumab (PROLIA) injection 60 mg  60 mg, Subcutaneous, Every 26 weeks      No therapy plan of the specified type found.    No therapy plan of the specified type found.      Mahesh Cameron MD  [facility]  Hematology and Oncology  [facility]   [address]   [place] [address]   O: [phone]  F: [phone]

## 2025-04-09 ENCOUNTER — OFFICE VISIT (OUTPATIENT)
Dept: HEMATOLOGY/ONCOLOGY | Facility: CLINIC | Age: 85
End: 2025-04-09
Payer: MEDICARE

## 2025-04-09 VITALS
DIASTOLIC BLOOD PRESSURE: 77 MMHG | TEMPERATURE: 98 F | WEIGHT: 236.13 LBS | HEART RATE: 103 BPM | BODY MASS INDEX: 40.53 KG/M2 | SYSTOLIC BLOOD PRESSURE: 137 MMHG | OXYGEN SATURATION: 98 %

## 2025-04-09 DIAGNOSIS — D68.59 THROMBOPHILIA: ICD-10-CM

## 2025-04-09 DIAGNOSIS — R91.8 PULMONARY NODULES: ICD-10-CM

## 2025-04-09 DIAGNOSIS — Z85.038 HISTORY OF COLON CANCER: Primary | ICD-10-CM

## 2025-04-09 DIAGNOSIS — I28.8 ENLARGED PULMONARY ARTERY: ICD-10-CM

## 2025-04-09 DIAGNOSIS — E53.8 B12 DEFICIENCY: ICD-10-CM

## 2025-04-09 DIAGNOSIS — N28.89 RENAL MASS, LEFT: ICD-10-CM

## 2025-04-09 DIAGNOSIS — E55.9 VITAMIN D DEFICIENCY: ICD-10-CM

## 2025-04-09 PROCEDURE — 99214 OFFICE O/P EST MOD 30 MIN: CPT | Mod: S$GLB,,, | Performed by: INTERNAL MEDICINE

## 2025-04-09 PROCEDURE — 1125F AMNT PAIN NOTED PAIN PRSNT: CPT | Mod: CPTII,S$GLB,, | Performed by: INTERNAL MEDICINE

## 2025-04-09 PROCEDURE — 99999 PR PBB SHADOW E&M-EST. PATIENT-LVL III: CPT | Mod: PBBFAC,,, | Performed by: INTERNAL MEDICINE

## 2025-04-09 PROCEDURE — 3078F DIAST BP <80 MM HG: CPT | Mod: CPTII,S$GLB,, | Performed by: INTERNAL MEDICINE

## 2025-04-09 PROCEDURE — 3288F FALL RISK ASSESSMENT DOCD: CPT | Mod: CPTII,S$GLB,, | Performed by: INTERNAL MEDICINE

## 2025-04-09 PROCEDURE — 1101F PT FALLS ASSESS-DOCD LE1/YR: CPT | Mod: CPTII,S$GLB,, | Performed by: INTERNAL MEDICINE

## 2025-04-09 PROCEDURE — 3075F SYST BP GE 130 - 139MM HG: CPT | Mod: CPTII,S$GLB,, | Performed by: INTERNAL MEDICINE

## 2025-04-24 ENCOUNTER — HOSPITAL ENCOUNTER (OUTPATIENT)
Dept: RADIOLOGY | Facility: HOSPITAL | Age: 85
Discharge: HOME | End: 2025-04-24
Attending: INTERNAL MEDICINE
Payer: MEDICARE

## 2025-04-24 ENCOUNTER — HOSPITAL ENCOUNTER (OUTPATIENT)
Dept: CARDIOLOGY | Facility: HOSPITAL | Age: 85
Discharge: HOME | End: 2025-04-24
Attending: INTERNAL MEDICINE
Payer: MEDICARE

## 2025-04-24 VITALS — WEIGHT: 236 LBS | BODY MASS INDEX: 40.29 KG/M2 | HEIGHT: 64 IN

## 2025-04-24 DIAGNOSIS — I28.8 ENLARGED PULMONARY ARTERY: ICD-10-CM

## 2025-04-24 DIAGNOSIS — R91.8 PULMONARY NODULES: ICD-10-CM

## 2025-04-24 LAB
ASCENDING AORTA: 3.5 CM
AV INDEX (PROSTH): 0.65
AV MEAN GRADIENT: 8 MMHG
AV PEAK GRADIENT: 13 MMHG
AV REGURGITATION PRESSURE HALF TIME: 486 MS
AV VALVE AREA BY VELOCITY RATIO: 2.5 CM²
AV VALVE AREA: 2.2 CM²
AV VELOCITY RATIO: 0.72
BSA FOR ECHO PROCEDURE: 2.2 M2
CV ECHO LV RWT: 0.36 CM
DOP CALC AO PEAK VEL: 1.8 M/S
DOP CALC AO VTI: 44.1 CM
DOP CALC LVOT AREA: 3.5 CM2
DOP CALC LVOT DIAMETER: 2.1 CM
DOP CALC LVOT PEAK VEL: 1.3 M/S
DOP CALC LVOT STROKE VOLUME: 98.7 CM3
DOP CALCLVOT PEAK VEL VTI: 28.5 CM
E WAVE DECELERATION TIME: 159 MSEC
E/A RATIO: 0.92
E/E' RATIO: 11 M/S
ECHO LV POSTERIOR WALL: 0.9 CM (ref 0.6–1.1)
EJECTION FRACTION: 60 %
FRACTIONAL SHORTENING: 34 % (ref 28–44)
GLOBAL LONGITUIDAL STRAIN: 17.8 %
INTERVENTRICULAR SEPTUM: 1 CM (ref 0.6–1.1)
IVC DIAMETER: 1.68 CM
LEFT ATRIUM AREA SYSTOLIC (APICAL 2 CHAMBER): 20.23 CM2
LEFT ATRIUM AREA SYSTOLIC (APICAL 4 CHAMBER): 20.59 CM2
LEFT ATRIUM VOLUME INDEX MOD: 30 ML/M2
LEFT ATRIUM VOLUME MOD: 62 ML
LEFT INTERNAL DIMENSION IN SYSTOLE: 3.3 CM (ref 2.1–4)
LEFT VENTRICLE DIASTOLIC VOLUME INDEX: 56.67 ML/M2
LEFT VENTRICLE DIASTOLIC VOLUME: 119 ML
LEFT VENTRICLE END SYSTOLIC VOLUME APICAL 2 CHAMBER: 63.13 ML
LEFT VENTRICLE END SYSTOLIC VOLUME APICAL 4 CHAMBER: 57.69 ML
LEFT VENTRICLE MASS INDEX: 80.9 G/M2
LEFT VENTRICLE SYSTOLIC VOLUME INDEX: 20.5 ML/M2
LEFT VENTRICLE SYSTOLIC VOLUME: 43 ML
LEFT VENTRICULAR INTERNAL DIMENSION IN DIASTOLE: 5 CM (ref 3.5–6)
LEFT VENTRICULAR MASS: 169.9 G
LV LATERAL E/E' RATIO: 10.7 M/S
LV SEPTAL E/E' RATIO: 12 M/S
LVED V (TEICH): 118.7 ML
LVES V (TEICH): 43.04 ML
LVOT MG: 3.14 MMHG
LVOT MV: 0.85 CM/S
MV PEAK A VEL: 1.04 M/S
MV PEAK E VEL: 0.96 M/S
MV STENOSIS PRESSURE HALF TIME: 46.02 MS
MV VALVE AREA P 1/2 METHOD: 4.78 CM2
OHS CV RV/LV RATIO: 0.88 CM
PISA AR MAX VEL: 4.01 M/S
PISA MRMAX VEL: 5.85 M/S
PISA TR MAX VEL: 3 M/S
PULM VEIN S/D RATIO: 0.98
PV PEAK D VEL: 0.88 M/S
PV PEAK S VEL: 0.86 M/S
RA PRESSURE ESTIMATED: 3 MMHG
RA VOL SYS: 64.2 ML
RIGHT ATRIAL AREA: 20.7 CM2
RIGHT ATRIUM VOLUME AREA LENGTH APICAL 4 CHAMBER: 61.57 ML
RIGHT VENTRICLE DIASTOLIC BASEL DIMENSION: 4.4 CM
RIGHT VENTRICLE DIASTOLIC LENGTH: 7.7 CM
RIGHT VENTRICLE DIASTOLIC MID DIMENSION: 3.1 CM
RIGHT VENTRICULAR END-DIASTOLIC DIMENSION: 4.4 CM
RIGHT VENTRICULAR LENGTH IN DIASTOLE (APICAL 4-CHAMBER VIEW): 7.66 CM
RV MID DIAMA: 3.07 CM
RV TB RVSP: 6 MMHG
RV TISSUE DOPPLER FREE WALL SYSTOLIC VELOCITY 1 (APICAL 4 CHAMBER VIEW): 16.27 CM/S
SINUS: 3.26 CM
STJ: 2.9 CM
TDI LATERAL: 0.09 M/S
TDI SEPTAL: 0.08 M/S
TDI: 0.09 M/S
TR MAX PG: 52 MMHG
TRICUSPID ANNULAR PLANE SYSTOLIC EXCURSION: 2.7 CM
TV REST PULMONARY ARTERY PRESSURE: 39 MMHG
Z-SCORE OF LEFT VENTRICULAR DIMENSION IN END DIASTOLE: -2.67
Z-SCORE OF LEFT VENTRICULAR DIMENSION IN END SYSTOLE: -1.5

## 2025-04-24 PROCEDURE — 93306 TTE W/DOPPLER COMPLETE: CPT | Mod: 26,,, | Performed by: INTERNAL MEDICINE

## 2025-04-24 PROCEDURE — 93356 MYOCRD STRAIN IMG SPCKL TRCK: CPT | Mod: ,,, | Performed by: INTERNAL MEDICINE

## 2025-04-24 PROCEDURE — 71250 CT THORAX DX C-: CPT | Mod: TC,PO

## 2025-04-24 PROCEDURE — 93356 MYOCRD STRAIN IMG SPCKL TRCK: CPT | Mod: PO

## 2025-04-24 PROCEDURE — 71250 CT THORAX DX C-: CPT | Mod: 26,,, | Performed by: RADIOLOGY

## 2025-04-28 DIAGNOSIS — I82.4Y3 DEEP VEIN THROMBOSIS (DVT) OF PROXIMAL VEIN OF BOTH LOWER EXTREMITIES, UNSPECIFIED CHRONICITY: ICD-10-CM

## 2025-04-28 NOTE — TELEPHONE ENCOUNTER
No care due was identified.  [facility] Embedded Care Due Messages. Reference number: [id number].   4/28/2025 1:05:53 PM CDT

## 2025-05-08 DIAGNOSIS — J30.1 SEASONAL ALLERGIC RHINITIS DUE TO POLLEN: ICD-10-CM

## 2025-05-08 RX ORDER — LEVOCETIRIZINE DIHYDROCHLORIDE 5 MG/1
TABLET, FILM COATED ORAL
Qty: 90 TABLET | Refills: 3 | Status: SHIPPED | OUTPATIENT
Start: 2025-05-08

## 2025-05-08 NOTE — TELEPHONE ENCOUNTER
No care due was identified.  [facility] Embedded Care Due Messages. Reference number: [id number].   5/08/2025 10:13:07 AM CDT

## 2025-05-08 NOTE — TELEPHONE ENCOUNTER
Refill Decision Note   Danna Edu  is requesting a refill authorization.  Brief Assessment and Rationale for Refill:  Approve     Medication Therapy Plan:        Comments:     Note composed:11:54 AM 05/08/2025

## 2025-05-13 ENCOUNTER — OFFICE VISIT (OUTPATIENT)
Dept: HEMATOLOGY/ONCOLOGY | Facility: CLINIC | Age: 85
End: 2025-05-13
Payer: MEDICARE

## 2025-05-13 VITALS
SYSTOLIC BLOOD PRESSURE: 143 MMHG | DIASTOLIC BLOOD PRESSURE: 83 MMHG | HEIGHT: 64 IN | OXYGEN SATURATION: 98 % | RESPIRATION RATE: 17 BRPM | TEMPERATURE: 98 F | HEART RATE: 63 BPM | BODY MASS INDEX: 40.24 KG/M2 | WEIGHT: 235.69 LBS

## 2025-05-13 DIAGNOSIS — I28.8 ENLARGED PULMONARY ARTERY: ICD-10-CM

## 2025-05-13 DIAGNOSIS — E53.8 B12 DEFICIENCY: ICD-10-CM

## 2025-05-13 DIAGNOSIS — N28.89 RENAL MASS, LEFT: ICD-10-CM

## 2025-05-13 DIAGNOSIS — D68.59 THROMBOPHILIA: ICD-10-CM

## 2025-05-13 DIAGNOSIS — R91.8 PULMONARY NODULES: ICD-10-CM

## 2025-05-13 DIAGNOSIS — E55.9 VITAMIN D DEFICIENCY: ICD-10-CM

## 2025-05-13 DIAGNOSIS — Z85.038 HISTORY OF COLON CANCER: Primary | ICD-10-CM

## 2025-05-13 PROCEDURE — 99999 PR PBB SHADOW E&M-EST. PATIENT-LVL IV: CPT | Mod: PBBFAC,,, | Performed by: INTERNAL MEDICINE

## 2025-05-25 NOTE — INTERVAL H&P NOTE
POA at [facility] as evidenced by leukocytosis and tachycardia   In setting of right hip intramuscular abscess as above   Continue IV vancomycin and p.o. Flagyl per ID recommendations  S/p IV fluid hydration -   Sp 1 unit PRBC's will order additional 1 unit PRBC's today   BC from [facility] 1/2 [facility] in clusters, see related plan.  BC obtained here with NGTD  Lactic negative   Procal, WBC now down trending 16.31 --> 14.74  Trend CBC and temps closely   See plan as above   The patient has been examined and the H&P has been reviewed:    I concur with the findings and no changes have occurred since H&P was written.    Surgery risks, benefits and alternative options discussed and understood by patient/family.          There are no hospital problems to display for this patient.

## 2025-06-04 RX ORDER — TRIAMCINOLONE ACETONIDE 1 MG/G
CREAM TOPICAL 2 TIMES DAILY
Qty: 80 G | Refills: 0 | Status: SHIPPED | OUTPATIENT
Start: 2025-06-04

## 2025-06-06 ENCOUNTER — TELEPHONE (OUTPATIENT)
Dept: FAMILY MEDICINE | Facility: CLINIC | Age: 85
End: 2025-06-06
Payer: MEDICARE

## 2025-06-20 ENCOUNTER — TELEPHONE (OUTPATIENT)
Dept: FAMILY MEDICINE | Facility: CLINIC | Age: 85
End: 2025-06-20
Payer: MEDICARE

## 2025-06-20 NOTE — TELEPHONE ENCOUNTER
Copied from CRM #[id number]. Topic: General Inquiry - Patient Advice  >> Jun 20, 2025  9:24 AM Kathy wrote:  Type:  Needs Medical Advice    Who Called: pt     Would the patient rather a call back or a response via [facility]? Call back   Best Call Back Number: [phone]   Additional Information: calling regarding clearance for cataract surgery, says doc been trying to reach salvador for 2 weeks for clearance. Scheduled for 07/09 in the morning.  [facility]  surgery coordinator [phone]

## 2025-06-23 NOTE — ADDENDUM NOTE
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 daily with improvement of the hemoglobin no more anemia

## 2025-06-25 ENCOUNTER — TELEPHONE (OUTPATIENT)
Dept: FAMILY MEDICINE | Facility: CLINIC | Age: 85
End: 2025-06-25

## 2025-06-25 ENCOUNTER — OFFICE VISIT (OUTPATIENT)
Dept: FAMILY MEDICINE | Facility: CLINIC | Age: 85
End: 2025-06-25
Payer: MEDICARE

## 2025-06-25 VITALS
OXYGEN SATURATION: 95 % | HEIGHT: 64 IN | HEART RATE: 83 BPM | RESPIRATION RATE: 16 BRPM | DIASTOLIC BLOOD PRESSURE: 80 MMHG | TEMPERATURE: 98 F | SYSTOLIC BLOOD PRESSURE: 120 MMHG | BODY MASS INDEX: 40.16 KG/M2 | WEIGHT: 235.25 LBS

## 2025-06-25 DIAGNOSIS — N18.32 STAGE 3B CHRONIC KIDNEY DISEASE: ICD-10-CM

## 2025-06-25 DIAGNOSIS — Z01.810 PREOP CARDIOVASCULAR EXAM: Primary | ICD-10-CM

## 2025-06-25 DIAGNOSIS — H26.9 CATARACT OF BOTH EYES, UNSPECIFIED CATARACT TYPE: ICD-10-CM

## 2025-06-25 DIAGNOSIS — I10 ESSENTIAL HYPERTENSION: ICD-10-CM

## 2025-06-25 DIAGNOSIS — J45.30 MILD PERSISTENT ASTHMA WITHOUT COMPLICATION: ICD-10-CM

## 2025-06-25 DIAGNOSIS — E66.01 MORBID OBESITY WITH BMI OF 40.0-44.9, ADULT: ICD-10-CM

## 2025-06-25 PROCEDURE — 99214 OFFICE O/P EST MOD 30 MIN: CPT | Mod: S$GLB,,, | Performed by: FAMILY MEDICINE

## 2025-06-25 PROCEDURE — 1126F AMNT PAIN NOTED NONE PRSNT: CPT | Mod: CPTII,S$GLB,, | Performed by: FAMILY MEDICINE

## 2025-06-25 PROCEDURE — 1159F MED LIST DOCD IN RCRD: CPT | Mod: CPTII,S$GLB,, | Performed by: FAMILY MEDICINE

## 2025-06-25 PROCEDURE — 3074F SYST BP LT 130 MM HG: CPT | Mod: CPTII,S$GLB,, | Performed by: FAMILY MEDICINE

## 2025-06-25 PROCEDURE — 3079F DIAST BP 80-89 MM HG: CPT | Mod: CPTII,S$GLB,, | Performed by: FAMILY MEDICINE

## 2025-06-25 PROCEDURE — 3288F FALL RISK ASSESSMENT DOCD: CPT | Mod: CPTII,S$GLB,, | Performed by: FAMILY MEDICINE

## 2025-06-25 PROCEDURE — G2211 COMPLEX E/M VISIT ADD ON: HCPCS | Mod: S$GLB,,, | Performed by: FAMILY MEDICINE

## 2025-06-25 PROCEDURE — 1101F PT FALLS ASSESS-DOCD LE1/YR: CPT | Mod: CPTII,S$GLB,, | Performed by: FAMILY MEDICINE

## 2025-06-25 PROCEDURE — 99999 PR PBB SHADOW E&M-EST. PATIENT-LVL III: CPT | Mod: PBBFAC,,, | Performed by: FAMILY MEDICINE

## 2025-06-25 PROCEDURE — 1160F RVW MEDS BY RX/DR IN RCRD: CPT | Mod: CPTII,S$GLB,, | Performed by: FAMILY MEDICINE

## 2025-06-25 NOTE — PROGRESS NOTES
Subjective:       Patient ID: Danna Sorensen is a 85 y.o. female.    Chief Complaint: Pre-op Exam (Cataract Clearance)    Problem List[1]  Patient is here for a chronic conditions follow up.    Reviewed labs 4/2025  History of Present Illness    CHIEF COMPLAINT:  Ms. Sorensen presents today for check up and pre-operative clearance for cataract surgery.    PRE-OPERATIVE CLEARANCE FOR CATARACT SURGERY:  She presents for pre-operative clearance for upcoming bilateral cataract surgery with right eye surgery scheduled for July 9th and left eye surgery scheduled for July 30th. She denies any prior problems with anesthesia and understands the perioperative medication management plan including stopping Eliquis two days before each surgical procedure.    RESPIRATORY:  She had a recent pulmonary visit yesterday where the physician noted wheezing in her chest. Her current Delara inhaler has not been effectively managing her symptoms. The pulmonary doctor prescribed Trelegy, which contains three medicines and is to be taken daily, along with an as-needed inhaler for management of shortness of breath. She denies currently feeling tight or significantly short of breath during today's encounter and expresses hope that the new medication will improve her respiratory symptoms.      ROS:  ROS findings as noted in HPI. Has f/u with me with previsit labs 8/2025     Needs preop cataracts  PRE-OP CONSULTATION FOR SURGERY    PROCEDURE:cataracts  PHYSICIAN:Dr. Myers  DATE:7/9/25 and 7/30/25    PRIOR SURGERY:   NO PROBLEM WITH ANESTHESIA    CV ROS: NO CHEST PAIN, SHORTNESS OF BREATH, POOR EXERCISE TOLERANCE.  NO H/O CAD, PALPITATIONS, SYNCOPE, CHF OR OTHER CARDIAC PROBLEMS.  PATIENT IS ABLE TO WALK A FLIGHT OF STAIRS WITHOUT STOPPING TO REST    RESP ROS: NO H/O  COPD. + ASTHMA.  PATIENT IS A former smoker.  Fair control on current meds    CARDIAC RF: htn, current or former smoker, sedentary lifestyle, and obesity   Review of Systems    Constitutional:  Negative for fatigue and unexpected weight change.   Respiratory:  Negative for chest tightness and shortness of breath.    Cardiovascular:  Negative for chest pain, palpitations and leg swelling.   Gastrointestinal:  Negative for abdominal pain.   Musculoskeletal:  Negative for arthralgias.   Neurological:  Negative for dizziness, syncope, light-headedness and headaches.      Relevant History:  Lives with niece Barbie-lots of support     Endocrine hypothyroid synthroid 75mcg      Pulm Dr. Romero asthma on dulera. Admitted SME for CAP.  Admitted 3/3/25 for asthma exacerbation due to flu      Psych C/o trouble sleeping. Trazodone  mg no help. Told to try melatonin 5 mg      Urology NP O'Kristine left renal mass suspected malignancy. Mri showed benign findings     GI Surg Dr. Chavez and Surgery Dr. Evans colon cancer Heme/onc NP Viraj following for colon cancer s/p resection and chemo 2007 with recent recurrence s/p extended robotic colectomy of the  transverse colon and splenic flexure with ileo colic anastomosis.  This was performed on September 29th, 2023 She did however have perineural and lymphovascular invasion.    Also of note she does have a known suspected malignancy of the left kidney.    port-a-cath placement 11/15/23.  chemo started 12/23  PET 11/2023 Patient is status post resection of colon carcinoma with no findings to indicate metastatic or recurrent disease.  Lung nodules, some of which are unchanged since 2007, benign, and others of which are new since 2007 but stable compared to 08/25/2023.  Continued CT follow-up is recommended. Last CT 6/2024  no new mets, stable pulm nodules, stable kidney mass suggestive of angiomyolipoma.   Colonoscopy  8/12/24 -no masses or polyps repeat 3 years.  5FU x 6 months completed 5/2024.  CT /PET 9/2024  Postoperative changes of partial colectomy.  No evidence of local disease recurrence or new metastasis.  2. Stable bilateral subcentimeter  pulmonary nodules.  3. 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  4. Multiple ventral abdominal wall hernias with one containing bowel.  5. Cholelithiasis.  6. Additional findings as above.  Port a cath has been removed     Vasc surg Dr. Verdin h/o DVT s/p IVC filter 9/2023 on eliquis. Has chronic d dimer elevation     GI Dr. Pinon planning egd 8/3/23 and colonoscopy 8/8/23 . Having breakthrough heartburn sx even after starting omeprazole 40mg a day. Increased to bid 4/2023-has helped a lot. Colon cancer 2023 s/p partial colectomy         Heme/onc mild normocytic anemia-slightly improved. Iron, folate and b12 normal 5/22. Colon cancer 2023-s/p partial colectomy     Rheum Dexa 5/22 osteoporosis on fosomax 5/22. Dexa osteoporosis 5/22  on fosamax since 11/2023.     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 Started on lyrica 50mg 2 bid-caused side effects-stopped. PT completed 2/22 not much help. Flexeril prn . Tramadol 50mg #60 lasts 2 months or more for low back pain-helps but still waking up from sleep with pain.  Tylenol also helps Sees chiropractor Dr. Stearns. Has gradually worsening of lower back pain. Ache that radiates to right hip. No paresthesias. No B/B incontinence.  Gabapentin- nausea.  Tried PT, KHARI and radiofrequency ablation without relief.       Eye Optom Dr. Berrios treating cataracts        Breast Had mri brast 2018- high risk.  3/22 mri breast negative. Cannot tolerate mammo.  Objective:      Physical Exam  Vitals and nursing note reviewed.   Constitutional:       Appearance: She is well-developed.   Cardiovascular:      Rate and Rhythm: Normal rate and regular rhythm.      Heart sounds: Normal heart sounds.   Pulmonary:      Effort: Pulmonary effort is normal.      Breath sounds: Wheezing present.   Skin:     General: Skin is warm and dry.   Neurological:      Mental Status: She is alert and oriented to person, place, and time.          Assessment:       ICD-10-CM ICD-9-CM    1. Preop cardiovascular exam  Z01.810 V72.81       2. Cataract of both eyes, unspecified cataract type  H26.9 366.9       3. Mild persistent asthma without complication  J45.30 493.90       4. Essential hypertension  I10 401.9       5. Morbid obesity with BMI of 40.0-44.9, adult  E66.01 278.01     Z68.41 V85.41       6. Stage 3b chronic kidney disease  N18.32 585.3          Plan:   1. Preop cardiovascular exam (Primary)  Patient may proceed with surgery with low risk of perioperative cardiovascular complications.  Tran perioperative risk score: .8%  Patients whose estimated risk of death is less than 1 percent are labeled as being low risk and require no additional cardiovascular testing.    Higher-risk patients -- Patients whose risk of death is 1 percent or higher may require additional cardiovascular evaluation.    1. Preoperative workup as follows hemoglobin, hematocrit, electrolytes, creatinine, glucose 4/2025.  2. Change in medication regimen before surgery: AVOID NSAIDS FOR 5-7 DAYS PRIOR TO PROCEDURE.  Hold eliquis for 2 days before the procedure and resume day after procedure once stable.  Continue medication regimen including morning of surgery, with sip of water.  3. Prophylaxis for cardiac events with perioperative beta-blockers: not indicated.  4. Invasive hemodynamic monitoring perioperatively: at the discretion of anesthesiologist.  5. Deep vein thrombosis prophylaxis postoperatively:regimen to be chosen by surgical team.  6. Surveillance for postoperative MI with ECG immediately postoperatively and on postoperative days 1 and 2 AND troponin levels 24 hours postoperatively and on day 4 or hospital discharge (whichever comes first): at the discretion of anesthesiologist.        2. Cataract of both eyes, unspecified cataract type  Proceed with surgery as planned    3. Mild persistent asthma without complication  Cont current regimen and pulm consult.  Use  "albuterol prn    4. Essential hypertension  Controlled on current medications.  Continue current medications.      5. Morbid obesity with BMI of 40.0-44.9, adult  Counseled patient on his ideal body weight, health consequences of being obese and current recommendations including weekly exercise and a heart healthy diet.  Current BMI is:Estimated body mass index is 40.38 kg/m² as calculated from the following:    Height as of this encounter: 5' 4" (1.626 m).    Weight as of this encounter: 106.7 kg (235 lb 3.7 oz)..  Patient is aware that ideal BMI < 25 or Weight in (lb) to have BMI = 25: 145.3.,    6. Stage 3b chronic kidney disease  Stable and chronic.  Will continue to monitor q3-6 months and control chronic conditions as optimally as possible to preserve function.  Assessment & Plan    - Discussed the accordion-like nature of lung expansion and the benefits of deep breathing, especially after using inhalers.  - Started new triple-combination inhaler (likely Trelegy), to be used daily.  - Started as-needed inhaler for shortness of breath.  - Discontinued current inhaler (Dulera).  - Continue Eliquis with instructions to stop 2 days before each cataract surgery and restart immediately post-procedure.  - Follow up for pre-op clearance to Dr. Carson for cataract surgery scheduled on July 9th for right eye and July 30th for left eye.  - Follow up as scheduled, which will include labs.           Time spent with patient: 20 minutes  Patient with be reevaluated in prn or sooner prn  Greater than 50% of this visit was spent counseling as described in above documentation:Yes  This note was generated with the assistance of ambient listening technology. Verbal consent was obtained by the patient and accompanying visitor(s) for the recording of patient appointment to facilitate this note. I attest to having reviewed and edited the generated note for accuracy, though some syntax or spelling errors may persist. Please contact " the author of this note for any clarification.            [1]   Patient Active Problem List  Diagnosis    Hypothyroid    Nuclear sclerosis    Hyperopia with astigmatism and presbyopia    DDD (degenerative disc disease), lumbar    Morbid obesity with BMI of 40.0-44.9, adult    Calcified granuloma of lung    History of colon cancer    Lumbar radiculitis    Other spondylosis, lumbar region    Chronic right-sided low back pain without sciatica    Vitamin D deficiency    Essential hypertension    Malignant neoplasm of colon    Decreased functional mobility    Muscle tightness    Decreased strength    Decreased range of motion    Venous lake of lip    GERD (gastroesophageal reflux disease)    Adenocarcinoma    DVT (deep vein thrombosis) in pregnancy    History of pulmonary embolus (PE)    Malignant neoplasm of splenic flexure    Insomnia    Chemotherapy-induced fatigue    Other low back pain    S/P IVC filter    Left renal mass    Port-A-Cath in place    Iron deficiency anemia    Immunodeficiency due to chemotherapy    Atherosclerosis of aorta    Class 3 severe obesity due to excess calories with serious comorbidity and body mass index (BMI) of 40.0 to 44.9 in adult    S/P partial colectomy    Stage 3b chronic kidney disease    Pulmonary fibrosis, unspecified    Pneumonia    Influenza A    Acute cystitis without hematuria    Exacerbation of asthma    Abnormal CT of the chest    Other osteoporosis without current pathological fracture

## 2025-07-28 ENCOUNTER — LAB VISIT (OUTPATIENT)
Dept: LAB | Facility: HOSPITAL | Age: 85
End: 2025-07-28
Attending: FAMILY MEDICINE
Payer: MEDICARE

## 2025-07-28 DIAGNOSIS — D50.9 IRON DEFICIENCY ANEMIA, UNSPECIFIED IRON DEFICIENCY ANEMIA TYPE: ICD-10-CM

## 2025-07-28 DIAGNOSIS — E03.4 HYPOTHYROIDISM DUE TO ACQUIRED ATROPHY OF THYROID: ICD-10-CM

## 2025-07-28 LAB
ABSOLUTE EOSINOPHIL (OHS): 0.11 K/UL
ABSOLUTE MONOCYTE (OHS): 0.49 K/UL (ref 0.3–1)
ABSOLUTE NEUTROPHIL COUNT (OHS): 3.37 K/UL (ref 1.8–7.7)
ALBUMIN SERPL BCP-MCNC: 3.6 G/DL (ref 3.5–5.2)
ALP SERPL-CCNC: 41 UNIT/L (ref 40–150)
ALT SERPL W/O P-5'-P-CCNC: 12 UNIT/L (ref 0–55)
ANION GAP (OHS): 8 MMOL/L (ref 8–16)
AST SERPL-CCNC: 17 UNIT/L (ref 0–50)
BASOPHILS # BLD AUTO: 0.03 K/UL
BASOPHILS NFR BLD AUTO: 0.6 %
BILIRUB SERPL-MCNC: 0.9 MG/DL (ref 0.1–1)
BUN SERPL-MCNC: 13 MG/DL (ref 8–23)
CALCIUM SERPL-MCNC: 8.5 MG/DL (ref 8.7–10.5)
CHLORIDE SERPL-SCNC: 111 MMOL/L (ref 95–110)
CO2 SERPL-SCNC: 20 MMOL/L (ref 23–29)
CREAT SERPL-MCNC: 1 MG/DL (ref 0.5–1.4)
ERYTHROCYTE [DISTWIDTH] IN BLOOD BY AUTOMATED COUNT: 13.3 % (ref 11.5–14.5)
FERRITIN SERPL-MCNC: 144 NG/ML (ref 20–300)
GFR SERPLBLD CREATININE-BSD FMLA CKD-EPI: 56 ML/MIN/1.73/M2
GLUCOSE SERPL-MCNC: 86 MG/DL (ref 70–110)
HCT VFR BLD AUTO: 39.4 % (ref 37–48.5)
HGB BLD-MCNC: 12.4 GM/DL (ref 12–16)
IMM GRANULOCYTES # BLD AUTO: 0.03 K/UL (ref 0–0.04)
IMM GRANULOCYTES NFR BLD AUTO: 0.6 % (ref 0–0.5)
IRON SATN MFR SERPL: 30 % (ref 20–50)
IRON SERPL-MCNC: 92 UG/DL (ref 30–160)
LYMPHOCYTES # BLD AUTO: 1.33 K/UL (ref 1–4.8)
MCH RBC QN AUTO: 30.3 PG (ref 27–31)
MCHC RBC AUTO-ENTMCNC: 31.5 G/DL (ref 32–36)
MCV RBC AUTO: 96 FL (ref 82–98)
NUCLEATED RBC (/100WBC) (OHS): 0 /100 WBC
PLATELET # BLD AUTO: 201 K/UL (ref 150–450)
PMV BLD AUTO: 10.6 FL (ref 9.2–12.9)
POTASSIUM SERPL-SCNC: 4 MMOL/L (ref 3.5–5.1)
PROT SERPL-MCNC: 6.5 GM/DL (ref 6–8.4)
RBC # BLD AUTO: 4.09 M/UL (ref 4–5.4)
RELATIVE EOSINOPHIL (OHS): 2.1 %
RELATIVE LYMPHOCYTE (OHS): 24.8 % (ref 18–48)
RELATIVE MONOCYTE (OHS): 9.1 % (ref 4–15)
RELATIVE NEUTROPHIL (OHS): 62.8 % (ref 38–73)
SODIUM SERPL-SCNC: 139 MMOL/L (ref 136–145)
T4 FREE SERPL-MCNC: 0.98 NG/DL (ref 0.71–1.51)
TIBC SERPL-MCNC: 303 UG/DL (ref 250–450)
TRANSFERRIN SERPL-MCNC: 205 MG/DL (ref 200–375)
TSH SERPL-ACNC: 5.02 UIU/ML (ref 0.4–4)
WBC # BLD AUTO: 5.36 K/UL (ref 3.9–12.7)

## 2025-07-28 PROCEDURE — 85025 COMPLETE CBC W/AUTO DIFF WBC: CPT

## 2025-07-28 PROCEDURE — 36415 COLL VENOUS BLD VENIPUNCTURE: CPT | Mod: PO

## 2025-07-28 PROCEDURE — 80053 COMPREHEN METABOLIC PANEL: CPT

## 2025-07-28 PROCEDURE — 84443 ASSAY THYROID STIM HORMONE: CPT

## 2025-07-28 PROCEDURE — 84439 ASSAY OF FREE THYROXINE: CPT

## 2025-07-28 PROCEDURE — 82728 ASSAY OF FERRITIN: CPT

## 2025-07-28 PROCEDURE — 84466 ASSAY OF TRANSFERRIN: CPT

## 2025-07-31 ENCOUNTER — TELEPHONE (OUTPATIENT)
Dept: FAMILY MEDICINE | Facility: CLINIC | Age: 85
End: 2025-07-31
Payer: MEDICARE

## 2025-07-31 NOTE — TELEPHONE ENCOUNTER
Left voicemail for patient to call the office back.      ----- Message from Yanique sent at 7/31/2025 10:16 AM CDT -----  Regarding: Pt advice  Contact: Called check in line  Patient stated she missed a call and was wondering if it was from someone on Judy team?  Can someone please call her back with an update?    [phone]

## 2025-08-04 ENCOUNTER — HOSPITAL ENCOUNTER (OUTPATIENT)
Dept: RADIOLOGY | Facility: HOSPITAL | Age: 85
Discharge: HOME | End: 2025-08-04
Attending: INTERNAL MEDICINE
Payer: MEDICARE

## 2025-08-04 DIAGNOSIS — Z85.038 HISTORY OF COLON CANCER: ICD-10-CM

## 2025-08-04 PROCEDURE — A9698 NON-RAD CONTRAST MATERIALNOC: HCPCS | Mod: PO | Performed by: INTERNAL MEDICINE

## 2025-08-04 PROCEDURE — 25500020 PHARM REV CODE 255: Mod: PO | Performed by: INTERNAL MEDICINE

## 2025-08-04 PROCEDURE — 71260 CT THORAX DX C+: CPT | Mod: TC,PO

## 2025-08-04 RX ADMIN — IOHEXOL 1000 ML: 12 SOLUTION ORAL at 11:08

## 2025-08-04 RX ADMIN — IOHEXOL 100 ML: 350 INJECTION, SOLUTION INTRAVENOUS at 11:08

## 2025-08-05 ENCOUNTER — OFFICE VISIT (OUTPATIENT)
Dept: FAMILY MEDICINE | Facility: CLINIC | Age: 85
End: 2025-08-05
Payer: MEDICARE

## 2025-08-05 VITALS
BODY MASS INDEX: 38.73 KG/M2 | WEIGHT: 226.88 LBS | SYSTOLIC BLOOD PRESSURE: 136 MMHG | HEART RATE: 82 BPM | OXYGEN SATURATION: 95 % | HEIGHT: 64 IN | DIASTOLIC BLOOD PRESSURE: 60 MMHG | TEMPERATURE: 98 F | RESPIRATION RATE: 20 BRPM

## 2025-08-05 DIAGNOSIS — N18.32 STAGE 3B CHRONIC KIDNEY DISEASE: ICD-10-CM

## 2025-08-05 DIAGNOSIS — M48.061 SPINAL STENOSIS OF LUMBAR REGION, UNSPECIFIED WHETHER NEUROGENIC CLAUDICATION PRESENT: ICD-10-CM

## 2025-08-05 DIAGNOSIS — G89.29 CHRONIC BILATERAL LOW BACK PAIN WITHOUT SCIATICA: ICD-10-CM

## 2025-08-05 DIAGNOSIS — E78.2 MIXED HYPERLIPIDEMIA: ICD-10-CM

## 2025-08-05 DIAGNOSIS — I10 ESSENTIAL HYPERTENSION: Primary | ICD-10-CM

## 2025-08-05 DIAGNOSIS — E55.9 VITAMIN D DEFICIENCY: ICD-10-CM

## 2025-08-05 DIAGNOSIS — M81.0 OSTEOPOROSIS, UNSPECIFIED OSTEOPOROSIS TYPE, UNSPECIFIED PATHOLOGICAL FRACTURE PRESENCE: ICD-10-CM

## 2025-08-05 DIAGNOSIS — M54.50 CHRONIC BILATERAL LOW BACK PAIN WITHOUT SCIATICA: ICD-10-CM

## 2025-08-05 DIAGNOSIS — E03.9 HYPOTHYROIDISM: ICD-10-CM

## 2025-08-05 DIAGNOSIS — R73.9 HYPERGLYCEMIA: ICD-10-CM

## 2025-08-05 PROCEDURE — G2211 COMPLEX E/M VISIT ADD ON: HCPCS | Mod: S$GLB,,, | Performed by: FAMILY MEDICINE

## 2025-08-05 PROCEDURE — 3288F FALL RISK ASSESSMENT DOCD: CPT | Mod: CPTII,S$GLB,, | Performed by: FAMILY MEDICINE

## 2025-08-05 PROCEDURE — 1101F PT FALLS ASSESS-DOCD LE1/YR: CPT | Mod: CPTII,S$GLB,, | Performed by: FAMILY MEDICINE

## 2025-08-05 PROCEDURE — 3075F SYST BP GE 130 - 139MM HG: CPT | Mod: CPTII,S$GLB,, | Performed by: FAMILY MEDICINE

## 2025-08-05 PROCEDURE — 99999 PR PBB SHADOW E&M-EST. PATIENT-LVL III: CPT | Mod: PBBFAC,,, | Performed by: FAMILY MEDICINE

## 2025-08-05 PROCEDURE — 1126F AMNT PAIN NOTED NONE PRSNT: CPT | Mod: CPTII,S$GLB,, | Performed by: FAMILY MEDICINE

## 2025-08-05 PROCEDURE — 3078F DIAST BP <80 MM HG: CPT | Mod: CPTII,S$GLB,, | Performed by: FAMILY MEDICINE

## 2025-08-05 PROCEDURE — 1160F RVW MEDS BY RX/DR IN RCRD: CPT | Mod: CPTII,S$GLB,, | Performed by: FAMILY MEDICINE

## 2025-08-05 PROCEDURE — 99214 OFFICE O/P EST MOD 30 MIN: CPT | Mod: S$GLB,,, | Performed by: FAMILY MEDICINE

## 2025-08-05 PROCEDURE — 1159F MED LIST DOCD IN RCRD: CPT | Mod: CPTII,S$GLB,, | Performed by: FAMILY MEDICINE

## 2025-08-05 RX ORDER — TRAMADOL HYDROCHLORIDE 50 MG/1
50 TABLET, FILM COATED ORAL EVERY 12 HOURS PRN
Qty: 60 EACH | Refills: 5 | Status: SHIPPED | OUTPATIENT
Start: 2025-08-05

## 2025-08-05 RX ORDER — LEVOTHYROXINE SODIUM 100 UG/1
100 TABLET ORAL
Qty: 90 TABLET | Refills: 3 | Status: SHIPPED | OUTPATIENT
Start: 2025-08-05 | End: 2026-08-05

## 2025-08-05 NOTE — PROGRESS NOTES
Subjective:       Patient ID: Danna Sorensen is a 85 y.o. female.    Chief Complaint: Follow-up (6mth f/u)    Problem List[1]  Patient is here for a chronic conditions follow up.    Reviewed labs 8/2025  History of Present Illness    CHIEF COMPLAINT:  Ms. Sorensen presents today for follow-up after cataract surgery.    OPHTHALMOLOGIC:  She underwent bilateral cataract surgery with the second eye completed on Wednesday. She reports significant vision improvement and currently wears eye protection due to glare. Her ophthalmologist recommends waiting one month post-surgery before evaluating the need for reading glasses.    OSTEOPOROSIS:  She has persistent osteoporosis with minimal change noted on bone density scan from February compared to 2022. She previously took Fosamax without side effects but discontinued per physician recommendation. She is unable to receive Prolia shots due to financial constraints.    CHRONIC BACK PAIN:  She reports ongoing back pain managed primarily with Tylenol over the past few months. She has tried multiple pain medications without success. She experienced adverse reactions to Lyrica and Gabapentin, and reports Tramadol provides minimal pain relief.    MEDICAL HISTORY:  She has a history of anemia which has resolved based on recent lab results. She continues routine follow-up with hematology oncology including regular labs. She occasionally experiences mild sluggishness but denies significant thyroid-related symptoms. CT was performed yesterday.    CURRENT MEDICATIONS:  She takes Vitamin D 2000 IU daily, multivitamin for women over 50, Eliquis, thyroid medication (changed in February), and Breo inhaler. She reports improvement in respiratory symptoms with reduced coughing and wheezing with Breo.      ROS:  ROS findings as noted in HPI. Prolia too costly. Tolerated fosamax well.  Wants to resume it. 2025 showed no change on fosamax since 2022 but no decline.         Review of Systems    Constitutional:  Negative for fatigue and unexpected weight change.   Respiratory:  Negative for chest tightness and shortness of breath.    Cardiovascular:  Negative for chest pain, palpitations and leg swelling.   Gastrointestinal:  Negative for abdominal pain.   Musculoskeletal:  Negative for arthralgias.   Neurological:  Negative for dizziness, syncope, light-headedness and headaches.      Relevant History:  Lives with niece Barbie-lots of support     Endocrine hypothyroid synthroid 75mcg      Pulm Dr. Romero asthma on dulera      Psych C/o trouble sleeping. Trazodone  mg no help. Told to try melatonin 5 mg      Urology NP O'Kristine left renal mass suspected malignancy. Mri showed benign findings     GI Surg Dr. Chavez and Surgery Dr. Evans colon cancer Heme/onc NP Viraj following for colon cancer s/p resection and chemo 2007 with recent recurrence s/p extended robotic colectomy of the  transverse colon and splenic flexure with ileo colic anastomosis.  This was performed on September 29th, 2023 She did however have perineural and lymphovascular invasion.    Also of note she does have a known suspected malignancy of the left kidney.    port-a-cath placement 11/15/23.  chemo started 12/23  PET 11/2023 Patient is status post resection of colon carcinoma with no findings to indicate metastatic or recurrent disease.  Lung nodules, some of which are unchanged since 2007, benign, and others of which are new since 2007 but stable compared to 08/25/2023.  Continued CT follow-up is recommended. Last CT 6/2024  no new mets, stable pulm nodules, stable kidney mass suggestive of angiomyolipoma.   Colonoscopy  8/12/24 -no masses or polyps repeat 3 years.  5FU x 6 months completed 5/2024.  CT /PET 9/2024  Postoperative changes of partial colectomy.  No evidence of local disease recurrence or new metastasis.  2. Stable bilateral subcentimeter pulmonary nodules.  3. 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  4. Multiple ventral abdominal wall hernias with one containing bowel.  5. Cholelithiasis.  6. Additional findings as above.  Port a cath has been removed     Vasc surg Dr. Verdin h/o DVT s/p IVC filter 9/2023 on eliquis. Has chronic d dimer elevation     GI Dr. Pinon planning egd 8/3/23 and colonoscopy 8/8/23 . Having breakthrough heartburn sx even after starting omeprazole 40mg a day. Increased to bid 4/2023-has helped a lot. Colon cancer 2023 s/p partial colectomy         Heme/onc mild normocytic anemia-slightly improved. Iron, folate and b12 normal 5/22. Colon cancer 2023-s/p partial colectomy     Rheum Dexa 5/22 osteoporosis on fosomax 5/22. Dexa osteoporosis 5/22  on fosamax since 11/2023.     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 Started on lyrica 50mg 2 bid-caused side effects-stopped. PT completed 2/22 not much help. Flexeril prn . Tramadol 50mg #60 lasts 2 months or more for low back pain-helps but still waking up from sleep with pain.  Tylenol also helps Sees chiropractor Dr. Stearns. Has gradually worsening of lower back pain. Ache that radiates to right hip. No paresthesias. No B/B incontinence.  Gabapentin- nausea.  Tried PT, KHARI and radiofrequency ablation without relief.       Eye Optom Dr. Berrios treating cataracts        Breast Had mri brast 2018- high risk.  3/22 mri breast negative. Cannot tolerate mammo.  Objective:      Physical Exam  Vitals and nursing note reviewed.   Constitutional:       Appearance: She is well-developed.   Cardiovascular:      Rate and Rhythm: Normal rate and regular rhythm.      Heart sounds: Normal heart sounds.   Pulmonary:      Effort: Pulmonary effort is normal.      Breath sounds: Normal breath sounds.   Skin:     General: Skin is warm and dry.   Neurological:      Mental Status: She is alert and oriented to person, place, and time.         Assessment:       ICD-10-CM ICD-9-CM    1. Essential hypertension  I10 401.9        2. Osteoporosis, unspecified osteoporosis type, unspecified pathological fracture presence  M81.0 733.00       3. Hypothyroidism  E03.9 244.9 levothyroxine (SYNTHROID) 100 MCG tablet      TSH      T4, Free      4. Stage 3b chronic kidney disease  N18.32 585.3       5. Hyperglycemia  R73.9 790.29 Comprehensive Metabolic Panel      Hemoglobin A1C      6. Chronic bilateral low back pain without sciatica  M54.50 724.2 traMADoL (ULTRAM) 50 mg tablet    G89.29 338.29       7. Spinal stenosis of lumbar region, unspecified whether neurogenic claudication present  M48.061 724.02 traMADoL (ULTRAM) 50 mg tablet      8. Vitamin D deficiency  E55.9 268.9 Vitamin D      9. Mixed hyperlipidemia  E78.2 272.2 Lipid Panel         Plan:   1. Osteoporosis, unspecified osteoporosis type, unspecified pathological fracture presence  D/c prolia and restart fosamax weekly. Your bone density is abnormal.  Your test showed you have significant bone loss in the osteoporosis range.  I recommend regular exercise specifically to add bone mass such as light weight lifting for the upper body and anti-gravity exercises like walking for the lower body and spine.  I also recommend over-the-counter calcium 500 mg + vitamin D 200 units supplements-once daily if you get 2-3 servings of dairy per day in your diet or twice daily if not.  Take them with food.  You should avoid smoking and plan to repeat your bone density in 1-2 years.        2. Hypothyroidism  Insufficient . Increase to  - levothyroxine (SYNTHROID) 100 MCG tablet; Take 1 tablet (100 mcg total) by mouth before breakfast.  Dispense: 90 tablet; Refill: 3  - TSH; Future  - T4, Free; Future    3. Essential hypertension (Primary)  Controlled on current medications.  Continue current medications.      4. Stage 3b chronic kidney disease  Stable and chronic.  Will continue to monitor q3-6 months and control chronic conditions as optimally as possible to preserve function.      5.  Hyperglycemia   Your blood sugar is borderline high.  This means you are at risk for developing type 2 diabetes mellitus.  To lessen your risk you should exercise regularly, avoid excess carbohydrates and work toward a body mass index of less than 25.      - Comprehensive Metabolic Panel; Future  - Hemoglobin A1C; Future    6. Chronic bilateral low back pain without sciatica  Cont current mgmt  - traMADoL (ULTRAM) 50 mg tablet; Take 1 tablet (50 mg total) by mouth every 12 (twelve) hours as needed for Pain.  Dispense: 60 each; Refill: 5    7. Spinal stenosis of lumbar region, unspecified whether neurogenic claudication present  Cont current mgmt  - traMADoL (ULTRAM) 50 mg tablet; Take 1 tablet (50 mg total) by mouth every 12 (twelve) hours as needed for Pain.  Dispense: 60 each; Refill: 5    8. Vitamin D deficiency  Cont supplement and monitor  - Vitamin D; Future    9. Mixed hyperlipidemia  I recommend a heart healthy diet rich in fiber, fresh vegetables and fruit and low in saturated fats (fried foods, red meat, etc.).  I also recommend regular exercise including a minimum of 150 minutes of cardio exercise per week and 2-30 minute workouts of strength training like light weights, yoga, pilates, etc.  You should work toward a body mass index of < 25.      - Lipid Panel; Future  Assessment & Plan    - Explained importance of weight-bearing exercise and calcium intake for bone health.  - Ms. Sorensen to engage in regular walking and perform light weightlifting exercises (2-3 pound weights) for upper body strength while seated, to support bone health and overall fitness.  - Started calcium supplement 500-600 mg daily.  - Continued Fosamax (alendronate) weekly as previously prescribed.  - Increased levothyroxine to 100 mcg daily after finishing current supply.  - Ordered thyroid levels and other labs in 6 months.  - Started combination of Tylenol (acetaminophen) and tramadol, 1 of each, twice daily for pain management  with additional refills.  - Ms. Sorensen to monitor for signs of sedation when starting new pain medication regimen.  - Continued Eliquis as previously prescribed.         Time spent with patient: 20 minutes  Patient with be reevaluated in 6 months or sooner prn  Greater than 50% of this visit was spent counseling as described in above documentation:Yes  This note was generated with the assistance of ambient listening technology. Verbal consent was obtained by the patient and accompanying visitor(s) for the recording of patient appointment to facilitate this note. I attest to having reviewed and edited the generated note for accuracy, though some syntax or spelling errors may persist. Please contact the author of this note for any clarification.            [1]   Patient Active Problem List  Diagnosis    Hypothyroid    Nuclear sclerosis    Hyperopia with astigmatism and presbyopia    DDD (degenerative disc disease), lumbar    Morbid obesity with BMI of 40.0-44.9, adult    Calcified granuloma of lung    History of colon cancer    Lumbar radiculitis    Other spondylosis, lumbar region    Chronic right-sided low back pain without sciatica    Vitamin D deficiency    Essential hypertension    Malignant neoplasm of colon    Decreased functional mobility    Muscle tightness    Decreased strength    Decreased range of motion    Venous lake of lip    GERD (gastroesophageal reflux disease)    Adenocarcinoma    DVT (deep vein thrombosis) in pregnancy    History of pulmonary embolus (PE)    Malignant neoplasm of splenic flexure    Insomnia    Chemotherapy-induced fatigue    Other low back pain    S/P IVC filter    Left renal mass    Port-A-Cath in place    Iron deficiency anemia    Immunodeficiency due to chemotherapy    Atherosclerosis of aorta    Class 3 severe obesity due to excess calories with serious comorbidity and body mass index (BMI) of 40.0 to 44.9 in adult    S/P partial colectomy    Stage 3b chronic kidney disease     Pulmonary fibrosis, unspecified    Pneumonia    Influenza A    Acute cystitis without hematuria    Exacerbation of asthma    Abnormal CT of the chest    Other osteoporosis without current pathological fracture

## 2025-08-07 ENCOUNTER — OFFICE VISIT (OUTPATIENT)
Dept: HEMATOLOGY/ONCOLOGY | Facility: CLINIC | Age: 85
End: 2025-08-07
Payer: MEDICARE

## 2025-08-07 VITALS
BODY MASS INDEX: 38.62 KG/M2 | DIASTOLIC BLOOD PRESSURE: 66 MMHG | TEMPERATURE: 98 F | HEIGHT: 64 IN | SYSTOLIC BLOOD PRESSURE: 128 MMHG | WEIGHT: 226.19 LBS | HEART RATE: 58 BPM | RESPIRATION RATE: 20 BRPM | OXYGEN SATURATION: 95 %

## 2025-08-07 DIAGNOSIS — N28.89 RENAL MASS, LEFT: ICD-10-CM

## 2025-08-07 DIAGNOSIS — D68.59 THROMBOPHILIA: ICD-10-CM

## 2025-08-07 DIAGNOSIS — E53.8 B12 DEFICIENCY: ICD-10-CM

## 2025-08-07 DIAGNOSIS — R91.8 PULMONARY NODULES: ICD-10-CM

## 2025-08-07 DIAGNOSIS — Z85.038 HISTORY OF COLON CANCER: Primary | ICD-10-CM

## 2025-08-07 PROCEDURE — 99999 PR PBB SHADOW E&M-EST. PATIENT-LVL IV: CPT | Mod: PBBFAC,,, | Performed by: INTERNAL MEDICINE

## (undated) DEVICE — NDL SAFETY 25G X 1.5 ECLIPSE

## (undated) DEVICE — SYR 10CC LUER LOCK

## (undated) DEVICE — APPLICATOR CHLORAPREP CLR 10.5

## (undated) DEVICE — Device

## (undated) DEVICE — GLOVE SURG ULTRA TOUCH 7.5

## (undated) DEVICE — TOWEL OR DISP STRL BLUE 4/PK

## (undated) DEVICE — GLOVE SENSICARE PI GRN 7.5

## (undated) DEVICE — IRRIGATOR SUCTION W/TIP

## (undated) DEVICE — GOWN POLY REINF BRTH SLV XL

## (undated) DEVICE — SUT 2-0 VICRYL / SH (J417)

## (undated) DEVICE — RELOAD PROXIMATE CUT BLUE 75MM

## (undated) DEVICE — DRAPE C ARM 42 X 120 10/BX

## (undated) DEVICE — CUTTER PROXIMATE BLUE 75MM

## (undated) DEVICE — SEALER VESSEL EXTEND

## (undated) DEVICE — STOPCOCK DISCOFIX 3 WAY

## (undated) DEVICE — GLOVE BIOGEL SKINSENSE PI 7.5

## (undated) DEVICE — SEAL UNIVERSAL 5MM-8MM XI

## (undated) DEVICE — NDL SPINAL SPINOCAN 22GX3.5

## (undated) DEVICE — SUT VLOC 90 3-0 V-20 NDL 9

## (undated) DEVICE — SYS LABEL CORRECT MED

## (undated) DEVICE — TROCAR ENDOPATH XCEL 12X100MM

## (undated) DEVICE — GLOVE SENSICARE PI GRN 6.5

## (undated) DEVICE — DRESSING TRANS 4X4 TEGADERM

## (undated) DEVICE — SHEATH PINNACLE 6FRX10CM W/GUIDEWIRE

## (undated) DEVICE — CANNULA CVD 100MM X 20G

## (undated) DEVICE — STRIP MEDI WND CLSR 1/2X4IN

## (undated) DEVICE — APPLICATOR CHLORAPREP ORN 26ML

## (undated) DEVICE — OBTURATOR BLADELESS 8MM XI CLR

## (undated) DEVICE — SUT 3-0 VICRYL SH CR/8 18

## (undated) DEVICE — BLADE SURG CARBON STEEL SZ11

## (undated) DEVICE — GLOVE SURG BIOGEL LATEX SZ 7.5

## (undated) DEVICE — SUT 0 VICRYL / CT-1

## (undated) DEVICE — GOWN POLY REINF X-LONG XL

## (undated) DEVICE — LEGGING CLEAR POLY 2/PACK

## (undated) DEVICE — SUT 1 36IN PDS II

## (undated) DEVICE — NDL HYPODERMIC BLUNT 18G 1.5IN

## (undated) DEVICE — ELECTRODE REM PLYHSV RETURN 9

## (undated) DEVICE — SOL IRRI STRL WATER 1000ML

## (undated) DEVICE — JELLY SURGILUBE LUBE TUBE 2OZ

## (undated) DEVICE — TUBING MINIBORE EXTENSION

## (undated) DEVICE — DRAPE ARM DAVINCI XI

## (undated) DEVICE — SOL CLEARIFY VISUALIZATION LAP

## (undated) DEVICE — SUT SILK 0 STRANDS 30IN BLK

## (undated) DEVICE — SYRINGE SAFETY LOK 5CC 305558

## (undated) DEVICE — TRAY CATH FOL SIL URIMTR 16FR

## (undated) DEVICE — SUT MONOCRYL 4-0 PS-2

## (undated) DEVICE — SYR ONLY LUER LOCK 20CC

## (undated) DEVICE — COVER TIP CURVED SCISSORS XI

## (undated) DEVICE — SHEET DRAPE MEDIUM

## (undated) DEVICE — SYR LUER LOCK STERILE 10ML

## (undated) DEVICE — UNDERGLOVES BIOGEL PI SZ 7 LF

## (undated) DEVICE — DRAPE T TRNSVRS LAP 102X78X121

## (undated) DEVICE — PACK SIRUS BASIC V SURG STRL

## (undated) DEVICE — SLEEVE SCD EXPRESS KNEE MEDIUM

## (undated) DEVICE — STRAP OR TABLE 5IN X 72IN

## (undated) DEVICE — PAD PINK TRENDELENBURG POS XL

## (undated) DEVICE — GLOVE SENSICARE PI GRN 6

## (undated) DEVICE — LINER SUCTION 3000CC

## (undated) DEVICE — GLOVE SENSICARE PI ALOE 6

## (undated) DEVICE — SYR DISP LL 5CC

## (undated) DEVICE — SUT 3-0 VICRYL / SH (J416)

## (undated) DEVICE — GLOVE SENSICARE PI ALOE 7

## (undated) DEVICE — SUT SA85H SILK 2-0

## (undated) DEVICE — GLOVE SENSICARE PI GRN 7

## (undated) DEVICE — GLOVE SENSICARE PI ALOE 7.5

## (undated) DEVICE — GLOVE SURG ULTRA TOUCH 7

## (undated) DEVICE — NDL SURE-SNAP HYPO SAF 21GX1.5

## (undated) DEVICE — DRAPE UINDERBUT GRAD PCH

## (undated) DEVICE — SCRUB 10% POVIDONE IODINE 4OZ

## (undated) DEVICE — NDL TUOHY EPIDURAL 20G X 3.5

## (undated) DEVICE — KIT SAHARA DRAPE DRAW/LIFT

## (undated) DEVICE — CANNULA REDUCER 12-8MM

## (undated) DEVICE — SET TUBE PNEUMOCLEAR SE HI FLO

## (undated) DEVICE — SOL NACL IRR 3000ML

## (undated) DEVICE — TROCAR ENDOPATH XCEL 5X100MM

## (undated) DEVICE — DRAPE ABDOMINAL TIBURON 14X11

## (undated) DEVICE — DRAPE COLUMN DAVINCI XI

## (undated) DEVICE — SOL ELECTROLUBE ANTI-STIC

## (undated) DEVICE — PACK CUSTOM UNIV BASIN SLI

## (undated) DEVICE — SOL NACL IRR 1000ML BTL

## (undated) DEVICE — CLOSURE SKIN STERI STRIP 1/2X4

## (undated) DEVICE — NDL INSUF ULTRA VERESS 120MM

## (undated) DEVICE — GUIDEWIRE DOUBLE ENDED .035 DIA. 150CML

## (undated) DEVICE — CARTRIDGE BABCOCK GRASPER 5X45

## (undated) DEVICE — SPONGE BULKEE II ABSRB 6X6.75

## (undated) DEVICE — PENCIL ROCKER SWITCH 10FT CORD

## (undated) DEVICE — SPONGE DERMACEA 4X4IN 12PLY

## (undated) DEVICE — SUT EASE CROSSBOW CLSR SYS

## (undated) DEVICE — CANNULA SEAL 12MM

## (undated) DEVICE — PAD ELECTROSURGICAL PAT PLATE